# Patient Record
Sex: FEMALE | Race: WHITE | Employment: OTHER | URBAN - METROPOLITAN AREA
[De-identification: names, ages, dates, MRNs, and addresses within clinical notes are randomized per-mention and may not be internally consistent; named-entity substitution may affect disease eponyms.]

---

## 2017-01-24 ENCOUNTER — OFFICE VISIT (OUTPATIENT)
Dept: FAMILY MEDICINE CLINIC | Age: 67
End: 2017-01-24

## 2017-01-24 VITALS
OXYGEN SATURATION: 97 % | RESPIRATION RATE: 18 BRPM | TEMPERATURE: 98.6 F | HEART RATE: 84 BPM | WEIGHT: 197.2 LBS | HEIGHT: 68 IN | SYSTOLIC BLOOD PRESSURE: 140 MMHG | BODY MASS INDEX: 29.89 KG/M2 | DIASTOLIC BLOOD PRESSURE: 88 MMHG

## 2017-01-24 DIAGNOSIS — F41.8 DEPRESSION WITH ANXIETY: ICD-10-CM

## 2017-01-24 DIAGNOSIS — J44.9 CHRONIC OBSTRUCTIVE PULMONARY DISEASE, UNSPECIFIED COPD TYPE (HCC): ICD-10-CM

## 2017-01-24 DIAGNOSIS — I60.8 SUBARACHNOID HEMORRHAGE DUE TO RUPTURED ANEURYSM (HCC): Primary | ICD-10-CM

## 2017-01-24 DIAGNOSIS — I10 BENIGN ESSENTIAL HYPERTENSION: ICD-10-CM

## 2017-01-24 NOTE — PATIENT INSTRUCTIONS
Home Safety Alarms: Care Instructions  Your Care Instructions  Home safety alarms save lives. For example, a smoke alarm can detect small amounts of smoke. This can give you time to escape from a fire. And a carbon monoxide alarm can let you know about this deadly gas before it starts to make you sick. It's important to have both kinds of alarms near all the sleeping areas and on each level of your home. You can buy alarms with different features. For example, if you have a smoke alarm that is set off by steam or cooking smoke, you can buy one with a hush alarm. This lets you push a button that turns off the alarm and makes it less sensitive for a short time. If you put in new alarms, look for long-life alarms with lithium batteries. You may also want to look for ones that can detect both smoke and carbon monoxide. In a newer home, alarms are wired in by an . This type of alarm is electric, with a backup battery. Your local fire department can give you more information on how to prevent fires and carbon monoxide poisoning. They can also help you make a fire escape plan, use fire safety devices, and provide first-aid. Follow-up care is a key part of your treatment and safety. Be sure to make and go to all appointments, and call your doctor if you are having problems. It's also a good idea to know your test results and keep a list of the medicines you take. How can you care for yourself at home? · Install smoke alarms and carbon monoxide alarms in your house. · Put smoke alarms:  ¨ On each level of your home, in the hallway outside sleeping areas, and inside each bedroom. ¨ In the center of a ceiling, or on a wall 6 to 12 inches from the ceiling. This is where smoke goes first. Avoid places near doors, windows, or air ducts. · Put a carbon monoxide alarm in the hallway outside of the bedrooms in each sleeping area of the house. The alarm should be placed high on the wall.  Make sure that the alarm can't be covered up by furniture or drapes. · Make sure your safety alarms are working at all times. You can test them every month by pressing the test button. · If an alarm makes a chirping sound, replace the battery right away. · Replace non-lithium batteries twice a year. Put this on your calendar ahead of time. Some people change alarm batteries when they reset their clocks in the spring and fall. · Replace smoke alarms every 10 years. · Plan and practice fire escape routes. Make sure you have at least two for each area of your home. This includes upper stories and the basement. When should you call for help? Call 911 anytime you think you may need emergency care. For example, call if:  · A smoke or carbon monoxide alarm sounds. Tell everyone to get out of the building. Stand outside until firefighters arrive. Watch closely for changes in your health, and be sure to contact your doctor if you have questions about how to use a home safety alarm. Where can you learn more? Go to http://jama-obinna.info/. Enter S449 in the search box to learn more about \"Home Safety Alarms: Care Instructions. \"  Current as of: July 29, 2016  Content Version: 11.1  © 4296-8379 [employer]. Care instructions adapted under license by [employer] (which disclaims liability or warranty for this information). If you have questions about a medical condition or this instruction, always ask your healthcare professional. [place] [address] any warranty or liability for your use of this information.

## 2017-01-24 NOTE — PROGRESS NOTES
HISTORY OF PRESENT ILLNESS  Jack Philip is a 61 y.o. female. HPI  Patient presents accompanied by her daughter Cary Orellana who is also my patient and this patient's primary caregiver. Cary Orellana is on a new full time job but also cares for her mother which requires her to take time from work to take her to doctor's appointments and provide transportation for other services. Patient had a SAH s/p craniotomy in 2006 and suffered some resultant memory deficit and cognitive impairments along w/ exacerbation of her already chronic depression and anxiety. Since that time patient has required assistance w/ medications, some of her ADL's and all transportation. She also is followed by Pulmonary for COPD and requires f/u appts there about 2-3 x a year. There is nothing acute going on at this time. Patient has been stable in general for quite some time now w/ her daughter's care and support. Presently at her baseline. Her mood is good and stable on current regimen. Appetite, po intake, sleep, bowel/bladder patterns wnl. Self feeds and self grooms. Ambulates w/o asst devices but does have to stop to rest due to general decreased stamina and debility r/t her COPD. Also bc of some of her cognitive issues, she is not safe to commute alone, so one of her children always needs to accompany her. She lost her permanent handicap placard from [facility] and is requesting another form for this as well. BP's have been stable. Review of Systems   Constitutional: Negative. Respiratory: Negative. Cardiovascular: Negative. Gastrointestinal: Negative. Musculoskeletal: Negative for falls.      Problem List  Date Reviewed: 1/24/2017          Codes Class Noted    Benign essential hypertension ICD-10-CM: I10  ICD-9-CM: 401.1  6/24/2016        Impaired fasting glucose/Prediabetes ICD-10-CM: R73.01  ICD-9-CM: 790.21  7/13/2014    Overview Signed 7/13/2014 10:50 PM by Nicholas Webb MD     6/2014             Bilateral hip pain ICD-10-CM: M25.551, M25.552  ICD-9-CM: 719.45  6/3/2014    Overview Signed 6/3/2014  2:46 PM by Justine Lujan MD     xrays 2012, negative             Vitamin D deficiency ICD-10-CM: E55.9  ICD-9-CM: 268.9  6/3/2014    Overview Signed 6/3/2014  2:48 PM by Justine Lujan MD     2012             GERD (gastroesophageal reflux disease) ICD-10-CM: K21.9  ICD-9-CM: 530.81  9/2/2011        H/O Subarachnoid hemorrhage due to ruptured aneurysm ICD-10-CM: I60.8  ICD-9-CM: 430  7/14/2010    Overview Signed 7/14/2010  1:08 PM by Justine Lujan MD     10/10/06 S/P Rt Frontal Craniotomy and clipping, Dr Salud Carter 10/27/06             Essential tremor ICD-10-CM: G25.0  ICD-9-CM: 333.1  7/14/2010        Cough variant asthma ICD-10-CM: J45.991  ICD-9-CM: 493.82  3/29/2010        Tobacco Abuse ICD-10-CM: M44.792  ICD-9-CM: 305.1  3/29/2010    Overview Signed 3/29/2010  6:13 PM by [place] (allergic rhinitis) ICD-10-CM: J30.9  ICD-9-CM: 477.9  3/29/2010        ADD (attention deficit disorder) ICD-10-CM: F98.8  ICD-9-CM: 314.00  3/29/2010        Depression ICD-10-CM: F32.9  ICD-9-CM: 479  3/29/2010        Anxiety ICD-10-CM: F41.9  ICD-9-CM: 300.00  3/29/2010        COPD (chronic obstructive pulmonary disease) ([facility] ICD-10-CM: J44.9  ICD-9-CM: 496  3/29/2010    Overview Signed 11/10/2014  2:05 PM by Justine Lujan MD     Pulm Dr Jory Cheek             Thyroiditis, subacute ICD-10-CM: E06.1  ICD-9-CM: 245.1  3/29/2010        Hyperlipemia ICD-10-CM: P87.1  ICD-9-CM: 272.4  3/29/2010    Overview Signed 7/14/2010  1:05 PM by Justine Lujan MD     2005             DVT of Rt Lower Extremity ICD-10-CM: I82.409  ICD-9-CM: 453.40  3/29/2010    Overview Addendum 7/14/2010  1:04 PM by Justine Lujan MD     11/06             Elevated LFT's, Fatty Liver ICD-10-CM: N71.72  ICD-9-CM: 790.6  3/29/2010    Overview Addendum 7/14/2010  1:04 PM by Justine Lujan MD Abd US 7/09 Hepatic Steatosis                 Past Surgical History   Procedure Laterality Date    Hx hysterectomy  1991     Cervical Dysplasia (Dr Lyndsay Caban)   [facility] colonoscopy w/biopsy single/multiple  1980's     Benign polyp    Echo stress  2005     Negative    Hx craniotomy  10/06     for clipping of ruptured aneurysm; Dr Williams Corona a shunt    Hx colonoscopy  3/2013, Dr Kaylyn Ambriz     benign cecal polyp, f/u 10 yrs    Hx gi  EGD, Dr Kaylyn Ambriz     \"ok\" per pt report       OB History     No data available        Current Outpatient Prescriptions   Medication Sig    tiotropium (SPIRIVA) 18 mcg inhalation capsule Take 1 Cap by inhalation daily. Per Pulm Dr Nael Todd    hydrochlorothiazide (HYDRODIURIL) 25 mg tablet Take 1 Tab by mouth daily. Indications: HYPERTENSION    omeprazole (PRILOSEC) 20 mg capsule TAKE ONE CAPSULE BY MOUTH EVERY DAY    traZODone (DESYREL) 50 mg tablet TAKE 1 TABLET BY MOUTH NIGHTLY    sertraline (ZOLOFT) 100 mg tablet TAKE 1 & 1/2 TABLETS BY MOUTH EVERY DAY    atorvastatin (LIPITOR) 20 mg tablet TAKE 1 TABLET BY MOUTH EVERY DAY    dicyclomine (BENTYL) 10 mg capsule TAKE 1-2 CAPSULES BY MOUTH EVERY 6 HOURS AS NEEDED FOR ABDOMINAL CRAMPS    albuterol (VENTOLIN HFA) 90 mcg/actuation inhaler Take 2 Puffs by inhalation every six (6) hours as needed for Wheezing or Shortness of Breath.  budesonide-formoterol (SYMBICORT) 80-4.5 mcg/actuation HFAA inhaler Take 2 Puffs by inhalation two (2) times a day.  traMADol (ULTRAM) 50 mg tablet Take 1-2 Tabs by mouth daily as needed (for hip pain).  amlodipine (NORVASC) 10 mg tablet TAKE 1 TABLET BY MOUTH EVERY DAY    MULTIVITAMINS (MULTIVITAMIN PO) Take  by mouth daily.  ferrous sulfate (IRON) 325 mg (65 mg Iron) tablet Take  by mouth two (2) times a day. No current facility-administered medications for this visit. Allergies   Allergen Reactions    Ativan [Lorazepam] Hives    Egg Hives     Upset stomach.     Pcn [Penicillins] Other (comments)     Unsure of reaction.     Wellbutrin [Bupropion Hcl] Anxiety    Xanax [Alprazolam] Hives     Social History     Social History    Marital status:      Spouse name: N/A    Number of children: 3    Years of education: N/A     Occupational History    Former  for [employer]; ; involved mostly usage of computers, numbers, financial data, manufacturing data, employee supervision      Social History Main Topics    Smoking status: Current Every Day Smoker     Packs/day: 1.00     Years: 20.00     Types: Cigarettes    Smokeless tobacco: Never Used      Comment: cut back from 1ppd to 1/2-3/4 ppd and uses e cig sometimes    Alcohol use No    Drug use: No    Sexual activity: No     Other Topics Concern     Service No    Blood Transfusions No    Caffeine Concern Yes     1 pot of coffee a day (~ 6 cups)    Occupational Exposure No    Hobby Hazards No    Sleep Concern No    Stress Concern No    Weight Concern No     stable in the upper 190's to 205 range; has gained alot of wt over the years since her disability and not working anymore    Special Diet Yes     cut back on junk foods/sweets    Back Care No    Exercise Yes     walks around [place] 3 x a week x 20 minutes    Bike Helmet No    Seat Belt Yes    Self-Exams Yes     Social History Narrative        2 of her daughters live with her    No asst devices for ambulation; no canes or walkers    No longer drives since the [facility] and stroke    Daughters help w/ finances    Does other ADL's, dressing, bathing, grooming all on her own; some help w/ meal preparation; pt can use microwave and the stove when daughters are home    No toileting problems; uses independently     Immunization History   Administered Date(s) Administered    Pneumococcal Polysaccharide (PPSV-23) 07/10/2014       Family History   Problem Relation Age of Onset    Anxiety Daughter      ADD    Anxiety Daughter    [place] Efrain Thyroid Disease Daughter     Thyroid Disease Daughter      Hypothyroidism    Asthma Daughter     Cancer Father      kidney    Diabetes Father     Arthritis-osteo Father     Diabetes Mother     Hypertension Mother    Boby Lintont Mother    [de-identified] Stroke Mother     Diabetes Sister     Heart Disease Sister     Hypertension Sister    [de-identified] Arthritis-osteo Sister     Thyroid Disease Sister     Diabetes Maternal Grandmother     Diabetes Brother      Visit Vitals    /88 (BP 1 Location: Right arm, BP Patient Position: Sitting)    Pulse 84    Temp 98.6 °F (37 °C) (Oral)    Resp 18    Ht 5' 8\" (1.727 m)    Wt 197 lb 3.2 oz (89.4 kg)    SpO2 97%    BMI 29.98 kg/m2     Physical Exam   Constitutional: She is oriented to person, place, and time. She appears well-developed and well-nourished. No distress. Cardiovascular: Normal rate. Pulmonary/Chest: Effort normal and breath sounds normal.   Neurological: She is alert and oriented to person, place, and time. Coordination normal.   Psychiatric: She has a normal mood and affect. Her behavior is normal. Judgment and thought content normal.   Vitals reviewed. ASSESSMENT and PLAN    ICD-10-CM ICD-9-CM    1. H/O Subarachnoid hemorrhage due to ruptured aneurysm I60.8 430    2. Benign essential hypertension I10 401.1    3. Depression with anxiety F41.8 300.4    4. Chronic obstructive pulmonary disease, unspecified COPD type ([facility] J44.9 496        Primary reason for today's visit is to complete FMLA and DMV paperwork for her daughter Shaji Snyder to care for patient. Shaji Snyder is my patient and is also Ms. Fulton's primary caregiver. See details in HPI above. FMLA forms completed and faxed. Copy given to Shaji Snyder as well.   DMV forms for the permanent handicap placard completed today as well  Reviewed medications, effects and side effects in detail  Continue routine follow up w/ Pulm q3-6 months as directed as well  She is already scheduled for her fasting routine appt w/ me on 2-21-17.  RTC sooner prn

## 2017-01-24 NOTE — PROGRESS NOTES
Chief Complaint   Patient presents with    Medication Evaluation     `Needs FMLA papers filled out for her daughter to be able to get out of work to take her to Dr appointments     1. Have you been to the ER, urgent care clinic since your last visit? Hospitalized since your last visit? No    2. Have you seen or consulted any other health care providers outside of the [address] [place] [address] since your last visit? Include any pap smears or colon screening.  No

## 2017-01-24 NOTE — MR AVS SNAPSHOT
Visit Information Date & Time Provider Department Dept. Phone Encounter #  
 1/24/2017 10:30 AM Justine Lujan, [address] [facility] [phone] [id number] Your Appointments 2/21/2017 10:00 AM  
ROUTINE CARE with Justine Lujan MD  
[facility]) Appt Note: F/u and FMLA papers to be filled out  
 [address] [address]  
[phone]  
  
   
 [address] [address] Upcoming Health Maintenance Date Due  
 BREAST CANCER SCRN MAMMOGRAM 6/9/2016 INFLUENZA AGE 9 TO ADULT 8/1/2016 PAP AKA CERVICAL CYTOLOGY 6/24/2019 COLONOSCOPY 3/27/2023 DTaP/Tdap/Td series (2 - Td) 6/24/2026 Allergies as of 1/24/2017  Review Complete On: 1/24/2017 By: Justine Lujan MD  
  
 Severity Noted Reaction Type Reactions Ativan [Lorazepam]  03/29/2010    Hives Egg  07/14/2010    Hives Upset stomach. Pcn [Penicillins]  03/29/2010    Other (comments) Unsure of reaction. Wellbutrin [Bupropion Hcl]  03/29/2010    Anxiety Xanax [Alprazolam]  03/29/2010    Hives Current Immunizations  Reviewed on 6/24/2016 Name Date Pneumococcal Polysaccharide (PPSV-23) 7/10/2014 Not reviewed this visit You Were Diagnosed With   
  
 Codes Comments Subarachnoid hemorrhage due to ruptured aneurysm ([facility]    -  Primary ICD-10-CM: I60.8 ICD-9-CM: 383 Benign essential hypertension     ICD-10-CM: I10 
ICD-9-CM: 401.1 Depression with anxiety     ICD-10-CM: F41.8 ICD-9-CM: 300.4 Chronic obstructive pulmonary disease, unspecified COPD type ([facility]     ICD-10-CM: J44.9 ICD-9-CM: 796 Vitals BP Pulse Temp Resp Height(growth percentile) Weight(growth percentile) 140/88 (BP 1 Location: Right arm, BP Patient Position: Sitting) 84 98.6 °F (37 °C) (Oral) 18 5' 8\" (1.727 m) 197 lb 3.2 oz (89.4 kg) SpO2 BMI OB Status Smoking Status 97% 29.98 kg/m2 Hysterectomy Current Every Day Smoker BMI and BSA Data Body Mass Index Body Surface Area  
 29.98 kg/m 2 2.07 m 2 Preferred Pharmacy Pharmacy Name Phone [id number] [phone] Your Updated Medication List  
  
   
This list is accurate as of: 1/24/17 12:10 PM.  Always use your most recent med list. amLODIPine 10 mg tablet Commonly known as:  Sparkle Dmitry TAKE 1 TABLET BY MOUTH EVERY DAY  
  
 atorvastatin 20 mg tablet Commonly known as:  LIPITOR  
TAKE 1 TABLET BY MOUTH EVERY DAY  
  
 budesonide-formoterol 80-4.5 mcg/actuation Hfaa inhaler Commonly known as:  SYMBICORT Take 2 Puffs by inhalation two (2) times a day. dicyclomine 10 mg capsule Commonly known as:  BENTYL TAKE 1-2 CAPSULES BY MOUTH EVERY 6 HOURS AS NEEDED FOR ABDOMINAL CRAMPS  
  
 hydroCHLOROthiazide 25 mg tablet Commonly known as:  HYDRODIURIL Take 1 Tab by mouth daily. Indications: HYPERTENSION Iron 325 mg (65 mg iron) tablet Generic drug:  ferrous sulfate Take  by mouth two (2) times a day. MULTIVITAMIN PO Take  by mouth daily. omeprazole 20 mg capsule Commonly known as:  PRILOSEC  
TAKE ONE CAPSULE BY MOUTH EVERY DAY  
  
 sertraline 100 mg tablet Commonly known as:  ZOLOFT  
TAKE 1 & 1/2 TABLETS BY MOUTH EVERY DAY  
  
 tiotropium 18 mcg inhalation capsule Commonly known as:  Darlina Forts Take 1 Cap by inhalation daily. Per Bean Milton  
  
 traMADol 50 mg tablet Commonly known as:  ULTRAM  
Take 1-2 Tabs by mouth daily as needed (for hip pain). traZODone 50 mg tablet Commonly known as:  DESYREL  
TAKE 1 TABLET BY MOUTH NIGHTLY  
  
 VENTOLIN HFA 90 mcg/actuation inhaler Generic drug:  albuterol Take 2 Puffs by inhalation every six (6) hours as needed for Wheezing or Shortness of Breath. Patient Instructions Home Safety Alarms: Care Instructions Your Care Instructions Home safety alarms save lives. For example, a smoke alarm can detect small amounts of smoke. This can give you time to escape from a fire. And a carbon monoxide alarm can let you know about this deadly gas before it starts to make you sick. It's important to have both kinds of alarms near all the sleeping areas and on each level of your home. You can buy alarms with different features. For example, if you have a smoke alarm that is set off by steam or cooking smoke, you can buy one with a hush alarm. This lets you push a button that turns off the alarm and makes it less sensitive for a short time. If you put in new alarms, look for long-life alarms with lithium batteries. You may also want to look for ones that can detect both smoke and carbon monoxide. In a newer home, alarms are wired in by an . This type of alarm is electric, with a backup battery. Your local fire department can give you more information on how to prevent fires and carbon monoxide poisoning. They can also help you make a fire escape plan, use fire safety devices, and provide first-aid. Follow-up care is a key part of your treatment and safety. Be sure to make and go to all appointments, and call your doctor if you are having problems. It's also a good idea to know your test results and keep a list of the medicines you take. How can you care for yourself at home? · Install smoke alarms and carbon monoxide alarms in your house. · Put smoke alarms: 
¨ On each level of your home, in the hallway outside sleeping areas, and inside each bedroom. ¨ In the center of a ceiling, or on a wall 6 to 12 inches from the ceiling. This is where smoke goes first. Avoid places near doors, windows, or air ducts. · Put a carbon monoxide alarm in the hallway outside of the bedrooms in each sleeping area of the house.  The alarm should be placed high on the wall. Make sure that the alarm can't be covered up by furniture or drapes. · Make sure your safety alarms are working at all times. You can test them every month by pressing the test button. · If an alarm makes a chirping sound, replace the battery right away. · Replace non-lithium batteries twice a year. Put this on your calendar ahead of time. Some people change alarm batteries when they reset their clocks in the spring and fall. · Replace smoke alarms every 10 years. · Plan and practice fire escape routes. Make sure you have at least two for each area of your home. This includes upper stories and the basement. When should you call for help? Call 911 anytime you think you may need emergency care. For example, call if: · A smoke or carbon monoxide alarm sounds. Tell everyone to get out of the building. Stand outside until firefighters arrive. Watch closely for changes in your health, and be sure to contact your doctor if you have questions about how to use a home safety alarm. Where can you learn more? Go to http://jama-obinna.info/. Enter C592 in the search box to learn more about \"Home Safety Alarms: Care Instructions. \" Current as of: July 29, 2016 Content Version: 11.1 © 7414-7094 [employer]. Care instructions adapted under license by [employer] (which disclaims liability or warranty for this information). If you have questions about a medical condition or this instruction, always ask your healthcare professional. [place] [address] any warranty or liability for your use of this information. Introducing [facility]! Dear Bailee Muñoz: Thank you for requesting a [employer] account. Our records indicate that you have previously registered for a [employer] account but its currently inactive. Please call our [employer] support line at [phone]. Additional Information If you have questions, please visit the Frequently Asked Questions section of the [employer] website at [email or web address] [email or web address]. [email or web address] Remember, [employer] is NOT to be used for urgent needs. For medical emergencies, dial 911. Now available from your iPhone and Android! Please provide this summary of care documentation to your next provider. Your primary care clinician is listed as ROSANA HOPSON. If you have any questions after today's visit, please call [phone].

## 2017-02-06 RX ORDER — TRAZODONE HYDROCHLORIDE 50 MG/1
TABLET ORAL
Qty: 30 TAB | Refills: 5 | Status: SHIPPED | OUTPATIENT
Start: 2017-02-06 | End: 2018-01-23 | Stop reason: SDUPTHER

## 2017-02-06 RX ORDER — OMEPRAZOLE 20 MG/1
CAPSULE, DELAYED RELEASE ORAL
Qty: 30 CAP | Refills: 5 | Status: SHIPPED | OUTPATIENT
Start: 2017-02-06 | End: 2018-01-23 | Stop reason: SDUPTHER

## 2017-02-21 ENCOUNTER — OFFICE VISIT (OUTPATIENT)
Dept: FAMILY MEDICINE CLINIC | Age: 67
End: 2017-02-21

## 2017-02-21 VITALS
HEART RATE: 88 BPM | RESPIRATION RATE: 18 BRPM | DIASTOLIC BLOOD PRESSURE: 74 MMHG | TEMPERATURE: 99.1 F | BODY MASS INDEX: 29.98 KG/M2 | SYSTOLIC BLOOD PRESSURE: 132 MMHG | WEIGHT: 197.8 LBS | OXYGEN SATURATION: 96 % | HEIGHT: 68 IN

## 2017-02-21 DIAGNOSIS — R73.01 IMPAIRED FASTING GLUCOSE: ICD-10-CM

## 2017-02-21 DIAGNOSIS — K76.0 FATTY LIVER: ICD-10-CM

## 2017-02-21 DIAGNOSIS — F41.8 DEPRESSION WITH ANXIETY: ICD-10-CM

## 2017-02-21 DIAGNOSIS — Z71.89 ACP (ADVANCE CARE PLANNING): ICD-10-CM

## 2017-02-21 DIAGNOSIS — E78.5 HYPERLIPIDEMIA, UNSPECIFIED HYPERLIPIDEMIA TYPE: ICD-10-CM

## 2017-02-21 DIAGNOSIS — I10 BENIGN ESSENTIAL HYPERTENSION: ICD-10-CM

## 2017-02-21 DIAGNOSIS — Z00.00 ROUTINE GENERAL MEDICAL EXAMINATION AT A HEALTH CARE FACILITY: Primary | ICD-10-CM

## 2017-02-21 NOTE — PROGRESS NOTES
HISTORY OF PRESENT ILLNESS  Bisi Hernadez is a 61 y.o. female. HPI  Fasting follow up cholesterol, hypertension, prediabetes, labs and medication check. Accompanied by her daughter Melquiades Robertson again today for routine check. Overall has been getting along well. No complaints or concerns at this time. Doing well on current medication regimen w/o reaction or side effects. Home BP's have been good and stable. Appetite, po intake, wt stable. Sleep patterns good. Mood good and stable on current regimen. Review of Systems   Constitutional: Negative. Respiratory: Negative. Cardiovascular: Negative. Gastrointestinal: Negative. Musculoskeletal: Negative for myalgias. Neurological: Negative for dizziness, tingling, sensory change, speech change and focal weakness. Endo/Heme/Allergies: Negative. Psychiatric/Behavioral: Negative. Problem List  Date Reviewed: 2/21/2017          Codes Class Noted    ACP (advance care planning) ICD-10-CM: Z71.89  ICD-9-CM: V65.49  2/21/2017    Overview Signed 2/21/2017 11:02 AM by Zena Emerson MD     Initial ACP discussion w/ pt and daughter 2-2017. AMD form reviewed and copy provided.  NN/facilitator to discuss with patient               Benign essential hypertension ICD-10-CM: I10  ICD-9-CM: 401.1  6/24/2016        Impaired fasting glucose/Prediabetes ICD-10-CM: R73.01  ICD-9-CM: 790.21  7/13/2014    Overview Signed 7/13/2014 10:50 PM by Zena Emerson MD     6/2014             Bilateral hip pain ICD-10-CM: M25.551, M25.552  ICD-9-CM: 719.45  6/3/2014    Overview Signed 6/3/2014  2:46 PM by Zena Emerson MD     xrays 2012, negative             Vitamin D deficiency ICD-10-CM: E55.9  ICD-9-CM: 268.9  6/3/2014    Overview Signed 6/3/2014  2:48 PM by Zena Emerson MD     2012             GERD (gastroesophageal reflux disease) ICD-10-CM: K21.9  ICD-9-CM: 530.81  9/2/2011        H/O Subarachnoid hemorrhage due to ruptured aneurysm ICD-10-CM: I60.8  ICD-9-CM: 447  7/14/2010    Overview Signed 7/14/2010  1:08 PM by Jennie Jaramillo MD     10/10/06 S/P Rt Frontal Craniotomy and clipping, Dr Farzad Rodríguez 10/27/06             Essential tremor ICD-10-CM: G25.0  ICD-9-CM: 333.1  7/14/2010        Cough variant asthma ICD-10-CM: J45.991  ICD-9-CM: 493.82  3/29/2010        Tobacco Abuse ICD-10-CM: R57.165  ICD-9-CM: 305.1  3/29/2010    Overview Signed 3/29/2010  6:13 PM by Mary Carroll (allergic rhinitis) ICD-10-CM: J30.9  ICD-9-CM: 477.9  3/29/2010        ADD (attention deficit disorder) ICD-10-CM: F98.8  ICD-9-CM: 314.00  3/29/2010        Depression ICD-10-CM: F32.9  ICD-9-CM: 745  3/29/2010        Anxiety ICD-10-CM: F41.9  ICD-9-CM: 300.00  3/29/2010        COPD (chronic obstructive pulmonary disease) ([facility] ICD-10-CM: J44.9  ICD-9-CM: 245  3/29/2010    Overview Signed 11/10/2014  2:05 PM by Jennie Jaramillo MD     Pulm Dr Yen Chamberlain             Thyroiditis, subacute ICD-10-CM: E06.1  ICD-9-CM: 245.1  3/29/2010        Hyperlipemia ICD-10-CM: E78.5  ICD-9-CM: 272.4  3/29/2010    Overview Signed 7/14/2010  1:05 PM by Jennie Jaramillo MD     2005             DVT of Rt Lower Extremity ICD-10-CM: I82.409  ICD-9-CM: 453.40  3/29/2010    Overview Addendum 7/14/2010  1:04 PM by Jennie Jaramillo MD     11/06             Elevated LFT's, Fatty Liver ICD-10-CM: R79.89  ICD-9-CM: 790.6  3/29/2010    Overview Addendum 7/14/2010  1:04 PM by Jennie Jaramillo MD     [facility] 7/09 Hepatic Steatosis                 Past Surgical History   Procedure Laterality Date    Hx hysterectomy  1991     Cervical Dysplasia (Dr Waleska Avina)    Pr colonoscopy w/biopsy single/multiple  1980's     Benign polyp    Echo stress  2005     Negative    Hx craniotomy  10/06     for clipping of ruptured aneurysm; Dr Abdelrahman Briggs a shunt    Hx colonoscopy  3/2013, Dr Tamika Velasquez     benign cecal polyp, f/u 10 yrs    Hx gi  EGD, Dr Tamika Velasquez \"ok\" per pt report     OB History     No data available        Current Outpatient Prescriptions   Medication Sig    traZODone (DESYREL) 50 mg tablet TAKE 1 TABLET BY MOUTH NIGHTLY    omeprazole (PRILOSEC) 20 mg capsule TAKE ONE CAPSULE BY MOUTH EVERY DAY    tiotropium (SPIRIVA) 18 mcg inhalation capsule Take 1 Cap by inhalation daily. Per Pulm Dr Don Mcgovern    hydrochlorothiazide (HYDRODIURIL) 25 mg tablet Take 1 Tab by mouth daily. Indications: HYPERTENSION    sertraline (ZOLOFT) 100 mg tablet TAKE 1 & 1/2 TABLETS BY MOUTH EVERY DAY    atorvastatin (LIPITOR) 20 mg tablet TAKE 1 TABLET BY MOUTH EVERY DAY    dicyclomine (BENTYL) 10 mg capsule TAKE 1-2 CAPSULES BY MOUTH EVERY 6 HOURS AS NEEDED FOR ABDOMINAL CRAMPS    albuterol (VENTOLIN HFA) 90 mcg/actuation inhaler Take 2 Puffs by inhalation every six (6) hours as needed for Wheezing or Shortness of Breath.  budesonide-formoterol (SYMBICORT) 80-4.5 mcg/actuation HFAA inhaler Take 2 Puffs by inhalation two (2) times a day.  traMADol (ULTRAM) 50 mg tablet Take 1-2 Tabs by mouth daily as needed (for hip pain).  amlodipine (NORVASC) 10 mg tablet TAKE 1 TABLET BY MOUTH EVERY DAY    MULTIVITAMINS (MULTIVITAMIN PO) Take  by mouth daily.  ferrous sulfate (IRON) 325 mg (65 mg Iron) tablet Take  by mouth two (2) times a day. No current facility-administered medications for this visit. Allergies   Allergen Reactions    Ativan [Lorazepam] Hives    Egg Hives     Upset stomach.  Pcn [Penicillins] Other (comments)     Unsure of reaction.     Wellbutrin [Bupropion Hcl] Anxiety    Xanax [Alprazolam] Hives     Social History     Social History    Marital status:      Spouse name: N/A    Number of children: 3    Years of education: N/A     Occupational History    Former  for [employer]; ; involved mostly usage of computers, numbers, financial data, manufacturing data, employee supervision      Social History Main Topics    Smoking status: Current Every Day Smoker     Packs/day: 1.00     Years: 20.00     Types: Cigarettes    Smokeless tobacco: Never Used      Comment: cut back from 1ppd to 1/2-3/4 ppd and uses e cig sometimes    Alcohol use No    Drug use: No    Sexual activity: No     Other Topics Concern     Service No    Blood Transfusions No    Caffeine Concern Yes     1 pot of coffee a day (~ 6-8 cups)    Occupational Exposure No    Hobby Hazards No    Sleep Concern No    Stress Concern No    Weight Concern No    Special Diet Yes     cut back on junk foods/sweets    Back Care No    Exercise No     not latelyl, was walking around [facility] 3 x a week x 20 minutes; will start back in spring    Bike Helmet No    Seat Belt Yes    Self-Exams Yes     Social History Narrative        2 of her daughters live with her and one niece    Niece prepares all meals    No asst devices for ambulation; no canes or walkers    No longer drives since the [facility] and stroke    Daughters help w/ finances, cooking, cleaning, transportation and manage her medications (daughter Ford Donnelly who accompanies her to all her appts)    Does other ADL's, dressing, bathing, grooming all on her own; some help w/ meal preparation; pt can use microwave and the stove when daughters are home    No toileting problems; uses independently    Initial ACP discussion w/ pt and daughter 2-2017. AMD form reviewed and copy provided.  NN/facilitator to discuss with patient     Immunization History   Administered Date(s) Administered    Pneumococcal Polysaccharide (PPSV-23) 07/10/2014     Family History   Problem Relation Age of Onset    Anxiety Daughter      ADD    Anxiety Daughter     Thyroid Disease Daughter     Thyroid Disease Daughter      Hypothyroidism    Asthma Daughter     Cancer Father      kidney    Diabetes Father     Arthritis-osteo Father     Diabetes Mother     Hypertension Mother     Arthritis-osteo Mother  Stroke Mother     Diabetes Sister     Heart Disease Sister     Hypertension Sister     Arthritis-osteo Sister     Thyroid Disease Sister     Diabetes Maternal Grandmother     Diabetes Brother      Visit Vitals    /74 (BP 1 Location: Left arm, BP Patient Position: Sitting)    Pulse 88    Temp 99.1 °F (37.3 °C) (Oral)    Resp 18    Ht 5' 8\" (1.727 m)    Wt 197 lb 12.8 oz (89.7 kg)    SpO2 96%    BMI 30.08 kg/m2       Physical Exam   Constitutional: She is oriented to person, place, and time. No distress. Neck: Carotid bruit is not present. Cardiovascular: Normal rate, regular rhythm and normal heart sounds. No murmur heard. Pulmonary/Chest: Effort normal and breath sounds normal. No respiratory distress. She has no wheezes. She has no rales. Abdominal: Soft. There is no tenderness. Musculoskeletal: She exhibits no edema or tenderness. Neurological: She is alert and oriented to person, place, and time. Coordination normal.   Psychiatric: She has a normal mood and affect. Her behavior is normal. Judgment and thought content normal.   Vitals reviewed. ASSESSMENT and PLAN    ICD-10-CM ICD-9-CM    1. Routine general medical examination at a health care facility Z00.00 V70.0 Medicare AWV,    2. ACP (advance care planning) Z71.89 V65.49 Initial ACP discussion w/ pt and daughter 2-2017. AMD form reviewed and copy provided. NN/facilitator to discuss with patient   3. Benign essential hypertension I10 401.1    4. Impaired fasting glucose/Prediabetes H41.35 034.93 METABOLIC PANEL, COMPREHENSIVE      HEMOGLOBIN A1C WITH EAG   5. Hyperlipidemia, unspecified hyperlipidemia type E78.5 272.4 LIPID PANEL   6. Fatty liver L08.0 640.2 METABOLIC PANEL, COMPREHENSIVE   7.  Depression with anxiety, controlled, stable on current regimen F41.8 300.4      Fasting labs today  Reviewed diet, nutrition, exercise, smoking cessation and weight control  Cardiovascular risk and specific lipid/LDL/BP/BS goals reviewed  Reviewed medications and side effects in detail  Doing well on current medication regimen, tolerating well  Further follow up & other recommendations pending review of labs as well  If all remains good and stable, RTC 6month check, sooner prn

## 2017-02-21 NOTE — MR AVS SNAPSHOT
Visit Information Date & Time Provider Department Dept. Phone Encounter #  
 2/21/2017 10:00 AM Alberto Galo, [address] [facility] [phone] [id number] Upcoming Health Maintenance Date Due  
 BREAST CANCER SCRN MAMMOGRAM 2/21/2019 PAP AKA CERVICAL CYTOLOGY 6/24/2019 COLONOSCOPY 3/27/2023 DTaP/Tdap/Td series (2 - Td) 6/24/2026 Allergies as of 2/21/2017  Review Complete On: 2/21/2017 By: [address], MD  
  
 Severity Noted Reaction Type Reactions Ativan [Lorazepam]  03/29/2010    Hives Egg  07/14/2010    Hives Upset stomach. Pcn [Penicillins]  03/29/2010    Other (comments) Unsure of reaction. Wellbutrin [Bupropion Hcl]  03/29/2010    Anxiety Xanax [Alprazolam]  03/29/2010    Hives Current Immunizations  Reviewed on 2/21/2017 Name Date Pneumococcal Polysaccharide (PPSV-23) 7/10/2014 Reviewed by [address], MD on 2/21/2017 at 11:08 AM  
You Were Diagnosed With   
  
 Codes Comments Routine general medical examination at a health care facility    -  Primary ICD-10-CM: Z00.00 ICD-9-CM: V70.0 ACP (advance care planning)     ICD-10-CM: Z71.89 ICD-9-CM: V65.49 Benign essential hypertension     ICD-10-CM: I10 
ICD-9-CM: 401.1 Impaired fasting glucose     ICD-10-CM: R73.01 
ICD-9-CM: 790.21 Hyperlipidemia, unspecified hyperlipidemia type     ICD-10-CM: E78.5 ICD-9-CM: 272.4 Fatty liver     ICD-10-CM: K76.0 ICD-9-CM: 571.8 Depression with anxiety     ICD-10-CM: F41.8 ICD-9-CM: 300.4 Vitals BP Pulse Temp Resp Height(growth percentile) Weight(growth percentile) 132/74 (BP 1 Location: Left arm, BP Patient Position: Sitting) 88 99.1 °F (37.3 °C) (Oral) 18 5' 8\" (1.727 m) 197 lb 12.8 oz (89.7 kg) SpO2 BMI OB Status Smoking Status 96% 30.08 kg/m2 Hysterectomy Current Every Day Smoker BMI and BSA Data Body Mass Index Body Surface Area 30.08 kg/m 2 2.07 m 2 Preferred Pharmacy Pharmacy Name Phone [id number], [address] [place] [address] [phone] Your Updated Medication List  
  
   
This list is accurate as of: 2/21/17 11:11 AM.  Always use your most recent med list. amLODIPine 10 mg tablet Commonly known as:  Absaraka Blade TAKE 1 TABLET BY MOUTH EVERY DAY  
  
 atorvastatin 20 mg tablet Commonly known as:  LIPITOR  
TAKE 1 TABLET BY MOUTH EVERY DAY  
  
 budesonide-formoterol 80-4.5 mcg/actuation Hfaa inhaler Commonly known as:  SYMBICORT Take 2 Puffs by inhalation two (2) times a day. dicyclomine 10 mg capsule Commonly known as:  BENTYL TAKE 1-2 CAPSULES BY MOUTH EVERY 6 HOURS AS NEEDED FOR ABDOMINAL CRAMPS  
  
 hydroCHLOROthiazide 25 mg tablet Commonly known as:  HYDRODIURIL Take 1 Tab by mouth daily. Indications: HYPERTENSION Iron 325 mg (65 mg iron) tablet Generic drug:  ferrous sulfate Take  by mouth two (2) times a day. MULTIVITAMIN PO Take  by mouth daily. omeprazole 20 mg capsule Commonly known as:  PRILOSEC  
TAKE ONE CAPSULE BY MOUTH EVERY DAY  
  
 sertraline 100 mg tablet Commonly known as:  ZOLOFT  
TAKE 1 & 1/2 TABLETS BY MOUTH EVERY DAY  
  
 tiotropium 18 mcg inhalation capsule Commonly known as:  Mayra Aland Take 1 Cap by inhalation daily. Per Bean Huerta  
  
 traMADol 50 mg tablet Commonly known as:  ULTRAM  
Take 1-2 Tabs by mouth daily as needed (for hip pain). traZODone 50 mg tablet Commonly known as:  DESYREL  
TAKE 1 TABLET BY MOUTH NIGHTLY  
  
 VENTOLIN HFA 90 mcg/actuation inhaler Generic drug:  albuterol Take 2 Puffs by inhalation every six (6) hours as needed for Wheezing or Shortness of Breath. We Performed the Following HEMOGLOBIN A1C WITH EAG [90470 CPT(R)] LIPID PANEL [98798 CPT(R)] METABOLIC PANEL, COMPREHENSIVE [91380 CPT(R)] Patient Instructions Advance Directives: Care Instructions Your Care Instructions An advance directive is a legal way to state your wishes at the end of your life. It tells your family and your doctor what to do if you can no longer say what you want. There are two main types of advance directives. You can change them any time that your wishes change. · A living will tells your family and your doctor your wishes about life support and other treatment. · A medical power of  lets you name a person to make treatment decisions for you when you can't speak for yourself. This person is called a health care agent. If you do not have an advance directive, decisions about your medical care may be made by a doctor or a  who doesn't know you. It may help to think of an advance directive as a gift to the people who care for you. If you have one, they won't have to make tough decisions by themselves. Follow-up care is a key part of your treatment and safety. Be sure to make and go to all appointments, and call your doctor if you are having problems. It's also a good idea to know your test results and keep a list of the medicines you take. How can you care for yourself at home? · Discuss your wishes with your loved ones and your doctor. This way, there are no surprises. · Many states have a unique form. Or you might use a universal form that has been approved by many states. This kind of form can sometimes be completed and stored online. Your electronic copy will then be available wherever you have a connection to the Internet. In most cases, doctors will respect your wishes even if you have a form from a different state. · You don't need a  to do an advance directive. But you may want to get legal advice. · Think about these questions when you prepare an advance directive: ¨ Who do you want to make decisions about your medical care if you are not able to? Many people choose a family member, close friend, or doctor. ¨ Do you know enough about life support methods that might be used? If not, talk to your doctor so you understand. ¨ What are you most afraid of that might happen? You might be afraid of having pain, losing your independence, or being kept alive by machines. ¨ Where would you prefer to die? Choices include your home, a hospital, or a nursing home. ¨ Would you like to have information about hospice care to support you and your family? ¨ Do you want to donate organs when you die? ¨ Do you want certain Latter day practices performed before you die? If so, put your wishes in the advance directive. · Read your advance directive every year, and make changes as needed. When should you call for help? Be sure to contact your doctor if you have any questions. Where can you learn more? Go to http://jama-obinna.info/. Enter R264 in the search box to learn more about \"Advance Directives: Care Instructions. \" Current as of: February 24, 2016 Content Version: 11.1 © 2739-7228 i'mma. Care instructions adapted under license by [employer] (which disclaims liability or warranty for this information). If you have questions about a medical condition or this instruction, always ask your healthcare professional. [address] any warranty or liability for your use of this information. Schedule of Personalized Health Plan (Provide Copy to Patient) The best way to stay healthy is to live a healthy lifestyle. A healthy lifestyle includes regular exercise, eating a well-balanced diet, keeping a healthy weight and not smoking. Regular physical exams and screening tests are another important way to take care of yourself.  Preventive exams provided by health care providers can find health problems early when treatment works best and can keep you from getting certain diseases or illnesses. Preventive services include exams, lab tests, screenings, shots, monitoring and information to help you take care of your own health. All people over 65 should have a pneumonia shot. Pneumonia shots are usually only needed once in a lifetime unless your doctor decides differently. All people over 65 should have a yearly flu shot. People over 65 are at medium to high risk for Hepatitis B. Three shots are needed for complete protection. In addition to your physical exam, some screening tests are recommended: 
 
Bone mass measurement (dexa scan) is recommended every two years Diabetes Mellitus screening is recommended every year. Glaucoma is an eye disease caused by high pressure in the eye. An eye exam is recommended every year. Cardiovascular screening tests that check your cholesterol and other blood fat (lipid) levels are recommended every five years. Colorectal Cancer screening tests help to find pre-cancerous polyps (growths in the colon) so they can be removed before they turn into cancer. Tests ordered for screening depend on your personal and family history risk factors. Screening for Breast Cancer is recommended yearly with a mammogram. 
 
Screening for Cervical Cancer is recommended every two years (annually for certain risk factors, such as previous history of STD or abnormal PAP in past 7 years), with a Pelvic Exam with PAP Here is a list of your current Health Maintenance items with a due date: 
Health Maintenance Topic Date Due  
 BREAST CANCER SCRN MAMMOGRAM  02/21/2019  PAP AKA CERVICAL CYTOLOGY  06/24/2019  COLONOSCOPY  03/27/2023  DTaP/Tdap/Td series (2 - Td) 06/24/2026  Hepatitis C Screening  Completed  ZOSTER VACCINE AGE 60>  Addressed  Pneumococcal 19-64 Medium Risk  Completed  INFLUENZA AGE 9 TO ADULT  Addressed Introducing [facility]! Dear Analia Duron: Thank you for requesting a [employer] account. Our records indicate that you have previously registered for a [employer] account but its currently inactive. Please call our [employer] support line at [phone]. Additional Information If you have questions, please visit the Frequently Asked Questions section of the [employer] website at [email or web address] Remember, [employer] is NOT to be used for urgent needs. For medical emergencies, dial 911. Now available from your iPhone and Android! Please provide this summary of care documentation to your next provider. Your primary care clinician is listed as ROSANA HOPSON. If you have any questions after today's visit, please call [phone].

## 2017-02-21 NOTE — LETTER
3/9/2017 4:03 PM 
 
Ms. Marianne Chan [address] [facility] [id number] Dear Marianne Chan: 
 
Please find your most recent results below. Resulted Orders METABOLIC PANEL, COMPREHENSIVE Result Value Ref Range Glucose 101 (H) 65 - 99 mg/dL BUN 9 8 - 27 mg/dL Creatinine 0.81 0.57 - 1.00 mg/dL GFR est non-AA 77 >59 mL/min/1.73 GFR est AA 89 >59 mL/min/1.73  
 BUN/Creatinine ratio 11 11 - 26 Sodium 140 134 - 144 mmol/L Potassium 4.1 3.5 - 5.2 mmol/L Chloride 99 96 - 106 mmol/L  
 CO2 22 18 - 29 mmol/L Calcium 9.6 8.7 - 10.3 mg/dL Protein, total 7.2 6.0 - 8.5 g/dL Albumin 4.4 3.6 - 4.8 g/dL GLOBULIN, TOTAL 2.8 1.5 - 4.5 g/dL A-G Ratio 1.6 1.1 - 2.5 Comment: **Effective March 13, 2017 the reference interval** 
  for A/G Ratio will be changing to: Age                Male          Female 0 -  7 days       1.1 - 2.3       1.1 - 2.3 
          8 - 30 days       1.2 - 2.8       1.2 - 2.8 
          1 -  6 months     1.3 - 3.6       1.3 - 3.6 
   7 months -  5 years      1.5 - 2.6       1.5 - 2.6 
             > 5 years      1.2 - 2.2       1.2 - 2.2 Bilirubin, total 0.3 0.0 - 1.2 mg/dL Alk. phosphatase 69 39 - 117 IU/L  
 AST (SGOT) 13 0 - 40 IU/L  
 ALT (SGPT) 16 0 - 32 IU/L Narrative Performed at:  [address]  [id number] : Didi Henry MD, Phone:  [phone] LIPID PANEL Result Value Ref Range Cholesterol, total 154 100 - 199 mg/dL Triglyceride 120 0 - 149 mg/dL HDL Cholesterol 45 >39 mg/dL VLDL, calculated 24 5 - 40 mg/dL LDL, calculated 85 0 - 99 mg/dL Narrative Performed at:  [facility] [address]  [id number] : Didi Henry MD, Phone:  [phone] HEMOGLOBIN A1C WITH EAG Result Value Ref Range Hemoglobin A1c 6.1 (H) 4.8 - 5.6 % Comment: Pre-diabetes: 5.7 - 6.4 Diabetes: >6.4 Glycemic control for adults with diabetes: <7.0 Estimated average glucose 128 mg/dL Narrative Performed at:  [address]  [id number] : Edward Mon MD, Phone:  [phone] CVD REPORT Result Value Ref Range INTERPRETATION Note Comment:  
   Supplement report is available. Narrative Performed at:  [address] [place] [address]  [id number] : Luann Alcala PhD, Phone:  [phone] Sorry we weren't able to connect. When I called it said that the voice mail was full. Dr David Cai wanted you to know that labs are all overall good and stable. No changes at this time. Next follow up 6months. No need to fast 
 
Please call me if you have any questions: [phone] Sincerely, MJ wesley Falk MD

## 2017-02-21 NOTE — ACP (ADVANCE CARE PLANNING)
Initial ACP discussion w/ pt and daughter 2-2017. AMD form reviewed and copy provided.  NN/facilitator to discuss with patient

## 2017-02-21 NOTE — PROGRESS NOTES
Chief Complaint   Patient presents with    Hypertension     fasting follow up    Blood sugar problem     1. Have you been to the ER, urgent care clinic since your last visit? Hospitalized since your last visit? No    2. Have you seen or consulted any other health care providers outside of the [address] [place] [address] since your last visit? Include any pap smears or colon screening.  No

## 2017-02-21 NOTE — PROGRESS NOTES
Radha Ghotra is a 61 y.o. female and presents for annual Medicare Wellness Visit. Problem List: Reviewed with patient and discussed risk factors.     Patient Active Problem List   Diagnosis Code    Cough variant asthma J45.991    Tobacco Abuse F17.210    AR (allergic rhinitis) J30.9    ADD (attention deficit disorder) F98.8    Depression F32.9    Anxiety F41.9    COPD (chronic obstructive pulmonary disease) ([address] J44.9    Thyroiditis, subacute E06.1    Hyperlipemia E78.5    DVT of Rt Lower Extremity I82.409    Elevated LFT's, Fatty Liver R79.89    H/O Subarachnoid hemorrhage due to ruptured aneurysm I60.8    Essential tremor G25.0    GERD (gastroesophageal reflux disease) K21.9    Bilateral hip pain M25.551, M25.552    Vitamin D deficiency E55.9    Impaired fasting glucose/Prediabetes R73.01    Benign essential hypertension I10       Current medical providers:  Patient Care Team:  Colton Sheets MD as PCP - Lorenzo Ordaz MD (Pulmonary Disease)    PSH: Reviewed with patient  Past Surgical History   Procedure Laterality Date    Hx hysterectomy  1991     Cervical Dysplasia (Dr Lorin Murrieta)    Pr colonoscopy w/biopsy single/multiple  1980's     Benign polyp    Echo stress  2005     Negative    Hx craniotomy  10/06     for clipping of ruptured aneurysm; Dr Rhoda Mcmahon a shunt    Hx colonoscopy  3/2013, Dr Armani Vu     benign cecal polyp, f/u 10 yrs    Hx gi  EGD, Dr Armani Vu     \"ok\" per pt report        SH: Reviewed with patient  Social History   Substance Use Topics    Smoking status: Current Every Day Smoker     Packs/day: 1.00     Years: 20.00     Types: Cigarettes    Smokeless tobacco: Never Used      Comment: cut back from 1ppd to 1/2-3/4 ppd and uses e cig sometimes    Alcohol use No       FH: Reviewed with patient  Family History   Problem Relation Age of Onset    Anxiety Daughter      ADD    Anxiety Daughter     Thyroid Disease Daughter     Thyroid Disease Daughter      Hypothyroidism    Asthma Daughter     Cancer Father      kidney    Diabetes Father     Arthritis-osteo Father     Diabetes Mother     Hypertension Mother    Corena Countess Mother    [employer] Stroke Mother     Diabetes Sister     Heart Disease Sister     Hypertension Sister    Corena Countess Sister     Thyroid Disease Sister     Diabetes Maternal Grandmother     Diabetes Brother        Medications/Allergies: Reviewed with patient  Current Outpatient Prescriptions on File Prior to Visit   Medication Sig Dispense Refill    traZODone (DESYREL) 50 mg tablet TAKE 1 TABLET BY MOUTH NIGHTLY 30 Tab 5    omeprazole (PRILOSEC) 20 mg capsule TAKE ONE CAPSULE BY MOUTH EVERY DAY 30 Cap 5    tiotropium (SPIRIVA) 18 mcg inhalation capsule Take 1 Cap by inhalation daily. Per Pulm Dr Yen Chamberlain      hydrochlorothiazide (HYDRODIURIL) 25 mg tablet Take 1 Tab by mouth daily. Indications: HYPERTENSION 30 Tab 5    sertraline (ZOLOFT) 100 mg tablet TAKE 1 & 1/2 TABLETS BY MOUTH EVERY DAY 45 tablet 11    atorvastatin (LIPITOR) 20 mg tablet TAKE 1 TABLET BY MOUTH EVERY DAY 30 tablet 2    dicyclomine (BENTYL) 10 mg capsule TAKE 1-2 CAPSULES BY MOUTH EVERY 6 HOURS AS NEEDED FOR ABDOMINAL CRAMPS 60 Cap 1    albuterol (VENTOLIN HFA) 90 mcg/actuation inhaler Take 2 Puffs by inhalation every six (6) hours as needed for Wheezing or Shortness of Breath.  budesonide-formoterol (SYMBICORT) 80-4.5 mcg/actuation HFAA inhaler Take 2 Puffs by inhalation two (2) times a day. 1 Inhaler 2    traMADol (ULTRAM) 50 mg tablet Take 1-2 Tabs by mouth daily as needed (for hip pain). 60 Tab 2    amlodipine (NORVASC) 10 mg tablet TAKE 1 TABLET BY MOUTH EVERY DAY 30 Tab 3    MULTIVITAMINS (MULTIVITAMIN PO) Take  by mouth daily.  ferrous sulfate (IRON) 325 mg (65 mg Iron) tablet Take  by mouth two (2) times a day. No current facility-administered medications on file prior to visit.        Allergies   Allergen Reactions    Ativan [Lorazepam] Hives    Egg Hives     Upset stomach.  Pcn [Penicillins] Other (comments)     Unsure of reaction.  Wellbutrin [Bupropion Hcl] Anxiety    Xanax [Alprazolam] Hives       Objective:  Visit Vitals    /74 (BP 1 Location: Left arm, BP Patient Position: Sitting)    Pulse 88    Temp 99.1 °F (37.3 °C) (Oral)    Resp 18    Ht 5' 8\" (1.727 m)    Wt 197 lb 12.8 oz (89.7 kg)    SpO2 96%    BMI 30.08 kg/m2    Body mass index is 30.08 kg/(m^2). Assessment of cognitive impairment: Alert and oriented x 3    Depression Screen: No flowsheet data found. Fall Risk Assessment:  No flowsheet data found. Functional Ability:   Does the patient exhibit a steady gait? yes   How long did it take the patient to get up and walk from a sitting position? 2 sec   Is the patient self reliant?  (ie can do own laundry, meals, household chores)  no     Does the patient handle his/her own medications?  no     Does the patient handle his/her own money? no     Is the patients home safe (ie good lighting, handrails on stairs and bath, etc.)? yes     Did you notice or did patient express any hearing difficulties? no     Did you notice or did patient express any vision difficulties?   no     Were distance and reading eye charts used? no       Advance Care Planning:   Patient was offered the opportunity to discuss advance care planning:  yes     Does patient have an Advance Directive:  no   If no, did you provide information on Caring Connections?   yes       Plan:      Orders Placed This Encounter    METABOLIC PANEL, COMPREHENSIVE    LIPID PANEL    HEMOGLOBIN A1C WITH EAG       Health Maintenance   Topic Date Due    BREAST CANCER SCRN MAMMOGRAM  06/09/2016    INFLUENZA AGE 9 TO ADULT  08/01/2016    PAP AKA CERVICAL CYTOLOGY  06/24/2019    COLONOSCOPY  03/27/2023    DTaP/Tdap/Td series (2 - Td) 06/24/2026    Hepatitis C Screening  Completed    ZOSTER VACCINE AGE 60>  Addressed   Ander Tirado Pneumococcal 19-64 Medium Risk  Completed       *Patient verbalized understanding and agreement with the plan. A copy of the After Visit Summary with personalized health plan was given to the patient today.

## 2017-02-22 LAB
ALBUMIN SERPL-MCNC: 4.4 G/DL (ref 3.6–4.8)
ALBUMIN/GLOB SERPL: 1.6 {RATIO} (ref 1.1–2.5)
ALP SERPL-CCNC: 69 IU/L (ref 39–117)
ALT SERPL-CCNC: 16 IU/L (ref 0–32)
AST SERPL-CCNC: 13 IU/L (ref 0–40)
BILIRUB SERPL-MCNC: 0.3 MG/DL (ref 0–1.2)
BUN SERPL-MCNC: 9 MG/DL (ref 8–27)
BUN/CREAT SERPL: 11 (ref 11–26)
CALCIUM SERPL-MCNC: 9.6 MG/DL (ref 8.7–10.3)
CHLORIDE SERPL-SCNC: 99 MMOL/L (ref 96–106)
CHOLEST SERPL-MCNC: 154 MG/DL (ref 100–199)
CO2 SERPL-SCNC: 22 MMOL/L (ref 18–29)
CREAT SERPL-MCNC: 0.81 MG/DL (ref 0.57–1)
EST. AVERAGE GLUCOSE BLD GHB EST-MCNC: 128 MG/DL
GLOBULIN SER CALC-MCNC: 2.8 G/DL (ref 1.5–4.5)
GLUCOSE SERPL-MCNC: 101 MG/DL (ref 65–99)
HBA1C MFR BLD: 6.1 % (ref 4.8–5.6)
HDLC SERPL-MCNC: 45 MG/DL
INTERPRETATION, 910389: NORMAL
LDLC SERPL CALC-MCNC: 85 MG/DL (ref 0–99)
POTASSIUM SERPL-SCNC: 4.1 MMOL/L (ref 3.5–5.2)
PROT SERPL-MCNC: 7.2 G/DL (ref 6–8.5)
SODIUM SERPL-SCNC: 140 MMOL/L (ref 134–144)
TRIGL SERPL-MCNC: 120 MG/DL (ref 0–149)
VLDLC SERPL CALC-MCNC: 24 MG/DL (ref 5–40)

## 2017-03-08 NOTE — PROGRESS NOTES
Advise pt/daughter Leslee Canas that labs are all overall good and stable. No changes at this time. Offer to mail copy in case daughter wants that. Next follow up 6months.  No need to fast

## 2017-05-10 ENCOUNTER — TELEPHONE (OUTPATIENT)
Dept: FAMILY MEDICINE CLINIC | Age: 67
End: 2017-05-10

## 2017-05-10 NOTE — TELEPHONE ENCOUNTER
Patients daughter Lily Briggs is calling, Lily Briggs is requesting that a new order be put into the system for a bone density scan for the patient, explained to patient as soon as the order is put into the system the coordination of care will be calling to schedule at her nearest imaging facility.     Best call back # for Lily Briggs: [phone]

## 2017-05-11 NOTE — TELEPHONE ENCOUNTER
LM for return call. Need to find out if another MD has ordered her bone density before. Spoke to dtr Tavares Gómez and she states that she doesn't know if pt had bone density from another MD advised that we can discuss at pt's next appt. She will check with pt's gyn.

## 2017-07-19 RX ORDER — HYDROCHLOROTHIAZIDE 25 MG/1
TABLET ORAL
Qty: 30 TAB | Refills: 3 | Status: SHIPPED | OUTPATIENT
Start: 2017-07-19 | End: 2017-12-27 | Stop reason: SDUPTHER

## 2017-08-01 RX ORDER — HYDROCHLOROTHIAZIDE 25 MG/1
TABLET ORAL
Qty: 30 TAB | Refills: 3 | OUTPATIENT
Start: 2017-08-01

## 2017-12-27 RX ORDER — TRAZODONE HYDROCHLORIDE 50 MG/1
TABLET ORAL
Qty: 30 TAB | Refills: 5 | OUTPATIENT
Start: 2017-12-27

## 2017-12-27 RX ORDER — SERTRALINE HYDROCHLORIDE 100 MG/1
TABLET, FILM COATED ORAL
Qty: 45 TAB | Refills: 3 | OUTPATIENT
Start: 2017-12-27

## 2017-12-27 RX ORDER — ATORVASTATIN CALCIUM 20 MG/1
TABLET, FILM COATED ORAL
Qty: 30 TAB | Refills: 3 | OUTPATIENT
Start: 2017-12-27

## 2017-12-27 NOTE — TELEPHONE ENCOUNTER
Pharmacy on file verified  Requested Prescriptions     Pending Prescriptions Disp Refills    hydroCHLOROthiazide (HYDRODIURIL) 25 mg tablet 30 Tab 3     Patient is requesting a 90 day supply

## 2017-12-29 RX ORDER — HYDROCHLOROTHIAZIDE 25 MG/1
TABLET ORAL
Qty: 90 TAB | Refills: 0 | Status: SHIPPED | OUTPATIENT
Start: 2017-12-29 | End: 2018-06-26 | Stop reason: SDUPTHER

## 2017-12-29 RX ORDER — AMLODIPINE BESYLATE 10 MG/1
TABLET ORAL
Qty: 90 TAB | Refills: 0 | Status: SHIPPED | OUTPATIENT
Start: 2017-12-29 | End: 2018-06-26 | Stop reason: SDUPTHER

## 2017-12-29 NOTE — TELEPHONE ENCOUNTER
Patient overdue fasting 6month follow up. She is running close to the time of her AWV due in 2-2018, so just get her scheduled for a fasting OV w/ AWV at the same time in 2-2018.

## 2018-01-23 NOTE — TELEPHONE ENCOUNTER
Pharmacy request #90 day supply for trazodone  Patient has upcoming appt 2/27/18 with pcp  Last appt 2/2017  Last refill 2/6/17      Pharmacy request #90 day supply for for zoloft  Patient has upcoming appt 2/27/18 with pcp  Last appt 2/2017  Last refill 9/30/17    Pharmacy request #90 day supply for Atorvastatin  Patient has upcoming appt 2/27/18 with pcp  Last appt 2/2017  Last refill 9/30/17 for #30 day supply    Pharmacy request #90 day supply for Omeprazole  Patient has upcoming appt 2/27/18 with pcp  Last appt 2/2017  Last refill 2/6/17

## 2018-01-24 RX ORDER — OMEPRAZOLE 20 MG/1
CAPSULE, DELAYED RELEASE ORAL
Qty: 90 CAP | Refills: 0 | Status: SHIPPED | OUTPATIENT
Start: 2018-01-24 | End: 2018-05-10 | Stop reason: SDUPTHER

## 2018-01-24 RX ORDER — SERTRALINE HYDROCHLORIDE 100 MG/1
TABLET, FILM COATED ORAL
Qty: 135 TAB | Refills: 0 | Status: SHIPPED | OUTPATIENT
Start: 2018-01-24 | End: 2018-05-10 | Stop reason: SDUPTHER

## 2018-01-24 RX ORDER — TRAZODONE HYDROCHLORIDE 50 MG/1
TABLET ORAL
Qty: 90 TAB | Refills: 0 | Status: SHIPPED | OUTPATIENT
Start: 2018-01-24 | End: 2018-05-10 | Stop reason: SDUPTHER

## 2018-01-24 RX ORDER — ATORVASTATIN CALCIUM 20 MG/1
TABLET, FILM COATED ORAL
Qty: 90 TAB | Refills: 0 | Status: SHIPPED | OUTPATIENT
Start: 2018-01-24 | End: 2018-05-10 | Stop reason: SDUPTHER

## 2018-05-10 RX ORDER — SERTRALINE HYDROCHLORIDE 100 MG/1
TABLET, FILM COATED ORAL
Qty: 45 TAB | Refills: 0 | Status: SHIPPED | OUTPATIENT
Start: 2018-05-10 | End: 2018-10-04

## 2018-05-10 RX ORDER — TRAZODONE HYDROCHLORIDE 50 MG/1
TABLET ORAL
Qty: 30 TAB | Refills: 0 | Status: SHIPPED | OUTPATIENT
Start: 2018-05-10 | End: 2020-07-30 | Stop reason: SDUPTHER

## 2018-05-10 RX ORDER — ATORVASTATIN CALCIUM 20 MG/1
TABLET, FILM COATED ORAL
Qty: 30 TAB | Refills: 0 | Status: SHIPPED | OUTPATIENT
Start: 2018-05-10 | End: 2018-10-04

## 2018-05-10 RX ORDER — OMEPRAZOLE 20 MG/1
CAPSULE, DELAYED RELEASE ORAL
Qty: 30 CAP | Refills: 0 | Status: SHIPPED | OUTPATIENT
Start: 2018-05-10 | End: 2018-10-04

## 2018-05-11 NOTE — TELEPHONE ENCOUNTER
Patient WAY OVERDUE  Last OV was 2-2017  And not scheduled til 7-2018!   Needs appt w/ fasting labs asap  Ok to put her in a routine slot fasting  For now I am only doing 30 day supply instead of 90

## 2018-05-25 ENCOUNTER — HOSPITAL ENCOUNTER (OUTPATIENT)
Dept: REHABILITATION | Age: 68
End: 2018-05-26
Attending: PHYSICAL MEDICINE & REHABILITATION | Admitting: PHYSICAL MEDICINE & REHABILITATION

## 2018-06-26 RX ORDER — AMLODIPINE BESYLATE 10 MG/1
TABLET ORAL
Qty: 90 TAB | Refills: 0 | Status: SHIPPED | OUTPATIENT
Start: 2018-06-26 | End: 2018-09-29 | Stop reason: SDUPTHER

## 2018-06-26 RX ORDER — HYDROCHLOROTHIAZIDE 25 MG/1
TABLET ORAL
Qty: 90 TAB | Refills: 0 | Status: SHIPPED | OUTPATIENT
Start: 2018-06-26 | End: 2018-09-29 | Stop reason: SDUPTHER

## 2018-08-18 RX ORDER — SERTRALINE HYDROCHLORIDE 100 MG/1
TABLET, FILM COATED ORAL
Qty: 135 TAB | Refills: 0 | Status: SHIPPED | OUTPATIENT
Start: 2018-08-18 | End: 2019-03-12 | Stop reason: SDUPTHER

## 2018-08-18 RX ORDER — OMEPRAZOLE 20 MG/1
CAPSULE, DELAYED RELEASE ORAL
Qty: 90 CAP | Refills: 0 | Status: SHIPPED | OUTPATIENT
Start: 2018-08-18 | End: 2019-03-12 | Stop reason: SDUPTHER

## 2018-08-18 RX ORDER — ATORVASTATIN CALCIUM 20 MG/1
TABLET, FILM COATED ORAL
Qty: 90 TAB | Refills: 0 | Status: SHIPPED | OUTPATIENT
Start: 2018-08-18 | End: 2019-07-11

## 2018-10-04 ENCOUNTER — OFFICE VISIT (OUTPATIENT)
Dept: FAMILY MEDICINE CLINIC | Age: 68
End: 2018-10-04

## 2018-10-04 VITALS
HEIGHT: 68 IN | HEART RATE: 83 BPM | RESPIRATION RATE: 18 BRPM | TEMPERATURE: 98.4 F | BODY MASS INDEX: 30.43 KG/M2 | SYSTOLIC BLOOD PRESSURE: 154 MMHG | WEIGHT: 200.8 LBS | DIASTOLIC BLOOD PRESSURE: 86 MMHG | OXYGEN SATURATION: 96 %

## 2018-10-04 DIAGNOSIS — E78.5 HYPERLIPIDEMIA, UNSPECIFIED HYPERLIPIDEMIA TYPE: ICD-10-CM

## 2018-10-04 DIAGNOSIS — F41.8 DEPRESSION WITH ANXIETY: ICD-10-CM

## 2018-10-04 DIAGNOSIS — Z23 NEED FOR VACCINATION WITH 13-POLYVALENT PNEUMOCOCCAL CONJUGATE VACCINE: ICD-10-CM

## 2018-10-04 DIAGNOSIS — Z00.00 MEDICARE ANNUAL WELLNESS VISIT, SUBSEQUENT: Primary | ICD-10-CM

## 2018-10-04 DIAGNOSIS — R73.01 IMPAIRED FASTING GLUCOSE: ICD-10-CM

## 2018-10-04 DIAGNOSIS — Z71.89 ACP (ADVANCE CARE PLANNING): ICD-10-CM

## 2018-10-04 DIAGNOSIS — I10 BENIGN ESSENTIAL HYPERTENSION: ICD-10-CM

## 2018-10-04 RX ORDER — LOSARTAN POTASSIUM AND HYDROCHLOROTHIAZIDE 12.5; 5 MG/1; MG/1
1 TABLET ORAL DAILY
Qty: 30 TAB | Refills: 2 | Status: SHIPPED | OUTPATIENT
Start: 2018-10-04 | End: 2019-06-13 | Stop reason: SDUPTHER

## 2018-10-04 NOTE — PROGRESS NOTES
HISTORY OF PRESENT ILLNESS  
HPI Follow up hypercholesterolemia, hypertension, prediabetes, depression, anxiety. Due fasting labs but she is not fasting today and will return in the AM to have these done. She is brought in by her daughter Eryn Virgen who lives w/ her and accompanies her to all her appointments. They both report that patient has been getting along well and feeling well in general. 
Doing well on current medication regimen. See diet/exercise under social hx below. Not ready to quit smoking. Still drinks a pot of coffee and refuses to cut back bc it is a great enjoyment for her in life, something she really looks fwd to and keeps her mood and energy up. She has a new Jeremy who now lives w/ them and she is really enjoying being w/ her every day. Mood, energy, appetite, po intake and sleep patterns are good. BP's run on higher end here but they have not been checking it at home lately. No complaints or concerns addressed by either of them today. REVIEW OF SYMPTOMS  
  Review of Systems Constitutional: Negative. Respiratory: Negative. She has not seen her pulmonologist in close to a year but daughter is scheduling that f/u appt. She has not been using Spiriva bc it affected her dentures and she has not been using Symbicort at all either. Even being off these x several months, she has not needed to use Albuterol x several months as well. Cardiovascular: Negative. Gastrointestinal: Negative. Musculoskeletal: Negative for myalgias. Neurological: Negative. Psychiatric/Behavioral: Negative for depression and suicidal ideas. The patient is not nervous/anxious and does not have insomnia. Doing well on Zoloft and Trazodone  
 
 
  
 PROBLEM LIST/MEDICAL HISTORY  
  
Problem List  Date Reviewed: 10/4/2018 Codes Class Noted ACP (advance care planning) ICD-10-CM: Z71.89 ICD-9-CM: V65.49  2/21/2017 Overview Signed 2/21/2017 11:02 AM by Marianne Lomax MD  
  Initial ACP discussion w/ pt and daughter 2-2017. AMD form reviewed and copy provided. NN/facilitator to discuss with patient Benign essential hypertension ICD-10-CM: I10 
ICD-9-CM: 401.1  6/24/2016 Impaired fasting glucose/Prediabetes ICD-10-CM: R73.01 
ICD-9-CM: 790.21  7/13/2014 Overview Signed 7/13/2014 10:50 PM by Marianne Lomax MD  
  6/2014 Bilateral hip pain ICD-10-CM: M25.551, M25.552 ICD-9-CM: 719.45  6/3/2014 Overview Signed 6/3/2014  2:46 PM by Marianne Lomax MD  
  xrays 2012, negative Vitamin D deficiency ICD-10-CM: E55.9 ICD-9-CM: 268.9  6/3/2014 Overview Signed 6/3/2014  2:48 PM by Marianne Lomax MD  
  2012 GERD (gastroesophageal reflux disease) ICD-10-CM: K21.9 ICD-9-CM: 530.81  9/2/2011 H/O Subarachnoid hemorrhage due to ruptured aneurysm ICD-10-CM: I60.8 ICD-9-CM: 400  7/14/2010 Overview Signed 7/14/2010  1:08 PM by Marianne Lomax MD  
  10/10/06 S/P Rt Frontal Craniotomy and clipping, Dr Richa Campbell 10/27/06 Essential tremor ICD-10-CM: G25.0 ICD-9-CM: 333.1  7/14/2010 Cough variant asthma ICD-10-CM: J45.991 ICD-9-CM: 493.82  3/29/2010 Tobacco Abuse ICD-10-CM: C09.045 ICD-9-CM: 305.1  3/29/2010 Overview Signed 3/29/2010  6:13 PM by Rod Homme ABUSE 
 
  
  
   
 AR (allergic rhinitis) ICD-10-CM: J30.9 ICD-9-CM: 477.9  3/29/2010 ADD (attention deficit disorder) ICD-10-CM: F98.8 ICD-9-CM: 314.00  3/29/2010 Depression ICD-10-CM: F32.9 ICD-9-CM: 600  3/29/2010 Anxiety ICD-10-CM: F41.9 ICD-9-CM: 300.00  3/29/2010 COPD (chronic obstructive pulmonary disease) (H[facility]) ICD-10-CM: J44.9 ICD-9-CM: 658  3/29/2010 Overview Signed 11/10/2014  2:05 PM by Marianne Lomax MD  
  Pulm Dr Page Gutierrez Thyroiditis, subacute ICD-10-CM: E06.1 ICD-9-CM: 245.1  3/29/2010 Hyperlipemia ICD-10-CM: E78.5 ICD-9-CM: 272.4  3/29/2010 Overview Signed 7/14/2010  1:05 PM by Brad Tran MD  
  2005 DVT of Rt Lower Extremity ICD-10-CM: I82.409 ICD-9-CM: 453.40  3/29/2010 Overview Addendum 7/14/2010  1:04 PM by Brad Tran MD  
  11/06 Elevated LFT's, Fatty Liver ICD-10-CM: R94.5 ICD-9-CM: 790.6  3/29/2010 Overview Addendum 7/14/2010  1:04 PM by Brad Tran MD  
  [facility] US 7/09 Hepatic Steatosis 
  
  
   
  
 
 
  PAST SURGICAL HISTORY  
   
Past Surgical History:  
Procedure Laterality Date  ECHO STRESS  2005 Negative  HX COLONOSCOPY  3/2013, Dr Debbie Natarajan  
 benign cecal polyp, f/u 10 yrs  HX CRANIOTOMY  10/06  
 for clipping of ruptured aneurysm; Dr Jeronimo Gabriel a shunt  HX ENDOSCOPY  EGD, Dr Debbie Natarajan \"ok\" per pt report [address] [facility] Cervical Dysplasia (Dr Elsie Tobar)  HI COLONOSCOPY W/BIOPSY SINGLE/MULTIPLE  1980's Benign polyp MEDICATIONS  
  
Current Outpatient Prescriptions Medication Sig  pneumococcal 13 kaleigh conj dip (PREVNAR 13, PF,) 0.5 mL syrg injection 0.5 mL by IntraMUSCular route once for 1 dose. UPON ADMINISTRATION PLEASE FAX VERIFICATION [phone], THANK YOU!  
 hydroCHLOROthiazide (HYDRODIURIL) 25 mg tablet TAKE 1 TABLET BY MOUTH EVERY DAY  sertraline (ZOLOFT) 100 mg tablet TAKE 1.5 TABS BY MOUTH DAILY  omeprazole (PRILOSEC) 20 mg capsule TAKE 1 CAPSULE BY MOUTH EVERY DAY  atorvastatin (LIPITOR) 20 mg tablet TAKE 1 TABLET BY MOUTH EVERY DAY  traZODone (DESYREL) 50 mg tablet TAKE 1 TABLET BY MOUTH EVERYDAY AT BEDTIME  dicyclomine (BENTYL) 10 mg capsule TAKE 1-2 CAPSULES BY MOUTH EVERY 6 HOURS AS NEEDED FOR ABDOMINAL CRAMPS  amlodipine (NORVASC) 10 mg tablet TAKE 1 TABLET BY MOUTH EVERY DAY  MULTIVITAMINS (MULTIVITAMIN PO) Take  by mouth daily.  ferrous sulfate (IRON) 325 mg (65 mg Iron) tablet Take  by mouth two (2) times a day.  albuterol (VENTOLIN HFA) 90 mcg/actuation inhaler Take 2 Puffs by inhalation every six (6) hours as needed for Wheezing or Shortness of Breath. No current facility-administered medications for this visit. ALLERGIES Allergies Allergen Reactions  Ativan [Lorazepam] Hives  Egg Hives Upset stomach.  Pcn [Penicillins] Other (comments) Unsure of reaction.  Wellbutrin [Bupropion Hcl] Anxiety  Xanax [Alprazolam] Hives SOCIAL HISTORY  
   
Social History Social History  Marital status:  Spouse name: N/A  
 Number of children: 3  
 Years of education: N/A Occupational History  Former  for [employer]; ; involved mostly usage of computers, AIS, financial data, manufacturing data, employee supervision Social History Main Topics  Smoking status: Current Every Day Smoker Packs/day: 1.00 Years: 20.00 Types: Cigarettes  Smokeless tobacco: Never Used Comment: cut back from 1ppd to 3/4 ppd  Alcohol use No  
 Drug use: No  
 Sexual activity: No  
 
Other Topics Concern   Service No  
 Blood Transfusions No  
 Caffeine Concern Yes 1 pot of coffee a day (~ 6-8 cups)  Occupational Exposure No  
 Hobby Hazards No  
 Sleep Concern No  
 Stress Concern No  
 Weight Concern No  
 Special Diet Yes  
  cut back on junk foods/sweets, not snacking late at night anymore  Back Care No  
 Exercise No  
  walks 2-3 x a week in yard or neighborhood  Bike Helmet No  
 Seat Belt Yes  Self-Exams Yes Social History Narrative  Lives in 2 story home 2 of her daughters (Kiera Sutton) live with her and one grandbaby Daughter prepares most meals but patient still cooks some as well No asst devices for ambulation; no canes or walkers No longer drives since the [facility] and stroke Daughters help w/ finances, cleaning, transportation and manage her medications (daughter Yessenia Malik who accompanies her to all her appts) Does other ADL's, dressing, bathing, grooming all on her own; some help w/ meal preparation; pt can use microwave and the stove when daughters are home. Patient does some cleaning. No toileting problems; uses independently Initial ACP discussion w/ pt and daughter . AMD form reviewed and copy provided. Discussed w/ pt and daughter again , they think patient has AMD and will check.   
 
  
IMMUNIZATIONS Immunization History Administered Date(s) Administered  Pneumococcal Polysaccharide (PPSV-23) 07/10/2014 FAMILY HISTORY Family History Problem Relation Age of Onset  Anxiety Daughter  Attention Deficit Disorder Daughter  Drug Abuse Daughter  Anxiety Daughter  Thyroid Disease Daughter  Thyroid Disease Daughter Hypothyroidism  Asthma Daughter  Cancer Father   
  kidney  Diabetes Father  Arthritis-osteo Father  Diabetes Mother  Hypertension Mother Royetta Perches Arthritis-osteo Mother  Stroke Mother  Hypertension Sister  Depression Sister  Pneumonia Sister   
   age 72  Diabetes Sister  Thyroid Disease Sister  Thyroid Disease Sister  Heart Disease Sister  Diabetes Maternal Grandmother  No Known Problems Brother  Thyroid Disease Sister  Anxiety Sister  Depression Sister  Alcohol abuse Sister  Hypertension Sister  Hypertension Sister  Thyroid Disease Sister  Thyroid Disease Sister Isis Faulkner Visit Vitals  /80 (BP 1 Location: Left arm, BP Patient Position: Sitting), repeated end of exam same arm 154/86  Pulse 83  Temp 98.4 °F (36.9 °C) (Oral)  Resp 18  Ht 5' 8\" (1.727 m)  Wt 200 lb 12.8 oz (91.1 kg)  SpO2 96%  BMI 30.53 kg/m2 PHYSICAL EXAMINATION  
  Physical Exam  
Constitutional: She is oriented to person, place, and time. No distress. Neck: Neck supple. No JVD present. Carotid bruit is not present. No thyromegaly present. Cardiovascular: Normal rate, regular rhythm and normal heart sounds. No murmur heard. Pulmonary/Chest: Effort normal and breath sounds normal.  
Abdominal: Soft. Bowel sounds are normal. She exhibits no distension and no mass. There is no tenderness. Musculoskeletal: She exhibits no edema or tenderness. Neurological: She is alert and oriented to person, place, and time. Gait normal. Coordination normal.  
Psychiatric: Mood, memory, affect and judgment normal.  
Vitals reviewed. 
 
 
  
 
 
 ASSESSMENT & PLAN  
 
  ICD-10-CM ICD-9-CM 1. Medicare annual wellness visit, subsequent Z00.00 V70.0 See separate note, same encounter 2. Need for vaccination with 13-polyvalent pneumococcal conjugate vaccine Z23 V03.82 pneumococcal 13 kaleigh conj dip (PREVNAR 13, PF,) 0.5 mL syrg injection 3. ACP (advance care planning) Z71.89 V65.49   
4. Benign essential hypertension I10 401.1 CBC W/O DIFF  
   LIPID PANEL  
   METABOLIC PANEL, COMPREHENSIVE  
   TSH 3RD GENERATION  
   URINALYSIS W/ RFLX MICROSCOPIC 5. Hyperlipidemia, unspecified hyperlipidemia type E78.5 272.4 LIPID PANEL  
   METABOLIC PANEL, COMPREHENSIVE  
   TSH 3RD GENERATION 6. Impaired fasting glucose/Prediabetes F97.19 160.47 METABOLIC PANEL, COMPREHENSIVE  
   HEMOGLOBIN A1C WITH EAG 7. Depression with anxiety F41.8 300.4 Controlled, stable on Zoloft and Trazodone She will RTC fasting for the above labs in the AM 
Cardiovascular risk and specific lipid/LDL/BS/BP goals reviewed Change plain HCTZ 25 mg to combo Hyzaar 50-12.5 mg daily Monitor interim BP's Keep other meds the same, doing well on current regimen otherwise Reviewed diet, nutrition, exercise, weight management, BMI/goals, smoking cessation, caffeine intake, age/risk based screening recommendations, health maintenance & prevention counseling. Reviewed medications and side effects in detail Further follow up & other recommendations pending review of labs and interim BPs

## 2018-10-04 NOTE — PATIENT INSTRUCTIONS
Body Mass Index: Care Instructions Your Care Instructions Body mass index (BMI) can help you see if your weight is raising your risk for health problems. It uses a formula to compare how much you weigh with how tall you are. · A BMI lower than 18.5 is considered underweight. · A BMI between 18.5 and 24.9 is considered healthy. · A BMI between 25 and 29.9 is considered overweight. A BMI of 30 or higher is considered obese. If your BMI is in the normal range, it means that you have a lower risk for weight-related health problems. If your BMI is in the overweight or obese range, you may be at increased risk for weight-related health problems, such as high blood pressure, heart disease, stroke, arthritis or joint pain, and diabetes. If your BMI is in the underweight range, you may be at increased risk for health problems such as fatigue, lower protection (immunity) against illness, muscle loss, bone loss, hair loss, and hormone problems. BMI is just one measure of your risk for weight-related health problems. You may be at higher risk for health problems if you are not active, you eat an unhealthy diet, or you drink too much alcohol or use tobacco products. Follow-up care is a key part of your treatment and safety. Be sure to make and go to all appointments, and call your doctor if you are having problems. It's also a good idea to know your test results and keep a list of the medicines you take. How can you care for yourself at home? · Practice healthy eating habits. This includes eating plenty of fruits, vegetables, whole grains, lean protein, and low-fat dairy. · If your doctor recommends it, get more exercise. Walking is a good choice. Bit by bit, increase the amount you walk every day. Try for at least 30 minutes on most days of the week. · Do not smoke. Smoking can increase your risk for health problems.  If you need help quitting, talk to your doctor about stop-smoking programs and medicines. These can increase your chances of quitting for good. · Limit alcohol to 2 drinks a day for men and 1 drink a day for women. Too much alcohol can cause health problems. If you have a BMI higher than 25 · Your doctor may do other tests to check your risk for weight-related health problems. This may include measuring the distance around your waist. A waist measurement of more than 40 inches in men or 35 inches in women can increase the risk of weight-related health problems. · Talk with your doctor about steps you can take to stay healthy or improve your health. You may need to make lifestyle changes to lose weight and stay healthy, such as changing your diet and getting regular exercise. If you have a BMI lower than 18.5 · Your doctor may do other tests to check your risk for health problems. · Talk with your doctor about steps you can take to stay healthy or improve your health. You may need to make lifestyle changes to gain or maintain weight and stay healthy, such as getting more healthy foods in your diet and doing exercises to build muscle. Where can you learn more? Go to http://jama-obinna.info/. Enter S176 in the search box to learn more about \"Body Mass Index: Care Instructions. \" Current as of: October 13, 2016 Content Version: 11.4 © 2814-2335 [employer]. Care instructions adapted under license by [employer] (which disclaims liability or warranty for this information). If you have questions about a medical condition or this instruction, always ask your healthcare professional. [place] [address] any warranty or liability for your use of this information. Medicare Wellness Visit, Female The best way to live healthy is to have a lifestyle where you eat a well-balanced diet, exercise regularly, limit alcohol use, and quit all forms of tobacco/nicotine, if applicable. Regular preventive services are another way to keep healthy. Preventive services (vaccines, screening tests, monitoring & exams) can help personalize your care plan, which helps you manage your own care. Screening tests can find health problems at the earliest stages, when they are easiest to treat. [facility] follows the current, evidence-based guidelines published by the [facility] [place] (USPSTF) when recommending preventive services for our patients. Because we follow these guidelines, sometimes recommendations change over time as research supports it. (For example, mammograms used to be recommended annually. Even though Medicare will still pay for an annual mammogram, the newer guidelines recommend a mammogram every two years for women of average risk.) Of course, you and your doctor may decide to screen more often for some diseases, based on your risk and your health status. Preventive services for you include: - Medicare offers their members a free annual wellness visit, which is time for you and your primary care provider to discuss and plan for your preventive service needs. Take advantage of this benefit every year! 
-All adults over the age of 72 should receive the recommended pneumonia vaccines. Current USPSTF guidelines recommend a series of two vaccines for the best pneumonia protection.  
-All adults should have a flu vaccine yearly and a tetanus vaccine every 10 years. All adults age 61 and older should receive a shingles vaccine once in their lifetime.   
-A bone mass density test is recommended when a woman turns 65 to screen for osteoporosis. This test is only recommended one time, as a screening. Some providers will use this same test as a disease monitoring tool if you already have osteoporosis. -All adults age 38-68 who are overweight should have a diabetes screening test once every three years. -Other screening tests and preventive services for persons with diabetes include: an eye exam to screen for diabetic retinopathy, a kidney function test, a foot exam, and stricter control over your cholesterol.  
-Cardiovascular screening for adults with routine risk involves an electrocardiogram (ECG) at intervals determined by your doctor.  
-Colorectal cancer screenings should be done for adults age 54-65 with no increased risk factors for colorectal cancer. There are a number of acceptable methods of screening for this type of cancer. Each test has its own benefits and drawbacks. Discuss with your doctor what is most appropriate for you during your annual wellness visit. The different tests include: colonoscopy (considered the best screening method), a fecal occult blood test, a fecal DNA test, and sigmoidoscopy. -Breast cancer screenings are recommended every other year for women of normal risk, age 54-69. 
-Cervical cancer screenings for women over age 72 are only recommended with certain risk factors.  
-All adults born between [facility] should be screened once for Hepatitis C. Here is a list of your current Health Maintenance items (your personalized list of preventive services) with a due date: 
Health Maintenance Due Topic Date Due  Shingles Vaccine (1 of 2) 10/24/2002  Glaucoma Screening   10/24/2017  Bone Mineral Density   10/24/2017  Pneumococcal Vaccine (1 of 2 - PCV13) 10/24/2017 Vaccine Information Statement Pneumococcal Conjugate Vaccine (PCV13): What You Need to Know Many Vaccine Information Statements are available in Hebrew and other languages. See www.immunize.org/vis. Hojas de información Sobre Vacunas están disponibles en español y en muchos otros idiomas. Visite www.immunize.org/vis. 1. Why get vaccinated? Vaccination can protect both children and adults from pneumococcal disease. Pneumococcal disease is caused by bacteria that can spread from person to person through close contact. It can cause ear infections, and it can also lead to more serious infections of the: 
 Lungs (pneumonia),  Blood (bacteremia), and 
 Covering of the brain and spinal cord (meningitis). Pneumococcal pneumonia is most common among adults. Pneumococcal meningitis can cause deafness and brain damage, and it kills about 1 child in 10 who get it. Anyone can get pneumococcal disease, but children under 3years of age and adults 72 years and older, people with certain medical conditions, and cigarette smokers are at the highest risk. Before there was a vaccine, the [facility] saw: 
 more than 700 cases of meningitis, 
 about 13,000 blood infections, 
 about 5 million ear infections, and 
 about 200 deaths 
in children under 5 each year from pneumococcal disease. Since vaccine became available, severe pneumococcal disease in these children has fallen by 88%. About 18,000 older adults die of pneumococcal disease each year in the [place]. Treatment of pneumococcal infections with penicillin and other drugs is not as effective as it used to be, because some strains of the disease have become resistant to these drugs. This makes prevention of the disease, through vaccination, even more important. 2. PCV13 vaccine Pneumococcal conjugate vaccine (called PCV13) protects against 13 types of pneumococcal bacteria. PCV13 is routinely given to children at 2, 4, 6, and 1515 months of age. It is also recommended for children and adults 3to 59years of age with certain health conditions, and for all adults 72years of age and older. Your doctor can give you details. 3. Some people should not get this vaccine Anyone who has ever had a life-threatening allergic reaction to a dose of this vaccine, to an earlier pneumococcal vaccine called PCV7, or to any vaccine containing diphtheria toxoid (for example, DTaP), should not get PCV13. Anyone with a severe allergy to any component of PCV13 should not get the vaccine. Tell your doctor if the person being vaccinated has any severe allergies. If the person scheduled for vaccination is not feeling well, your healthcare provider might decide to reschedule the shot on another day. 4. Risks of a vaccine reaction With any medicine, including vaccines, there is a chance of reactions. These are usually mild and go away on their own, but serious reactions are also possible. Problems reported following PCV13 varied by age and dose in the series. The most common problems reported among children were:  About half became drowsy after the shot, had a temporary loss of appetite, or had redness or tenderness where the shot was given.  About 1 out of 3 had swelling where the shot was given.  About 1 out of 3 had a mild fever, and about 1 in 20 had a fever over 102.2°F. 
 Up to about 8 out of 10 became fussy or irritable. Adults have reported pain, redness, and swelling where the shot was given; also mild fever, fatigue, headache, chills, or muscle pain. [employer] children who get PCV13 along with inactivated flu vaccine at the same time may be at increased risk for seizures caused by fever. Ask your doctor for more information. Problems that could happen after any vaccine:  People sometimes faint after a medical procedure, including vaccination. Sitting or lying down for about 15 minutes can help prevent fainting, and injuries caused by a fall. Tell your doctor if you feel dizzy, or have vision changes or ringing in the ears.  Some older children and adults get severe pain in the shoulder and have difficulty moving the arm where a shot was given. This happens very rarely.  Any medication can cause a severe allergic reaction.  Such reactions from a vaccine are very rare, estimated at about 1 in a million doses, and would happen within a few minutes to a few hours after the vaccination. As with any medicine, there is a very small chance of a vaccine causing a serious injury or death. The safety of vaccines is always being monitored. For more information, visit: www.cdc.gov/vaccinesafety/  
 
5. What if there is a serious reaction? What should I look for?  Look for anything that concerns you, such as signs of a severe allergic reaction, very high fever, or unusual behavior. Signs of a severe allergic reaction can include hives, swelling of the face and throat, difficulty breathing, a fast heartbeat, dizziness, and weakness  usually within a few minutes to a few hours after the vaccination. What should I do?  If you think it is a severe allergic reaction or other emergency that cant wait, call 9-1-1 or get the person to the nearest hospital. Otherwise, call your doctor. Reactions should be reported to the Vaccine Adverse Event Reporting System (VAERS). Your doctor should file this report, or you can do it yourself through the VAERS web site at www.vaers. [facility].gov, or by calling 3-212.329.1253. VAERS does not give medical advice. 6. The National Vaccine Injury Compensation Program 
 
The [facility] Vaccine Injury Compensation Program (VICP) is a federal program that was created to compensate people who may have been injured by certain vaccines. Persons who believe they may have been injured by a vaccine can learn about the program and about filing a claim by calling 5-632.470.5035 or visiting the [facility] [employer] website at www.[facility].gov/vaccinecompensation. There is a time limit to file a claim for compensation. 7. How can I learn more?  Ask your healthcare provider. He or she can give you the vaccine package insert or suggest other sources of information.  Call your local or state health department.  Contact the Centers for Disease Control and Prevention (CDC): 
- Call 8-218.881.6420 (1-800-CDC-INFO) or 
- Visit CDCs website at www.cdc.gov/vaccines Vaccine Information Statement PCV13 Vaccine 11/5/2015  
[address] CARMEN Roberto [id number] Department of [facility] and [employer] Centers for Disease Control and Prevention Office Use Only High Blood Pressure: Care Instructions Your Care Instructions If your blood pressure is usually above 130/80, you have high blood pressure, or hypertension. That means the top number is 130 or higher or the bottom number is 80 or higher, or both. Despite what a lot of people think, high blood pressure usually doesn't cause headaches or make you feel dizzy or lightheaded. It usually has no symptoms. But it does increase your risk for heart attack, stroke, and kidney or eye damage. The higher your blood pressure, the more your risk increases. Your doctor will give you a goal for your blood pressure. Your goal will be based on your health and your age. Lifestyle changes, such as eating healthy and being active, are always important to help lower blood pressure. You might also take medicine to reach your blood pressure goal. 
Follow-up care is a key part of your treatment and safety. Be sure to make and go to all appointments, and call your doctor if you are having problems. It's also a good idea to know your test results and keep a list of the medicines you take. How can you care for yourself at home? Medical treatment · If you stop taking your medicine, your blood pressure will go back up. You may take one or more types of medicine to lower your blood pressure. Be safe with medicines. Take your medicine exactly as prescribed. Call your doctor if you think you are having a problem with your medicine. · Talk to your doctor before you start taking aspirin every day. Aspirin can help certain people lower their risk of a heart attack or stroke.  But taking aspirin isn't right for everyone, because it can cause serious bleeding. · See your doctor regularly. You may need to see the doctor more often at first or until your blood pressure comes down. · If you are taking blood pressure medicine, talk to your doctor before you take decongestants or anti-inflammatory medicine, such as ibuprofen. Some of these medicines can raise blood pressure. · Learn how to check your blood pressure at home. Lifestyle changes · Stay at a healthy weight. This is especially important if you put on weight around the waist. Losing even 10 pounds can help you lower your blood pressure. · If your doctor recommends it, get more exercise. Walking is a good choice. Bit by bit, increase the amount you walk every day. Try for at least 30 minutes on most days of the week. You also may want to swim, bike, or do other activities. · Avoid or limit alcohol. Talk to your doctor about whether you can drink any alcohol. · Try to limit how much sodium you eat to less than 2,300 milligrams (mg) a day. Your doctor may ask you to try to eat less than 1,500 mg a day. · Eat plenty of fruits (such as bananas and oranges), vegetables, legumes, whole grains, and low-fat dairy products. · Lower the amount of saturated fat in your diet. Saturated fat is found in animal products such as milk, cheese, and meat. Limiting these foods may help you lose weight and also lower your risk for heart disease. · Do not smoke. Smoking increases your risk for heart attack and stroke. If you need help quitting, talk to your doctor about stop-smoking programs and medicines. These can increase your chances of quitting for good. When should you call for help? Call 911 anytime you think you may need emergency care. This may mean having symptoms that suggest that your blood pressure is causing a serious heart or blood vessel problem. Your blood pressure may be over 180/120. 
 For example, call 911 if:   · You have symptoms of a heart attack. These may include: ¨ Chest pain or pressure, or a strange feeling in the chest. 
¨ Sweating. ¨ Shortness of breath. ¨ Nausea or vomiting. ¨ Pain, pressure, or a strange feeling in the back, neck, jaw, or upper belly or in one or both shoulders or arms. ¨ Lightheadedness or sudden weakness. ¨ A fast or irregular heartbeat.  
  · You have symptoms of a stroke. These may include: 
¨ Sudden numbness, tingling, weakness, or loss of movement in your face, arm, or leg, especially on only one side of your body. ¨ Sudden vision changes. ¨ Sudden trouble speaking. ¨ Sudden confusion or trouble understanding simple statements. ¨ Sudden problems with walking or balance. ¨ A sudden, severe headache that is different from past headaches.  
  · You have severe back or belly pain.  
 Do not wait until your blood pressure comes down on its own. Get help right away. 
 Call your doctor now or seek immediate care if: 
  · Your blood pressure is much higher than normal (such as 180/120 or higher), but you don't have symptoms.  
  · You think high blood pressure is causing symptoms, such as: ¨ Severe headache. ¨ Blurry vision.  
 Watch closely for changes in your health, and be sure to contact your doctor if: 
  · Your blood pressure measures higher than your doctor recommends at least 2 times. That means the top number is higher or the bottom number is higher, or both.  
  · You think you may be having side effects from your blood pressure medicine. Where can you learn more? Go to http://jama-obinna.info/. Enter O382 in the search box to learn more about \"High Blood Pressure: Care Instructions. \" Current as of: December 6, 2017 Content Version: 11.8 © 7446-1275 [employer].  Care instructions adapted under license by [employer] (which disclaims liability or warranty for this information). If you have questions about a medical condition or this instruction, always ask your healthcare professional. [place] [address] any warranty or liability for your use of this information.

## 2018-10-04 NOTE — PROGRESS NOTES
This is the Subsequent Medicare Annual Wellness Exam, performed 12 months or more after the Initial AWV or the last Subsequent AWV I have reviewed the patient's medical history in detail and updated the computerized patient record. History Past Medical History:  
Diagnosis Date  ACP (advance care planning) 2/21/2017 Initial ACP discussion w/ pt and daughter 2-2017. AMD form reviewed and copy provided. NN/facilitator to discuss with patient  Asthma   
 hx of  Benign essential hypertension 6/24/2016  Bilateral hip pain 6/3/2014  
 xrays 2012, negative  COPD (chronic obstructive pulmonary disease) ([facility] [address] 3/29/2010  Depression 3/29/2010  Diverticulitis  DVT of Rt Lower Extremity 3/29/2010  Elevated LFT's, Fatty Liver 3/29/2010  Essential tremor 7/14/2010  GERD (gastroesophageal reflux disease) 9/2/2011  H/O Subarachnoid hemorrhage due to ruptured aneurysm 7/14/2010  Hyperlipemia 3/29/2010  Impaired fasting glucose 7/13/2014 6/2014  Thyroiditis, subacute 3/29/2010  Tobacco Abuse 3/29/2010  Vitamin D deficiency 6/3/2014 2012 Past Surgical History:  
Procedure Laterality Date  ECHO STRESS  2005 Negative  HX COLONOSCOPY  3/2013, Dr Willie Wren  
 benign cecal polyp, f/u 10 yrs  HX CRANIOTOMY  10/06  
 for clipping of ruptured aneurysm; Dr Butler Deep a shunt  HX ENDOSCOPY  EGD, Dr Willie Wren \"ok\" per pt report [id number] Cervical Dysplasia (Dr Ana Amaro)  NC COLONOSCOPY W/BIOPSY SINGLE/MULTIPLE  1980's Benign polyp Current Outpatient Prescriptions Medication Sig Dispense Refill  pneumococcal 13 kaleigh conj dip (PREVNAR 13, PF,) 0.5 mL syrg injection 0.5 mL by IntraMUSCular route once for 1 dose.  UPON ADMINISTRATION PLEASE FAX VERIFICATION -[phone], THANK YOU! 0.5 mL 0  
 hydroCHLOROthiazide (HYDRODIURIL) 25 mg tablet TAKE 1 TABLET BY MOUTH EVERY DAY 30 Tab 0  
  sertraline (ZOLOFT) 100 mg tablet TAKE 1.5 TABS BY MOUTH DAILY 135 Tab 0  
 omeprazole (PRILOSEC) 20 mg capsule TAKE 1 CAPSULE BY MOUTH EVERY DAY 90 Cap 0  
 atorvastatin (LIPITOR) 20 mg tablet TAKE 1 TABLET BY MOUTH EVERY DAY 90 Tab 0  
 traZODone (DESYREL) 50 mg tablet TAKE 1 TABLET BY MOUTH EVERYDAY AT BEDTIME 30 Tab 0  
 dicyclomine (BENTYL) 10 mg capsule TAKE 1-2 CAPSULES BY MOUTH EVERY 6 HOURS AS NEEDED FOR ABDOMINAL CRAMPS 60 Cap 1  
 amlodipine (NORVASC) 10 mg tablet TAKE 1 TABLET BY MOUTH EVERY DAY 30 Tab 3  MULTIVITAMINS (MULTIVITAMIN PO) Take  by mouth daily.  ferrous sulfate (IRON) 325 mg (65 mg Iron) tablet Take  by mouth two (2) times a day.  albuterol (VENTOLIN HFA) 90 mcg/actuation inhaler Take 2 Puffs by inhalation every six (6) hours as needed for Wheezing or Shortness of Breath. Allergies Allergen Reactions  Ativan [Lorazepam] Hives  Egg Hives Upset stomach.  Pcn [Penicillins] Other (comments) Unsure of reaction.  Wellbutrin [Bupropion Hcl] Anxiety  Xanax [Alprazolam] Hives Family History Problem Relation Age of Onset  Anxiety Daughter  Attention Deficit Disorder Daughter  Drug Abuse Daughter  Anxiety Daughter  Thyroid Disease Daughter  Thyroid Disease Daughter Hypothyroidism  Asthma Daughter  Cancer Father   
  kidney  Diabetes Father  Arthritis-osteo Father  Diabetes Mother  Hypertension Mother [address] [facility] Arthritis-osteo Mother  Stroke Mother  Hypertension Sister  Depression Sister  Pneumonia Sister   
   age 72  Diabetes Sister  Thyroid Disease Sister  Thyroid Disease Sister  Heart Disease Sister  Diabetes Maternal Grandmother  No Known Problems Brother  Thyroid Disease Sister  Anxiety Sister  Depression Sister  Alcohol abuse Sister  Hypertension Sister  Hypertension Sister  Thyroid Disease Sister  Thyroid Disease Sister Social History Substance Use Topics  Smoking status: Current Every Day Smoker Packs/day: 1.00 Years: 20.00 Types: Cigarettes  Smokeless tobacco: Never Used Comment: cut back from 1ppd to 3/4 ppd  Alcohol use No  
 
Patient Active Problem List  
Diagnosis Code  Cough variant asthma J45.991  
 Tobacco Abuse F17.210  
 AR (allergic rhinitis) J30.9  ADD (attention deficit disorder) F98.8  Depression F32.9  Anxiety F41.9  
 COPD (chronic obstructive pulmonary disease) ([facility] [address] J44.9  Thyroiditis, subacute E06.1  Hyperlipemia E78.5  DVT of Rt Lower Extremity I82.409  Elevated LFT's, Fatty Liver R94.5  
 H/O Subarachnoid hemorrhage due to ruptured aneurysm I60.8  Essential tremor G25.0  GERD (gastroesophageal reflux disease) K21.9  Bilateral hip pain M25.551, M25.552  Vitamin D deficiency E55.9  Impaired fasting glucose/Prediabetes R73.01  
 Benign essential hypertension I10  
 ACP (advance care planning) Z71.89 Depression Risk Factor Screening: PHQ over the last two weeks 10/4/2018 Little interest or pleasure in doing things Not at all Feeling down, depressed, irritable, or hopeless Not at all Total Score PHQ 2 0 Alcohol Risk Factor Screening: You do not drink alcohol or very rarely. Functional Ability and Level of Safety:  
Hearing Loss Hearing is good. Activities of Daily Living The home contains: no safety equipment. Patient needs help with:  transportation, shopping, managing medications and managing money Fall Risk Fall Risk Assessment, last 12 mths 10/4/2018 Able to walk? Yes Fall in past 12 months? No  
 
 
Abuse Screen Patient is not abused Cognitive Screening Evaluation of Cognitive Function: 
Has your family/caregiver stated any concerns about your memory: no 
 
 
Patient Care Team  
Patient Care Team: 
Jo Bauer MD as PCP - General 
 Rosendo Raya MD (Pulmonary Disease) Assessment/Plan Education and counseling provided: 
Are appropriate based on today's review and evaluation Pneumococcal Vaccine Diagnoses and all orders for this visit: 
 
1. Medicare annual wellness visit, subsequent Other orders -     pneumococcal 13 kaleigh conj dip (PREVNAR 13, PF,) 0.5 mL syrg injection; 0.5 mL by IntraMUSCular route once for 1 dose. Health Maintenance Due Topic Date Due  Shingrix Vaccine Age 50> (1 of 2) 10/24/2002  GLAUCOMA SCREENING Q2Y  10/24/2017  Bone Densitometry (Dexa) Screening  10/24/2017  Pneumococcal 65+ Low/Medium Risk (1 of 2 - PCV13) 10/24/2017

## 2018-10-04 NOTE — ACP (ADVANCE CARE PLANNING)
Patient given \"[place] Advanced Directive For Healthcare\" form and/or booklet. Patient advised to follow up with me or contact nurse navigator to review/submit these forms to add to their medical record. I advised patient of the benefits of [address] with regard to end of life care & decision making. The benefits of completing these forms was reviewed w/ patient via 16-30 minute discussion. Questions answered. Concerns addressed.

## 2018-10-04 NOTE — PROGRESS NOTES
Chief Complaint Patient presents with [facility] Annual Wellness Visit  1. Have you been to the ER, urgent care clinic since your last visit? Hospitalized since your last visit? No 
 
2. Have you seen or consulted any other health care providers outside of the [address] [place] [address] since your last visit? Include any pap smears or colon screening.  No

## 2018-10-04 NOTE — MR AVS SNAPSHOT
[address] 
[phone] Patient: Fito Salinas MRN: [id number] :1950 Visit Information Date & Time Provider Department Dept. Phone Encounter #  
 10/4/2018 11:00 AM [facility] [address] [phone] [id number] Upcoming Health Maintenance Date Due Shingrix Vaccine Age 50> (1 of 2) 10/24/2002 GLAUCOMA SCREENING Q2Y 10/24/2017 Bone Densitometry (Dexa) Screening 10/24/2017 Pneumococcal 65+ Low/Medium Risk (1 of 2 - PCV13) 10/24/2017 Influenza Age 5 to Adult 2019* BREAST CANCER SCRN MAMMOGRAM 2019 MEDICARE YEARLY EXAM 10/5/2019 COLONOSCOPY 3/27/2023 DTaP/Tdap/Td series (2 - Td) 2026 *Topic was postponed. The date shown is not the original due date. Allergies as of 10/4/2018  Review Complete On: 10/4/2018 By: [address], [place]  
  
 Severity Noted Reaction Type Reactions Ativan [Lorazepam]  2010    Hives Egg  2010    Hives Upset stomach. Pcn [Penicillins]  2010    Other (comments) Unsure of reaction. Wellbutrin [Bupropion Hcl]  2010    Anxiety Xanax [Alprazolam]  2010    Hives Current Immunizations  Reviewed on 10/4/2018 Name Date Pneumococcal Polysaccharide (PPSV-23) 7/10/2014 Reviewed by [address], [place] on 10/4/2018 at 11:27 AM  
You Were Diagnosed With   
  
 Codes Comments Medicare annual wellness visit, subsequent    -  Primary ICD-10-CM: Z00.00 ICD-9-CM: V70.0 Need for vaccination with 13-polyvalent pneumococcal conjugate vaccine     ICD-10-CM: C07 ICD-9-CM: V03.82   
 ACP (advance care planning)     ICD-10-CM: Z71.89 ICD-9-CM: V65.49 Benign essential hypertension     ICD-10-CM: I10 
ICD-9-CM: 401.1 Hyperlipidemia, unspecified hyperlipidemia type     ICD-10-CM: E78.5 ICD-9-CM: 272.4 Impaired fasting glucose     ICD-10-CM: R73.01 
ICD-9-CM: 790.21 Depression with anxiety     ICD-10-CM: F41.8 ICD-9-CM: 300.4 Vitals BP Pulse Temp Resp Height(growth percentile) Weight(growth percentile) 156/80 (BP 1 Location: Left arm, BP Patient Position: Sitting) 83 98.4 °F (36.9 °C) (Oral) 18 5' 8\" (1.727 m) 200 lb 12.8 oz (91.1 kg) SpO2 BMI OB Status Smoking Status 96% 30.53 kg/m2 Hysterectomy Current Every Day Smoker BMI and BSA Data Body Mass Index Body Surface Area 30.53 kg/m 2 2.09 m 2 Preferred Pharmacy Pharmacy Name Phone [id number]- [facility] Escobar [phone] Your Updated Medication List  
  
   
This list is accurate as of 10/4/18 12:07 PM.  Always use your most recent med list. amLODIPine 10 mg tablet Commonly known as:  Lue Ravinder TAKE 1 TABLET BY MOUTH EVERY DAY  
  
 atorvastatin 20 mg tablet Commonly known as:  LIPITOR  
TAKE 1 TABLET BY MOUTH EVERY DAY  
  
 dicyclomine 10 mg capsule Commonly known as:  BENTYL TAKE 1-2 CAPSULES BY MOUTH EVERY 6 HOURS AS NEEDED FOR ABDOMINAL CRAMPS Iron 325 mg (65 mg iron) tablet Generic drug:  ferrous sulfate Take  by mouth two (2) times a day. losartan-hydroCHLOROthiazide 50-12.5 mg per tablet Commonly known as:  HYZAAR Take 1 Tab by mouth daily. Indications: hypertension MULTIVITAMIN PO Take  by mouth daily. omeprazole 20 mg capsule Commonly known as:  PRILOSEC  
TAKE 1 CAPSULE BY MOUTH EVERY DAY  
  
 pneumococcal 13 kaleigh conj dip 0.5 mL Syrg injection Commonly known as:  PREVNAR 13 (PF)  
0.5 mL by IntraMUSCular route once for 1 dose. UPON ADMINISTRATION PLEASE FAX VERIFICATION -[phone], THANK YOU! sertraline 100 mg tablet Commonly known as:  ZOLOFT  
TAKE 1.5 TABS BY MOUTH DAILY  
  
 traZODone 50 mg tablet Commonly known as:  Nam Giron  
 TAKE 1 TABLET BY MOUTH EVERYDAY AT BEDTIME  
  
 VENTOLIN HFA 90 mcg/actuation inhaler Generic drug:  albuterol Take 2 Puffs by inhalation every six (6) hours as needed for Wheezing or Shortness of Breath. Prescriptions Printed Refills  
 pneumococcal 13 kaleigh conj dip (PREVNAR 13, PF,) 0.5 mL syrg injection 0 Si.5 mL by IntraMUSCular route once for 1 dose. UPON ADMINISTRATION PLEASE FAX VERIFICATION -[phone], THANK YOU! Class: Print Route: IntraMUSCular Prescriptions Sent to Pharmacy Refills  
 losartan-hydroCHLOROthiazide (HYZAAR) 50-12.5 mg per tablet 2 Sig: Take 1 Tab by mouth daily. Indications: hypertension Class: Normal  
 Pharmacy: [facility] #: [phone] Route: Oral  
  
We Performed the Following CBC W/O DIFF [78042 CPT(R)] HEMOGLOBIN A1C WITH EAG [09878 CPT(R)] LIPID PANEL [14296 CPT(R)] METABOLIC PANEL, COMPREHENSIVE [23519 CPT(R)] TSH 3RD GENERATION [68214 CPT(R)] URINALYSIS W/ RFLX MICROSCOPIC [70482 CPT(R)] Patient Instructions Body Mass Index: Care Instructions Your Care Instructions Body mass index (BMI) can help you see if your weight is raising your risk for health problems. It uses a formula to compare how much you weigh with how tall you are. · A BMI lower than 18.5 is considered underweight. · A BMI between 18.5 and 24.9 is considered healthy. · A BMI between 25 and 29.9 is considered overweight. A BMI of 30 or higher is considered obese. If your BMI is in the normal range, it means that you have a lower risk for weight-related health problems. If your BMI is in the overweight or obese range, you may be at increased risk for weight-related health problems, such as high blood pressure, heart disease, stroke, arthritis or joint pain, and diabetes.  If your BMI is in the underweight range, you may be at increased risk for health problems such as fatigue, lower protection (immunity) against illness, muscle loss, bone loss, hair loss, and hormone problems. BMI is just one measure of your risk for weight-related health problems. You may be at higher risk for health problems if you are not active, you eat an unhealthy diet, or you drink too much alcohol or use tobacco products. Follow-up care is a key part of your treatment and safety. Be sure to make and go to all appointments, and call your doctor if you are having problems. It's also a good idea to know your test results and keep a list of the medicines you take. How can you care for yourself at home? · Practice healthy eating habits. This includes eating plenty of fruits, vegetables, whole grains, lean protein, and low-fat dairy. · If your doctor recommends it, get more exercise. Walking is a good choice. Bit by bit, increase the amount you walk every day. Try for at least 30 minutes on most days of the week. · Do not smoke. Smoking can increase your risk for health problems. If you need help quitting, talk to your doctor about stop-smoking programs and medicines. These can increase your chances of quitting for good. · Limit alcohol to 2 drinks a day for men and 1 drink a day for women. Too much alcohol can cause health problems. If you have a BMI higher than 25 · Your doctor may do other tests to check your risk for weight-related health problems. This may include measuring the distance around your waist. A waist measurement of more than 40 inches in men or 35 inches in women can increase the risk of weight-related health problems. · Talk with your doctor about steps you can take to stay healthy or improve your health. You may need to make lifestyle changes to lose weight and stay healthy, such as changing your diet and getting regular exercise. If you have a BMI lower than 18.5 · Your doctor may do other tests to check your risk for health problems. · Talk with your doctor about steps you can take to stay healthy or improve your health. You may need to make lifestyle changes to gain or maintain weight and stay healthy, such as getting more healthy foods in your diet and doing exercises to build muscle. Where can you learn more? Go to http://jama-obinna.info/. Enter S176 in the search box to learn more about \"Body Mass Index: Care Instructions. \" Current as of: October 13, 2016 Content Version: 11.4 © 4754-0147 [employer]. Care instructions adapted under license by [employer] (which disclaims liability or warranty for this information). If you have questions about a medical condition or this instruction, always ask your healthcare professional. [address] any warranty or liability for your use of this information. Medicare Wellness Visit, Female The best way to live healthy is to have a lifestyle where you eat a well-balanced diet, exercise regularly, limit alcohol use, and quit all forms of tobacco/nicotine, if applicable. Regular preventive services are another way to keep healthy. Preventive services (vaccines, screening tests, monitoring & exams) can help personalize your care plan, which helps you manage your own care. Screening tests can find health problems at the earliest stages, when they are easiest to treat. [facility] follows the current, evidence-based guidelines published by the [place] (USPSTF) when recommending preventive services for our patients. Because we follow these guidelines, sometimes recommendations change over time as research supports it. (For example, mammograms used to be recommended annually.  Even though Medicare will still pay for an annual mammogram, the newer guidelines recommend a mammogram every two years for women of average risk.) Of course, you and your doctor may decide to screen more often for some diseases, based on your risk and your health status. Preventive services for you include: - Medicare offers their members a free annual wellness visit, which is time for you and your primary care provider to discuss and plan for your preventive service needs. Take advantage of this benefit every year! 
-All adults over the age of 72 should receive the recommended pneumonia vaccines. Current USPSTF guidelines recommend a series of two vaccines for the best pneumonia protection.  
-All adults should have a flu vaccine yearly and a tetanus vaccine every 10 years. All adults age 61 and older should receive a shingles vaccine once in their lifetime.   
-A bone mass density test is recommended when a woman turns 65 to screen for osteoporosis. This test is only recommended one time, as a screening. Some providers will use this same test as a disease monitoring tool if you already have osteoporosis. -All adults age 38-68 who are overweight should have a diabetes screening test once every three years.  
-Other screening tests and preventive services for persons with diabetes include: an eye exam to screen for diabetic retinopathy, a kidney function test, a foot exam, and stricter control over your cholesterol.  
-Cardiovascular screening for adults with routine risk involves an electrocardiogram (ECG) at intervals determined by your doctor.  
-Colorectal cancer screenings should be done for adults age 54-65 with no increased risk factors for colorectal cancer. There are a number of acceptable methods of screening for this type of cancer. Each test has its own benefits and drawbacks. Discuss with your doctor what is most appropriate for you during your annual wellness visit. The different tests include: colonoscopy (considered the best screening method), a fecal occult blood test, a fecal DNA test, and sigmoidoscopy. -Breast cancer screenings are recommended every other year for women of normal risk, age 54-69. 
-Cervical cancer screenings for women over age 72 are only recommended with certain risk factors.  
-All adults born between [facility] should be screened once for Hepatitis C. Here is a list of your current Health Maintenance items (your personalized list of preventive services) with a due date: 
Health Maintenance Due Topic Date Due  Shingles Vaccine (1 of 2) 10/24/2002  Glaucoma Screening   10/24/2017  Bone Mineral Density   10/24/2017  Pneumococcal Vaccine (1 of 2 - PCV13) 10/24/2017 Vaccine Information Statement Pneumococcal Conjugate Vaccine (PCV13): What You Need to Know Many Vaccine Information Statements are available in [place] and other languages. See www.immunize.org/vis. Hojas de información Sobre Vacunas están disponibles en español y en muchos otros idiomas. Visite www.immunize.org/vis. 1. Why get vaccinated? Vaccination can protect both children and adults from pneumococcal disease. Pneumococcal disease is caused by bacteria that can spread from person to person through close contact. It can cause ear infections, and it can also lead to more serious infections of the: 
 Lungs (pneumonia),  Blood (bacteremia), and 
 Covering of the brain and spinal cord (meningitis). Pneumococcal pneumonia is most common among adults. Pneumococcal meningitis can cause deafness and brain damage, and it kills about 1 child in 10 who get it. Anyone can get pneumococcal disease, but children under 3years of age and adults 72 years and older, people with certain medical conditions, and cigarette smokers are at the highest risk. Before there was a vaccine, the [facility] saw: 
 more than 700 cases of meningitis, 
 about 13,000 blood infections, 
 about 5 million ear infections, and 
 about 200 deaths in children under 5 each year from pneumococcal disease. Since vaccine became available, severe pneumococcal disease in these children has fallen by 88%. About 18,000 older adults die of pneumococcal disease each year in the [place]. Treatment of pneumococcal infections with penicillin and other drugs is not as effective as it used to be, because some strains of the disease have become resistant to these drugs. This makes prevention of the disease, through vaccination, even more important. 2. PCV13 vaccine Pneumococcal conjugate vaccine (called PCV13) protects against 13 types of pneumococcal bacteria. PCV13 is routinely given to children at 2, 4, 6, and 1515 months of age. It is also recommended for children and adults 3to 59years of age with certain health conditions, and for all adults 72years of age and older. Your doctor can give you details. 3. Some people should not get this vaccine Anyone who has ever had a life-threatening allergic reaction to a dose of this vaccine, to an earlier pneumococcal vaccine called PCV7, or to any vaccine containing diphtheria toxoid (for example, DTaP), should not get PCV13. Anyone with a severe allergy to any component of PCV13 should not get the vaccine. Tell your doctor if the person being vaccinated has any severe allergies. If the person scheduled for vaccination is not feeling well, your healthcare provider might decide to reschedule the shot on another day. 4. Risks of a vaccine reaction With any medicine, including vaccines, there is a chance of reactions. These are usually mild and go away on their own, but serious reactions are also possible. Problems reported following PCV13 varied by age and dose in the series. The most common problems reported among children were:  About half became drowsy after the shot, had a temporary loss of appetite, or had redness or tenderness where the shot was given.  About 1 out of 3 had swelling where the shot was given.  About 1 out of 3 had a mild fever, and about 1 in 20 had a fever over 102.2°F. 
 Up to about 8 out of 10 became fussy or irritable. Adults have reported pain, redness, and swelling where the shot was given; also mild fever, fatigue, headache, chills, or muscle pain. Yvonne Bomarilyn children who get PCV13 along with inactivated flu vaccine at the same time may be at increased risk for seizures caused by fever. Ask your doctor for more information. Problems that could happen after any vaccine:  People sometimes faint after a medical procedure, including vaccination. Sitting or lying down for about 15 minutes can help prevent fainting, and injuries caused by a fall. Tell your doctor if you feel dizzy, or have vision changes or ringing in the ears.  Some older children and adults get severe pain in the shoulder and have difficulty moving the arm where a shot was given. This happens very rarely.  Any medication can cause a severe allergic reaction. Such reactions from a vaccine are very rare, estimated at about 1 in a million doses, and would happen within a few minutes to a few hours after the vaccination. As with any medicine, there is a very small chance of a vaccine causing a serious injury or death. The safety of vaccines is always being monitored. For more information, visit: www.cdc.gov/vaccinesafety/  
 
5. What if there is a serious reaction? What should I look for?  Look for anything that concerns you, such as signs of a severe allergic reaction, very high fever, or unusual behavior. Signs of a severe allergic reaction can include hives, swelling of the face and throat, difficulty breathing, a fast heartbeat, dizziness, and weakness  usually within a few minutes to a few hours after the vaccination. What should I do?  
 
 If you think it is a severe allergic reaction or other emergency that cant wait, call 9-1-1 or get the person to the nearest hospital. Otherwise, call your doctor. Reactions should be reported to the Vaccine Adverse Event Reporting System (VAERS). Your doctor should file this report, or you can do it yourself through the VAERS web site at www.vaers. [facility].gov, or by calling 6-615.464.1025. VAERS does not give medical advice. 6. The National Vaccine Injury Compensation Program 
 
The [facility] Vaccine Injury Compensation Program (VICP) is a federal program that was created to compensate people who may have been injured by certain vaccines. Persons who believe they may have been injured by a vaccine can learn about the program and about filing a claim by calling 6-917.459.8021 or visiting the [employer] website at www.[facility].gov/vaccinecompensation. There is a time limit to file a claim for compensation. 7. How can I learn more?  Ask your healthcare provider. He or she can give you the vaccine package insert or suggest other sources of information.  Call your local or state health department.  Contact the Centers for Disease Control and Prevention (CDC): 
- Call 0-516.722.2925 (1-800-CDC-INFO) or 
- Visit CDCs website at www.cdc.gov/vaccines Vaccine Information Statement PCV13 Vaccine 11/5/2015  
[address] PHILIPAdam Mancini Taz [id number] Department of Health and [employer] Centers for Disease Control and Prevention Office Use Only High Blood Pressure: Care Instructions Your Care Instructions If your blood pressure is usually above 130/80, you have high blood pressure, or hypertension. That means the top number is 130 or higher or the bottom number is 80 or higher, or both. Despite what a lot of people think, high blood pressure usually doesn't cause headaches or make you feel dizzy or lightheaded. It usually has no symptoms. But it does increase your risk for heart attack, stroke, and kidney or eye damage.  The higher your blood pressure, the more your risk increases. Your doctor will give you a goal for your blood pressure. Your goal will be based on your health and your age. Lifestyle changes, such as eating healthy and being active, are always important to help lower blood pressure. You might also take medicine to reach your blood pressure goal. 
Follow-up care is a key part of your treatment and safety. Be sure to make and go to all appointments, and call your doctor if you are having problems. It's also a good idea to know your test results and keep a list of the medicines you take. How can you care for yourself at home? Medical treatment · If you stop taking your medicine, your blood pressure will go back up. You may take one or more types of medicine to lower your blood pressure. Be safe with medicines. Take your medicine exactly as prescribed. Call your doctor if you think you are having a problem with your medicine. · Talk to your doctor before you start taking aspirin every day. Aspirin can help certain people lower their risk of a heart attack or stroke. But taking aspirin isn't right for everyone, because it can cause serious bleeding. · See your doctor regularly. You may need to see the doctor more often at first or until your blood pressure comes down. · If you are taking blood pressure medicine, talk to your doctor before you take decongestants or anti-inflammatory medicine, such as ibuprofen. Some of these medicines can raise blood pressure. · Learn how to check your blood pressure at home. Lifestyle changes · Stay at a healthy weight. This is especially important if you put on weight around the waist. Losing even 10 pounds can help you lower your blood pressure. · If your doctor recommends it, get more exercise. Walking is a good choice. Bit by bit, increase the amount you walk every day. Try for at least 30 minutes on most days of the week. You also may want to swim, bike, or do other activities. · Avoid or limit alcohol. Talk to your doctor about whether you can drink any alcohol. · Try to limit how much sodium you eat to less than 2,300 milligrams (mg) a day. Your doctor may ask you to try to eat less than 1,500 mg a day. · Eat plenty of fruits (such as bananas and oranges), vegetables, legumes, whole grains, and low-fat dairy products. · Lower the amount of saturated fat in your diet. Saturated fat is found in animal products such as milk, cheese, and meat. Limiting these foods may help you lose weight and also lower your risk for heart disease. · Do not smoke. Smoking increases your risk for heart attack and stroke. If you need help quitting, talk to your doctor about stop-smoking programs and medicines. These can increase your chances of quitting for good. When should you call for help? Call 911 anytime you think you may need emergency care. This may mean having symptoms that suggest that your blood pressure is causing a serious heart or blood vessel problem. Your blood pressure may be over 180/120. 
 For example, call 911 if: 
  · You have symptoms of a heart attack. These may include: ¨ Chest pain or pressure, or a strange feeling in the chest. 
¨ Sweating. ¨ Shortness of breath. ¨ Nausea or vomiting. ¨ Pain, pressure, or a strange feeling in the back, neck, jaw, or upper belly or in one or both shoulders or arms. ¨ Lightheadedness or sudden weakness. ¨ A fast or irregular heartbeat.  
  · You have symptoms of a stroke. These may include: 
¨ Sudden numbness, tingling, weakness, or loss of movement in your face, arm, or leg, especially on only one side of your body. ¨ Sudden vision changes. ¨ Sudden trouble speaking. ¨ Sudden confusion or trouble understanding simple statements. ¨ Sudden problems with walking or balance. ¨ A sudden, severe headache that is different from past headaches.  
  · You have severe back or belly pain.  Do not wait until your blood pressure comes down on its own. Get help right away. 
 Call your doctor now or seek immediate care if: 
  · Your blood pressure is much higher than normal (such as 180/120 or higher), but you don't have symptoms.  
  · You think high blood pressure is causing symptoms, such as: ¨ Severe headache. ¨ Blurry vision.  
 Watch closely for changes in your health, and be sure to contact your doctor if: 
  · Your blood pressure measures higher than your doctor recommends at least 2 times. That means the top number is higher or the bottom number is higher, or both.  
  · You think you may be having side effects from your blood pressure medicine. Where can you learn more? Go to http://jama-obinna.info/. Enter S671 in the search box to learn more about \"High Blood Pressure: Care Instructions. \" Current as of: December 6, 2017 Content Version: 11.8 © 0446-7639 [employer]. Care instructions adapted under license by [employer] (which disclaims liability or warranty for this information). If you have questions about a medical condition or this instruction, always ask your healthcare professional. [place] [address] any warranty or liability for your use of this information. Introducing [facility]! [facility] introduces [employer] patient portal. Now you can access parts of your medical record, email your doctor's office, and request medication refills online. 1. In your internet browser, go to [email or web address] 2. Click on the First Time User? Click Here link in the Sign In box. You will see the New Member Sign Up page. 3. Enter your [employer] Access Code exactly as it appears below. You will not need to use this code after youve completed the sign-up process. If you do not sign up before the expiration date, you must request a new code.  
 
· [employer] Access Code: [email or web address] 
 Expires: 1/2/2019 11:06 AM 
 
4. Enter the last four digits of your Social Security Number (xxxx) and Date of Birth (mm/dd/yyyy) as indicated and click Submit. You will be taken to the next sign-up page. 5. Create a [employer] ID. This will be your [employer] login ID and cannot be changed, so think of one that is secure and easy to remember. 6. Create a [employer] password. You can change your password at any time. 7. Enter your Password Reset Question and Answer. This can be used at a later time if you forget your password. 8. Enter your e-mail address. You will receive e-mail notification when new information is available in [address] 9. Click Sign Up. You can now view and download portions of your medical record. 10. Click the Download Summary menu link to download a portable copy of your medical information. If you have questions, please visit the Frequently Asked Questions section of the [employer] website. Remember, [employer] is NOT to be used for urgent needs. For medical emergencies, dial 911. Now available from your iPhone and Android! Please provide this summary of care documentation to your next provider. Your primary care clinician is listed as ROSANA HOPSON. If you have any questions after today's visit, please call [phone].

## 2018-10-06 LAB
ALBUMIN SERPL-MCNC: 4.6 G/DL (ref 3.6–4.8)
ALBUMIN/GLOB SERPL: 1.7 {RATIO} (ref 1.2–2.2)
ALP SERPL-CCNC: 63 IU/L (ref 39–117)
ALT SERPL-CCNC: 59 IU/L (ref 0–32)
AST SERPL-CCNC: 39 IU/L (ref 0–40)
BILIRUB SERPL-MCNC: 0.3 MG/DL (ref 0–1.2)
BUN SERPL-MCNC: 8 MG/DL (ref 8–27)
BUN/CREAT SERPL: 12 (ref 12–28)
CALCIUM SERPL-MCNC: 9.4 MG/DL (ref 8.7–10.3)
CHLORIDE SERPL-SCNC: 101 MMOL/L (ref 96–106)
CHOLEST SERPL-MCNC: 157 MG/DL (ref 100–199)
CO2 SERPL-SCNC: 24 MMOL/L (ref 20–29)
CREAT SERPL-MCNC: 0.67 MG/DL (ref 0.57–1)
ERYTHROCYTE [DISTWIDTH] IN BLOOD BY AUTOMATED COUNT: 13.2 % (ref 12.3–15.4)
EST. AVERAGE GLUCOSE BLD GHB EST-MCNC: 123 MG/DL
GLOBULIN SER CALC-MCNC: 2.7 G/DL (ref 1.5–4.5)
GLUCOSE SERPL-MCNC: 119 MG/DL (ref 65–99)
HBA1C MFR BLD: 5.9 % (ref 4.8–5.6)
HCT VFR BLD AUTO: 46.4 % (ref 34–46.6)
HDLC SERPL-MCNC: 40 MG/DL
HGB BLD-MCNC: 15.6 G/DL (ref 11.1–15.9)
INTERPRETATION, 910389: NORMAL
LDLC SERPL CALC-MCNC: 89 MG/DL (ref 0–99)
MCH RBC QN AUTO: 31.4 PG (ref 26.6–33)
MCHC RBC AUTO-ENTMCNC: 33.6 G/DL (ref 31.5–35.7)
MCV RBC AUTO: 93 FL (ref 79–97)
PLATELET # BLD AUTO: 292 X10E3/UL (ref 150–379)
POTASSIUM SERPL-SCNC: 4.2 MMOL/L (ref 3.5–5.2)
PROT SERPL-MCNC: 7.3 G/DL (ref 6–8.5)
RBC # BLD AUTO: 4.97 X10E6/UL (ref 3.77–5.28)
SODIUM SERPL-SCNC: 143 MMOL/L (ref 134–144)
TRIGL SERPL-MCNC: 141 MG/DL (ref 0–149)
TSH SERPL DL<=0.005 MIU/L-ACNC: 1.16 UIU/ML (ref 0.45–4.5)
VLDLC SERPL CALC-MCNC: 28 MG/DL (ref 5–40)
WBC # BLD AUTO: 8 X10E3/UL (ref 3.4–10.8)

## 2018-10-11 ENCOUNTER — TELEPHONE (OUTPATIENT)
Dept: FAMILY MEDICINE CLINIC | Age: 68
End: 2018-10-11

## 2018-10-12 NOTE — TELEPHONE ENCOUNTER
Please call and advise daughter Ronan Beckett who cares for pt, manages her meds and brings her to all her appts (also make sure on HIPAA) of patient lab results and recommendations:    Blood counts, thyroid, electrolytes good and wnl  Fasting  , and HgBA1c has improved nicely from 6.1 to 5.9 which is lowest it has been the last several checks over the past few years, great job  Lipids overall good and at goal except HDL low at 40 and ideal should be >50  I have included some tips below. Liver enzyme mildly elevated, she has a h/o this presumably which has been related to fatty liver on US and can improve w/ diet and wt loss. Adhere to the following Lifestyle Modifications below:  Diet:  ~Consume a dietary pattern that emphasizes intake of vegetables, fruits, whole grains, poultry, fish, low fat dairy, legumes, non tropical vegetable oils and nuts. ~Avoid red meat, saturated fats, fried foods, sweets, and sugars. Limit carbs to no more than one serving per meal and <40 g per serving. Limit snacks to <15 g carbs. Increase lean protein. ~Avoid added salt and if you have a history of high blood pressure also avoid added salt and limit sodium intake to < 2 grams per day. ~Reference the AHA (American Heart Association) Diet or DASH diet for additional guidelines. ~Limit alcohol to no more than 1 standard drink per day for women and 2 for men (ie/ 12 oz beer, 5 oz wine, 1.5 oz liquor). ~Avoid tobacco products. ~Limit caffeine to no more than 1-2 small servings per day. Exercise:  ~Get regular moderate intensity cardio/aerobic exercise at least 150 minutes per week ie/ 30-50 minutes 3-4 x a week.     Follow up 3 months, sooner prn  Set now

## 2018-10-12 NOTE — TELEPHONE ENCOUNTER
Spoke with patient's daughter, Lea Drake( on HIPAA), regarding results and recommendations. Voiced understanding.

## 2019-03-12 RX ORDER — OMEPRAZOLE 20 MG/1
CAPSULE, DELAYED RELEASE ORAL
Qty: 30 CAP | Refills: 0 | Status: SHIPPED | OUTPATIENT
Start: 2019-03-12 | End: 2019-10-08

## 2019-03-12 RX ORDER — SERTRALINE HYDROCHLORIDE 100 MG/1
TABLET, FILM COATED ORAL
Qty: 45 TAB | Refills: 0 | Status: SHIPPED | OUTPATIENT
Start: 2019-03-12 | End: 2020-08-25 | Stop reason: SDUPTHER

## 2019-03-13 NOTE — TELEPHONE ENCOUNTER
Left message on daughter-Prachi-Identified VM to call office a [phone] to schedule appointment for her mom (patient)  Advised a 30 day supply of her requested refills were called in with no refills. Patient needs to be seen before additional refills are called in.

## 2019-03-13 NOTE — TELEPHONE ENCOUNTER
Patient advised after last check to have follow up 3 months, nothing scheduled yet, overdue. Her daughter Joe Odonnell schedules these and brings her to appts. Needs to get set asap.

## 2019-03-16 ENCOUNTER — OFFICE VISIT (OUTPATIENT)
Dept: FAMILY MEDICINE CLINIC | Age: 69
End: 2019-03-16

## 2019-03-16 VITALS
DIASTOLIC BLOOD PRESSURE: 90 MMHG | OXYGEN SATURATION: 95 % | HEIGHT: 68 IN | WEIGHT: 192 LBS | TEMPERATURE: 101 F | RESPIRATION RATE: 24 BRPM | HEART RATE: 92 BPM | SYSTOLIC BLOOD PRESSURE: 180 MMHG | BODY MASS INDEX: 29.1 KG/M2

## 2019-03-16 DIAGNOSIS — R68.89 FLU-LIKE SYMPTOMS: Primary | ICD-10-CM

## 2019-03-16 LAB
QUICKVUE INFLUENZA TEST: NEGATIVE
S PYO AG THROAT QL: NEGATIVE
VALID INTERNAL CONTROL?: YES
VALID INTERNAL CONTROL?: YES

## 2019-03-16 RX ORDER — AZITHROMYCIN 250 MG/1
TABLET, FILM COATED ORAL
Qty: 6 TAB | Refills: 0 | Status: SHIPPED | OUTPATIENT
Start: 2019-03-16 | End: 2019-03-26

## 2019-03-16 RX ORDER — AZITHROMYCIN 250 MG/1
TABLET, FILM COATED ORAL
Qty: 6 TAB | Refills: 0 | Status: CANCELLED | OUTPATIENT
Start: 2019-03-16

## 2019-03-16 RX ORDER — OSELTAMIVIR PHOSPHATE 75 MG/1
75 CAPSULE ORAL 2 TIMES DAILY
Qty: 10 CAP | Refills: 0 | Status: SHIPPED | OUTPATIENT
Start: 2019-03-16 | End: 2019-03-21

## 2019-03-16 NOTE — PATIENT INSTRUCTIONS
Oseltamivir (By mouth)   Oseltamivir ([id number]-i-vir)  Treats and helps prevent influenza. Brand Name(s): Tamiflu   There may be other brand names for this medicine. When This Medicine Should Not Be Used: This medicine is not right for everyone. Do not use it if you had an allergic reaction to oseltamivir. How to Use This Medicine:   Capsule, Liquid  · Your doctor will tell you how much medicine to use. Do not use more than directed. Do not take this medicine for any illness other than the flu. · Start taking this medicine as soon as possible after flu symptoms begin or after you are exposed to the flu (within the first 2 days). · Shake the oral liquid before each use. Measure the liquid medicine with the oral dispenser that came with the medicine. Ask your pharmacist for an oral measuring spoon or syringe if you do not have one. · You may open the capsule and mix the contents with a sweet liquid (such as chocolate syrup, corn syrup, or sugar dissolved in water). Ask your doctor or pharmacist if you have any questions. · Read and follow the patient instructions that come with this medicine. Talk to your doctor or pharmacist if you have any questions. · Missed dose: If you miss a dose and your next dose is due within 2 hours, skip the missed dose and take your medicine at the normal time. Do not use extra medicine to make up for a missed dose. If you miss a dose and your next dose is due in more than 2 hours, take the missed dose as soon as you can. Then take your next dose at the normal time, and go back to your regular schedule. · Capsules: Store at room temperature, away from heat, moisture, and direct light. · Oral liquid: Store in the refrigerator and use within 17 days. Do not freeze. You may also store the medicine at room temperature, but use it within 10 days. Throw away any medicine that has not been used within this time.   Drugs and Foods to Avoid:   Ask your doctor or pharmacist before using any other medicine, including over-the-counter medicines, vitamins, and herbal products. · Do not use this medicine if you have received the live influenza vaccine (nasal mist) in the past 2 weeks or if you will receive the vaccine within 48 hours, unless your doctor says it is okay. Warnings While Using This Medicine:   · Tell your doctor if you are pregnant or breastfeeding, or if you have kidney disease, liver disease, heart disease, lung disease, or a weakened immune system. · This medicine may cause the following problems:   ¨ Serious skin reactions  ¨ Unusual thoughts or behaviors  · Call your doctor if your symptoms do not improve or if they get worse. · The liquid form of this medicine contains sorbitol. Tell your doctor if you have hereditary fructose intolerance. · This medicine is not a substitute for a yearly flu shot. It also will not prevent a bacterial infection. · Keep all medicine out of the reach of children. Never share your medicine with anyone. Possible Side Effects While Using This Medicine:   Call your doctor right away if you notice any of these side effects:  · Allergic reaction: Itching or hives, swelling in your face or hands, swelling or tingling in your mouth or throat, chest tightness, trouble breathing  · Blistering, peeling, red skin rash  · Confusion, agitation, seeing or hearing things that are not there, change in mood or behavior, seizures  · Fever, chills, cough, sore throat, body aches  If you notice these less serious side effects, talk with your doctor:   · Nausea, vomiting, diarrhea, stomach pain, or upset stomach  If you notice other side effects that you think are caused by this medicine, tell your doctor. Call your doctor for medical advice about side effects.  You may report side effects to FDA at 2-840-CTK-7927  © 2017 [facility] Information is for End User's use only and may not be sold, redistributed or otherwise used for commercial purposes. The above information is an  only. It is not intended as medical advice for individual conditions or treatments. Talk to your doctor, nurse or pharmacist before following any medical regimen to see if it is safe and effective for you.

## 2019-03-16 NOTE — PROGRESS NOTES
Chief Complaint   Patient presents with    Cough     started coughing 4 days ago. felt better and then started with chills, fever, and fatigue 2 days ago.  Nausea     pt and her daughter both are having nausea and vomiting. \"REVIEWED RECORD IN PREPARATION FOR VISIT AND HAVE OBTAINED THE NECESSARY DOCUMENTATION\"  1. Have you been to the ER, urgent care clinic since your last visit? Hospitalized since your last visit? No    2. Have you seen or consulted any other health care providers outside of the [address] [place] [address] since your last visit? Include any pap smears or colon screening.  No

## 2019-03-16 NOTE — PROGRESS NOTES
Patient Name: Blake Scott   MRN: [id number]    Bianca York is a 79 y.o. female who presents with the following:     Patient reports sore throat, myalgias, fever, chills, n/v/d, and URI symptoms for 4 days but fevers of 102F started yesterday. Denies a history of rhinorrhea, sinus congestion, chest congestion, wheezing, SOB/CAMARA and chest pain. Evaluation to date: none. Treatment to date: OTC products. Relevant PMH: COPD. Patient reports sick contacts: yes. Review of Systems   Constitutional: Positive for fever and malaise/fatigue. Negative for weight loss. Respiratory: Negative for cough, hemoptysis, shortness of breath and wheezing. Cardiovascular: Negative for chest pain, palpitations, leg swelling and PND. Gastrointestinal: Positive for diarrhea, nausea and vomiting. Negative for abdominal pain and constipation. Musculoskeletal: Positive for myalgias. The patient's medications, allergies, past medical history, surgical history, family history and social history were reviewed and updated where appropriate. Prior to Admission medications    Medication Sig Start Date End Date Taking? Authorizing Provider   sertraline (ZOLOFT) 100 mg tablet TAKE 1.5 TABS BY MOUTH DAILY 3/12/19  Yes Stacy Matias MD   omeprazole (PRILOSEC) 20 mg capsule TAKE 1 CAPSULE BY MOUTH EVERY DAY 3/12/19  Yes Remington Matias MD   traZODone (DESYREL) 50 mg tablet TAKE 1 TABLET BY MOUTH EVERYDAY AT BEDTIME 12/26/18  Yes Stacy Matias MD   amLODIPine (NORVASC) 10 mg tablet TAKE 1 TABLET BY MOUTH EVERY DAY 11/5/18  Yes Remington Matias MD   losartan-hydroCHLOROthiazide (HYZAAR) 50-12.5 mg per tablet Take 1 Tab by mouth daily.  Indications: hypertension 10/4/18  Yes Stacy Matias MD   atorvastatin (LIPITOR) 20 mg tablet TAKE 1 TABLET BY MOUTH EVERY DAY 8/18/18  Yes Stacy Matias MD   traZODone (DESYREL) 50 mg tablet TAKE 1 TABLET BY MOUTH EVERYDAY AT BEDTIME 5/10/18  Yes Fountain-Ellis, Terrel Cabot, MD   dicyclomine (BENTYL) 10 mg capsule TAKE 1-2 CAPSULES BY MOUTH EVERY 6 HOURS AS NEEDED FOR ABDOMINAL CRAMPS 8/6/14  Yes Latonya Giang MD   albuterol (VENTOLIN HFA) 90 mcg/actuation inhaler Take 2 Puffs by inhalation every six (6) hours as needed for Wheezing or Shortness of Breath. Yes Provider, Historical   amlodipine (NORVASC) 10 mg tablet TAKE 1 TABLET BY MOUTH EVERY DAY 10/19/11  Yes Stacy Matias MD   MULTIVITAMINS (MULTIVITAMIN PO) Take  by mouth daily. Yes Provider, Historical   ferrous sulfate (IRON) 325 mg (65 mg Iron) tablet Take  by mouth two (2) times a day. 7/14/10  Yes Provider, Historical       Allergies   Allergen Reactions    Ativan [Lorazepam] Hives    Egg Hives     Upset stomach.  Pcn [Penicillins] Other (comments)     Unsure of reaction.  Wellbutrin [Bupropion Hcl] Anxiety    Xanax [Alprazolam] Hives           OBJECTIVE    Visit Vitals  /90 (BP 1 Location: Left arm, BP Patient Position: Sitting)   Pulse 92   Temp (!) 101 °F (38.3 °C) (Oral)   Resp 24   Ht 5' 8\" (1.727 m)   Wt 192 lb (87.1 kg)   SpO2 95%   BMI 29.19 kg/m²       Physical Exam   Constitutional: She is oriented to person, place, and time and well-developed, well-nourished, and in no distress. No distress. HENT:   Head: Normocephalic and atraumatic. Right Ear: Tympanic membrane is not perforated and not erythematous. No middle ear effusion. No decreased hearing is noted. Left Ear: Tympanic membrane is not perforated and not erythematous. No middle ear effusion. No decreased hearing is noted. Nose: Nose normal. Right sinus exhibits no maxillary sinus tenderness and no frontal sinus tenderness. Left sinus exhibits no maxillary sinus tenderness and no frontal sinus tenderness. Mouth/Throat: Uvula is midline, oropharynx is clear and moist and mucous membranes are normal.   Neck: Normal range of motion. Neck supple.    Cardiovascular: Normal rate, regular rhythm and normal heart sounds. Exam reveals no gallop and no friction rub. No murmur heard. Pulmonary/Chest: Effort normal and breath sounds normal. No respiratory distress. She has no wheezes. Lymphadenopathy:     She has no cervical adenopathy. Neurological: She is alert and oriented to person, place, and time. Skin: She is not diaphoretic. Psychiatric: Mood, memory, affect and judgment normal.   Nursing note and vitals reviewed. ASSESSMENT AND PLAN  Marly Sullivan is a 79 y.o. female who presents today for:    1. Flu-like symptoms  POC flu/strep negative but symptoms appear c/w with flu. Discussed with pt regarding risks/benefits of Tamiflu vs watchful waiting. Side effects of GI upset and transient mood changes reviewed. Patient would like to start Tamiflu. Given fever and COPD hx, will also cover for atypical PNA; Review to take with food and probiotic/yogurt daily while on abx. Reviewed signs and symptoms that would indicate a worsening medical condition which would require immediate evaluation and treatment; patient expressed understanding of plan. - AMB POC RAPID INFLUENZA TEST  - AMB POC RAPID STREP A  - oseltamivir (TAMIFLU) 75 mg capsule; Take 1 Cap by mouth two (2) times a day for 5 days. Dispense: 10 Cap; Refill: 0  - azithromycin (ZITHROMAX) 250 mg tablet; 500 mg (2 tabs) on day 1 followed by 250 mg (1 tab) on days 2 to 5  Dispense: 6 Tab; Refill: 0      There are no discontinued medications. Follow-up Disposition:  Return if symptoms worsen or fail to improve. Medication risks/benefits/costs/interactions/alternatives discussed with patient. Advised patient to call back or return to office if symptoms worsen/change/persist. If patient cannot reach us or should anything more severe/urgent arise he/she should proceed directly to the nearest emergency department. Discussed expected course/resolution/complications of diagnosis in detail with patient.   Patient given a written after visit summary which includes his/her diagnoses, current medications and vitals. Patient expressed understanding with the diagnosis and plan. Elinor Malloy M.D.

## 2019-03-26 ENCOUNTER — HOSPITAL ENCOUNTER (INPATIENT)
Age: 69
LOS: 31 days | Discharge: HOME | DRG: 329 | End: 2019-04-26
Attending: EMERGENCY MEDICINE | Admitting: SURGERY
Payer: MEDICARE

## 2019-03-26 ENCOUNTER — APPOINTMENT (OUTPATIENT)
Dept: GENERAL RADIOLOGY | Age: 69
DRG: 329 | End: 2019-03-26
Attending: EMERGENCY MEDICINE
Payer: MEDICARE

## 2019-03-26 ENCOUNTER — APPOINTMENT (OUTPATIENT)
Dept: CT IMAGING | Age: 69
DRG: 329 | End: 2019-03-26
Attending: EMERGENCY MEDICINE
Payer: MEDICARE

## 2019-03-26 DIAGNOSIS — I50.9 ACUTE CONGESTIVE HEART FAILURE, UNSPECIFIED HEART FAILURE TYPE (HCC): ICD-10-CM

## 2019-03-26 DIAGNOSIS — E87.6 HYPOKALEMIA: ICD-10-CM

## 2019-03-26 DIAGNOSIS — Z98.890 HISTORY OF OPEN SIGMOIDECTOMY: ICD-10-CM

## 2019-03-26 DIAGNOSIS — R77.8 ELEVATED TROPONIN: ICD-10-CM

## 2019-03-26 DIAGNOSIS — K57.80 PERFORATED DIVERTICULUM: Primary | ICD-10-CM

## 2019-03-26 DIAGNOSIS — Z90.49 HISTORY OF OPEN SIGMOIDECTOMY: ICD-10-CM

## 2019-03-26 PROBLEM — K57.20 PERFORATED DIVERTICULUM OF LARGE INTESTINE: Status: ACTIVE | Noted: 2019-03-26

## 2019-03-26 LAB
ALBUMIN SERPL-MCNC: 2.8 G/DL (ref 3.5–5)
ALBUMIN/GLOB SERPL: 0.5 {RATIO} (ref 1.1–2.2)
ALP SERPL-CCNC: 75 U/L (ref 45–117)
ALT SERPL-CCNC: 38 U/L (ref 12–78)
ANION GAP SERPL CALC-SCNC: 9 MMOL/L (ref 5–15)
ARTERIAL PATENCY WRIST A: ABNORMAL
AST SERPL-CCNC: 24 U/L (ref 15–37)
BASE EXCESS BLD CALC-SCNC: 6 MMOL/L
BASOPHILS # BLD: 0 K/UL (ref 0–0.1)
BASOPHILS NFR BLD: 0 % (ref 0–1)
BDY SITE: ABNORMAL
BILIRUB SERPL-MCNC: 0.7 MG/DL (ref 0.2–1)
BNP SERPL-MCNC: 2224 PG/ML (ref 0–125)
BUN SERPL-MCNC: 12 MG/DL (ref 6–20)
BUN/CREAT SERPL: 13 (ref 12–20)
CALCIUM SERPL-MCNC: 9.2 MG/DL (ref 8.5–10.1)
CHLORIDE SERPL-SCNC: 95 MMOL/L (ref 97–108)
CO2 SERPL-SCNC: 31 MMOL/L (ref 21–32)
COMMENT, HOLDF: NORMAL
CREAT SERPL-MCNC: 0.94 MG/DL (ref 0.55–1.02)
D DIMER PPP FEU-MCNC: 3.6 MG/L FEU (ref 0–0.65)
DIFFERENTIAL METHOD BLD: ABNORMAL
EOSINOPHIL # BLD: 0 K/UL (ref 0–0.4)
EOSINOPHIL NFR BLD: 0 % (ref 0–7)
ERYTHROCYTE [DISTWIDTH] IN BLOOD BY AUTOMATED COUNT: 12.5 % (ref 11.5–14.5)
GAS FLOW.O2 O2 DELIVERY SYS: ABNORMAL L/MIN
GLOBULIN SER CALC-MCNC: 5.2 G/DL (ref 2–4)
GLUCOSE SERPL-MCNC: 115 MG/DL (ref 65–100)
HCO3 BLD-SCNC: 28.4 MMOL/L (ref 22–26)
HCT VFR BLD AUTO: 40.6 % (ref 35–47)
HGB BLD-MCNC: 14 G/DL (ref 11.5–16)
IMM GRANULOCYTES # BLD AUTO: 0.1 K/UL (ref 0–0.04)
IMM GRANULOCYTES NFR BLD AUTO: 1 % (ref 0–0.5)
LACTATE SERPL-SCNC: 1.4 MMOL/L (ref 0.4–2)
LIPASE SERPL-CCNC: 343 U/L (ref 73–393)
LYMPHOCYTES # BLD: 2.5 K/UL (ref 0.8–3.5)
LYMPHOCYTES NFR BLD: 20 % (ref 12–49)
MCH RBC QN AUTO: 30.2 PG (ref 26–34)
MCHC RBC AUTO-ENTMCNC: 34.5 G/DL (ref 30–36.5)
MCV RBC AUTO: 87.5 FL (ref 80–99)
MONOCYTES # BLD: 1.1 K/UL (ref 0–1)
MONOCYTES NFR BLD: 9 % (ref 5–13)
NEUTS SEG # BLD: 8.9 K/UL (ref 1.8–8)
NEUTS SEG NFR BLD: 70 % (ref 32–75)
NRBC # BLD: 0 K/UL (ref 0–0.01)
NRBC BLD-RTO: 0 PER 100 WBC
PCO2 BLD: 31.9 MMHG (ref 35–45)
PH BLD: 7.56 [PH] (ref 7.35–7.45)
PLATELET # BLD AUTO: 304 K/UL (ref 150–400)
PMV BLD AUTO: 10.9 FL (ref 8.9–12.9)
PO2 BLD: 63 MMHG (ref 80–100)
POTASSIUM SERPL-SCNC: 2.6 MMOL/L (ref 3.5–5.1)
PROT SERPL-MCNC: 8 G/DL (ref 6.4–8.2)
RBC # BLD AUTO: 4.64 M/UL (ref 3.8–5.2)
SAMPLES BEING HELD,HOLD: NORMAL
SAO2 % BLD: 95 % (ref 92–97)
SODIUM SERPL-SCNC: 135 MMOL/L (ref 136–145)
SPECIMEN TYPE: ABNORMAL
TOTAL RESP. RATE, ITRR: 20
TROPONIN I SERPL-MCNC: 0.08 NG/ML
WBC # BLD AUTO: 12.6 K/UL (ref 3.6–11)

## 2019-03-26 PROCEDURE — 36600 WITHDRAWAL OF ARTERIAL BLOOD: CPT

## 2019-03-26 PROCEDURE — 94640 AIRWAY INHALATION TREATMENT: CPT

## 2019-03-26 PROCEDURE — 71045 X-RAY EXAM CHEST 1 VIEW: CPT

## 2019-03-26 PROCEDURE — 83880 ASSAY OF NATRIURETIC PEPTIDE: CPT

## 2019-03-26 PROCEDURE — 71275 CT ANGIOGRAPHY CHEST: CPT

## 2019-03-26 PROCEDURE — 85379 FIBRIN DEGRADATION QUANT: CPT

## 2019-03-26 PROCEDURE — 96366 THER/PROPH/DIAG IV INF ADDON: CPT

## 2019-03-26 PROCEDURE — 83690 ASSAY OF LIPASE: CPT

## 2019-03-26 PROCEDURE — 77030029684 HC NEB SM VOL KT MONA -A

## 2019-03-26 PROCEDURE — 65660000000 HC RM CCU STEPDOWN

## 2019-03-26 PROCEDURE — 74011250636 HC RX REV CODE- 250/636: Performed by: SURGERY

## 2019-03-26 PROCEDURE — 74011250636 HC RX REV CODE- 250/636: Performed by: EMERGENCY MEDICINE

## 2019-03-26 PROCEDURE — C9113 INJ PANTOPRAZOLE SODIUM, VIA: HCPCS | Performed by: SURGERY

## 2019-03-26 PROCEDURE — 82803 BLOOD GASES ANY COMBINATION: CPT

## 2019-03-26 PROCEDURE — 99285 EMERGENCY DEPT VISIT HI MDM: CPT

## 2019-03-26 PROCEDURE — 74177 CT ABD & PELVIS W/CONTRAST: CPT

## 2019-03-26 PROCEDURE — 74011000250 HC RX REV CODE- 250: Performed by: EMERGENCY MEDICINE

## 2019-03-26 PROCEDURE — 85025 COMPLETE CBC W/AUTO DIFF WBC: CPT

## 2019-03-26 PROCEDURE — 74011636320 HC RX REV CODE- 636/320: Performed by: EMERGENCY MEDICINE

## 2019-03-26 PROCEDURE — 84484 ASSAY OF TROPONIN QUANT: CPT

## 2019-03-26 PROCEDURE — 93005 ELECTROCARDIOGRAM TRACING: CPT

## 2019-03-26 PROCEDURE — 96365 THER/PROPH/DIAG IV INF INIT: CPT

## 2019-03-26 PROCEDURE — 83605 ASSAY OF LACTIC ACID: CPT

## 2019-03-26 PROCEDURE — 36415 COLL VENOUS BLD VENIPUNCTURE: CPT

## 2019-03-26 PROCEDURE — 74011250637 HC RX REV CODE- 250/637: Performed by: EMERGENCY MEDICINE

## 2019-03-26 PROCEDURE — 80053 COMPREHEN METABOLIC PANEL: CPT

## 2019-03-26 PROCEDURE — 96368 THER/DIAG CONCURRENT INF: CPT

## 2019-03-26 PROCEDURE — 74011000250 HC RX REV CODE- 250: Performed by: SURGERY

## 2019-03-26 RX ORDER — ACETAMINOPHEN 325 MG/1
650 TABLET ORAL
Status: DISCONTINUED | OUTPATIENT
Start: 2019-03-26 | End: 2019-04-08

## 2019-03-26 RX ORDER — SERTRALINE HYDROCHLORIDE 50 MG/1
100 TABLET, FILM COATED ORAL DAILY
Status: DISCONTINUED | OUTPATIENT
Start: 2019-03-27 | End: 2019-04-26 | Stop reason: HOSPADM

## 2019-03-26 RX ORDER — METRONIDAZOLE 500 MG/100ML
500 INJECTION, SOLUTION INTRAVENOUS EVERY 8 HOURS
Status: DISCONTINUED | OUTPATIENT
Start: 2019-03-27 | End: 2019-04-02

## 2019-03-26 RX ORDER — ALBUTEROL SULFATE 0.83 MG/ML
2.5 SOLUTION RESPIRATORY (INHALATION)
Status: DISCONTINUED | OUTPATIENT
Start: 2019-03-26 | End: 2019-04-26 | Stop reason: HOSPADM

## 2019-03-26 RX ORDER — CIPROFLOXACIN 2 MG/ML
400 INJECTION, SOLUTION INTRAVENOUS
Status: DISCONTINUED | OUTPATIENT
Start: 2019-03-26 | End: 2019-03-26 | Stop reason: CLARIF

## 2019-03-26 RX ORDER — IPRATROPIUM BROMIDE AND ALBUTEROL SULFATE 2.5; .5 MG/3ML; MG/3ML
3 SOLUTION RESPIRATORY (INHALATION) AS NEEDED
Status: DISCONTINUED | OUTPATIENT
Start: 2019-03-26 | End: 2019-04-12

## 2019-03-26 RX ORDER — SODIUM CHLORIDE 0.9 % (FLUSH) 0.9 %
10 SYRINGE (ML) INJECTION ONCE
Status: COMPLETED | OUTPATIENT
Start: 2019-03-26 | End: 2019-03-26

## 2019-03-26 RX ORDER — DICYCLOMINE HYDROCHLORIDE 10 MG/1
10 CAPSULE ORAL
Status: DISCONTINUED | OUTPATIENT
Start: 2019-03-26 | End: 2019-03-30

## 2019-03-26 RX ORDER — LEVOFLOXACIN 5 MG/ML
750 INJECTION, SOLUTION INTRAVENOUS ONCE
Status: COMPLETED | OUTPATIENT
Start: 2019-03-26 | End: 2019-03-26

## 2019-03-26 RX ORDER — METRONIDAZOLE 500 MG/100ML
500 INJECTION, SOLUTION INTRAVENOUS EVERY 12 HOURS
Status: DISCONTINUED | OUTPATIENT
Start: 2019-03-27 | End: 2019-03-26

## 2019-03-26 RX ORDER — POTASSIUM CHLORIDE 750 MG/1
40 TABLET, FILM COATED, EXTENDED RELEASE ORAL
Status: COMPLETED | OUTPATIENT
Start: 2019-03-26 | End: 2019-03-26

## 2019-03-26 RX ORDER — METRONIDAZOLE 500 MG/100ML
500 INJECTION, SOLUTION INTRAVENOUS
Status: COMPLETED | OUTPATIENT
Start: 2019-03-26 | End: 2019-03-26

## 2019-03-26 RX ORDER — HYDROMORPHONE HYDROCHLORIDE 1 MG/ML
1 INJECTION, SOLUTION INTRAMUSCULAR; INTRAVENOUS; SUBCUTANEOUS
Status: DISCONTINUED | OUTPATIENT
Start: 2019-03-26 | End: 2019-04-02

## 2019-03-26 RX ORDER — SODIUM CHLORIDE, SODIUM LACTATE, POTASSIUM CHLORIDE, CALCIUM CHLORIDE 600; 310; 30; 20 MG/100ML; MG/100ML; MG/100ML; MG/100ML
125 INJECTION, SOLUTION INTRAVENOUS CONTINUOUS
Status: DISCONTINUED | OUTPATIENT
Start: 2019-03-26 | End: 2019-04-03

## 2019-03-26 RX ORDER — TRAZODONE HYDROCHLORIDE 50 MG/1
50 TABLET ORAL
Status: DISCONTINUED | OUTPATIENT
Start: 2019-03-26 | End: 2019-04-26 | Stop reason: HOSPADM

## 2019-03-26 RX ORDER — POTASSIUM CHLORIDE 14.9 MG/ML
10 INJECTION INTRAVENOUS
Status: COMPLETED | OUTPATIENT
Start: 2019-03-26 | End: 2019-03-26

## 2019-03-26 RX ORDER — LEVOFLOXACIN 5 MG/ML
750 INJECTION, SOLUTION INTRAVENOUS EVERY 24 HOURS
Status: DISCONTINUED | OUTPATIENT
Start: 2019-03-27 | End: 2019-03-31 | Stop reason: ALTCHOICE

## 2019-03-26 RX ORDER — ONDANSETRON 2 MG/ML
4 INJECTION INTRAMUSCULAR; INTRAVENOUS
Status: DISCONTINUED | OUTPATIENT
Start: 2019-03-26 | End: 2019-04-26 | Stop reason: HOSPADM

## 2019-03-26 RX ORDER — SODIUM CHLORIDE 9 MG/ML
50 INJECTION, SOLUTION INTRAVENOUS ONCE
Status: COMPLETED | OUTPATIENT
Start: 2019-03-26 | End: 2019-03-26

## 2019-03-26 RX ADMIN — IOPAMIDOL 100 ML: 755 INJECTION, SOLUTION INTRAVENOUS at 15:36

## 2019-03-26 RX ADMIN — IPRATROPIUM BROMIDE AND ALBUTEROL SULFATE 3 ML: .5; 3 SOLUTION RESPIRATORY (INHALATION) at 14:31

## 2019-03-26 RX ADMIN — PANTOPRAZOLE SODIUM 40 MG: 40 INJECTION, POWDER, FOR SOLUTION INTRAVENOUS at 18:35

## 2019-03-26 RX ADMIN — POTASSIUM CHLORIDE 10 MEQ: 200 INJECTION, SOLUTION INTRAVENOUS at 20:42

## 2019-03-26 RX ADMIN — HYDROMORPHONE HYDROCHLORIDE 1 MG: 1 INJECTION, SOLUTION INTRAMUSCULAR; INTRAVENOUS; SUBCUTANEOUS at 18:53

## 2019-03-26 RX ADMIN — SODIUM CHLORIDE 50 ML/HR: 900 INJECTION, SOLUTION INTRAVENOUS at 15:35

## 2019-03-26 RX ADMIN — METRONIDAZOLE 500 MG: 500 INJECTION, SOLUTION INTRAVENOUS at 16:24

## 2019-03-26 RX ADMIN — Medication 10 ML: at 15:35

## 2019-03-26 RX ADMIN — METRONIDAZOLE 500 MG: 500 INJECTION, SOLUTION INTRAVENOUS at 23:14

## 2019-03-26 RX ADMIN — POTASSIUM CHLORIDE 10 MEQ: 200 INJECTION, SOLUTION INTRAVENOUS at 17:23

## 2019-03-26 RX ADMIN — POTASSIUM CHLORIDE 10 MEQ: 200 INJECTION, SOLUTION INTRAVENOUS at 18:37

## 2019-03-26 RX ADMIN — POTASSIUM CHLORIDE 40 MEQ: 750 TABLET, EXTENDED RELEASE ORAL at 15:44

## 2019-03-26 RX ADMIN — SODIUM CHLORIDE, SODIUM LACTATE, POTASSIUM CHLORIDE, AND CALCIUM CHLORIDE 125 ML/HR: 600; 310; 30; 20 INJECTION, SOLUTION INTRAVENOUS at 18:53

## 2019-03-26 RX ADMIN — LEVOFLOXACIN 750 MG: 5 INJECTION, SOLUTION INTRAVENOUS at 18:40

## 2019-03-26 RX ADMIN — POTASSIUM CHLORIDE 10 MEQ: 200 INJECTION, SOLUTION INTRAVENOUS at 15:45

## 2019-03-26 NOTE — ED TRIAGE NOTES
Triage: pt is brought in by her children to be seen. Per family, x2 weeks ago dx with flu and given Tamiflu and antibiotic. Pt continues to feel fatigue and has a productive cough and N/V/D. Denies chest pain, urinary symptoms, sore throat, bilateral ear pain.

## 2019-03-26 NOTE — ED PROVIDER NOTES
The history is provided by the patient and a relative. No  was used. Fatigue This is a new problem. The current episode started more than 1 week ago. The problem has been gradually worsening. There was no focality noted. Pertinent negatives include no focal weakness, no loss of sensation, no loss of balance, no slurred speech, no speech difficulty, no memory loss, no movement disorder, no agitation, no visual change, no auditory change, no mental status change, no unresponsiveness and no disorientation. There has been no fever. Associated symptoms include shortness of breath. Pertinent negatives include no chest pain, no vomiting, no altered mental status, no confusion, no headaches, no choking, no nausea and no bowel incontinence. Associated medical issues include clotting disorder. Associated medical issues do not include trauma, mood changes, bleeding disorder, seizures, dementia or CVA. Past Medical History:  
Diagnosis Date  ACP (advance care planning) 2/21/2017 Initial ACP discussion w/ pt and daughter 2-2017. AMD form reviewed and copy provided. NN/facilitator to discuss with patient  Asthma   
 hx of  Benign essential hypertension 6/24/2016  Bilateral hip pain 6/3/2014  
 xrays 2012, negative  COPD (chronic obstructive pulmonary disease) ([facility] [address] 3/29/2010  Depression 3/29/2010  Diverticulitis  DVT of Rt Lower Extremity 3/29/2010  Elevated LFT's, Fatty Liver 3/29/2010  Essential tremor 7/14/2010  GERD (gastroesophageal reflux disease) 9/2/2011  H/O Subarachnoid hemorrhage due to ruptured aneurysm 7/14/2010  Hyperlipemia 3/29/2010  Impaired fasting glucose 7/13/2014 6/2014  Thyroiditis, subacute 3/29/2010  Tobacco Abuse 3/29/2010  Vitamin D deficiency 6/3/2014 2012 Past Surgical History:  
Procedure Laterality Date  ECHO STRESS  2005 Negative  HX COLONOSCOPY  3/2013, Dr Armani Vu benign cecal polyp, f/u 10 yrs  HX CRANIOTOMY  10/06  
 for clipping of ruptured aneurysm; Dr Markus Cook a shunt  HX ENDOSCOPY  EGD, Dr Danielle Lopes \"ok\" per pt report [address] Cervical Dysplasia (Dr Christiano Arshad)  [place] COLONOSCOPY W/BIOPSY SINGLE/MULTIPLE   Benign polyp Family History:  
Problem Relation Age of Onset  Anxiety Daughter  Attention Deficit Disorder Daughter  Drug Abuse Daughter  Anxiety Daughter  Thyroid Disease Daughter  Thyroid Disease Daughter Hypothyroidism  Asthma Daughter  Cancer Father   
     kidney  Diabetes Father  Arthritis-osteo Father  Diabetes Mother  Hypertension Mother Lashell Fujita Arthritis-osteo Mother  Stroke Mother  Hypertension Sister  Depression Sister  Pneumonia Sister   
      age 72  Diabetes Sister  Thyroid Disease Sister  Thyroid Disease Sister  Heart Disease Sister  Diabetes Maternal Grandmother  No Known Problems Brother  Thyroid Disease Sister  Anxiety Sister  Depression Sister  Alcohol abuse Sister  Hypertension Sister  Hypertension Sister  Thyroid Disease Sister  Thyroid Disease Sister Social History Socioeconomic History  Marital status:  Spouse name: Not on file  Number of children: 3  
 Years of education: Not on file  Highest education level: Not on file Occupational History  Occupation: Former  for [employer]; ; involved mostly usage of computers, numbers, financial data, manufacturing data, employee supervision Social Needs  Financial resource strain: Not on file  Food insecurity:  
  Worry: Not on file Inability: Not on file  Transportation needs:  
  Medical: Not on file Non-medical: Not on file Tobacco Use  Smoking status: Current Every Day Smoker Packs/day: 1.00 Years: 20.00 Pack years: 20.00 Types: Cigarettes  Smokeless tobacco: Never Used  Tobacco comment: cut back from 1ppd to 3/4 ppd Substance and Sexual Activity  Alcohol use: No  
  Alcohol/week: 0.0 oz  Drug use: No  
 Sexual activity: Never Lifestyle  Physical activity:  
  Days per week: Not on file Minutes per session: Not on file  Stress: Not on file Relationships  Social connections:  
  Talks on phone: Not on file Gets together: Not on file Attends Nondenominational service: Not on file Active member of club or organization: Not on file Attends meetings of clubs or organizations: Not on file Relationship status: Not on file  Intimate partner violence:  
  Fear of current or ex partner: Not on file Emotionally abused: Not on file Physically abused: Not on file Forced sexual activity: Not on file Other Topics Concern   Service No  
 Blood Transfusions No  
 Caffeine Concern Yes Comment: 1 pot of coffee a day (~ 6-8 cups)  Occupational Exposure No  
 Hobby Hazards No  
 Sleep Concern No  
 Stress Concern No  
 Weight Concern No  
 Special Diet Yes Comment: cut back on junk foods/sweets, not snacking late at night anymore  Back Care No  
 Exercise No  
  Comment: walks 2-3 x a week in yard or neighborhood  Bike Helmet No  
 Seat Belt Yes  Self-Exams Yes Social History Narrative  Lives in 2 story home 2 of her daughters ([facility] Staff) live with her and one grandbaby Daughter prepares most meals but patient still cooks some as well No asst devices for ambulation; no canes or walkers No longer drives since the [facility] and stroke Daughters help w/ finances, cleaning, transportation and manage her medications (daughter Shaji Snyder who accompanies her to all her appts)  Does other ADL's, dressing, bathing, grooming all on her own; some help w/ meal preparation; pt can use microwave and the stove when daughters are home. Patient does some cleaning. No toileting problems; uses independently Initial ACP discussion w/ pt and daughter 2-2017. AMD form reviewed and copy provided. Discussed w/ pt and daughter again , they think patient has AMD and will check. ALLERGIES: Ativan [lorazepam]; Egg; Pcn [penicillins]; Wellbutrin [bupropion hcl]; and Xanax [alprazolam] Review of Systems Constitutional: Positive for fatigue. Negative for activity change, chills and fever. HENT: Negative for nosebleeds, sore throat, trouble swallowing and voice change. Eyes: Negative for visual disturbance. Respiratory: Positive for shortness of breath. Negative for choking. Cardiovascular: Negative for chest pain and palpitations. Gastrointestinal: Positive for abdominal pain and rectal pain. Negative for bowel incontinence, constipation, diarrhea, nausea and vomiting. Genitourinary: Negative for difficulty urinating, dysuria, hematuria and urgency. Musculoskeletal: Negative for back pain, neck pain and neck stiffness. Skin: Negative for color change. Allergic/Immunologic: Negative for immunocompromised state. Neurological: Negative for dizziness, focal weakness, seizures, syncope, speech difficulty, weakness, light-headedness, numbness, headaches and loss of balance. Psychiatric/Behavioral: Negative for agitation, behavioral problems, confusion, hallucinations, memory loss, self-injury and suicidal ideas. Vitals:  
 03/26/19 1359 BP: 147/86 Pulse: 92 Resp: 22 Temp: 98 °F (36.7 °C) SpO2: 94% Weight: 83.9 kg (184 lb 15.5 oz) Physical Exam  
Constitutional: She is oriented to person, place, and time. She appears well-developed and well-nourished. No distress. HENT:  
Head: Normocephalic and atraumatic. Eyes: Pupils are equal, round, and reactive to light. Neck: Normal range of motion. Neck supple. Cardiovascular: Normal rate, regular rhythm and normal heart sounds. Exam reveals no gallop and no friction rub. No murmur heard. Pulmonary/Chest: Tachypnea noted. She is in respiratory distress. She has decreased breath sounds. She has wheezes. Abdominal: Soft. Bowel sounds are normal. She exhibits no distension. There is generalized tenderness. There is no rebound and no guarding. Musculoskeletal: Normal range of motion. Neurological: She is alert and oriented to person, place, and time. Skin: Skin is warm. No rash noted. She is not diaphoretic. Psychiatric: She has a normal mood and affect. Her behavior is normal. Judgment and thought content normal.  
Nursing note and vitals reviewed. MDM Number of Diagnoses or Management Options Acute congestive heart failure, unspecified heart failure type [facility]):  
Elevated troponin: Hypokalemia:  
Perforated diverticulum:  
Diagnosis management comments: Total critical care time spent exclusive of procedures:  45min ED EKG interpretation: 
Rhythm: normal sinus rhythm; and regular . Rate (approx.): 83; Axis: normal; P wave: normal; QRS interval: normal ; ST/T wave: non-specific changes; in  Lead: Other findings: abnormal ekg. This EKG was interpreted by Regine Ashford MD,ED Physician. This is a 46-year-old female with past medical history, review of systems, physical exam as above, presenting with complaints of weakness, and abdominal pain. Per patient, and family members at bedside, patient became ill approximately 2 weeks ago, with fever, cough, nausea, vomiting, diarrhea, family members were diagnosed with influenza, the patient was treated with Tamiflu, diarrheal symptoms improved, no longer febrile, but continued weakness, and now abdominal pain, without nausea or vomiting.  Physical exam is remarkable for uncomfortable-appearing elderly female, dyspneic, with audible wheezing, diffuse abdominal tenderness, noted to be afebrile, without tachycardia or hypotension. She has decreased breath sounds, scattered diffuse wheezes, mild in nature, in the setting of a history of COPD, patient states noncompliant with nebulizers as she does not like the side effects of albuterol. Differential includes post viral pneumonia, sepsis, diverticulitis, status post antibiotic therapy. Plan to obtain CMP, CBC, lactic acid, chest x-ray, EKG, CT scan of the abdomen and pelvis. We will provide stacked nebulizers, reevaluated, and make a disposition based the patient's diagnostic and response to therapy, however the patient may require admission for further care and evaluation. Procedures 6:20 PM 
Patient discussed with Dr. Baldev Baker (General Surgery), he will admit the patient.

## 2019-03-26 NOTE — PROGRESS NOTES
TRANSFER - IN REPORT: 
 
Verbal report received from Gi Mays RN(name) on Eden Kellogg  being received from [place] [facility](unit) for routine progression of care Report consisted of patients Situation, Background, Assessment and  
Recommendations(SBAR). Information from the following report(s) SBAR was reviewed with the receiving nurse. Opportunity for questions and clarification was provided. Assessment completed upon patients arrival to unit and care assumed.

## 2019-03-26 NOTE — ED NOTES
Verbal report and paperwork given to [facility] providers; time allotted for questions but denied having any at this time. Pt alert and oriented. Pt transported out of ER via stretcher. Daughter going with transport and pt.

## 2019-03-26 NOTE — ROUTINE PROCESS
TRANSFER - OUT REPORT: 
 
Verbal report given to Clarke Jiménez RN (name) on Terrance Bledsoe  being transferred to [address] [place] [address] (unit) for routine progression of care Report consisted of patients Situation, Background, Assessment and Recommendations(SBAR). Information from the following report(s) SBAR, ED Summary, Intake/Output, MAR, Recent Results, Med Rec Status and Cardiac Rhythm NSR was reviewed with the receiving nurse. Lines:  
Peripheral IV 03/26/19 Left Antecubital (Active) Site Assessment Clean, dry, & intact 3/26/2019  2:32 PM  
Phlebitis Assessment 0 3/26/2019  2:32 PM  
Infiltration Assessment 0 3/26/2019  2:32 PM  
Dressing Status Clean, dry, & intact 3/26/2019  2:32 PM  
Dressing Type Tape;Transparent 3/26/2019  2:32 PM  
Hub Color/Line Status Pink;Flushed 3/26/2019  2:32 PM  
Action Taken Blood drawn 3/26/2019  2:32 PM  
  
 
Opportunity for questions and clarification was provided. Patient transported with: 
Monitor

## 2019-03-26 NOTE — PROGRESS NOTES
Clinical Pharmacy Note: Metronidazole Dosing Please note that the metronidazole dose for Jessica Agrawal has been changed to 500 mg IV q12h per [facility]-approved protocol. Please contact the pharmacy with any questions.

## 2019-03-26 NOTE — PROGRESS NOTES
Spiritual Care Assessment/Progress Note [facility] 
 
 
NAME: John Alston      MRN: [id number] AGE: 79 y.o. SEX: female Rastafarian Affiliation: Hoahaoism  
Language: English  
 
3/26/2019     Total Time (in minutes): 10 Spiritual Assessment begun in [facility] through conversation with: 
  
    [x]Patient        [x] Family    [] Friend(s) Reason for Consult: Emergency Department visit Spiritual beliefs: (Please include comment if needed) 
   [] Identifies with a neo tradition:     
   [] Supported by a neo community:        
   [] Claims no spiritual orientation:       
   [] Seeking spiritual identity:            
   [] Adheres to an individual form of spirituality:       
   [x] Not able to assess:                   
 
    
Identified resources for coping:  
   [] Prayer                           
   [] Music                  [] Guided Imagery [x] Family/friends                 [] Pet visits [] Devotional reading                         [] Unknown 
   [] Other:                                          
 
 
Interventions offered during this visit: (See comments for more details) Patient Interventions: Affirmation of emotions/emotional suffering, Normalization of emotional/spiritual concerns Family/Friend(s): Affirmation of emotions/emotional suffering, Normalization of emotional/spiritual concerns Plan of Care: 
 
 [] Support spiritual and/or cultural needs  
 [] Support AMD and/or advance care planning process    
 [] Support grieving process 
 [] Coordinate Rites and/or Rituals  
 [] Coordination with community clergy [] No spiritual needs identified at this time 
 [] Detailed Plan of Care below (See Comments)  [] Make referral to Music Therapy 
[] Make referral to Pet Therapy    
[] Make referral to Addiction services 
[] Make referral to [facility] 
[] Make referral to Spiritual Care Partner 
[] No future visits requested [x] Follow up visits as needed Comments:  visit in ER. Pt was in bed and family at bedside. Pt and family were appreciative of visit. Let them know of  support. Please contact [phone] [facility] for further support.  follow up as needed. Symone Putnam, MACE 
 [phone])

## 2019-03-27 ENCOUNTER — APPOINTMENT (OUTPATIENT)
Dept: GENERAL RADIOLOGY | Age: 69
DRG: 329 | End: 2019-03-27
Attending: SURGERY
Payer: MEDICARE

## 2019-03-27 LAB
ANION GAP SERPL CALC-SCNC: 6 MMOL/L (ref 5–15)
ARTERIAL PATENCY WRIST A: YES
ATRIAL RATE: 83 BPM
BASE EXCESS BLD CALC-SCNC: 4 MMOL/L
BDY SITE: ABNORMAL
BUN SERPL-MCNC: 12 MG/DL (ref 6–20)
BUN/CREAT SERPL: 18 (ref 12–20)
CALCIUM SERPL-MCNC: 8.4 MG/DL (ref 8.5–10.1)
CALCULATED P AXIS, ECG09: 68 DEGREES
CALCULATED R AXIS, ECG10: 76 DEGREES
CALCULATED T AXIS, ECG11: 75 DEGREES
CHLORIDE SERPL-SCNC: 102 MMOL/L (ref 97–108)
CO2 SERPL-SCNC: 28 MMOL/L (ref 21–32)
CREAT SERPL-MCNC: 0.65 MG/DL (ref 0.55–1.02)
DIAGNOSIS, 93000: NORMAL
ERYTHROCYTE [DISTWIDTH] IN BLOOD BY AUTOMATED COUNT: 12.4 % (ref 11.5–14.5)
GAS FLOW.O2 O2 DELIVERY SYS: ABNORMAL L/MIN
GAS FLOW.O2 SETTING OXYMISER: 2 L/M
GLUCOSE SERPL-MCNC: 122 MG/DL (ref 65–100)
HCO3 BLD-SCNC: 28.1 MMOL/L (ref 22–26)
HCT VFR BLD AUTO: 36.9 % (ref 35–47)
HGB BLD-MCNC: 12.2 G/DL (ref 11.5–16)
MCH RBC QN AUTO: 30 PG (ref 26–34)
MCHC RBC AUTO-ENTMCNC: 33.1 G/DL (ref 30–36.5)
MCV RBC AUTO: 90.7 FL (ref 80–99)
NRBC # BLD: 0 K/UL (ref 0–0.01)
NRBC BLD-RTO: 0 PER 100 WBC
P-R INTERVAL, ECG05: 140 MS
PCO2 BLD: 40.3 MMHG (ref 35–45)
PH BLD: 7.45 [PH] (ref 7.35–7.45)
PLATELET # BLD AUTO: 288 K/UL (ref 150–400)
PMV BLD AUTO: 10.4 FL (ref 8.9–12.9)
PO2 BLD: 82 MMHG (ref 80–100)
POTASSIUM SERPL-SCNC: 3.1 MMOL/L (ref 3.5–5.1)
Q-T INTERVAL, ECG07: 432 MS
QRS DURATION, ECG06: 92 MS
QTC CALCULATION (BEZET), ECG08: 507 MS
RBC # BLD AUTO: 4.07 M/UL (ref 3.8–5.2)
SAO2 % BLD: 97 % (ref 92–97)
SODIUM SERPL-SCNC: 136 MMOL/L (ref 136–145)
SPECIMEN TYPE: ABNORMAL
VENTRICULAR RATE, ECG03: 83 BPM
WBC # BLD AUTO: 11.7 K/UL (ref 3.6–11)

## 2019-03-27 PROCEDURE — 80048 BASIC METABOLIC PNL TOTAL CA: CPT

## 2019-03-27 PROCEDURE — 82803 BLOOD GASES ANY COMBINATION: CPT

## 2019-03-27 PROCEDURE — C9113 INJ PANTOPRAZOLE SODIUM, VIA: HCPCS | Performed by: SURGERY

## 2019-03-27 PROCEDURE — 77010033678 HC OXYGEN DAILY

## 2019-03-27 PROCEDURE — 36600 WITHDRAWAL OF ARTERIAL BLOOD: CPT

## 2019-03-27 PROCEDURE — 74011000250 HC RX REV CODE- 250: Performed by: SURGERY

## 2019-03-27 PROCEDURE — 74011250637 HC RX REV CODE- 250/637: Performed by: SURGERY

## 2019-03-27 PROCEDURE — 71045 X-RAY EXAM CHEST 1 VIEW: CPT

## 2019-03-27 PROCEDURE — 74011250636 HC RX REV CODE- 250/636: Performed by: SURGERY

## 2019-03-27 PROCEDURE — 85027 COMPLETE CBC AUTOMATED: CPT

## 2019-03-27 PROCEDURE — 36415 COLL VENOUS BLD VENIPUNCTURE: CPT

## 2019-03-27 PROCEDURE — 51798 US URINE CAPACITY MEASURE: CPT

## 2019-03-27 PROCEDURE — 65660000000 HC RM CCU STEPDOWN

## 2019-03-27 PROCEDURE — 74011250636 HC RX REV CODE- 250/636: Performed by: NURSE PRACTITIONER

## 2019-03-27 RX ORDER — SODIUM CHLORIDE 0.9 % (FLUSH) 0.9 %
SYRINGE (ML) INJECTION
Status: COMPLETED
Start: 2019-03-27 | End: 2019-03-27

## 2019-03-27 RX ORDER — KETOROLAC TROMETHAMINE 30 MG/ML
15 INJECTION, SOLUTION INTRAMUSCULAR; INTRAVENOUS
Status: DISPENSED | OUTPATIENT
Start: 2019-03-27 | End: 2019-03-28

## 2019-03-27 RX ORDER — POTASSIUM CHLORIDE 7.45 MG/ML
10 INJECTION INTRAVENOUS
Status: COMPLETED | OUTPATIENT
Start: 2019-03-27 | End: 2019-03-27

## 2019-03-27 RX ADMIN — KETOROLAC TROMETHAMINE 15 MG: 30 INJECTION, SOLUTION INTRAMUSCULAR; INTRAVENOUS at 18:52

## 2019-03-27 RX ADMIN — HYDROCHLOROTHIAZIDE: 25 TABLET ORAL at 08:41

## 2019-03-27 RX ADMIN — SERTRALINE 100 MG: 50 TABLET, FILM COATED ORAL at 08:41

## 2019-03-27 RX ADMIN — POTASSIUM CHLORIDE 10 MEQ: 10 INJECTION, SOLUTION INTRAVENOUS at 10:48

## 2019-03-27 RX ADMIN — METRONIDAZOLE 500 MG: 500 INJECTION, SOLUTION INTRAVENOUS at 08:41

## 2019-03-27 RX ADMIN — PANTOPRAZOLE SODIUM 40 MG: 40 INJECTION, POWDER, FOR SOLUTION INTRAVENOUS at 17:34

## 2019-03-27 RX ADMIN — SODIUM CHLORIDE, SODIUM LACTATE, POTASSIUM CHLORIDE, AND CALCIUM CHLORIDE 125 ML/HR: 600; 310; 30; 20 INJECTION, SOLUTION INTRAVENOUS at 13:34

## 2019-03-27 RX ADMIN — DICYCLOMINE HYDROCHLORIDE 10 MG: 10 CAPSULE ORAL at 08:21

## 2019-03-27 RX ADMIN — POTASSIUM CHLORIDE 10 MEQ: 10 INJECTION, SOLUTION INTRAVENOUS at 11:56

## 2019-03-27 RX ADMIN — DICYCLOMINE HYDROCHLORIDE 10 MG: 10 CAPSULE ORAL at 22:28

## 2019-03-27 RX ADMIN — POTASSIUM CHLORIDE 10 MEQ: 10 INJECTION, SOLUTION INTRAVENOUS at 12:57

## 2019-03-27 RX ADMIN — Medication 10 ML: at 18:54

## 2019-03-27 RX ADMIN — SODIUM CHLORIDE, SODIUM LACTATE, POTASSIUM CHLORIDE, AND CALCIUM CHLORIDE 125 ML/HR: 600; 310; 30; 20 INJECTION, SOLUTION INTRAVENOUS at 22:31

## 2019-03-27 RX ADMIN — LEVOFLOXACIN 750 MG: 5 INJECTION, SOLUTION INTRAVENOUS at 18:48

## 2019-03-27 RX ADMIN — METRONIDAZOLE 500 MG: 500 INJECTION, SOLUTION INTRAVENOUS at 23:59

## 2019-03-27 RX ADMIN — POTASSIUM CHLORIDE 10 MEQ: 10 INJECTION, SOLUTION INTRAVENOUS at 14:35

## 2019-03-27 RX ADMIN — KETOROLAC TROMETHAMINE 15 MG: 30 INJECTION, SOLUTION INTRAMUSCULAR; INTRAVENOUS at 12:04

## 2019-03-27 RX ADMIN — SODIUM CHLORIDE, SODIUM LACTATE, POTASSIUM CHLORIDE, AND CALCIUM CHLORIDE 125 ML/HR: 600; 310; 30; 20 INJECTION, SOLUTION INTRAVENOUS at 05:22

## 2019-03-27 RX ADMIN — METRONIDAZOLE 500 MG: 500 INJECTION, SOLUTION INTRAVENOUS at 17:38

## 2019-03-27 NOTE — PROGRESS NOTES
General Surgery Daily Progress Note Admit Date: 3/26/2019 Post-Operative Day: * No surgery found * from * No surgery found * Subjective:  
 
Last 24 hrs: NAEO. Afebrile. WBC trending down from 12.6 to 11.7 today. No BM. Pain 7/10 generalized abdomen, worse in LUQ. No nausea, tolerating ice chips. Objective:  
 
Blood pressure (P) 161/66, pulse (P) 78, temperature (P) 98 °F (36.7 °C), resp. rate (P) 20, weight 81.7 kg (180 lb 1.9 oz), SpO2 (P) 98 %. Temp (24hrs), Av.4 °F (36.9 °C), Min:98 °F (36.7 °C), Max:98.8 °F (37.1 °C) 
 
 
_____________________ Physical Exam:    
Alert, repetitive questions,  in no acute distress. Cardiovascular: RRR, no gallops/rubs/murmurs,no peripheral edema Lungs: Tachypneic 20-24, expiratory wheeze, 2L NC, Sat's 94-96%, non-labored Abdomen: Tender to palp x 4 quadrants, greatest in LUQ, hypoactive BS Assessment:  
Active Problems: 
  Perforated diverticulum of large intestine (3/26/2019) Plan:  
 
Neuro: Multimodal pain management. Start IV toradol in addition to PRN dilaudid and PO tylenol. Assess neuro status [id number] with vitals. Continue zoloft. CV: Continue to cardiac monitor, continue hyzaar for HTN management Resp: Continue 2LNC, continuous pulse oximetry, PRN duo nebs, encourage IS 
F/E/N: Repleat K (4 runs 10 mEq IV), Monitor labs, monitor I/O's, Continue LR 125cc/hr GI: Continue NPO with ice chips, except meds, PPI for prophylaxis Heme: Monitor labs I.D.: Continue IV antibiotics (levaquin and flagyl), monitor labs Derm: Turn and position q2 hour MSK: SCD's for DVT prophylaxis, OOB as tolerated Data Review: 
 
Recent Labs  
  19 
0151 19 
1431 WBC 11.7* 12.6* HGB 12.2 14.0  
HCT 36.9 40.6  304 Recent Labs  
  19 
0151 19 
1431  135* K 3.1* 2.6*  
 95* CO2 28 31 * 115* BUN 12 12 CREA 0.65 0.94  
CA 8.4* 9.2 ALB  --  2.8* SGOT  --  24 ALT  --  38  
 
 Recent Labs  
  03/26/19 
1431 LPSE 343  
 
 
 
 
______________________ Medications: 
 
Current Facility-Administered Medications Medication Dose Route Frequency  potassium chloride 10 mEq in 100 ml IVPB  10 mEq IntraVENous Q1H  
 albuterol-ipratropium (DUO-NEB) 2.5 MG-0.5 MG/3 ML  3 mL Nebulization PRN  
 lactated Ringers infusion  125 mL/hr IntraVENous CONTINUOUS  
 ondansetron (ZOFRAN) injection 4 mg  4 mg IntraVENous Q4H PRN  
 acetaminophen (TYLENOL) tablet 650 mg  650 mg Oral Q6H PRN  
 HYDROmorphone (PF) (DILAUDID) injection 1 mg  1 mg IntraVENous Q3H PRN  pantoprazole (PROTONIX) 40 mg in sodium chloride 0.9% 10 mL injection  40 mg IntraVENous Q24H  
 albuterol (PROVENTIL VENTOLIN) nebulizer solution 2.5 mg  2.5 mg Nebulization Q6H PRN  
 dicyclomine (BENTYL) capsule 10 mg  10 mg Oral Q6H PRN  
 sertraline (ZOLOFT) tablet 100 mg  100 mg Oral DAILY  traZODone (DESYREL) tablet 50 mg  50 mg Oral QHS PRN  
 losartan/hydroCHLOROthiazide (HYZAAR) 50/12.5 mg   Oral DAILY  metroNIDAZOLE (FLAGYL) IVPB premix 500 mg  500 mg IntraVENous Q8H  
 levoFLOXacin (LEVAQUIN) 750 mg in D5W IVPB  750 mg IntraVENous Q24H  
 
 
WILLIS Moe Bernard 3/27/2019 I agree w/ the assessment and plan by the student Cont conservative mgmt ADDENDUM:  Delvin Israel MD 
Pt seen and examined. No acute surgical issues. Pt reported pain is overall better. Tolerating sips and WBC trending down. Will start clear liquids. Continue antibiotic therapy. Repeat CT scan tomorrow. Hypokalemia:  Replete potassium.

## 2019-03-27 NOTE — PROGRESS NOTES
Bedside shift change report given to Vimal Hardy RN (oncoming nurse) by Chucky Jordan RN (offgoing nurse). Report included the following information SBAR.

## 2019-03-27 NOTE — CDMP QUERY
#4 
 
Patient admitted with Perforated diverticulitis  noted to have abnormal BNP. If possible, please document in progress notes and d/c summary if you are evaluating and/or treating any of the following: 
 
 
 
Acute on Chronic Systolic CHF Acute on Chronic Diastolic CHF Acute on Chronic Systolic and Diastolic CHF Acute Systolic CHF Acute Diastolic CHF Acute Systolic and Diastolic CHF Chronic Systolic CHF Chronic Diastolic CHF Chronic Systolic and Diastolic CHF Other, please specify Clinically unable to determine The medical record reflects the following: 
  Risk Factors: HTN/COPD Clinical Indicators: NT pro-BNP: 2,224 Treatment: EKG Thank you, 
       Mae Jean [facility], [address] [facility]

## 2019-03-27 NOTE — PROGRESS NOTES
Reason for Admission:   Perforated diverticulum of large intestine. RRAT Score:     14 Do you (patient/family) have any concerns for transition/discharge? No concerns patient has support from family. Plan for utilizing home health:     No plans at this time. Likelihood of readmission?   moderate Transition of Care Plan:       
 CM met with patient to discuss discharge plans. Patient's daughter Dolph Session was at bedside. Patient lives with daughter Leslee Canas who is her caregiver. Patient is independent with ADL's. She uses no assisting devices or medical equipment. Patient is not on oxygen uses room air. Patient does not drive, patient's daughter transports her to and from as needed and will transport at discharge. Patient confirmed PCP is Dr. Zuleima Storm and was last seen earlier this month for the Flu. Patient does not need home health at this time. Patient's has not had home health in the past but was admitted to [facility] for rehab back in 2016. Patient doesn't have a preference for home health if applicable and is in agreement with using [address]. No DNR/ Advance Directive at this time. CM to follow patient for updates. DARELL Bender/CRM Care Management Interventions PCP Verified by CM: Yes Mode of Transport at Discharge: Other (see comment)(Daughter Destinee Ashford will transport at discharge) Current Support Network: Lives with Caregiver(Lives with daughter Leslee Canas) Confirm Follow Up Transport: Family(uSly Canas transports) Plan discussed with Pt/Family/Caregiver: Yes Freedom of Choice Offered: Yes Discharge Location Discharge Placement: Home

## 2019-03-27 NOTE — PROGRESS NOTES
Problem: Pressure Injury - Risk of 
Goal: *Prevention of pressure injury Description Document Moe Scale and appropriate interventions in the flowsheet. Outcome: Progressing Towards Goal 
  
Problem: Patient Education: Go to Patient Education Activity Goal: Patient/Family Education Outcome: Progressing Towards Goal 
  
Problem: Falls - Risk of 
Goal: *Absence of Falls Description Document Kathryn Latin Fall Risk and appropriate interventions in the flowsheet. Outcome: Progressing Towards Goal 
  
Problem: Patient Education: Go to Patient Education Activity Goal: Patient/Family Education Outcome: Progressing Towards Goal

## 2019-03-27 NOTE — CONSULTS
Chief Complaint:  Perforated diverticulitis    HPI:  78 yo woman with multiple medical issues including COPD, HTN, diverticulitis, SAH with short term memory loss presented to [place] [facility] with shortness of breath, abdominal pain, nausea and vomiting. Pt reported having flu about a week ago. Family member was also sick with flu but got better and she was getting worse. Pt reported nausea and vomiting and feeling constipated. Last BM was yesterday. No fever or chills. Pt had CT scan performed at [place] which showed perforated diverticulitis. She has WBC 13K. Past Medical History:   Diagnosis Date    ACP (advance care planning) 2/21/2017    Initial ACP discussion w/ pt and daughter 2-2017. AMD form reviewed and copy provided.  NN/facilitator to discuss with patient     Asthma     hx of    Benign essential hypertension 6/24/2016    Bilateral hip pain 6/3/2014    xrays 2012, negative    COPD (chronic obstructive pulmonary disease) ([facility] [address] 3/29/2010    Depression 3/29/2010    Diverticulitis     DVT of Rt Lower Extremity 3/29/2010    Elevated LFT's, Fatty Liver 3/29/2010    Essential tremor 7/14/2010    GERD (gastroesophageal reflux disease) 9/2/2011    H/O Subarachnoid hemorrhage due to ruptured aneurysm 7/14/2010    Hyperlipemia 3/29/2010    Impaired fasting glucose 7/13/2014 6/2014    Thyroiditis, subacute 3/29/2010    Tobacco Abuse 3/29/2010    Vitamin D deficiency 6/3/2014    2012       Past Surgical History:   Procedure Laterality Date    ECHO STRESS  2005    Negative    HX COLONOSCOPY  3/2013, Dr Sahil Rausch    benign cecal polyp, f/u 10 yrs    HX CRANIOTOMY  10/06    for clipping of ruptured aneurysm; Dr Marlen Mcnulty a shunt    HX ENDOSCOPY  EGD, Dr Sahil Rausch    \"ok\" per pt report    HX HYSTERECTOMY  1991    Cervical Dysplasia (Dr Vickie Limon)    ME COLONOSCOPY W/BIOPSY SINGLE/MULTIPLE  1980's    Benign polyp       No current facility-administered medications on file prior to encounter. Current Outpatient Medications on File Prior to Encounter   Medication Sig Dispense Refill    sertraline (ZOLOFT) 100 mg tablet TAKE 1.5 TABS BY MOUTH DAILY 45 Tab 0    omeprazole (PRILOSEC) 20 mg capsule TAKE 1 CAPSULE BY MOUTH EVERY DAY 30 Cap 0    losartan-hydroCHLOROthiazide (HYZAAR) 50-12.5 mg per tablet Take 1 Tab by mouth daily. Indications: hypertension 30 Tab 2    atorvastatin (LIPITOR) 20 mg tablet TAKE 1 TABLET BY MOUTH EVERY DAY 90 Tab 0    traZODone (DESYREL) 50 mg tablet TAKE 1 TABLET BY MOUTH EVERYDAY AT BEDTIME 30 Tab 0    dicyclomine (BENTYL) 10 mg capsule TAKE 1-2 CAPSULES BY MOUTH EVERY 6 HOURS AS NEEDED FOR ABDOMINAL CRAMPS 60 Cap 1    albuterol (VENTOLIN HFA) 90 mcg/actuation inhaler Take 2 Puffs by inhalation every six (6) hours as needed for Wheezing or Shortness of Breath.  MULTIVITAMINS (MULTIVITAMIN PO) Take  by mouth daily.  ferrous sulfate (IRON) 325 mg (65 mg Iron) tablet Take  by mouth two (2) times a day. Allergies   Allergen Reactions    Ativan [Lorazepam] Hives    Egg Hives     Upset stomach.  Pcn [Penicillins] Other (comments)     Unsure of reaction.  Wellbutrin [Bupropion Hcl] Anxiety    Xanax [Alprazolam] Hives       Review of Systems - General ROS: negative  Psychological ROS: negative  Respiratory ROS: negative  Cardiovascular ROS: negative  Gastrointestinal ROS: positive for - abdominal pain and nausea/vomiting    Visit Vitals  BP (!) 125/91 (BP 1 Location: Right arm, BP Patient Position: At rest)   Pulse 74   Temp 98.8 °F (37.1 °C)   Resp 22   Wt 184 lb 15.5 oz (83.9 kg)   SpO2 96%   BMI 28.12 kg/m²       .   Physical Exam:    Gen:  NAD  Pulm:  Unlabored  Abd:  S/ND/mild TTP in LLQ without guarding or rebound      Recent Results (from the past 24 hour(s))   EKG, 12 LEAD, INITIAL    Collection Time: 03/26/19  2:21 PM   Result Value Ref Range    Ventricular Rate 83 BPM    Atrial Rate 83 BPM    P-R Interval 140 ms    QRS Duration 92 ms    Q-T Interval 432 ms    QTC Calculation (Bezet) 507 ms    Calculated P Axis 68 degrees    Calculated R Axis 76 degrees    Calculated T Axis 75 degrees    Diagnosis       Normal sinus rhythm  ST & T wave abnormality, consider anterior ischemia  Abnormal ECG  When compared with ECG of 21-JUN-2011 21:41,  premature atrial complexes are no longer present  T wave inversion now evident in Anterior leads  QT has lengthened     CBC WITH AUTOMATED DIFF    Collection Time: 03/26/19  2:31 PM   Result Value Ref Range    WBC 12.6 (H) 3.6 - 11.0 K/uL    RBC 4.64 3.80 - 5.20 M/uL    HGB 14.0 11.5 - 16.0 g/dL    HCT 40.6 35.0 - 47.0 %    MCV 87.5 80.0 - 99.0 FL    MCH 30.2 26.0 - 34.0 PG    MCHC 34.5 30.0 - 36.5 g/dL    RDW 12.5 11.5 - 14.5 %    PLATELET 495 466 - 256 K/uL    MPV 10.9 8.9 - 12.9 FL    NRBC 0.0 0  WBC    ABSOLUTE NRBC 0.00 0.00 - 0.01 K/uL    NEUTROPHILS 70 32 - 75 %    LYMPHOCYTES 20 12 - 49 %    MONOCYTES 9 5 - 13 %    EOSINOPHILS 0 0 - 7 %    BASOPHILS 0 0 - 1 %    IMMATURE GRANULOCYTES 1 (H) 0.0 - 0.5 %    ABS. NEUTROPHILS 8.9 (H) 1.8 - 8.0 K/UL    ABS. LYMPHOCYTES 2.5 0.8 - 3.5 K/UL    ABS. MONOCYTES 1.1 (H) 0.0 - 1.0 K/UL    ABS. EOSINOPHILS 0.0 0.0 - 0.4 K/UL    ABS. BASOPHILS 0.0 0.0 - 0.1 K/UL    ABS. IMM. GRANS. 0.1 (H) 0.00 - 0.04 K/UL    DF AUTOMATED     METABOLIC PANEL, COMPREHENSIVE    Collection Time: 03/26/19  2:31 PM   Result Value Ref Range    Sodium 135 (L) 136 - 145 mmol/L    Potassium 2.6 (LL) 3.5 - 5.1 mmol/L    Chloride 95 (L) 97 - 108 mmol/L    CO2 31 21 - 32 mmol/L    Anion gap 9 5 - 15 mmol/L    Glucose 115 (H) 65 - 100 mg/dL    BUN 12 6 - 20 MG/DL    Creatinine 0.94 0.55 - 1.02 MG/DL    BUN/Creatinine ratio 13 12 - 20      GFR est AA >60 >60 ml/min/1.73m2    GFR est non-AA 60 (L) >60 ml/min/1.73m2    Calcium 9.2 8.5 - 10.1 MG/DL    Bilirubin, total 0.7 0.2 - 1.0 MG/DL    ALT (SGPT) 38 12 - 78 U/L    AST (SGOT) 24 15 - 37 U/L    Alk.  phosphatase 75 45 - 117 U/L    Protein, total 8.0 6.4 - 8.2 g/dL    Albumin 2.8 (L) 3.5 - 5.0 g/dL    Globulin 5.2 (H) 2.0 - 4.0 g/dL    A-G Ratio 0.5 (L) 1.1 - 2.2     LACTIC ACID    Collection Time: 03/26/19  2:31 PM   Result Value Ref Range    Lactic acid 1.4 0.4 - 2.0 MMOL/L   TROPONIN I    Collection Time: 03/26/19  2:31 PM   Result Value Ref Range    Troponin-I, Qt. 0.08 (H) <0.05 ng/mL   NT-PRO BNP    Collection Time: 03/26/19  2:31 PM   Result Value Ref Range    NT pro-BNP 2,224 (H) 0 - 125 PG/ML   D DIMER    Collection Time: 03/26/19  2:31 PM   Result Value Ref Range    D-dimer 3.60 (H) 0.00 - 0.65 mg/L FEU   LIPASE    Collection Time: 03/26/19  2:31 PM   Result Value Ref Range    Lipase 343 73 - 393 U/L   SAMPLES BEING HELD    Collection Time: 03/26/19  2:31 PM   Result Value Ref Range    SAMPLES BEING HELD 1RED 1BLUE     COMMENT        Add-on orders for these samples will be processed based on acceptable specimen integrity and analyte stability, which may vary by analyte. POC G3 - PUL    Collection Time: 03/26/19  2:53 PM   Result Value Ref Range    pH (POC) 7.557 (HH) 7.35 - 7.45      pCO2 (POC) 31.9 (L) 35.0 - 45.0 MMHG    pO2 (POC) 63 (L) 80 - 100 MMHG    HCO3 (POC) 28.4 (H) 22 - 26 MMOL/L    sO2 (POC) 95 92 - 97 %    Base excess (POC) 6 mmol/L    Site RIGHT BRACHIAL      Device: ROOM AIR      Allens test (POC) N/A      Specimen type (POC) ARTERIAL      Total resp. rate 20         AP:  80 yo woman with perforated diverticulitis    - Perforated diverticulitis:  No acute indication for operation. Will treat with antibiotic therapy and bowel rest  - NPO with ice chips  - Hypoxemia:  Oxygen therapy.   CXR in am  - Labs in am  - Telemetry monitoring

## 2019-03-27 NOTE — CDMP QUERY
#2 
 
 
Patient admitted with Perforated diverticulitis,  noted to have elevated troponin and abnormal EKG. If possible, please document in progress notes and d/c summary if you are evaluating and/or treating any of the following: 
 
=> Type 2 MI 
=> NSTEMI 
=> Other Explanation of clinical findings 
=> Clinically Undetermined (no explanation for clinical findings) The medical record reflects the following: 
   Risk Factors: Perforated diverticulitis Clinical Indicators: Troponin-I, Qt.: 0.08 
ED EKG interpretation: 
Rhythm: normal sinus rhythm; and regular . Rate (approx.): 83; Axis: normal; P wave: normal; QRS interval: normal ; ST/T wave: non-specific changes; in  Lead: Other findings: abnormal ekg. Treatment: EKG, supplemental oxygen Please clarify and document your clinical opinion in the progress notes and discharge summary including the definitive and/or presumptive diagnosis, (suspected or probable), related to the above clinical findings. Please include clinical findings supporting your diagnosis. Type 2 (MI secondary to an ischemic imbalance): MI consequent to increased oxygen demand or decreased supply (eg, coronary endothelial dysfunction, coronary artery spasm, coronary artery embolus, tachy-/uzma-arrhythmias, anemia, respiratory failure, hypertension, or hypotension). Reference John KRUSE, Hakan Blum MD MPH, Alexis Mayo. (July 5, 2016). Criteria for the Diagnosis of Acute Myocardial Infarction. In UpToDate (Topic  52, Version 36.0). Retrieved from [employer] Thank you, 
       Gardenia García [facility]0 [facility], [address] [facility]

## 2019-03-27 NOTE — CDMP QUERY
#3 
 
 
Pt admitted with Perforated diverticulitis: Pt noted to have hypoxia If possible, please document in the progress notes and d/c summary if you are evaluating and / or treating any of the following: 
 
Acute respiratory failure With hypoxia With hypercapnia Chronic respiratory failure With hypoxia With hypercapnia Acute on chronic respiratory failure With hypoxia With hypercapnia Other, please specify Clinically unable to determine The medical record reflects the following: 
   Risk Factors: SOB Clinical Indicators:resp rate 31-35 
3/26/2019 14:53 
pH (POC): 7.557 (HH) 
pCO2 (POC): 31.9 (L) 
pO2 (POC): 63 (L) HCO3 (POC): 28.4 (H) 
sO2 (POC): 95 Base excess (POC): 6 Specimen type (POC): ARTERIAL Site: RIGHT BRACHIAL Device[de-identified] ROOM AIR Total resp. rate: 20 Allens test (POC): N/A Treatment:ABGs , supplemental oxygen Thank you, 
       [facility], [address] [facility]

## 2019-03-27 NOTE — INTERDISCIPLINARY ROUNDS
..Bedside and Verbal shift change report given to Nadiya Crespo (oncoming nurse) by Mihaela Sprague (offgoing nurse). Report included the following information SBAR, Kardex, Intake/Output and MAR.

## 2019-03-27 NOTE — H&P
Chief Complaint:  Perforated diverticulitis HPI:  78 yo woman with multiple medical issues including COPD, HTN, diverticulitis, SAH with short term memory loss presented to [place] ER with shortness of breath, abdominal pain, nausea and vomiting. Pt reported having flu about a week ago. Family member was also sick with flu but got better and she was getting worse. Pt reported nausea and vomiting and feeling constipated. Last BM was yesterday. No fever or chills. Pt had CT scan performed at [facility] which showed perforated diverticulitis. She has WBC 13K. Past Medical History:  
Diagnosis Date  ACP (advance care planning) 2/21/2017 Initial ACP discussion w/ pt and daughter 2-2017. AMD form reviewed and copy provided. NN/facilitator to discuss with patient  Asthma   
 hx of  Benign essential hypertension 6/24/2016  Bilateral hip pain 6/3/2014  
 xrays 2012, negative  COPD (chronic obstructive pulmonary disease) ([facility] [address] 3/29/2010  Depression 3/29/2010  Diverticulitis  DVT of Rt Lower Extremity 3/29/2010  Elevated LFT's, Fatty Liver 3/29/2010  Essential tremor 7/14/2010  GERD (gastroesophageal reflux disease) 9/2/2011  H/O Subarachnoid hemorrhage due to ruptured aneurysm 7/14/2010  Hyperlipemia 3/29/2010  Impaired fasting glucose 7/13/2014 6/2014  Thyroiditis, subacute 3/29/2010  Tobacco Abuse 3/29/2010  Vitamin D deficiency 6/3/2014 2012 Past Surgical History:  
Procedure Laterality Date  ECHO STRESS  2005 Negative  HX COLONOSCOPY  3/2013, Dr Djea Bird  
 benign cecal polyp, f/u 10 yrs  HX CRANIOTOMY  10/06  
 for clipping of ruptured aneurysm; Dr Sasha Miguel a shunt  HX ENDOSCOPY  EGD, Dr Deja Bird \"ok\" per pt report [id number] Cervical Dysplasia (Dr Kyung Grimaldo)  AL COLONOSCOPY W/BIOPSY SINGLE/MULTIPLE  1980's Benign polyp No current facility-administered medications on file prior to encounter. Current Outpatient Medications on File Prior to Encounter Medication Sig Dispense Refill  sertraline (ZOLOFT) 100 mg tablet TAKE 1.5 TABS BY MOUTH DAILY 45 Tab 0  
 omeprazole (PRILOSEC) 20 mg capsule TAKE 1 CAPSULE BY MOUTH EVERY DAY 30 Cap 0  
 losartan-hydroCHLOROthiazide (HYZAAR) 50-12.5 mg per tablet Take 1 Tab by mouth daily. Indications: hypertension 30 Tab 2  
 atorvastatin (LIPITOR) 20 mg tablet TAKE 1 TABLET BY MOUTH EVERY DAY 90 Tab 0  
 traZODone (DESYREL) 50 mg tablet TAKE 1 TABLET BY MOUTH EVERYDAY AT BEDTIME 30 Tab 0  
 dicyclomine (BENTYL) 10 mg capsule TAKE 1-2 CAPSULES BY MOUTH EVERY 6 HOURS AS NEEDED FOR ABDOMINAL CRAMPS 60 Cap 1  
 albuterol (VENTOLIN HFA) 90 mcg/actuation inhaler Take 2 Puffs by inhalation every six (6) hours as needed for Wheezing or Shortness of Breath.  MULTIVITAMINS (MULTIVITAMIN PO) Take  by mouth daily.  ferrous sulfate (IRON) 325 mg (65 mg Iron) tablet Take  by mouth two (2) times a day. Allergies Allergen Reactions  Ativan [Lorazepam] Hives  Egg Hives Upset stomach.  Pcn [Penicillins] Other (comments) Unsure of reaction.  Wellbutrin [Bupropion Hcl] Anxiety  Xanax [Alprazolam] Hives Review of Systems - General ROS: negative Psychological ROS: negative Respiratory ROS: negative Cardiovascular ROS: negative Gastrointestinal ROS: positive for - abdominal pain and nausea/vomiting Visit Vitals BP (!) 125/91 (BP 1 Location: Right arm, BP Patient Position: At rest) Pulse 74 Temp 98.8 °F (37.1 °C) Resp 22 Wt 184 lb 15.5 oz (83.9 kg) SpO2 96% BMI 28.12 kg/m² Nicholas Plpadilla Physical Exam:   
Gen:  NAD Pulm:  Unlabored Abd:  S/ND/mild TTP in LLQ without guarding or rebound Recent Results (from the past 24 hour(s)) EKG, 12 LEAD, INITIAL Collection Time: 03/26/19  2:21 PM  
Result Value Ref Range Ventricular Rate 83 BPM  
 Atrial Rate 83 BPM  
 P-R Interval 140 ms QRS Duration 92 ms Q-T Interval 432 ms QTC Calculation (Bezet) 507 ms Calculated P Axis 68 degrees Calculated R Axis 76 degrees Calculated T Axis 75 degrees Diagnosis Normal sinus rhythm ST & T wave abnormality, consider anterior ischemia Abnormal ECG When compared with ECG of 21-JUN-2011 21:41, 
premature atrial complexes are no longer present T wave inversion now evident in Anterior leads QT has lengthened CBC WITH AUTOMATED DIFF Collection Time: 03/26/19  2:31 PM  
Result Value Ref Range WBC 12.6 (H) 3.6 - 11.0 K/uL  
 RBC 4.64 3.80 - 5.20 M/uL  
 HGB 14.0 11.5 - 16.0 g/dL HCT 40.6 35.0 - 47.0 % MCV 87.5 80.0 - 99.0 FL  
 MCH 30.2 26.0 - 34.0 PG  
 MCHC 34.5 30.0 - 36.5 g/dL  
 RDW 12.5 11.5 - 14.5 % PLATELET 361 741 - 710 K/uL MPV 10.9 8.9 - 12.9 FL  
 NRBC 0.0 0  WBC ABSOLUTE NRBC 0.00 0.00 - 0.01 K/uL NEUTROPHILS 70 32 - 75 % LYMPHOCYTES 20 12 - 49 % MONOCYTES 9 5 - 13 % EOSINOPHILS 0 0 - 7 % BASOPHILS 0 0 - 1 % IMMATURE GRANULOCYTES 1 (H) 0.0 - 0.5 % ABS. NEUTROPHILS 8.9 (H) 1.8 - 8.0 K/UL  
 ABS. LYMPHOCYTES 2.5 0.8 - 3.5 K/UL  
 ABS. MONOCYTES 1.1 (H) 0.0 - 1.0 K/UL  
 ABS. EOSINOPHILS 0.0 0.0 - 0.4 K/UL  
 ABS. BASOPHILS 0.0 0.0 - 0.1 K/UL  
 ABS. IMM. GRANS. 0.1 (H) 0.00 - 0.04 K/UL  
 DF AUTOMATED METABOLIC PANEL, COMPREHENSIVE Collection Time: 03/26/19  2:31 PM  
Result Value Ref Range Sodium 135 (L) 136 - 145 mmol/L Potassium 2.6 (LL) 3.5 - 5.1 mmol/L Chloride 95 (L) 97 - 108 mmol/L  
 CO2 31 21 - 32 mmol/L Anion gap 9 5 - 15 mmol/L Glucose 115 (H) 65 - 100 mg/dL BUN 12 6 - 20 MG/DL Creatinine 0.94 0.55 - 1.02 MG/DL  
 BUN/Creatinine ratio 13 12 - 20 GFR est AA >60 >60 ml/min/1.73m2 GFR est non-AA 60 (L) >60 ml/min/1.73m2 Calcium 9.2 8.5 - 10.1 MG/DL  Bilirubin, total 0.7 0.2 - 1.0 MG/DL  
 ALT (SGPT) 38 12 - 78 U/L  
 AST (SGOT) 24 15 - 37 U/L Alk. phosphatase 75 45 - 117 U/L Protein, total 8.0 6.4 - 8.2 g/dL Albumin 2.8 (L) 3.5 - 5.0 g/dL Globulin 5.2 (H) 2.0 - 4.0 g/dL A-G Ratio 0.5 (L) 1.1 - 2.2 LACTIC ACID Collection Time: 03/26/19  2:31 PM  
Result Value Ref Range Lactic acid 1.4 0.4 - 2.0 MMOL/L  
TROPONIN I Collection Time: 03/26/19  2:31 PM  
Result Value Ref Range Troponin-I, Qt. 0.08 (H) <0.05 ng/mL NT-PRO BNP Collection Time: 03/26/19  2:31 PM  
Result Value Ref Range NT pro-BNP 2,224 (H) 0 - 125 PG/ML  
D DIMER Collection Time: 03/26/19  2:31 PM  
Result Value Ref Range D-dimer 3.60 (H) 0.00 - 0.65 mg/L FEU  
LIPASE Collection Time: 03/26/19  2:31 PM  
Result Value Ref Range Lipase 343 73 - 393 U/L  
SAMPLES BEING HELD Collection Time: 03/26/19  2:31 PM  
Result Value Ref Range SAMPLES BEING HELD 1RED 1BLUE   
 COMMENT Add-on orders for these samples will be processed based on acceptable specimen integrity and analyte stability, which may vary by analyte. POC G3 - PUL Collection Time: 03/26/19  2:53 PM  
Result Value Ref Range pH (POC) 7.557 (HH) 7.35 - 7.45    
 pCO2 (POC) 31.9 (L) 35.0 - 45.0 MMHG  
 pO2 (POC) 63 (L) 80 - 100 MMHG  
 HCO3 (POC) 28.4 (H) 22 - 26 MMOL/L  
 sO2 (POC) 95 92 - 97 % Base excess (POC) 6 mmol/L Site RIGHT BRACHIAL Device: ROOM AIR Allens test (POC) N/A Specimen type (POC) ARTERIAL Total resp. rate 20 AP:  78 yo woman with perforated diverticulitis - Perforated diverticulitis:  No acute indication for operation. Will treat with antibiotic therapy and bowel rest 
- NPO with ice chips - Hypoxemia:  Oxygen therapy. CXR in am 
- Labs in am 
- Telemetry monitoring

## 2019-03-27 NOTE — PROGRESS NOTES
Bedside and Verbal shift change report given to oncoming nurse Miguel Davila R.N. Opportunity for questions and clarifications provided.

## 2019-03-27 NOTE — CDMP QUERY
#1 
 
Patient admitted with Perforated diverticulitis, noted to have low potassium levels . If possible, please document in progress notes and d/c summary if you are evaluating and/or treating any of the following: 
 
=> Hypokalemia 
=> Other explanation of clinical findings 
=> Clinically Undetermined (no explanation for clinical findings) The medical record reflects the following: 
   Risk Factors: Perforated diverticulitis Clinical Indicators: serum KCl 2.6 Treatment: supplemental potassium Thank you, 
       Heber Patterson [facility]0 [facility], [address] [facility] Spontaneous, unlabored and symmetrical

## 2019-03-27 NOTE — PROGRESS NOTES
Primary Nurse Jay Meredith, RN and Harper Saeed, RN, RN performed a dual skin assessment on this patient No impairment noted Moe score is 23

## 2019-03-28 ENCOUNTER — APPOINTMENT (OUTPATIENT)
Dept: CT IMAGING | Age: 69
DRG: 329 | End: 2019-03-28
Attending: SURGERY
Payer: MEDICARE

## 2019-03-28 LAB
ANION GAP SERPL CALC-SCNC: 9 MMOL/L (ref 5–15)
BUN SERPL-MCNC: 6 MG/DL (ref 6–20)
BUN/CREAT SERPL: 12 (ref 12–20)
CALCIUM SERPL-MCNC: 8.3 MG/DL (ref 8.5–10.1)
CHLORIDE SERPL-SCNC: 101 MMOL/L (ref 97–108)
CO2 SERPL-SCNC: 25 MMOL/L (ref 21–32)
CREAT SERPL-MCNC: 0.5 MG/DL (ref 0.55–1.02)
ERYTHROCYTE [DISTWIDTH] IN BLOOD BY AUTOMATED COUNT: 12.4 % (ref 11.5–14.5)
GLUCOSE BLD STRIP.AUTO-MCNC: 114 MG/DL (ref 65–100)
GLUCOSE SERPL-MCNC: 119 MG/DL (ref 65–100)
HCT VFR BLD AUTO: 35 % (ref 35–47)
HGB BLD-MCNC: 11.5 G/DL (ref 11.5–16)
MAGNESIUM SERPL-MCNC: 1.7 MG/DL (ref 1.6–2.4)
MCH RBC QN AUTO: 29.9 PG (ref 26–34)
MCHC RBC AUTO-ENTMCNC: 32.9 G/DL (ref 30–36.5)
MCV RBC AUTO: 90.9 FL (ref 80–99)
NRBC # BLD: 0 K/UL (ref 0–0.01)
NRBC BLD-RTO: 0 PER 100 WBC
PLATELET # BLD AUTO: 323 K/UL (ref 150–400)
PMV BLD AUTO: 10.6 FL (ref 8.9–12.9)
POTASSIUM SERPL-SCNC: 3.3 MMOL/L (ref 3.5–5.1)
RBC # BLD AUTO: 3.85 M/UL (ref 3.8–5.2)
SERVICE CMNT-IMP: ABNORMAL
SODIUM SERPL-SCNC: 135 MMOL/L (ref 136–145)
WBC # BLD AUTO: 16.4 K/UL (ref 3.6–11)

## 2019-03-28 PROCEDURE — 85027 COMPLETE CBC AUTOMATED: CPT

## 2019-03-28 PROCEDURE — 74011000258 HC RX REV CODE- 258: Performed by: RADIOLOGY

## 2019-03-28 PROCEDURE — 80048 BASIC METABOLIC PNL TOTAL CA: CPT

## 2019-03-28 PROCEDURE — 36415 COLL VENOUS BLD VENIPUNCTURE: CPT

## 2019-03-28 PROCEDURE — 65660000000 HC RM CCU STEPDOWN

## 2019-03-28 PROCEDURE — 74011250637 HC RX REV CODE- 250/637: Performed by: SURGERY

## 2019-03-28 PROCEDURE — C9113 INJ PANTOPRAZOLE SODIUM, VIA: HCPCS | Performed by: SURGERY

## 2019-03-28 PROCEDURE — 74011000250 HC RX REV CODE- 250: Performed by: SURGERY

## 2019-03-28 PROCEDURE — 74011636320 HC RX REV CODE- 636/320: Performed by: RADIOLOGY

## 2019-03-28 PROCEDURE — 82962 GLUCOSE BLOOD TEST: CPT

## 2019-03-28 PROCEDURE — 74177 CT ABD & PELVIS W/CONTRAST: CPT

## 2019-03-28 PROCEDURE — 74011250636 HC RX REV CODE- 250/636: Performed by: SURGERY

## 2019-03-28 PROCEDURE — 83735 ASSAY OF MAGNESIUM: CPT

## 2019-03-28 RX ORDER — SODIUM CHLORIDE 0.9 % (FLUSH) 0.9 %
10 SYRINGE (ML) INJECTION
Status: COMPLETED | OUTPATIENT
Start: 2019-03-28 | End: 2019-03-28

## 2019-03-28 RX ADMIN — SODIUM CHLORIDE, SODIUM LACTATE, POTASSIUM CHLORIDE, AND CALCIUM CHLORIDE 125 ML/HR: 600; 310; 30; 20 INJECTION, SOLUTION INTRAVENOUS at 08:31

## 2019-03-28 RX ADMIN — PANTOPRAZOLE SODIUM 40 MG: 40 INJECTION, POWDER, FOR SOLUTION INTRAVENOUS at 19:09

## 2019-03-28 RX ADMIN — DICYCLOMINE HYDROCHLORIDE 10 MG: 10 CAPSULE ORAL at 13:16

## 2019-03-28 RX ADMIN — HYDROCHLOROTHIAZIDE: 25 TABLET ORAL at 13:00

## 2019-03-28 RX ADMIN — METRONIDAZOLE 500 MG: 500 INJECTION, SOLUTION INTRAVENOUS at 16:32

## 2019-03-28 RX ADMIN — HYDROMORPHONE HYDROCHLORIDE 1 MG: 1 INJECTION, SOLUTION INTRAMUSCULAR; INTRAVENOUS; SUBCUTANEOUS at 06:22

## 2019-03-28 RX ADMIN — DICYCLOMINE HYDROCHLORIDE 10 MG: 10 CAPSULE ORAL at 19:14

## 2019-03-28 RX ADMIN — Medication 10 ML: at 10:47

## 2019-03-28 RX ADMIN — SODIUM CHLORIDE 100 ML: 900 INJECTION, SOLUTION INTRAVENOUS at 10:47

## 2019-03-28 RX ADMIN — METRONIDAZOLE 500 MG: 500 INJECTION, SOLUTION INTRAVENOUS at 23:39

## 2019-03-28 RX ADMIN — METRONIDAZOLE 500 MG: 500 INJECTION, SOLUTION INTRAVENOUS at 08:30

## 2019-03-28 RX ADMIN — IOPAMIDOL 100 ML: 755 INJECTION, SOLUTION INTRAVENOUS at 10:47

## 2019-03-28 RX ADMIN — ONDANSETRON HYDROCHLORIDE 4 MG: 2 SOLUTION INTRAMUSCULAR; INTRAVENOUS at 13:16

## 2019-03-28 RX ADMIN — SODIUM CHLORIDE, SODIUM LACTATE, POTASSIUM CHLORIDE, AND CALCIUM CHLORIDE 125 ML/HR: 600; 310; 30; 20 INJECTION, SOLUTION INTRAVENOUS at 23:39

## 2019-03-28 RX ADMIN — ACETAMINOPHEN 650 MG: 325 TABLET ORAL at 16:39

## 2019-03-28 RX ADMIN — LEVOFLOXACIN 750 MG: 5 INJECTION, SOLUTION INTRAVENOUS at 19:09

## 2019-03-28 NOTE — PROGRESS NOTES
Progress Note Patient: Chucky Wall MRN: [id number]  SSN: [id number] YOB: 1950  Age: 79 y.o. Sex: female Admit Date: 3/26/2019 Perforated diverticulitis Subjective: No acute surgical issues. Pt reported more abdominal pain today. CT scan showed worsening diverticular perforation. Hemodynamically stable. Objective:  
 
Visit Vitals /81 Pulse 88 Temp 100 °F (37.8 °C) Resp 18 Wt 179 lb 7.3 oz (81.4 kg) SpO2 100% BMI 27.29 kg/m² Temp (24hrs), Av.1 °F (37.3 °C), Min:98.2 °F (36.8 °C), Max:100 °F (37.8 °C) Physical Exam:   
Gen:  NAD Pulm:  Unlabored Abd:  S/ND/moderate TTP without guarding or rebound Recent Results (from the past 24 hour(s)) CBC W/O DIFF Collection Time: 19  4:14 AM  
Result Value Ref Range WBC 16.4 (H) 3.6 - 11.0 K/uL  
 RBC 3.85 3.80 - 5.20 M/uL  
 HGB 11.5 11.5 - 16.0 g/dL HCT 35.0 35.0 - 47.0 % MCV 90.9 80.0 - 99.0 FL  
 MCH 29.9 26.0 - 34.0 PG  
 MCHC 32.9 30.0 - 36.5 g/dL  
 RDW 12.4 11.5 - 14.5 % PLATELET 775 355 - 043 K/uL MPV 10.6 8.9 - 12.9 FL  
 NRBC 0.0 0  WBC ABSOLUTE NRBC 0.00 0.00 - 0.01 K/uL METABOLIC PANEL, BASIC Collection Time: 19  4:14 AM  
Result Value Ref Range Sodium 135 (L) 136 - 145 mmol/L Potassium 3.3 (L) 3.5 - 5.1 mmol/L Chloride 101 97 - 108 mmol/L  
 CO2 25 21 - 32 mmol/L Anion gap 9 5 - 15 mmol/L Glucose 119 (H) 65 - 100 mg/dL BUN 6 6 - 20 MG/DL Creatinine 0.50 (L) 0.55 - 1.02 MG/DL  
 BUN/Creatinine ratio 12 12 - 20 GFR est AA >60 >60 ml/min/1.73m2 GFR est non-AA >60 >60 ml/min/1.73m2 Calcium 8.3 (L) 8.5 - 10.1 MG/DL MAGNESIUM Collection Time: 19  4:14 AM  
Result Value Ref Range Magnesium 1.7 1.6 - 2.4 mg/dL GLUCOSE, POC Collection Time: 19 12:50 PM  
Result Value Ref Range Glucose (POC) 114 (H) 65 - 100 mg/dL Performed by Miguel Sawyer April Assessment: Hospital Problems  Date Reviewed: 3/16/2019 Codes Class Noted POA Perforated diverticulum of large intestine ICD-10-CM: K57.20 ICD-9-CM: 562.10  3/26/2019 Unknown Plan/Recommendations/Medical Decision Making:  
 
- Continue clears for now - Perforated diverticulitis:  No acute indication for operation. Continue antibiotic therapy   
- Pain control 
- Consult IR for percutaneous drainage of abscess - Labs in am 
 
Signed By: Ranjit Villarreal MD   
 March 28, 2019

## 2019-03-28 NOTE — PROGRESS NOTES
Pt does not have IV access. Charge nurse attempted to gain access and family requested we let pt sleep. Will try again when pt is awake and alert.

## 2019-03-28 NOTE — PROGRESS NOTES
Bedside shift change report given to April/T, RN (oncoming nurse) by Kaila Silveira RN (offgoing nurse). Report included the following information SBAR, Intake/Output, MAR, Recent Results and Cardiac Rhythm NSR.

## 2019-03-29 ENCOUNTER — ANESTHESIA EVENT (OUTPATIENT)
Dept: SURGERY | Age: 69
DRG: 329 | End: 2019-03-29
Payer: MEDICARE

## 2019-03-29 ENCOUNTER — ANESTHESIA (OUTPATIENT)
Dept: SURGERY | Age: 69
DRG: 329 | End: 2019-03-29
Payer: MEDICARE

## 2019-03-29 ENCOUNTER — APPOINTMENT (OUTPATIENT)
Dept: CT IMAGING | Age: 69
DRG: 329 | End: 2019-03-29
Attending: SURGERY
Payer: MEDICARE

## 2019-03-29 LAB
ANION GAP SERPL CALC-SCNC: 6 MMOL/L (ref 5–15)
APTT PPP: 25.7 SEC (ref 22.1–32)
BASOPHILS # BLD: 0 K/UL (ref 0–0.1)
BASOPHILS NFR BLD: 0 % (ref 0–1)
BUN SERPL-MCNC: 5 MG/DL (ref 6–20)
BUN/CREAT SERPL: 11 (ref 12–20)
CALCIUM SERPL-MCNC: 8.1 MG/DL (ref 8.5–10.1)
CHLORIDE SERPL-SCNC: 98 MMOL/L (ref 97–108)
CO2 SERPL-SCNC: 27 MMOL/L (ref 21–32)
CREAT SERPL-MCNC: 0.45 MG/DL (ref 0.55–1.02)
DIFFERENTIAL METHOD BLD: ABNORMAL
EOSINOPHIL # BLD: 0 K/UL (ref 0–0.4)
EOSINOPHIL NFR BLD: 0 % (ref 0–7)
ERYTHROCYTE [DISTWIDTH] IN BLOOD BY AUTOMATED COUNT: 12.2 % (ref 11.5–14.5)
GLUCOSE SERPL-MCNC: 105 MG/DL (ref 65–100)
HCT VFR BLD AUTO: 33.7 % (ref 35–47)
HGB BLD-MCNC: 11.2 G/DL (ref 11.5–16)
IMM GRANULOCYTES # BLD AUTO: 0.2 K/UL (ref 0–0.04)
IMM GRANULOCYTES NFR BLD AUTO: 1 % (ref 0–0.5)
INR PPP: 1.5 (ref 0.9–1.1)
LYMPHOCYTES # BLD: 1.9 K/UL (ref 0.8–3.5)
LYMPHOCYTES NFR BLD: 9 % (ref 12–49)
MAGNESIUM SERPL-MCNC: 1.5 MG/DL (ref 1.6–2.4)
MCH RBC QN AUTO: 30.6 PG (ref 26–34)
MCHC RBC AUTO-ENTMCNC: 33.2 G/DL (ref 30–36.5)
MCV RBC AUTO: 92.1 FL (ref 80–99)
MONOCYTES # BLD: 1 K/UL (ref 0–1)
MONOCYTES NFR BLD: 5 % (ref 5–13)
NEUTS SEG # BLD: 18.1 K/UL (ref 1.8–8)
NEUTS SEG NFR BLD: 85 % (ref 32–75)
NRBC # BLD: 0 K/UL (ref 0–0.01)
NRBC BLD-RTO: 0 PER 100 WBC
PLATELET # BLD AUTO: 318 K/UL (ref 150–400)
PMV BLD AUTO: 9.9 FL (ref 8.9–12.9)
POTASSIUM SERPL-SCNC: 3 MMOL/L (ref 3.5–5.1)
PROTHROMBIN TIME: 15.1 SEC (ref 9–11.1)
RBC # BLD AUTO: 3.66 M/UL (ref 3.8–5.2)
SODIUM SERPL-SCNC: 131 MMOL/L (ref 136–145)
THERAPEUTIC RANGE,PTTT: NORMAL SECS (ref 58–77)
WBC # BLD AUTO: 21.2 K/UL (ref 3.6–11)

## 2019-03-29 PROCEDURE — 76210000016 HC OR PH I REC 1 TO 1.5 HR: Performed by: SURGERY

## 2019-03-29 PROCEDURE — 0D9V4ZX DRAINAGE OF MESENTERY, PERCUTANEOUS ENDOSCOPIC APPROACH, DIAGNOSTIC: ICD-10-PCS | Performed by: SURGERY

## 2019-03-29 PROCEDURE — 76060000034 HC ANESTHESIA 1.5 TO 2 HR: Performed by: SURGERY

## 2019-03-29 PROCEDURE — 77030012407 HC DRN WND BARD -B: Performed by: SURGERY

## 2019-03-29 PROCEDURE — 77030020263 HC SOL INJ SOD CL0.9% LFCR 1000ML: Performed by: SURGERY

## 2019-03-29 PROCEDURE — 74011250636 HC RX REV CODE- 250/636: Performed by: SURGERY

## 2019-03-29 PROCEDURE — 77030035048 HC TRCR ENDOSC OPTCL COVD -B: Performed by: SURGERY

## 2019-03-29 PROCEDURE — 86900 BLOOD TYPING SEROLOGIC ABO: CPT

## 2019-03-29 PROCEDURE — 87186 SC STD MICRODIL/AGAR DIL: CPT

## 2019-03-29 PROCEDURE — 77030037032 HC INSRT SCIS CLICKLLINE DISP STOR -B: Performed by: SURGERY

## 2019-03-29 PROCEDURE — 74011000250 HC RX REV CODE- 250: Performed by: SURGERY

## 2019-03-29 PROCEDURE — 87205 SMEAR GRAM STAIN: CPT

## 2019-03-29 PROCEDURE — 77030002916 HC SUT ETHLN J&J -A: Performed by: SURGERY

## 2019-03-29 PROCEDURE — 83735 ASSAY OF MAGNESIUM: CPT

## 2019-03-29 PROCEDURE — 77030013567 HC DRN WND RESERV BARD -A: Performed by: SURGERY

## 2019-03-29 PROCEDURE — 86905 BLOOD TYPING RBC ANTIGENS: CPT

## 2019-03-29 PROCEDURE — 74011250637 HC RX REV CODE- 250/637: Performed by: SURGERY

## 2019-03-29 PROCEDURE — 85730 THROMBOPLASTIN TIME PARTIAL: CPT

## 2019-03-29 PROCEDURE — 36415 COLL VENOUS BLD VENIPUNCTURE: CPT

## 2019-03-29 PROCEDURE — 77030035045 HC TRCR ENDOSC VRSPRT BLDLSS COVD -B: Performed by: SURGERY

## 2019-03-29 PROCEDURE — 85610 PROTHROMBIN TIME: CPT

## 2019-03-29 PROCEDURE — 77030035051: Performed by: SURGERY

## 2019-03-29 PROCEDURE — 85025 COMPLETE CBC W/AUTO DIFF WBC: CPT

## 2019-03-29 PROCEDURE — 77030040361 HC SLV COMPR DVT MDII -B: Performed by: SURGERY

## 2019-03-29 PROCEDURE — C9113 INJ PANTOPRAZOLE SODIUM, VIA: HCPCS | Performed by: SURGERY

## 2019-03-29 PROCEDURE — 87077 CULTURE AEROBIC IDENTIFY: CPT

## 2019-03-29 PROCEDURE — 86870 RBC ANTIBODY IDENTIFICATION: CPT

## 2019-03-29 PROCEDURE — 74011000250 HC RX REV CODE- 250: Performed by: ANESTHESIOLOGY

## 2019-03-29 PROCEDURE — 77030039266 HC ADH SKN EXOFIN S2SG -A: Performed by: SURGERY

## 2019-03-29 PROCEDURE — 80048 BASIC METABOLIC PNL TOTAL CA: CPT

## 2019-03-29 PROCEDURE — 74011250636 HC RX REV CODE- 250/636

## 2019-03-29 PROCEDURE — 77030008684 HC TU ET CUF COVD -B: Performed by: ANESTHESIOLOGY

## 2019-03-29 PROCEDURE — 77030013079 HC BLNKT BAIR HGGR 3M -A: Performed by: ANESTHESIOLOGY

## 2019-03-29 PROCEDURE — 65660000000 HC RM CCU STEPDOWN

## 2019-03-29 PROCEDURE — 86922 COMPATIBILITY TEST ANTIGLOB: CPT

## 2019-03-29 PROCEDURE — 77030008756 HC TU IRR SUC STRY -B: Performed by: SURGERY

## 2019-03-29 PROCEDURE — 0DN84ZZ RELEASE SMALL INTESTINE, PERCUTANEOUS ENDOSCOPIC APPROACH: ICD-10-PCS | Performed by: SURGERY

## 2019-03-29 PROCEDURE — 77030026438 HC STYL ET INTUB CARD -A: Performed by: ANESTHESIOLOGY

## 2019-03-29 PROCEDURE — 86921 COMPATIBILITY TEST INCUBATE: CPT

## 2019-03-29 PROCEDURE — 77030002933 HC SUT MCRYL J&J -A: Performed by: SURGERY

## 2019-03-29 PROCEDURE — 77030011640 HC PAD GRND REM COVD -A: Performed by: SURGERY

## 2019-03-29 PROCEDURE — 77010033678 HC OXYGEN DAILY

## 2019-03-29 PROCEDURE — 74011250636 HC RX REV CODE- 250/636: Performed by: ANESTHESIOLOGY

## 2019-03-29 PROCEDURE — 77030020747 HC TU INSUF ENDOSC TELE -A: Performed by: SURGERY

## 2019-03-29 PROCEDURE — 86902 BLOOD TYPE ANTIGEN DONOR EA: CPT

## 2019-03-29 PROCEDURE — 77030034849: Performed by: SURGERY

## 2019-03-29 PROCEDURE — 86920 COMPATIBILITY TEST SPIN: CPT

## 2019-03-29 PROCEDURE — 74011000250 HC RX REV CODE- 250

## 2019-03-29 PROCEDURE — 87185 SC STD ENZYME DETCJ PER NZM: CPT

## 2019-03-29 PROCEDURE — 76010000153 HC OR TIME 1.5 TO 2 HR: Performed by: SURGERY

## 2019-03-29 PROCEDURE — 77030034628 HC LIGASURE LAP SEAL DIV MD COVD -F: Performed by: SURGERY

## 2019-03-29 RX ORDER — FENTANYL CITRATE 50 UG/ML
25 INJECTION, SOLUTION INTRAMUSCULAR; INTRAVENOUS
Status: DISCONTINUED | OUTPATIENT
Start: 2019-03-29 | End: 2019-03-29 | Stop reason: HOSPADM

## 2019-03-29 RX ORDER — SODIUM CHLORIDE 9 MG/ML
25 INJECTION, SOLUTION INTRAVENOUS CONTINUOUS
Status: DISCONTINUED | OUTPATIENT
Start: 2019-03-29 | End: 2019-03-29 | Stop reason: HOSPADM

## 2019-03-29 RX ORDER — SUCCINYLCHOLINE CHLORIDE 20 MG/ML
INJECTION INTRAMUSCULAR; INTRAVENOUS AS NEEDED
Status: DISCONTINUED | OUTPATIENT
Start: 2019-03-29 | End: 2019-03-29 | Stop reason: HOSPADM

## 2019-03-29 RX ORDER — MIDAZOLAM HYDROCHLORIDE 1 MG/ML
INJECTION, SOLUTION INTRAMUSCULAR; INTRAVENOUS AS NEEDED
Status: DISCONTINUED | OUTPATIENT
Start: 2019-03-29 | End: 2019-03-29 | Stop reason: HOSPADM

## 2019-03-29 RX ORDER — MIDAZOLAM HYDROCHLORIDE 1 MG/ML
1 INJECTION, SOLUTION INTRAMUSCULAR; INTRAVENOUS AS NEEDED
Status: DISCONTINUED | OUTPATIENT
Start: 2019-03-29 | End: 2019-03-29 | Stop reason: HOSPADM

## 2019-03-29 RX ORDER — ALBUTEROL SULFATE 0.83 MG/ML
2.5 SOLUTION RESPIRATORY (INHALATION)
Status: COMPLETED | OUTPATIENT
Start: 2019-03-29 | End: 2019-03-29

## 2019-03-29 RX ORDER — SODIUM CHLORIDE 9 MG/ML
INJECTION, SOLUTION INTRAVENOUS
Status: DISCONTINUED | OUTPATIENT
Start: 2019-03-29 | End: 2019-03-29 | Stop reason: HOSPADM

## 2019-03-29 RX ORDER — PROPOFOL 10 MG/ML
INJECTION, EMULSION INTRAVENOUS AS NEEDED
Status: DISCONTINUED | OUTPATIENT
Start: 2019-03-29 | End: 2019-03-29 | Stop reason: HOSPADM

## 2019-03-29 RX ORDER — SODIUM CHLORIDE 0.9 % (FLUSH) 0.9 %
5-40 SYRINGE (ML) INJECTION EVERY 8 HOURS
Status: DISCONTINUED | OUTPATIENT
Start: 2019-03-29 | End: 2019-03-29 | Stop reason: HOSPADM

## 2019-03-29 RX ORDER — DEXAMETHASONE SODIUM PHOSPHATE 4 MG/ML
INJECTION, SOLUTION INTRA-ARTICULAR; INTRALESIONAL; INTRAMUSCULAR; INTRAVENOUS; SOFT TISSUE AS NEEDED
Status: DISCONTINUED | OUTPATIENT
Start: 2019-03-29 | End: 2019-03-29 | Stop reason: HOSPADM

## 2019-03-29 RX ORDER — ROCURONIUM BROMIDE 10 MG/ML
INJECTION, SOLUTION INTRAVENOUS AS NEEDED
Status: DISCONTINUED | OUTPATIENT
Start: 2019-03-29 | End: 2019-03-29 | Stop reason: HOSPADM

## 2019-03-29 RX ORDER — SODIUM CHLORIDE 0.9 % (FLUSH) 0.9 %
5-40 SYRINGE (ML) INJECTION AS NEEDED
Status: DISCONTINUED | OUTPATIENT
Start: 2019-03-29 | End: 2019-03-29 | Stop reason: HOSPADM

## 2019-03-29 RX ORDER — MAGNESIUM SULFATE HEPTAHYDRATE 40 MG/ML
INJECTION, SOLUTION INTRAVENOUS AS NEEDED
Status: DISCONTINUED | OUTPATIENT
Start: 2019-03-29 | End: 2019-03-29 | Stop reason: HOSPADM

## 2019-03-29 RX ORDER — ONDANSETRON 2 MG/ML
INJECTION INTRAMUSCULAR; INTRAVENOUS AS NEEDED
Status: DISCONTINUED | OUTPATIENT
Start: 2019-03-29 | End: 2019-03-29 | Stop reason: HOSPADM

## 2019-03-29 RX ORDER — BUPIVACAINE HYDROCHLORIDE 5 MG/ML
50 INJECTION, SOLUTION EPIDURAL; INTRACAUDAL ONCE
Status: COMPLETED | OUTPATIENT
Start: 2019-03-29 | End: 2019-03-29

## 2019-03-29 RX ORDER — SODIUM CHLORIDE, SODIUM LACTATE, POTASSIUM CHLORIDE, CALCIUM CHLORIDE 600; 310; 30; 20 MG/100ML; MG/100ML; MG/100ML; MG/100ML
75 INJECTION, SOLUTION INTRAVENOUS CONTINUOUS
Status: DISCONTINUED | OUTPATIENT
Start: 2019-03-29 | End: 2019-03-29 | Stop reason: HOSPADM

## 2019-03-29 RX ORDER — SODIUM CHLORIDE, SODIUM LACTATE, POTASSIUM CHLORIDE, CALCIUM CHLORIDE 600; 310; 30; 20 MG/100ML; MG/100ML; MG/100ML; MG/100ML
125 INJECTION, SOLUTION INTRAVENOUS CONTINUOUS
Status: DISCONTINUED | OUTPATIENT
Start: 2019-03-29 | End: 2019-03-29 | Stop reason: HOSPADM

## 2019-03-29 RX ORDER — MIDAZOLAM HYDROCHLORIDE 1 MG/ML
0.5 INJECTION, SOLUTION INTRAMUSCULAR; INTRAVENOUS
Status: DISCONTINUED | OUTPATIENT
Start: 2019-03-29 | End: 2019-03-29 | Stop reason: HOSPADM

## 2019-03-29 RX ORDER — DIPHENHYDRAMINE HYDROCHLORIDE 50 MG/ML
12.5 INJECTION, SOLUTION INTRAMUSCULAR; INTRAVENOUS AS NEEDED
Status: DISCONTINUED | OUTPATIENT
Start: 2019-03-29 | End: 2019-03-29 | Stop reason: HOSPADM

## 2019-03-29 RX ORDER — SODIUM CHLORIDE 9 MG/ML
250 INJECTION, SOLUTION INTRAVENOUS AS NEEDED
Status: DISCONTINUED | OUTPATIENT
Start: 2019-03-29 | End: 2019-04-08

## 2019-03-29 RX ORDER — LIDOCAINE HYDROCHLORIDE 20 MG/ML
INJECTION, SOLUTION EPIDURAL; INFILTRATION; INTRACAUDAL; PERINEURAL AS NEEDED
Status: DISCONTINUED | OUTPATIENT
Start: 2019-03-29 | End: 2019-03-29 | Stop reason: HOSPADM

## 2019-03-29 RX ORDER — ONDANSETRON 2 MG/ML
4 INJECTION INTRAMUSCULAR; INTRAVENOUS AS NEEDED
Status: DISCONTINUED | OUTPATIENT
Start: 2019-03-29 | End: 2019-03-29 | Stop reason: HOSPADM

## 2019-03-29 RX ORDER — FENTANYL CITRATE 50 UG/ML
50 INJECTION, SOLUTION INTRAMUSCULAR; INTRAVENOUS AS NEEDED
Status: DISCONTINUED | OUTPATIENT
Start: 2019-03-29 | End: 2019-03-29 | Stop reason: HOSPADM

## 2019-03-29 RX ORDER — HYDROCODONE BITARTRATE AND ACETAMINOPHEN 5; 325 MG/1; MG/1
1 TABLET ORAL AS NEEDED
Status: DISCONTINUED | OUTPATIENT
Start: 2019-03-29 | End: 2019-03-29 | Stop reason: HOSPADM

## 2019-03-29 RX ORDER — MORPHINE SULFATE 10 MG/ML
2 INJECTION, SOLUTION INTRAMUSCULAR; INTRAVENOUS
Status: DISCONTINUED | OUTPATIENT
Start: 2019-03-29 | End: 2019-03-29 | Stop reason: HOSPADM

## 2019-03-29 RX ORDER — HYDROMORPHONE HYDROCHLORIDE 1 MG/ML
0.2 INJECTION, SOLUTION INTRAMUSCULAR; INTRAVENOUS; SUBCUTANEOUS
Status: DISCONTINUED | OUTPATIENT
Start: 2019-03-29 | End: 2019-03-29 | Stop reason: HOSPADM

## 2019-03-29 RX ORDER — LIDOCAINE HYDROCHLORIDE 10 MG/ML
0.1 INJECTION, SOLUTION EPIDURAL; INFILTRATION; INTRACAUDAL; PERINEURAL AS NEEDED
Status: DISCONTINUED | OUTPATIENT
Start: 2019-03-29 | End: 2019-03-29 | Stop reason: HOSPADM

## 2019-03-29 RX ORDER — SODIUM CHLORIDE 9 MG/ML
50 INJECTION, SOLUTION INTRAVENOUS CONTINUOUS
Status: DISCONTINUED | OUTPATIENT
Start: 2019-03-29 | End: 2019-03-29 | Stop reason: HOSPADM

## 2019-03-29 RX ORDER — KETAMINE HYDROCHLORIDE 10 MG/ML
INJECTION, SOLUTION INTRAMUSCULAR; INTRAVENOUS AS NEEDED
Status: DISCONTINUED | OUTPATIENT
Start: 2019-03-29 | End: 2019-03-29 | Stop reason: HOSPADM

## 2019-03-29 RX ORDER — ESMOLOL HYDROCHLORIDE 10 MG/ML
INJECTION INTRAVENOUS AS NEEDED
Status: DISCONTINUED | OUTPATIENT
Start: 2019-03-29 | End: 2019-03-29 | Stop reason: HOSPADM

## 2019-03-29 RX ADMIN — SODIUM CHLORIDE, SODIUM LACTATE, POTASSIUM CHLORIDE, AND CALCIUM CHLORIDE 125 ML/HR: 600; 310; 30; 20 INJECTION, SOLUTION INTRAVENOUS at 20:22

## 2019-03-29 RX ADMIN — LIDOCAINE HYDROCHLORIDE 100 MG: 20 INJECTION, SOLUTION EPIDURAL; INFILTRATION; INTRACAUDAL; PERINEURAL at 16:16

## 2019-03-29 RX ADMIN — METRONIDAZOLE 500 MG: 500 INJECTION, SOLUTION INTRAVENOUS at 16:33

## 2019-03-29 RX ADMIN — HYDROMORPHONE HYDROCHLORIDE 1 MG: 1 INJECTION, SOLUTION INTRAMUSCULAR; INTRAVENOUS; SUBCUTANEOUS at 20:21

## 2019-03-29 RX ADMIN — ROCURONIUM BROMIDE 45 MG: 10 INJECTION, SOLUTION INTRAVENOUS at 16:26

## 2019-03-29 RX ADMIN — HYDROMORPHONE HYDROCHLORIDE 1 MG: 1 INJECTION, SOLUTION INTRAMUSCULAR; INTRAVENOUS; SUBCUTANEOUS at 10:44

## 2019-03-29 RX ADMIN — KETAMINE HYDROCHLORIDE 50 MG: 10 INJECTION, SOLUTION INTRAMUSCULAR; INTRAVENOUS at 16:17

## 2019-03-29 RX ADMIN — MIDAZOLAM HYDROCHLORIDE 1 MG: 1 INJECTION, SOLUTION INTRAMUSCULAR; INTRAVENOUS at 16:07

## 2019-03-29 RX ADMIN — SODIUM CHLORIDE: 9 INJECTION, SOLUTION INTRAVENOUS at 16:35

## 2019-03-29 RX ADMIN — PANTOPRAZOLE SODIUM 40 MG: 40 INJECTION, POWDER, FOR SOLUTION INTRAVENOUS at 19:44

## 2019-03-29 RX ADMIN — PROPOFOL 100 MG: 10 INJECTION, EMULSION INTRAVENOUS at 16:16

## 2019-03-29 RX ADMIN — ONDANSETRON 4 MG: 2 INJECTION INTRAMUSCULAR; INTRAVENOUS at 16:42

## 2019-03-29 RX ADMIN — LEVOFLOXACIN 750 MG: 5 INJECTION, SOLUTION INTRAVENOUS at 19:44

## 2019-03-29 RX ADMIN — SUCCINYLCHOLINE CHLORIDE 140 MG: 20 INJECTION INTRAMUSCULAR; INTRAVENOUS at 16:17

## 2019-03-29 RX ADMIN — MAGNESIUM SULFATE HEPTAHYDRATE 2 G: 40 INJECTION, SOLUTION INTRAVENOUS at 16:34

## 2019-03-29 RX ADMIN — ESMOLOL HYDROCHLORIDE 10 MG: 10 INJECTION INTRAVENOUS at 17:26

## 2019-03-29 RX ADMIN — ESMOLOL HYDROCHLORIDE 30 MG: 10 INJECTION INTRAVENOUS at 17:30

## 2019-03-29 RX ADMIN — METRONIDAZOLE 500 MG: 500 INJECTION, SOLUTION INTRAVENOUS at 23:28

## 2019-03-29 RX ADMIN — DEXAMETHASONE SODIUM PHOSPHATE 4 MG: 4 INJECTION, SOLUTION INTRA-ARTICULAR; INTRALESIONAL; INTRAMUSCULAR; INTRAVENOUS; SOFT TISSUE at 16:42

## 2019-03-29 RX ADMIN — ESMOLOL HYDROCHLORIDE 10 MG: 10 INJECTION INTRAVENOUS at 17:28

## 2019-03-29 RX ADMIN — ROCURONIUM BROMIDE 5 MG: 10 INJECTION, SOLUTION INTRAVENOUS at 16:16

## 2019-03-29 RX ADMIN — DICYCLOMINE HYDROCHLORIDE 10 MG: 10 CAPSULE ORAL at 00:31

## 2019-03-29 RX ADMIN — MIDAZOLAM HYDROCHLORIDE 1 MG: 1 INJECTION, SOLUTION INTRAMUSCULAR; INTRAVENOUS at 16:11

## 2019-03-29 RX ADMIN — METRONIDAZOLE 500 MG: 500 INJECTION, SOLUTION INTRAVENOUS at 08:13

## 2019-03-29 RX ADMIN — SODIUM CHLORIDE, SODIUM LACTATE, POTASSIUM CHLORIDE, AND CALCIUM CHLORIDE 125 ML/HR: 600; 310; 30; 20 INJECTION, SOLUTION INTRAVENOUS at 10:19

## 2019-03-29 RX ADMIN — ONDANSETRON HYDROCHLORIDE 4 MG: 2 SOLUTION INTRAMUSCULAR; INTRAVENOUS at 05:19

## 2019-03-29 RX ADMIN — ESMOLOL HYDROCHLORIDE 10 MG: 10 INJECTION INTRAVENOUS at 17:25

## 2019-03-29 RX ADMIN — ALBUTEROL SULFATE 2.5 MG: 2.5 SOLUTION RESPIRATORY (INHALATION) at 18:06

## 2019-03-29 RX ADMIN — SODIUM CHLORIDE, SODIUM LACTATE, POTASSIUM CHLORIDE, AND CALCIUM CHLORIDE 125 ML/HR: 600; 310; 30; 20 INJECTION, SOLUTION INTRAVENOUS at 18:23

## 2019-03-29 RX ADMIN — HYDROMORPHONE HYDROCHLORIDE 1 MG: 1 INJECTION, SOLUTION INTRAMUSCULAR; INTRAVENOUS; SUBCUTANEOUS at 07:17

## 2019-03-29 RX ADMIN — SODIUM CHLORIDE, POTASSIUM CHLORIDE, SODIUM LACTATE AND CALCIUM CHLORIDE: 600; 310; 30; 20 INJECTION, SOLUTION INTRAVENOUS at 15:54

## 2019-03-29 NOTE — ROUTINE PROCESS
Patient: Hussain Mayorga MRN: [id number]  SSN: [id number] YOB: 1950  Age: 79 y.o. Sex: female Patient is status post Procedure(s): LAPAROSCOPY GENERAL DIAGNOSTIC/ LAPAROSCOPIC DRAINAGE OF PELVIC ABSCESS. Surgeon(s) and Role: Gaetano Young MD - Primary Local/Dose/Irrigation: see mar Peripheral IV 03/28/19 Left;Mid Forearm (Active) Site Assessment Clean, dry, & intact 3/29/2019 11:30 AM  
Phlebitis Assessment 0 3/29/2019 11:30 AM  
Infiltration Assessment 0 3/29/2019 11:30 AM  
Dressing Status Clean, dry, & intact 3/29/2019 11:30 AM  
Dressing Type Transparent;Tape 3/29/2019 11:30 AM  
Hub Color/Line Status Blue; Infusing 3/29/2019 11:30 AM  
Action Taken Open ports on tubing capped 3/29/2019 11:30 AM  
Alcohol Cap Used Yes 3/29/2019 11:30 AM  
   
Peripheral IV 03/29/19 Right Hand (Active) Lavon Neighbor #1 03/29/19 Right;Upper; Outer Abdomen (Active) Site Assessment Intact 3/29/2019  5:07 PM  
           
 
 
 
Dressing/Packing:    
Splint/Cast:  ] Other: bandaid on drain site

## 2019-03-29 NOTE — WOUND CARE
Stoma Marking Note:  
 
Type of ostomy scheduled: possible colostomy or ileostomy by Dr. Genaro Dodson Introduced self and services to patient. Patient able to verbalize knowledge of procedure consistent with consent. Stoma site marked with surgical marker in LUQ & RUQ Stoma site chosen is in the patients visual field and within the rectus muscle while avoiding the following: umbilicus, wrinkles, dips, skin folds, rib cage, iliac crest and belt/pants/underwear line. Site was assessed in the lying and sitting positions. Abdomen is rotund and BOTH LOWER QUADRANTS ROLL UNDER ENTIRELY when she is sitting. There is a scar in right upper quadrant as well. Her abdomen may appear very different when it is not acute - a difficult marking. Patient understands that the final location will be determined by the surgeon and the site is only a guideline. Plan to follow after surgery for teaching. Will likely need home health care for further teaching after discharge. Christine Johnson, RN, BSN, CWOCN Certified Wound, Ostomy, Continence Nurse 
office: [phone] 
pager [phone] or [phone]

## 2019-03-29 NOTE — PERIOP NOTES
TRANSFER - OUT REPORT: 
 
Verbal report given to KELSEY CANCHOLA(name) [facility]  being transferred to [facility](unit) for routine post - op Report consisted of patients Situation, Background, Assessment and  
Recommendations(SBAR). Time Pre op antibiotic given:N Anesthesia Stop time: N Nails Present on Transfer to floor:Y Order for Nails on Chart:Y Discharge Prescriptions with Chart:N Information from the following report(s) SBAR was reviewed with the receiving nurse. Opportunity for questions and clarification was provided. Is the patient on 02? YES 
     L/Min 2 Other LITERS Is the patient on a monitor? YES Is the nurse transporting with the patient? YES Surgical Waiting Area notified of patient's transfer from PACU? YES The following personal items collected during your admission accompanied patient upon transfer:  
Dental Appliance: Dental Appliances: None Vision: Visual Aid: Glasses Hearing Aid:   
Jewelry: Jewelry: None Clothing: Clothing: (no belongings to preop) Other Valuables: Other Valuables: None Valuables sent to safe:

## 2019-03-29 NOTE — ANESTHESIA PREPROCEDURE EVALUATION
Relevant Problems No relevant active problems Anesthetic History No history of anesthetic complications Review of Systems / Medical History Patient summary reviewed, nursing notes reviewed and pertinent labs reviewed Pulmonary Within defined limits COPD Smoker Neuro/Psych Within defined limits Cardiovascular Within defined limits Hypertension GI/Hepatic/Renal 
Within defined limits GERD Endo/Other Within defined limits Hypothyroidism Other Findings Physical Exam 
 
Airway Mallampati: II 
TM Distance: > 6 cm Neck ROM: normal range of motion Mouth opening: Normal 
 
 Cardiovascular Regular rate and rhythm,  S1 and S2 normal,  no murmur, click, rub, or gallop Dental 
No notable dental hx Pulmonary Breath sounds clear to auscultation Abdominal 
GI exam deferred Other Findings Anesthetic Plan ASA: 4 Anesthesia type: general 
 
 
 
 
Induction: Intravenous Anesthetic plan and risks discussed with: Patient

## 2019-03-29 NOTE — PROGRESS NOTES
Bedside shift change report given to SUSHANT Lopez (oncoming nurse) by SUSHANT Kelley (offgoing nurse). Report included the following information SBAR, Kardex, Intake/Output, MAR, Accordion and Cardiac Rhythm NSR.

## 2019-03-29 NOTE — PROGRESS NOTES
Bedside shift change report given to Karen Conner RN (oncoming nurse) by April/SUSHANT Brown (offgoing nurse). Report included the following information SBAR, Kardex, Intake/Output, MAR, Accordion and Cardiac Rhythm NSR.

## 2019-03-29 NOTE — PROGRESS NOTES
Image-guided drainage not possible given location of extra-luminal collection. Will proceed with diagnostic laparoscopy. Risks reviewed with patient to include bleeding, infection, conversion to open procedure, need for sigmoidectomy with temporary colostomy, need for prolonged mechanical ventilation. She and her family agree and desire to proceed. All questions answered.

## 2019-03-29 NOTE — PROGRESS NOTES
Progress Note Patient: Karlie Dickson MRN: [id number]  SSN: [id number] YOB: 1950  Age: 79 y.o. Sex: female Admit Date: 3/26/2019 Subjective:  
 
Patient complains of worsening abdominal pain; no fever or chills. Objective:  
 
Visit Vitals /78 Pulse 86 Temp 99 °F (37.2 °C) Resp 16 Wt 183 lb 13.8 oz (83.4 kg) SpO2 96% BMI 27.96 kg/m² Temp (24hrs), Av.9 °F (37.2 °C), Min:98.2 °F (36.8 °C), Max:100 °F (37.8 °C) Physical Exam: ABDOMEN: Obese, non-distended, soft. Diffusely tender to palpation (maximal in LLQ) with guarding. Data Review: VS, I/O's, Labs, CT (3/28) Lab Review:  
Recent Results (from the past 12 hour(s)) METABOLIC PANEL, BASIC Collection Time: 19  4:10 AM  
Result Value Ref Range Sodium 131 (L) 136 - 145 mmol/L Potassium 3.0 (L) 3.5 - 5.1 mmol/L Chloride 98 97 - 108 mmol/L  
 CO2 27 21 - 32 mmol/L Anion gap 6 5 - 15 mmol/L Glucose 105 (H) 65 - 100 mg/dL BUN 5 (L) 6 - 20 MG/DL Creatinine 0.45 (L) 0.55 - 1.02 MG/DL  
 BUN/Creatinine ratio 11 (L) 12 - 20 GFR est AA >60 >60 ml/min/1.73m2 GFR est non-AA >60 >60 ml/min/1.73m2 Calcium 8.1 (L) 8.5 - 10.1 MG/DL MAGNESIUM Collection Time: 19  4:10 AM  
Result Value Ref Range Magnesium 1.5 (L) 1.6 - 2.4 mg/dL CBC WITH AUTOMATED DIFF Collection Time: 19  4:10 AM  
Result Value Ref Range WBC 21.2 (H) 3.6 - 11.0 K/uL  
 RBC 3.66 (L) 3.80 - 5.20 M/uL  
 HGB 11.2 (L) 11.5 - 16.0 g/dL HCT 33.7 (L) 35.0 - 47.0 % MCV 92.1 80.0 - 99.0 FL  
 MCH 30.6 26.0 - 34.0 PG  
 MCHC 33.2 30.0 - 36.5 g/dL  
 RDW 12.2 11.5 - 14.5 % PLATELET 668 620 - 755 K/uL MPV 9.9 8.9 - 12.9 FL  
 NRBC 0.0 0  WBC ABSOLUTE NRBC 0.00 0.00 - 0.01 K/uL NEUTROPHILS 85 (H) 32 - 75 % LYMPHOCYTES 9 (L) 12 - 49 % MONOCYTES 5 5 - 13 % EOSINOPHILS 0 0 - 7 % BASOPHILS 0 0 - 1 % IMMATURE GRANULOCYTES 1 (H) 0.0 - 0.5 % ABS. NEUTROPHILS 18.1 (H) 1.8 - 8.0 K/UL  
 ABS. LYMPHOCYTES 1.9 0.8 - 3.5 K/UL  
 ABS. MONOCYTES 1.0 0.0 - 1.0 K/UL  
 ABS. EOSINOPHILS 0.0 0.0 - 0.4 K/UL  
 ABS. BASOPHILS 0.0 0.0 - 0.1 K/UL  
 ABS. IMM. GRANS. 0.2 (H) 0.00 - 0.04 K/UL  
 DF AUTOMATED Assessment:  
 
Hospital Problems  Date Reviewed: 3/16/2019 Codes Class Noted POA Perforated diverticulum of large intestine ICD-10-CM: K57.20 ICD-9-CM: 562.10  3/26/2019 Unknown Worsening clinically and radiographically. Based on location of collection, may not be amenable to image-guided drainage. Plan/Recommendations/Medical Decision Makin. NPO. 
2. Antibiotics. 3. Will discuss feasibility of drainage with IR; discussed possible need for operative drainage, possible sigmoid resection. Patient and daughter in agreement. Signed By: Rainer Perez MD   
 2019

## 2019-03-29 NOTE — PROGRESS NOTES
Problem: Pressure Injury - Risk of 
Goal: *Prevention of pressure injury Description Document Moe Scale and appropriate interventions in the flowsheet. Outcome: Progressing Towards Goal 
  
Problem: Patient Education: Go to Patient Education Activity Goal: Patient/Family Education Outcome: Progressing Towards Goal 
  
Problem: Falls - Risk of 
Goal: *Absence of Falls Description Document Beth Cullen Fall Risk and appropriate interventions in the flowsheet. Outcome: Progressing Towards Goal 
  
Problem: Patient Education: Go to Patient Education Activity Goal: Patient/Family Education Outcome: Progressing Towards Goal 
  
Problem: General Medical Care Plan Goal: *Vital signs within specified parameters Outcome: Progressing Towards Goal 
Goal: *Labs within defined limits Outcome: Progressing Towards Goal 
Goal: *Absence of infection signs and symptoms Outcome: Progressing Towards Goal 
Goal: *Optimal pain control at patient's stated goal 
Outcome: Progressing Towards Goal 
Goal: *Skin integrity maintained Outcome: Progressing Towards Goal 
Goal: *Fluid volume balance Outcome: Progressing Towards Goal 
Goal: *Optimize nutritional status Outcome: Progressing Towards Goal 
Goal: *Anxiety reduced or absent Outcome: Progressing Towards Goal 
Goal: *Progressive mobility and function (eg: ADL's) Outcome: Progressing Towards Goal 
  
Problem: Patient Education: Go to Patient Education Activity Goal: Patient/Family Education Outcome: Progressing Towards Goal

## 2019-03-29 NOTE — ROUTINE PROCESS
Bedside shift change report given to April RN (oncoming nurse) by Maurilio Parish RN (offgoing nurse). Report included the following information SBAR, Procedure Summary, Intake/Output, MAR and Recent Results, NPO since midnight, pain medications, plans for procedures.

## 2019-03-29 NOTE — ANESTHESIA POSTPROCEDURE EVALUATION
Procedure(s): LAPAROSCOPY GENERAL DIAGNOSTIC/ LAPAROSCOPIC DRAINAGE OF PELVIC ABSCESS. general 
 
Anesthesia Post Evaluation Patient location during evaluation: PACU Patient participation: complete - patient participated Level of consciousness: awake and alert Pain management: adequate Airway patency: patent Anesthetic complications: no 
Cardiovascular status: acceptable Respiratory status: acceptable Hydration status: acceptable Comments: I have seen and evaluated the patient and is ready for discharge. Magno Dennis MD 
 
Post anesthesia nausea and vomiting:  none Vitals Value Taken Time /53 3/29/2019  6:30 PM  
Temp 36.4 °C (97.5 °F) 3/29/2019  5:51 PM  
Pulse 81 3/29/2019  6:32 PM  
Resp 24 3/29/2019  6:32 PM  
SpO2 100 % 3/29/2019  6:32 PM  
Vitals shown include unvalidated device data.

## 2019-03-29 NOTE — PROGRESS NOTES
Bedside shift change report given to Philippe Abbott RN (oncoming nurse) by Lary Ramos RN (offgoing nurse). Report included the following information SBAR, Intake/Output, MAR, Recent Results and Cardiac Rhythm NSR.

## 2019-03-29 NOTE — PROGRESS NOTES
Problem: Pressure Injury - Risk of 
Goal: *Prevention of pressure injury Description Document Moe Scale and appropriate interventions in the flowsheet. Outcome: Progressing Towards Goal 
  
Problem: Patient Education: Go to Patient Education Activity Goal: Patient/Family Education Outcome: Progressing Towards Goal 
  
Problem: Falls - Risk of 
Goal: *Absence of Falls Description Document Essie Lawson Fall Risk and appropriate interventions in the flowsheet. Outcome: Progressing Towards Goal 
  
Problem: Patient Education: Go to Patient Education Activity Goal: Patient/Family Education Outcome: Progressing Towards Goal 
  
Problem: General Medical Care Plan Goal: *Vital signs within specified parameters Outcome: Progressing Towards Goal 
Goal: *Labs within defined limits Outcome: Progressing Towards Goal 
Goal: *Absence of infection signs and symptoms Outcome: Progressing Towards Goal 
Goal: *Optimal pain control at patient's stated goal 
Outcome: Progressing Towards Goal 
Goal: *Skin integrity maintained Outcome: Progressing Towards Goal 
Goal: *Fluid volume balance Outcome: Progressing Towards Goal 
Goal: *Optimize nutritional status Outcome: Progressing Towards Goal 
Goal: *Anxiety reduced or absent Outcome: Progressing Towards Goal 
Goal: *Progressive mobility and function (eg: ADL's) Outcome: Progressing Towards Goal 
  
Problem: Patient Education: Go to Patient Education Activity Goal: Patient/Family Education Outcome: Progressing Towards Goal

## 2019-03-29 NOTE — BRIEF OP NOTE
BRIEF OPERATIVE NOTE Date of Procedure: 3/29/2019 Preoperative Diagnosis: Perforated Diverticulitis 
/ Pelvic Abscess Postoperative Diagnosis: Perforated Diverticulitis 
/ Pelvic Abscess Procedure(s): LAPAROSCOPY GENERAL DIAGNOSTIC/ LAPAROSCOPIC DRAINAGE OF PELVIC ABSCESS Surgeon(s) and Role: Evelio Santos MD - Primary Surgical Assistant: Angeline Dutton Surgical Staff: 
Circ-1: Misty Reynolds RN Scrub RN-1: Vesta Scheuermann, RN Surg Asst-1: Robyn Ron Event Time In Time Out Incision Start 03/29/2019 1635 Incision Close 03/29/2019 1724 Anesthesia: General  
Estimated Blood Loss: 50 cc Specimens:  
ID Type Source Tests Collected by Time Destination 1 : Pelvic abscess Body Fluid Pelvis ANAEROBIC/AEROBIC/GRAM Jey Marino MD 3/29/2019 1652 Microbiology Findings:  shunt in LLQ; pelvic abscess with mesosigmoid drained; no feculent material  
Complications: none Implants: * No implants in log *

## 2019-03-29 NOTE — ROUTINE PROCESS
TRANSFER - IN REPORT: 
 
Verbal report received from April RN(name) on Jack Cedrick  being received from [facility](unit) for ordered procedure Report consisted of patients Situation, Background, Assessment and  
Recommendations(SBAR). Information from the following report(s) SBAR, Kardex, ED Summary, Procedure Summary, Intake/Output, MAR, Recent Results and Cardiac Rhythm SR was reviewed with the receiving nurse. Opportunity for questions and clarification was provided. Assessment completed upon patients arrival to unit and care assumed.

## 2019-03-30 ENCOUNTER — APPOINTMENT (OUTPATIENT)
Dept: CT IMAGING | Age: 69
DRG: 329 | End: 2019-03-30
Attending: SPECIALIST
Payer: MEDICARE

## 2019-03-30 LAB
ANION GAP SERPL CALC-SCNC: 7 MMOL/L (ref 5–15)
BASOPHILS # BLD: 0 K/UL (ref 0–0.1)
BASOPHILS NFR BLD: 0 % (ref 0–1)
BUN SERPL-MCNC: 8 MG/DL (ref 6–20)
BUN/CREAT SERPL: 19 (ref 12–20)
CALCIUM SERPL-MCNC: 8.1 MG/DL (ref 8.5–10.1)
CHLORIDE SERPL-SCNC: 102 MMOL/L (ref 97–108)
CO2 SERPL-SCNC: 28 MMOL/L (ref 21–32)
CREAT SERPL-MCNC: 0.43 MG/DL (ref 0.55–1.02)
DIFFERENTIAL METHOD BLD: ABNORMAL
EOSINOPHIL # BLD: 0 K/UL (ref 0–0.4)
EOSINOPHIL NFR BLD: 0 % (ref 0–7)
ERYTHROCYTE [DISTWIDTH] IN BLOOD BY AUTOMATED COUNT: 12.3 % (ref 11.5–14.5)
GLUCOSE SERPL-MCNC: 99 MG/DL (ref 65–100)
HCT VFR BLD AUTO: 33 % (ref 35–47)
HGB BLD-MCNC: 10.8 G/DL (ref 11.5–16)
IMM GRANULOCYTES # BLD AUTO: 0.2 K/UL (ref 0–0.04)
IMM GRANULOCYTES NFR BLD AUTO: 1 % (ref 0–0.5)
LYMPHOCYTES # BLD: 1.9 K/UL (ref 0.8–3.5)
LYMPHOCYTES NFR BLD: 9 % (ref 12–49)
MCH RBC QN AUTO: 30.4 PG (ref 26–34)
MCHC RBC AUTO-ENTMCNC: 32.7 G/DL (ref 30–36.5)
MCV RBC AUTO: 93 FL (ref 80–99)
MONOCYTES # BLD: 0.9 K/UL (ref 0–1)
MONOCYTES NFR BLD: 5 % (ref 5–13)
NEUTS SEG # BLD: 17.5 K/UL (ref 1.8–8)
NEUTS SEG NFR BLD: 85 % (ref 32–75)
NRBC # BLD: 0 K/UL (ref 0–0.01)
NRBC BLD-RTO: 0 PER 100 WBC
PLATELET # BLD AUTO: 354 K/UL (ref 150–400)
PMV BLD AUTO: 10.1 FL (ref 8.9–12.9)
POTASSIUM SERPL-SCNC: 3.4 MMOL/L (ref 3.5–5.1)
RBC # BLD AUTO: 3.55 M/UL (ref 3.8–5.2)
SODIUM SERPL-SCNC: 137 MMOL/L (ref 136–145)
WBC # BLD AUTO: 20.5 K/UL (ref 3.6–11)

## 2019-03-30 PROCEDURE — 94760 N-INVAS EAR/PLS OXIMETRY 1: CPT

## 2019-03-30 PROCEDURE — 74011250637 HC RX REV CODE- 250/637: Performed by: SURGERY

## 2019-03-30 PROCEDURE — 85025 COMPLETE CBC W/AUTO DIFF WBC: CPT

## 2019-03-30 PROCEDURE — 74011000250 HC RX REV CODE- 250: Performed by: SURGERY

## 2019-03-30 PROCEDURE — C9113 INJ PANTOPRAZOLE SODIUM, VIA: HCPCS | Performed by: SURGERY

## 2019-03-30 PROCEDURE — 65660000000 HC RM CCU STEPDOWN

## 2019-03-30 PROCEDURE — 80048 BASIC METABOLIC PNL TOTAL CA: CPT

## 2019-03-30 PROCEDURE — 36415 COLL VENOUS BLD VENIPUNCTURE: CPT

## 2019-03-30 PROCEDURE — 77030027138 HC INCENT SPIROMETER -A

## 2019-03-30 PROCEDURE — 70450 CT HEAD/BRAIN W/O DYE: CPT

## 2019-03-30 PROCEDURE — 77010033678 HC OXYGEN DAILY

## 2019-03-30 PROCEDURE — 74011250636 HC RX REV CODE- 250/636: Performed by: SURGERY

## 2019-03-30 RX ORDER — SODIUM CHLORIDE 0.9 % (FLUSH) 0.9 %
5-40 SYRINGE (ML) INJECTION EVERY 8 HOURS
Status: DISCONTINUED | OUTPATIENT
Start: 2019-03-30 | End: 2019-04-26 | Stop reason: HOSPADM

## 2019-03-30 RX ORDER — SODIUM CHLORIDE 0.9 % (FLUSH) 0.9 %
5-40 SYRINGE (ML) INJECTION AS NEEDED
Status: DISCONTINUED | OUTPATIENT
Start: 2019-03-30 | End: 2019-04-26 | Stop reason: HOSPADM

## 2019-03-30 RX ORDER — HYDROCODONE BITARTRATE AND ACETAMINOPHEN 5; 325 MG/1; MG/1
1 TABLET ORAL
Status: DISCONTINUED | OUTPATIENT
Start: 2019-03-30 | End: 2019-04-08

## 2019-03-30 RX ADMIN — SERTRALINE 100 MG: 50 TABLET, FILM COATED ORAL at 09:31

## 2019-03-30 RX ADMIN — METRONIDAZOLE 500 MG: 500 INJECTION, SOLUTION INTRAVENOUS at 16:20

## 2019-03-30 RX ADMIN — Medication 10 ML: at 20:15

## 2019-03-30 RX ADMIN — LEVOFLOXACIN 750 MG: 5 INJECTION, SOLUTION INTRAVENOUS at 19:55

## 2019-03-30 RX ADMIN — HYDROMORPHONE HYDROCHLORIDE 1 MG: 1 INJECTION, SOLUTION INTRAMUSCULAR; INTRAVENOUS; SUBCUTANEOUS at 15:00

## 2019-03-30 RX ADMIN — ACETAMINOPHEN 650 MG: 325 TABLET ORAL at 03:11

## 2019-03-30 RX ADMIN — Medication 10 ML: at 03:10

## 2019-03-30 RX ADMIN — Medication 10 ML: at 13:47

## 2019-03-30 RX ADMIN — METRONIDAZOLE 500 MG: 500 INJECTION, SOLUTION INTRAVENOUS at 09:38

## 2019-03-30 RX ADMIN — Medication 10 ML: at 04:19

## 2019-03-30 RX ADMIN — SODIUM CHLORIDE, SODIUM LACTATE, POTASSIUM CHLORIDE, AND CALCIUM CHLORIDE 125 ML/HR: 600; 310; 30; 20 INJECTION, SOLUTION INTRAVENOUS at 13:21

## 2019-03-30 RX ADMIN — SODIUM CHLORIDE, SODIUM LACTATE, POTASSIUM CHLORIDE, AND CALCIUM CHLORIDE 125 ML/HR: 600; 310; 30; 20 INJECTION, SOLUTION INTRAVENOUS at 04:18

## 2019-03-30 RX ADMIN — HYDROMORPHONE HYDROCHLORIDE 1 MG: 1 INJECTION, SOLUTION INTRAMUSCULAR; INTRAVENOUS; SUBCUTANEOUS at 04:17

## 2019-03-30 RX ADMIN — HYDROMORPHONE HYDROCHLORIDE 1 MG: 1 INJECTION, SOLUTION INTRAMUSCULAR; INTRAVENOUS; SUBCUTANEOUS at 20:15

## 2019-03-30 RX ADMIN — METRONIDAZOLE 500 MG: 500 INJECTION, SOLUTION INTRAVENOUS at 23:59

## 2019-03-30 RX ADMIN — PANTOPRAZOLE SODIUM 40 MG: 40 INJECTION, POWDER, FOR SOLUTION INTRAVENOUS at 18:05

## 2019-03-30 RX ADMIN — HYDROCHLOROTHIAZIDE: 25 TABLET ORAL at 09:38

## 2019-03-30 NOTE — CONSULTS
[address]    Name:  Anh Pan  MR#:  [id number]  :  1950  ACCOUNT #:  [de-identified]  DATE OF SERVICE:  2019      REASON FOR CONSULTATION:   shunt. HISTORY OF PRESENT ILLNESS:  The patient is a 27-year-old female. She had an aneurysm rupture in  which was clipped by Dr. Sherri Valera. She ended up with a shunt after this. She never had a problem with the shunt since then. She had one CAT scan from  for dizziness that showed small ventricles. She came in for abdominal pain, was found to have a pelvic abscess and Dr. Emma Prado did a laparoscopic drainage of the abscess. I talked to him directly. She did not have a peritonitis and this was fairly well contained without a big irrigation system when there is a drain in now. The shunt is present in her abdominal cavity. She is not having other neurological symptoms or signs. Her abdomen feels much better now. She has not had any fevers in the last 24 hours. Her white count today is 20 down from 21, was called for evaluation of the shunt. PAST MEDICAL HISTORY:  Significant for,  1. Asthma. 2.  Hypertension. 3.  COPD. 4.  Depression. 5.  Diverticulitis. 6.  DVT. 7.  Tremor. 8.  GERD. 9.  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 10.  Hyperlipidemia. 11.  Thyroiditis. OUTPATIENT MEDICATIONS:  1.  Zoloft. 2.  Prilosec. 3.  Hyzaar. 4.  Lipitor. 5.  Desyrel. 6.  Bentyl. 7.  Ventolin. 8.  Iron. SOCIAL HISTORY:  Lives with her daughter. Daughter takes care of anything outside the house. She does her own ADLS. She is a current smoker. Does not drink. ALLERGIES:  ATIVAN, EGG, PENICILLIN, Glory Rasher. FAMILY HISTORY:  Noncontributory. REVIEW OF SYSTEMS:  No chest pain, no shortness of breath, no headache, no other neurologic signs or symptoms except as explained above.     PHYSICAL EXAMINATION:  GENERAL:  She is a well-developed, well-nourished female sitting in no acute distress. HEENT:  Her head is normocephalic, atraumatic. Her pupils are equal and reactive to light. Extraocular movements are intact. Face is symmetric. Tongue and uvula are in midline. EXTREMITIES:  Shoulder shrug is normal.  Her strength is 5/5 in upper and lower extremities. Sensation is grossly intact to light touch. Deep tendon reflexes are intact. NEUROLOGIC:  She is awake, alert, oriented x3. Her speech is fluent. Recent and remote memory appears impacted. Fund of knowledge appears impacted. I can feel the shunt along the right side of her neck over the clavicle and probably up into her upper chest.    IMPRESSION:  Ventriculoperitoneal shunt after aneurysmal rupture, subarachnoid hemorrhage, now with localized pelvic abscess. PLAN:  I discussed with Dr. Gayle Melgoza. Certainly, she did not have a widespread peritonitis. There are still chance of seeding her  shunt getting into her abdomen. I discussed the options with her family of externalized the  shunt, letting the abdomen calm down and replace it. The other option is to watch this and if she continues to show signs of infection, then externalize it. We discussed the risk of failure, continued infection even months down the road. We will have to watch this closely. If she needs any repeat surgery on her abdomen, I would certainly externalize the  shunt at that time. Otherwise, we will keep a close eye on this and make sure she is heading in the right direction. We will get a head CT now to see what sorts out with her brain as well.       Balwinder Ang MD      [id number]  D:  03/30/2019 10:15  T:  03/30/2019 11:43  JOB #:  [id number]

## 2019-03-30 NOTE — ROUTINE PROCESS
Bedside and Verbal shift change report given to Alex (oncoming nurse) by Julio Trevino (offgoing nurse). Report included the following information SBAR, Kardex, ED Summary, Intake/Output, MAR, Recent Results and Cardiac Rhythm NSR.

## 2019-03-30 NOTE — ROUTINE PROCESS
Bedside and Verbal shift change report given to Patricia Zhao (oncoming nurse) by Chacorta Mccabe (offgoing nurse). Report included the following information SBAR, Kardex, Procedure Summary, Intake/Output, Recent Results and Cardiac Rhythm NSR.

## 2019-03-30 NOTE — PROGRESS NOTES
Progress Note Patient: Lizzie Farah MRN: [id number]  SSN: [id number] YOB: 1950  Age: 79 y.o. Sex: female Admit Date: 3/26/2019 
 
1 Day Post-Op Procedure:  Procedure(s): LAPAROSCOPY GENERAL DIAGNOSTIC/ LAPAROSCOPIC DRAINAGE OF PELVIC ABSCESS Subjective:  
 
Patient complains of some pain. No nausea. Hungry. Passing some gas. Objective:  
 
Visit Vitals /69 (BP 1 Location: Right arm, BP Patient Position: At rest) Pulse 83 Temp 98.6 °F (37 °C) Resp 20 Wt 189 lb 2.5 oz (85.8 kg) SpO2 99% BMI 28.76 kg/m² Temp (24hrs), Av.1 °F (36.7 °C), Min:97.4 °F (36.3 °C), Max:98.6 °F (37 °C) Physical Exam:   
gen- Alert in NAd 
Lungs- CTA- 
H-RRR Abd-S/distended. Tender in the bilateral lower quadrants. Drain serous Data Review: images and reports reviewed Lab Review: All lab results for the last 24 hours reviewed. Recent Results (from the past 24 hour(s)) TYPE & SCREEN Collection Time: 19  4:00 PM  
Result Value Ref Range Crossmatch Expiration 2019 ABO/Rh(D) A POSITIVE Antibody screen POS Antibody ID ANTI-STEPHANIE   
 ANTIGEN TYPING STEPHANIE NEGATIVE, Unit number [id number] Blood component type  LR Unit division 00 Status of unit ALLOCATED ANTIGEN/ANTIBODY INFO STEPHANIE NEGATIVE, Crossmatch result Compatible CULTURE, WOUND W GRAM STAIN Collection Time: 19  4:52 PM  
Result Value Ref Range Special Requests: LOOKING FOR ANAEROBES   
 GRAM STAIN 1+ WBCS SEEN    
 GRAM STAIN 2+ GRAM POSITIVE COCCI IN CLUSTERS    
 GRAM STAIN MODERATE GRAM POSITIVE COCCI IN CHAINS    
 GRAM STAIN MODERATE GRAM NEGATIVE RODS Culture result: HEAVY GRAM NEGATIVE RODS (A) Culture result: (A) HEAVY PROBABLE STAPHYLOCOCCUS SPECIES, COAGULASE NEGATIVE  Culture result:     
  Specimen not collected anaerobically, recovery of anaerobes may be compromised. Anaerobic screening performed based on source. METABOLIC PANEL, BASIC Collection Time: 03/30/19  3:12 AM  
Result Value Ref Range Sodium 137 136 - 145 mmol/L Potassium 3.4 (L) 3.5 - 5.1 mmol/L Chloride 102 97 - 108 mmol/L  
 CO2 28 21 - 32 mmol/L Anion gap 7 5 - 15 mmol/L Glucose 99 65 - 100 mg/dL BUN 8 6 - 20 MG/DL Creatinine 0.43 (L) 0.55 - 1.02 MG/DL  
 BUN/Creatinine ratio 19 12 - 20 GFR est AA >60 >60 ml/min/1.73m2 GFR est non-AA >60 >60 ml/min/1.73m2 Calcium 8.1 (L) 8.5 - 10.1 MG/DL  
CBC WITH AUTOMATED DIFF Collection Time: 03/30/19  3:12 AM  
Result Value Ref Range WBC 20.5 (H) 3.6 - 11.0 K/uL  
 RBC 3.55 (L) 3.80 - 5.20 M/uL  
 HGB 10.8 (L) 11.5 - 16.0 g/dL HCT 33.0 (L) 35.0 - 47.0 % MCV 93.0 80.0 - 99.0 FL  
 MCH 30.4 26.0 - 34.0 PG  
 MCHC 32.7 30.0 - 36.5 g/dL  
 RDW 12.3 11.5 - 14.5 % PLATELET 018 839 - 117 K/uL MPV 10.1 8.9 - 12.9 FL  
 NRBC 0.0 0  WBC ABSOLUTE NRBC 0.00 0.00 - 0.01 K/uL NEUTROPHILS 85 (H) 32 - 75 % LYMPHOCYTES 9 (L) 12 - 49 % MONOCYTES 5 5 - 13 % EOSINOPHILS 0 0 - 7 % BASOPHILS 0 0 - 1 % IMMATURE GRANULOCYTES 1 (H) 0.0 - 0.5 % ABS. NEUTROPHILS 17.5 (H) 1.8 - 8.0 K/UL  
 ABS. LYMPHOCYTES 1.9 0.8 - 3.5 K/UL  
 ABS. MONOCYTES 0.9 0.0 - 1.0 K/UL  
 ABS. EOSINOPHILS 0.0 0.0 - 0.4 K/UL  
 ABS. BASOPHILS 0.0 0.0 - 0.1 K/UL  
 ABS. IMM. GRANS. 0.2 (H) 0.00 - 0.04 K/UL  
 DF AUTOMATED Assessment:  
 
Hospital Problems  Date Reviewed: 3/16/2019 Codes Class Noted POA Perforated diverticulum of large intestine ICD-10-CM: K57.20 ICD-9-CM: 562.10  3/26/2019 Unknown Plan/Recommendations/Medical Decision Making: Ok for sips of clears Appreciate NS input. Awaiting CT of the head. Conitnue IV abx 
D/c Jesu. Signed By: Aden Cardoso MD   
 March 30, 2019

## 2019-03-30 NOTE — PROGRESS NOTES
Neurosurgery Pt seen and examined, full consult to follow. Difficult case.  shunt in abdomen, with localized pelvic abscess, no peritonitis. Discussed options of externalizing the  shunt, letting the abdomen calm down, then replace. Option to watch and if continues to show signs of infection then externalize. Discussed that there is a risk of failure and continued infection,  Will have to watch closely. If she needs repeat surgery would certainly externalize the shunt at that time. Will follow

## 2019-03-30 NOTE — ROUTINE PROCESS
TRANSFER - IN REPORT: 
 
Verbal report received from Shyla Louis (name) on Ismael Freeman  being received from PACU (unit) for routine post - op Report consisted of patients Situation, Background, Assessment and  
Recommendations(SBAR). Information from the following report(s) SBAR, OR Summary, Intake/Output, MAR, Recent Results and Cardiac Rhythm NSR was reviewed with the receiving nurse. Opportunity for questions and clarification was provided. Assessment completed upon patients arrival to unit and care assumed.

## 2019-03-30 NOTE — ROUTINE PROCESS
Bedside shift change report given to Sherice Edward and John RN (oncoming nurse) by Wendy Hernandez (offgoing nurse). Report included the following information SBAR, OR Summary, Procedure Summary, Intake/Output, MAR and Recent Results.

## 2019-03-31 LAB
ANION GAP SERPL CALC-SCNC: 6 MMOL/L (ref 5–15)
BASOPHILS # BLD: 0 K/UL (ref 0–0.1)
BASOPHILS NFR BLD: 0 % (ref 0–1)
BUN SERPL-MCNC: 7 MG/DL (ref 6–20)
BUN/CREAT SERPL: 18 (ref 12–20)
CALCIUM SERPL-MCNC: 8 MG/DL (ref 8.5–10.1)
CHLORIDE SERPL-SCNC: 100 MMOL/L (ref 97–108)
CO2 SERPL-SCNC: 29 MMOL/L (ref 21–32)
CREAT SERPL-MCNC: 0.39 MG/DL (ref 0.55–1.02)
DIFFERENTIAL METHOD BLD: ABNORMAL
EOSINOPHIL # BLD: 0 K/UL (ref 0–0.4)
EOSINOPHIL NFR BLD: 0 % (ref 0–7)
ERYTHROCYTE [DISTWIDTH] IN BLOOD BY AUTOMATED COUNT: 12.3 % (ref 11.5–14.5)
GLUCOSE SERPL-MCNC: 95 MG/DL (ref 65–100)
HCT VFR BLD AUTO: 34.1 % (ref 35–47)
HGB BLD-MCNC: 11.1 G/DL (ref 11.5–16)
IMM GRANULOCYTES # BLD AUTO: 0.1 K/UL (ref 0–0.04)
IMM GRANULOCYTES NFR BLD AUTO: 1 % (ref 0–0.5)
LYMPHOCYTES # BLD: 2 K/UL (ref 0.8–3.5)
LYMPHOCYTES NFR BLD: 15 % (ref 12–49)
MAGNESIUM SERPL-MCNC: 1.9 MG/DL (ref 1.6–2.4)
MCH RBC QN AUTO: 30.7 PG (ref 26–34)
MCHC RBC AUTO-ENTMCNC: 32.6 G/DL (ref 30–36.5)
MCV RBC AUTO: 94.2 FL (ref 80–99)
MONOCYTES # BLD: 0.9 K/UL (ref 0–1)
MONOCYTES NFR BLD: 7 % (ref 5–13)
NEUTS SEG # BLD: 10.1 K/UL (ref 1.8–8)
NEUTS SEG NFR BLD: 77 % (ref 32–75)
NRBC # BLD: 0 K/UL (ref 0–0.01)
NRBC BLD-RTO: 0 PER 100 WBC
PHOSPHATE SERPL-MCNC: 1.7 MG/DL (ref 2.6–4.7)
PLATELET # BLD AUTO: 382 K/UL (ref 150–400)
PMV BLD AUTO: 9.7 FL (ref 8.9–12.9)
POTASSIUM SERPL-SCNC: 2.9 MMOL/L (ref 3.5–5.1)
RBC # BLD AUTO: 3.62 M/UL (ref 3.8–5.2)
SODIUM SERPL-SCNC: 135 MMOL/L (ref 136–145)
WBC # BLD AUTO: 13 K/UL (ref 3.6–11)

## 2019-03-31 PROCEDURE — 65660000000 HC RM CCU STEPDOWN

## 2019-03-31 PROCEDURE — 74011250637 HC RX REV CODE- 250/637: Performed by: SURGERY

## 2019-03-31 PROCEDURE — 74011000250 HC RX REV CODE- 250: Performed by: SURGERY

## 2019-03-31 PROCEDURE — 94760 N-INVAS EAR/PLS OXIMETRY 1: CPT

## 2019-03-31 PROCEDURE — 84100 ASSAY OF PHOSPHORUS: CPT

## 2019-03-31 PROCEDURE — 85025 COMPLETE CBC W/AUTO DIFF WBC: CPT

## 2019-03-31 PROCEDURE — 83735 ASSAY OF MAGNESIUM: CPT

## 2019-03-31 PROCEDURE — 77030038269 HC DRN EXT URIN PURWCK BARD -A

## 2019-03-31 PROCEDURE — 74011250636 HC RX REV CODE- 250/636: Performed by: SURGERY

## 2019-03-31 PROCEDURE — 74011000258 HC RX REV CODE- 258: Performed by: SURGERY

## 2019-03-31 PROCEDURE — 80048 BASIC METABOLIC PNL TOTAL CA: CPT

## 2019-03-31 PROCEDURE — 77010033678 HC OXYGEN DAILY

## 2019-03-31 PROCEDURE — 36415 COLL VENOUS BLD VENIPUNCTURE: CPT

## 2019-03-31 PROCEDURE — C9113 INJ PANTOPRAZOLE SODIUM, VIA: HCPCS | Performed by: SURGERY

## 2019-03-31 RX ORDER — VANCOMYCIN 1.75 GRAM/500 ML IN 0.9 % SODIUM CHLORIDE INTRAVENOUS
1750 ONCE
Status: COMPLETED | OUTPATIENT
Start: 2019-03-31 | End: 2019-03-31

## 2019-03-31 RX ORDER — POTASSIUM CHLORIDE 14.9 MG/ML
10 INJECTION INTRAVENOUS
Status: COMPLETED | OUTPATIENT
Start: 2019-03-31 | End: 2019-03-31

## 2019-03-31 RX ADMIN — SODIUM CHLORIDE, SODIUM LACTATE, POTASSIUM CHLORIDE, AND CALCIUM CHLORIDE 125 ML/HR: 600; 310; 30; 20 INJECTION, SOLUTION INTRAVENOUS at 08:51

## 2019-03-31 RX ADMIN — POTASSIUM CHLORIDE 10 MEQ: 200 INJECTION, SOLUTION INTRAVENOUS at 11:42

## 2019-03-31 RX ADMIN — HYDROMORPHONE HYDROCHLORIDE 1 MG: 1 INJECTION, SOLUTION INTRAMUSCULAR; INTRAVENOUS; SUBCUTANEOUS at 08:49

## 2019-03-31 RX ADMIN — CEFTRIAXONE SODIUM 2 G: 2 INJECTION, POWDER, FOR SOLUTION INTRAMUSCULAR; INTRAVENOUS at 15:34

## 2019-03-31 RX ADMIN — PANTOPRAZOLE SODIUM 40 MG: 40 INJECTION, POWDER, FOR SOLUTION INTRAVENOUS at 17:19

## 2019-03-31 RX ADMIN — HYDROCHLOROTHIAZIDE: 25 TABLET ORAL at 08:45

## 2019-03-31 RX ADMIN — VANCOMYCIN HYDROCHLORIDE 1750 MG: 10 INJECTION, POWDER, LYOPHILIZED, FOR SOLUTION INTRAVENOUS at 15:37

## 2019-03-31 RX ADMIN — POTASSIUM CHLORIDE 10 MEQ: 200 INJECTION, SOLUTION INTRAVENOUS at 07:03

## 2019-03-31 RX ADMIN — POTASSIUM CHLORIDE 10 MEQ: 200 INJECTION, SOLUTION INTRAVENOUS at 10:22

## 2019-03-31 RX ADMIN — HYDROMORPHONE HYDROCHLORIDE 1 MG: 1 INJECTION, SOLUTION INTRAMUSCULAR; INTRAVENOUS; SUBCUTANEOUS at 13:44

## 2019-03-31 RX ADMIN — HYDROMORPHONE HYDROCHLORIDE 1 MG: 1 INJECTION, SOLUTION INTRAMUSCULAR; INTRAVENOUS; SUBCUTANEOUS at 04:36

## 2019-03-31 RX ADMIN — Medication 10 ML: at 22:00

## 2019-03-31 RX ADMIN — POTASSIUM CHLORIDE 10 MEQ: 200 INJECTION, SOLUTION INTRAVENOUS at 08:56

## 2019-03-31 RX ADMIN — Medication 10 ML: at 15:34

## 2019-03-31 RX ADMIN — SERTRALINE 100 MG: 50 TABLET, FILM COATED ORAL at 08:45

## 2019-03-31 RX ADMIN — METRONIDAZOLE 500 MG: 500 INJECTION, SOLUTION INTRAVENOUS at 08:45

## 2019-03-31 RX ADMIN — SODIUM CHLORIDE: 900 INJECTION, SOLUTION INTRAVENOUS at 07:26

## 2019-03-31 RX ADMIN — Medication 10 ML: at 13:44

## 2019-03-31 RX ADMIN — Medication 10 ML: at 04:36

## 2019-03-31 RX ADMIN — HYDROCODONE BITARTRATE AND ACETAMINOPHEN 1 TABLET: 5; 325 TABLET ORAL at 19:11

## 2019-03-31 RX ADMIN — SODIUM CHLORIDE, SODIUM LACTATE, POTASSIUM CHLORIDE, AND CALCIUM CHLORIDE 125 ML/HR: 600; 310; 30; 20 INJECTION, SOLUTION INTRAVENOUS at 01:36

## 2019-03-31 RX ADMIN — METRONIDAZOLE 500 MG: 500 INJECTION, SOLUTION INTRAVENOUS at 17:19

## 2019-03-31 NOTE — ROUTINE PROCESS
1353:  All day tried to motivate pt out of bed to sit in chair or walk but pt reported bloating. Encourage to walk to help with bloating but she still does not want to get out of bed. Pt able to self turn in bed. Push deep breathing and incentive use. Sofia educated pt on IS usage.

## 2019-03-31 NOTE — PROGRESS NOTES
Bedside shift change report given to [address] (oncoming nurse) by Maurilio Parish RN (offgoing nurse). Report included the following information SBAR, OR Summary, Procedure Summary, Intake/Output, MAR and Recent Results, Lab results, potassium and and magnesium, CT results posted

## 2019-03-31 NOTE — PROGRESS NOTES
Pharmacist Note - Vancomycin Dosing Consult provided for this 79 y.o. female for indication of s/p lap I&D of pelvic abscess - Perforated diverticulum of large intestine Antibiotic regimen(s): Vancomycin, Flagyl, and ceftriaxone (replaces Levaquin) Recent Labs  
  19 
0424 19 
0056 19 
0410 WBC 13.0* 20.5* 21.2*  
CREA 0.39* 0.43* 0.45* BUN 7 8 5* Frequency of BMP: Daily Height: 172.7 cm Weight: 87 kg Est CrCl: >100 ml/min; UO: 0.6 ml/kg/hr Temp (24hrs), Av.3 °F (36.8 °C), Min:98.1 °F (36.7 °C), Max:98.5 °F (36.9 °C) Cultures: 
3/29 Wound, Pelvic abscess - Heavy E.coli (S to ceftriaxone, R to Levaquin), Heavy CoNS - pending 3/29 Fluid, Pelvic abscess - NGTD - pending Goal trough = 10 - 15 mcg/mL Therapy will be initiated with a loading dose of 1750 mg IV x 1 to be followed by a maintenance dose of 1000 mg IV every 12 hours. Pharmacy to follow patient daily and order levels / make dose adjustments as appropriate.

## 2019-03-31 NOTE — PROGRESS NOTES
Progress Note Patient: Claude Bowl MRN: [id number]  SSN: [id number] YOB: 1950  Age: 79 y.o. Sex: female Admit Date: 3/26/2019 2 Days Post-Op Procedure:  Procedure(s): LAPAROSCOPY GENERAL DIAGNOSTIC/ LAPAROSCOPIC DRAINAGE OF PELVIC ABSCESS Subjective:  
 
Patient denies nausea. She has been passing gas. Complains of some pain. Objective:  
 
Visit Vitals /70 (BP 1 Location: Left arm, BP Patient Position: At rest;Lying right side) Pulse 82 Temp 98.2 °F (36.8 °C) Resp 25 Wt 191 lb 12.8 oz (87 kg) SpO2 98% BMI 29.16 kg/m² Temp (24hrs), Av.3 °F (36.8 °C), Min:98.1 °F (36.7 °C), Max:98.5 °F (36.9 °C) Physical Exam:   
Gen- Alert in NAD Lungs- CTA H-RRR Abd-soft but distended. Much less tender in the LLQ than yesterday. TAMIKO serous no enteric content. Data Review: images and reports reviewed Lab Review: All lab results for the last 24 hours reviewed. Recent Results (from the past 24 hour(s)) CBC WITH AUTOMATED DIFF Collection Time: 19  4:24 AM  
Result Value Ref Range WBC 13.0 (H) 3.6 - 11.0 K/uL  
 RBC 3.62 (L) 3.80 - 5.20 M/uL  
 HGB 11.1 (L) 11.5 - 16.0 g/dL HCT 34.1 (L) 35.0 - 47.0 % MCV 94.2 80.0 - 99.0 FL  
 MCH 30.7 26.0 - 34.0 PG  
 MCHC 32.6 30.0 - 36.5 g/dL  
 RDW 12.3 11.5 - 14.5 % PLATELET 288 818 - 357 K/uL MPV 9.7 8.9 - 12.9 FL  
 NRBC 0.0 0  WBC ABSOLUTE NRBC 0.00 0.00 - 0.01 K/uL NEUTROPHILS 77 (H) 32 - 75 % LYMPHOCYTES 15 12 - 49 % MONOCYTES 7 5 - 13 % EOSINOPHILS 0 0 - 7 % BASOPHILS 0 0 - 1 % IMMATURE GRANULOCYTES 1 (H) 0.0 - 0.5 % ABS. NEUTROPHILS 10.1 (H) 1.8 - 8.0 K/UL  
 ABS. LYMPHOCYTES 2.0 0.8 - 3.5 K/UL  
 ABS. MONOCYTES 0.9 0.0 - 1.0 K/UL  
 ABS. EOSINOPHILS 0.0 0.0 - 0.4 K/UL  
 ABS. BASOPHILS 0.0 0.0 - 0.1 K/UL  
 ABS. IMM. GRANS. 0.1 (H) 0.00 - 0.04 K/UL  
 DF AUTOMATED METABOLIC PANEL, BASIC  Collection Time: 19  4:24 AM  
 Result Value Ref Range Sodium 135 (L) 136 - 145 mmol/L Potassium 2.9 (L) 3.5 - 5.1 mmol/L Chloride 100 97 - 108 mmol/L  
 CO2 29 21 - 32 mmol/L Anion gap 6 5 - 15 mmol/L Glucose 95 65 - 100 mg/dL BUN 7 6 - 20 MG/DL Creatinine 0.39 (L) 0.55 - 1.02 MG/DL  
 BUN/Creatinine ratio 18 12 - 20 GFR est AA >60 >60 ml/min/1.73m2 GFR est non-AA >60 >60 ml/min/1.73m2 Calcium 8.0 (L) 8.5 - 10.1 MG/DL MAGNESIUM Collection Time: 03/31/19  4:24 AM  
Result Value Ref Range Magnesium 1.9 1.6 - 2.4 mg/dL PHOSPHORUS Collection Time: 03/31/19  4:24 AM  
Result Value Ref Range Phosphorus 1.7 (L) 2.6 - 4.7 MG/DL Assessment:  
 
Hospital Problems  Date Reviewed: 3/16/2019 Codes Class Noted POA Perforated diverticulum of large intestine ICD-10-CM: K57.20 ICD-9-CM: 562.10  3/26/2019 Unknown Plan/Recommendations/Medical Decision Making: S/p Drainage of diverticulitis Doing well 
 advance diet to clears OOB and ambulate. Continue abx Signed By: Kasey Frazier MD   
 March 31, 2019

## 2019-03-31 NOTE — PROGRESS NOTES
03/31/19 1528 Vitals Temp 99.1 °F (37.3 °C) Temp Source Axillary Pulse (Heart Rate) 81 Resp Rate 30  
O2 Sat (%) 100 % Level of Consciousness Alert /90 MAP (Monitor) 119 MAP (Calculated) 123 BP 1 Location Left arm BP 1 Method Automatic MEWS Score 3 Post activity BP. PT tachypnic noted all day. Also pt c/o of bloating. Upper quandrants harder than yesterday. Remind pt to sip clears slowly but pt reports hungry. Dr. Lyle Morning aware of bloating.

## 2019-03-31 NOTE — PROGRESS NOTES
Neurosurgery Progress Note Date: 3/31/2019 Admit Date: 3/26/2019 LOS: 5 days Chief Complaint:  abdominal pain Interval History: Still with abdominal pain, is passing gas Subjective:  
 
Pain is  not changed, still generalized abdominal pain Objective:  
 
Patient Vitals for the past 8 hrs: 
 BP Temp Pulse Resp SpO2  
19 0944 156/81      
19 0820 193/75 98.1 °F (36.7 °C) 82 24 94 % 19 0728 (!) 174/92 98.2 °F (36.8 °C) 88 18 93 % 19 0404 174/68 98.2 °F (36.8 °C) 92 20 95 % Temp (24hrs), Av.3 °F (36.8 °C), Min:98.1 °F (36.7 °C), Max:98.6 °F (37 °C) 
 
 
 0701 -  1900 In: -  
Out: 210 [Urine:200; Drains:10] Physical Exam:   
General : NAD Follows commands readily. Awake, Alert, oriented x3   Speech fluent. Recent and remote memory intact. PERRL EOMI, face symmetric, tongue and uvula midline, shoulder shrug nl. No pronator drift. Motor 5/5, Sensation intact. Reflexes intact. Labs:   
Recent Results (from the past 24 hour(s)) CBC WITH AUTOMATED DIFF Collection Time: 19  4:24 AM  
Result Value Ref Range WBC 13.0 (H) 3.6 - 11.0 K/uL  
 RBC 3.62 (L) 3.80 - 5.20 M/uL  
 HGB 11.1 (L) 11.5 - 16.0 g/dL HCT 34.1 (L) 35.0 - 47.0 % MCV 94.2 80.0 - 99.0 FL  
 MCH 30.7 26.0 - 34.0 PG  
 MCHC 32.6 30.0 - 36.5 g/dL  
 RDW 12.3 11.5 - 14.5 % PLATELET 194 062 - 878 K/uL MPV 9.7 8.9 - 12.9 FL  
 NRBC 0.0 0  WBC ABSOLUTE NRBC 0.00 0.00 - 0.01 K/uL NEUTROPHILS 77 (H) 32 - 75 % LYMPHOCYTES 15 12 - 49 % MONOCYTES 7 5 - 13 % EOSINOPHILS 0 0 - 7 % BASOPHILS 0 0 - 1 % IMMATURE GRANULOCYTES 1 (H) 0.0 - 0.5 % ABS. NEUTROPHILS 10.1 (H) 1.8 - 8.0 K/UL  
 ABS. LYMPHOCYTES 2.0 0.8 - 3.5 K/UL  
 ABS. MONOCYTES 0.9 0.0 - 1.0 K/UL  
 ABS. EOSINOPHILS 0.0 0.0 - 0.4 K/UL  
 ABS. BASOPHILS 0.0 0.0 - 0.1 K/UL  
 ABS. IMM. GRANS. 0.1 (H) 0.00 - 0.04 K/UL  
 DF AUTOMATED METABOLIC PANEL, BASIC  
 Collection Time: 03/31/19  4:24 AM  
Result Value Ref Range Sodium 135 (L) 136 - 145 mmol/L Potassium 2.9 (L) 3.5 - 5.1 mmol/L Chloride 100 97 - 108 mmol/L  
 CO2 29 21 - 32 mmol/L Anion gap 6 5 - 15 mmol/L Glucose 95 65 - 100 mg/dL BUN 7 6 - 20 MG/DL Creatinine 0.39 (L) 0.55 - 1.02 MG/DL  
 BUN/Creatinine ratio 18 12 - 20 GFR est AA >60 >60 ml/min/1.73m2 GFR est non-AA >60 >60 ml/min/1.73m2 Calcium 8.0 (L) 8.5 - 10.1 MG/DL MAGNESIUM Collection Time: 03/31/19  4:24 AM  
Result Value Ref Range Magnesium 1.9 1.6 - 2.4 mg/dL PHOSPHORUS Collection Time: 03/31/19  4:24 AM  
Result Value Ref Range Phosphorus 1.7 (L) 2.6 - 4.7 MG/DL  
 
 
CT:  Unchanged head CT from 2011 Assessment:  
 
Active Problems: 
  Perforated diverticulum of large intestine (3/26/2019) Plan:  
 
Continue to watch abdomen If general surgery needs to go back in, will externalize shunt. Stephani Blanco MD 
35 minutes were spent with the patient, over half of which was face to face  counseling and coordination of care, discussing with nursing, obtaining history, examining patient and discussing treatment plans.

## 2019-03-31 NOTE — OP NOTES
[address]   OPERATIVE REPORT    Name:  Nadiya Herrera  MR#:  [id number]  :  1950  ACCOUNT #:  [de-identified]  DATE OF SERVICE:  2019      PREOPERATIVE DIAGNOSES:  Perforated diverticulitis with abscess. POSTOPERATIVE DIAGNOSES:  Perforated diverticulitis with abscess. PROCEDURE PERFORMED:  Diagnostic laparoscopy with laparoscopic drainage of pelvic abscess. ATTENDING SURGEON:  Demetrice Aguilar MD    ASSISTANT:  Roxi Canela SA    ANESTHESIA:  General endotracheal.    DRAIN:  19-mm Sam drain. COUNTS:  Sponge count correct. Needle count correct. SPECIMENS REMOVED:  Fluid from pelvic abscess for culture and sensitivities. ESTIMATED BLOOD LOSS:   50 mL. IMPLANTS:  None. COMPLICATIONS:  None. INDICATIONS:  The patient is a 53-year-old white female with history of multiple medical problems to include COPD, prior cerebral aneurysm managed through craniotomy with interval ventriculoperitoneal shunt, recently admitted with perforated diverticulitis. Bowel rest was initiated, along with intravenous antibiotics. Her symptoms of pain had worsened, with increasing white blood cell count to 21,000 this morning. CT scan performed yesterday revealed interval worsening of a pelvic collection, extending into the mesosigmoid. The collection is not amenable to image-guided drainage. She is taken to the operating room today for diagnostic laparoscopy. FINDINGS:  1. Ventriculoperitoneal shunt in the left lower quadrant. 2.  Pelvic abscess within the mesosigmoid drain. No feculent material identified. PROCEDURE:  The patient was identified as the correct patient in the preoperative holding area and informed consent was confirmed. After answering the patient's remaining questions, she was taken to the operating room and placed on the operating room table in the supine position. Sequential compression devices were placed on both lower extremities.   Following the uneventful initiation of general anesthesia, she was carefully secured to the operating room table with footboard and safety strap in place. All potential pressure points were padded with egg crate. Her arms were tucked to her side. Her abdomen was prepped and draped in the usual sterile fashion. Final time-out was performed, and it was confirmed she had received intravenous antibiotics. A 5-mm trocar was inserted through a small right upper quadrant skin incision using an OptiView technique. After confirming intraperitoneal location of the trocar tip, insufflation with carbon dioxide gas was initiated. Once adequate working sites have been developed, the 5-mm, 30-degree laparoscope was inserted. No signs of trocar injury were present. The ventriculoperitoneal shunt was identified, with an entry site into the abdominal space in the right abdomen, with the shunt coursing towards the left lower quadrant. Omental adhesions were identified in the pelvis. Three additional 5-mm trocars were inserted through small incisions using visual guidance with the laparoscope. The patient was placed in the steep Trendelenburg position. Omental adhesions were taken down from the intraabdominal wall in the pelvis along the left sidewall, freeing the omentum and allowing exposure of the underlying small bowel. Several loops of small bowel were mobilized into the upper abdomen, exposing the sigmoid colon, which was densely adherent to the sidewall of the pelvis and several loops of small bowel, which were densely adherent to the medial aspect of the sigmoid colon and the mesosigmoid. Careful blunt dissection was then used to mobilize loops of small bowel from the pelvis. This allowed entry into a large abscess within the mesosigmoid, and a sample of fluid was sent for culture and sensitivities. Approximately 80 mL of purulent material were evacuated.   The colon was then manually manipulated, and despite this maneuver, no feculent material was identified within the abscess cavity. The abscess cavity area was then irrigated with sterile saline. After evacuating this fluid, a 19-mm Sam drain was inserted into the abdominal space. This allowed to lie within the abscess cavity and pelvis and brought out through the right upper quadrant trocar site. It was secured to the skin with 2-0 nylon suture. After confirming adequate hemostasis, pneumoperitoneum was released and all trocars were removed. All wounds were infiltrated with 0.5% Marcaine without epinephrine. All skin edges were reapproximated with a combination of subcuticular 4-0 Monocryl suture. The patient tolerated the procedure well. She was extubated in the operating room and transported to the recovery area in stable condition. The attending surgeon, Dr. Cecilia Jc, was scrubbed and present for the entire procedure. The operative findings were discussed in detail with the patient's family. Given the ventriculoperitoneal shunt with an area that is potentially contaminated, Dr. Debby Albright from Neurosurgery was consulted for recommendations.         Mary Phillips MD      [id number]  D:  03/29/2019 19:59  T:  03/29/2019 20:02  JOB #:  [id number]

## 2019-04-01 ENCOUNTER — APPOINTMENT (OUTPATIENT)
Dept: CT IMAGING | Age: 69
DRG: 329 | End: 2019-04-01
Attending: NURSE PRACTITIONER
Payer: MEDICARE

## 2019-04-01 LAB
ABO + RH BLD: NORMAL
ANION GAP SERPL CALC-SCNC: 7 MMOL/L (ref 5–15)
ANTIGENS PRESENT BLD: NORMAL
ANTIGENS PRESENT RBC DONR: NORMAL
BACTERIA SPEC CULT: ABNORMAL
BASOPHILS # BLD: 0 K/UL (ref 0–0.1)
BASOPHILS NFR BLD: 0 % (ref 0–1)
BLD PROD TYP BPU: NORMAL
BLOOD GROUP ANTIBODIES SERPL: NORMAL
BLOOD GROUP ANTIBODIES SERPL: NORMAL
BPU ID: NORMAL
BUN SERPL-MCNC: 3 MG/DL (ref 6–20)
BUN/CREAT SERPL: 8 (ref 12–20)
CALCIUM SERPL-MCNC: 8.5 MG/DL (ref 8.5–10.1)
CHLORIDE SERPL-SCNC: 99 MMOL/L (ref 97–108)
CO2 SERPL-SCNC: 29 MMOL/L (ref 21–32)
CREAT SERPL-MCNC: 0.38 MG/DL (ref 0.55–1.02)
CROSSMATCH RESULT,%XM: NORMAL
DIFFERENTIAL METHOD BLD: ABNORMAL
EOSINOPHIL # BLD: 0.1 K/UL (ref 0–0.4)
EOSINOPHIL NFR BLD: 1 % (ref 0–7)
ERYTHROCYTE [DISTWIDTH] IN BLOOD BY AUTOMATED COUNT: 12.3 % (ref 11.5–14.5)
GLUCOSE SERPL-MCNC: 107 MG/DL (ref 65–100)
GRAM STN SPEC: ABNORMAL
HCT VFR BLD AUTO: 35.4 % (ref 35–47)
HGB BLD-MCNC: 11.9 G/DL (ref 11.5–16)
IMM GRANULOCYTES # BLD AUTO: 0.1 K/UL (ref 0–0.04)
IMM GRANULOCYTES NFR BLD AUTO: 1 % (ref 0–0.5)
LYMPHOCYTES # BLD: 1.8 K/UL (ref 0.8–3.5)
LYMPHOCYTES NFR BLD: 19 % (ref 12–49)
MAGNESIUM SERPL-MCNC: 1.8 MG/DL (ref 1.6–2.4)
MCH RBC QN AUTO: 30.7 PG (ref 26–34)
MCHC RBC AUTO-ENTMCNC: 33.6 G/DL (ref 30–36.5)
MCV RBC AUTO: 91.2 FL (ref 80–99)
MONOCYTES # BLD: 0.9 K/UL (ref 0–1)
MONOCYTES NFR BLD: 10 % (ref 5–13)
NEUTS SEG # BLD: 6.5 K/UL (ref 1.8–8)
NEUTS SEG NFR BLD: 69 % (ref 32–75)
NRBC # BLD: 0 K/UL (ref 0–0.01)
NRBC BLD-RTO: 0 PER 100 WBC
PHOSPHATE SERPL-MCNC: 2.2 MG/DL (ref 2.6–4.7)
PLATELET # BLD AUTO: 385 K/UL (ref 150–400)
PMV BLD AUTO: 9.6 FL (ref 8.9–12.9)
POTASSIUM SERPL-SCNC: 3 MMOL/L (ref 3.5–5.1)
RBC # BLD AUTO: 3.88 M/UL (ref 3.8–5.2)
SERVICE CMNT-IMP: ABNORMAL
SODIUM SERPL-SCNC: 135 MMOL/L (ref 136–145)
SPECIMEN EXP DATE BLD: NORMAL
STATUS OF UNIT,%ST: NORMAL
UNIT DIVISION, %UDIV: 0
WBC # BLD AUTO: 9.3 K/UL (ref 3.6–11)

## 2019-04-01 PROCEDURE — 74011250636 HC RX REV CODE- 250/636: Performed by: INTERNAL MEDICINE

## 2019-04-01 PROCEDURE — 80048 BASIC METABOLIC PNL TOTAL CA: CPT

## 2019-04-01 PROCEDURE — 83735 ASSAY OF MAGNESIUM: CPT

## 2019-04-01 PROCEDURE — 74011250637 HC RX REV CODE- 250/637: Performed by: SURGERY

## 2019-04-01 PROCEDURE — 65660000000 HC RM CCU STEPDOWN

## 2019-04-01 PROCEDURE — 74011000250 HC RX REV CODE- 250: Performed by: SURGERY

## 2019-04-01 PROCEDURE — 84100 ASSAY OF PHOSPHORUS: CPT

## 2019-04-01 PROCEDURE — 74011250636 HC RX REV CODE- 250/636: Performed by: NEUROLOGICAL SURGERY

## 2019-04-01 PROCEDURE — 70450 CT HEAD/BRAIN W/O DYE: CPT

## 2019-04-01 PROCEDURE — C9113 INJ PANTOPRAZOLE SODIUM, VIA: HCPCS | Performed by: SURGERY

## 2019-04-01 PROCEDURE — 74011000258 HC RX REV CODE- 258: Performed by: SURGERY

## 2019-04-01 PROCEDURE — 36415 COLL VENOUS BLD VENIPUNCTURE: CPT

## 2019-04-01 PROCEDURE — 74011250636 HC RX REV CODE- 250/636: Performed by: SURGERY

## 2019-04-01 PROCEDURE — 85025 COMPLETE CBC W/AUTO DIFF WBC: CPT

## 2019-04-01 PROCEDURE — 74011250637 HC RX REV CODE- 250/637: Performed by: NURSE PRACTITIONER

## 2019-04-01 RX ORDER — HYDRALAZINE HYDROCHLORIDE 20 MG/ML
10 INJECTION INTRAMUSCULAR; INTRAVENOUS
Status: DISCONTINUED | OUTPATIENT
Start: 2019-04-01 | End: 2019-04-16

## 2019-04-01 RX ORDER — HYDRALAZINE HYDROCHLORIDE 20 MG/ML
10 INJECTION INTRAMUSCULAR; INTRAVENOUS ONCE
Status: COMPLETED | OUTPATIENT
Start: 2019-04-01 | End: 2019-04-01

## 2019-04-01 RX ORDER — HYDRALAZINE HYDROCHLORIDE 20 MG/ML
20 INJECTION INTRAMUSCULAR; INTRAVENOUS ONCE
Status: DISCONTINUED | OUTPATIENT
Start: 2019-04-01 | End: 2019-04-01

## 2019-04-01 RX ORDER — LABETALOL HYDROCHLORIDE 5 MG/ML
10 INJECTION, SOLUTION INTRAVENOUS
Status: DISCONTINUED | OUTPATIENT
Start: 2019-04-01 | End: 2019-04-02

## 2019-04-01 RX ORDER — POTASSIUM CHLORIDE 750 MG/1
40 TABLET, FILM COATED, EXTENDED RELEASE ORAL
Status: COMPLETED | OUTPATIENT
Start: 2019-04-01 | End: 2019-04-01

## 2019-04-01 RX ADMIN — HYDROCHLOROTHIAZIDE: 25 TABLET ORAL at 08:28

## 2019-04-01 RX ADMIN — VANCOMYCIN HYDROCHLORIDE 1000 MG: 1 INJECTION, POWDER, LYOPHILIZED, FOR SOLUTION INTRAVENOUS at 15:48

## 2019-04-01 RX ADMIN — VANCOMYCIN HYDROCHLORIDE 1000 MG: 1 INJECTION, POWDER, LYOPHILIZED, FOR SOLUTION INTRAVENOUS at 03:30

## 2019-04-01 RX ADMIN — CEFTRIAXONE SODIUM 2 G: 2 INJECTION, POWDER, FOR SOLUTION INTRAMUSCULAR; INTRAVENOUS at 15:23

## 2019-04-01 RX ADMIN — Medication 10 ML: at 06:00

## 2019-04-01 RX ADMIN — METRONIDAZOLE 500 MG: 500 INJECTION, SOLUTION INTRAVENOUS at 15:25

## 2019-04-01 RX ADMIN — HYDROMORPHONE HYDROCHLORIDE 1 MG: 1 INJECTION, SOLUTION INTRAMUSCULAR; INTRAVENOUS; SUBCUTANEOUS at 11:57

## 2019-04-01 RX ADMIN — HYDROCODONE BITARTRATE AND ACETAMINOPHEN 1 TABLET: 5; 325 TABLET ORAL at 04:19

## 2019-04-01 RX ADMIN — SODIUM CHLORIDE, SODIUM LACTATE, POTASSIUM CHLORIDE, AND CALCIUM CHLORIDE 125 ML/HR: 600; 310; 30; 20 INJECTION, SOLUTION INTRAVENOUS at 08:24

## 2019-04-01 RX ADMIN — HYDROCODONE BITARTRATE AND ACETAMINOPHEN 1 TABLET: 5; 325 TABLET ORAL at 15:25

## 2019-04-01 RX ADMIN — POTASSIUM CHLORIDE 40 MEQ: 750 TABLET, EXTENDED RELEASE ORAL at 11:57

## 2019-04-01 RX ADMIN — PANTOPRAZOLE SODIUM 40 MG: 40 INJECTION, POWDER, FOR SOLUTION INTRAVENOUS at 18:38

## 2019-04-01 RX ADMIN — HYDROCODONE BITARTRATE AND ACETAMINOPHEN 1 TABLET: 5; 325 TABLET ORAL at 21:27

## 2019-04-01 RX ADMIN — SERTRALINE 100 MG: 50 TABLET, FILM COATED ORAL at 08:28

## 2019-04-01 RX ADMIN — METRONIDAZOLE 500 MG: 500 INJECTION, SOLUTION INTRAVENOUS at 08:26

## 2019-04-01 RX ADMIN — HYDROMORPHONE HYDROCHLORIDE 1 MG: 1 INJECTION, SOLUTION INTRAMUSCULAR; INTRAVENOUS; SUBCUTANEOUS at 15:25

## 2019-04-01 RX ADMIN — METRONIDAZOLE 500 MG: 500 INJECTION, SOLUTION INTRAVENOUS at 00:00

## 2019-04-01 RX ADMIN — HYDROMORPHONE HYDROCHLORIDE 1 MG: 1 INJECTION, SOLUTION INTRAMUSCULAR; INTRAVENOUS; SUBCUTANEOUS at 06:24

## 2019-04-01 RX ADMIN — HYDRALAZINE HYDROCHLORIDE 10 MG: 20 INJECTION INTRAMUSCULAR; INTRAVENOUS at 05:07

## 2019-04-01 RX ADMIN — Medication 10 ML: at 22:00

## 2019-04-01 RX ADMIN — SODIUM CHLORIDE, SODIUM LACTATE, POTASSIUM CHLORIDE, AND CALCIUM CHLORIDE 125 ML/HR: 600; 310; 30; 20 INJECTION, SOLUTION INTRAVENOUS at 18:34

## 2019-04-01 RX ADMIN — HYDRALAZINE HYDROCHLORIDE 10 MG: 20 INJECTION INTRAMUSCULAR; INTRAVENOUS at 15:47

## 2019-04-01 NOTE — PROGRESS NOTES
Call placed spoke with Dr. Smith Castillo about pt's BP, orders received. Will give IV Hydralazine 10mg x1 and monitor. No further orders as pt has 0900 routine BP meds.

## 2019-04-01 NOTE — ROUTINE PROCESS
Bedside and Verbal shift change report given to [employer] (oncoming nurse) by Ingris Burris (offgoing nurse). Report included the following information SBAR, ED Summary, Intake/Output, MAR, Recent Results and Cardiac Rhythm NSR.

## 2019-04-01 NOTE — PROGRESS NOTES
Mild h/a today but has had marked hypertension. CT  Head is stable. I added prn hydralazine and labetalol. Awake, alert. Nonfocal. 
Will follow

## 2019-04-01 NOTE — ROUTINE PROCESS
Bedside and Verbal shift change report given to ariela Hernandez (oncoming nurse) by Otto Lilly (offgoing nurse). Report included the following information SBAR, Kardex, OR Summary, Procedure Summary, Intake/Output, MAR, Recent Results and Cardiac Rhythm SA/NSR.

## 2019-04-01 NOTE — PROGRESS NOTES
Daily Progress Note [id number] [facility] at [address] [place] [address] Admit Date: 3/26/2019 Post-Operative Day: 3 from Procedure(s): LAPAROSCOPY GENERAL DIAGNOSTIC/ LAPAROSCOPIC DRAINAGE OF PELVIC ABSCESS Subjective:  
 
Last 24 hrs: Just returned from head CT. Has persistent pain in RLQ. WBC 9.3, improved, Tmax 99.1, on Rocephine, Flagyl, and Vancomycin. + flatus and loose stool. Doing OK with clears. Objective:  
 
Blood pressure (!) 170/93, pulse 82, temperature 98.3 °F (36.8 °C), resp. rate 20, height 5' 8\" (1.727 m), weight 87 kg (191 lb 12.8 oz), SpO2 95 %. Temp (24hrs), Av.6 °F (37 °C), Min:98.2 °F (36.8 °C), Max:99.1 °F (37.3 °C) 
 
 
_____________________ Physical Exam:    
Alert and Oriented, sitting up in bed, no acute distress. Cardiovascular: RRR, no peripheral edema Lungs:CTAB Abdomen: + BS, soft, appropriately tender. Laparoscopy sites healing well. Assessment:  
Active Problems: 
  Perforated diverticulum of large intestine (3/26/2019) 
 
s/p drainage of pelvic abscess Plan:  
 
Doing well from abdominal surgical perspective Can advance to full liquids if OK with all involved services Continue abx Labs in am 
[facility] [address] [facility] at [address] [place] [address], [address] [place], [place] 
[phone] Data Review: 
 
Recent Labs 19 
1438 19 
0424 19 
9036 WBC 9.3 13.0* 20.5* HGB 11.9 11.1* 10.8* HCT 35.4 34.1* 33.0*  
 382 354 Recent Labs 19 
7990 19 
0424 19 
2960 * 135* 137  
K 3.0* 2.9* 3.4*  
CL 99 100 102 CO2 29 29 28 * 95 99 BUN 3* 7 8 CREA 0.38* 0.39* 0.43* CA 8.5 8.0* 8.1*  
MG 1.8 1.9  --   
PHOS 2.2* 1.7*  -- No results for input(s): AML, LPSE in the last 72 hours. ______________________ Medications: 
 
Current Facility-Administered Medications Medication Dose Route Frequency  Vancomycin - Pharmacy to Dose   Other Rx Dosing/Monitoring  vancomycin (VANCOCIN) 1,000 mg in 0.9% sodium chloride (MBP/ADV) 250 mL  1,000 mg IntraVENous Q12H  cefTRIAXone (ROCEPHIN) 2 g in 0.9% sodium chloride (MBP/ADV) 50 mL  2 g IntraVENous Q24H  
 sodium chloride (NS) flush 5-40 mL  5-40 mL IntraVENous Q8H  
 sodium chloride (NS) flush 5-40 mL  5-40 mL IntraVENous PRN  
 HYDROcodone-acetaminophen (NORCO) 5-325 mg per tablet 1 Tab  1 Tab Oral Q4H PRN  
 0.9% sodium chloride infusion 250 mL  250 mL IntraVENous PRN  
 albuterol-ipratropium (DUO-NEB) 2.5 MG-0.5 MG/3 ML  3 mL Nebulization PRN  
 lactated Ringers infusion  125 mL/hr IntraVENous CONTINUOUS  
 ondansetron (ZOFRAN) injection 4 mg  4 mg IntraVENous Q4H PRN  
 acetaminophen (TYLENOL) tablet 650 mg  650 mg Oral Q6H PRN  
 HYDROmorphone (PF) (DILAUDID) injection 1 mg  1 mg IntraVENous Q3H PRN  pantoprazole (PROTONIX) 40 mg in sodium chloride 0.9% 10 mL injection  40 mg IntraVENous Q24H  
 albuterol (PROVENTIL VENTOLIN) nebulizer solution 2.5 mg  2.5 mg Nebulization Q6H PRN  
 sertraline (ZOLOFT) tablet 100 mg  100 mg Oral DAILY  traZODone (DESYREL) tablet 50 mg  50 mg Oral QHS PRN  
 losartan/hydroCHLOROthiazide (HYZAAR) 50/12.5 mg   Oral DAILY  metroNIDAZOLE (FLAGYL) IVPB premix 500 mg  500 mg IntraVENous Q8H I have independently examined the patient and have reviewed the chart. I agree with the above plan. Doing well, tolerating fulls well. OK for regular diet per primary team.  I have ordered a CT abd for tomorrow to see if the collection is fully drained and then may remove the TAMIKO drain. Improving well.  
 
Rashad Honeycutt MD

## 2019-04-02 ENCOUNTER — APPOINTMENT (OUTPATIENT)
Dept: NON INVASIVE DIAGNOSTICS | Age: 69
DRG: 329 | End: 2019-04-02
Attending: NEUROMUSCULOSKELETAL MEDICINE & OMM
Payer: MEDICARE

## 2019-04-02 ENCOUNTER — APPOINTMENT (OUTPATIENT)
Dept: CT IMAGING | Age: 69
DRG: 329 | End: 2019-04-02
Attending: SURGERY
Payer: MEDICARE

## 2019-04-02 LAB
ANION GAP SERPL CALC-SCNC: 8 MMOL/L (ref 5–15)
BASOPHILS # BLD: 0 K/UL (ref 0–0.1)
BASOPHILS NFR BLD: 0 % (ref 0–1)
BUN SERPL-MCNC: 3 MG/DL (ref 6–20)
BUN/CREAT SERPL: 7 (ref 12–20)
CALCIUM SERPL-MCNC: 8.5 MG/DL (ref 8.5–10.1)
CHLORIDE SERPL-SCNC: 100 MMOL/L (ref 97–108)
CO2 SERPL-SCNC: 28 MMOL/L (ref 21–32)
CREAT SERPL-MCNC: 0.46 MG/DL (ref 0.55–1.02)
DATE LAST DOSE: NORMAL
DIFFERENTIAL METHOD BLD: ABNORMAL
ECHO AO ROOT DIAM: 2.71 CM
ECHO LA AREA 4C: 16.3 CM2
ECHO LA MAJOR AXIS: 4.3 CM
ECHO LA TO AORTIC ROOT RATIO: 1.58
ECHO LA VOL 2C: 55.3 ML (ref 22–52)
ECHO LA VOL 4C: 47.37 ML (ref 22–52)
ECHO LA VOL BP: 60.65 ML (ref 22–52)
ECHO LA VOL/BSA BIPLANE: 30.67 ML/M2 (ref 16–28)
ECHO LA VOLUME INDEX A2C: 27.97 ML/M2 (ref 16–28)
ECHO LA VOLUME INDEX A4C: 23.96 ML/M2 (ref 16–28)
ECHO LV E' LATERAL VELOCITY: 5.19 CM/S
ECHO LV E' SEPTAL VELOCITY: 4.1 CM/S
ECHO LV INTERNAL DIMENSION DIASTOLIC: 4.46 CM (ref 3.9–5.3)
ECHO LV INTERNAL DIMENSION SYSTOLIC: 3 CM
ECHO LV IVSD: 1.1 CM (ref 0.6–0.9)
ECHO LV MASS 2D: 197 G (ref 67–162)
ECHO LV MASS INDEX 2D: 99.6 G/M2 (ref 43–95)
ECHO LV POSTERIOR WALL DIASTOLIC: 1.07 CM (ref 0.6–0.9)
ECHO MV A VELOCITY: 113.82 CM/S
ECHO MV AREA PHT: 3.8 CM2
ECHO MV AREA PISA: 0.1 CM2
ECHO MV E DECELERATION TIME (DT): 200.1 MS
ECHO MV E VELOCITY: 65.92 CM/S
ECHO MV E/A RATIO: 0.58
ECHO MV E/E' LATERAL: 12.7
ECHO MV E/E' RATIO (AVERAGED): 14.39
ECHO MV E/E' SEPTAL: 16.08
ECHO MV EROA PISA: 0.1 CM2
ECHO MV PRESSURE HALF TIME (PHT): 58 MS
ECHO MV REGURGITANT PEAK GRADIENT: 129.6 MMHG
ECHO MV REGURGITANT PEAK VELOCITY: 569.31 CM/S
ECHO MV REGURGITANT RADIUS PISA: 0.43 CM
ECHO MV REGURGITANT VOLUME: 15.18 CC
ECHO MV REGURGITANT VTIA: 197.2 CM
ECHO PULMONARY ARTERY SYSTOLIC PRESSURE (PASP): 35 MMHG
ECHO TV REGURGITANT MAX VELOCITY: 285.07 CM/S
ECHO TV REGURGITANT PEAK GRADIENT: 32.5 MMHG
EOSINOPHIL # BLD: 0.1 K/UL (ref 0–0.4)
EOSINOPHIL NFR BLD: 1 % (ref 0–7)
ERYTHROCYTE [DISTWIDTH] IN BLOOD BY AUTOMATED COUNT: 12.3 % (ref 11.5–14.5)
GLUCOSE SERPL-MCNC: 111 MG/DL (ref 65–100)
HCT VFR BLD AUTO: 36.1 % (ref 35–47)
HGB BLD-MCNC: 11.7 G/DL (ref 11.5–16)
IMM GRANULOCYTES # BLD AUTO: 0.1 K/UL (ref 0–0.04)
IMM GRANULOCYTES NFR BLD AUTO: 1 % (ref 0–0.5)
LYMPHOCYTES # BLD: 1.8 K/UL (ref 0.8–3.5)
LYMPHOCYTES NFR BLD: 17 % (ref 12–49)
MAGNESIUM SERPL-MCNC: 1.7 MG/DL (ref 1.6–2.4)
MCH RBC QN AUTO: 29.5 PG (ref 26–34)
MCHC RBC AUTO-ENTMCNC: 32.4 G/DL (ref 30–36.5)
MCV RBC AUTO: 91.2 FL (ref 80–99)
MONOCYTES # BLD: 1.1 K/UL (ref 0–1)
MONOCYTES NFR BLD: 10 % (ref 5–13)
NEUTS SEG # BLD: 7.3 K/UL (ref 1.8–8)
NEUTS SEG NFR BLD: 71 % (ref 32–75)
NRBC # BLD: 0 K/UL (ref 0–0.01)
NRBC BLD-RTO: 0 PER 100 WBC
PHOSPHATE SERPL-MCNC: 2.9 MG/DL (ref 2.6–4.7)
PLATELET # BLD AUTO: 393 K/UL (ref 150–400)
PMV BLD AUTO: 9.8 FL (ref 8.9–12.9)
POTASSIUM SERPL-SCNC: 2.9 MMOL/L (ref 3.5–5.1)
RBC # BLD AUTO: 3.96 M/UL (ref 3.8–5.2)
REPORTED DOSE,DOSE: NORMAL UNITS
REPORTED DOSE/TIME,TMG: NORMAL
SODIUM SERPL-SCNC: 136 MMOL/L (ref 136–145)
TROPONIN I SERPL-MCNC: <0.05 NG/ML
VANCOMYCIN TROUGH SERPL-MCNC: 6.8 UG/ML (ref 5–10)
WBC # BLD AUTO: 10.3 K/UL (ref 3.6–11)

## 2019-04-02 PROCEDURE — 84484 ASSAY OF TROPONIN QUANT: CPT

## 2019-04-02 PROCEDURE — 74011250637 HC RX REV CODE- 250/637: Performed by: SURGERY

## 2019-04-02 PROCEDURE — 74011250636 HC RX REV CODE- 250/636: Performed by: SURGERY

## 2019-04-02 PROCEDURE — 74177 CT ABD & PELVIS W/CONTRAST: CPT

## 2019-04-02 PROCEDURE — 85025 COMPLETE CBC W/AUTO DIFF WBC: CPT

## 2019-04-02 PROCEDURE — 84100 ASSAY OF PHOSPHORUS: CPT

## 2019-04-02 PROCEDURE — 80202 ASSAY OF VANCOMYCIN: CPT

## 2019-04-02 PROCEDURE — 74011000258 HC RX REV CODE- 258: Performed by: SURGERY

## 2019-04-02 PROCEDURE — 74011636320 HC RX REV CODE- 636/320: Performed by: RADIOLOGY

## 2019-04-02 PROCEDURE — 74011250636 HC RX REV CODE- 250/636: Performed by: NEUROLOGICAL SURGERY

## 2019-04-02 PROCEDURE — 74011000250 HC RX REV CODE- 250: Performed by: SURGERY

## 2019-04-02 PROCEDURE — 80048 BASIC METABOLIC PNL TOTAL CA: CPT

## 2019-04-02 PROCEDURE — 65660000000 HC RM CCU STEPDOWN

## 2019-04-02 PROCEDURE — C9113 INJ PANTOPRAZOLE SODIUM, VIA: HCPCS | Performed by: SURGERY

## 2019-04-02 PROCEDURE — 77030038269 HC DRN EXT URIN PURWCK BARD -A

## 2019-04-02 PROCEDURE — 93306 TTE W/DOPPLER COMPLETE: CPT

## 2019-04-02 PROCEDURE — 74011250636 HC RX REV CODE- 250/636: Performed by: NEUROMUSCULOSKELETAL MEDICINE & OMM

## 2019-04-02 PROCEDURE — 74011250637 HC RX REV CODE- 250/637: Performed by: NEUROMUSCULOSKELETAL MEDICINE & OMM

## 2019-04-02 PROCEDURE — 36415 COLL VENOUS BLD VENIPUNCTURE: CPT

## 2019-04-02 PROCEDURE — 83735 ASSAY OF MAGNESIUM: CPT

## 2019-04-02 PROCEDURE — 74011000258 HC RX REV CODE- 258: Performed by: RADIOLOGY

## 2019-04-02 RX ORDER — POTASSIUM CHLORIDE 7.45 MG/ML
10 INJECTION INTRAVENOUS
Status: COMPLETED | OUTPATIENT
Start: 2019-04-02 | End: 2019-04-02

## 2019-04-02 RX ORDER — SODIUM CHLORIDE 0.9 % (FLUSH) 0.9 %
10 SYRINGE (ML) INJECTION
Status: COMPLETED | OUTPATIENT
Start: 2019-04-02 | End: 2019-04-02

## 2019-04-02 RX ORDER — LABETALOL HCL 20 MG/4 ML
20 SYRINGE (ML) INTRAVENOUS
Status: DISCONTINUED | OUTPATIENT
Start: 2019-04-02 | End: 2019-04-16

## 2019-04-02 RX ORDER — METRONIDAZOLE 250 MG/1
500 TABLET ORAL EVERY 8 HOURS
Status: DISCONTINUED | OUTPATIENT
Start: 2019-04-02 | End: 2019-04-08

## 2019-04-02 RX ORDER — CLONIDINE 0.2 MG/24H
1 PATCH, EXTENDED RELEASE TRANSDERMAL
Status: DISCONTINUED | OUTPATIENT
Start: 2019-04-02 | End: 2019-04-26 | Stop reason: HOSPADM

## 2019-04-02 RX ORDER — HYDROMORPHONE HYDROCHLORIDE 1 MG/ML
2 INJECTION, SOLUTION INTRAMUSCULAR; INTRAVENOUS; SUBCUTANEOUS
Status: DISCONTINUED | OUTPATIENT
Start: 2019-04-02 | End: 2019-04-08

## 2019-04-02 RX ADMIN — SODIUM CHLORIDE 100 ML: 900 INJECTION, SOLUTION INTRAVENOUS at 12:28

## 2019-04-02 RX ADMIN — VANCOMYCIN HYDROCHLORIDE 1000 MG: 1 INJECTION, POWDER, LYOPHILIZED, FOR SOLUTION INTRAVENOUS at 05:00

## 2019-04-02 RX ADMIN — Medication 10 ML: at 20:27

## 2019-04-02 RX ADMIN — Medication 10 ML: at 12:28

## 2019-04-02 RX ADMIN — HYDROCODONE BITARTRATE AND ACETAMINOPHEN 1 TABLET: 5; 325 TABLET ORAL at 06:03

## 2019-04-02 RX ADMIN — SODIUM CHLORIDE, SODIUM LACTATE, POTASSIUM CHLORIDE, AND CALCIUM CHLORIDE 125 ML/HR: 600; 310; 30; 20 INJECTION, SOLUTION INTRAVENOUS at 09:16

## 2019-04-02 RX ADMIN — HYDROMORPHONE HYDROCHLORIDE 2 MG: 1 INJECTION, SOLUTION INTRAMUSCULAR; INTRAVENOUS; SUBCUTANEOUS at 11:35

## 2019-04-02 RX ADMIN — Medication 10 ML: at 06:00

## 2019-04-02 RX ADMIN — POTASSIUM CHLORIDE 10 MEQ: 10 INJECTION, SOLUTION INTRAVENOUS at 10:51

## 2019-04-02 RX ADMIN — HYDROCHLOROTHIAZIDE: 25 TABLET ORAL at 09:16

## 2019-04-02 RX ADMIN — TRAZODONE HYDROCHLORIDE 50 MG: 50 TABLET ORAL at 20:38

## 2019-04-02 RX ADMIN — METRONIDAZOLE 500 MG: 250 TABLET ORAL at 09:16

## 2019-04-02 RX ADMIN — HYDROCODONE BITARTRATE AND ACETAMINOPHEN 1 TABLET: 5; 325 TABLET ORAL at 09:16

## 2019-04-02 RX ADMIN — POTASSIUM CHLORIDE 10 MEQ: 10 INJECTION, SOLUTION INTRAVENOUS at 13:02

## 2019-04-02 RX ADMIN — SERTRALINE 100 MG: 50 TABLET, FILM COATED ORAL at 09:16

## 2019-04-02 RX ADMIN — LABETALOL 20 MG/4 ML (5 MG/ML) INTRAVENOUS SYRINGE 20 MG: at 20:47

## 2019-04-02 RX ADMIN — POTASSIUM CHLORIDE 10 MEQ: 10 INJECTION, SOLUTION INTRAVENOUS at 15:29

## 2019-04-02 RX ADMIN — HYDRALAZINE HYDROCHLORIDE 10 MG: 20 INJECTION INTRAMUSCULAR; INTRAVENOUS at 20:29

## 2019-04-02 RX ADMIN — POTASSIUM CHLORIDE 10 MEQ: 10 INJECTION, SOLUTION INTRAVENOUS at 14:18

## 2019-04-02 RX ADMIN — HYDRALAZINE HYDROCHLORIDE 10 MG: 20 INJECTION INTRAMUSCULAR; INTRAVENOUS at 09:16

## 2019-04-02 RX ADMIN — METRONIDAZOLE 500 MG: 250 TABLET ORAL at 17:22

## 2019-04-02 RX ADMIN — HYDROCODONE BITARTRATE AND ACETAMINOPHEN 1 TABLET: 5; 325 TABLET ORAL at 20:04

## 2019-04-02 RX ADMIN — PANTOPRAZOLE SODIUM 40 MG: 40 INJECTION, POWDER, FOR SOLUTION INTRAVENOUS at 17:22

## 2019-04-02 RX ADMIN — IOPAMIDOL 100 ML: 755 INJECTION, SOLUTION INTRAVENOUS at 12:28

## 2019-04-02 RX ADMIN — IOHEXOL 50 ML: 240 INJECTION, SOLUTION INTRATHECAL; INTRAVASCULAR; INTRAVENOUS; ORAL at 12:29

## 2019-04-02 RX ADMIN — METRONIDAZOLE 500 MG: 500 INJECTION, SOLUTION INTRAVENOUS at 00:00

## 2019-04-02 RX ADMIN — CEFTRIAXONE SODIUM 2 G: 2 INJECTION, POWDER, FOR SOLUTION INTRAMUSCULAR; INTRAVENOUS at 17:22

## 2019-04-02 NOTE — PROGRESS NOTES
Neurosurgery Checked in on patient with Dr. Alessandro Kuhn. Multiple family members at bedside. Consulted hospitalist for BP management as headache likely due to HTN. Pain also contributing to HTN. If patient ends up returning to OR for any sort of abdominal washout, please notify us so the neurosurgeon can externalize her shunt at the shoulder.  
 
Samuel Ziegler, NP

## 2019-04-02 NOTE — H&P
Hospitalist Consultation NoteNAME:  Ismael Freeman :   1950 MRN:   [id number] ATTENDING: Yoshi Byers MD 
PCP:  Marissa Pantoja MD 
 
Date/Time:  2019 9:58 AM 
 
 
Recommendations/Plan: Active Problems: 
  Perforated diverticulum of large intestine (3/26/2019) 1. htn urgency with hx of brain aneurysm(SAH) in the past - labetalol 20mg iv q4 hrs prn. Cont hyzaar. Add clonidine patch until on reliable po. 
 
2. Intractable pain - increase dilaudid to 2 q3hrs prn 
 
3. diverticlitis sp drain - clear fluid currently, cx with ecoli on rocephin and flagyl 4. COPD - duonebs prn. 
 
5. NSTEMI - repeat troponin 6.hypokalemia - give 20 meq of iv k 
 
7. Acute on chronic respiratory failure 
    With hypoxia - supplemental o2 
 
8. Chronic Systolic CHF - check echo Code Status: full DVT Prophylaxis: scd Subjective:  
REQUESTING PHYSICIAN: 
REASON FOR CONSULT:     
Darrian Fitzpatrick is a 79 y.o.  female who I was asked to see for htn and hypokalemia. The pt is sp laparoscopy and drain placement for perforated diverticluitis with abscess and is sp drain placmeent. The pt has a hx of htn and is on hyzaar. She currently reports severe 10/10 pain which may be contributing to her htn. She deneis any nausea or vomiting. She is have borwn smears for stool Past Medical History:  
Diagnosis Date  ACP (advance care planning) 2017 Initial ACP discussion w/ pt and daughter . AMD form reviewed and copy provided. NN/facilitator to discuss with patient  Asthma   
 hx of  Benign essential hypertension 2016  Bilateral hip pain 6/3/2014  
 xrays , negative  COPD (chronic obstructive pulmonary disease) ([facility] [address] 3/29/2010  Depression 3/29/2010  Diverticulitis  DVT of Rt Lower Extremity 3/29/2010  Elevated LFT's, Fatty Liver 3/29/2010  Essential tremor 2010  GERD (gastroesophageal reflux disease) 2011  H/O Subarachnoid hemorrhage due to ruptured aneurysm 2010  Hyperlipemia 3/29/2010  Impaired fasting glucose 2014  Thyroiditis, subacute 3/29/2010  Tobacco Abuse 3/29/2010  Vitamin D deficiency 6/3/2014 2012 Past Surgical History:  
Procedure Laterality Date  ECHO STRESS  2005 Negative  HX COLONOSCOPY  3/2013, Dr Pavan Weiss  
 benign cecal polyp, f/u 10 yrs  HX CRANIOTOMY  10/06  
 for clipping of ruptured aneurysm; Dr Ene Collins a shunt  HX ENDOSCOPY  EGD, Dr Pavan Weiss \"ok\" per pt report [address] Cervical Dysplasia (Dr Lili Do)  HI COLONOSCOPY W/BIOPSY SINGLE/MULTIPLE   Benign polyp Social History Tobacco Use  Smoking status: Current Every Day Smoker Packs/day: 1.00 Years: 20.00 Pack years: 20.00 Types: Cigarettes  Smokeless tobacco: Never Used  Tobacco comment: cut back from 1ppd to 3/4 ppd Substance Use Topics  Alcohol use: No  
  Alcohol/week: 0.0 oz Family History Problem Relation Age of Onset  Anxiety Daughter  Attention Deficit Disorder Daughter  Drug Abuse Daughter  Anxiety Daughter  Thyroid Disease Daughter  Thyroid Disease Daughter Hypothyroidism  Asthma Daughter  Cancer Father   
     kidney  Diabetes Father  Arthritis-osteo Father  Diabetes Mother  Hypertension Mother Winsome Belcher Arthritis-osteo Mother  Stroke Mother  Hypertension Sister  Depression Sister  Pneumonia Sister   
      age 72  Diabetes Sister  Thyroid Disease Sister  Thyroid Disease Sister  Heart Disease Sister  Diabetes Maternal Grandmother  No Known Problems Brother  Thyroid Disease Sister  Anxiety Sister  Depression Sister  Alcohol abuse Sister  Hypertension Sister  Hypertension Sister  Thyroid Disease Sister  Thyroid Disease Sister Allergies Allergen Reactions  Ativan [Lorazepam] Hives  Egg Hives Upset stomach.  Pcn [Penicillins] Other (comments) Unsure of reaction; pt reports unsure if have taken cephalosporin before.  Wellbutrin [Bupropion Hcl] Anxiety  Xanax [Alprazolam] Hives Prior to Admission medications Medication Sig Start Date End Date Taking? Authorizing Provider  
sertraline (ZOLOFT) 100 mg tablet TAKE 1.5 TABS BY MOUTH DAILY 3/12/19  Yes Stacy Matias MD  
omeprazole (PRILOSEC) 20 mg capsule TAKE 1 CAPSULE BY MOUTH EVERY DAY 3/12/19  Yes Nael Matias MD  
losartan-hydroCHLOROthiazide (HYZAAR) 50-12.5 mg per tablet Take 1 Tab by mouth daily. Indications: hypertension 10/4/18  Yes Stacy Matias MD  
atorvastatin (LIPITOR) 20 mg tablet TAKE 1 TABLET BY MOUTH EVERY DAY 8/18/18  Yes Nael Matias MD  
traZODone (DESYREL) 50 mg tablet TAKE 1 TABLET BY MOUTH EVERYDAY AT BEDTIME 5/10/18  Yes Stacy Matias MD  
dicyclomine (BENTYL) 10 mg capsule TAKE 1-2 CAPSULES BY MOUTH EVERY 6 HOURS AS NEEDED FOR ABDOMINAL CRAMPS 8/6/14  Yes Zhao Carlos MD  
albuterol (VENTOLIN HFA) 90 mcg/actuation inhaler Take 2 Puffs by inhalation every six (6) hours as needed for Wheezing or Shortness of Breath. Yes Provider, Historical  
MULTIVITAMINS (MULTIVITAMIN PO) Take  by mouth daily. Yes Provider, Historical  
ferrous sulfate (IRON) 325 mg (65 mg Iron) tablet Take  by mouth two (2) times a day. 7/14/10  Yes Provider, Historical  
 
 
REVIEW OF SYSTEMS:    
Total of 12 systems reviewed as follows:  
I am not able to complete the review of systems because: The patient is intubated and sedated The patient has altered mental status due to his acute medical problems The patient has baseline aphasia from prior stroke(s) The patient has baseline dementia and is not reliable historian POSITIVE= underlined text  Negative = text not underlined General:  fever, chills, sweats, generalized weakness, weight loss/gain,  
   loss of appetite Eyes:    blurred vision, eye pain, loss of vision, double vision ENT:    rhinorrhea, pharyngitis Respiratory:   cough, sputum production, SOB, wheezing, CAMARA, pleuritic pain  
Cardiology:   chest pain, palpitations, orthopnea, PND, edema, syncope Gastrointestinal:  abdominal pain , N/V, dysphagia, diarrhea, constipation, bleeding Genitourinary:  frequency, urgency, dysuria, hematuria, incontinence Muskuloskeletal :  arthralgia, myalgia Hematology:  easy bruising, nose or gum bleeding, lymphadenopathy Dermatological: rash, ulceration, pruritis Endocrine:   hot flashes or polydipsia Neurological:  headache, dizziness, confusion, focal weakness, paresthesia, Speech difficulties, memory loss, gait disturbance Psychological: Feelings of anxiety, depression, agitation Objective: VITALS:   
Visit Vitals /86 Pulse 76 Temp 98.5 °F (36.9 °C) Resp 16 Ht 5' 8\" (1.727 m) Wt 84.3 kg (185 lb 13.6 oz) SpO2 96% BMI 28.26 kg/m² Temp (24hrs), Av.4 °F (36.9 °C), Min:98 °F (36.7 °C), Max:99.6 °F (37.6 °C) PHYSICAL EXAM:  
General:    Alert, cooperative, no distress, appears stated age. HEENT: Atraumatic, anicteric sclerae, pink conjunctivae No oral ulcers, mucosa moist, throat clear Neck:  Supple, symmetrical,  thyroid: non tender Lungs:   Clear to auscultation bilaterally. No Wheezing or Rhonchi. No rales. Chest wall:  No tenderness  No Accessory muscle use. Heart:   Regular  rhythm,  No  murmur   No edema Abdomen:   Drain in place with clear fluid output 
 
_______________________________________________________________________ Care Plan discussed with: 
  Comments Patient Family RN Care Manager Consultant:     
____________________________________________________________________ TOTAL TIME:     39 mins Comments Reviewed previous records  
>50% of visit spent in counseling and coordination of care  Discussion with patient and/or family and questions answered Critical Care Provided     Minutes non procedure based 
________________________________________________________________________ Signed: Hermes Huertas DO 
 
 
Procedures: see electronic medical records for all procedures/Xrays and details which were not copied into this note but were reviewed prior to creation of Plan. LAB DATA REVIEWED:   
Recent Results (from the past 24 hour(s)) CBC WITH AUTOMATED DIFF Collection Time: 04/02/19  4:15 AM  
Result Value Ref Range WBC 10.3 3.6 - 11.0 K/uL  
 RBC 3.96 3.80 - 5.20 M/uL  
 HGB 11.7 11.5 - 16.0 g/dL HCT 36.1 35.0 - 47.0 % MCV 91.2 80.0 - 99.0 FL  
 MCH 29.5 26.0 - 34.0 PG  
 MCHC 32.4 30.0 - 36.5 g/dL  
 RDW 12.3 11.5 - 14.5 % PLATELET 133 710 - 698 K/uL MPV 9.8 8.9 - 12.9 FL  
 NRBC 0.0 0  WBC ABSOLUTE NRBC 0.00 0.00 - 0.01 K/uL NEUTROPHILS 71 32 - 75 % LYMPHOCYTES 17 12 - 49 % MONOCYTES 10 5 - 13 % EOSINOPHILS 1 0 - 7 % BASOPHILS 0 0 - 1 % IMMATURE GRANULOCYTES 1 (H) 0.0 - 0.5 % ABS. NEUTROPHILS 7.3 1.8 - 8.0 K/UL  
 ABS. LYMPHOCYTES 1.8 0.8 - 3.5 K/UL  
 ABS. MONOCYTES 1.1 (H) 0.0 - 1.0 K/UL  
 ABS. EOSINOPHILS 0.1 0.0 - 0.4 K/UL  
 ABS. BASOPHILS 0.0 0.0 - 0.1 K/UL  
 ABS. IMM. GRANS. 0.1 (H) 0.00 - 0.04 K/UL  
 DF AUTOMATED METABOLIC PANEL, BASIC Collection Time: 04/02/19  4:15 AM  
Result Value Ref Range Sodium 136 136 - 145 mmol/L Potassium 2.9 (L) 3.5 - 5.1 mmol/L Chloride 100 97 - 108 mmol/L  
 CO2 28 21 - 32 mmol/L Anion gap 8 5 - 15 mmol/L Glucose 111 (H) 65 - 100 mg/dL BUN 3 (L) 6 - 20 MG/DL Creatinine 0.46 (L) 0.55 - 1.02 MG/DL  
 BUN/Creatinine ratio 7 (L) 12 - 20 GFR est AA >60 >60 ml/min/1.73m2 GFR est non-AA >60 >60 ml/min/1.73m2 Calcium 8.5 8.5 - 10.1 MG/DL MAGNESIUM  
 Collection Time: 04/02/19  4:15 AM  
Result Value Ref Range Magnesium 1.7 1.6 - 2.4 mg/dL PHOSPHORUS Collection Time: 04/02/19  4:15 AM  
Result Value Ref Range Phosphorus 2.9 2.6 - 4.7 MG/DL  
 
 
_____________________________ Hospitalist: Billy Hill DO

## 2019-04-02 NOTE — PROGRESS NOTES
Progress Note Patient: Michael De Jesus MRN: [id number]  SSN: [id number] YOB: 1950  Age: 79 y.o. Sex: female Admit Date: 3/26/2019 
 
4 Days Post-Op Procedure:  Procedure(s): LAPAROSCOPY GENERAL DIAGNOSTIC/ LAPAROSCOPIC DRAINAGE OF PELVIC ABSCESS Subjective:  
 
Patient continues to feel better. Wants to eat regular food. Objective:  
 
Visit Vitals /86 Pulse 76 Temp 98.5 °F (36.9 °C) Resp 16 Ht 5' 8\" (1.727 m) Wt 185 lb 13.6 oz (84.3 kg) SpO2 96% BMI 28.26 kg/m² Temp (24hrs), Av.4 °F (36.9 °C), Min:98 °F (36.7 °C), Max:99.6 °F (37.6 °C) Physical Exam:   
Gen- Alert in NAD Lungs-CTA H-RRR Abds/nt/nd TAMIKO serous Data Review: images and reports reviewed Lab Review: All lab results for the last 24 hours reviewed. Recent Results (from the past 24 hour(s)) CBC WITH AUTOMATED DIFF Collection Time: 19  4:15 AM  
Result Value Ref Range WBC 10.3 3.6 - 11.0 K/uL  
 RBC 3.96 3.80 - 5.20 M/uL  
 HGB 11.7 11.5 - 16.0 g/dL HCT 36.1 35.0 - 47.0 % MCV 91.2 80.0 - 99.0 FL  
 MCH 29.5 26.0 - 34.0 PG  
 MCHC 32.4 30.0 - 36.5 g/dL  
 RDW 12.3 11.5 - 14.5 % PLATELET 703 689 - 558 K/uL MPV 9.8 8.9 - 12.9 FL  
 NRBC 0.0 0  WBC ABSOLUTE NRBC 0.00 0.00 - 0.01 K/uL NEUTROPHILS 71 32 - 75 % LYMPHOCYTES 17 12 - 49 % MONOCYTES 10 5 - 13 % EOSINOPHILS 1 0 - 7 % BASOPHILS 0 0 - 1 % IMMATURE GRANULOCYTES 1 (H) 0.0 - 0.5 % ABS. NEUTROPHILS 7.3 1.8 - 8.0 K/UL  
 ABS. LYMPHOCYTES 1.8 0.8 - 3.5 K/UL  
 ABS. MONOCYTES 1.1 (H) 0.0 - 1.0 K/UL  
 ABS. EOSINOPHILS 0.1 0.0 - 0.4 K/UL  
 ABS. BASOPHILS 0.0 0.0 - 0.1 K/UL  
 ABS. IMM. GRANS. 0.1 (H) 0.00 - 0.04 K/UL  
 DF AUTOMATED METABOLIC PANEL, BASIC Collection Time: 19  4:15 AM  
Result Value Ref Range Sodium 136 136 - 145 mmol/L Potassium 2.9 (L) 3.5 - 5.1 mmol/L  Chloride 100 97 - 108 mmol/L  
 CO2 28 21 - 32 mmol/L  
 Anion gap 8 5 - 15 mmol/L Glucose 111 (H) 65 - 100 mg/dL BUN 3 (L) 6 - 20 MG/DL Creatinine 0.46 (L) 0.55 - 1.02 MG/DL  
 BUN/Creatinine ratio 7 (L) 12 - 20 GFR est AA >60 >60 ml/min/1.73m2 GFR est non-AA >60 >60 ml/min/1.73m2 Calcium 8.5 8.5 - 10.1 MG/DL MAGNESIUM Collection Time: 04/02/19  4:15 AM  
Result Value Ref Range Magnesium 1.7 1.6 - 2.4 mg/dL PHOSPHORUS Collection Time: 04/02/19  4:15 AM  
Result Value Ref Range Phosphorus 2.9 2.6 - 4.7 MG/DL Assessment:  
 
Hospital Problems  Date Reviewed: 3/16/2019 Codes Class Noted POA Perforated diverticulum of large intestine ICD-10-CM: K57.20 ICD-9-CM: 562.10  3/26/2019 Unknown Plan/Recommendations/Medical Decision Making:  
Seems to be improving Appreciate Medical and NS input Getting repeat CT today. Will advance diet once back from CT Continue IV ABX. Signed By: Yaritza Boone MD   
 April 2, 2019

## 2019-04-02 NOTE — PROGRESS NOTES
Assumed care of pt this am. Pt is alert and oriented resting in bed at this time. Pt has started drinking her contrast for CT this morning. Pt BP is elevated, pt given morning medications as well as her PRN.

## 2019-04-02 NOTE — ROUTINE PROCESS
Bedside and Verbal shift change report given to Jimenez Richey rn (oncoming nurse) by Lisa Ambrocio (offgoing nurse). Report included the following information SBAR, Kardex, OR Summary, Procedure Summary, Intake/Output, MAR and Recent Results.

## 2019-04-02 NOTE — PROGRESS NOTES
NUTRITION COMPLETE ASSESSMENT 
 
RECOMMENDATIONS:  
1. Diet advancement per surgery (recommend Regular, Low Fiber rather than GI Lite) 2. Consider probiotic secondary to antibiotic use 3. Continue daily weights via standing scale Interventions/Plan:  
Food/Nutrient Delivery: Ensure Enlive q day to optimize intake Assessment:  
Reason for Assessment:  
[x]LOS/NPO or Liquid diet x 7 days Diet: Full liquids Nutritionally Significant Medications: [x] Reviewed & Includes: rocephin daily; LR @ 125 mL/hr; flagyl q8 hours; zofran q 4 hours prn; protonix daily (IV); potassium chloride runs Meal Intake:  
Patient Vitals for the past 100 hrs: 
 % Diet Eaten 03/31/19 1722 50 % 03/31/19 0845 0 %  
03/29/19 1130 0 % Pre-Hospitalization: 
Usual Appetite: Good Diet at Home: Regular Vitamins/Supplements: Yes(has tried Ensure in the past) Current Hospitalization:  
Fluid Restriction:  NA Appetite: Good PO Ability: Independent Subjective: 
Pt in good spirits. Multiple family members at the bedside. Pt is hungry and hopeful she can eat soon. Pt and family admit her usual appetite is great and she was eating well without issues prior to admission. She has had Ensure in the past and is agreeable to trial while here in the hospital (Ensure Enlive chocolate). Pt with egg allergy listed. She does eat foods that contain eggs, but dislikes whole eggs (fried, scrambled, hard boiled, etc). Spoke with Pharmacy to remove from EHR allergy list. 
 
Objective: 
Chart reviewed, discussed with RN. Pt admitted with perforated diverticulum of the large intestine. She is POD4 from laparascopy with laparoscopic drainage of pelvic abscess. Diet advancement is pending CT results. Pt has been NPO or on liquid diet x 7 days. Discussed increased protein needs and pt willing to drink ONS. This will provide 350 kcal, 20 g protein.   If unable to advance diet within the next 24 hours, may need to consider short term nutrition support (PPN) to optimize post-op healing and prevent nutrition decline. Decreased potassium-- ordered for repletion today Decreased phosphorus-- WNL today Estimated Nutrition Needs:  
Kcals/day: 5690 Kcals/day(7437-3915 kcal/day (MSJ x 1.2-1.3)) Protein: 84 g(84 g/day (1 g/kg)) Fluid: 1800 ml(1 mL/kcal) Based On: [place] [address] Weight Used: Actual wt(83.9 kg) Pt expected to meet estimated nutrient needs:  [x]   Yes (once diet is advanced; otherwise, she is not meeting estimated needs) Nutrition Diagnosis:  
1. Inadequate protein-energy intake related to slow diet progression/advancement as evidenced by NPO/Liquid diet order 7 days Goals:   
 Pt to tolerate diet advancement within the next 24 hours; consume at least 50% of all meals and snacks over the next 5-7 days Monitoring & Evaluation: - Total energy intake, Liquid meal replacement - Weight/weight change Previous Nutrition Goals Met:   N/A Previous Recommendations:    N/A Education & Discharge Needs: 
 [] None Identified 
 [x] Identified and addressed [x] Participated in care plan, discharge planning, and/or interdisciplinary rounds Cultural, Roman Catholic and ethnic food preferences identified: None Skin Integrity: []Intact  [x]Other: surgical incision Edema: [x]None []Other Last BM: 3/31/19 Food Allergies: []None [x]Other:egg (clarified-- she does not like whole eggs, but will eat foods containing eggs) Diet Restrictions: Cultural/Oriental orthodox Preference(s): None Anthropometrics:   
Weight Loss Metrics 4/2/2019 3/16/2019 10/4/2018 2/21/2017 1/24/2017 6/24/2016 6/2/2015 Today's Wt 185 lb 192 lb 200 lb 12.8 oz 197 lb 12.8 oz 197 lb 3.2 oz 201 lb 6.4 oz 202 lb 12.8 oz  
BMI 28.13 kg/m2 29.19 kg/m2 30.53 kg/m2 30.08 kg/m2 29.98 kg/m2 30.63 kg/m2 30.84 kg/m2 Weight Source: Standing scale (comment) Height: 5' 8\" (172.7 cm), Body mass index is 28.13 kg/m². IBW : 69.9 kg (154 lb), Usual Body Weight: 88.5 kg (195 lb),   
 
Labs:   
Lab Results Component Value Date/Time Sodium 136 04/02/2019 04:15 AM  
 Potassium 2.9 (L) 04/02/2019 04:15 AM  
 Chloride 100 04/02/2019 04:15 AM  
 CO2 28 04/02/2019 04:15 AM  
 Glucose 111 (H) 04/02/2019 04:15 AM  
 BUN 3 (L) 04/02/2019 04:15 AM  
 Creatinine 0.46 (L) 04/02/2019 04:15 AM  
 Calcium 8.5 04/02/2019 04:15 AM  
 Magnesium 1.7 04/02/2019 04:15 AM  
 Phosphorus 2.9 04/02/2019 04:15 AM  
 Albumin 2.8 (L) 03/26/2019 02:31 PM  
 
Lab Results Component Value Date/Time Hemoglobin A1c 5.9 (H) 10/05/2018 08:25 AM  
 
[place], [address]

## 2019-04-03 LAB
ANION GAP SERPL CALC-SCNC: 8 MMOL/L (ref 5–15)
BASOPHILS # BLD: 0.1 K/UL (ref 0–0.1)
BASOPHILS NFR BLD: 0 % (ref 0–1)
BUN SERPL-MCNC: 4 MG/DL (ref 6–20)
BUN/CREAT SERPL: 8 (ref 12–20)
CALCIUM SERPL-MCNC: 8.6 MG/DL (ref 8.5–10.1)
CHLORIDE SERPL-SCNC: 99 MMOL/L (ref 97–108)
CO2 SERPL-SCNC: 27 MMOL/L (ref 21–32)
CREAT SERPL-MCNC: 0.53 MG/DL (ref 0.55–1.02)
DIFFERENTIAL METHOD BLD: ABNORMAL
EOSINOPHIL # BLD: 0.1 K/UL (ref 0–0.4)
EOSINOPHIL NFR BLD: 0 % (ref 0–7)
ERYTHROCYTE [DISTWIDTH] IN BLOOD BY AUTOMATED COUNT: 12.5 % (ref 11.5–14.5)
GLUCOSE SERPL-MCNC: 98 MG/DL (ref 65–100)
HCT VFR BLD AUTO: 36.2 % (ref 35–47)
HGB BLD-MCNC: 11.8 G/DL (ref 11.5–16)
IMM GRANULOCYTES # BLD AUTO: 0.1 K/UL (ref 0–0.04)
IMM GRANULOCYTES NFR BLD AUTO: 1 % (ref 0–0.5)
LYMPHOCYTES # BLD: 2.4 K/UL (ref 0.8–3.5)
LYMPHOCYTES NFR BLD: 20 % (ref 12–49)
MAGNESIUM SERPL-MCNC: 1.9 MG/DL (ref 1.6–2.4)
MCH RBC QN AUTO: 29.5 PG (ref 26–34)
MCHC RBC AUTO-ENTMCNC: 32.6 G/DL (ref 30–36.5)
MCV RBC AUTO: 90.5 FL (ref 80–99)
MONOCYTES # BLD: 1.3 K/UL (ref 0–1)
MONOCYTES NFR BLD: 11 % (ref 5–13)
NEUTS SEG # BLD: 8 K/UL (ref 1.8–8)
NEUTS SEG NFR BLD: 68 % (ref 32–75)
NRBC # BLD: 0 K/UL (ref 0–0.01)
NRBC BLD-RTO: 0 PER 100 WBC
PLATELET # BLD AUTO: 393 K/UL (ref 150–400)
PMV BLD AUTO: 9.7 FL (ref 8.9–12.9)
POTASSIUM SERPL-SCNC: 2.9 MMOL/L (ref 3.5–5.1)
RBC # BLD AUTO: 4 M/UL (ref 3.8–5.2)
SODIUM SERPL-SCNC: 134 MMOL/L (ref 136–145)
WBC # BLD AUTO: 11.9 K/UL (ref 3.6–11)

## 2019-04-03 PROCEDURE — 74011250636 HC RX REV CODE- 250/636: Performed by: NEUROLOGICAL SURGERY

## 2019-04-03 PROCEDURE — 74011000250 HC RX REV CODE- 250: Performed by: SURGERY

## 2019-04-03 PROCEDURE — 36415 COLL VENOUS BLD VENIPUNCTURE: CPT

## 2019-04-03 PROCEDURE — 80048 BASIC METABOLIC PNL TOTAL CA: CPT

## 2019-04-03 PROCEDURE — 65270000032 HC RM SEMIPRIVATE

## 2019-04-03 PROCEDURE — 83735 ASSAY OF MAGNESIUM: CPT

## 2019-04-03 PROCEDURE — 74011250636 HC RX REV CODE- 250/636: Performed by: SURGERY

## 2019-04-03 PROCEDURE — 74011250636 HC RX REV CODE- 250/636: Performed by: NEUROMUSCULOSKELETAL MEDICINE & OMM

## 2019-04-03 PROCEDURE — C9113 INJ PANTOPRAZOLE SODIUM, VIA: HCPCS | Performed by: SURGERY

## 2019-04-03 PROCEDURE — 74011250637 HC RX REV CODE- 250/637: Performed by: SURGERY

## 2019-04-03 PROCEDURE — 74011000258 HC RX REV CODE- 258: Performed by: SURGERY

## 2019-04-03 PROCEDURE — 85025 COMPLETE CBC W/AUTO DIFF WBC: CPT

## 2019-04-03 RX ADMIN — HYDROCHLOROTHIAZIDE: 25 TABLET ORAL at 08:34

## 2019-04-03 RX ADMIN — HYDROMORPHONE HYDROCHLORIDE 2 MG: 1 INJECTION, SOLUTION INTRAMUSCULAR; INTRAVENOUS; SUBCUTANEOUS at 18:21

## 2019-04-03 RX ADMIN — HYDRALAZINE HYDROCHLORIDE 10 MG: 20 INJECTION INTRAMUSCULAR; INTRAVENOUS at 15:57

## 2019-04-03 RX ADMIN — METRONIDAZOLE 500 MG: 250 TABLET ORAL at 16:50

## 2019-04-03 RX ADMIN — HYDRALAZINE HYDROCHLORIDE 10 MG: 20 INJECTION INTRAMUSCULAR; INTRAVENOUS at 08:36

## 2019-04-03 RX ADMIN — CEFTRIAXONE SODIUM 2 G: 2 INJECTION, POWDER, FOR SOLUTION INTRAMUSCULAR; INTRAVENOUS at 15:56

## 2019-04-03 RX ADMIN — HYDROCODONE BITARTRATE AND ACETAMINOPHEN 1 TABLET: 5; 325 TABLET ORAL at 16:57

## 2019-04-03 RX ADMIN — HYDROCODONE BITARTRATE AND ACETAMINOPHEN 1 TABLET: 5; 325 TABLET ORAL at 02:15

## 2019-04-03 RX ADMIN — METRONIDAZOLE 500 MG: 250 TABLET ORAL at 08:36

## 2019-04-03 RX ADMIN — SODIUM CHLORIDE, SODIUM LACTATE, POTASSIUM CHLORIDE, AND CALCIUM CHLORIDE 125 ML/HR: 600; 310; 30; 20 INJECTION, SOLUTION INTRAVENOUS at 13:46

## 2019-04-03 RX ADMIN — SODIUM CHLORIDE, SODIUM LACTATE, POTASSIUM CHLORIDE, AND CALCIUM CHLORIDE 125 ML/HR: 600; 310; 30; 20 INJECTION, SOLUTION INTRAVENOUS at 02:15

## 2019-04-03 RX ADMIN — SERTRALINE 100 MG: 50 TABLET, FILM COATED ORAL at 08:36

## 2019-04-03 RX ADMIN — LABETALOL 20 MG/4 ML (5 MG/ML) INTRAVENOUS SYRINGE 20 MG: at 04:44

## 2019-04-03 RX ADMIN — METRONIDAZOLE 500 MG: 250 TABLET ORAL at 02:05

## 2019-04-03 RX ADMIN — PANTOPRAZOLE SODIUM 40 MG: 40 INJECTION, POWDER, FOR SOLUTION INTRAVENOUS at 17:40

## 2019-04-03 RX ADMIN — Medication 10 ML: at 13:08

## 2019-04-03 RX ADMIN — Medication 10 ML: at 08:43

## 2019-04-03 RX ADMIN — LABETALOL 20 MG/4 ML (5 MG/ML) INTRAVENOUS SYRINGE 20 MG: at 13:08

## 2019-04-03 NOTE — PROGRESS NOTES
Hospitalist Progress Note Bernice Farah MD 
Answering service: [phone] OR [phone] from in house phone Date of Service:  4/3/2019 NAME:  Katie Bone :  1950 MRN:  [id number] Admission Summary:  
REASON FOR CONSULT:     
[facility] is a 79 y.o.  female who I was asked to see for htn and hypokalemia. The pt is sp laparoscopy and drain placement for perforated diverticluitis with abscess and is sp drain placmeent. The pt has a hx of htn and is on hyzaar. She currently reports severe 10/10 pain which may be contributing to her htn. She deneis any nausea or vomiting. She is have borwn smears for stool Interval history / Subjective:  
  BP still elevated Assessment & Plan: 1. HTN urgency with hx of brain aneurysm(SAH) in the past  
- labetalol 20mg iv q4 hrs prn. Cont hyzaar. Add clonidine patch until on reliable po. 
- d/c fluids if possible  
  
2. Intractable pain - increase dilaudid to 2 q3hrs prn 
- pain causing elevated BP partly  
  
3. Diverticlitis sp drain - clear fluid currently, cx with ecoli on rocephin and flagyl 
  
4. COPD - duonebs prn. 
  
5. Hypokalemia - replete IV 
  
7. Acute on chronic respiratory failure 
    With hypoxia - supplemental o2 
  
8. Chronic diastolic CHF- 4/2 ECHO - Estimated left ventricular ejection fraction is 61 - 65% Need diuresis. Code status:Full DVT prophylaxis: SCD Care Plan discussed with: Patient/Family and Nurse Disposition: TBD Hospital Problems  Date Reviewed: 3/16/2019 Codes Class Noted POA Perforated diverticulum of large intestine ICD-10-CM: K57.20 ICD-9-CM: 562.10  3/26/2019 Unknown Review of Systems: A comprehensive review of systems was negative. Vital Signs:  
 Last 24hrs VS reviewed since prior progress note. Most recent are: 
Visit Vitals /79 (BP 1 Location: Right arm, BP Patient Position: Sitting) Pulse 82 Temp 98.5 °F (36.9 °C) Resp 17 Ht 5' 8\" (1.727 m) Wt 83.9 kg (185 lb) SpO2 92% BMI 28.13 kg/m² Intake/Output Summary (Last 24 hours) at 4/3/2019 1259 Last data filed at 4/3/2019 5347 Gross per 24 hour Intake 600 ml Output 575 ml Net 25 ml Physical Examination:  
 
 
     
Constitutional:  No acute distress, cooperative, ENT:  Oral mucous moist, Resp:  CTA bilaterally. No wheezing/rhonchi/rales. CV:  Regular rhythm, normal rate, GI:  Soft, non distended, non tender. bs+ Musculoskeletal:  No edema, warm, 2+ pulses throughout Neurologic:  Moves all extremities. AAOx3, CN II-XII reviewed Psych:  Good insight, Not anxious nor agitated. Data Review:  
 I personally reviewed  Image and labs Labs:  
 
Recent Labs 04/03/19 
5232 04/02/19 
4010 WBC 11.9* 10.3 HGB 11.8 11.7 HCT 36.2 36.1  393 Recent Labs 04/03/19 
1469 04/02/19 
2647 04/01/19 
7695 * 136 135*  
K 2.9* 2.9* 3.0*  
CL 99 100 99 CO2 27 28 29 BUN 4* 3* 3*  
CREA 0.53* 0.46* 0.38* GLU 98 111* 107* CA 8.6 8.5 8.5 MG 1.9 1.7 1.8 PHOS  --  2.9 2.2* No results for input(s): SGOT, GPT, ALT, AP, TBIL, TBILI, TP, ALB, GLOB, GGT, AML, LPSE in the last 72 hours. No lab exists for component: AMYP, HLPSE No results for input(s): INR, PTP, APTT in the last 72 hours. No lab exists for component: INREXT No results for input(s): FE, TIBC, PSAT, FERR in the last 72 hours. No results found for: FOL, RBCF No results for input(s): PH, PCO2, PO2 in the last 72 hours. Recent Labs 04/02/19 
1451 TROIQ <0.05 Lab Results Component Value Date/Time  Cholesterol, total 157 10/05/2018 08:25 AM  
 HDL Cholesterol 40 10/05/2018 08:25 AM  
 LDL, calculated 89 10/05/2018 08:25 AM  
 Triglyceride 141 10/05/2018 08:25 AM  
 CHOL/HDL Ratio 2.7 10/05/2009 09:40 AM  
 
 Lab Results Component Value Date/Time Glucose (POC) 114 (H) 03/28/2019 12:50 PM  
 
Lab Results Component Value Date/Time Color Yellow 06/15/2016 10:03 AM  
 Appearance Clear 06/15/2016 10:03 AM  
 pH (UA) 6.5 06/15/2016 10:03 AM  
 Ketone Trace (A) 06/15/2016 10:03 AM  
 Bilirubin Negative 06/15/2016 10:03 AM  
 Nitrites Negative 06/15/2016 10:03 AM  
 Leukocyte Esterase Trace (A) 06/15/2016 10:03 AM  
 Bacteria Few 06/15/2016 10:03 AM  
 WBC 0-5 06/15/2016 10:03 AM  
 RBC 0-2 06/15/2016 10:03 AM  
 
 
 
Medications Reviewed:  
 
Current Facility-Administered Medications Medication Dose Route Frequency  metroNIDAZOLE (FLAGYL) tablet 500 mg  500 mg Oral Q8H  
 HYDROmorphone (PF) (DILAUDID) injection 2 mg  2 mg IntraVENous Q3H PRN  
 labetalol (NORMODYNE;TRANDATE) 20 mg/4 mL (5 mg/mL) injection 20 mg  20 mg IntraVENous Q4H PRN  
 cloNIDine (CATAPRES) 0.2 mg/24 hr patch 1 Patch  1 Patch TransDERmal Q7D  
 hydrALAZINE (APRESOLINE) 20 mg/mL injection 10 mg  10 mg IntraVENous Q6H PRN  
 cefTRIAXone (ROCEPHIN) 2 g in 0.9% sodium chloride (MBP/ADV) 50 mL  2 g IntraVENous Q24H  
 sodium chloride (NS) flush 5-40 mL  5-40 mL IntraVENous Q8H  
 sodium chloride (NS) flush 5-40 mL  5-40 mL IntraVENous PRN  
 HYDROcodone-acetaminophen (NORCO) 5-325 mg per tablet 1 Tab  1 Tab Oral Q4H PRN  
 0.9% sodium chloride infusion 250 mL  250 mL IntraVENous PRN  
 albuterol-ipratropium (DUO-NEB) 2.5 MG-0.5 MG/3 ML  3 mL Nebulization PRN  
 lactated Ringers infusion  125 mL/hr IntraVENous CONTINUOUS  
 ondansetron (ZOFRAN) injection 4 mg  4 mg IntraVENous Q4H PRN  
 acetaminophen (TYLENOL) tablet 650 mg  650 mg Oral Q6H PRN  pantoprazole (PROTONIX) 40 mg in sodium chloride 0.9% 10 mL injection  40 mg IntraVENous Q24H  
 albuterol (PROVENTIL VENTOLIN) nebulizer solution 2.5 mg  2.5 mg Nebulization Q6H PRN  
 sertraline (ZOLOFT) tablet 100 mg  100 mg Oral DAILY  traZODone (DESYREL) tablet 50 mg  50 mg Oral QHS PRN  
 losartan/hydroCHLOROthiazide (HYZAAR) 50/12.5 mg   Oral DAILY  
 
______________________________________________________________________ EXPECTED LENGTH OF STAY: 2d 14h ACTUAL LENGTH OF STAY:          [address] [place] MD

## 2019-04-03 NOTE — PROGRESS NOTES
She is feeling better today, ate, some diarrhea. Discussed with Dr. Maureen Díaz as well Following, hopeful to clear infection with antibiotics

## 2019-04-03 NOTE — PROGRESS NOTES
[facility] POD #5 Subjective Doing well, tolerating fulls well, no N/V 
 
Objective Patient Vitals for the past 24 hrs: 
 Temp Pulse Resp BP SpO2  
04/03/19 1507 98 °F (36.7 °C) 85 18 199/88 98 % 04/03/19 1217 98.5 °F (36.9 °C) 82 17 172/79 92 % 04/03/19 0743 98.8 °F (37.1 °C) 83 17 188/86 91 % 04/03/19 0318 98.9 °F (37.2 °C) 86 16 164/72 94 % 04/03/19 0020 99 °F (37.2 °C) 83 16 154/82 94 % 04/02/19 2112  86  149/62   
04/02/19 2046  91  189/65   
04/02/19 2033  86  192/77   
04/02/19 1944 98.6 °F (37 °C) 84 17 179/57 92 % Date 04/02/19 1900 - 04/03/19 2251 04/03/19 0700 - 04/04/19 2338 Shift 1462-7444 24 Hour Total 2825-5911 0707-0379 24 Hour Total  
INTAKE  
P.O. 200 200     
  P. O. 200 200     
I. V.(mL/kg/hr)  400 Volume (potassium chloride 10 mEq in 100 ml IVPB)  400 Shift Total(mL/kg) 200(2.4) 600(7.2) OUTPUT Urine(mL/kg/hr) 550 550 Urine Voided 550 550 Drains 5 25 0  0 Output (ml) (Curtistine Breslow #1 03/29/19 Right;Upper; Outer Abdomen) 5 25 0  0 Shift Total(mL/kg) 555(6.6) 575(6.9) 0(0)  0(0) NET -355 25 0  0 Weight (kg) 83.9 83.9 83.9 83.9 83.9 PE 
Pulm - CTAB 
CV - RRR Abd - soft, ND, BS present, TAMIKO serous Labs Lab Results Component Value Date/Time WBC 11.9 (H) 04/03/2019 04:34 AM  
 HGB 11.8 04/03/2019 04:34 AM  
 HCT 36.2 04/03/2019 04:34 AM  
 PLATELET 886 45/77/8638 04:34 AM  
 MCV 90.5 04/03/2019 04:34 AM  
 
Lab Results Component Value Date/Time  Sodium 134 (L) 04/03/2019 04:34 AM  
 Potassium 2.9 (L) 04/03/2019 04:34 AM  
 Chloride 99 04/03/2019 04:34 AM  
 CO2 27 04/03/2019 04:34 AM  
 Anion gap 8 04/03/2019 04:34 AM  
 Glucose 98 04/03/2019 04:34 AM  
 BUN 4 (L) 04/03/2019 04:34 AM  
 Creatinine 0.53 (L) 04/03/2019 04:34 AM  
 BUN/Creatinine ratio 8 (L) 04/03/2019 04:34 AM  
 GFR est AA >60 04/03/2019 04:34 AM  
 GFR est non-AA >60 04/03/2019 04:34 AM  
 Calcium 8.6 04/03/2019 04:34 AM  
 Bilirubin, total 0.7 03/26/2019 02:31 PM  
 AST (SGOT) 24 03/26/2019 02:31 PM  
 Alk. phosphatase 75 03/26/2019 02:31 PM  
 Protein, total 8.0 03/26/2019 02:31 PM  
 Albumin 2.8 (L) 03/26/2019 02:31 PM  
 Globulin 5.2 (H) 03/26/2019 02:31 PM  
 A-G Ratio 0.5 (L) 03/26/2019 02:31 PM  
 ALT (SGPT) 38 03/26/2019 02:31 PM  
 
 
 
Assessment Claude Bowl is a 79 y. o.yr old female s/p laparoscopic drainage of diverticular abscess Plan Increase diet to GI lite diet OOB more Tx to floor today DC IVF I talked with Dr Lili Brooks about the  shunt and it appears to be far away from the infection The CT shows a little contrast in the area of the drain but the drain is serous and no signs of any enteric contents Continue IV abx Jeronimo Pfeiffer MD

## 2019-04-03 NOTE — PROGRESS NOTES
Bedside shift change report given to Mahendra Nair (oncoming nurse) by Robby He (offgoing nurse).  Report included the following information SBAR, MAR, Recent Results and Cardiac Rhythm SR.

## 2019-04-03 NOTE — PROGRESS NOTES
Patient resting in bed, family at bedside. C/o abd pain, Norco prn given. Patient a&ox4, although she can be forgetful at times. SR on tele. Elevated bp highest was 192/77, have prn Hydralazine at 2029, pressure down to 189/65, gave Labetalol at 2047, pressure down to 149/62, afebrile. Jearldine Leivasy in place and draining well, emptied 550cc, patient oob with 2 assist, advanced to full liquid diet, tolerating well. TAMIKO in place, very little output. Call light within reach, will ctm 0030: Bedside and Verbal shift change report given to Omero Gastelum (oncoming nurse) by Eve Zuñiga (offgoing nurse).  Report included the following information SBAR, Kardex, MAR, Med Rec Status and Cardiac Rhythm SR.

## 2019-04-03 NOTE — PROGRESS NOTES
Spiritual Care Partner Volunteer visited patient in Rm 439 on 4/3/19. Documented by: Chaplain Singh MDiv, MACE 
[phone] [phone])

## 2019-04-04 LAB
ANION GAP SERPL CALC-SCNC: 7 MMOL/L (ref 5–15)
BASOPHILS # BLD: 0 K/UL (ref 0–0.1)
BASOPHILS NFR BLD: 0 % (ref 0–1)
BUN SERPL-MCNC: 6 MG/DL (ref 6–20)
BUN/CREAT SERPL: 12 (ref 12–20)
CALCIUM SERPL-MCNC: 8.5 MG/DL (ref 8.5–10.1)
CHLORIDE SERPL-SCNC: 97 MMOL/L (ref 97–108)
CO2 SERPL-SCNC: 26 MMOL/L (ref 21–32)
CREAT SERPL-MCNC: 0.49 MG/DL (ref 0.55–1.02)
DIFFERENTIAL METHOD BLD: ABNORMAL
EOSINOPHIL # BLD: 0.1 K/UL (ref 0–0.4)
EOSINOPHIL NFR BLD: 1 % (ref 0–7)
ERYTHROCYTE [DISTWIDTH] IN BLOOD BY AUTOMATED COUNT: 12.9 % (ref 11.5–14.5)
GLUCOSE SERPL-MCNC: 110 MG/DL (ref 65–100)
HCT VFR BLD AUTO: 34.7 % (ref 35–47)
HGB BLD-MCNC: 11.3 G/DL (ref 11.5–16)
IMM GRANULOCYTES # BLD AUTO: 0.1 K/UL (ref 0–0.04)
IMM GRANULOCYTES NFR BLD AUTO: 1 % (ref 0–0.5)
LYMPHOCYTES # BLD: 2.4 K/UL (ref 0.8–3.5)
LYMPHOCYTES NFR BLD: 19 % (ref 12–49)
MAGNESIUM SERPL-MCNC: 1.9 MG/DL (ref 1.6–2.4)
MAGNESIUM SERPL-MCNC: 2 MG/DL (ref 1.6–2.4)
MCH RBC QN AUTO: 29.6 PG (ref 26–34)
MCHC RBC AUTO-ENTMCNC: 32.6 G/DL (ref 30–36.5)
MCV RBC AUTO: 90.8 FL (ref 80–99)
MONOCYTES # BLD: 1.3 K/UL (ref 0–1)
MONOCYTES NFR BLD: 11 % (ref 5–13)
NEUTS SEG # BLD: 8.5 K/UL (ref 1.8–8)
NEUTS SEG NFR BLD: 68 % (ref 32–75)
NRBC # BLD: 0 K/UL (ref 0–0.01)
NRBC BLD-RTO: 0 PER 100 WBC
PHOSPHATE SERPL-MCNC: 3.7 MG/DL (ref 2.6–4.7)
PLATELET # BLD AUTO: 366 K/UL (ref 150–400)
PMV BLD AUTO: 9.5 FL (ref 8.9–12.9)
POTASSIUM SERPL-SCNC: 3.1 MMOL/L (ref 3.5–5.1)
RBC # BLD AUTO: 3.82 M/UL (ref 3.8–5.2)
SODIUM SERPL-SCNC: 130 MMOL/L (ref 136–145)
WBC # BLD AUTO: 12.5 K/UL (ref 3.6–11)

## 2019-04-04 PROCEDURE — 74011250636 HC RX REV CODE- 250/636: Performed by: SURGERY

## 2019-04-04 PROCEDURE — 83735 ASSAY OF MAGNESIUM: CPT

## 2019-04-04 PROCEDURE — 74011250636 HC RX REV CODE- 250/636: Performed by: NEUROMUSCULOSKELETAL MEDICINE & OMM

## 2019-04-04 PROCEDURE — 74011250637 HC RX REV CODE- 250/637: Performed by: SURGERY

## 2019-04-04 PROCEDURE — 74011000258 HC RX REV CODE- 258: Performed by: SURGERY

## 2019-04-04 PROCEDURE — 85025 COMPLETE CBC W/AUTO DIFF WBC: CPT

## 2019-04-04 PROCEDURE — 36415 COLL VENOUS BLD VENIPUNCTURE: CPT

## 2019-04-04 PROCEDURE — 84100 ASSAY OF PHOSPHORUS: CPT

## 2019-04-04 PROCEDURE — C9113 INJ PANTOPRAZOLE SODIUM, VIA: HCPCS | Performed by: SURGERY

## 2019-04-04 PROCEDURE — 74011250636 HC RX REV CODE- 250/636: Performed by: HOSPITALIST

## 2019-04-04 PROCEDURE — 65270000032 HC RM SEMIPRIVATE

## 2019-04-04 PROCEDURE — 74011000250 HC RX REV CODE- 250: Performed by: SURGERY

## 2019-04-04 PROCEDURE — 74011250637 HC RX REV CODE- 250/637: Performed by: HOSPITALIST

## 2019-04-04 PROCEDURE — 80048 BASIC METABOLIC PNL TOTAL CA: CPT

## 2019-04-04 RX ORDER — FUROSEMIDE 10 MG/ML
40 INJECTION INTRAMUSCULAR; INTRAVENOUS ONCE
Status: COMPLETED | OUTPATIENT
Start: 2019-04-04 | End: 2019-04-04

## 2019-04-04 RX ORDER — POTASSIUM CHLORIDE 14.9 MG/ML
10 INJECTION INTRAVENOUS
Status: COMPLETED | OUTPATIENT
Start: 2019-04-04 | End: 2019-04-04

## 2019-04-04 RX ORDER — HYDRALAZINE HYDROCHLORIDE 25 MG/1
25 TABLET, FILM COATED ORAL 3 TIMES DAILY
Status: DISCONTINUED | OUTPATIENT
Start: 2019-04-04 | End: 2019-04-05

## 2019-04-04 RX ORDER — FLUCONAZOLE 2 MG/ML
400 INJECTION, SOLUTION INTRAVENOUS DAILY
Status: DISCONTINUED | OUTPATIENT
Start: 2019-04-04 | End: 2019-04-26

## 2019-04-04 RX ORDER — POTASSIUM CHLORIDE 750 MG/1
40 TABLET, FILM COATED, EXTENDED RELEASE ORAL 2 TIMES DAILY
Status: DISPENSED | OUTPATIENT
Start: 2019-04-04 | End: 2019-04-06

## 2019-04-04 RX ORDER — POTASSIUM CHLORIDE 750 MG/1
10 TABLET, FILM COATED, EXTENDED RELEASE ORAL 3 TIMES DAILY
Status: DISCONTINUED | OUTPATIENT
Start: 2019-04-04 | End: 2019-04-04

## 2019-04-04 RX ADMIN — POTASSIUM CHLORIDE 10 MEQ: 200 INJECTION, SOLUTION INTRAVENOUS at 15:39

## 2019-04-04 RX ADMIN — POTASSIUM CHLORIDE 10 MEQ: 200 INJECTION, SOLUTION INTRAVENOUS at 18:20

## 2019-04-04 RX ADMIN — POTASSIUM CHLORIDE 40 MEQ: 750 TABLET, EXTENDED RELEASE ORAL at 18:30

## 2019-04-04 RX ADMIN — HYDRALAZINE HYDROCHLORIDE 25 MG: 25 TABLET, FILM COATED ORAL at 15:20

## 2019-04-04 RX ADMIN — POTASSIUM CHLORIDE 10 MEQ: 750 TABLET, EXTENDED RELEASE ORAL at 10:09

## 2019-04-04 RX ADMIN — SERTRALINE 100 MG: 50 TABLET, FILM COATED ORAL at 10:09

## 2019-04-04 RX ADMIN — CEFTRIAXONE SODIUM 2 G: 2 INJECTION, POWDER, FOR SOLUTION INTRAMUSCULAR; INTRAVENOUS at 15:19

## 2019-04-04 RX ADMIN — FLUCONAZOLE 400 MG: 400 INJECTION, SOLUTION INTRAVENOUS at 11:58

## 2019-04-04 RX ADMIN — HYDROCODONE BITARTRATE AND ACETAMINOPHEN 1 TABLET: 5; 325 TABLET ORAL at 13:33

## 2019-04-04 RX ADMIN — HYDRALAZINE HYDROCHLORIDE 25 MG: 25 TABLET, FILM COATED ORAL at 21:10

## 2019-04-04 RX ADMIN — Medication 10 ML: at 13:33

## 2019-04-04 RX ADMIN — METRONIDAZOLE 500 MG: 250 TABLET ORAL at 18:30

## 2019-04-04 RX ADMIN — HYDROMORPHONE HYDROCHLORIDE 2 MG: 1 INJECTION, SOLUTION INTRAMUSCULAR; INTRAVENOUS; SUBCUTANEOUS at 21:10

## 2019-04-04 RX ADMIN — FUROSEMIDE 40 MG: 10 INJECTION, SOLUTION INTRAMUSCULAR; INTRAVENOUS at 15:20

## 2019-04-04 RX ADMIN — METRONIDAZOLE 500 MG: 250 TABLET ORAL at 00:47

## 2019-04-04 RX ADMIN — METRONIDAZOLE 500 MG: 250 TABLET ORAL at 09:55

## 2019-04-04 RX ADMIN — HYDROCODONE BITARTRATE AND ACETAMINOPHEN 1 TABLET: 5; 325 TABLET ORAL at 18:38

## 2019-04-04 RX ADMIN — Medication 10 ML: at 21:11

## 2019-04-04 RX ADMIN — HYDROCHLOROTHIAZIDE: 25 TABLET ORAL at 10:09

## 2019-04-04 RX ADMIN — PANTOPRAZOLE SODIUM 40 MG: 40 INJECTION, POWDER, FOR SOLUTION INTRAVENOUS at 18:30

## 2019-04-04 NOTE — PROGRESS NOTES
Bedside shift change report given to Miguel A RN (oncoming nurse) by Blaze RN (offgoing nurse). Report included the following information SBAR, Kardex, Intake/Output and MAR.

## 2019-04-04 NOTE — PROGRESS NOTES
4/4/19; 10:30 -  
CM participated in IDRs on patient. Patient's WBC is up today, patient continues on IV ABX, and patient is afebrile. CRM: Edin Cuevas MPH, [facility]; Z: [phone] 
 
16:00 -  
CM noted patient's CM consult for SNF vs HH. Patient has not been seen by PT and OT to assess needs. For possible SNF placement, patient will need to be evaluated by therapy teams. CM discussed with bedside nursing. CRM: Edin Cuevas, MPH, [address] [place] [address]; Z: [phone]

## 2019-04-04 NOTE — PROGRESS NOTES
Neurosurgery Pt states abdominal pain a little worse today though she has been tolerating a diet and having BMs. No fevers, but WBC count trending up. Surgery plans to repeat WBC count tomorrow and possibly repeat abdominal CT if trend continues to rise. We are continuing to follow along in the case the patient is taken back to surgery, so we would be able to externalize shunt. Will continue to follow along.  
 
Milton Cm, NP

## 2019-04-04 NOTE — PROGRESS NOTES
[facility] Surgery Subjective Doing well, tolerating diet well, no N/V, had a BM, pain minimal 
 
Objective Patient Vitals for the past 24 hrs: 
 Temp Pulse Resp BP SpO2  
04/04/19 0915 98.1 °F (36.7 °C) 79 16 152/82 96 % 04/04/19 0440 98.2 °F (36.8 °C) 80 16 156/85 99 % 04/04/19 0046 98.4 °F (36.9 °C) 77 16 155/65 97 % 04/03/19 2156 98.1 °F (36.7 °C) 78 18 125/77 95 % 04/03/19 1942 98.1 °F (36.7 °C) 81 18 137/53 99 % 04/03/19 1730    142/55   
04/03/19 1507 98 °F (36.7 °C) 85 18 199/88 98 % 04/03/19 1217 98.5 °F (36.9 °C) 82 17 172/79 92 % Date 04/03/19 0700 - 04/04/19 4027 04/04/19 0700 - 04/05/19 5839 Shift 7198-5292 6548-3553 24 Hour Total 4505-3395 8317-3796 24 Hour Total  
INTAKE Shift Total(mL/kg) OUTPUT Urine(mL/kg/hr) 500(0.5)  500(0.2) Urine Voided 500  500 Drains 0  0 Output (ml) (Ami Soja #1 03/29/19 Right;Upper; Outer Abdomen) 0  0 Shift Total(mL/kg) 500(6)  500(6) NET -500  -500 Weight (kg) 83.9 83.9 83.9 83 83 83 PE 
Pulm - CTAB 
CV - RRR Abd - soft, ND, BS present, minimal TTP, TAMIKO serous to almost a greenish tint, not succuss or stool though Labs Recent Results (from the past 12 hour(s)) CBC WITH AUTOMATED DIFF Collection Time: 04/04/19  4:48 AM  
Result Value Ref Range WBC 12.5 (H) 3.6 - 11.0 K/uL  
 RBC 3.82 3.80 - 5.20 M/uL  
 HGB 11.3 (L) 11.5 - 16.0 g/dL HCT 34.7 (L) 35.0 - 47.0 % MCV 90.8 80.0 - 99.0 FL  
 MCH 29.6 26.0 - 34.0 PG  
 MCHC 32.6 30.0 - 36.5 g/dL  
 RDW 12.9 11.5 - 14.5 % PLATELET 322 258 - 508 K/uL MPV 9.5 8.9 - 12.9 FL  
 NRBC 0.0 0  WBC ABSOLUTE NRBC 0.00 0.00 - 0.01 K/uL NEUTROPHILS 68 32 - 75 % LYMPHOCYTES 19 12 - 49 % MONOCYTES 11 5 - 13 % EOSINOPHILS 1 0 - 7 % BASOPHILS 0 0 - 1 % IMMATURE GRANULOCYTES 1 (H) 0.0 - 0.5 % ABS. NEUTROPHILS 8.5 (H) 1.8 - 8.0 K/UL ABS. LYMPHOCYTES 2.4 0.8 - 3.5 K/UL  
 ABS. MONOCYTES 1.3 (H) 0.0 - 1.0 K/UL  
 ABS. EOSINOPHILS 0.1 0.0 - 0.4 K/UL  
 ABS. BASOPHILS 0.0 0.0 - 0.1 K/UL  
 ABS. IMM. GRANS. 0.1 (H) 0.00 - 0.04 K/UL  
 DF AUTOMATED METABOLIC PANEL, BASIC Collection Time: 04/04/19  4:48 AM  
Result Value Ref Range Sodium 130 (L) 136 - 145 mmol/L Potassium 3.1 (L) 3.5 - 5.1 mmol/L Chloride 97 97 - 108 mmol/L  
 CO2 26 21 - 32 mmol/L Anion gap 7 5 - 15 mmol/L Glucose 110 (H) 65 - 100 mg/dL BUN 6 6 - 20 MG/DL Creatinine 0.49 (L) 0.55 - 1.02 MG/DL  
 BUN/Creatinine ratio 12 12 - 20 GFR est AA >60 >60 ml/min/1.73m2 GFR est non-AA >60 >60 ml/min/1.73m2 Calcium 8.5 8.5 - 10.1 MG/DL MAGNESIUM Collection Time: 04/04/19  4:48 AM  
Result Value Ref Range Magnesium 1.9 1.6 - 2.4 mg/dL PHOSPHORUS Collection Time: 04/04/19  4:48 AM  
Result Value Ref Range Phosphorus 3.7 2.6 - 4.7 MG/DL Assessment Vanessa Arzate is a 79 y. o.yr old female s/p laparoscopic drainage of diverticular abscess Plan Doing well Tolerating diet and having bowel function Continue IV abx, she will be eventually DC home on PO abx if possible WBC up a touch from yesterday but no fever, tachycardia and if the WBC goes up I may repeat a CT tomorrow Gaston Lewis MD

## 2019-04-04 NOTE — ROUTINE PROCESS
TRANSFER - OUT REPORT: 
 
Verbal report given to ariela Thakur(name) on Theresa Mayes  being transferred to [place], rn(unit) for routine progression of care Report consisted of patients Situation, Background, Assessment and  
Recommendations(SBAR). Information from the following report(s) SBAR, Kardex, OR Summary, Intake/Output, MAR and Cardiac Rhythm NSR. was reviewed with the receiving nurse. Lines:  
Peripheral IV 04/03/19 Left Wrist (Active) Site Assessment Clean, dry, & intact 4/3/2019  3:56 PM  
Phlebitis Assessment 0 4/3/2019  3:56 PM  
Infiltration Assessment 0 4/3/2019  3:56 PM  
Dressing Status Clean, dry, & intact 4/3/2019  3:56 PM  
Dressing Type Transparent;Tape 4/3/2019  3:56 PM  
Hub Color/Line Status Blue 4/3/2019  3:56 PM  
Alcohol Cap Used Yes 4/3/2019  3:56 PM  
  
 
Opportunity for questions and clarification was provided. Patient transported with: 
 O2 @ 1 liters

## 2019-04-04 NOTE — ROUTINE PROCESS
Bedside and Verbal shift change report given to [address] [place] [address] (oncoming nurse) by Saba Barron (offgoing nurse). Report included the following information SBAR, Intake/Output, MAR and Cardiac Rhythm NSR.

## 2019-04-04 NOTE — PROGRESS NOTES
Hospitalist Progress Note Stacey Almendarez MD 
Answering service: [phone] OR [phone] from in house phone Date of Service:  2019 NAME:  Malena Hernandez :  1950 MRN:  [id number] Admission Summary:  
REASON FOR CONSULT:     
Jason Euceda is a 79 y.o.  female who I was asked to see for htn and hypokalemia. The pt is sp laparoscopy and drain placement for perforated diverticluitis with abscess and is sp drain placmeent. The pt has a hx of htn and is on hyzaar. She currently reports severe 10/10 pain which may be contributing to her htn. She deneis any nausea or vomiting. She is have borwn smears for stool Interval history / Subjective:  
  BP still elevated will make some changes Assessment & Plan: 1. HTN urgency with hx of brain aneurysm(SAH) in the past  
-Cont hyzaar. Add clonidine patch  
- add hydralazine 25 TID and lasix - d/c fluids 
  
2. Intractable pain - increase dilaudid to 2 q3hrs prn 
- pain causing elevated BP partly  
  
3. Diverticlitis sp drain - clear fluid currently, cx with ecoli on rocephin and flagyl - MGMT per primary team 
  
4. COPD - duonebs prn. 
  
5. Hypokalemia - replete IV and Oral  
  
7. Acute on chronic respiratory failure 
    With hypoxia - supplemental o2 
  
8. Chronic diastolic CHF- 4/2 ECHO - Estimated left ventricular ejection fraction is 61 - 65% Need diuresis. On lasix Code status:Full DVT prophylaxis: SCD Care Plan discussed with: Patient/Family and Nurse Disposition: TBD Hospital Problems  Date Reviewed: 3/16/2019 Codes Class Noted POA Perforated diverticulum of large intestine ICD-10-CM: K57.20 ICD-9-CM: 562.10  3/26/2019 Unknown Review of Systems: A comprehensive review of systems was negative. Vital Signs:  
 Last 24hrs VS reviewed since prior progress note. Most recent are: 
Visit Vitals /79 (BP 1 Location: Left arm, BP Patient Position: At rest) Pulse 82 Temp 97.3 °F (36.3 °C) Resp 16 Ht 5' 8\" (1.727 m) Wt 83 kg (182 lb 14.4 oz) SpO2 98% BMI 27.81 kg/m² Intake/Output Summary (Last 24 hours) at 4/4/2019 1340 Last data filed at 4/3/2019 1556 Gross per 24 hour Intake  Output 500 ml Net -500 ml Physical Examination:  
 
 
     
Constitutional:  No acute distress, cooperative, ENT:  Oral mucous moist, Resp:  CTA bilaterally. No wheezing/rhonchi/rales. CV:  Regular rhythm, normal rate, GI:  Soft, non distended, non tender. bs+ Musculoskeletal:  No edema, warm, 2+ pulses throughout Neurologic:  Moves all extremities. AAOx3, CN II-XII reviewed Psych:  Good insight, Not anxious nor agitated. Data Review:  
 I personally reviewed  Image and labs Labs:  
 
Recent Labs 04/04/19 0448 04/03/19 
1004 WBC 12.5* 11.9* HGB 11.3* 11.8 HCT 34.7* 36.2  393 Recent Labs 04/04/19 
0448 04/03/19 
1817 04/02/19 
5072 * 134* 136  
K 3.1* 2.9* 2.9*  
CL 97 99 100 CO2 26 27 28 BUN 6 4* 3*  
CREA 0.49* 0.53* 0.46* * 98 111* CA 8.5 8.6 8.5 MG 1.9 1.9 1.7 PHOS 3.7  --  2.9 No results for input(s): SGOT, GPT, ALT, AP, TBIL, TBILI, TP, ALB, GLOB, GGT, AML, LPSE in the last 72 hours. No lab exists for component: AMYP, HLPSE No results for input(s): INR, PTP, APTT in the last 72 hours. No lab exists for component: INREXT, INREXT No results for input(s): FE, TIBC, PSAT, FERR in the last 72 hours. No results found for: FOL, RBCF No results for input(s): PH, PCO2, PO2 in the last 72 hours. Recent Labs 04/02/19 
1451 TROIQ <0.05 Lab Results Component Value Date/Time  Cholesterol, total 157 10/05/2018 08:25 AM  
 HDL Cholesterol 40 10/05/2018 08:25 AM  
 LDL, calculated 89 10/05/2018 08:25 AM  
 Triglyceride 141 10/05/2018 08:25 AM  
 CHOL/HDL Ratio 2.7 10/05/2009 09:40 AM  
 
 Lab Results Component Value Date/Time Glucose (POC) 114 (H) 03/28/2019 12:50 PM  
 
Lab Results Component Value Date/Time Color Yellow 06/15/2016 10:03 AM  
 Appearance Clear 06/15/2016 10:03 AM  
 pH (UA) 6.5 06/15/2016 10:03 AM  
 Ketone Trace (A) 06/15/2016 10:03 AM  
 Bilirubin Negative 06/15/2016 10:03 AM  
 Nitrites Negative 06/15/2016 10:03 AM  
 Leukocyte Esterase Trace (A) 06/15/2016 10:03 AM  
 Bacteria Few 06/15/2016 10:03 AM  
 WBC 0-5 06/15/2016 10:03 AM  
 RBC 0-2 06/15/2016 10:03 AM  
 
 
 
Medications Reviewed:  
 
Current Facility-Administered Medications Medication Dose Route Frequency  fluconazole (DIFLUCAN) 400mg/200 mL IVPB (premix)  400 mg IntraVENous DAILY  hydrALAZINE (APRESOLINE) tablet 25 mg  25 mg Oral TID  furosemide (LASIX) injection 40 mg  40 mg IntraVENous ONCE  potassium chloride SR (KLOR-CON 10) tablet 40 mEq  40 mEq Oral BID  metroNIDAZOLE (FLAGYL) tablet 500 mg  500 mg Oral Q8H  
 HYDROmorphone (PF) (DILAUDID) injection 2 mg  2 mg IntraVENous Q3H PRN  
 labetalol (NORMODYNE;TRANDATE) 20 mg/4 mL (5 mg/mL) injection 20 mg  20 mg IntraVENous Q4H PRN  
 cloNIDine (CATAPRES) 0.2 mg/24 hr patch 1 Patch  1 Patch TransDERmal Q7D  
 hydrALAZINE (APRESOLINE) 20 mg/mL injection 10 mg  10 mg IntraVENous Q6H PRN  
 cefTRIAXone (ROCEPHIN) 2 g in 0.9% sodium chloride (MBP/ADV) 50 mL  2 g IntraVENous Q24H  
 sodium chloride (NS) flush 5-40 mL  5-40 mL IntraVENous Q8H  
 sodium chloride (NS) flush 5-40 mL  5-40 mL IntraVENous PRN  
 HYDROcodone-acetaminophen (NORCO) 5-325 mg per tablet 1 Tab  1 Tab Oral Q4H PRN  
 0.9% sodium chloride infusion 250 mL  250 mL IntraVENous PRN  
 albuterol-ipratropium (DUO-NEB) 2.5 MG-0.5 MG/3 ML  3 mL Nebulization PRN  
 ondansetron (ZOFRAN) injection 4 mg  4 mg IntraVENous Q4H PRN  
 acetaminophen (TYLENOL) tablet 650 mg  650 mg Oral Q6H PRN  pantoprazole (PROTONIX) 40 mg in sodium chloride 0.9% 10 mL injection 40 mg IntraVENous Q24H  
 albuterol (PROVENTIL VENTOLIN) nebulizer solution 2.5 mg  2.5 mg Nebulization Q6H PRN  
 sertraline (ZOLOFT) tablet 100 mg  100 mg Oral DAILY  traZODone (DESYREL) tablet 50 mg  50 mg Oral QHS PRN  
 losartan/hydroCHLOROthiazide (HYZAAR) 50/12.5 mg   Oral DAILY  
 
______________________________________________________________________ EXPECTED LENGTH OF STAY: 2d 14h ACTUAL LENGTH OF STAY:          9 Humza Garcia MD

## 2019-04-05 ENCOUNTER — APPOINTMENT (OUTPATIENT)
Dept: CT IMAGING | Age: 69
DRG: 329 | End: 2019-04-05
Attending: SURGERY
Payer: MEDICARE

## 2019-04-05 LAB
ANION GAP SERPL CALC-SCNC: 7 MMOL/L (ref 5–15)
BASOPHILS # BLD: 0 K/UL (ref 0–0.1)
BASOPHILS NFR BLD: 0 % (ref 0–1)
BUN SERPL-MCNC: 8 MG/DL (ref 6–20)
BUN/CREAT SERPL: 13 (ref 12–20)
CALCIUM SERPL-MCNC: 8.1 MG/DL (ref 8.5–10.1)
CHLORIDE SERPL-SCNC: 98 MMOL/L (ref 97–108)
CO2 SERPL-SCNC: 28 MMOL/L (ref 21–32)
CREAT SERPL-MCNC: 0.6 MG/DL (ref 0.55–1.02)
DIFFERENTIAL METHOD BLD: ABNORMAL
EOSINOPHIL # BLD: 0.5 K/UL (ref 0–0.4)
EOSINOPHIL NFR BLD: 4 % (ref 0–7)
ERYTHROCYTE [DISTWIDTH] IN BLOOD BY AUTOMATED COUNT: 12.9 % (ref 11.5–14.5)
GLUCOSE SERPL-MCNC: 115 MG/DL (ref 65–100)
HCT VFR BLD AUTO: 33.5 % (ref 35–47)
HGB BLD-MCNC: 11.1 G/DL (ref 11.5–16)
IMM GRANULOCYTES # BLD AUTO: 0.1 K/UL (ref 0–0.04)
IMM GRANULOCYTES NFR BLD AUTO: 1 % (ref 0–0.5)
LYMPHOCYTES # BLD: 2.7 K/UL (ref 0.8–3.5)
LYMPHOCYTES NFR BLD: 18 % (ref 12–49)
MAGNESIUM SERPL-MCNC: 1.8 MG/DL (ref 1.6–2.4)
MCH RBC QN AUTO: 30.6 PG (ref 26–34)
MCHC RBC AUTO-ENTMCNC: 33.1 G/DL (ref 30–36.5)
MCV RBC AUTO: 92.3 FL (ref 80–99)
MONOCYTES # BLD: 1.4 K/UL (ref 0–1)
MONOCYTES NFR BLD: 9 % (ref 5–13)
NEUTS SEG # BLD: 10.2 K/UL (ref 1.8–8)
NEUTS SEG NFR BLD: 68 % (ref 32–75)
NRBC # BLD: 0 K/UL (ref 0–0.01)
NRBC BLD-RTO: 0 PER 100 WBC
PHOSPHATE SERPL-MCNC: 2.3 MG/DL (ref 2.6–4.7)
PLATELET # BLD AUTO: 380 K/UL (ref 150–400)
PMV BLD AUTO: 9.6 FL (ref 8.9–12.9)
POTASSIUM SERPL-SCNC: 3.8 MMOL/L (ref 3.5–5.1)
RBC # BLD AUTO: 3.63 M/UL (ref 3.8–5.2)
SODIUM SERPL-SCNC: 133 MMOL/L (ref 136–145)
WBC # BLD AUTO: 15 K/UL (ref 3.6–11)

## 2019-04-05 PROCEDURE — 74011250636 HC RX REV CODE- 250/636: Performed by: SURGERY

## 2019-04-05 PROCEDURE — 83735 ASSAY OF MAGNESIUM: CPT

## 2019-04-05 PROCEDURE — 74011250637 HC RX REV CODE- 250/637: Performed by: SURGERY

## 2019-04-05 PROCEDURE — 74177 CT ABD & PELVIS W/CONTRAST: CPT

## 2019-04-05 PROCEDURE — 36415 COLL VENOUS BLD VENIPUNCTURE: CPT

## 2019-04-05 PROCEDURE — 74011000258 HC RX REV CODE- 258: Performed by: SURGERY

## 2019-04-05 PROCEDURE — 74011250637 HC RX REV CODE- 250/637: Performed by: HOSPITALIST

## 2019-04-05 PROCEDURE — 85025 COMPLETE CBC W/AUTO DIFF WBC: CPT

## 2019-04-05 PROCEDURE — 74011250636 HC RX REV CODE- 250/636: Performed by: NEUROLOGICAL SURGERY

## 2019-04-05 PROCEDURE — 74011000250 HC RX REV CODE- 250: Performed by: SURGERY

## 2019-04-05 PROCEDURE — 80048 BASIC METABOLIC PNL TOTAL CA: CPT

## 2019-04-05 PROCEDURE — 74011000258 HC RX REV CODE- 258: Performed by: RADIOLOGY

## 2019-04-05 PROCEDURE — 65270000032 HC RM SEMIPRIVATE

## 2019-04-05 PROCEDURE — 74011250636 HC RX REV CODE- 250/636: Performed by: NEUROMUSCULOSKELETAL MEDICINE & OMM

## 2019-04-05 PROCEDURE — 74011636320 HC RX REV CODE- 636/320: Performed by: RADIOLOGY

## 2019-04-05 PROCEDURE — 84100 ASSAY OF PHOSPHORUS: CPT

## 2019-04-05 PROCEDURE — C9113 INJ PANTOPRAZOLE SODIUM, VIA: HCPCS | Performed by: SURGERY

## 2019-04-05 RX ORDER — LOSARTAN POTASSIUM 50 MG/1
100 TABLET ORAL DAILY
Status: DISCONTINUED | OUTPATIENT
Start: 2019-04-05 | End: 2019-04-17

## 2019-04-05 RX ORDER — HYDRALAZINE HYDROCHLORIDE 25 MG/1
50 TABLET, FILM COATED ORAL 3 TIMES DAILY
Status: DISCONTINUED | OUTPATIENT
Start: 2019-04-05 | End: 2019-04-07

## 2019-04-05 RX ORDER — SODIUM CHLORIDE 0.9 % (FLUSH) 0.9 %
10 SYRINGE (ML) INJECTION
Status: COMPLETED | OUTPATIENT
Start: 2019-04-05 | End: 2019-04-05

## 2019-04-05 RX ORDER — FUROSEMIDE 40 MG/1
40 TABLET ORAL DAILY
Status: DISCONTINUED | OUTPATIENT
Start: 2019-04-05 | End: 2019-04-08

## 2019-04-05 RX ADMIN — HYDRALAZINE HYDROCHLORIDE 10 MG: 20 INJECTION INTRAMUSCULAR; INTRAVENOUS at 05:15

## 2019-04-05 RX ADMIN — Medication 10 ML: at 05:01

## 2019-04-05 RX ADMIN — POTASSIUM CHLORIDE 40 MEQ: 750 TABLET, EXTENDED RELEASE ORAL at 17:19

## 2019-04-05 RX ADMIN — HYDROMORPHONE HYDROCHLORIDE 2 MG: 1 INJECTION, SOLUTION INTRAMUSCULAR; INTRAVENOUS; SUBCUTANEOUS at 08:40

## 2019-04-05 RX ADMIN — TRAZODONE HYDROCHLORIDE 50 MG: 50 TABLET ORAL at 20:59

## 2019-04-05 RX ADMIN — HYDROCODONE BITARTRATE AND ACETAMINOPHEN 1 TABLET: 5; 325 TABLET ORAL at 20:43

## 2019-04-05 RX ADMIN — IOPAMIDOL 100 ML: 755 INJECTION, SOLUTION INTRAVENOUS at 11:18

## 2019-04-05 RX ADMIN — HYDROCODONE BITARTRATE AND ACETAMINOPHEN 1 TABLET: 5; 325 TABLET ORAL at 12:55

## 2019-04-05 RX ADMIN — HYDRALAZINE HYDROCHLORIDE 50 MG: 25 TABLET, FILM COATED ORAL at 21:01

## 2019-04-05 RX ADMIN — HYDRALAZINE HYDROCHLORIDE 50 MG: 25 TABLET, FILM COATED ORAL at 12:54

## 2019-04-05 RX ADMIN — FLUCONAZOLE 400 MG: 400 INJECTION, SOLUTION INTRAVENOUS at 14:14

## 2019-04-05 RX ADMIN — SODIUM CHLORIDE 100 ML: 900 INJECTION, SOLUTION INTRAVENOUS at 11:19

## 2019-04-05 RX ADMIN — Medication 10 ML: at 21:01

## 2019-04-05 RX ADMIN — CEFTRIAXONE SODIUM 2 G: 2 INJECTION, POWDER, FOR SOLUTION INTRAMUSCULAR; INTRAVENOUS at 18:05

## 2019-04-05 RX ADMIN — METRONIDAZOLE 500 MG: 250 TABLET ORAL at 17:19

## 2019-04-05 RX ADMIN — HYDROCODONE BITARTRATE AND ACETAMINOPHEN 1 TABLET: 5; 325 TABLET ORAL at 05:18

## 2019-04-05 RX ADMIN — LOSARTAN POTASSIUM 100 MG: 50 TABLET ORAL at 12:54

## 2019-04-05 RX ADMIN — METRONIDAZOLE 500 MG: 250 TABLET ORAL at 12:54

## 2019-04-05 RX ADMIN — FUROSEMIDE 40 MG: 40 TABLET ORAL at 12:54

## 2019-04-05 RX ADMIN — METRONIDAZOLE 500 MG: 250 TABLET ORAL at 00:30

## 2019-04-05 RX ADMIN — Medication 10 ML: at 20:58

## 2019-04-05 RX ADMIN — SERTRALINE 100 MG: 50 TABLET, FILM COATED ORAL at 12:54

## 2019-04-05 RX ADMIN — PANTOPRAZOLE SODIUM 40 MG: 40 INJECTION, POWDER, FOR SOLUTION INTRAVENOUS at 17:21

## 2019-04-05 RX ADMIN — Medication 10 ML: at 11:19

## 2019-04-05 RX ADMIN — HYDRALAZINE HYDROCHLORIDE 50 MG: 25 TABLET, FILM COATED ORAL at 17:19

## 2019-04-05 NOTE — PROGRESS NOTES
[address] [facility] Surgery POD #7 Subjective Having more pain today, no fever Objective Patient Vitals for the past 24 hrs: 
 Temp Pulse Resp BP SpO2  
04/05/19 0843 97.9 °F (36.6 °C) 84 18 169/81 94 % 04/05/19 0502 98.9 °F (37.2 °C) 80 16 (!) 179/103 94 % 04/05/19 0103 97.8 °F (36.6 °C) 77 16 152/80 95 % 04/04/19 2110  88  149/81   
04/04/19 2013 97.6 °F (36.4 °C) 82 16 155/83 93 % 04/04/19 1542 97.3 °F (36.3 °C) 81 16 154/82 96 % 04/04/19 1157 97.3 °F (36.3 °C) 82 16 160/79 98 % Date 04/04/19 0700 - 04/05/19 9765 04/05/19 0700 - 04/06/19 8388 Shift 1995-0263 2608-1045 24 Hour Total 1246-3437 0142-0098 24 Hour Total  
INTAKE  
P.O. 240 240 480     
  P. O. 240 240 480 Shift Total(mL/kg) 240(2.9) 240(2.9) 480(5.8) OUTPUT Urine(mL/kg/hr) 450(0.5) 250(0.3) 700(0.4) Urine Voided 450 250 700 Urine Occurrence(s) 2 x 1 x 3 x Drains 15  15 Output (ml) (Tani Chin #1 03/29/19 Right;Upper; Outer Abdomen) 15  15 Stool Stool Occurrence(s) 2 x  2 x Shift Total(mL/kg) 465(5.6) 250(3) 715(8.6) NET -225 -10 -235 Weight (kg) 83 83 83 83 83 83 PE 
Pulm - CTAB 
CV - RRR Abd - soft, ND, BS Present, mild TTP, no peritonitis, TAMIKO with enteric contents Labs Recent Results (from the past 12 hour(s)) CBC WITH AUTOMATED DIFF Collection Time: 04/05/19 12:40 AM  
Result Value Ref Range WBC 15.0 (H) 3.6 - 11.0 K/uL  
 RBC 3.63 (L) 3.80 - 5.20 M/uL  
 HGB 11.1 (L) 11.5 - 16.0 g/dL HCT 33.5 (L) 35.0 - 47.0 % MCV 92.3 80.0 - 99.0 FL  
 MCH 30.6 26.0 - 34.0 PG  
 MCHC 33.1 30.0 - 36.5 g/dL  
 RDW 12.9 11.5 - 14.5 % PLATELET 897 177 - 447 K/uL MPV 9.6 8.9 - 12.9 FL  
 NRBC 0.0 0  WBC ABSOLUTE NRBC 0.00 0.00 - 0.01 K/uL NEUTROPHILS 68 32 - 75 % LYMPHOCYTES 18 12 - 49 % MONOCYTES 9 5 - 13 % EOSINOPHILS 4 0 - 7 % BASOPHILS 0 0 - 1 % IMMATURE GRANULOCYTES 1 (H) 0.0 - 0.5 % ABS. NEUTROPHILS 10.2 (H) 1.8 - 8.0 K/UL  
 ABS. LYMPHOCYTES 2.7 0.8 - 3.5 K/UL  
 ABS. MONOCYTES 1.4 (H) 0.0 - 1.0 K/UL  
 ABS. EOSINOPHILS 0.5 (H) 0.0 - 0.4 K/UL  
 ABS. BASOPHILS 0.0 0.0 - 0.1 K/UL  
 ABS. IMM. GRANS. 0.1 (H) 0.00 - 0.04 K/UL  
 DF AUTOMATED METABOLIC PANEL, BASIC Collection Time: 04/05/19 12:40 AM  
Result Value Ref Range Sodium 133 (L) 136 - 145 mmol/L Potassium 3.8 3.5 - 5.1 mmol/L Chloride 98 97 - 108 mmol/L  
 CO2 28 21 - 32 mmol/L Anion gap 7 5 - 15 mmol/L Glucose 115 (H) 65 - 100 mg/dL BUN 8 6 - 20 MG/DL Creatinine 0.60 0.55 - 1.02 MG/DL  
 BUN/Creatinine ratio 13 12 - 20 GFR est AA >60 >60 ml/min/1.73m2 GFR est non-AA >60 >60 ml/min/1.73m2 Calcium 8.1 (L) 8.5 - 10.1 MG/DL MAGNESIUM Collection Time: 04/05/19 12:40 AM  
Result Value Ref Range Magnesium 1.8 1.6 - 2.4 mg/dL PHOSPHORUS Collection Time: 04/05/19 12:40 AM  
Result Value Ref Range Phosphorus 2.3 (L) 2.6 - 4.7 MG/DL Assessment Theresa Mayes is a 79 y. o.yr old female s/p laparoscopic drainage of diverticular abscess Plan She now has enteric contents in the TAMIKO drain, she has not sealed up this diverticular perforation The TAMIKO seems to be draining the fluid well and it is likely contained in that area. She does not have any peritonitis or any signs of sepsis She is going now for CT to see if the collection is still contained in that area It is looking like she may need sigmoid colectomy with colostomy and then would likely need to get the  shunt exteriorized Her WBC is up some but not too high Continue IV abx NPO for now We will make a decision about surgery after the CT Ebonie Suggs MD

## 2019-04-05 NOTE — PROGRESS NOTES
I reviewed the CT, the abscess is maybe a little bigger but is very well contained and well away from the  shunt. For now no operation but may need to consider this if she has any signs of sepsis or peritonitis which she does not have. Maybe with NPO and some time the output will decrease. I will discuss her with Dr Sherri Merino for the weekend. Janette Beckham

## 2019-04-05 NOTE — PROGRESS NOTES
Hospitalist Progress Note Donte Bolton MD 
Answering service: [phone] OR [phone] from in house phone Date of Service:  2019 NAME:  Vanessa Arzate :  1950 MRN:  [id number] Admission Summary:  
REASON FOR CONSULT:     
Mayra Matute is a 79 y.o.  female who I was asked to see for htn and hypokalemia. The pt is sp laparoscopy and drain placement for perforated diverticluitis with abscess and is sp drain placmeent. The pt has a hx of htn and is on hyzaar. She currently reports severe 10/10 pain which may be contributing to her htn. She deneis any nausea or vomiting. She is have borwn smears for stool Interval history / Subjective:  
  BP still elevated reviewed chart Assessment & Plan: 1. HTN urgency with hx of brain aneurysm(SAH) in the past  
-Cont ACE will increase dose add diuretics. Add clonidine patch  
- add hydralazine 50  TID and lasix - d/c fluids 
  
2. Intractable pain - increase dilaudid to 2 q3hrs prn 
- pain causing elevated BP partly  
  
3. Diverticlitis sp drain - clear fluid currently, cx with ecoli on rocephin and flagyl - MGMT per primary team 
  
4. COPD - duonebs prn. 
  
5. Hypokalemia - resolved 
  
7. Acute on chronic respiratory failure 
    With hypoxia - supplemental o2 
  
8. Chronic diastolic CHF-unknown NYHA  
-  4/2 ECHO - Estimated left ventricular ejection fraction is 61 - 65% - Need diuresis. On lasix Code status:Full DVT prophylaxis: SCD Care Plan discussed with: Patient/Family and Nurse Disposition: TBD Hospital Problems  Date Reviewed: 3/16/2019 Codes Class Noted POA Perforated diverticulum of large intestine ICD-10-CM: K57.20 ICD-9-CM: 562.10  3/26/2019 Unknown Review of Systems: A comprehensive review of systems was negative. Vital Signs:  
 Last 24hrs VS reviewed since prior progress note. Most recent are: Visit Vitals /81 (BP 1 Location: Left arm, BP Patient Position: At rest) Pulse 84 Temp 97.9 °F (36.6 °C) Resp 18 Ht 5' 8\" (1.727 m) Wt 83 kg (182 lb 14.4 oz) SpO2 94% BMI 27.81 kg/m² Intake/Output Summary (Last 24 hours) at 4/5/2019 9591 Last data filed at 4/4/2019 1945 Gross per 24 hour Intake 240 ml Output 715 ml Net -475 ml Physical Examination:  
 
 
     
Constitutional:  No acute distress, cooperative, ENT:  Oral mucous moist, Resp:  CTA bilaterally. No wheezing/rhonchi/rales. CV:  Regular rhythm, normal rate, GI:  Soft, non distended, non tender. bs+ Musculoskeletal:  No edema, warm, 2+ pulses throughout Neurologic:  Moves all extremities. AAOx3, CN II-XII reviewed Psych:  Good insight, Not anxious nor agitated. Data Review:  
 I personally reviewed  Image and labs Labs:  
 
Recent Labs 04/05/19 0040 04/04/19 0448 WBC 15.0* 12.5* HGB 11.1* 11.3* HCT 33.5* 34.7*  
 366 Recent Labs 04/05/19 
0040 04/04/19 
1526 04/04/19 0448 04/03/19 
5988 *  --  130* 134* K 3.8  --  3.1* 2.9*  
CL 98  --  97 99 CO2 28  --  26 27 BUN 8  --  6 4* CREA 0.60  --  0.49* 0.53* *  --  110* 98  
CA 8.1*  --  8.5 8.6 MG 1.8 2.0 1.9 1.9 PHOS 2.3*  --  3.7  -- No results for input(s): SGOT, GPT, ALT, AP, TBIL, TBILI, TP, ALB, GLOB, GGT, AML, LPSE in the last 72 hours. No lab exists for component: AMYP, HLPSE No results for input(s): INR, PTP, APTT in the last 72 hours. No lab exists for component: INREXT, INREXT No results for input(s): FE, TIBC, PSAT, FERR in the last 72 hours. No results found for: FOL, RBCF No results for input(s): PH, PCO2, PO2 in the last 72 hours. Recent Labs 04/02/19 
1451 TROIQ <0.05 Lab Results Component Value Date/Time  Cholesterol, total 157 10/05/2018 08:25 AM  
 HDL Cholesterol 40 10/05/2018 08:25 AM  
 LDL, calculated 89 10/05/2018 08:25 AM  
 Triglyceride 141 10/05/2018 08:25 AM  
 CHOL/HDL Ratio 2.7 10/05/2009 09:40 AM  
 
Lab Results Component Value Date/Time Glucose (POC) 114 (H) 03/28/2019 12:50 PM  
 
Lab Results Component Value Date/Time Color Yellow 06/15/2016 10:03 AM  
 Appearance Clear 06/15/2016 10:03 AM  
 pH (UA) 6.5 06/15/2016 10:03 AM  
 Ketone Trace (A) 06/15/2016 10:03 AM  
 Bilirubin Negative 06/15/2016 10:03 AM  
 Nitrites Negative 06/15/2016 10:03 AM  
 Leukocyte Esterase Trace (A) 06/15/2016 10:03 AM  
 Bacteria Few 06/15/2016 10:03 AM  
 WBC 0-5 06/15/2016 10:03 AM  
 RBC 0-2 06/15/2016 10:03 AM  
 
 
 
Medications Reviewed:  
 
Current Facility-Administered Medications Medication Dose Route Frequency  sodium chloride 0.9 % bolus infusion 100 mL  100 mL IntraVENous RAD ONCE  
 iopamidol (ISOVUE-370) 76 % injection 100 mL  100 mL IntraVENous RAD ONCE  
 sodium chloride (NS) flush 10 mL  10 mL IntraVENous RAD ONCE  hydrALAZINE (APRESOLINE) tablet 50 mg  50 mg Oral TID  losartan (COZAAR) tablet 100 mg  100 mg Oral DAILY  furosemide (LASIX) tablet 40 mg  40 mg Oral DAILY  fluconazole (DIFLUCAN) 400mg/200 mL IVPB (premix)  400 mg IntraVENous DAILY  potassium chloride SR (KLOR-CON 10) tablet 40 mEq  40 mEq Oral BID  metroNIDAZOLE (FLAGYL) tablet 500 mg  500 mg Oral Q8H  
 HYDROmorphone (PF) (DILAUDID) injection 2 mg  2 mg IntraVENous Q3H PRN  
 labetalol (NORMODYNE;TRANDATE) 20 mg/4 mL (5 mg/mL) injection 20 mg  20 mg IntraVENous Q4H PRN  
 cloNIDine (CATAPRES) 0.2 mg/24 hr patch 1 Patch  1 Patch TransDERmal Q7D  
 hydrALAZINE (APRESOLINE) 20 mg/mL injection 10 mg  10 mg IntraVENous Q6H PRN  
 cefTRIAXone (ROCEPHIN) 2 g in 0.9% sodium chloride (MBP/ADV) 50 mL  2 g IntraVENous Q24H  
 sodium chloride (NS) flush 5-40 mL  5-40 mL IntraVENous Q8H  
 sodium chloride (NS) flush 5-40 mL  5-40 mL IntraVENous PRN  
 HYDROcodone-acetaminophen (NORCO) 5-325 mg per tablet 1 Tab  1 Tab Oral Q4H PRN  
  0.9% sodium chloride infusion 250 mL  250 mL IntraVENous PRN  
 albuterol-ipratropium (DUO-NEB) 2.5 MG-0.5 MG/3 ML  3 mL Nebulization PRN  
 ondansetron (ZOFRAN) injection 4 mg  4 mg IntraVENous Q4H PRN  
 acetaminophen (TYLENOL) tablet 650 mg  650 mg Oral Q6H PRN  pantoprazole (PROTONIX) 40 mg in sodium chloride 0.9% 10 mL injection  40 mg IntraVENous Q24H  
 albuterol (PROVENTIL VENTOLIN) nebulizer solution 2.5 mg  2.5 mg Nebulization Q6H PRN  
 sertraline (ZOLOFT) tablet 100 mg  100 mg Oral DAILY  traZODone (DESYREL) tablet 50 mg  50 mg Oral QHS PRN  
 
______________________________________________________________________ EXPECTED LENGTH OF STAY: 2d 14h ACTUAL LENGTH OF STAY:          Abi Cruz MD

## 2019-04-05 NOTE — PROGRESS NOTES
Bedside shift change report given to Julita Zuñiga 71 (oncoming nurse) by Bianca Sandhu (offgoing nurse). Report included the following information SBAR, Kardex and MAR.

## 2019-04-05 NOTE — PROGRESS NOTES
4/5/19; 10:10 -  
CM did independent chart review of patient. Patient is to have CT today. Patient is NPO status and will likely have a sigmoid colectomy with ostomy placement. Patient's TAMIKO drain now has enteric material in it. CM again noted consult for SNF vs , but patient has not been seen by therapy teams for recommendations. Therapies will be likely after surgical plan is developed. CM to continue following. CRM: [facility], [facility]; Z: [phone] 
 
16:25 -  
CM notes that patient's surgical plan is being held for now. Patient needs to be evaluated by PT and OT for placement recommendations. CRM: Buddy Hannah, MPH, [address] [place] [address]; Z: [phone]

## 2019-04-05 NOTE — ROUTINE PROCESS
Bedside and Verbal shift change report given to [address] [place] [address] (oncoming nurse) by Roya Frias (offgoing nurse). Report included the following information SBAR, Kardex, Procedure Summary, Intake/Output, MAR, Accordion and Recent Results.

## 2019-04-06 LAB
ANION GAP SERPL CALC-SCNC: 6 MMOL/L (ref 5–15)
BASOPHILS # BLD: 0 K/UL (ref 0–0.1)
BASOPHILS NFR BLD: 0 % (ref 0–1)
BUN SERPL-MCNC: 5 MG/DL (ref 6–20)
BUN/CREAT SERPL: 9 (ref 12–20)
CALCIUM SERPL-MCNC: 8.5 MG/DL (ref 8.5–10.1)
CHLORIDE SERPL-SCNC: 102 MMOL/L (ref 97–108)
CO2 SERPL-SCNC: 27 MMOL/L (ref 21–32)
CREAT SERPL-MCNC: 0.58 MG/DL (ref 0.55–1.02)
DIFFERENTIAL METHOD BLD: ABNORMAL
EOSINOPHIL # BLD: 0.3 K/UL (ref 0–0.4)
EOSINOPHIL NFR BLD: 3 % (ref 0–7)
ERYTHROCYTE [DISTWIDTH] IN BLOOD BY AUTOMATED COUNT: 13.2 % (ref 11.5–14.5)
GLUCOSE SERPL-MCNC: 88 MG/DL (ref 65–100)
HCT VFR BLD AUTO: 36 % (ref 35–47)
HGB BLD-MCNC: 11.4 G/DL (ref 11.5–16)
IMM GRANULOCYTES # BLD AUTO: 0.1 K/UL (ref 0–0.04)
IMM GRANULOCYTES NFR BLD AUTO: 1 % (ref 0–0.5)
LYMPHOCYTES # BLD: 2 K/UL (ref 0.8–3.5)
LYMPHOCYTES NFR BLD: 18 % (ref 12–49)
MAGNESIUM SERPL-MCNC: 2.2 MG/DL (ref 1.6–2.4)
MCH RBC QN AUTO: 29.5 PG (ref 26–34)
MCHC RBC AUTO-ENTMCNC: 31.7 G/DL (ref 30–36.5)
MCV RBC AUTO: 93.3 FL (ref 80–99)
MONOCYTES # BLD: 1 K/UL (ref 0–1)
MONOCYTES NFR BLD: 9 % (ref 5–13)
NEUTS SEG # BLD: 7.5 K/UL (ref 1.8–8)
NEUTS SEG NFR BLD: 69 % (ref 32–75)
NRBC # BLD: 0 K/UL (ref 0–0.01)
NRBC BLD-RTO: 0 PER 100 WBC
PHOSPHATE SERPL-MCNC: 2.7 MG/DL (ref 2.6–4.7)
PLATELET # BLD AUTO: 379 K/UL (ref 150–400)
PMV BLD AUTO: 9.5 FL (ref 8.9–12.9)
POTASSIUM SERPL-SCNC: 3.8 MMOL/L (ref 3.5–5.1)
RBC # BLD AUTO: 3.86 M/UL (ref 3.8–5.2)
SODIUM SERPL-SCNC: 135 MMOL/L (ref 136–145)
WBC # BLD AUTO: 10.9 K/UL (ref 3.6–11)

## 2019-04-06 PROCEDURE — 85025 COMPLETE CBC W/AUTO DIFF WBC: CPT

## 2019-04-06 PROCEDURE — 74011250636 HC RX REV CODE- 250/636: Performed by: SURGERY

## 2019-04-06 PROCEDURE — 74011250637 HC RX REV CODE- 250/637: Performed by: SURGERY

## 2019-04-06 PROCEDURE — 83735 ASSAY OF MAGNESIUM: CPT

## 2019-04-06 PROCEDURE — 74011250636 HC RX REV CODE- 250/636: Performed by: NEUROMUSCULOSKELETAL MEDICINE & OMM

## 2019-04-06 PROCEDURE — C9113 INJ PANTOPRAZOLE SODIUM, VIA: HCPCS | Performed by: SURGERY

## 2019-04-06 PROCEDURE — 74011250637 HC RX REV CODE- 250/637: Performed by: HOSPITALIST

## 2019-04-06 PROCEDURE — 36415 COLL VENOUS BLD VENIPUNCTURE: CPT

## 2019-04-06 PROCEDURE — 65270000032 HC RM SEMIPRIVATE

## 2019-04-06 PROCEDURE — 80048 BASIC METABOLIC PNL TOTAL CA: CPT

## 2019-04-06 PROCEDURE — 84100 ASSAY OF PHOSPHORUS: CPT

## 2019-04-06 PROCEDURE — 74011000250 HC RX REV CODE- 250: Performed by: SURGERY

## 2019-04-06 PROCEDURE — 74011000258 HC RX REV CODE- 258: Performed by: SURGERY

## 2019-04-06 RX ORDER — SODIUM CHLORIDE 9 MG/ML
100 INJECTION, SOLUTION INTRAVENOUS CONTINUOUS
Status: DISCONTINUED | OUTPATIENT
Start: 2019-04-06 | End: 2019-04-08

## 2019-04-06 RX ADMIN — HYDROCODONE BITARTRATE AND ACETAMINOPHEN 1 TABLET: 5; 325 TABLET ORAL at 11:02

## 2019-04-06 RX ADMIN — HYDROCODONE BITARTRATE AND ACETAMINOPHEN 1 TABLET: 5; 325 TABLET ORAL at 20:29

## 2019-04-06 RX ADMIN — FLUCONAZOLE 400 MG: 400 INJECTION, SOLUTION INTRAVENOUS at 11:00

## 2019-04-06 RX ADMIN — SODIUM CHLORIDE 100 ML/HR: 900 INJECTION, SOLUTION INTRAVENOUS at 16:08

## 2019-04-06 RX ADMIN — PANTOPRAZOLE SODIUM 40 MG: 40 INJECTION, POWDER, FOR SOLUTION INTRAVENOUS at 17:48

## 2019-04-06 RX ADMIN — HYDRALAZINE HYDROCHLORIDE 50 MG: 25 TABLET, FILM COATED ORAL at 22:38

## 2019-04-06 RX ADMIN — Medication 10 ML: at 07:25

## 2019-04-06 RX ADMIN — HYDROMORPHONE HYDROCHLORIDE 2 MG: 1 INJECTION, SOLUTION INTRAMUSCULAR; INTRAVENOUS; SUBCUTANEOUS at 22:38

## 2019-04-06 RX ADMIN — METRONIDAZOLE 500 MG: 250 TABLET ORAL at 16:01

## 2019-04-06 RX ADMIN — Medication 10 ML: at 14:00

## 2019-04-06 RX ADMIN — SERTRALINE 100 MG: 50 TABLET, FILM COATED ORAL at 16:05

## 2019-04-06 RX ADMIN — LOSARTAN POTASSIUM 50 MG: 50 TABLET ORAL at 16:05

## 2019-04-06 RX ADMIN — METRONIDAZOLE 500 MG: 250 TABLET ORAL at 00:36

## 2019-04-06 RX ADMIN — HYDRALAZINE HYDROCHLORIDE 50 MG: 25 TABLET, FILM COATED ORAL at 16:00

## 2019-04-06 RX ADMIN — HYDROCODONE BITARTRATE AND ACETAMINOPHEN 1 TABLET: 5; 325 TABLET ORAL at 00:35

## 2019-04-06 RX ADMIN — HYDROCODONE BITARTRATE AND ACETAMINOPHEN 1 TABLET: 5; 325 TABLET ORAL at 16:00

## 2019-04-06 RX ADMIN — Medication 10 ML: at 23:10

## 2019-04-06 RX ADMIN — CEFTRIAXONE SODIUM 2 G: 2 INJECTION, POWDER, FOR SOLUTION INTRAMUSCULAR; INTRAVENOUS at 15:03

## 2019-04-06 NOTE — PROGRESS NOTES
Progress Note Patient: Theresa Mayes MRN: [id number]  SSN: [id number] YOB: 1950  Age: 79 y.o. Sex: female Admit Date: 3/26/2019 
 
8 Days Post-Op Procedure:  Procedure(s): LAPAROSCOPY GENERAL DIAGNOSTIC/ LAPAROSCOPIC DRAINAGE OF PELVIC ABSCESS Subjective: No acute surgical issues. Pt reported pain is well controlled. No nausea or vomiting and WBC normalizing. No fever or chills. TAMIKO drain does look feculent but there is minimal output. Objective:  
 
Visit Vitals /78 (BP 1 Location: Left arm, BP Patient Position: At rest) Pulse 77 Temp 98.4 °F (36.9 °C) Resp 16 Ht 5' 8\" (1.727 m) Wt 180 lb 11.2 oz (82 kg) SpO2 97% BMI 27.48 kg/m² Temp (24hrs), Av.7 °F (36.5 °C), Min:97.1 °F (36.2 °C), Max:98.4 °F (36.9 °C) Physical Exam:   
Gen:  NAD Pulm:  Unlabored Abd:  S/ND/appropriate TTP Wound:  C/D/I 
TAMIKO:  Light brown feculent material 
 
Recent Results (from the past 24 hour(s)) CBC WITH AUTOMATED DIFF Collection Time: 19 12:55 AM  
Result Value Ref Range WBC 10.9 3.6 - 11.0 K/uL  
 RBC 3.86 3.80 - 5.20 M/uL  
 HGB 11.4 (L) 11.5 - 16.0 g/dL HCT 36.0 35.0 - 47.0 % MCV 93.3 80.0 - 99.0 FL  
 MCH 29.5 26.0 - 34.0 PG  
 MCHC 31.7 30.0 - 36.5 g/dL  
 RDW 13.2 11.5 - 14.5 % PLATELET 529 898 - 353 K/uL MPV 9.5 8.9 - 12.9 FL  
 NRBC 0.0 0  WBC ABSOLUTE NRBC 0.00 0.00 - 0.01 K/uL NEUTROPHILS 69 32 - 75 % LYMPHOCYTES 18 12 - 49 % MONOCYTES 9 5 - 13 % EOSINOPHILS 3 0 - 7 % BASOPHILS 0 0 - 1 % IMMATURE GRANULOCYTES 1 (H) 0.0 - 0.5 % ABS. NEUTROPHILS 7.5 1.8 - 8.0 K/UL  
 ABS. LYMPHOCYTES 2.0 0.8 - 3.5 K/UL  
 ABS. MONOCYTES 1.0 0.0 - 1.0 K/UL  
 ABS. EOSINOPHILS 0.3 0.0 - 0.4 K/UL  
 ABS. BASOPHILS 0.0 0.0 - 0.1 K/UL  
 ABS. IMM. GRANS. 0.1 (H) 0.00 - 0.04 K/UL  
 DF AUTOMATED METABOLIC PANEL, BASIC Collection Time: 19 12:55 AM  
Result Value Ref Range Sodium 135 (L) 136 - 145 mmol/L Potassium 3.8 3.5 - 5.1 mmol/L Chloride 102 97 - 108 mmol/L  
 CO2 27 21 - 32 mmol/L Anion gap 6 5 - 15 mmol/L Glucose 88 65 - 100 mg/dL BUN 5 (L) 6 - 20 MG/DL Creatinine 0.58 0.55 - 1.02 MG/DL  
 BUN/Creatinine ratio 9 (L) 12 - 20 GFR est AA >60 >60 ml/min/1.73m2 GFR est non-AA >60 >60 ml/min/1.73m2 Calcium 8.5 8.5 - 10.1 MG/DL MAGNESIUM Collection Time: 04/06/19 12:55 AM  
Result Value Ref Range Magnesium 2.2 1.6 - 2.4 mg/dL PHOSPHORUS Collection Time: 04/06/19 12:55 AM  
Result Value Ref Range Phosphorus 2.7 2.6 - 4.7 MG/DL Assessment:  
 
Hospital Problems  Date Reviewed: 3/16/2019 Codes Class Noted POA Perforated diverticulum of large intestine ICD-10-CM: K57.20 ICD-9-CM: 562.10  3/26/2019 Unknown Plan/Recommendations/Medical Decision Making: - Perforated diverticulitis:  Continue antibiotic therapy and monitor TAMIKO drain output. Overall pt is clinically improving. No acute indication for colectomy 
- NPO with ice chips/meds/popsicle - Continue antibiotic therapy - Repeat CT scan Monday - If not improving by early this week then she will need PICC and TPN 
- Out of bed as tolerated - Labs in am 
 
Signed By: Carmel Espinosa MD   
 April 6, 2019

## 2019-04-06 NOTE — PROGRESS NOTES
Bedside shift change report given to [address] [place] [address] (oncoming nurse) by Dov Hopkins (offgoing nurse). Report included the following information SBAR, Kardex and MAR.

## 2019-04-06 NOTE — PROGRESS NOTES
Bedside shift change report given to Donell Varela (oncoming nurse) by Airam Kaufman (offgoing nurse). Report included the following information SBAR.

## 2019-04-07 LAB
ANION GAP SERPL CALC-SCNC: 5 MMOL/L (ref 5–15)
BASOPHILS # BLD: 0 K/UL (ref 0–0.1)
BASOPHILS NFR BLD: 0 % (ref 0–1)
BUN SERPL-MCNC: 4 MG/DL (ref 6–20)
BUN/CREAT SERPL: 8 (ref 12–20)
CALCIUM SERPL-MCNC: 8.2 MG/DL (ref 8.5–10.1)
CHLORIDE SERPL-SCNC: 104 MMOL/L (ref 97–108)
CO2 SERPL-SCNC: 26 MMOL/L (ref 21–32)
CREAT SERPL-MCNC: 0.48 MG/DL (ref 0.55–1.02)
DIFFERENTIAL METHOD BLD: ABNORMAL
EOSINOPHIL # BLD: 0.2 K/UL (ref 0–0.4)
EOSINOPHIL NFR BLD: 2 % (ref 0–7)
ERYTHROCYTE [DISTWIDTH] IN BLOOD BY AUTOMATED COUNT: 13.1 % (ref 11.5–14.5)
GLUCOSE SERPL-MCNC: 88 MG/DL (ref 65–100)
HCT VFR BLD AUTO: 34.6 % (ref 35–47)
HGB BLD-MCNC: 10.9 G/DL (ref 11.5–16)
IMM GRANULOCYTES # BLD AUTO: 0.1 K/UL (ref 0–0.04)
IMM GRANULOCYTES NFR BLD AUTO: 1 % (ref 0–0.5)
LYMPHOCYTES # BLD: 1.5 K/UL (ref 0.8–3.5)
LYMPHOCYTES NFR BLD: 13 % (ref 12–49)
MCH RBC QN AUTO: 29.6 PG (ref 26–34)
MCHC RBC AUTO-ENTMCNC: 31.5 G/DL (ref 30–36.5)
MCV RBC AUTO: 94 FL (ref 80–99)
MONOCYTES # BLD: 0.8 K/UL (ref 0–1)
MONOCYTES NFR BLD: 7 % (ref 5–13)
NEUTS SEG # BLD: 9 K/UL (ref 1.8–8)
NEUTS SEG NFR BLD: 77 % (ref 32–75)
NRBC # BLD: 0 K/UL (ref 0–0.01)
NRBC BLD-RTO: 0 PER 100 WBC
PLATELET # BLD AUTO: 383 K/UL (ref 150–400)
POTASSIUM SERPL-SCNC: 3.6 MMOL/L (ref 3.5–5.1)
RBC # BLD AUTO: 3.68 M/UL (ref 3.8–5.2)
RBC MORPH BLD: ABNORMAL
SODIUM SERPL-SCNC: 135 MMOL/L (ref 136–145)
WBC # BLD AUTO: 11.6 K/UL (ref 3.6–11)

## 2019-04-07 PROCEDURE — 74011000250 HC RX REV CODE- 250: Performed by: SURGERY

## 2019-04-07 PROCEDURE — 85025 COMPLETE CBC W/AUTO DIFF WBC: CPT

## 2019-04-07 PROCEDURE — 74011000258 HC RX REV CODE- 258: Performed by: SURGERY

## 2019-04-07 PROCEDURE — 74011250637 HC RX REV CODE- 250/637: Performed by: SURGERY

## 2019-04-07 PROCEDURE — 74011250637 HC RX REV CODE- 250/637: Performed by: HOSPITALIST

## 2019-04-07 PROCEDURE — 94760 N-INVAS EAR/PLS OXIMETRY 1: CPT

## 2019-04-07 PROCEDURE — 36415 COLL VENOUS BLD VENIPUNCTURE: CPT

## 2019-04-07 PROCEDURE — 74011250636 HC RX REV CODE- 250/636: Performed by: SURGERY

## 2019-04-07 PROCEDURE — C9113 INJ PANTOPRAZOLE SODIUM, VIA: HCPCS | Performed by: SURGERY

## 2019-04-07 PROCEDURE — 65270000032 HC RM SEMIPRIVATE

## 2019-04-07 PROCEDURE — 80048 BASIC METABOLIC PNL TOTAL CA: CPT

## 2019-04-07 RX ORDER — HYDRALAZINE HYDROCHLORIDE 50 MG/1
100 TABLET, FILM COATED ORAL 3 TIMES DAILY
Status: DISCONTINUED | OUTPATIENT
Start: 2019-04-07 | End: 2019-04-17

## 2019-04-07 RX ADMIN — TRAZODONE HYDROCHLORIDE 50 MG: 50 TABLET ORAL at 01:13

## 2019-04-07 RX ADMIN — LOSARTAN POTASSIUM 100 MG: 50 TABLET ORAL at 11:03

## 2019-04-07 RX ADMIN — FLUCONAZOLE 400 MG: 400 INJECTION, SOLUTION INTRAVENOUS at 11:13

## 2019-04-07 RX ADMIN — HYDRALAZINE HYDROCHLORIDE 100 MG: 25 TABLET ORAL at 11:03

## 2019-04-07 RX ADMIN — Medication 10 ML: at 05:03

## 2019-04-07 RX ADMIN — Medication 10 ML: at 15:26

## 2019-04-07 RX ADMIN — HYDRALAZINE HYDROCHLORIDE 100 MG: 25 TABLET ORAL at 22:26

## 2019-04-07 RX ADMIN — METRONIDAZOLE 500 MG: 250 TABLET ORAL at 19:59

## 2019-04-07 RX ADMIN — HYDROCODONE BITARTRATE AND ACETAMINOPHEN 1 TABLET: 5; 325 TABLET ORAL at 07:12

## 2019-04-07 RX ADMIN — FUROSEMIDE 40 MG: 40 TABLET ORAL at 11:03

## 2019-04-07 RX ADMIN — CEFTRIAXONE SODIUM 2 G: 2 INJECTION, POWDER, FOR SOLUTION INTRAMUSCULAR; INTRAVENOUS at 15:23

## 2019-04-07 RX ADMIN — HYDROCODONE BITARTRATE AND ACETAMINOPHEN 1 TABLET: 5; 325 TABLET ORAL at 11:03

## 2019-04-07 RX ADMIN — Medication 10 ML: at 22:28

## 2019-04-07 RX ADMIN — HYDROCODONE BITARTRATE AND ACETAMINOPHEN 1 TABLET: 5; 325 TABLET ORAL at 15:26

## 2019-04-07 RX ADMIN — METRONIDAZOLE 500 MG: 250 TABLET ORAL at 11:04

## 2019-04-07 RX ADMIN — HYDROCODONE BITARTRATE AND ACETAMINOPHEN 1 TABLET: 5; 325 TABLET ORAL at 19:59

## 2019-04-07 RX ADMIN — SERTRALINE 100 MG: 50 TABLET, FILM COATED ORAL at 11:04

## 2019-04-07 RX ADMIN — HYDRALAZINE HYDROCHLORIDE 100 MG: 25 TABLET ORAL at 15:26

## 2019-04-07 RX ADMIN — METRONIDAZOLE 500 MG: 250 TABLET ORAL at 01:09

## 2019-04-07 RX ADMIN — Medication 10 ML: at 14:00

## 2019-04-07 RX ADMIN — PANTOPRAZOLE SODIUM 40 MG: 40 INJECTION, POWDER, FOR SOLUTION INTRAVENOUS at 19:59

## 2019-04-07 NOTE — PROGRESS NOTES
Hospitalist Progress Note Faina Fontanez MD 
Answering service: [phone] OR [phone] from in house phone Date of Service:  2019 NAME:  Chai Oconnor :  1950 MRN:  [id number] Admission Summary:  
REASON FOR CONSULT:     
Tavo Fernández is a 79 y.o.  female who I was asked to see for htn and hypokalemia. The pt is sp laparoscopy and drain placement for perforated diverticluitis with abscess and is sp drain placmeent. The pt has a hx of htn and is on hyzaar. She currently reports severe 10/10 pain which may be contributing to her htn. She deneis any nausea or vomiting. She is have borwn smears for stool Interval history / Subjective:  
  BP still elevated reviewed chart cont same MGMT today Assessment & Plan: 1. HTN urgency with hx of brain aneurysm(SAH) in the past  
-Cont ACE will increase dose add diuretics. Add clonidine patch  
- add hydralazine 50  TID and lasix - d/c fluids 
  
2. Intractable pain - increase dilaudid to 2 q3hrs prn 
- pain causing elevated BP partly  
  
3. Diverticlitis sp drain - clear fluid currently, cx with ecoli on rocephin and flagyl - MGMT per primary team 
  
4. COPD - duonebs prn. 
  
5. Hypokalemia - resolved 
  
7. Acute on chronic respiratory failure 
    With hypoxia - supplemental o2 
  
8. Chronic diastolic CHF-unknown NYHA  
-  4/2 ECHO - Estimated left ventricular ejection fraction is 61 - 65% - Need diuresis. On lasix Code status:Full DVT prophylaxis: SCD Care Plan discussed with: Patient/Family and Nurse Disposition: TBD Hospital Problems  Date Reviewed: 3/16/2019 Codes Class Noted POA Perforated diverticulum of large intestine ICD-10-CM: K57.20 ICD-9-CM: 562.10  3/26/2019 Unknown Review of Systems: A comprehensive review of systems was negative. Vital Signs: Last 24hrs VS reviewed since prior progress note. Most recent are: 
Visit Vitals /72 (BP 1 Location: Left arm, BP Patient Position: At rest) Pulse 82 Temp 98.4 °F (36.9 °C) Resp 16 Ht 5' 8\" (1.727 m) Wt 81.3 kg (179 lb 3.2 oz) SpO2 97% BMI 27.25 kg/m² Intake/Output Summary (Last 24 hours) at 4/7/2019 0413 Last data filed at 4/6/2019 2034 Gross per 24 hour Intake 291.67 ml Output 13 ml Net 278.67 ml Physical Examination:  
 
 
     
Constitutional:  No acute distress, cooperative, ENT:  Oral mucous moist, Resp:  CTA bilaterally. No wheezing/rhonchi/rales. CV:  Regular rhythm, normal rate, GI:  Soft, non distended, non tender. bs+ Musculoskeletal:  No edema, warm, 2+ pulses throughout Neurologic:  Moves all extremities. AAOx3, CN II-XII reviewed Psych:  Good insight, Not anxious nor agitated. Data Review:  
 I personally reviewed  Image and labs Labs:  
 
Recent Labs 04/07/19 
0125 04/06/19 
6621 WBC 11.6* 10.9 HGB 10.9* 11.4* HCT 34.6* 36.0  379 Recent Labs 04/07/19 
0125 04/06/19 
0055 04/05/19 
0040 04/04/19 
1526 * 135* 133*  --   
K 3.6 3.8 3.8  --   
 102 98  --   
CO2 26 27 28  --   
BUN 4* 5* 8  --   
CREA 0.48* 0.58 0.60  --   
GLU 88 88 115*  --   
CA 8.2* 8.5 8.1*  --   
MG  --  2.2 1.8 2.0 PHOS  --  2.7 2.3*  -- No results for input(s): SGOT, GPT, ALT, AP, TBIL, TBILI, TP, ALB, GLOB, GGT, AML, LPSE in the last 72 hours. No lab exists for component: AMYP, HLPSE No results for input(s): INR, PTP, APTT in the last 72 hours. No lab exists for component: INREXT, INREXT No results for input(s): FE, TIBC, PSAT, FERR in the last 72 hours. No results found for: FOL, RBCF No results for input(s): PH, PCO2, PO2 in the last 72 hours. No results for input(s): CPK, CKNDX, TROIQ in the last 72 hours. No lab exists for component: CPKMB Lab Results Component Value Date/Time Cholesterol, total 157 10/05/2018 08:25 AM  
 HDL Cholesterol 40 10/05/2018 08:25 AM  
 LDL, calculated 89 10/05/2018 08:25 AM  
 Triglyceride 141 10/05/2018 08:25 AM  
 CHOL/HDL Ratio 2.7 10/05/2009 09:40 AM  
 
Lab Results Component Value Date/Time Glucose (POC) 114 (H) 03/28/2019 12:50 PM  
 
Lab Results Component Value Date/Time Color Yellow 06/15/2016 10:03 AM  
 Appearance Clear 06/15/2016 10:03 AM  
 pH (UA) 6.5 06/15/2016 10:03 AM  
 Ketone Trace (A) 06/15/2016 10:03 AM  
 Bilirubin Negative 06/15/2016 10:03 AM  
 Nitrites Negative 06/15/2016 10:03 AM  
 Leukocyte Esterase Trace (A) 06/15/2016 10:03 AM  
 Bacteria Few 06/15/2016 10:03 AM  
 WBC 0-5 06/15/2016 10:03 AM  
 RBC 0-2 06/15/2016 10:03 AM  
 
 
 
Medications Reviewed:  
 
Current Facility-Administered Medications Medication Dose Route Frequency  0.9% sodium chloride infusion  100 mL/hr IntraVENous CONTINUOUS  
 hydrALAZINE (APRESOLINE) tablet 50 mg  50 mg Oral TID  losartan (COZAAR) tablet 100 mg  100 mg Oral DAILY  furosemide (LASIX) tablet 40 mg  40 mg Oral DAILY  fluconazole (DIFLUCAN) 400mg/200 mL IVPB (premix)  400 mg IntraVENous DAILY  metroNIDAZOLE (FLAGYL) tablet 500 mg  500 mg Oral Q8H  
 HYDROmorphone (PF) (DILAUDID) injection 2 mg  2 mg IntraVENous Q3H PRN  
 labetalol (NORMODYNE;TRANDATE) 20 mg/4 mL (5 mg/mL) injection 20 mg  20 mg IntraVENous Q4H PRN  
 cloNIDine (CATAPRES) 0.2 mg/24 hr patch 1 Patch  1 Patch TransDERmal Q7D  
 hydrALAZINE (APRESOLINE) 20 mg/mL injection 10 mg  10 mg IntraVENous Q6H PRN  
 cefTRIAXone (ROCEPHIN) 2 g in 0.9% sodium chloride (MBP/ADV) 50 mL  2 g IntraVENous Q24H  
 sodium chloride (NS) flush 5-40 mL  5-40 mL IntraVENous Q8H  
 sodium chloride (NS) flush 5-40 mL  5-40 mL IntraVENous PRN  
 HYDROcodone-acetaminophen (NORCO) 5-325 mg per tablet 1 Tab  1 Tab Oral Q4H PRN  
 0.9% sodium chloride infusion 250 mL  250 mL IntraVENous PRN  
  albuterol-ipratropium (DUO-NEB) 2.5 MG-0.5 MG/3 ML  3 mL Nebulization PRN  
 ondansetron (ZOFRAN) injection 4 mg  4 mg IntraVENous Q4H PRN  
 acetaminophen (TYLENOL) tablet 650 mg  650 mg Oral Q6H PRN  pantoprazole (PROTONIX) 40 mg in sodium chloride 0.9% 10 mL injection  40 mg IntraVENous Q24H  
 albuterol (PROVENTIL VENTOLIN) nebulizer solution 2.5 mg  2.5 mg Nebulization Q6H PRN  
 sertraline (ZOLOFT) tablet 100 mg  100 mg Oral DAILY  traZODone (DESYREL) tablet 50 mg  50 mg Oral QHS PRN  
 
______________________________________________________________________ EXPECTED LENGTH OF STAY: 2d 14h ACTUAL LENGTH OF STAY:          12 Kathy Matthew MD

## 2019-04-07 NOTE — PROGRESS NOTES
Following from neurosurgical standpoint in case shunt needs to be externalized  Pt is awake alert comfortable in NAD Spoke with Dr Ana Gutierrez who is also on call this weekend and plan is to repeat CT abd tomorrow  No surgery at this time, CT from Friday was stable with regard to confinement of abscess according to Dr Ana Martínez to return tomorrow

## 2019-04-07 NOTE — PROGRESS NOTES
Bedside shift change report given to Julianne CANCHOLA (oncoming nurse) by Dale Melara (offgoing nurse). Report included the following information SBAR, Kardex and MAR.

## 2019-04-07 NOTE — PROGRESS NOTES
Progress Note Patient: Jack Philip MRN: [id number]  SSN: [id number] YOB: 1950  Age: 79 y.o. Sex: female Admit Date: 3/26/2019 
 
9 Days Post-Op Procedure:  Procedure(s): LAPAROSCOPY GENERAL DIAGNOSTIC/ LAPAROSCOPIC DRAINAGE OF PELVIC ABSCESS Subjective: No acute surgical issues. Pt reported pain is well controlled. No nausea or vomiting and WBC normalizing. No fever or chills. TAMIKO drain still with feculent output but is minimal.   
 
Objective:  
 
Visit Vitals /73 (BP 1 Location: Left arm, BP Patient Position: At rest) Pulse 82 Temp 99 °F (37.2 °C) Resp 16 Ht 5' 8\" (1.727 m) Wt 179 lb 3.2 oz (81.3 kg) SpO2 97% BMI 27.25 kg/m² Temp (24hrs), Av.7 °F (37.1 °C), Min:98.4 °F (36.9 °C), Max:99 °F (37.2 °C) Physical Exam:   
Gen:  NAD Pulm:  Unlabored Abd:  S/ND/appropriate TTP Wound:  C/D/I 
TAMIKO:  Light brown feculent material 
 
Recent Results (from the past 24 hour(s)) CBC WITH AUTOMATED DIFF Collection Time: 19  1:25 AM  
Result Value Ref Range WBC 11.6 (H) 3.6 - 11.0 K/uL  
 RBC 3.68 (L) 3.80 - 5.20 M/uL  
 HGB 10.9 (L) 11.5 - 16.0 g/dL HCT 34.6 (L) 35.0 - 47.0 % MCV 94.0 80.0 - 99.0 FL  
 MCH 29.6 26.0 - 34.0 PG  
 MCHC 31.5 30.0 - 36.5 g/dL  
 RDW 13.1 11.5 - 14.5 % PLATELET 276 990 - 925 K/uL NRBC 0.0 0  WBC ABSOLUTE NRBC 0.00 0.00 - 0.01 K/uL NEUTROPHILS 77 (H) 32 - 75 % LYMPHOCYTES 13 12 - 49 % MONOCYTES 7 5 - 13 % EOSINOPHILS 2 0 - 7 % BASOPHILS 0 0 - 1 % IMMATURE GRANULOCYTES 1 (H) 0.0 - 0.5 % ABS. NEUTROPHILS 9.0 (H) 1.8 - 8.0 K/UL  
 ABS. LYMPHOCYTES 1.5 0.8 - 3.5 K/UL  
 ABS. MONOCYTES 0.8 0.0 - 1.0 K/UL  
 ABS. EOSINOPHILS 0.2 0.0 - 0.4 K/UL  
 ABS. BASOPHILS 0.0 0.0 - 0.1 K/UL  
 ABS. IMM. GRANS. 0.1 (H) 0.00 - 0.04 K/UL  
 DF SMEAR SCANNED    
 RBC COMMENTS NORMOCYTIC, NORMOCHROMIC METABOLIC PANEL, BASIC  Collection Time: 19  1:25 AM  
 Result Value Ref Range Sodium 135 (L) 136 - 145 mmol/L Potassium 3.6 3.5 - 5.1 mmol/L Chloride 104 97 - 108 mmol/L  
 CO2 26 21 - 32 mmol/L Anion gap 5 5 - 15 mmol/L Glucose 88 65 - 100 mg/dL BUN 4 (L) 6 - 20 MG/DL Creatinine 0.48 (L) 0.55 - 1.02 MG/DL  
 BUN/Creatinine ratio 8 (L) 12 - 20 GFR est AA >60 >60 ml/min/1.73m2 GFR est non-AA >60 >60 ml/min/1.73m2 Calcium 8.2 (L) 8.5 - 10.1 MG/DL Assessment:  
 
Hospital Problems  Date Reviewed: 3/16/2019 Codes Class Noted POA Perforated diverticulum of large intestine ICD-10-CM: K57.20 ICD-9-CM: 562.10  3/26/2019 Unknown Plan/Recommendations/Medical Decision Making: - Perforated diverticulitis:  Continue antibiotic therapy and monitor TAMIKO drain output. Overall pt is clinically improving. No acute indication for colectomy - Clear liquid diet 
- Continue antibiotic therapy - Repeat CT scan Monday - If not improving by early this week then she will need PICC and TPN 
- Out of bed as tolerated - Labs in am 
 
Signed By: Mela Lopez MD   
 April 7, 2019

## 2019-04-08 ENCOUNTER — ANESTHESIA EVENT (OUTPATIENT)
Dept: SURGERY | Age: 69
DRG: 329 | End: 2019-04-08
Payer: MEDICARE

## 2019-04-08 ENCOUNTER — APPOINTMENT (OUTPATIENT)
Dept: CT IMAGING | Age: 69
DRG: 329 | End: 2019-04-08
Attending: SURGERY
Payer: MEDICARE

## 2019-04-08 ENCOUNTER — ANESTHESIA (OUTPATIENT)
Dept: SURGERY | Age: 69
DRG: 329 | End: 2019-04-08
Payer: MEDICARE

## 2019-04-08 LAB
ANION GAP SERPL CALC-SCNC: 6 MMOL/L (ref 5–15)
BASOPHILS # BLD: 0 K/UL (ref 0–0.1)
BASOPHILS NFR BLD: 0 % (ref 0–1)
BUN SERPL-MCNC: 4 MG/DL (ref 6–20)
BUN/CREAT SERPL: 8 (ref 12–20)
CALCIUM SERPL-MCNC: 8.4 MG/DL (ref 8.5–10.1)
CHLORIDE SERPL-SCNC: 102 MMOL/L (ref 97–108)
CO2 SERPL-SCNC: 27 MMOL/L (ref 21–32)
CREAT SERPL-MCNC: 0.51 MG/DL (ref 0.55–1.02)
DIFFERENTIAL METHOD BLD: ABNORMAL
EOSINOPHIL # BLD: 0.4 K/UL (ref 0–0.4)
EOSINOPHIL NFR BLD: 5 % (ref 0–7)
ERYTHROCYTE [DISTWIDTH] IN BLOOD BY AUTOMATED COUNT: 13.2 % (ref 11.5–14.5)
GLUCOSE BLD STRIP.AUTO-MCNC: 171 MG/DL (ref 65–100)
GLUCOSE BLD STRIP.AUTO-MCNC: 176 MG/DL (ref 65–100)
GLUCOSE SERPL-MCNC: 113 MG/DL (ref 65–100)
HCT VFR BLD AUTO: 33.8 % (ref 35–47)
HGB BLD-MCNC: 10.9 G/DL (ref 11.5–16)
IMM GRANULOCYTES # BLD AUTO: 0.1 K/UL (ref 0–0.04)
IMM GRANULOCYTES NFR BLD AUTO: 1 % (ref 0–0.5)
INR PPP: 1.2 (ref 0.9–1.1)
LYMPHOCYTES # BLD: 1.5 K/UL (ref 0.8–3.5)
LYMPHOCYTES NFR BLD: 17 % (ref 12–49)
MCH RBC QN AUTO: 30 PG (ref 26–34)
MCHC RBC AUTO-ENTMCNC: 32.2 G/DL (ref 30–36.5)
MCV RBC AUTO: 93.1 FL (ref 80–99)
MONOCYTES # BLD: 0.6 K/UL (ref 0–1)
MONOCYTES NFR BLD: 7 % (ref 5–13)
NEUTS SEG # BLD: 6.1 K/UL (ref 1.8–8)
NEUTS SEG NFR BLD: 70 % (ref 32–75)
NRBC # BLD: 0 K/UL (ref 0–0.01)
NRBC BLD-RTO: 0 PER 100 WBC
PLATELET # BLD AUTO: 339 K/UL (ref 150–400)
PMV BLD AUTO: 9.4 FL (ref 8.9–12.9)
POTASSIUM SERPL-SCNC: 3.3 MMOL/L (ref 3.5–5.1)
PROTHROMBIN TIME: 12.5 SEC (ref 9–11.1)
RBC # BLD AUTO: 3.63 M/UL (ref 3.8–5.2)
SERVICE CMNT-IMP: ABNORMAL
SERVICE CMNT-IMP: ABNORMAL
SODIUM SERPL-SCNC: 135 MMOL/L (ref 136–145)
WBC # BLD AUTO: 8.6 K/UL (ref 3.6–11)

## 2019-04-08 PROCEDURE — 85610 PROTHROMBIN TIME: CPT

## 2019-04-08 PROCEDURE — 77030019702 HC WRP THER MENM -C: Performed by: SPECIALIST

## 2019-04-08 PROCEDURE — 76210000000 HC OR PH I REC 2 TO 2.5 HR: Performed by: SPECIALIST

## 2019-04-08 PROCEDURE — 86900 BLOOD TYPING SEROLOGIC ABO: CPT

## 2019-04-08 PROCEDURE — 74011250637 HC RX REV CODE- 250/637: Performed by: SURGERY

## 2019-04-08 PROCEDURE — 77030026438 HC STYL ET INTUB CARD -A: Performed by: NURSE ANESTHETIST, CERTIFIED REGISTERED

## 2019-04-08 PROCEDURE — 82962 GLUCOSE BLOOD TEST: CPT

## 2019-04-08 PROCEDURE — 74011636320 HC RX REV CODE- 636/320: Performed by: SURGERY

## 2019-04-08 PROCEDURE — 74177 CT ABD & PELVIS W/CONTRAST: CPT

## 2019-04-08 PROCEDURE — 65660000000 HC RM CCU STEPDOWN

## 2019-04-08 PROCEDURE — 00W60JZ REVISION OF SYNTHETIC SUBSTITUTE IN CEREBRAL VENTRICLE, OPEN APPROACH: ICD-10-PCS | Performed by: SPECIALIST

## 2019-04-08 PROCEDURE — 77030011264 HC ELECTRD BLD EXT COVD -A: Performed by: SPECIALIST

## 2019-04-08 PROCEDURE — 93005 ELECTROCARDIOGRAM TRACING: CPT

## 2019-04-08 PROCEDURE — 3E0336Z INTRODUCTION OF NUTRITIONAL SUBSTANCE INTO PERIPHERAL VEIN, PERCUTANEOUS APPROACH: ICD-10-PCS | Performed by: SURGERY

## 2019-04-08 PROCEDURE — 0D1H0Z4 BYPASS CECUM TO CUTANEOUS, OPEN APPROACH: ICD-10-PCS | Performed by: SPECIALIST

## 2019-04-08 PROCEDURE — 77030034818 HC STPLR INT GIA COVD -C: Performed by: SPECIALIST

## 2019-04-08 PROCEDURE — 77030018719 HC DRSG PTCH ANTIMIC J&J -A

## 2019-04-08 PROCEDURE — 76937 US GUIDE VASCULAR ACCESS: CPT

## 2019-04-08 PROCEDURE — 77030010541: Performed by: SPECIALIST

## 2019-04-08 PROCEDURE — 74011250636 HC RX REV CODE- 250/636: Performed by: SURGERY

## 2019-04-08 PROCEDURE — 02HV33Z INSERTION OF INFUSION DEVICE INTO SUPERIOR VENA CAVA, PERCUTANEOUS APPROACH: ICD-10-PCS | Performed by: SURGERY

## 2019-04-08 PROCEDURE — 86870 RBC ANTIBODY IDENTIFICATION: CPT

## 2019-04-08 PROCEDURE — 77030011640 HC PAD GRND REM COVD -A: Performed by: SPECIALIST

## 2019-04-08 PROCEDURE — 77030031139 HC SUT VCRL2 J&J -A: Performed by: SPECIALIST

## 2019-04-08 PROCEDURE — 77030032060 HC PWDR HEMSTAT ARISTA ASRB 3GM BARD -C: Performed by: SPECIALIST

## 2019-04-08 PROCEDURE — 77030040361 HC SLV COMPR DVT MDII -B: Performed by: SPECIALIST

## 2019-04-08 PROCEDURE — 74011000250 HC RX REV CODE- 250: Performed by: SURGERY

## 2019-04-08 PROCEDURE — 77030035058: Performed by: SPECIALIST

## 2019-04-08 PROCEDURE — 36569 INSJ PICC 5 YR+ W/O IMAGING: CPT | Performed by: SURGERY

## 2019-04-08 PROCEDURE — 77030020417 HC STPLR ENDO GIA COVD -C: Performed by: SPECIALIST

## 2019-04-08 PROCEDURE — 74011250636 HC RX REV CODE- 250/636: Performed by: NEUROMUSCULOSKELETAL MEDICINE & OMM

## 2019-04-08 PROCEDURE — 77030008684 HC TU ET CUF COVD -B: Performed by: NURSE ANESTHETIST, CERTIFIED REGISTERED

## 2019-04-08 PROCEDURE — 77030020782 HC GWN BAIR PAWS FLX 3M -B

## 2019-04-08 PROCEDURE — 88307 TISSUE EXAM BY PATHOLOGIST: CPT

## 2019-04-08 PROCEDURE — 36415 COLL VENOUS BLD VENIPUNCTURE: CPT

## 2019-04-08 PROCEDURE — 77030002966 HC SUT PDS J&J -A: Performed by: SPECIALIST

## 2019-04-08 PROCEDURE — 77030020847 HC STATLOK BARD -A

## 2019-04-08 PROCEDURE — 77030025242: Performed by: SPECIALIST

## 2019-04-08 PROCEDURE — 77030012935 HC DRSG AQUACEL BMS -B: Performed by: SPECIALIST

## 2019-04-08 PROCEDURE — 77030018846 HC SOL IRR STRL H20 ICUM -A: Performed by: SPECIALIST

## 2019-04-08 PROCEDURE — 86921 COMPATIBILITY TEST INCUBATE: CPT

## 2019-04-08 PROCEDURE — C1751 CATH, INF, PER/CENT/MIDLINE: HCPCS

## 2019-04-08 PROCEDURE — 77030002996 HC SUT SLK J&J -A: Performed by: SPECIALIST

## 2019-04-08 PROCEDURE — 80048 BASIC METABOLIC PNL TOTAL CA: CPT

## 2019-04-08 PROCEDURE — 74011250636 HC RX REV CODE- 250/636

## 2019-04-08 PROCEDURE — 77030031753 HC SHR ENDO COAG HARM J&J -E: Performed by: SPECIALIST

## 2019-04-08 PROCEDURE — 74011250636 HC RX REV CODE- 250/636: Performed by: ANESTHESIOLOGY

## 2019-04-08 PROCEDURE — 77030008467 HC STPLR SKN COVD -B: Performed by: SPECIALIST

## 2019-04-08 PROCEDURE — 85018 HEMOGLOBIN: CPT

## 2019-04-08 PROCEDURE — 76010000135 HC OR TIME 4 TO 4.5 HR: Performed by: SPECIALIST

## 2019-04-08 PROCEDURE — 77030018836 HC SOL IRR NACL ICUM -A: Performed by: SPECIALIST

## 2019-04-08 PROCEDURE — 86920 COMPATIBILITY TEST SPIN: CPT

## 2019-04-08 PROCEDURE — 74011250637 HC RX REV CODE- 250/637: Performed by: HOSPITALIST

## 2019-04-08 PROCEDURE — 76060000040 HC ANESTHESIA 4.5 TO 5 HR: Performed by: SPECIALIST

## 2019-04-08 PROCEDURE — 77030020365 HC SOL INJ SOD CL 0.9% 50ML

## 2019-04-08 PROCEDURE — 86922 COMPATIBILITY TEST ANTIGLOB: CPT

## 2019-04-08 PROCEDURE — 86902 BLOOD TYPE ANTIGEN DONOR EA: CPT

## 2019-04-08 PROCEDURE — 85025 COMPLETE CBC W/AUTO DIFF WBC: CPT

## 2019-04-08 PROCEDURE — 77030002986 HC SUT PROL J&J -A: Performed by: SPECIALIST

## 2019-04-08 PROCEDURE — 77030026102 HC DEV TISS ENSEAL G2 J&J -F: Performed by: SPECIALIST

## 2019-04-08 PROCEDURE — 74011000258 HC RX REV CODE- 258: Performed by: SURGERY

## 2019-04-08 PROCEDURE — 0DTN0ZZ RESECTION OF SIGMOID COLON, OPEN APPROACH: ICD-10-PCS | Performed by: SPECIALIST

## 2019-04-08 RX ORDER — METRONIDAZOLE 500 MG/100ML
INJECTION, SOLUTION INTRAVENOUS AS NEEDED
Status: DISCONTINUED | OUTPATIENT
Start: 2019-04-08 | End: 2019-04-08 | Stop reason: HOSPADM

## 2019-04-08 RX ORDER — SODIUM CHLORIDE 9 MG/ML
INJECTION, SOLUTION INTRAVENOUS
Status: DISCONTINUED | OUTPATIENT
Start: 2019-04-08 | End: 2019-04-08 | Stop reason: HOSPADM

## 2019-04-08 RX ORDER — SODIUM CHLORIDE 9 MG/ML
250 INJECTION, SOLUTION INTRAVENOUS AS NEEDED
Status: DISCONTINUED | OUTPATIENT
Start: 2019-04-08 | End: 2019-04-08

## 2019-04-08 RX ORDER — MIDAZOLAM HYDROCHLORIDE 1 MG/ML
1 INJECTION, SOLUTION INTRAMUSCULAR; INTRAVENOUS AS NEEDED
Status: DISCONTINUED | OUTPATIENT
Start: 2019-04-08 | End: 2019-04-08 | Stop reason: HOSPADM

## 2019-04-08 RX ORDER — ACETAMINOPHEN 10 MG/ML
1000 INJECTION, SOLUTION INTRAVENOUS EVERY 8 HOURS
Status: COMPLETED | OUTPATIENT
Start: 2019-04-08 | End: 2019-04-09

## 2019-04-08 RX ORDER — METRONIDAZOLE 500 MG/100ML
500 INJECTION, SOLUTION INTRAVENOUS EVERY 8 HOURS
Status: DISCONTINUED | OUTPATIENT
Start: 2019-04-08 | End: 2019-04-20

## 2019-04-08 RX ORDER — PHENYLEPHRINE HCL IN 0.9% NACL 0.4MG/10ML
SYRINGE (ML) INTRAVENOUS AS NEEDED
Status: DISCONTINUED | OUTPATIENT
Start: 2019-04-08 | End: 2019-04-08 | Stop reason: HOSPADM

## 2019-04-08 RX ORDER — HYDROMORPHONE HYDROCHLORIDE 1 MG/ML
1 INJECTION, SOLUTION INTRAMUSCULAR; INTRAVENOUS; SUBCUTANEOUS ONCE
Status: COMPLETED | OUTPATIENT
Start: 2019-04-08 | End: 2019-04-08

## 2019-04-08 RX ORDER — GLYCOPYRROLATE 0.2 MG/ML
INJECTION INTRAMUSCULAR; INTRAVENOUS AS NEEDED
Status: DISCONTINUED | OUTPATIENT
Start: 2019-04-08 | End: 2019-04-08 | Stop reason: HOSPADM

## 2019-04-08 RX ORDER — ONDANSETRON 2 MG/ML
INJECTION INTRAMUSCULAR; INTRAVENOUS AS NEEDED
Status: DISCONTINUED | OUTPATIENT
Start: 2019-04-08 | End: 2019-04-08 | Stop reason: HOSPADM

## 2019-04-08 RX ORDER — PROPOFOL 10 MG/ML
INJECTION, EMULSION INTRAVENOUS AS NEEDED
Status: DISCONTINUED | OUTPATIENT
Start: 2019-04-08 | End: 2019-04-08 | Stop reason: HOSPADM

## 2019-04-08 RX ORDER — SODIUM CHLORIDE, SODIUM LACTATE, POTASSIUM CHLORIDE, CALCIUM CHLORIDE 600; 310; 30; 20 MG/100ML; MG/100ML; MG/100ML; MG/100ML
100 INJECTION, SOLUTION INTRAVENOUS CONTINUOUS
Status: DISCONTINUED | OUTPATIENT
Start: 2019-04-08 | End: 2019-04-08 | Stop reason: HOSPADM

## 2019-04-08 RX ORDER — FENTANYL CITRATE 50 UG/ML
INJECTION, SOLUTION INTRAMUSCULAR; INTRAVENOUS AS NEEDED
Status: DISCONTINUED | OUTPATIENT
Start: 2019-04-08 | End: 2019-04-08 | Stop reason: HOSPADM

## 2019-04-08 RX ORDER — DIPHENHYDRAMINE HYDROCHLORIDE 50 MG/ML
12.5 INJECTION, SOLUTION INTRAMUSCULAR; INTRAVENOUS AS NEEDED
Status: DISCONTINUED | OUTPATIENT
Start: 2019-04-08 | End: 2019-04-08 | Stop reason: HOSPADM

## 2019-04-08 RX ORDER — SODIUM CHLORIDE 0.9 % (FLUSH) 0.9 %
5-40 SYRINGE (ML) INJECTION EVERY 8 HOURS
Status: DISCONTINUED | OUTPATIENT
Start: 2019-04-08 | End: 2019-04-26 | Stop reason: HOSPADM

## 2019-04-08 RX ORDER — ALBUMIN HUMAN 50 G/1000ML
SOLUTION INTRAVENOUS AS NEEDED
Status: DISCONTINUED | OUTPATIENT
Start: 2019-04-08 | End: 2019-04-08 | Stop reason: HOSPADM

## 2019-04-08 RX ORDER — MIDAZOLAM HYDROCHLORIDE 1 MG/ML
INJECTION, SOLUTION INTRAMUSCULAR; INTRAVENOUS AS NEEDED
Status: DISCONTINUED | OUTPATIENT
Start: 2019-04-08 | End: 2019-04-08 | Stop reason: HOSPADM

## 2019-04-08 RX ORDER — SODIUM CHLORIDE 0.9 % (FLUSH) 0.9 %
10 SYRINGE (ML) INJECTION
Status: COMPLETED | OUTPATIENT
Start: 2019-04-08 | End: 2019-04-08

## 2019-04-08 RX ORDER — MIDAZOLAM HYDROCHLORIDE 1 MG/ML
0.5 INJECTION, SOLUTION INTRAMUSCULAR; INTRAVENOUS
Status: DISCONTINUED | OUTPATIENT
Start: 2019-04-08 | End: 2019-04-08 | Stop reason: HOSPADM

## 2019-04-08 RX ORDER — SODIUM CHLORIDE 9 MG/ML
125 INJECTION, SOLUTION INTRAVENOUS CONTINUOUS
Status: DISCONTINUED | OUTPATIENT
Start: 2019-04-08 | End: 2019-04-12

## 2019-04-08 RX ORDER — MORPHINE SULFATE 10 MG/ML
2 INJECTION, SOLUTION INTRAMUSCULAR; INTRAVENOUS
Status: DISCONTINUED | OUTPATIENT
Start: 2019-04-08 | End: 2019-04-08 | Stop reason: HOSPADM

## 2019-04-08 RX ORDER — ACETAMINOPHEN 325 MG/1
650 TABLET ORAL ONCE
Status: DISCONTINUED | OUTPATIENT
Start: 2019-04-08 | End: 2019-04-08 | Stop reason: HOSPADM

## 2019-04-08 RX ORDER — SODIUM CHLORIDE 0.9 % (FLUSH) 0.9 %
5-40 SYRINGE (ML) INJECTION AS NEEDED
Status: DISCONTINUED | OUTPATIENT
Start: 2019-04-08 | End: 2019-04-08 | Stop reason: HOSPADM

## 2019-04-08 RX ORDER — FENTANYL CITRATE 50 UG/ML
50 INJECTION, SOLUTION INTRAMUSCULAR; INTRAVENOUS AS NEEDED
Status: DISCONTINUED | OUTPATIENT
Start: 2019-04-08 | End: 2019-04-08 | Stop reason: HOSPADM

## 2019-04-08 RX ORDER — HYDROMORPHONE HYDROCHLORIDE 2 MG/ML
INJECTION, SOLUTION INTRAMUSCULAR; INTRAVENOUS; SUBCUTANEOUS AS NEEDED
Status: DISCONTINUED | OUTPATIENT
Start: 2019-04-08 | End: 2019-04-08 | Stop reason: HOSPADM

## 2019-04-08 RX ORDER — FENTANYL CITRATE 50 UG/ML
50 INJECTION, SOLUTION INTRAMUSCULAR; INTRAVENOUS
Status: COMPLETED | OUTPATIENT
Start: 2019-04-08 | End: 2019-04-08

## 2019-04-08 RX ORDER — FENTANYL CITRATE 50 UG/ML
25 INJECTION, SOLUTION INTRAMUSCULAR; INTRAVENOUS
Status: DISCONTINUED | OUTPATIENT
Start: 2019-04-08 | End: 2019-04-08 | Stop reason: HOSPADM

## 2019-04-08 RX ORDER — SODIUM CHLORIDE 0.9 % (FLUSH) 0.9 %
5-40 SYRINGE (ML) INJECTION EVERY 8 HOURS
Status: DISCONTINUED | OUTPATIENT
Start: 2019-04-08 | End: 2019-04-08 | Stop reason: HOSPADM

## 2019-04-08 RX ORDER — SODIUM CHLORIDE 9 MG/ML
25 INJECTION, SOLUTION INTRAVENOUS CONTINUOUS
Status: DISCONTINUED | OUTPATIENT
Start: 2019-04-08 | End: 2019-04-08 | Stop reason: HOSPADM

## 2019-04-08 RX ORDER — SODIUM CHLORIDE, SODIUM LACTATE, POTASSIUM CHLORIDE, CALCIUM CHLORIDE 600; 310; 30; 20 MG/100ML; MG/100ML; MG/100ML; MG/100ML
INJECTION, SOLUTION INTRAVENOUS
Status: DISCONTINUED | OUTPATIENT
Start: 2019-04-08 | End: 2019-04-08 | Stop reason: HOSPADM

## 2019-04-08 RX ORDER — ACETAMINOPHEN 10 MG/ML
INJECTION, SOLUTION INTRAVENOUS AS NEEDED
Status: DISCONTINUED | OUTPATIENT
Start: 2019-04-08 | End: 2019-04-08 | Stop reason: HOSPADM

## 2019-04-08 RX ORDER — HYDROMORPHONE HYDROCHLORIDE 2 MG/ML
2 INJECTION, SOLUTION INTRAMUSCULAR; INTRAVENOUS; SUBCUTANEOUS
Status: DISCONTINUED | OUTPATIENT
Start: 2019-04-08 | End: 2019-04-09

## 2019-04-08 RX ORDER — NEOSTIGMINE METHYLSULFATE 1 MG/ML
INJECTION INTRAVENOUS AS NEEDED
Status: DISCONTINUED | OUTPATIENT
Start: 2019-04-08 | End: 2019-04-08 | Stop reason: HOSPADM

## 2019-04-08 RX ORDER — LIDOCAINE HYDROCHLORIDE 10 MG/ML
0.1 INJECTION, SOLUTION EPIDURAL; INFILTRATION; INTRACAUDAL; PERINEURAL AS NEEDED
Status: DISCONTINUED | OUTPATIENT
Start: 2019-04-08 | End: 2019-04-08 | Stop reason: HOSPADM

## 2019-04-08 RX ORDER — BACITRACIN 500 UNIT/G
1 PACKET (EA) TOPICAL AS NEEDED
Status: DISCONTINUED | OUTPATIENT
Start: 2019-04-08 | End: 2019-04-26 | Stop reason: HOSPADM

## 2019-04-08 RX ORDER — ONDANSETRON 2 MG/ML
4 INJECTION INTRAMUSCULAR; INTRAVENOUS AS NEEDED
Status: DISCONTINUED | OUTPATIENT
Start: 2019-04-08 | End: 2019-04-08 | Stop reason: HOSPADM

## 2019-04-08 RX ORDER — ROPIVACAINE HYDROCHLORIDE 5 MG/ML
30 INJECTION, SOLUTION EPIDURAL; INFILTRATION; PERINEURAL AS NEEDED
Status: DISCONTINUED | OUTPATIENT
Start: 2019-04-08 | End: 2019-04-08 | Stop reason: HOSPADM

## 2019-04-08 RX ORDER — HYDROMORPHONE HYDROCHLORIDE 1 MG/ML
0.5 INJECTION, SOLUTION INTRAMUSCULAR; INTRAVENOUS; SUBCUTANEOUS
Status: DISCONTINUED | OUTPATIENT
Start: 2019-04-08 | End: 2019-04-08 | Stop reason: HOSPADM

## 2019-04-08 RX ORDER — MAGNESIUM SULFATE 100 %
4 CRYSTALS MISCELLANEOUS AS NEEDED
Status: DISCONTINUED | OUTPATIENT
Start: 2019-04-08 | End: 2019-04-26 | Stop reason: HOSPADM

## 2019-04-08 RX ORDER — ROCURONIUM BROMIDE 10 MG/ML
INJECTION, SOLUTION INTRAVENOUS AS NEEDED
Status: DISCONTINUED | OUTPATIENT
Start: 2019-04-08 | End: 2019-04-08 | Stop reason: HOSPADM

## 2019-04-08 RX ORDER — DEXTROSE 50 % IN WATER (D50W) INTRAVENOUS SYRINGE
12.5-25 AS NEEDED
Status: DISCONTINUED | OUTPATIENT
Start: 2019-04-08 | End: 2019-04-26 | Stop reason: HOSPADM

## 2019-04-08 RX ORDER — SODIUM CHLORIDE 0.9 % (FLUSH) 0.9 %
5-40 SYRINGE (ML) INJECTION AS NEEDED
Status: DISCONTINUED | OUTPATIENT
Start: 2019-04-08 | End: 2019-04-26 | Stop reason: HOSPADM

## 2019-04-08 RX ORDER — FENTANYL CITRATE 50 UG/ML
INJECTION, SOLUTION INTRAMUSCULAR; INTRAVENOUS
Status: COMPLETED
Start: 2019-04-08 | End: 2019-04-08

## 2019-04-08 RX ADMIN — ROCURONIUM BROMIDE 40 MG: 10 INJECTION, SOLUTION INTRAVENOUS at 14:05

## 2019-04-08 RX ADMIN — FENTANYL CITRATE 50 MCG: 50 INJECTION, SOLUTION INTRAMUSCULAR; INTRAVENOUS at 19:40

## 2019-04-08 RX ADMIN — HYDROMORPHONE HYDROCHLORIDE 0.5 MG: 2 INJECTION, SOLUTION INTRAMUSCULAR; INTRAVENOUS; SUBCUTANEOUS at 17:15

## 2019-04-08 RX ADMIN — SODIUM CHLORIDE 100 ML: 900 INJECTION, SOLUTION INTRAVENOUS at 07:58

## 2019-04-08 RX ADMIN — ACETAMINOPHEN 1000 MG: 10 INJECTION, SOLUTION INTRAVENOUS at 14:30

## 2019-04-08 RX ADMIN — SODIUM CHLORIDE 125 ML/HR: 900 INJECTION, SOLUTION INTRAVENOUS at 18:41

## 2019-04-08 RX ADMIN — FENTANYL CITRATE 25 MCG: 50 INJECTION INTRAMUSCULAR; INTRAVENOUS at 18:55

## 2019-04-08 RX ADMIN — FUROSEMIDE 40 MG: 40 TABLET ORAL at 12:02

## 2019-04-08 RX ADMIN — SODIUM CHLORIDE 100 ML/HR: 900 INJECTION, SOLUTION INTRAVENOUS at 03:15

## 2019-04-08 RX ADMIN — FLUCONAZOLE 400 MG: 400 INJECTION, SOLUTION INTRAVENOUS at 11:57

## 2019-04-08 RX ADMIN — Medication 80 MCG: at 14:30

## 2019-04-08 RX ADMIN — ACETAMINOPHEN 1000 MG: 10 INJECTION, SOLUTION INTRAVENOUS at 21:37

## 2019-04-08 RX ADMIN — HYDROMORPHONE HYDROCHLORIDE 0.5 MG: 2 INJECTION, SOLUTION INTRAMUSCULAR; INTRAVENOUS; SUBCUTANEOUS at 13:54

## 2019-04-08 RX ADMIN — SODIUM CHLORIDE, SODIUM LACTATE, POTASSIUM CHLORIDE, CALCIUM CHLORIDE: 600; 310; 30; 20 INJECTION, SOLUTION INTRAVENOUS at 16:28

## 2019-04-08 RX ADMIN — ROCURONIUM BROMIDE 10 MG: 10 INJECTION, SOLUTION INTRAVENOUS at 15:19

## 2019-04-08 RX ADMIN — HYDROMORPHONE HYDROCHLORIDE 2 MG: 1 INJECTION, SOLUTION INTRAMUSCULAR; INTRAVENOUS; SUBCUTANEOUS at 07:24

## 2019-04-08 RX ADMIN — LOSARTAN POTASSIUM 100 MG: 50 TABLET ORAL at 12:02

## 2019-04-08 RX ADMIN — Medication 10 ML: at 07:58

## 2019-04-08 RX ADMIN — METRONIDAZOLE 500 MG: 500 INJECTION, SOLUTION INTRAVENOUS at 14:15

## 2019-04-08 RX ADMIN — CEFTRIAXONE SODIUM 2 G: 2 INJECTION, POWDER, FOR SOLUTION INTRAMUSCULAR; INTRAVENOUS at 15:14

## 2019-04-08 RX ADMIN — HYDROMORPHONE HYDROCHLORIDE 1 MG: 1 INJECTION, SOLUTION INTRAMUSCULAR; INTRAVENOUS; SUBCUTANEOUS at 19:47

## 2019-04-08 RX ADMIN — MIDAZOLAM HYDROCHLORIDE 2 MG: 1 INJECTION, SOLUTION INTRAMUSCULAR; INTRAVENOUS at 13:54

## 2019-04-08 RX ADMIN — ROCURONIUM BROMIDE 10 MG: 10 INJECTION, SOLUTION INTRAVENOUS at 17:13

## 2019-04-08 RX ADMIN — SODIUM CHLORIDE, SODIUM LACTATE, POTASSIUM CHLORIDE, CALCIUM CHLORIDE: 600; 310; 30; 20 INJECTION, SOLUTION INTRAVENOUS at 13:54

## 2019-04-08 RX ADMIN — Medication 80 MCG: at 14:35

## 2019-04-08 RX ADMIN — ALBUMIN HUMAN 250 ML: 50 SOLUTION INTRAVENOUS at 16:05

## 2019-04-08 RX ADMIN — FENTANYL CITRATE 50 MCG: 50 INJECTION, SOLUTION INTRAMUSCULAR; INTRAVENOUS at 17:12

## 2019-04-08 RX ADMIN — Medication 80 MCG: at 17:29

## 2019-04-08 RX ADMIN — THIAMINE HYDROCHLORIDE: 100 INJECTION, SOLUTION INTRAMUSCULAR; INTRAVENOUS at 22:28

## 2019-04-08 RX ADMIN — METRONIDAZOLE 500 MG: 250 TABLET ORAL at 00:24

## 2019-04-08 RX ADMIN — HYDRALAZINE HYDROCHLORIDE 100 MG: 25 TABLET ORAL at 12:02

## 2019-04-08 RX ADMIN — GLYCOPYRROLATE 0.4 MG: 0.2 INJECTION INTRAMUSCULAR; INTRAVENOUS at 17:48

## 2019-04-08 RX ADMIN — ROCURONIUM BROMIDE 10 MG: 10 INJECTION, SOLUTION INTRAVENOUS at 15:00

## 2019-04-08 RX ADMIN — ALBUMIN HUMAN 250 ML: 50 SOLUTION INTRAVENOUS at 15:24

## 2019-04-08 RX ADMIN — Medication 10 ML: at 21:40

## 2019-04-08 RX ADMIN — FENTANYL CITRATE 50 MCG: 50 INJECTION, SOLUTION INTRAMUSCULAR; INTRAVENOUS at 19:29

## 2019-04-08 RX ADMIN — HYDROMORPHONE HYDROCHLORIDE 0.5 MG: 2 INJECTION, SOLUTION INTRAMUSCULAR; INTRAVENOUS; SUBCUTANEOUS at 18:13

## 2019-04-08 RX ADMIN — IOPAMIDOL 100 ML: 755 INJECTION, SOLUTION INTRAVENOUS at 07:58

## 2019-04-08 RX ADMIN — HYDROMORPHONE HYDROCHLORIDE 2 MG: 2 INJECTION, SOLUTION INTRAMUSCULAR; INTRAVENOUS; SUBCUTANEOUS at 23:28

## 2019-04-08 RX ADMIN — HYDRALAZINE HYDROCHLORIDE 100 MG: 25 TABLET ORAL at 21:37

## 2019-04-08 RX ADMIN — FENTANYL CITRATE 25 MCG: 50 INJECTION, SOLUTION INTRAMUSCULAR; INTRAVENOUS at 19:00

## 2019-04-08 RX ADMIN — Medication 10 MCG: at 18:03

## 2019-04-08 RX ADMIN — ONDANSETRON 4 MG: 2 INJECTION INTRAMUSCULAR; INTRAVENOUS at 17:15

## 2019-04-08 RX ADMIN — SODIUM CHLORIDE: 9 INJECTION, SOLUTION INTRAVENOUS at 13:54

## 2019-04-08 RX ADMIN — FENTANYL CITRATE 25 MCG: 50 INJECTION, SOLUTION INTRAMUSCULAR; INTRAVENOUS at 19:05

## 2019-04-08 RX ADMIN — HYDROMORPHONE HYDROCHLORIDE 0.5 MG: 2 INJECTION, SOLUTION INTRAMUSCULAR; INTRAVENOUS; SUBCUTANEOUS at 15:00

## 2019-04-08 RX ADMIN — PROPOFOL 150 MG: 10 INJECTION, EMULSION INTRAVENOUS at 14:05

## 2019-04-08 RX ADMIN — Medication 80 MCG: at 17:26

## 2019-04-08 RX ADMIN — FENTANYL CITRATE 100 MCG: 50 INJECTION, SOLUTION INTRAMUSCULAR; INTRAVENOUS at 14:05

## 2019-04-08 RX ADMIN — Medication 10 ML: at 07:24

## 2019-04-08 RX ADMIN — HYDROCODONE BITARTRATE AND ACETAMINOPHEN 1 TABLET: 5; 325 TABLET ORAL at 00:24

## 2019-04-08 RX ADMIN — METRONIDAZOLE 500 MG: 500 INJECTION, SOLUTION INTRAVENOUS at 21:41

## 2019-04-08 RX ADMIN — FENTANYL CITRATE 25 MCG: 50 INJECTION, SOLUTION INTRAMUSCULAR; INTRAVENOUS at 18:55

## 2019-04-08 RX ADMIN — NEOSTIGMINE METHYLSULFATE 3 MG: 1 INJECTION INTRAVENOUS at 17:48

## 2019-04-08 RX ADMIN — ROCURONIUM BROMIDE 20 MG: 10 INJECTION, SOLUTION INTRAVENOUS at 16:08

## 2019-04-08 NOTE — ANESTHESIA PREPROCEDURE EVALUATION
Relevant Problems No relevant active problems Anesthetic History No history of anesthetic complications Review of Systems / Medical History Patient summary reviewed, nursing notes reviewed and pertinent labs reviewed Pulmonary Within defined limits COPD Smoker Neuro/Psych Comments: Hx SAH  Cardiovascular Within defined limits Hypertension GI/Hepatic/Renal 
Within defined limits GERD Endo/Other Within defined limits Hypothyroidism Other Findings Physical Exam 
 
Airway Mallampati: II 
TM Distance: > 6 cm Neck ROM: normal range of motion Mouth opening: Normal 
 
 Cardiovascular Regular rate and rhythm,  S1 and S2 normal,  no murmur, click, rub, or gallop Dental 
No notable dental hx Pulmonary Breath sounds clear to auscultation Abdominal 
GI exam deferred Other Findings Anesthetic Plan ASA: 4 Anesthesia type: general 
 
 
 
 
Induction: Intravenous Anesthetic plan and risks discussed with: Patient

## 2019-04-08 NOTE — PROCEDURES
PICC Placement Note    PRE-PROCEDURE VERIFICATION  Correct Procedure: yes  Correct Site:  yes  Temperature: Temp: 98.7 °F (37.1 °C), Temperature Source: Temp Source: Oral  Recent Labs     04/08/19  0320   BUN 4*   CREA 0.51*      WBC 8.6     Allergies: Ativan [lorazepam]; Pcn [penicillins]; Wellbutrin [bupropion hcl]; and Xanax [alprazolam]  Education materials, including PICC Booklet, for PICC Care given to patient: yes. See Patient Education activity for further details. PROCEDURE DETAIL  A double lumen PICC line was started for TPN. The following documentation is in addition to the PICC properties in the lines/airways flowsheet :  Lot #: [id number]  Was xylocaine 1% used intradermally:  yes  Catheter Length: 40 (cm)  Vein Selection for PICC:right basilic  Central Line Bundle followed yes  Complication Related to Insertion: none    The placement was verified by ECG/Sapiens technology: The  tip location is on the right side and the tip is in the  superior vena cava. See ECG results for PICC tip placement. Report given to nurse Whitaker. Line is okay to use.     Danny Montoya, RN

## 2019-04-08 NOTE — BRIEF OP NOTE
BRIEF OPERATIVE NOTE Date of Procedure: 4/8/2019 Preoperative Diagnosis: PERFORATED DIVERTICULITIS Postoperative Diagnosis: PERFORATED DIVERTICULUS Procedure(s): LAPAROTOMY EXPLORATORY/ SIGMOIDECTOMY/ COLOSTOMY 
externalize Shunt Ventricular-Peritoneal 
Surgeon(s) and Role: Baudilio Chew MD - Primary Wyatt Reid MD - Co-Surgeon Surgical Assistant: Dori Cassidy MD 
 
Surgical Staff: 
Circ-1: Kj Sitter Circ-Relief: Jhonny Meredith RN Scrub Tech-1: Whit  Scrub Tech-Relief: Amrit Moses Scrub RN-2: Sriram Brown RN Scrub RN-Relief: Ny García RN Surg Asst-1: Iman Corporal Surg Asst-Relief: Tati Alvarez Event Time In Time Out Incision Start 04/08/2019 1436 Incision Close 04/08/2019 1752 Anesthesia: General  
Estimated Blood Loss: 200 cc Specimens:  
ID Type Source Tests Collected by Time Destination 1 : sigmoind colon Fresh Colon  Eloisa Stephenson MD 4/8/2019 1705 Pathology Findings: Perforated diverticulitis with fecal contamination; sigmoidectomy with end colostomy;  shunt externalized at beginning of case Complications: bladder tear, repaired primarily Implants: * No implants in log *

## 2019-04-08 NOTE — PROGRESS NOTES
Bedside shift change report given to Rosalva Herrera RN (oncoming nurse) by Chaya Aguilar RN (offgoing nurse). Report included the following information SBAR, Kardex and MAR.

## 2019-04-08 NOTE — BRIEF OP NOTE
BRIEF OPERATIVE NOTE Date of Procedure: 4/8/2019 Preoperative Diagnosis: PERFORATED DIVERTICULITIS, hydrocephalus Postoperative Diagnosis: same Procedure(s): 
 
externalize Ventricular-Peritoneal Shunt Surgeon(s) and Role: Eamon Alba MD - Primary Surgical Assistant: or staff Anesthesia: General  
Estimated Blood Loss: minimal 
Specimens: * No specimens in log * Findings: good flow CSF Complications: none Implants: * No implants in log *

## 2019-04-08 NOTE — PROGRESS NOTES
She complains increased diffuse abdominal pain. AF, FSS; drain fluid is feculent. Lower abdominal pain with palpation. CT abdomen/pelvis with persistent abscess adjacent to drain. Recommended laparotomy with sigmoid resection/end colostomy with externalization of  shunt by NS. Technical aspects of procedure along with risks to include bleeding, infection, visceral injury, ureteral injury, CNS infection/ shunt infection. She understands and desires to proceed. All questions answered. NPO, continue IV antibiotics.

## 2019-04-09 LAB
ANION GAP SERPL CALC-SCNC: 6 MMOL/L (ref 5–15)
BASOPHILS # BLD: 0 K/UL (ref 0–0.1)
BASOPHILS NFR BLD: 0 % (ref 0–1)
BUN SERPL-MCNC: 6 MG/DL (ref 6–20)
BUN/CREAT SERPL: 9 (ref 12–20)
CALCIUM SERPL-MCNC: 7.7 MG/DL (ref 8.5–10.1)
CHLORIDE SERPL-SCNC: 103 MMOL/L (ref 97–108)
CO2 SERPL-SCNC: 26 MMOL/L (ref 21–32)
CREAT SERPL-MCNC: 0.67 MG/DL (ref 0.55–1.02)
DIFFERENTIAL METHOD BLD: ABNORMAL
EOSINOPHIL # BLD: 0 K/UL (ref 0–0.4)
EOSINOPHIL NFR BLD: 0 % (ref 0–7)
ERYTHROCYTE [DISTWIDTH] IN BLOOD BY AUTOMATED COUNT: 13.5 % (ref 11.5–14.5)
GLUCOSE BLD STRIP.AUTO-MCNC: 145 MG/DL (ref 65–100)
GLUCOSE BLD STRIP.AUTO-MCNC: 154 MG/DL (ref 65–100)
GLUCOSE BLD STRIP.AUTO-MCNC: 163 MG/DL (ref 65–100)
GLUCOSE BLD STRIP.AUTO-MCNC: 186 MG/DL (ref 65–100)
GLUCOSE SERPL-MCNC: 152 MG/DL (ref 65–100)
HCT VFR BLD AUTO: 32.7 % (ref 35–47)
HGB BLD-MCNC: 10.2 G/DL (ref 11.5–16)
HGB BLD-MCNC: 10.6 G/DL (ref 11.5–16)
HGB BLD-MCNC: 11.1 G/DL (ref 11.5–16)
HGB BLD-MCNC: 9.5 G/DL (ref 11.5–16)
IMM GRANULOCYTES # BLD AUTO: 0.1 K/UL (ref 0–0.04)
IMM GRANULOCYTES NFR BLD AUTO: 0 % (ref 0–0.5)
LYMPHOCYTES # BLD: 1.7 K/UL (ref 0.8–3.5)
LYMPHOCYTES NFR BLD: 11 % (ref 12–49)
MCH RBC QN AUTO: 30.3 PG (ref 26–34)
MCHC RBC AUTO-ENTMCNC: 31.2 G/DL (ref 30–36.5)
MCV RBC AUTO: 97 FL (ref 80–99)
MONOCYTES # BLD: 0.8 K/UL (ref 0–1)
MONOCYTES NFR BLD: 5 % (ref 5–13)
NEUTS SEG # BLD: 11.9 K/UL (ref 1.8–8)
NEUTS SEG NFR BLD: 84 % (ref 32–75)
NRBC # BLD: 0 K/UL (ref 0–0.01)
NRBC BLD-RTO: 0 PER 100 WBC
PLATELET # BLD AUTO: 337 K/UL (ref 150–400)
PMV BLD AUTO: 9.8 FL (ref 8.9–12.9)
POTASSIUM SERPL-SCNC: 3.4 MMOL/L (ref 3.5–5.1)
RBC # BLD AUTO: 3.37 M/UL (ref 3.8–5.2)
SERVICE CMNT-IMP: ABNORMAL
SODIUM SERPL-SCNC: 135 MMOL/L (ref 136–145)
WBC # BLD AUTO: 14.4 K/UL (ref 3.6–11)

## 2019-04-09 PROCEDURE — 74011250636 HC RX REV CODE- 250/636: Performed by: SURGERY

## 2019-04-09 PROCEDURE — 74011636637 HC RX REV CODE- 636/637: Performed by: SURGERY

## 2019-04-09 PROCEDURE — 80048 BASIC METABOLIC PNL TOTAL CA: CPT

## 2019-04-09 PROCEDURE — 77010033678 HC OXYGEN DAILY

## 2019-04-09 PROCEDURE — 82962 GLUCOSE BLOOD TEST: CPT

## 2019-04-09 PROCEDURE — 74011000250 HC RX REV CODE- 250: Performed by: SURGERY

## 2019-04-09 PROCEDURE — 74011250637 HC RX REV CODE- 250/637: Performed by: SURGERY

## 2019-04-09 PROCEDURE — 36415 COLL VENOUS BLD VENIPUNCTURE: CPT

## 2019-04-09 PROCEDURE — 65660000000 HC RM CCU STEPDOWN

## 2019-04-09 PROCEDURE — 74011000258 HC RX REV CODE- 258: Performed by: SURGERY

## 2019-04-09 PROCEDURE — C9113 INJ PANTOPRAZOLE SODIUM, VIA: HCPCS | Performed by: SURGERY

## 2019-04-09 PROCEDURE — 85025 COMPLETE CBC W/AUTO DIFF WBC: CPT

## 2019-04-09 RX ORDER — HYDROMORPHONE HCL/0.9% NACL/PF 0.5 MG/ML
PLASTIC BAG, INJECTION (ML) INTRAVENOUS CONTINUOUS
Status: DISCONTINUED | OUTPATIENT
Start: 2019-04-09 | End: 2019-04-16

## 2019-04-09 RX ADMIN — CEFTRIAXONE SODIUM 2 G: 2 INJECTION, POWDER, FOR SOLUTION INTRAMUSCULAR; INTRAVENOUS at 16:12

## 2019-04-09 RX ADMIN — FLUCONAZOLE 400 MG: 400 INJECTION, SOLUTION INTRAVENOUS at 10:28

## 2019-04-09 RX ADMIN — METRONIDAZOLE 500 MG: 500 INJECTION, SOLUTION INTRAVENOUS at 05:57

## 2019-04-09 RX ADMIN — Medication: at 09:08

## 2019-04-09 RX ADMIN — Medication 10 ML: at 13:42

## 2019-04-09 RX ADMIN — HYDROMORPHONE HYDROCHLORIDE 2 MG: 2 INJECTION, SOLUTION INTRAMUSCULAR; INTRAVENOUS; SUBCUTANEOUS at 02:27

## 2019-04-09 RX ADMIN — HUMAN INSULIN 2 UNITS: 100 INJECTION, SOLUTION SUBCUTANEOUS at 17:46

## 2019-04-09 RX ADMIN — TRAZODONE HYDROCHLORIDE 50 MG: 50 TABLET ORAL at 23:15

## 2019-04-09 RX ADMIN — HYDRALAZINE HYDROCHLORIDE 100 MG: 25 TABLET ORAL at 17:48

## 2019-04-09 RX ADMIN — METRONIDAZOLE 500 MG: 500 INJECTION, SOLUTION INTRAVENOUS at 21:49

## 2019-04-09 RX ADMIN — THIAMINE HYDROCHLORIDE: 100 INJECTION, SOLUTION INTRAMUSCULAR; INTRAVENOUS at 20:09

## 2019-04-09 RX ADMIN — HYDRALAZINE HYDROCHLORIDE 100 MG: 25 TABLET ORAL at 21:47

## 2019-04-09 RX ADMIN — ACETAMINOPHEN 1000 MG: 10 INJECTION, SOLUTION INTRAVENOUS at 13:39

## 2019-04-09 RX ADMIN — ACETAMINOPHEN 1000 MG: 10 INJECTION, SOLUTION INTRAVENOUS at 04:54

## 2019-04-09 RX ADMIN — METRONIDAZOLE 500 MG: 500 INJECTION, SOLUTION INTRAVENOUS at 13:41

## 2019-04-09 RX ADMIN — HUMAN INSULIN 2 UNITS: 100 INJECTION, SOLUTION SUBCUTANEOUS at 23:40

## 2019-04-09 RX ADMIN — LABETALOL 20 MG/4 ML (5 MG/ML) INTRAVENOUS SYRINGE 20 MG: at 23:06

## 2019-04-09 RX ADMIN — Medication 20 ML: at 21:49

## 2019-04-09 RX ADMIN — PANTOPRAZOLE SODIUM 40 MG: 40 INJECTION, POWDER, FOR SOLUTION INTRAVENOUS at 17:45

## 2019-04-09 RX ADMIN — HYDRALAZINE HYDROCHLORIDE 100 MG: 25 TABLET ORAL at 10:04

## 2019-04-09 RX ADMIN — LOSARTAN POTASSIUM 100 MG: 50 TABLET ORAL at 10:04

## 2019-04-09 RX ADMIN — Medication 10 ML: at 21:50

## 2019-04-09 RX ADMIN — HUMAN INSULIN 2 UNITS: 100 INJECTION, SOLUTION SUBCUTANEOUS at 13:39

## 2019-04-09 RX ADMIN — SODIUM CHLORIDE 125 ML/HR: 900 INJECTION, SOLUTION INTRAVENOUS at 13:39

## 2019-04-09 RX ADMIN — SERTRALINE 100 MG: 50 TABLET, FILM COATED ORAL at 10:04

## 2019-04-09 RX ADMIN — Medication 10 ML: at 05:57

## 2019-04-09 NOTE — PERIOP NOTES
TRANSFER - OUT REPORT: 
 
Verbal report given to Lashell (name) on Theo Powell  being transferred to [facility] rom 667 (unit) for routine post - op Report consisted of patients Situation, Background, Assessment and  
Recommendations(SBAR). Time Pre op antibiotic given:1515 Anesthesia Stop time: 6824 Nails Present on Transfer to floor:YES Order for Nails on Chart:no Discharge Prescriptions with Chart:no Information from the following report(s) SBAR, Kardex, OR Summary, Intake/Output, MAR, Med Rec Status and Cardiac Rhythm SR was reviewed with the receiving nurse. Opportunity for questions and clarification was provided. Is the patient on 02? YES 
     L/Min 3L Is the patient on a monitor? YES Is the nurse transporting with the patient? YES Surgical Waiting Area notified of patient's transfer from PACU? YES The following personal items collected during your admission accompanied patient upon transfer:  
Dental Appliance: Dental Appliances: None Vision: Visual Aid: Glasses Hearing Aid:   
Jewelry: Jewelry: None Clothing: Clothing: (no belongings to preop) Other Valuables: Other Valuables: None Valuables sent to safe:

## 2019-04-09 NOTE — PROGRESS NOTES
This writer called neuro dept to give report on pt as pt is being transferred from PACU, rather than returning to [address] [place] [address], post-op. Spoke w/Eloina who stated nurse in neuro receiving report via telephone from PACU at the time of my call. Had no further questions or concerns.

## 2019-04-09 NOTE — PROGRESS NOTES
Physical Therapy: Defer 1359 - Chart reviewed, Rn approved, patient received lying in bed. Patient reports she just got positioned after moving with nursing, requesting to rest and hold PT eval at this time. Will follow up as able and appropriate. [facility] second attempt to see patient. Patient currently sleeping with family requesting to hold PT to allow patient to rest.  Will follow up tomorrow for PT evaluation.    
 
Placido Spicer, PT, DPT

## 2019-04-09 NOTE — PROGRESS NOTES
Problem: Pressure Injury - Risk of 
Goal: *Prevention of pressure injury Description Document Moe Scale and appropriate interventions in the flowsheet. Outcome: Progressing Towards Goal 
  
Problem: Patient Education: Go to Patient Education Activity Goal: Patient/Family Education Outcome: Progressing Towards Goal 
  
Problem: Falls - Risk of 
Goal: *Absence of Falls Description Document Ron Arenas Fall Risk and appropriate interventions in the flowsheet. Outcome: Progressing Towards Goal 
  
Problem: Patient Education: Go to Patient Education Activity Goal: Patient/Family Education Outcome: Progressing Towards Goal 
  
Problem: General Medical Care Plan Goal: *Vital signs within specified parameters Outcome: Progressing Towards Goal 
Goal: *Labs within defined limits Outcome: Progressing Towards Goal 
Goal: *Absence of infection signs and symptoms Outcome: Progressing Towards Goal 
Goal: *Optimal pain control at patient's stated goal 
Outcome: Progressing Towards Goal 
Goal: *Skin integrity maintained Outcome: Progressing Towards Goal 
Goal: *Fluid volume balance Outcome: Progressing Towards Goal 
Goal: *Optimize nutritional status Outcome: Progressing Towards Goal 
Goal: *Anxiety reduced or absent Outcome: Progressing Towards Goal 
Goal: *Progressive mobility and function (eg: ADL's) Outcome: Progressing Towards Goal 
  
Problem: Surgical Pathway Post-Op Day 1 Goal: Activity/Safety Outcome: Progressing Towards Goal 
Goal: Diagnostic Test/Procedures Outcome: Progressing Towards Goal 
Goal: Nutrition/Diet Outcome: Progressing Towards Goal 
Goal: Discharge Planning Outcome: Progressing Towards Goal 
Goal: Medications Outcome: Progressing Towards Goal 
Goal: Respiratory Outcome: Progressing Towards Goal 
Goal: Treatments/Interventions/Procedures Outcome: Progressing Towards Goal 
Goal: Psychosocial 
Outcome: Progressing Towards Goal 
 Goal: *No signs and symptoms of infection or wound complications Outcome: Progressing Towards Goal 
Goal: *Optimal pain control at patient's stated goal 
Outcome: Progressing Towards Goal 
Goal: *Adequate urinary output (equal to or greater than 30 milliliters/hour) Outcome: Progressing Towards Goal 
Goal: *Hemodynamically stable Outcome: Progressing Towards Goal 
Goal: *Tolerating diet Outcome: Progressing Towards Goal 
Goal: *Demonstrates progressive activity Outcome: Progressing Towards Goal 
Goal: *Lungs clear or at baseline Outcome: Progressing Towards Goal 
  
Problem: Nutrition Deficit Goal: *Optimize nutritional status Outcome: Progressing Towards Goal 
  
Problem: Nutrition Deficit Goal: *Optimize nutritional status Outcome: Progressing Towards Goal

## 2019-04-09 NOTE — PROGRESS NOTES
NUTRITION COMPLETE ASSESSMENT 
 
RECOMMENDATIONS:  
1. At risk for refeeding syndrome: 
 - add Mg, Phos in addition to CBC tomorrow 
 - continue thiamine 2. If electrolytes WNL tomorrow increase to goal: - 5%AA, D15 @ 83ml/hr + 500ml, 20% lipids 3x/week 3. Diet advancement per surgery to:  Regular, Low Fiber - not GI lite - Ensure Enlive (chocolate) TID 4. Daily weights via standing scale while on TPN Interventions/Plan:  
Food/Nutrient Delivery: TPN Assessment:  
Reason for Assessment: [x]Reassessment Diet: NPO  
TPN: 5%AA, D15 @ 42ml/hr -> 5%AA, D15 @ 75ml/hr + 2 units insulin/liter + 110mg thiamine Nutritionally Significant Medications: [x] Reviewed & Includes: ceftriaxone, diflucan, regular insulin, flagyl, protonix, zoloft, NS @ 125ml/hr; dilaudid PCA Subjective: \"When will I be able to eat. \" Discussed usual diet progression and TPN for now. Objective: 
Pt admitted with perforated diverticulum of the large intestine. PMHx: COPD, depression, HLD, shunt s/p aneurysm, GERD. S/p laparoscopic drainage of pelvic abscess on 3/29, returned to OR on 4/8 for sigmoidectomy and end colostomy with externalization of ventriculoperitoneal shunt. Abx continue. TPN started last night after surgery. Happy to see start of TPN since pt has been with minimal PO intake since admit with just several days of full liquids throughout. TPN increasing tonight to provide: 1278kcal, 90g protein, 1800ml fluid. At high risk for refeeding syndrome. Low K+ noted today. Recheck K+ along with Mg and Phos tomorrow morning with repletion PRN. If electrolytes WNL tomorrow increased to goal of: 5%AA, D15 @ 83ml/hr + 500ml, 20% lipids 3x/week. Provides: 1848kcal, 100g protein, 300g CHO (GIR = 2.48mg/kg/min), 2000ml fluid. Meets 100% energy and protein needs. She has had Ensure in the past and is agreeable to trial while here in the hospital (Ensure Enlive chocolate).  Once diet advanced add Ensure TID (1050kcal, 60g protein). Will continue to follow for TPN, electrolytes, wt trends, and diet advancement. Estimated Nutrition Needs:  
Kcals/day: 2663 Kcals/day(2813-1737 kcal/day (MSJ x 1.2-1.3)) Protein: 100 g(100-118g (1.2-1.4 g/kg)) Fluid: 1800 ml(1 mL/kcal) Based On: [place] [address] Weight Used: Actual wt(83.9 kg) Pt expected to meet estimated nutrient needs:  [x]   Yes - with TPN Nutrition Diagnosis:  
1. Inadequate protein-energy intake related to altered GI fx as evidenced by minimal PO intake this admit; TPN started; s/p colectomy and colostomy placment Goals:   
 TPN meeting at least 90% needs until oral intake meeting 60% needs consistently Monitoring & Evaluation: - Total energy intake - Weight/weight change Previous Nutrition Goals Met:   No 
Previous Recommendations:    No 
 
Education & Discharge Needs: 
 [] None Identified 
 [x] Identified and addressed  
 [] Participated in care plan, discharge planning, and/or interdisciplinary rounds Cultural, Sikh and ethnic food preferences identified: None Skin Integrity: []Intact  [x]Other: surgical incision Edema: [x]None []Other Last BM: 4/8 - colostomy Food Allergies: []None [x]Other:egg (clarified-- she does not like whole eggs, but will eat foods containing eggs) Diet Restrictions: Cultural/Restorationist Preference(s): None Anthropometrics:   
Weight Loss Metrics 4/9/2019 3/16/2019 10/4/2018 2/21/2017 1/24/2017 6/24/2016 6/2/2015 Today's Wt 189 lb 9.5 oz 192 lb 200 lb 12.8 oz 197 lb 12.8 oz 197 lb 3.2 oz 201 lb 6.4 oz 202 lb 12.8 oz  
BMI 28.83 kg/m2 29.19 kg/m2 30.53 kg/m2 30.08 kg/m2 29.98 kg/m2 30.63 kg/m2 30.84 kg/m2 Weight Source: Bed Height: 5' 8\" (172.7 cm), Body mass index is 28.83 kg/m². IBW : 69.9 kg (154 lb), Usual Body Weight: 88.5 kg (195 lb),   
 
Labs:   
Lab Results Component Value Date/Time  Sodium 135 (L) 04/09/2019 02:37 AM  
 Potassium 3.4 (L) 04/09/2019 02:37 AM  
 Chloride 103 04/09/2019 02:37 AM  
 CO2 26 04/09/2019 02:37 AM  
 Glucose 152 (H) 04/09/2019 02:37 AM  
 BUN 6 04/09/2019 02:37 AM  
 Creatinine 0.67 04/09/2019 02:37 AM  
 Calcium 7.7 (L) 04/09/2019 02:37 AM  
 Magnesium 2.2 04/06/2019 12:55 AM  
 Phosphorus 2.7 04/06/2019 12:55 AM  
 Albumin 2.8 (L) 03/26/2019 02:31 PM  
 
Lab Results Component Value Date/Time Hemoglobin A1c 5.9 (H) 10/05/2018 08:25 AM  
 
Linda Jain [address]

## 2019-04-09 NOTE — OP NOTES
[address]   OPERATIVE REPORT    Name:  Heike Velez  MR#:  [id number]  :  1950  ACCOUNT #:  [de-identified]  DATE OF SERVICE:  2019    PREOPERATIVE DIAGNOSIS:  Hydrocephalus. POSTOPERATIVE DIAGNOSIS:  Hydrocephalus. PROCEDURE PERFORMED:  Externalization of ventriculoperitoneal shunt. SURGEON:  Candyce Snellen, MD    FIRST ASSISTANT:  OR staff. ANESTHESIA:  General endotracheal.    INDICATIONS:  The patient is a 72-year-old female with a longstanding ventriculoperitoneal shunt, put in by Dr. Brendon Patiño in , I believe. She has not had any problems with it. She has developed an abscess in her pelvis and is going for a sigmoid colectomy, so we decided to externalize the ventriculoperitoneal shunt. There has been no sign of  shunt infection; however, to be on the safe side, we are going to externalize it with a plan of putting it probably back in her chest after everything has calmed down. COMPLICATIONS:  None. SPECIMENS REMOVED:  n.    IMPLANTS:  n.    ESTIMATED BLOOD LOSS:  Minimal.    FINDINGS:  Good drainage of CSF. She was already on antibiotics, which were planned postoperatively given her pelvic infection. SCDs were used for the entire case. PROCEDURE:  The patient was brought to the operating room. After appropriate anesthesia was administered, she was placed supine, then her chest and neck were prepped and draped in the usual sterile fashion. I could feel the shunt just to the right side of her xyphoid process. This was incised using a 15-blade, and Bovie electrocautery was used to cut through the subcutaneous tissue. I was then able to use digital palpation to find the shunt slightly lateral to where it was and then pulled this out using a Mosquito. Once I was able to clear off all the soft tissue from this, it easily slid from the belly without any hesitancy or stretching and I could see the tip of the shunt was intact.   I then cut back several centimeters back to get all the side ports out. This was then connected to a bag and left to drain at 10 cm above her ear. At this point, the site was closed using interrupted sutures followed by Steri-Strips. The sponge and needle counts were correct x2. Rosa Chopra MD was present for the entire case from start to finish.       John Smith MD      [id number]  D:  04/08/2019 15:12  T:  04/08/2019 19:01  JOB #:  [id number]

## 2019-04-09 NOTE — PROGRESS NOTES
Progress Note Patient: Chai Oconnor MRN: [id number]  SSN: [id number] YOB: 1950  Age: 79 y.o. Sex: female Admit Date: 3/26/2019 
 
1 Day Post-Op Procedure:  Procedure(s): LAPAROTOMY EXPLORATORY/ SIGMOIDECTOMY/ COLOSTOMY 
externalize Shunt Ventricular-Peritoneal 
 
Subjective:  
 
Patient had marginal pain control overnight (better now); no nausea/vomiting; not out of bed yet. Objective:  
 
Visit Vitals /65 (BP 1 Location: Left arm, BP Patient Position: At rest) Pulse 87 Temp 98.6 °F (37 °C) Resp 15 Ht 5' 8\" (1.727 m) Wt 189 lb 9.5 oz (86 kg) SpO2 99% BMI 28.83 kg/m² Temp (24hrs), Av.9 °F (36.6 °C), Min:96.3 °F (35.7 °C), Max:98.7 °F (37.1 °C) Physical Exam: ABDOMEN: Hypoactive bowel sounds, slightly distended, soft. Silver dressing intact. Sero-sanguinous drain fluid. Viable LUQ colostomy; appropriate incisional pain with palpation. Data Review: VS, I/O's, labs Lab Review:  
Recent Results (from the past 12 hour(s)) GLUCOSE, POC Collection Time: 19 10:01 PM  
Result Value Ref Range Glucose (POC) 171 (H) 65 - 100 mg/dL Performed by Lalo Dixon   
CBC WITH AUTOMATED DIFF Collection Time: 19  2:37 AM  
Result Value Ref Range WBC 14.4 (H) 3.6 - 11.0 K/uL  
 RBC 3.37 (L) 3.80 - 5.20 M/uL  
 HGB 10.2 (L) 11.5 - 16.0 g/dL HCT 32.7 (L) 35.0 - 47.0 % MCV 97.0 80.0 - 99.0 FL  
 MCH 30.3 26.0 - 34.0 PG  
 MCHC 31.2 30.0 - 36.5 g/dL  
 RDW 13.5 11.5 - 14.5 % PLATELET 233 777 - 582 K/uL MPV 9.8 8.9 - 12.9 FL  
 NRBC 0.0 0  WBC ABSOLUTE NRBC 0.00 0.00 - 0.01 K/uL NEUTROPHILS 84 (H) 32 - 75 % LYMPHOCYTES 11 (L) 12 - 49 % MONOCYTES 5 5 - 13 % EOSINOPHILS 0 0 - 7 % BASOPHILS 0 0 - 1 % IMMATURE GRANULOCYTES 0 0.0 - 0.5 % ABS. NEUTROPHILS 11.9 (H) 1.8 - 8.0 K/UL  
 ABS. LYMPHOCYTES 1.7 0.8 - 3.5 K/UL  
 ABS. MONOCYTES 0.8 0.0 - 1.0 K/UL ABS. EOSINOPHILS 0.0 0.0 - 0.4 K/UL  
 ABS. BASOPHILS 0.0 0.0 - 0.1 K/UL  
 ABS. IMM. GRANS. 0.1 (H) 0.00 - 0.04 K/UL  
 DF AUTOMATED METABOLIC PANEL, BASIC Collection Time: 19  2:37 AM  
Result Value Ref Range Sodium 135 (L) 136 - 145 mmol/L Potassium 3.4 (L) 3.5 - 5.1 mmol/L Chloride 103 97 - 108 mmol/L  
 CO2 26 21 - 32 mmol/L Anion gap 6 5 - 15 mmol/L Glucose 152 (H) 65 - 100 mg/dL BUN 6 6 - 20 MG/DL Creatinine 0.67 0.55 - 1.02 MG/DL  
 BUN/Creatinine ratio 9 (L) 12 - 20 GFR est AA >60 >60 ml/min/1.73m2 GFR est non-AA >60 >60 ml/min/1.73m2 Calcium 7.7 (L) 8.5 - 10.1 MG/DL  
GLUCOSE, POC Collection Time: 19  7:12 AM  
Result Value Ref Range Glucose (POC) 186 (H) 65 - 100 mg/dL Performed by [employer] Assessment:  
 
Hospital Problems  Date Reviewed: 2019 Codes Class Noted POA Perforated diverticulum of large intestine ICD-10-CM: K57.20 ICD-9-CM: 562.10  3/26/2019 Unknown Plan/Recommendations/Medical Decision Makin. Add PCA. 2. Sips of clear liquids. 3. Out of bed, PT, spirometry. 4. DVT prophylaxis. 5. Maintain Nails. Signed By: Frank Post MD   
 2019

## 2019-04-09 NOTE — WOUND CARE
WOCN Ostomy Progress Note:  
 
First postoperative visit. Surgery: Laparotomy with sigmoidectomy and end colostomy Date of Surgery: 4.8.19      Surgeon: Dr. Elise Salinas Type of Ostomy: End Colostomy Stoma Location: JONA Ostomy Assessment: 
Stoma type: End Colostomy Stoma appearance: pink, moist and flushed Abdomen: midline incision with dressing and drain Output: minimal serosanguinous Current appliance: 2 piece no filtered pouch Teaching/Subjects covered today: 
Daughter at the bedside. Left educational material at the bedside for daughters to review. Patient is sleepy and requested that ostomy RN return tomorrow for teaching. Encouraged daughter and patient to review handout given to patient/family and ask questions regarding material on next ostomy nurse visit. Recommendations: 1. Empty appliance when 1/3 full and PRN. Encourage patient/family to notify nurse and 
assist w/ pouch emptying to promote self-care. Change appliance twice weekly and PRN for leaking ASAP. DO NOT REINFORCE LEAKS to avoid skin breakdown. Transition of Care: 
Ostomy RN to return and meet with patient and daughter for ostomy education Wednesday. Judith LOZOYAN RN [facility] Wound Care Office [phone] Pager [phone]

## 2019-04-09 NOTE — PROGRESS NOTES
2045 - Pt arrived from PACU. Telemetry monitor placed on. Vitals taken. Family at bedside. 2055 - Nursing supervisor called. Transducer needed?for shunt. 2105 - Dr. Kyara Angelo paged. 2115 - Pt does not need to be connected to transducer. Abdominal pressure would make it inaccurate readings. 0 - Dr. Adam Mays paged. Pain meds needed. 2315 - Orders given.

## 2019-04-09 NOTE — OP NOTES
[facility]  OPERATIVE REPORT    Name:  Yasmani Strong  MR#:  [id number]  :  1950  ACCOUNT #:  [de-identified]  DATE OF SERVICE:  2019    PREOPERATIVE DIAGNOSIS:  Perforated sigmoid diverticulitis. POSTOPERATIVE DIAGNOSIS:  Perforated sigmoid diverticulitis with fecal contamination. PROCEDURE PERFORMED:  Laparotomy with sigmoidectomy and end colostomy (CPT 28640). ATTENDING SURGEON:  John Olivas MD    ASSISTANT:  Renny Red. MD Dick    ANESTHESIA:  General endotracheal.    DRAIN:  19-mm Sam drain. SPONGE COUNT:  Correct. NEEDLE COUNT:  Correct. COMPLICATIONS:  Bladder tear, repaired primarily. SPECIMENS REMOVED:  Sigmoid colon. IMPLANTS:  None. ESTIMATED BLOOD LOSS:  200 mL. INDICATIONS:  The patient is a 73-year-old obese white female with multiple medical problems to include COPD, who was admitted to [facility] 13 days ago with perforated sigmoid diverticulitis with abscess. She was treated conservatively, however, worsening of inflammatory pelvic changes were noted on interval imaging. She was taken to the operating room on 2019 for laparoscopic drainage of this abscess. A drain was left in place. Additionally, she has a ventriculoperitoneal shunt and Neurosurgery has been following. There have been no signs of shunt infection, and initially the patient did well, with improvement in pain, white blood cell count, and vital signs. On 2019, drain output became feculent. Interval imaging revealed a collection adjacent to the sigmoid colon with gas. Over the weekend, with bowel rest, feculent drainage has continued, and interval imaging performed earlier on 2019 revealed a persistent pericolic collection. She has failed nonoperative management of the perforation. She was taken to the operating today for sigmoidectomy with end colostomy.   Dr. Dona Graf from Neurosurgery will externalize her drain prior to laparotomy and sigmoidectomy. During the procedure, I asked Dr. Kolby Arriaga to assist with the procedure, given her multiple prior abdominal surgeries, severe pelvic inflammatory changes, and chronic scarring from prior hysterectomy. He assisted in mobilization of the sigmoid colon and repair of the bladder tear. FINDINGS:  1. Externalization of  shunt at beginning of procedure. 2.  Perforated sigmoid diverticulitis with contained feculent contamination. 3.  Sigmoidectomy with end colostomy. PROCEDURE:  The patient was identified as the correct patient in the preoperative holding area and informed consent was confirmed. After answering the patient's remaining questions, she was taken to the operating room and placed on the operating room table in a supine position. Sequential compression devices were placed on both lower extremities. Following the uneventful initiation of general anesthesia, a Nails catheter was placed within her bladder, and she was carefully secured to the operating room table with safety strap in place. All potential pressure points were padded with egg crate. Dr. Gabby Gar from Neurosurgery then performed externalization of her ventriculoperitoneal shunt prior to laparotomy. This portion of the procedure is dictated as a separate operative note. Following Dr. Evelyn Melara portion of the procedure, her abdomen was reprepped and redraped in the usual standard fashion using sterile technique. Final time-out was performed, and it was confirmed that the patient had received intravenous antibiotics. A generous midline incision was made from just above the umbilicus. The dissection was carried through the subcutaneous fatty tissue down to the fascia, which was incised in the midline, allowing atraumatic entry into the abdominal space. This opening in the fascia was extended throughout the length of the skin incision. Serous ascites was identified.   Dense inflammatory changes were identified in the pelvis, and the patient was placed in a steep Trendelenburg position. Loops of small bowel were freed from the margins of the pelvis, mainly in the right lower quadrant, using careful blunt dissection. As these loops of small bowel were mobilized, the sigmoid colon was noted to be densely adherent to the area of the bladder and vaginal cuff from prior hysterectomy. Lateral attachments were incised using electrocautery, followed by medial mobilization of the sigmoid colon using careful blunt dissection. The adnexa were freed from the sigmoid colon using blunt dissection. The left ureter was identified and mobilized away from the sigmoid colon. As the dissection was carried distally to the area of perforation, a contained collection of stool, extraluminal, was entered and drained. Significant inflammatory changes were identified between the antimesenteric border of the sigmoid colon and the area of the bladder. Chronic scarring from prior hysterectomy was also encountered. Blunt dissection was used to free this segment of the sigmoid colon from the area of the bladder, with identification of a 1-cm tear in the bladder. With continued sigmoid mobilization, the sigmoid colon was completely freed from the area of the bladder and vaginal cuff. The bladder was then additionally mobilized for planned primary repair. Continued blunt dissection was used to completely free the sigmoid colon from the pelvis, allowing identification of a relatively soft segment of proximal rectum. The bladder tear was repaired with a running 3-0 Vicryl suture. This repair was imbricated with seromuscular 3-0 silk sutures. The colon was divided proximally at the junction of the descending colon and sigmoid colon. The mesosigmoid was serially ligated and divided using a combination of the bipolar device and transfixing 2-0 silk sutures. This brought the dissection to the rectosigmoid junction.   As the blood supply was controlled using the bipolar device, a soft segment of upper rectum was reached. This segment of the rectum was controlled with a black load stapler firing, and the specimen was sent to Pathology for review. The pelvis was irrigated with sterile saline. The area of controlled blood supply was inspected and no signs of ongoing bleeding were noted. Two small bleeding points in the left gonadal vessel area were controlled with transfixing 3-0 silk sutures. The staple line of the rectum was imbricated with a running 2-0 Prolene suture. The descending colon was then mobilized using combination of  electrocautery and blunt dissection. A site previously marked by the stoma team was chosen, and a disc of skin and subcutaneous fatty tissue were excised using electrocautery. Once dissection reached the fascia, the fascia was incised using cruciate technique, followed by blunt dissection of the rectus musculature and incision of the posterior fascia using electrocautery. This allowed entry into the abdominal space. This opening was manually dilated to allow passage of 2 fingerbreadths. The descending colon was passed through this opening with care to avoid twisting of the bowel or its blood supply. No significant tension was encountered during this maneuver. The abdominal space was again irrigated with warm sterile saline. Small bleeding points along rough surfaces were controlled using Surgicel and Reshma hemostatic agent. The drain from her prior surgery was replaced in the pelvis, adjacent to the bladder and left lower quadrant. After confirming adequate hemostasis and correct sponge count and needle count, all personnel changed gowns and gloves, and new instruments were opened. The midline fascia was reapproximated with a running loop #1 PDS suture. The subcutaneous tissues were irrigated with sterile saline. Skin edges were reapproximated with skin staples.   The end colostomy was then matured in standard fashion with interrupted 4-0 Vicryl sutures. A colostomy appliance was applied, followed by application of an Aquacel dressing to the midline wound. The patient tolerated the procedure well. She was extubated in the operating room and transported to the recovery area in stable condition. The attending surgeon, Dr. Day Gutiérrez, was scrubbed and present for the entire procedure.       Jocy Hernandez MD      [id number]  D:  04/08/2019 20:33  T:  04/08/2019 20:39  JOB #:  [id number]

## 2019-04-10 LAB
ANION GAP SERPL CALC-SCNC: ABNORMAL MMOL/L (ref 5–15)
ATRIAL RATE: 74 BPM
BASOPHILS # BLD: 0 K/UL (ref 0–0.1)
BASOPHILS NFR BLD: 0 % (ref 0–1)
BUN SERPL-MCNC: 7 MG/DL (ref 6–20)
BUN/CREAT SERPL: 15 (ref 12–20)
CALCIUM SERPL-MCNC: 7.6 MG/DL (ref 8.5–10.1)
CALCULATED P AXIS, ECG09: 67 DEGREES
CALCULATED R AXIS, ECG10: 71 DEGREES
CALCULATED T AXIS, ECG11: 96 DEGREES
CHLORIDE SERPL-SCNC: 106 MMOL/L (ref 97–108)
CO2 SERPL-SCNC: 30 MMOL/L (ref 21–32)
CREAT SERPL-MCNC: 0.46 MG/DL (ref 0.55–1.02)
DIAGNOSIS, 93000: NORMAL
DIFFERENTIAL METHOD BLD: ABNORMAL
EOSINOPHIL # BLD: 0.1 K/UL (ref 0–0.4)
EOSINOPHIL NFR BLD: 1 % (ref 0–7)
ERYTHROCYTE [DISTWIDTH] IN BLOOD BY AUTOMATED COUNT: 13.5 % (ref 11.5–14.5)
GLUCOSE BLD STRIP.AUTO-MCNC: 164 MG/DL (ref 65–100)
GLUCOSE BLD STRIP.AUTO-MCNC: 170 MG/DL (ref 65–100)
GLUCOSE BLD STRIP.AUTO-MCNC: 170 MG/DL (ref 65–100)
GLUCOSE SERPL-MCNC: 133 MG/DL (ref 65–100)
HCT VFR BLD AUTO: 28.1 % (ref 35–47)
HGB BLD-MCNC: 8.6 G/DL (ref 11.5–16)
IMM GRANULOCYTES # BLD AUTO: 0.1 K/UL (ref 0–0.04)
IMM GRANULOCYTES NFR BLD AUTO: 1 % (ref 0–0.5)
LYMPHOCYTES # BLD: 1.8 K/UL (ref 0.8–3.5)
LYMPHOCYTES NFR BLD: 13 % (ref 12–49)
MAGNESIUM SERPL-MCNC: 2.1 MG/DL (ref 1.6–2.4)
MCH RBC QN AUTO: 29.9 PG (ref 26–34)
MCHC RBC AUTO-ENTMCNC: 30.6 G/DL (ref 30–36.5)
MCV RBC AUTO: 97.6 FL (ref 80–99)
MONOCYTES # BLD: 1.1 K/UL (ref 0–1)
MONOCYTES NFR BLD: 8 % (ref 5–13)
NEUTS SEG # BLD: 11.3 K/UL (ref 1.8–8)
NEUTS SEG NFR BLD: 77 % (ref 32–75)
NRBC # BLD: 0 K/UL (ref 0–0.01)
NRBC BLD-RTO: 0 PER 100 WBC
P-R INTERVAL, ECG05: 126 MS
PHOSPHATE SERPL-MCNC: 1.5 MG/DL (ref 2.6–4.7)
PLATELET # BLD AUTO: 309 K/UL (ref 150–400)
PMV BLD AUTO: 9.7 FL (ref 8.9–12.9)
POTASSIUM SERPL-SCNC: 2.9 MMOL/L (ref 3.5–5.1)
Q-T INTERVAL, ECG07: 506 MS
QRS DURATION, ECG06: 94 MS
QTC CALCULATION (BEZET), ECG08: 572 MS
RBC # BLD AUTO: 2.88 M/UL (ref 3.8–5.2)
SERVICE CMNT-IMP: ABNORMAL
SODIUM SERPL-SCNC: 134 MMOL/L (ref 136–145)
VENTRICULAR RATE, ECG03: 77 BPM
WBC # BLD AUTO: 14.5 K/UL (ref 3.6–11)

## 2019-04-10 PROCEDURE — 97530 THERAPEUTIC ACTIVITIES: CPT

## 2019-04-10 PROCEDURE — 85025 COMPLETE CBC W/AUTO DIFF WBC: CPT

## 2019-04-10 PROCEDURE — 74011250636 HC RX REV CODE- 250/636: Performed by: SURGERY

## 2019-04-10 PROCEDURE — 84100 ASSAY OF PHOSPHORUS: CPT

## 2019-04-10 PROCEDURE — 74011250637 HC RX REV CODE- 250/637: Performed by: SURGERY

## 2019-04-10 PROCEDURE — 74011000250 HC RX REV CODE- 250: Performed by: SURGERY

## 2019-04-10 PROCEDURE — 77030011641 HC PASTE OST ADH BMS -A

## 2019-04-10 PROCEDURE — 74011636637 HC RX REV CODE- 636/637: Performed by: SURGERY

## 2019-04-10 PROCEDURE — 77030010540 HC BG OST DRN BMS -A

## 2019-04-10 PROCEDURE — C9113 INJ PANTOPRAZOLE SODIUM, VIA: HCPCS | Performed by: SURGERY

## 2019-04-10 PROCEDURE — 74011000258 HC RX REV CODE- 258: Performed by: SURGERY

## 2019-04-10 PROCEDURE — 80048 BASIC METABOLIC PNL TOTAL CA: CPT

## 2019-04-10 PROCEDURE — 77030010520

## 2019-04-10 PROCEDURE — 82962 GLUCOSE BLOOD TEST: CPT

## 2019-04-10 PROCEDURE — 36415 COLL VENOUS BLD VENIPUNCTURE: CPT

## 2019-04-10 PROCEDURE — 83735 ASSAY OF MAGNESIUM: CPT

## 2019-04-10 PROCEDURE — 97162 PT EVAL MOD COMPLEX 30 MIN: CPT

## 2019-04-10 PROCEDURE — 97166 OT EVAL MOD COMPLEX 45 MIN: CPT

## 2019-04-10 PROCEDURE — 65660000000 HC RM CCU STEPDOWN

## 2019-04-10 RX ORDER — POTASSIUM CHLORIDE 29.8 MG/ML
20 INJECTION INTRAVENOUS
Status: COMPLETED | OUTPATIENT
Start: 2019-04-10 | End: 2019-04-12

## 2019-04-10 RX ORDER — SODIUM,POTASSIUM PHOSPHATES 280-250MG
1 POWDER IN PACKET (EA) ORAL 4 TIMES DAILY
Status: DISCONTINUED | OUTPATIENT
Start: 2019-04-10 | End: 2019-04-26 | Stop reason: HOSPADM

## 2019-04-10 RX ORDER — DIPHENHYDRAMINE HYDROCHLORIDE 50 MG/ML
25 INJECTION, SOLUTION INTRAMUSCULAR; INTRAVENOUS
Status: DISCONTINUED | OUTPATIENT
Start: 2019-04-10 | End: 2019-04-26 | Stop reason: HOSPADM

## 2019-04-10 RX ADMIN — LOSARTAN POTASSIUM 100 MG: 50 TABLET ORAL at 08:48

## 2019-04-10 RX ADMIN — LABETALOL 20 MG/4 ML (5 MG/ML) INTRAVENOUS SYRINGE 20 MG: at 07:33

## 2019-04-10 RX ADMIN — PANTOPRAZOLE SODIUM 40 MG: 40 INJECTION, POWDER, FOR SOLUTION INTRAVENOUS at 17:02

## 2019-04-10 RX ADMIN — METRONIDAZOLE 500 MG: 500 INJECTION, SOLUTION INTRAVENOUS at 05:53

## 2019-04-10 RX ADMIN — Medication 10 ML: at 13:30

## 2019-04-10 RX ADMIN — HYDRALAZINE HYDROCHLORIDE 10 MG: 20 INJECTION INTRAMUSCULAR; INTRAVENOUS at 13:59

## 2019-04-10 RX ADMIN — POTASSIUM & SODIUM PHOSPHATES POWDER PACK 280-160-250 MG 1 PACKET: 280-160-250 PACK at 17:03

## 2019-04-10 RX ADMIN — Medication 10 ML: at 05:53

## 2019-04-10 RX ADMIN — LABETALOL 20 MG/4 ML (5 MG/ML) INTRAVENOUS SYRINGE 20 MG: at 22:30

## 2019-04-10 RX ADMIN — HYDRALAZINE HYDROCHLORIDE 10 MG: 20 INJECTION INTRAMUSCULAR; INTRAVENOUS at 06:21

## 2019-04-10 RX ADMIN — HYDRALAZINE HYDROCHLORIDE 100 MG: 25 TABLET ORAL at 21:36

## 2019-04-10 RX ADMIN — HYDRALAZINE HYDROCHLORIDE 100 MG: 25 TABLET ORAL at 09:39

## 2019-04-10 RX ADMIN — POTASSIUM & SODIUM PHOSPHATES POWDER PACK 280-160-250 MG 1 PACKET: 280-160-250 PACK at 21:36

## 2019-04-10 RX ADMIN — CEFTRIAXONE SODIUM 2 G: 2 INJECTION, POWDER, FOR SOLUTION INTRAMUSCULAR; INTRAVENOUS at 14:37

## 2019-04-10 RX ADMIN — Medication 10 ML: at 21:36

## 2019-04-10 RX ADMIN — TRAZODONE HYDROCHLORIDE 50 MG: 50 TABLET ORAL at 21:37

## 2019-04-10 RX ADMIN — POTASSIUM CHLORIDE 20 MEQ: 400 INJECTION, SOLUTION INTRAVENOUS at 11:02

## 2019-04-10 RX ADMIN — METRONIDAZOLE 500 MG: 500 INJECTION, SOLUTION INTRAVENOUS at 21:37

## 2019-04-10 RX ADMIN — HYDRALAZINE HYDROCHLORIDE 10 MG: 20 INJECTION INTRAMUSCULAR; INTRAVENOUS at 02:19

## 2019-04-10 RX ADMIN — Medication 10 ML: at 13:28

## 2019-04-10 RX ADMIN — LABETALOL 20 MG/4 ML (5 MG/ML) INTRAVENOUS SYRINGE 20 MG: at 03:18

## 2019-04-10 RX ADMIN — HYDRALAZINE HYDROCHLORIDE 100 MG: 25 TABLET ORAL at 17:03

## 2019-04-10 RX ADMIN — POTASSIUM & SODIUM PHOSPHATES POWDER PACK 280-160-250 MG 1 PACKET: 280-160-250 PACK at 13:36

## 2019-04-10 RX ADMIN — HUMAN INSULIN 2 UNITS: 100 INJECTION, SOLUTION SUBCUTANEOUS at 12:03

## 2019-04-10 RX ADMIN — POTASSIUM CHLORIDE 20 MEQ: 400 INJECTION, SOLUTION INTRAVENOUS at 15:42

## 2019-04-10 RX ADMIN — POTASSIUM CHLORIDE 20 MEQ: 400 INJECTION, SOLUTION INTRAVENOUS at 13:27

## 2019-04-10 RX ADMIN — POTASSIUM CHLORIDE 20 MEQ: 400 INJECTION, SOLUTION INTRAVENOUS at 17:06

## 2019-04-10 RX ADMIN — HUMAN INSULIN 2 UNITS: 100 INJECTION, SOLUTION SUBCUTANEOUS at 06:20

## 2019-04-10 RX ADMIN — HUMAN INSULIN 2 UNITS: 100 INJECTION, SOLUTION SUBCUTANEOUS at 19:17

## 2019-04-10 RX ADMIN — FLUCONAZOLE 400 MG: 400 INJECTION, SOLUTION INTRAVENOUS at 11:09

## 2019-04-10 RX ADMIN — THIAMINE HYDROCHLORIDE: 100 INJECTION, SOLUTION INTRAMUSCULAR; INTRAVENOUS at 18:58

## 2019-04-10 RX ADMIN — DIPHENHYDRAMINE HYDROCHLORIDE 25 MG: 50 INJECTION, SOLUTION INTRAMUSCULAR; INTRAVENOUS at 21:37

## 2019-04-10 RX ADMIN — LABETALOL 20 MG/4 ML (5 MG/ML) INTRAVENOUS SYRINGE 20 MG: at 19:20

## 2019-04-10 RX ADMIN — SODIUM CHLORIDE 125 ML/HR: 900 INJECTION, SOLUTION INTRAVENOUS at 15:42

## 2019-04-10 RX ADMIN — SERTRALINE 100 MG: 50 TABLET, FILM COATED ORAL at 08:48

## 2019-04-10 RX ADMIN — HYDRALAZINE HYDROCHLORIDE 10 MG: 20 INJECTION INTRAMUSCULAR; INTRAVENOUS at 23:33

## 2019-04-10 RX ADMIN — METRONIDAZOLE 500 MG: 500 INJECTION, SOLUTION INTRAVENOUS at 13:36

## 2019-04-10 NOTE — PROGRESS NOTES
Problem: Mobility Impaired (Adult and Pediatric) Goal: *Acute Goals and Plan of Care (Insert Text) Description Physical Therapy Goals Initiated 4/10/2019 1. Patient will move from supine to sit and sit to supine , scoot up and down and roll side to side in bed with modified independence within 7 day(s). 2.  Patient will transfer from bed to chair and chair to bed with minimal assistance/contact guard assist using the least restrictive device within 7 day(s). 3.  Patient will perform sit to stand with minimal assistance/contact guard assist within 7 day(s). 4.  Patient will ambulate with minimal assistance/contact guard assist for 25 feet with the least restrictive device within 7 day(s). Outcome: Progressing Towards Goal 
PHYSICAL THERAPY EVALUATION Patient: Jessica Agrawal (62 y.o. female) Date: 4/10/2019 Primary Diagnosis: Perforated diverticulum of large intestine [K57.20] Procedure(s) (LRB): 
LAPAROTOMY EXPLORATORY/ SIGMOIDECTOMY/ COLSTOMY (N/A) externalize Shunt Ventricular-Peritoneal (Right) 2 Days Post-Op Precautions:  shunt drain must have 0 in line with tragus of ear ASSESSMENT :  
Based on the objective data described below, patient presents with impaired mobility s/p above procedure POD 2. At baseline, patient lives with daughter, is independent with ADLs, and ambulates without an AD. This date patient received lying in bed agreeable to work with therapy. Patient transferred supine<>sit with Renard. Sit<>stand with Renard x2 and HHA. Patient maintained standing for ~1 minute before reporting abdominal pain. Able to take sidesteps along bed with Renard x2 and returned to supine. Rn present throughout session to adjust  shunt drain to align with tragus throughout session. The following are barriers to independence while in acute care:  
-Cognitive and/or behavioral: Patient has short term memory loss at baseline per daughter report -Medical condition: strength, functional endurance, standing balance and pain tolerance   
-Other:    
 
The patient will benefit from skilled acute intervention to address the above impairments and their rehabilitation potential is considered to be Good Discharge recommendations: Home health (to increase independence and safety) 24 supervision If above is not an option then recommend: Rehab: patient is working towards tolerating 3 hours of therapy 
 (to regain functional baseline patient requires rehab) Patient's barriers to discharging home, in addition to above impairments: family availability to assist 
total assist driving to follow up medical appointment(s)/groceries/obtain medication 
level of physical assist required to maintain patient safety. Equipment recommendations for successful discharge (if) home: TBD PLAN : 
Recommendations and Planned Interventions: bed mobility training, transfer training, gait training, therapeutic exercises, neuromuscular re-education, patient and family training/education and therapeutic activities Frequency/Duration: Patient will be followed by physical therapy  5 times a week to address goals. SUBJECTIVE:  
Patient stated ? It feels good to stand. ? OBJECTIVE DATA SUMMARY:  
HISTORY:   
Past Medical History:  
Diagnosis Date  
 ACP (advance care planning) 2/21/2017 Initial ACP discussion w/ pt and daughter 2-2017. AMD form reviewed and copy provided. NN/facilitator to discuss with patient Asthma   
 hx of Benign essential hypertension 6/24/2016 Bilateral hip pain 6/3/2014  
 xrays 2012, negative COPD (chronic obstructive pulmonary disease) ([facility] [address] 3/29/2010 Depression 3/29/2010 Diverticulitis DVT of Rt Lower Extremity 3/29/2010 Elevated LFT's, Fatty Liver 3/29/2010 Essential tremor 7/14/2010 GERD (gastroesophageal reflux disease) 9/2/2011 H/O Subarachnoid hemorrhage due to ruptured aneurysm 7/14/2010 Hyperlipemia 3/29/2010 Impaired fasting glucose 7/13/2014 6/2014 Thyroiditis, subacute 3/29/2010 Tobacco Abuse 3/29/2010 Vitamin D deficiency 6/3/2014 2012 Past Surgical History:  
Procedure Laterality Date ECHO STRESS  2005 Negative HX COLONOSCOPY  3/2013, Dr Marco Woo  
 benign cecal polyp, f/u 10 yrs HX CRANIOTOMY  10/06  
 for clipping of ruptured aneurysm; Dr Sam Reyes a shunt HX ENDOSCOPY  EGD, Dr Marco Woo \"ok\" per pt report [address] [place] [address] Cervical Dysplasia (Dr Eladio Napoles) AR COLONOSCOPY W/BIOPSY SINGLE/MULTIPLE  1980's Benign polyp Prior Level of Function/Home Situation: At baseline, patient lives with daughter, is independent with ADLs, and ambulates without an AD. Patient has history of short term memory loss and does not drive Personal factors and/or comorbidities impacting plan of care: [facility] Home Situation Home Environment: Private residence One/Two Story Residence: Two story Living Alone: No 
Support Systems: Child(messi), Family member(s) Patient Expects to be Discharged to[de-identified] Private residence Current DME Used/Available at Home: None EXAMINATION/PRESENTATION/DECISION MAKING:  
Critical Behavior: 
Neurologic State: Alert Orientation Level: Oriented X4 Cognition: Memory loss Hearing: Auditory Auditory Impairment: None Skin:   
Edema:  
Range Of Motion: 
AROM: Generally decreased, functional 
  
  
  
PROM: Within functional limits Strength:   
Strength: Generally decreased, functional 
  
  
  
  
  
  
Tone & Sensation:  
Tone: Normal 
  
  
  
  
Sensation: Intact Coordination: 
Coordination: Within functional limits Vision:  
  
Functional Mobility: 
Bed Mobility: 
  
Supine to Sit: Minimum assistance; Additional time Sit to Supine: Minimum assistance; Additional time Scooting: Moderate assistance;Assist x2 Transfers: 
Sit to Stand: Minimum assistance;Assist x2 
 Stand to Sit: Minimum assistance;Assist x2 Balance:  
Sitting: Intact; Without support Standing: Impaired; With support Standing - Static: Fair;Constant support Standing - Dynamic : Fair;Constant support Ambulation/Gait Training: 
Distance (ft): 2 Feet (ft) Assistive Device: Gait belt(HHA) Ambulation - Level of Assistance: Minimal assistance;Assist x2 Gait Abnormalities: Decreased step clearance;Shuffling gait;Trunk sway increased Base of Support: Widened Speed/Tracy: Shuffled; Slow Step Length: Left shortened;Right shortened Stairs: Therapeutic Exercises:  
 
 
Functional Measure: 
Barthel Index: 
Bathin Bladder: 0 Bowels: 5 Groomin Dressin Feedin Mobility: 0 Stairs: 0 Toilet Use: 0 Transfer (Bed to Chair and Back): 0 Total: 15/100 Percentage of impairment  
0% 1-19% 20-39% 40-59% 60-79% 80-99% 100% Barthel Score 0-100 100 99-80 79-60 59-40 20-39 1-19 
 0 The Barthel ADL Index: Guidelines 1. The index should be used as a record of what a patient does, not as a record of what a patient could do. 2. The main aim is to establish degree of independence from any help, physical or verbal, however minor and for whatever reason. 3. The need for supervision renders the patient not independent. 4. A patient's performance should be established using the best available evidence. Asking the patient, friends/relatives and nurses are the usual sources, but direct observation and common sense are also important. However direct testing is not needed. 5. Usually the patient's performance over the preceding 24-48 hours is important, but occasionally longer periods will be relevant. 6. Middle categories imply that the patient supplies over 50 per cent of the effort. 7. Use of aids to be independent is allowed. Melisa Swenson., Barthel, D.W. (1753).  Functional evaluation: the Barthel Index. [address] [facility] (14)2. ASHUTOSH Morrison, Jessica Bhupinder., Shelly Ham., [place], [address] (1999). Measuring the change indisability after inpatient rehabilitation; comparison of the responsiveness of the Barthel Index and Functional [place] Measure. Journal of Neurology, Neurosurgery, and Psychiatry, 66(4), 596-673. PRATIK Colorado, PARISA Wakefield, & Gerson Appiah M.A. (2004.) Assessment of post-stroke quality of life in cost-effectiveness studies: The usefulness of the Barthel Index and the EuroQoL-5D. [facility], 13, 128-35 Physical Therapy Evaluation Charge Determination History Examination Presentation Decision-Making HIGH Complexity :3+ comorbidities / personal factors will impact the outcome/ POC  HIGH Complexity : 4+ Standardized tests and measures addressing body structure, function, activity limitation and / or participation in recreation  MEDIUM Complexity : Evolving with changing characteristics  MEDIUM Complexity : FOTO score of 26-74 Based on the above components, the patient evaluation is determined to be of the following complexity level: MEDIUM Activity Tolerance:  
Fair secondary to pain Please refer to the flowsheet for vital signs taken during this treatment. After treatment patient left:  
Supine in bed Call light within reach RN notified Family at bedside COMMUNICATION/EDUCATION:  
The patient?s plan of care was discussed with: Registered Nurse. Fall prevention education was provided and the patient/caregiver indicated understanding., Patient/family have participated as able in goal setting and plan of care. and Patient/family agree to work toward stated goals and plan of care. Thank you for this referral. 
Ellie Soto, PT, DPT Time Calculation: 22 mins

## 2019-04-10 NOTE — PROGRESS NOTES
Bedside and Verbal shift change report given to Mone Paiz RN (oncoming nurse) by Nayeli Barry RN (offgoing nurse). Report included the following information SBAR, Kardex, Procedure Summary, Intake/Output, MAR, Recent Results and Cardiac Rhythm SA/NSR.

## 2019-04-10 NOTE — PROGRESS NOTES
Progress Note Patient: Katie Bone MRN: [id number]  SSN: [id number] YOB: 1950  Age: 79 y.o. Sex: female Admit Date: 3/26/2019 2 Days Post-Op Procedure:  Procedure(s): LAPAROTOMY EXPLORATORY/ SIGMOIDECTOMY/ COLOSTOMY 
externalize Shunt Ventricular-Peritoneal 
 
Subjective:  
 
Patient has improved pain control now; no nausea/vomiting; not out of bed yet. She complains of distention and insomnia. Objective:  
 
Visit Vitals /67 (BP 1 Location: Right arm, BP Patient Position: At rest) Pulse 99 Temp 98.8 °F (37.1 °C) Resp 25 Ht 5' 8\" (1.727 m) Wt 189 lb 2.5 oz (85.8 kg) SpO2 98% BMI 28.76 kg/m² Temp (24hrs), Av °F (37.2 °C), Min:98.5 °F (36.9 °C), Max:99.4 °F (37.4 °C) Physical Exam: ABDOMEN: Hypoactive bowel sounds, distended, soft. Silver dressing intact. Sero-sanguinous drain fluid. Viable LUQ colostomy ( no output); appropriate incisional pain with palpation. Data Review: VS, I/O's, labs Lab Review:  
Recent Results (from the past 12 hour(s)) CBC WITH AUTOMATED DIFF Collection Time: 04/10/19  2:29 AM  
Result Value Ref Range WBC 14.5 (H) 3.6 - 11.0 K/uL  
 RBC 2.88 (L) 3.80 - 5.20 M/uL HGB 8.6 (L) 11.5 - 16.0 g/dL HCT 28.1 (L) 35.0 - 47.0 % MCV 97.6 80.0 - 99.0 FL  
 MCH 29.9 26.0 - 34.0 PG  
 MCHC 30.6 30.0 - 36.5 g/dL  
 RDW 13.5 11.5 - 14.5 % PLATELET 911 790 - 791 K/uL MPV 9.7 8.9 - 12.9 FL  
 NRBC 0.0 0  WBC ABSOLUTE NRBC 0.00 0.00 - 0.01 K/uL NEUTROPHILS 77 (H) 32 - 75 % LYMPHOCYTES 13 12 - 49 % MONOCYTES 8 5 - 13 % EOSINOPHILS 1 0 - 7 % BASOPHILS 0 0 - 1 % IMMATURE GRANULOCYTES 1 (H) 0.0 - 0.5 % ABS. NEUTROPHILS 11.3 (H) 1.8 - 8.0 K/UL  
 ABS. LYMPHOCYTES 1.8 0.8 - 3.5 K/UL  
 ABS. MONOCYTES 1.1 (H) 0.0 - 1.0 K/UL  
 ABS. EOSINOPHILS 0.1 0.0 - 0.4 K/UL  
 ABS. BASOPHILS 0.0 0.0 - 0.1 K/UL  
 ABS. IMM. GRANS. 0.1 (H) 0.00 - 0.04 K/UL  
 DF AUTOMATED METABOLIC PANEL, BASIC Collection Time: 04/10/19  2:29 AM  
Result Value Ref Range Sodium 134 (L) 136 - 145 mmol/L Potassium 2.9 (L) 3.5 - 5.1 mmol/L Chloride 106 97 - 108 mmol/L  
 CO2 30 21 - 32 mmol/L Anion gap NEG 2 5 - 15 mmol/L Glucose 133 (H) 65 - 100 mg/dL BUN 7 6 - 20 MG/DL Creatinine 0.46 (L) 0.55 - 1.02 MG/DL  
 BUN/Creatinine ratio 15 12 - 20 GFR est AA >60 >60 ml/min/1.73m2 GFR est non-AA >60 >60 ml/min/1.73m2 Calcium 7.6 (L) 8.5 - 10.1 MG/DL  
PHOSPHORUS Collection Time: 04/10/19  2:29 AM  
Result Value Ref Range Phosphorus 1.5 (L) 2.6 - 4.7 MG/DL MAGNESIUM Collection Time: 04/10/19  2:29 AM  
Result Value Ref Range Magnesium 2.1 1.6 - 2.4 mg/dL GLUCOSE, POC Collection Time: 04/10/19  5:59 AM  
Result Value Ref Range Glucose (POC) 164 (H) 65 - 100 mg/dL Performed by Corrine Kendall, POC Collection Time: 04/10/19 11:47 AM  
Result Value Ref Range Glucose (POC) 170 (H) 65 - 100 mg/dL Performed by Maryjo Collins Assessment:  
 
Hospital Problems  Date Reviewed: 2019 Codes Class Noted POA Perforated diverticulum of large intestine ICD-10-CM: K57.20 ICD-9-CM: 562.10  3/26/2019 Unknown Plan/Recommendations/Medical Decision Makin. Continue PCA. 2. Sips of clear liquids until distention improves. 3. Out of bed, PT, spirometry. 4. DVT prophylaxis. 5. Maintain Nails. 6. Replete K+, PO4; continue TPN. Signed By: Carolin Bro MD   
 April 10, 2019

## 2019-04-10 NOTE — PROGRESS NOTES
Pt discussed in [address] rounds. Pt to see therapy today. CM will meet with pt after therapy recommendations are made. BRIAN Mcconnell,ACM-SW

## 2019-04-10 NOTE — PROGRESS NOTES
Neurosurgery Progress Note Date: 4/10/2019 Admit Date: 3/26/2019 LOS: 15 days Chief Complaint:  Pelvic abscess Interval History: looks better today Subjective: No acute complaints Objective:  
 
Patient Vitals for the past 8 hrs: 
 BP Temp Pulse Resp SpO2  
04/10/19 1331 186/70 98.8 °F (37.1 °C) (!) 102 21 97 % 04/10/19 1000 155/67 98.8 °F (37.1 °C) 99 25 98 % 04/10/19 0848 138/87  (!) 101    
04/10/19 0733 176/85  (!) 104    
04/10/19 0730 176/85      
04/10/19 0621 173/70  99    
04/10/19 0600 153/54 99.2 °F (37.3 °C) 98 19 94 % Temp (24hrs), Av °F (37.2 °C), Min:98.5 °F (36.9 °C), Max:99.4 °F (37.4 °C) 
 
 
04/10 07 - 04/10 1900 In: -  
Out: 105 [Drains:105] Physical Exam:   
General : NAD Follows commands readily. Awake, Alert, oriented x3   Speech fluent. Recent and remote memory intact. PERRL EOMI, face symmetric, tongue and uvula midline, shoulder shrug nl. No pronator drift. Motor 5/5, Sensation intact. Reflexes intact. Labs:   
Recent Results (from the past 24 hour(s)) GLUCOSE, POC Collection Time: 19  4:40 PM  
Result Value Ref Range Glucose (POC) 154 (H) 65 - 100 mg/dL Performed by Michael Beltran GLUCOSE, POC Collection Time: 19 11:19 PM  
Result Value Ref Range Glucose (POC) 163 (H) 65 - 100 mg/dL Performed by [facility]   
CBC WITH AUTOMATED DIFF Collection Time: 04/10/19  2:29 AM  
Result Value Ref Range WBC 14.5 (H) 3.6 - 11.0 K/uL  
 RBC 2.88 (L) 3.80 - 5.20 M/uL HGB 8.6 (L) 11.5 - 16.0 g/dL HCT 28.1 (L) 35.0 - 47.0 % MCV 97.6 80.0 - 99.0 FL  
 MCH 29.9 26.0 - 34.0 PG  
 MCHC 30.6 30.0 - 36.5 g/dL  
 RDW 13.5 11.5 - 14.5 % PLATELET 097 822 - 137 K/uL MPV 9.7 8.9 - 12.9 FL  
 NRBC 0.0 0  WBC ABSOLUTE NRBC 0.00 0.00 - 0.01 K/uL NEUTROPHILS 77 (H) 32 - 75 % LYMPHOCYTES 13 12 - 49 % MONOCYTES 8 5 - 13 % EOSINOPHILS 1 0 - 7 % BASOPHILS 0 0 - 1 % IMMATURE GRANULOCYTES 1 (H) 0.0 - 0.5 % ABS. NEUTROPHILS 11.3 (H) 1.8 - 8.0 K/UL  
 ABS. LYMPHOCYTES 1.8 0.8 - 3.5 K/UL  
 ABS. MONOCYTES 1.1 (H) 0.0 - 1.0 K/UL  
 ABS. EOSINOPHILS 0.1 0.0 - 0.4 K/UL  
 ABS. BASOPHILS 0.0 0.0 - 0.1 K/UL  
 ABS. IMM. GRANS. 0.1 (H) 0.00 - 0.04 K/UL  
 DF AUTOMATED METABOLIC PANEL, BASIC Collection Time: 04/10/19  2:29 AM  
Result Value Ref Range Sodium 134 (L) 136 - 145 mmol/L Potassium 2.9 (L) 3.5 - 5.1 mmol/L Chloride 106 97 - 108 mmol/L  
 CO2 30 21 - 32 mmol/L Anion gap NEG 2 5 - 15 mmol/L Glucose 133 (H) 65 - 100 mg/dL BUN 7 6 - 20 MG/DL Creatinine 0.46 (L) 0.55 - 1.02 MG/DL  
 BUN/Creatinine ratio 15 12 - 20 GFR est AA >60 >60 ml/min/1.73m2 GFR est non-AA >60 >60 ml/min/1.73m2 Calcium 7.6 (L) 8.5 - 10.1 MG/DL  
PHOSPHORUS Collection Time: 04/10/19  2:29 AM  
Result Value Ref Range Phosphorus 1.5 (L) 2.6 - 4.7 MG/DL MAGNESIUM Collection Time: 04/10/19  2:29 AM  
Result Value Ref Range Magnesium 2.1 1.6 - 2.4 mg/dL GLUCOSE, POC Collection Time: 04/10/19  5:59 AM  
Result Value Ref Range Glucose (POC) 164 (H) 65 - 100 mg/dL Performed by Galindo Ladd, POC Collection Time: 04/10/19 11:47 AM  
Result Value Ref Range Glucose (POC) 170 (H) 65 - 100 mg/dL Performed by Jeff Azul Assessment:  
 
Active Problems: 
  Perforated diverticulum of large intestine (3/26/2019) Plan:  
 
Tentative plan would be drain this week - put in pleural space next week, barring no infection or fevers. I discussed with patient and family that there is a risk of infection with the drain externalized, but to early to put it back in. Discussed reasons to put in pleural space given infection in abdomen.  
 
I discussed with nursing that the drain does not need to strictly be at ten - but if too low for prolonged period of time, it could overdrain, and if too high it could underdrain. It does not have to be exact. Continue to watch closely Rosa Chopra MD 
35 minutes were spent with the patient, over half of which was face to face  counseling and coordination of care, discussing with nursing, obtaining history, examining patient and discussing treatment plans.

## 2019-04-10 NOTE — PROGRESS NOTES
Problem: Pressure Injury - Risk of 
Goal: *Prevention of pressure injury Description Document Moe Scale and appropriate interventions in the flowsheet. Outcome: Progressing Towards Goal 
  
Problem: Patient Education: Go to Patient Education Activity Goal: Patient/Family Education Outcome: Progressing Towards Goal 
  
Problem: Falls - Risk of 
Goal: *Absence of Falls Description Document Marco Gomez Fall Risk and appropriate interventions in the flowsheet. Outcome: Progressing Towards Goal 
Note:  
Fall Risk Interventions: 
Mobility Interventions: Communicate number of staff needed for ambulation/transfer, PT Consult for mobility concerns, PT Consult for assist device competence Mentation Interventions: Door open when patient unattended, Adequate sleep, hydration, pain control, Increase mobility Medication Interventions: Assess postural VS orthostatic hypotension, Evaluate medications/consider consulting pharmacy Elimination Interventions: Call light in reach History of Falls Interventions: Consult care management for discharge planning, Door open when patient unattended Problem: Patient Education: Go to Patient Education Activity Goal: Patient/Family Education Outcome: Progressing Towards Goal 
  
Problem: General Medical Care Plan Goal: *Vital signs within specified parameters Outcome: Progressing Towards Goal 
Goal: *Labs within defined limits Outcome: Progressing Towards Goal 
Goal: *Absence of infection signs and symptoms Outcome: Progressing Towards Goal 
Goal: *Optimal pain control at patient's stated goal 
Outcome: Progressing Towards Goal 
Goal: *Skin integrity maintained Outcome: Progressing Towards Goal 
Goal: *Fluid volume balance Outcome: Progressing Towards Goal 
Goal: *Optimize nutritional status Outcome: Progressing Towards Goal 
Goal: *Anxiety reduced or absent Outcome: Progressing Towards Goal 
Goal: *Progressive mobility and function (eg: ADL's) Outcome: Progressing Towards Goal 
  
Problem: Patient Education: Go to Patient Education Activity Goal: Patient/Family Education Outcome: Progressing Towards Goal 
  
Problem: Surgical Pathway Day of Surgery Goal: Activity/Safety Outcome: Progressing Towards Goal 
Goal: Consults, if ordered Outcome: Progressing Towards Goal 
Goal: Nutrition/Diet Outcome: Progressing Towards Goal 
Goal: Medications Outcome: Progressing Towards Goal 
Goal: Respiratory Outcome: Progressing Towards Goal 
Goal: Treatments/Interventions/Procedures Outcome: Progressing Towards Goal 
Goal: Psychosocial 
Outcome: Progressing Towards Goal 
Goal: *No signs and symptoms of infection or wound complications Outcome: Progressing Towards Goal 
Goal: *Optimal pain control at patient's stated goal 
Outcome: Progressing Towards Goal 
Goal: *Adequate urinary output (equal to or greater than 30 milliliters/hour) Description Ambulatory Surgery patients voiding without difficulty. Outcome: Progressing Towards Goal 
Goal: *Hemodynamically stable Outcome: Progressing Towards Goal

## 2019-04-10 NOTE — PROGRESS NOTES
Bedside and Verbal shift change report given to [facility] (oncoming nurse) by Kimani Sandoval (offgoing nurse). Report included the following information SBAR, Kardex, Intake/Output, MAR and Cardiac Rhythm NSR.

## 2019-04-10 NOTE — PROGRESS NOTES
Problem: Pressure Injury - Risk of 
Goal: *Prevention of pressure injury Description Document Moe Scale and appropriate interventions in the flowsheet. Outcome: Progressing Towards Goal 
  
Problem: Patient Education: Go to Patient Education Activity Goal: Patient/Family Education Outcome: Progressing Towards Goal 
  
Problem: Falls - Risk of 
Goal: *Absence of Falls Description Document Edgar Brunner Fall Risk and appropriate interventions in the flowsheet. Outcome: Progressing Towards Goal 
  
Problem: Patient Education: Go to Patient Education Activity Goal: Patient/Family Education Outcome: Progressing Towards Goal 
  
Problem: General Medical Care Plan Goal: *Vital signs within specified parameters Outcome: Progressing Towards Goal 
Goal: *Labs within defined limits Outcome: Progressing Towards Goal 
Goal: *Absence of infection signs and symptoms Outcome: Progressing Towards Goal 
Goal: *Optimal pain control at patient's stated goal 
Outcome: Progressing Towards Goal 
Goal: *Skin integrity maintained Outcome: Progressing Towards Goal 
Goal: *Fluid volume balance Outcome: Progressing Towards Goal 
Goal: *Optimize nutritional status Outcome: Progressing Towards Goal 
Goal: *Anxiety reduced or absent Outcome: Progressing Towards Goal 
Goal: *Progressive mobility and function (eg: ADL's) Outcome: Progressing Towards Goal 
  
Problem: Patient Education: Go to Patient Education Activity Goal: Patient/Family Education Outcome: Progressing Towards Goal 
  
Problem: Patient Education: Go to Patient Education Activity Goal: Patient/Family Education Outcome: Progressing Towards Goal 
  
Problem: Surgical Pathway Day of Surgery Goal: Off Pathway (Use only if patient is Off Pathway) Outcome: Progressing Towards Goal 
Goal: Activity/Safety Outcome: Progressing Towards Goal 
Goal: Consults, if ordered Outcome: Progressing Towards Goal 
Goal: Nutrition/Diet Outcome: Progressing Towards Goal 
 Goal: Medications Outcome: Progressing Towards Goal 
Goal: Respiratory Outcome: Progressing Towards Goal 
Goal: Treatments/Interventions/Procedures Outcome: Progressing Towards Goal 
Goal: Psychosocial 
Outcome: Progressing Towards Goal 
Goal: *No signs and symptoms of infection or wound complications Outcome: Progressing Towards Goal 
Goal: *Optimal pain control at patient's stated goal 
Outcome: Progressing Towards Goal 
Goal: *Adequate urinary output (equal to or greater than 30 milliliters/hour) Description Ambulatory Surgery patients voiding without difficulty. Outcome: Progressing Towards Goal 
Goal: *Hemodynamically stable Outcome: Progressing Towards Goal 
  
Problem: Surgical Pathway Post-Op Day 2 through Discharge Goal: Off Pathway (Use only if patient is Off Pathway) Outcome: Progressing Towards Goal 
Goal: Activity/Safety Outcome: Progressing Towards Goal 
Goal: Nutrition/Diet Outcome: Progressing Towards Goal 
Goal: Discharge Planning Outcome: Progressing Towards Goal 
Goal: Medications Outcome: Progressing Towards Goal 
Goal: Respiratory Outcome: Progressing Towards Goal 
Goal: Treatments/Interventions/Procedures Outcome: Progressing Towards Goal 
Goal: Psychosocial 
Outcome: Progressing Towards Goal 
Goal: *No signs and symptoms of infection or wound complications Outcome: Progressing Towards Goal 
Goal: *Optimal pain control at patient's stated goal 
Outcome: Progressing Towards Goal 
Goal: *Adequate urinary output (equal to or greater than 30 milliliters/hour) Outcome: Progressing Towards Goal 
Goal: *Hemodynamically stable Outcome: Progressing Towards Goal 
Goal: *Tolerating diet Outcome: Progressing Towards Goal 
Goal: *Demonstrates progressive activity Outcome: Progressing Towards Goal 
Goal: *Lungs clear or at baseline Outcome: Progressing Towards Goal 
  
Problem: Surgical Pathway: Discharge Outcomes Goal: *Lungs clear or at baseline Outcome: Progressing Towards Goal 
 Goal: *Demonstrates independent activity or return to baseline Outcome: Progressing Towards Goal 
Goal: *Optimal pain control at patient's stated goal 
Outcome: Progressing Towards Goal 
Goal: *Verbalizes understanding and describes prescribed diet Outcome: Progressing Towards Goal 
Goal: *Tolerating diet Outcome: Progressing Towards Goal 
Goal: *Verbalizes name, dosage, time, side effects, and number of days to continue medications Outcome: Progressing Towards Goal 
Goal: *No signs and symptoms of infection or wound complications Outcome: Progressing Towards Goal 
Goal: *Anxiety reduced or absent Outcome: Progressing Towards Goal 
Goal: *Understands and describes signs and symptoms to report to providers(Stroke Metric) Outcome: Progressing Towards Goal 
Goal: *Describes follow-up/return visits to physicians Outcome: Progressing Towards Goal 
Goal: *Describes available resources and support systems Outcome: Progressing Towards Goal

## 2019-04-10 NOTE — PROGRESS NOTES
Problem: Self Care Deficits Care Plan (Adult) Goal: *Acute Goals and Plan of Care (Insert Text) Description Occupational Therapy Goals Initiated 4/10/2019 1. Patient will perform standing ADLs at sink with least restrictive DME with supervision/set-up within 7 day(s). 2.  Patient will perform lower body dressing with minimal assistance/contact guard assist using AE PRN within 7 day(s). 3.  Patient will perform bathing with minimal assistance/contact guard assist within 7 day(s). 4.  Patient will perform toilet transfers with supervision/set-up within 7 day(s). 5.  Patient will perform all aspects of toileting with minimal assistance/contact guard assist within 7 day(s). 6.  Patient will participate in upper extremity therapeutic exercise/activities with supervision/set-up for 10 minutes within 7 day(s). Outcome: Progressing Towards Goal 
  
OCCUPATIONAL THERAPY EVALUATION Patient: Gael Bojorquez (76 y.o. female) Date: 4/10/2019 Primary Diagnosis: Perforated diverticulum of large intestine [K57.20] Procedure(s) (LRB): 
LAPAROTOMY EXPLORATORY/ SIGMOIDECTOMY/ COLSTOMY (N/A) externalize Shunt Ventricular-Peritoneal (Right) 2 Days Post-Op Precautions:   Fall ASSESSMENT : 
Based on the objective data described below, patient presents with moderate assistance upper body ADLs, Maximum assistance lower body ADLs, and Minimum assistance and Assist x1 assist functional mobility to take several side steps up to [facility] with RW. Pt is POD 2 laparotomy exploratory POD 2. RN present to assist with external ventricular shunt management. She was mod A x 1 for bed mobility where she completed UE exercises as well as bathing with mod A seated EOB 2* decreased functional reach to feet due to abdominal pain. Completed sit <> stand with min A x 1 with RW. She is progressing well but functioning below her ADL baseline and may benefit from IP rehab The following are barriers to ADL independence while in acute care:  
- Cognitive and/or behavioral: none - Medical condition: strength, standing balance and pain tolerance   
- Other:    
 
Patient will benefit from skilled acute intervention to address the above impairments. Patient?s rehabilitation potential is considered to be Good Discharge recommendations: Rehab: patient is working towards tolerating 3 hours of therapy (to regain functional baseline patient requires rehab) If above is not an option then recommend: Rehab at inpatient facility: patient can tolerate 3 hours of therapy (to regain functional baseline patient requires rehab) Barriers to discharging home, in addition to above listed impairments: level of physical assist required to maintain patient safety. Equipment recommendations for successful discharge (if) home: TBD PLAN : 
Recommendations and Planned Interventions: self care training, functional mobility training, therapeutic exercise, therapeutic activities and endurance activities Frequency/Duration: Patient will be followed by occupational therapy 5 times a week to address goals. SUBJECTIVE:  
Patient stated ?do I have to do any more movement today?? OBJECTIVE DATA SUMMARY:  
HISTORY:  
Past Medical History:  
Diagnosis Date  
 ACP (advance care planning) 2/21/2017 Initial ACP discussion w/ pt and daughter 2-2017. AMD form reviewed and copy provided. NN/facilitator to discuss with patient Asthma   
 hx of Benign essential hypertension 6/24/2016 Bilateral hip pain 6/3/2014  
 xrays 2012, negative COPD (chronic obstructive pulmonary disease) ([facility] [address] 3/29/2010 Depression 3/29/2010 Diverticulitis DVT of Rt Lower Extremity 3/29/2010 Elevated LFT's, Fatty Liver 3/29/2010 Essential tremor 7/14/2010 GERD (gastroesophageal reflux disease) 9/2/2011 H/O Subarachnoid hemorrhage due to ruptured aneurysm 7/14/2010 Hyperlipemia 3/29/2010 Impaired fasting glucose 7/13/2014 6/2014 Thyroiditis, subacute 3/29/2010 Tobacco Abuse 3/29/2010 Vitamin D deficiency 6/3/2014 2012 Past Surgical History:  
Procedure Laterality Date ECHO STRESS  2005 Negative HX COLONOSCOPY  3/2013, Dr Keily Johnson  
 benign cecal polyp, f/u 10 yrs HX CRANIOTOMY  10/06  
 for clipping of ruptured aneurysm; Dr Chantell Burciaga a shunt HX ENDOSCOPY  EGD, Dr Keily Johnson \"ok\" per pt report [facility] Cervical Dysplasia (Dr Anahi Pichardo) [place] COLONOSCOPY W/BIOPSY SINGLE/MULTIPLE  1980's Benign polyp Prior Level of Function/Environment/Context: independent in ADLs Occupations in which the patient is/was successful, what are the barriers preventing that success:  
Performance Patterns (routines, roles, habits, and rituals):  
Personal Interests and/or values:  
Expanded or extensive additional review of patient history:  
 
Home Situation Home Environment: Private residence One/Two Story Residence: Two story Living Alone: No 
Support Systems: Child(messi), Family member(s) Patient Expects to be Discharged to[de-identified] Private residence Current DME Used/Available at Home: None Hand dominance: Right EXAMINATION OF PERFORMANCE DEFICITS: 
Cognitive/Behavioral Status: 
Neurologic State: Alert Orientation Level: Oriented X4 Cognition: Memory loss Perception: Appears intact Perseveration: No perseveration noted Safety/Judgement: Awareness of environment Skin: all lines/leads intact Edema: none noted Hearing: Auditory Auditory Impairment: None Vision/Perceptual:   
Tracking: Able to track stimulus in all quadrants w/o difficulty Acuity: Within Defined Limits Corrective Lenses: Reading glasses Range of Motion: 
AROM: Generally decreased, functional 
PROM: Within functional limits Strength: 
Strength: Generally decreased, functional 
  
  
  
  
Coordination: Coordination: Within functional limits Fine Motor Skills-Upper: Left Intact; Right Intact Gross Motor Skills-Upper: Left Intact; Right Intact Tone & Sensation: 
Tone: Normal 
Sensation: Intact Balance: 
Sitting: Intact; Without support Standing: Impaired; With support Standing - Static: Fair;Constant support Standing - Dynamic : Fair;Constant support Functional Mobility and Transfers for ADLs: 
Bed Mobility: 
Supine to Sit: Moderate assistance x 1;Additional time Sit to Supine: Minimum assistance; Additional time Scooting: Moderate assistance;Assist x2 Transfers: 
Sit to Stand: Minimum assistance;Assist x1, cues for hand placement with RW 
Stand to Sit: Minimum assistance;Assist x1, cues to reach back ADL Assessment: 
  
 
Oral Facial Hygiene/Grooming: Minimum assistance Bathing: Moderate assistance(decreased functional reach) Upper Body Dressing: Moderate assistance Lower Body Dressing: Maximum assistance Toileting: Total assistance ADL Intervention and task modifications: 
  
 
  
 
Upper Body Bathing Bathing Assistance: Moderate assistance Position Performed: Seated edge of bed Lower Body Bathing Lower Body : Maximum assistance Position Performed: Seated edge of bed Upper Body Dressing Assistance Hospital Gown: Moderate assistance Cognitive Retraining Safety/Judgement: Awareness of environment Therapeutic Exercise: 
Pt completed 10 reps of unilateral shoulder flexion, abduction and functional reach to mid shins in prep for LB ADLs. Functional Measure: 
Barthel Index: 
Bathin Bladder: 0 Bowels: 5 Groomin Dressin Feedin Mobility: 0 Stairs: 0 Toilet Use: 0 Transfer (Bed to Chair and Back): 0 Total: 15/100 Percentage of impairment  
0% 1-19% 20-39% 40-59% 60-79% 80-99% 100% Barthel Score 0-100 100 99-80 79-60 59-40 20-39 1-19 
 0 The Barthel ADL Index: Guidelines 1. The index should be used as a record of what a patient does, not as a record of what a patient could do. 2. The main aim is to establish degree of independence from any help, physical or verbal, however minor and for whatever reason. 3. The need for supervision renders the patient not independent. 4. A patient's performance should be established using the best available evidence. Asking the patient, friends/relatives and nurses are the usual sources, but direct observation and common sense are also important. However direct testing is not needed. 5. Usually the patient's performance over the preceding 24-48 hours is important, but occasionally longer periods will be relevant. 6. Middle categories imply that the patient supplies over 50 per cent of the effort. 7. Use of aids to be independent is allowed. Marcela Egan, Barthel, MINI. (5244). Functional evaluation: the Barthel Index. [address] [facility] (14)2. Lauren Myrick cheryl ASHUTOSH Benton, Awilda Avalos., Count includes the [facility], [address] [place] [address] (1999). Measuring the change indisability after inpatient rehabilitation; comparison of the responsiveness of the Barthel Index and Functional Costilla Measure. Journal of Neurology, Neurosurgery, and Psychiatry, 66(4), 196-288. Rocco Borja, N.J.A, PARISA [place], & Aide Hernandez MTRISTIN. (2004.) Assessment of post-stroke quality of life in cost-effectiveness studies: The usefulness of the Barthel Index and the EuroQoL-5D. [facility], 13, [phone] Occupational Therapy Evaluation Charge Determination History Examination Decision-Making MEDIUM Complexity : Expanded review of history including physical, cognitive and psychosocial  history  MEDIUM Complexity : 3-5 performance deficits relating to physical, cognitive , or psychosocial skils that result in activity limitations and / or participation restrictions MEDIUM Complexity : Patient may present with comorbidities that affect occupational performnce. Miniml to moderate modification of tasks or assistance (eg, physical or verbal ) with assesment(s) is necessary to enable patient to complete evaluation Based on the above components, the patient evaluation is determined to be of the following complexity level: MEDIUM Activity Tolerance:  
Good Please refer to the flowsheet for vital signs taken during this treatment. After treatment patient left:  
Supine in bed Call light within reach RN notified Family at bedside COMMUNICATION/EDUCATION:  
The patient?s plan of care was discussed with: Registered Nurse and Certified Nursing Assistant/Patient Care Technician. Patient/family have participated as able in goal setting and plan of care. This patient?s plan of care is appropriate for delegation to [facility]. Thank you for this referral. 
Tim Nunez OT Time Calculation: 32 mins

## 2019-04-11 LAB
ANION GAP SERPL CALC-SCNC: 1 MMOL/L (ref 5–15)
BASOPHILS # BLD: 0 K/UL (ref 0–0.1)
BASOPHILS NFR BLD: 0 % (ref 0–1)
BUN SERPL-MCNC: 7 MG/DL (ref 6–20)
BUN/CREAT SERPL: 16 (ref 12–20)
CALCIUM SERPL-MCNC: 7.8 MG/DL (ref 8.5–10.1)
CHLORIDE SERPL-SCNC: 106 MMOL/L (ref 97–108)
CO2 SERPL-SCNC: 26 MMOL/L (ref 21–32)
CREAT SERPL-MCNC: 0.44 MG/DL (ref 0.55–1.02)
DIFFERENTIAL METHOD BLD: ABNORMAL
EOSINOPHIL # BLD: 0.2 K/UL (ref 0–0.4)
EOSINOPHIL NFR BLD: 1 % (ref 0–7)
ERYTHROCYTE [DISTWIDTH] IN BLOOD BY AUTOMATED COUNT: 13.5 % (ref 11.5–14.5)
GLUCOSE BLD STRIP.AUTO-MCNC: 140 MG/DL (ref 65–100)
GLUCOSE BLD STRIP.AUTO-MCNC: 142 MG/DL (ref 65–100)
GLUCOSE BLD STRIP.AUTO-MCNC: 146 MG/DL (ref 65–100)
GLUCOSE BLD STRIP.AUTO-MCNC: 151 MG/DL (ref 65–100)
GLUCOSE BLD STRIP.AUTO-MCNC: 160 MG/DL (ref 65–100)
GLUCOSE SERPL-MCNC: 140 MG/DL (ref 65–100)
HCT VFR BLD AUTO: 26 % (ref 35–47)
HGB BLD-MCNC: 8.2 G/DL (ref 11.5–16)
IMM GRANULOCYTES # BLD AUTO: 0.2 K/UL (ref 0–0.04)
IMM GRANULOCYTES NFR BLD AUTO: 1 % (ref 0–0.5)
LYMPHOCYTES # BLD: 2.8 K/UL (ref 0.8–3.5)
LYMPHOCYTES NFR BLD: 18 % (ref 12–49)
MCH RBC QN AUTO: 30.3 PG (ref 26–34)
MCHC RBC AUTO-ENTMCNC: 31.5 G/DL (ref 30–36.5)
MCV RBC AUTO: 95.9 FL (ref 80–99)
MONOCYTES # BLD: 1.2 K/UL (ref 0–1)
MONOCYTES NFR BLD: 8 % (ref 5–13)
NEUTS SEG # BLD: 11.2 K/UL (ref 1.8–8)
NEUTS SEG NFR BLD: 72 % (ref 32–75)
NRBC # BLD: 0 K/UL (ref 0–0.01)
NRBC BLD-RTO: 0 PER 100 WBC
PLATELET # BLD AUTO: 303 K/UL (ref 150–400)
PMV BLD AUTO: 9.3 FL (ref 8.9–12.9)
POTASSIUM SERPL-SCNC: 3.7 MMOL/L (ref 3.5–5.1)
RBC # BLD AUTO: 2.71 M/UL (ref 3.8–5.2)
SERVICE CMNT-IMP: ABNORMAL
SODIUM SERPL-SCNC: 133 MMOL/L (ref 136–145)
WBC # BLD AUTO: 15.5 K/UL (ref 3.6–11)

## 2019-04-11 PROCEDURE — 80048 BASIC METABOLIC PNL TOTAL CA: CPT

## 2019-04-11 PROCEDURE — 74011250636 HC RX REV CODE- 250/636: Performed by: SURGERY

## 2019-04-11 PROCEDURE — C9113 INJ PANTOPRAZOLE SODIUM, VIA: HCPCS | Performed by: SURGERY

## 2019-04-11 PROCEDURE — 74011000258 HC RX REV CODE- 258: Performed by: SURGERY

## 2019-04-11 PROCEDURE — 97530 THERAPEUTIC ACTIVITIES: CPT

## 2019-04-11 PROCEDURE — 36415 COLL VENOUS BLD VENIPUNCTURE: CPT

## 2019-04-11 PROCEDURE — 74011636637 HC RX REV CODE- 636/637: Performed by: SURGERY

## 2019-04-11 PROCEDURE — 85025 COMPLETE CBC W/AUTO DIFF WBC: CPT

## 2019-04-11 PROCEDURE — 74011000250 HC RX REV CODE- 250: Performed by: SURGERY

## 2019-04-11 PROCEDURE — 74011250637 HC RX REV CODE- 250/637: Performed by: SURGERY

## 2019-04-11 PROCEDURE — 65660000000 HC RM CCU STEPDOWN

## 2019-04-11 PROCEDURE — 82962 GLUCOSE BLOOD TEST: CPT

## 2019-04-11 PROCEDURE — 97535 SELF CARE MNGMENT TRAINING: CPT

## 2019-04-11 RX ADMIN — CEFTRIAXONE SODIUM 2 G: 2 INJECTION, POWDER, FOR SOLUTION INTRAMUSCULAR; INTRAVENOUS at 15:53

## 2019-04-11 RX ADMIN — HYDRALAZINE HYDROCHLORIDE 10 MG: 20 INJECTION INTRAMUSCULAR; INTRAVENOUS at 11:59

## 2019-04-11 RX ADMIN — ONDANSETRON HYDROCHLORIDE 4 MG: 2 SOLUTION INTRAMUSCULAR; INTRAVENOUS at 02:12

## 2019-04-11 RX ADMIN — PANTOPRAZOLE SODIUM 40 MG: 40 INJECTION, POWDER, FOR SOLUTION INTRAVENOUS at 18:06

## 2019-04-11 RX ADMIN — HYDRALAZINE HYDROCHLORIDE 10 MG: 20 INJECTION INTRAMUSCULAR; INTRAVENOUS at 05:13

## 2019-04-11 RX ADMIN — Medication 10 ML: at 05:14

## 2019-04-11 RX ADMIN — HYDRALAZINE HYDROCHLORIDE 100 MG: 25 TABLET ORAL at 09:23

## 2019-04-11 RX ADMIN — LABETALOL 20 MG/4 ML (5 MG/ML) INTRAVENOUS SYRINGE 20 MG: at 06:01

## 2019-04-11 RX ADMIN — ALBUTEROL SULFATE 2.5 MG: 2.5 SOLUTION RESPIRATORY (INHALATION) at 11:59

## 2019-04-11 RX ADMIN — METRONIDAZOLE 500 MG: 500 INJECTION, SOLUTION INTRAVENOUS at 21:54

## 2019-04-11 RX ADMIN — LABETALOL 20 MG/4 ML (5 MG/ML) INTRAVENOUS SYRINGE 20 MG: at 14:26

## 2019-04-11 RX ADMIN — METRONIDAZOLE 500 MG: 500 INJECTION, SOLUTION INTRAVENOUS at 14:09

## 2019-04-11 RX ADMIN — THIAMINE HYDROCHLORIDE: 100 INJECTION, SOLUTION INTRAMUSCULAR; INTRAVENOUS at 19:23

## 2019-04-11 RX ADMIN — LOSARTAN POTASSIUM 100 MG: 50 TABLET ORAL at 09:22

## 2019-04-11 RX ADMIN — HYDRALAZINE HYDROCHLORIDE 100 MG: 25 TABLET ORAL at 21:54

## 2019-04-11 RX ADMIN — SODIUM CHLORIDE 125 ML/HR: 900 INJECTION, SOLUTION INTRAVENOUS at 14:59

## 2019-04-11 RX ADMIN — HUMAN INSULIN 2 UNITS: 100 INJECTION, SOLUTION SUBCUTANEOUS at 18:06

## 2019-04-11 RX ADMIN — METRONIDAZOLE 500 MG: 500 INJECTION, SOLUTION INTRAVENOUS at 05:13

## 2019-04-11 RX ADMIN — Medication: at 14:19

## 2019-04-11 RX ADMIN — HUMAN INSULIN 2 UNITS: 100 INJECTION, SOLUTION SUBCUTANEOUS at 06:18

## 2019-04-11 RX ADMIN — HUMAN INSULIN 2 UNITS: 100 INJECTION, SOLUTION SUBCUTANEOUS at 01:19

## 2019-04-11 RX ADMIN — Medication 10 ML: at 14:00

## 2019-04-11 RX ADMIN — FLUCONAZOLE 400 MG: 400 INJECTION, SOLUTION INTRAVENOUS at 10:56

## 2019-04-11 RX ADMIN — POTASSIUM & SODIUM PHOSPHATES POWDER PACK 280-160-250 MG 1 PACKET: 280-160-250 PACK at 18:06

## 2019-04-11 RX ADMIN — POTASSIUM & SODIUM PHOSPHATES POWDER PACK 280-160-250 MG 1 PACKET: 280-160-250 PACK at 09:28

## 2019-04-11 RX ADMIN — LABETALOL 20 MG/4 ML (5 MG/ML) INTRAVENOUS SYRINGE 20 MG: at 02:12

## 2019-04-11 RX ADMIN — Medication 10 ML: at 21:57

## 2019-04-11 RX ADMIN — POTASSIUM & SODIUM PHOSPHATES POWDER PACK 280-160-250 MG 1 PACKET: 280-160-250 PACK at 21:54

## 2019-04-11 RX ADMIN — SERTRALINE 100 MG: 50 TABLET, FILM COATED ORAL at 09:22

## 2019-04-11 RX ADMIN — POTASSIUM & SODIUM PHOSPHATES POWDER PACK 280-160-250 MG 1 PACKET: 280-160-250 PACK at 14:09

## 2019-04-11 RX ADMIN — HYDRALAZINE HYDROCHLORIDE 100 MG: 25 TABLET ORAL at 15:53

## 2019-04-11 NOTE — PROGRESS NOTES
Problem: Pressure Injury - Risk of 
Goal: *Prevention of pressure injury Description Document Moe Scale and appropriate interventions in the flowsheet. Outcome: Progressing Towards Goal 
  
Problem: Patient Education: Go to Patient Education Activity Goal: Patient/Family Education Outcome: Progressing Towards Goal 
  
Problem: Falls - Risk of 
Goal: *Absence of Falls Description Document Tia Manus Fall Risk and appropriate interventions in the flowsheet. Outcome: Progressing Towards Goal 
  
Problem: Patient Education: Go to Patient Education Activity Goal: Patient/Family Education Outcome: Progressing Towards Goal 
  
Problem: General Medical Care Plan Goal: *Vital signs within specified parameters Outcome: Progressing Towards Goal 
Goal: *Labs within defined limits Outcome: Progressing Towards Goal 
Goal: *Absence of infection signs and symptoms Outcome: Progressing Towards Goal 
Goal: *Optimal pain control at patient's stated goal 
Outcome: Progressing Towards Goal 
Goal: *Skin integrity maintained Outcome: Progressing Towards Goal 
Goal: *Fluid volume balance Outcome: Progressing Towards Goal 
Goal: *Optimize nutritional status Outcome: Progressing Towards Goal 
Goal: *Anxiety reduced or absent Outcome: Progressing Towards Goal 
Goal: *Progressive mobility and function (eg: ADL's) Outcome: Progressing Towards Goal 
  
Problem: Patient Education: Go to Patient Education Activity Goal: Patient/Family Education Outcome: Progressing Towards Goal 
  
Problem: Patient Education: Go to Patient Education Activity Goal: Patient/Family Education Outcome: Progressing Towards Goal 
  
Problem: Surgical Pathway Post-Op Day 2 through Discharge Goal: Off Pathway (Use only if patient is Off Pathway) Outcome: Progressing Towards Goal 
Goal: Activity/Safety Outcome: Progressing Towards Goal 
Goal: Nutrition/Diet Outcome: Progressing Towards Goal 
Goal: Discharge Planning Outcome: Progressing Towards Goal 
Goal: Medications Outcome: Progressing Towards Goal 
Goal: Respiratory Outcome: Progressing Towards Goal 
Goal: Treatments/Interventions/Procedures Outcome: Progressing Towards Goal 
Goal: Psychosocial 
Outcome: Progressing Towards Goal 
Goal: *No signs and symptoms of infection or wound complications Outcome: Progressing Towards Goal 
Goal: *Optimal pain control at patient's stated goal 
Outcome: Progressing Towards Goal 
Goal: *Adequate urinary output (equal to or greater than 30 milliliters/hour) Outcome: Progressing Towards Goal 
Goal: *Hemodynamically stable Outcome: Progressing Towards Goal 
Goal: *Tolerating diet Outcome: Progressing Towards Goal 
Goal: *Demonstrates progressive activity Outcome: Progressing Towards Goal 
Goal: *Lungs clear or at baseline Outcome: Progressing Towards Goal 
  
Problem: Surgical Pathway: Discharge Outcomes Goal: *Hemodynamically stable Outcome: Progressing Towards Goal 
Goal: *Lungs clear or at baseline Outcome: Progressing Towards Goal 
Goal: *Demonstrates independent activity or return to baseline Outcome: Progressing Towards Goal 
Goal: *Optimal pain control at patient's stated goal 
Outcome: Progressing Towards Goal 
Goal: *Verbalizes understanding and describes prescribed diet Outcome: Progressing Towards Goal 
Goal: *Tolerating diet Outcome: Progressing Towards Goal 
Goal: *Verbalizes name, dosage, time, side effects, and number of days to continue medications Outcome: Progressing Towards Goal 
Goal: *No signs and symptoms of infection or wound complications Outcome: Progressing Towards Goal 
Goal: *Anxiety reduced or absent Outcome: Progressing Towards Goal 
Goal: *Understands and describes signs and symptoms to report to providers(Stroke Metric) Outcome: Progressing Towards Goal 
Goal: *Describes follow-up/return visits to physicians Outcome: Progressing Towards Goal 
 Goal: *Describes available resources and support systems Outcome: Progressing Towards Goal

## 2019-04-11 NOTE — PROGRESS NOTES
Problem: Self Care Deficits Care Plan (Adult) Goal: *Acute Goals and Plan of Care (Insert Text) Description Occupational Therapy Goals Initiated 4/10/2019 1. Patient will perform standing ADLs at sink with least restrictive DME with supervision/set-up within 7 day(s). 2.  Patient will perform lower body dressing with minimal assistance/contact guard assist using AE PRN within 7 day(s). 3.  Patient will perform bathing with minimal assistance/contact guard assist within 7 day(s). 4.  Patient will perform toilet transfers with supervision/set-up within 7 day(s). 5.  Patient will perform all aspects of toileting with minimal assistance/contact guard assist within 7 day(s). 6.  Patient will participate in upper extremity therapeutic exercise/activities with supervision/set-up for 10 minutes within 7 day(s). Outcome: Progressing Towards Goal 
  
OCCUPATIONAL THERAPY TREATMENT Patient: Jolie Hill (85 y.o. female) Date: 4/11/2019 Diagnosis: Perforated diverticulum of large intestine [K57.20] <principal problem not specified> Procedure(s) (LRB): 
LAPAROTOMY EXPLORATORY/ SIGMOIDECTOMY/ COLSTOMY (N/A) externalize Shunt Ventricular-Peritoneal (Right) 3 Days Post-Op Precautions: Fall Chart, occupational therapy assessment, plan of care, and goals were reviewed. ASSESSMENT: 
Based on the objective data described below, patient presents with set-up to moderate assistance upper body ADLs, Maximum assistance lower body ADLs, moderate assistance supine-sit, and minimum assistance x2 for sit-stand and pivot bed-chair. RN present and assisted with line/  management. Patient demonstrates progress in self-care (washed face and blew nose/ managed kleenex with set-up) and transferred OOB to chair for first time since admission.   Co-treated with physical therapist due to medical complexity/ need for collaboration for 2 disciplines/ to optimize patient safety and functional outcome. Patient's BP in chair elevated to 267/106 (HR 98) with patient demonstrating CAMARA and diaphoretic. BP after a few minutes siting in the chair 191/88 (). Patient reporting 7/10 pain with mobility. RN present and aware and notified MD immediately. Prior level of function: At baseline, patient lives with daughter, is independent with ADLs, and ambulates without an AD. Patient has history of short term memory loss and does not drive Progression toward goals: 
?       Improving appropriately and progressing toward goals ? Improving slowly and progressing toward goals ? Not making progress toward goals and plan of care will be adjusted PLAN: 
Patient continues to benefit from skilled intervention to address the above impairments. Continue treatment per established plan of care. Discharge Recommendations: To be determined pending progress. Patient is currently limited by multiple lines/  shunt, elevated BP, and pain. Further Equipment Recommendations for Discharge: TBD SUBJECTIVE:  
Patient stated ? That wore me out.? OBJECTIVE DATA SUMMARY:  
Cognitive/Behavioral Status: 
Neurologic State: Alert Orientation Level: Oriented to place;Oriented to person;Disoriented to situation;Disoriented to time Cognition: Memory loss Functional Mobility and Transfers for ADLs: 
Bed Mobility: 
Supine to Sit: Moderate assistance; Additional time Sit to Supine: (session ended in chair) Transfers: 
Sit to Stand: Minimum assistance;Assist x2 Balance: 
Sitting: Intact; Without support Standing: Impaired; With support Standing - Static: Fair;Constant support Standing - Dynamic : Fair;Constant support ADL Intervention: 
  
 
Grooming Washing Hands: Supervision/set-up(washed face seated in chair) Pain: 
Pain Scale 1: Numeric (0 - 10) Pain Intensity 1: 5 Pain Location 1: Abdomen Pain Intervention(s) 1: Encouraged PCA Activity Tolerance:  
 Patient's BP in chair elevated to 267/106 (HR 98) with patient demonstrating CAMARA and diaphoretic. BP after a few minutes siting in the chair 191/88 (). Please refer to the flowsheet for vital signs taken during this treatment. After treatment:  
? Patient left in no apparent distress sitting up in chair ? Patient left in no apparent distress in bed 
? Call bell left within reach ? Nursing notified ? Family present ? Bed alarm activated COMMUNICATION/COLLABORATION:  
The patient?s plan of care was discussed with: Physical Therapist and Registered Nurse Teresa Chavez OT Time Calculation: 30 mins

## 2019-04-11 NOTE — PROGRESS NOTES
Problem: Mobility Impaired (Adult and Pediatric) Goal: *Acute Goals and Plan of Care (Insert Text) Description Physical Therapy Goals Initiated 4/10/2019 1. Patient will move from supine to sit and sit to supine , scoot up and down and roll side to side in bed with modified independence within 7 day(s). 2.  Patient will transfer from bed to chair and chair to bed with minimal assistance/contact guard assist using the least restrictive device within 7 day(s). 3.  Patient will perform sit to stand with minimal assistance/contact guard assist within 7 day(s). 4.  Patient will ambulate with minimal assistance/contact guard assist for 25 feet with the least restrictive device within 7 day(s). Outcome: Progressing Towards Goal 
PHYSICAL THERAPY TREATMENT Patient: Otoniel Hanson (37 y.o. female) Date: 4/11/2019 Diagnosis: Perforated diverticulum of large intestine [K57.20] <principal problem not specified> Procedure(s) (LRB): 
LAPAROTOMY EXPLORATORY/ SIGMOIDECTOMY/ COLSTOMY (N/A) externalize Shunt Ventricular-Peritoneal (Right) 3 Days Post-Op Precautions: Fall,  shunt drain in line with tragus of ear Chart, physical therapy assessment, plan of care and goals were reviewed. ASSESSMENT: 
Patient transferred supine to sit with modA x1. Transferred sit<>stand and stand pivot transfer bed to chair with Renard x2 (third assist for line management). Patient's BP in chair elevated to 267/106, HR 98 with patient demonstrating CAMARA and diaphoretic. BP after a few minutes siting in the chair 191/88, . Patient reporting 7/10 pain with mobility. RN present to assist with line management and aware of vitals. Prior level of function: At baseline, patient lives with daughter, is independent with ADLs, and ambulates without an AD. Patient has history of short term memory loss and does not drive PLAN: 
Patient continues to benefit from skilled intervention to address the above impairments. Continue treatment per established plan of care. Discharge recommendations: TBD Either inpatient rehab or home health with 24/7 assistance pending progress with acute care Patient's barriers to discharging home, in addition to above impairments: family availability to assist 
total assist driving to follow up medical appointment(s)/groceries/obtain medication 
entry and exit into the home 
level of physical assist required to maintain patient safety. Equipment recommendations for successful discharge (if) home: TBD SUBJECTIVE:  
Patient stated ? I want a tray of food. ? OBJECTIVE DATA SUMMARY:  
Critical Behavior: 
Neurologic State: Alert Orientation Level: Oriented to place, Oriented to person, Disoriented to situation, Disoriented to time Cognition: Memory loss Safety/Judgement: Awareness of environment Functional Mobility Training: 
Bed Mobility: 
  
Supine to Sit: Moderate assistance; Additional time Sit to Supine: (session ended in chair) Transfers: 
Sit to Stand: Minimum assistance;Assist x2 Stand to Sit: Minimum assistance;Assist x2 Balance: 
Sitting: Intact; Without support Standing: Impaired; With support Standing - Static: Fair;Constant support Standing - Dynamic : Fair;Constant support Ambulation/Gait Training: 
  
  
Ambulation - Level of Assistance: Minimal assistance;Assist x2 Gait Abnormalities: Decreased step clearance;Shuffling gait;Trunk sway increased Base of Support: Widened Speed/Tracy: Shuffled; Slow Step Length: Left shortened;Right shortened Activity Tolerance:  
Poor, see vitals above Please refer to the flowsheet for vital signs taken during this treatment. After treatment patient left:  
Up in chair Call light within reach RN notified Family at bedside COMMUNICATION/COLLABORATION:  
The patient?s plan of care was discussed with: Occupational Therapist and Registered Nurse Mk Brennan, PT, DPT Time Calculation: 34 mins

## 2019-04-11 NOTE — WOUND CARE
WO Ostomy Progress Note:  
  
Third postoperative visit. Surgery: Laparotomy with sigmoidectomy and end colostomy  
Date of Surgery: 4.8.19      Surgeon: Dr. Kimberly Licea Type of Ostomy: End Colostomy Stoma Location: JEREMY Ruffin at the bedside for education; Daughter Leti Dove was at the bedside yesterday. 
  
Ostomy Assessment: 
Stoma appearance: pink, moist, edematous and flushed. Abdomen: midline incision with dressing with marked drainage; TAMIKO drain to right upper quadrant Output: scant serosanguinous; no flatus noted Current appliance: 1 piece advanced life pouch without filter. Firmly attached without leakage. 
   
Teaching/Subjects covered today: 
Encouraged pt to review handout given to patient/family and ask questions regarding material on next ostomy nurse visit. 
  
- General anatomy and expectations of output - Normal & abnormal stoma characteristics - Normal & abnormal peristomal characteristics & changes over time - When/how to clean stoma & peristomal skin - When/how to empty pouch 
- Crusting technique; shaving around stoma;  
- When/how to change appliance - When to notify nurse/physician - Dietary guidelines - Flatus management - Where/how to obtain supplies - Reviewed ADL's 
  
Recommendations: 1. Empty appliance when 1/3 full and PRN. Encourage patient/family to notify nurse and 
assist w/ pouch emptying to promote self-care. Change appliance twice weekly and PRN for leaking ASAP. DO NOT REINFORCE LEAKS to avoid skin breakdown. 
  
Transition of Care: 
Ostomy nurse to return and reinforce teaching Friday. Home Health consult in place. 
  
Yesi LOZOYAN RN [facility] Wound Care Office [phone] Pager [phone]

## 2019-04-11 NOTE — PROGRESS NOTES
NUTRITION Recommendations: 1. Recheck phos & mag tomorrow 2. Advance TPN as planned 3. Advance PO per GI to Regular, Low Fiber with Ensure Enlive (vahe) with meals TPN check. Pt is beginning sips of clear liquids this morning. She is hungry and eager to resume PO. TPN continues. Pt will remain on unit for healing for the next week at least. Phos low yesterday - working towards replacing & GI adjusted TPN. Expect MD to recheck lytes tomorrow. TPN: AA5% D15 @75ml/hr (provides: 1.8L, 1263 kcal, 89g protein) TPN for 4/12: AA5% D15 @ 83 mL/hr (provides: 2L, ~1400 kcal, 99g Protein) Lipids for 4/12: 500mL 20% (M,W,F) provides on average additional 428 kcal daily 5%AA, D15 @ 83ml/hr + 500ml, 20% lipids 3x/week. Provides: 1848kcal, 100g protein, 300g CHO (GIR = 2.48mg/kg/min), 2000ml fluid. Meets 100% energy and protein needs.  
 
 
Eunice Foley, RD, MS, CDE

## 2019-04-11 NOTE — PROGRESS NOTES
Problem: Pressure Injury - Risk of 
Goal: *Prevention of pressure injury Description Document Moe Scale and appropriate interventions in the flowsheet. Outcome: Progressing Towards Goal 
  
Problem: Patient Education: Go to Patient Education Activity Goal: Patient/Family Education Outcome: Progressing Towards Goal 
  
Problem: Falls - Risk of 
Goal: *Absence of Falls Description Document Rafael Earl Fall Risk and appropriate interventions in the flowsheet. Outcome: Progressing Towards Goal 
Note:  
Fall Risk Interventions: 
Mobility Interventions: Patient to call before getting OOB, PT Consult for mobility concerns Mentation Interventions: Door open when patient unattended, Familiar objects from home, Family/sitter at bedside Medication Interventions: Teach patient to arise slowly, Patient to call before getting OOB, Bed/chair exit alarm Elimination Interventions: Toilet paper/wipes in reach, Toileting schedule/hourly rounds, Patient to call for help with toileting needs, Call light in reach History of Falls Interventions: Room close to nurse's station Problem: Patient Education: Go to Patient Education Activity Goal: Patient/Family Education Outcome: Progressing Towards Goal 
  
Problem: General Medical Care Plan Goal: *Labs within defined limits Outcome: Progressing Towards Goal 
Goal: *Absence of infection signs and symptoms Outcome: Progressing Towards Goal 
Goal: *Optimal pain control at patient's stated goal 
Outcome: Progressing Towards Goal 
Goal: *Skin integrity maintained Outcome: Progressing Towards Goal 
Goal: *Fluid volume balance Outcome: Progressing Towards Goal 
Goal: *Optimize nutritional status Outcome: Progressing Towards Goal 
  
Problem: Surgical Pathway Post-Op Day 2 through Discharge Goal: Activity/Safety Outcome: Progressing Towards Goal 
Goal: Nutrition/Diet Outcome: Progressing Towards Goal 
Goal: Discharge Planning Outcome: Progressing Towards Goal 
 Goal: Medications Outcome: Progressing Towards Goal 
Goal: Respiratory Outcome: Progressing Towards Goal 
Goal: Treatments/Interventions/Procedures Outcome: Progressing Towards Goal 
Goal: *No signs and symptoms of infection or wound complications Outcome: Progressing Towards Goal

## 2019-04-11 NOTE — PROGRESS NOTES
Neurosurgery Shunt externalization site clean and dry. CSF is clear. Will have better idea of shunt internalization date tomorrow. Looking at sometime next week. Will have to coordinate with thoracic surgery as well. Discussed with daughter.  
 
Katelynn Cruz, NP

## 2019-04-12 ENCOUNTER — APPOINTMENT (OUTPATIENT)
Dept: GENERAL RADIOLOGY | Age: 69
DRG: 329 | End: 2019-04-12
Attending: NEUROMUSCULOSKELETAL MEDICINE & OMM
Payer: MEDICARE

## 2019-04-12 ENCOUNTER — DOCUMENTATION ONLY (OUTPATIENT)
Dept: SURGERY | Age: 69
End: 2019-04-12

## 2019-04-12 PROBLEM — J18.9 PNEUMONIA DUE TO INFECTIOUS ORGANISM: Status: ACTIVE | Noted: 2019-04-12

## 2019-04-12 PROBLEM — J96.01 ACUTE HYPOXEMIC RESPIRATORY FAILURE (HCC): Status: ACTIVE | Noted: 2019-04-12

## 2019-04-12 LAB
ABO + RH BLD: NORMAL
ALBUMIN SERPL-MCNC: 2 G/DL (ref 3.5–5)
ALBUMIN/GLOB SERPL: 0.4 {RATIO} (ref 1.1–2.2)
ALP SERPL-CCNC: 72 U/L (ref 45–117)
ALT SERPL-CCNC: 18 U/L (ref 12–78)
ANION GAP SERPL CALC-SCNC: 7 MMOL/L (ref 5–15)
ANTIGENS PRESENT RBC DONR: NORMAL
APTT PPP: 26.5 SEC (ref 22.1–32)
ARTERIAL PATENCY WRIST A: YES
AST SERPL-CCNC: 28 U/L (ref 15–37)
BASE DEFICIT BLD-SCNC: 2 MMOL/L
BASOPHILS # BLD: 0 K/UL (ref 0–0.1)
BASOPHILS NFR BLD: 0 % (ref 0–1)
BDY SITE: ABNORMAL
BILIRUB SERPL-MCNC: 0.5 MG/DL (ref 0.2–1)
BLD PROD TYP BPU: NORMAL
BLOOD BANK CMNT PATIENT-IMP: NORMAL
BLOOD GROUP ANTIBODIES SERPL: NORMAL
BLOOD GROUP ANTIBODIES SERPL: NORMAL
BPU ID: NORMAL
BUN SERPL-MCNC: 8 MG/DL (ref 6–20)
BUN/CREAT SERPL: 20 (ref 12–20)
CALCIUM SERPL-MCNC: 7.9 MG/DL (ref 8.5–10.1)
CHLORIDE SERPL-SCNC: 103 MMOL/L (ref 97–108)
CK MB CFR SERPL CALC: 0.6 % (ref 0–2.5)
CK MB SERPL-MCNC: 1.7 NG/ML (ref 5–25)
CK SERPL-CCNC: 284 U/L (ref 26–192)
CO2 SERPL-SCNC: 25 MMOL/L (ref 21–32)
CREAT SERPL-MCNC: 0.4 MG/DL (ref 0.55–1.02)
CROSSMATCH RESULT,%XM: NORMAL
DIFFERENTIAL METHOD BLD: ABNORMAL
EOSINOPHIL # BLD: 0 K/UL (ref 0–0.4)
EOSINOPHIL NFR BLD: 0 % (ref 0–7)
ERYTHROCYTE [DISTWIDTH] IN BLOOD BY AUTOMATED COUNT: 14.1 % (ref 11.5–14.5)
GAS FLOW.O2 O2 DELIVERY SYS: ABNORMAL L/MIN
GAS FLOW.O2 SETTING OXYMISER: 4 L/M
GLOBULIN SER CALC-MCNC: 4.8 G/DL (ref 2–4)
GLUCOSE BLD STRIP.AUTO-MCNC: 119 MG/DL (ref 65–100)
GLUCOSE BLD STRIP.AUTO-MCNC: 144 MG/DL (ref 65–100)
GLUCOSE BLD STRIP.AUTO-MCNC: 172 MG/DL (ref 65–100)
GLUCOSE BLD STRIP.AUTO-MCNC: 180 MG/DL (ref 65–100)
GLUCOSE BLD STRIP.AUTO-MCNC: 184 MG/DL (ref 65–100)
GLUCOSE BLD STRIP.AUTO-MCNC: 189 MG/DL (ref 65–100)
GLUCOSE SERPL-MCNC: 166 MG/DL (ref 65–100)
HCO3 BLD-SCNC: 23 MMOL/L (ref 22–26)
HCT VFR BLD AUTO: 24.1 % (ref 35–47)
HGB BLD-MCNC: 7.4 G/DL (ref 11.5–16)
IMM GRANULOCYTES # BLD AUTO: 0.1 K/UL (ref 0–0.04)
IMM GRANULOCYTES NFR BLD AUTO: 1 % (ref 0–0.5)
INR PPP: 1.2 (ref 0.9–1.1)
LACTATE SERPL-SCNC: 1.1 MMOL/L (ref 0.4–2)
LYMPHOCYTES # BLD: 1.5 K/UL (ref 0.8–3.5)
LYMPHOCYTES NFR BLD: 12 % (ref 12–49)
MCH RBC QN AUTO: 30.3 PG (ref 26–34)
MCHC RBC AUTO-ENTMCNC: 30.7 G/DL (ref 30–36.5)
MCV RBC AUTO: 98.8 FL (ref 80–99)
MONOCYTES # BLD: 0.9 K/UL (ref 0–1)
MONOCYTES NFR BLD: 7 % (ref 5–13)
NEUTS SEG # BLD: 10.1 K/UL (ref 1.8–8)
NEUTS SEG NFR BLD: 80 % (ref 32–75)
NRBC # BLD: 0.03 K/UL (ref 0–0.01)
NRBC BLD-RTO: 0.2 PER 100 WBC
PCO2 BLD: 39.5 MMHG (ref 35–45)
PH BLD: 7.37 [PH] (ref 7.35–7.45)
PLATELET # BLD AUTO: 301 K/UL (ref 150–400)
PMV BLD AUTO: 9.3 FL (ref 8.9–12.9)
PO2 BLD: 45 MMHG (ref 80–100)
POTASSIUM SERPL-SCNC: 3.9 MMOL/L (ref 3.5–5.1)
PROT SERPL-MCNC: 6.8 G/DL (ref 6.4–8.2)
PROTHROMBIN TIME: 11.6 SEC (ref 9–11.1)
RBC # BLD AUTO: 2.44 M/UL (ref 3.8–5.2)
SAO2 % BLD: 79 % (ref 92–97)
SERVICE CMNT-IMP: ABNORMAL
SODIUM SERPL-SCNC: 135 MMOL/L (ref 136–145)
SPECIMEN EXP DATE BLD: NORMAL
SPECIMEN TYPE: ABNORMAL
STATUS OF UNIT,%ST: NORMAL
THERAPEUTIC RANGE,PTTT: NORMAL SECS (ref 58–77)
TOTAL RESP. RATE, ITRR: 31
TROPONIN I SERPL-MCNC: <0.05 NG/ML
UNIT DIVISION, %UDIV: 0
WBC # BLD AUTO: 12.7 K/UL (ref 3.6–11)

## 2019-04-12 PROCEDURE — 36415 COLL VENOUS BLD VENIPUNCTURE: CPT

## 2019-04-12 PROCEDURE — 74011250636 HC RX REV CODE- 250/636: Performed by: SURGERY

## 2019-04-12 PROCEDURE — 97168 OT RE-EVAL EST PLAN CARE: CPT

## 2019-04-12 PROCEDURE — 82962 GLUCOSE BLOOD TEST: CPT

## 2019-04-12 PROCEDURE — 74011000258 HC RX REV CODE- 258: Performed by: SURGERY

## 2019-04-12 PROCEDURE — 83735 ASSAY OF MAGNESIUM: CPT

## 2019-04-12 PROCEDURE — 85610 PROTHROMBIN TIME: CPT

## 2019-04-12 PROCEDURE — 74011636637 HC RX REV CODE- 636/637: Performed by: SURGERY

## 2019-04-12 PROCEDURE — 87040 BLOOD CULTURE FOR BACTERIA: CPT

## 2019-04-12 PROCEDURE — 77030010520

## 2019-04-12 PROCEDURE — 77030010522

## 2019-04-12 PROCEDURE — 97530 THERAPEUTIC ACTIVITIES: CPT

## 2019-04-12 PROCEDURE — 77030020186 HC BOOT HL PROTCT SAGE -B

## 2019-04-12 PROCEDURE — 74011000250 HC RX REV CODE- 250: Performed by: SURGERY

## 2019-04-12 PROCEDURE — 77030010541

## 2019-04-12 PROCEDURE — 94640 AIRWAY INHALATION TREATMENT: CPT

## 2019-04-12 PROCEDURE — 84100 ASSAY OF PHOSPHORUS: CPT

## 2019-04-12 PROCEDURE — 36600 WITHDRAWAL OF ARTERIAL BLOOD: CPT

## 2019-04-12 PROCEDURE — 74011250637 HC RX REV CODE- 250/637: Performed by: SURGERY

## 2019-04-12 PROCEDURE — 80053 COMPREHEN METABOLIC PANEL: CPT

## 2019-04-12 PROCEDURE — 82803 BLOOD GASES ANY COMBINATION: CPT

## 2019-04-12 PROCEDURE — 71045 X-RAY EXAM CHEST 1 VIEW: CPT

## 2019-04-12 PROCEDURE — 85730 THROMBOPLASTIN TIME PARTIAL: CPT

## 2019-04-12 PROCEDURE — 74011250637 HC RX REV CODE- 250/637: Performed by: FAMILY MEDICINE

## 2019-04-12 PROCEDURE — 84484 ASSAY OF TROPONIN QUANT: CPT

## 2019-04-12 PROCEDURE — 74011000250 HC RX REV CODE- 250: Performed by: INTERNAL MEDICINE

## 2019-04-12 PROCEDURE — 77030011641 HC PASTE OST ADH BMS -A

## 2019-04-12 PROCEDURE — C9113 INJ PANTOPRAZOLE SODIUM, VIA: HCPCS | Performed by: SURGERY

## 2019-04-12 PROCEDURE — 97164 PT RE-EVAL EST PLAN CARE: CPT

## 2019-04-12 PROCEDURE — 77030011256 HC DRSG MEPILEX <16IN NO BORD MOLN -A

## 2019-04-12 PROCEDURE — 82550 ASSAY OF CK (CPK): CPT

## 2019-04-12 PROCEDURE — 83605 ASSAY OF LACTIC ACID: CPT

## 2019-04-12 PROCEDURE — 65620000000 HC RM CCU GENERAL

## 2019-04-12 PROCEDURE — 74011250636 HC RX REV CODE- 250/636: Performed by: INTERNAL MEDICINE

## 2019-04-12 PROCEDURE — 85025 COMPLETE CBC W/AUTO DIFF WBC: CPT

## 2019-04-12 PROCEDURE — 93005 ELECTROCARDIOGRAM TRACING: CPT

## 2019-04-12 RX ORDER — IPRATROPIUM BROMIDE AND ALBUTEROL SULFATE 2.5; .5 MG/3ML; MG/3ML
3 SOLUTION RESPIRATORY (INHALATION)
Status: DISCONTINUED | OUTPATIENT
Start: 2019-04-12 | End: 2019-04-20

## 2019-04-12 RX ORDER — FUROSEMIDE 10 MG/ML
40 INJECTION INTRAMUSCULAR; INTRAVENOUS DAILY
Status: DISCONTINUED | OUTPATIENT
Start: 2019-04-12 | End: 2019-04-15

## 2019-04-12 RX ADMIN — POTASSIUM & SODIUM PHOSPHATES POWDER PACK 280-160-250 MG 1 PACKET: 280-160-250 PACK at 18:05

## 2019-04-12 RX ADMIN — Medication 10 ML: at 13:39

## 2019-04-12 RX ADMIN — Medication 10 ML: at 05:10

## 2019-04-12 RX ADMIN — FLUCONAZOLE 400 MG: 400 INJECTION, SOLUTION INTRAVENOUS at 13:59

## 2019-04-12 RX ADMIN — HUMAN INSULIN 2 UNITS: 100 INJECTION, SOLUTION SUBCUTANEOUS at 13:39

## 2019-04-12 RX ADMIN — LOSARTAN POTASSIUM 100 MG: 50 TABLET ORAL at 08:32

## 2019-04-12 RX ADMIN — Medication 10 ML: at 21:47

## 2019-04-12 RX ADMIN — HYDRALAZINE HYDROCHLORIDE 10 MG: 20 INJECTION INTRAMUSCULAR; INTRAVENOUS at 09:00

## 2019-04-12 RX ADMIN — FUROSEMIDE 40 MG: 10 INJECTION, SOLUTION INTRAMUSCULAR; INTRAVENOUS at 16:42

## 2019-04-12 RX ADMIN — METRONIDAZOLE 500 MG: 500 INJECTION, SOLUTION INTRAVENOUS at 05:09

## 2019-04-12 RX ADMIN — HYDRALAZINE HYDROCHLORIDE 100 MG: 25 TABLET ORAL at 15:52

## 2019-04-12 RX ADMIN — PANTOPRAZOLE SODIUM 40 MG: 40 INJECTION, POWDER, FOR SOLUTION INTRAVENOUS at 18:05

## 2019-04-12 RX ADMIN — Medication 10 ML: at 13:40

## 2019-04-12 RX ADMIN — HUMAN INSULIN 2 UNITS: 100 INJECTION, SOLUTION SUBCUTANEOUS at 23:22

## 2019-04-12 RX ADMIN — HUMAN INSULIN 2 UNITS: 100 INJECTION, SOLUTION SUBCUTANEOUS at 06:14

## 2019-04-12 RX ADMIN — POTASSIUM & SODIUM PHOSPHATES POWDER PACK 280-160-250 MG 1 PACKET: 280-160-250 PACK at 08:32

## 2019-04-12 RX ADMIN — SERTRALINE 100 MG: 50 TABLET, FILM COATED ORAL at 08:31

## 2019-04-12 RX ADMIN — METRONIDAZOLE 500 MG: 500 INJECTION, SOLUTION INTRAVENOUS at 21:47

## 2019-04-12 RX ADMIN — HYDRALAZINE HYDROCHLORIDE 100 MG: 25 TABLET ORAL at 21:46

## 2019-04-12 RX ADMIN — IPRATROPIUM BROMIDE AND ALBUTEROL SULFATE 3 ML: .5; 3 SOLUTION RESPIRATORY (INHALATION) at 04:02

## 2019-04-12 RX ADMIN — SODIUM CHLORIDE 125 ML/HR: 900 INJECTION, SOLUTION INTRAVENOUS at 09:00

## 2019-04-12 RX ADMIN — POTASSIUM & SODIUM PHOSPHATES POWDER PACK 280-160-250 MG 1 PACKET: 280-160-250 PACK at 13:39

## 2019-04-12 RX ADMIN — SODIUM CHLORIDE 125 ML/HR: 900 INJECTION, SOLUTION INTRAVENOUS at 00:00

## 2019-04-12 RX ADMIN — Medication 10 ML: at 05:09

## 2019-04-12 RX ADMIN — NITROGLYCERIN 1 INCH: 20 OINTMENT TOPICAL at 05:09

## 2019-04-12 RX ADMIN — METRONIDAZOLE 500 MG: 500 INJECTION, SOLUTION INTRAVENOUS at 13:39

## 2019-04-12 RX ADMIN — IPRATROPIUM BROMIDE AND ALBUTEROL SULFATE 3 ML: .5; 3 SOLUTION RESPIRATORY (INHALATION) at 20:13

## 2019-04-12 RX ADMIN — HYDRALAZINE HYDROCHLORIDE 100 MG: 25 TABLET ORAL at 08:32

## 2019-04-12 RX ADMIN — HUMAN INSULIN 2 UNITS: 100 INJECTION, SOLUTION SUBCUTANEOUS at 00:35

## 2019-04-12 RX ADMIN — CEFTRIAXONE SODIUM 2 G: 2 INJECTION, POWDER, FOR SOLUTION INTRAMUSCULAR; INTRAVENOUS at 15:51

## 2019-04-12 RX ADMIN — LABETALOL 20 MG/4 ML (5 MG/ML) INTRAVENOUS SYRINGE 20 MG: at 09:49

## 2019-04-12 RX ADMIN — IPRATROPIUM BROMIDE AND ALBUTEROL SULFATE 3 ML: .5; 3 SOLUTION RESPIRATORY (INHALATION) at 00:37

## 2019-04-12 RX ADMIN — THIAMINE HYDROCHLORIDE: 100 INJECTION, SOLUTION INTRAMUSCULAR; INTRAVENOUS at 19:03

## 2019-04-12 RX ADMIN — I.V. FAT EMULSION 500 ML: 20 EMULSION INTRAVENOUS at 19:08

## 2019-04-12 RX ADMIN — HYDRALAZINE HYDROCHLORIDE 10 MG: 20 INJECTION INTRAMUSCULAR; INTRAVENOUS at 16:42

## 2019-04-12 RX ADMIN — POTASSIUM & SODIUM PHOSPHATES POWDER PACK 280-160-250 MG 1 PACKET: 280-160-250 PACK at 21:22

## 2019-04-12 NOTE — PROGRESS NOTES
0730 Bedside shift change report given to Jil Holstein (oncoming nurse) by Minnie Mendoza (offgoing nurse). Report included the following information SBAR, Kardex, Procedure Summary, Intake/Output, MAR, Recent Results and Cardiac Rhythm NSR . 0830 Pt tolerated mask off long enough for mouth care and med admin, but became tachypneic after 10 min, working to breathe, tachycardic 110s.

## 2019-04-12 NOTE — PROGRESS NOTES
0500 Pt arrived in bed, transferred to CCU 23, oriented to room and call system. Placed on bipap, will continue to monitor. New PIV started. 1468 Dr. Grace Garvey notified and updated. 0169 Dr. Lulu Gandhi at bedside, update provided, no new orders. 0600 Family at bedside, pt resting on bipap, easily awakened, no complaints at this time. 0730 Bedside and Verbal shift change report given to Ric Hodge (oncoming nurse) by Grant Schulz (offgoing nurse). Report included the following information SBAR, Kardex, Intake/Output, MAR, Recent Results and Alarm Parameters .

## 2019-04-12 NOTE — PROGRESS NOTES
Spiritual Care Assessment/Progress Note [address] 
 
 
NAME: Ismael Freeman      MRN: [id number] AGE: 79 y.o. SEX: female Yarsani Affiliation: [employer]  
Language: [place] 4/12/2019     Total Time (in minutes): 15 Spiritual Assessment begun in [facility] 4 CORONARY CARE through conversation with: 
  
    []Patient        [x] Family    [] Friend(s) Reason for Consult: Rapid response team  
 
Spiritual beliefs: (Please include comment if needed) [x] Identifies with a neo tradition:     
   [] Supported by a neo community:        
   [] Claims no spiritual orientation:       
   [] Seeking spiritual identity:            
   [] Adheres to an individual form of spirituality:       
   [] Not able to assess:                   
 
    
Identified resources for coping:  
   [x] Prayer                           
   [] Music                  [] Guided Imagery [x] Family/friends                 [] Pet visits [] Devotional reading                         [] Unknown 
   [x] Other:                                       
 
 
Interventions offered during this visit: (See comments for more details) Patient Interventions: Prayer (assurance of) Family/Friend(s): Affirmation of emotions/emotional suffering, Affirmation of neo, Iconic (affirming the presence of God/Higher Power), Normalization of emotional/spiritual concerns, Prayer (actual), Prayer (assurance of), Yarsani beliefs/image of God discussed Plan of Care: 
 
 [x] Support spiritual and/or cultural needs  
 [] Support AMD and/or advance care planning process    
 [] Support grieving process 
 [] Coordinate Rites and/or Rituals  
 [] Coordination with community clergy [] No spiritual needs identified at this time 
 [] Detailed Plan of Care below (See Comments)  [] Make referral to Music Therapy 
[] Make referral to Pet Therapy    
[] Make referral to Addiction services 
[] Make referral to [facility] [] Make referral to Spiritual Care Partner 
[] No future visits requested       
[x] Follow up visits as needed Comments:Responded to Code RRT in [phone]. Consulted with Nurse. Provided listening ear as family shared about pt being the \"Queen Bee\" of the family  Family relies of neo and prayer to cope with difficult situations.  prayed and offered continual prayer and spiritual support. Robert Stephenson of  Availability. Chaplains will follow as able and/or needed.

## 2019-04-12 NOTE — PROGRESS NOTES
Bedside and Verbal shift change report given to Enoch Hayes (oncoming nurse) by Scott Carl (offgoing nurse). Report included the following information SBAR, Kardex, Intake/Output, MAR and Cardiac Rhythm NSR.

## 2019-04-12 NOTE — INTERDISCIPLINARY ROUNDS
IDR/SLIDR Summary Patient: Bisi Hernadez MRN: [id number]    Age: 79 y.o. YOB: 1950 Room/Bed: [address] [place] [address] Admit Diagnosis: Perforated diverticulum of large intestine [K57.20]  Principal Diagnosis: <principal problem not specified>  
Goals: Breathing easier Readmission: NO  Quality Measure: SCIP 
VTE Prophylaxis: Mechanical 
Influenza Vaccine screening completed? YES Pneumococcal Vaccine screening completed? YES Mobility needs: Yes   Nutrition plan:Yes 
Consults:Respiratory and Case Management Financial concerns:Yes  Escalated to ? YES 
RRAT Score: 20   Interventions:H2H Testing due for pt today? YES 
LOS: 17 days Expected length of stay ? days Discharge plan: home   PCP: Sohan Weiss MD 
Transportation needs: Yes Days before discharge:two or more days before discharge  and longer than expected LOS Discharge disposition: Rehab Signed:  
 
Colletta Slim, RN 
4/12/2019 
6:32 AM

## 2019-04-12 NOTE — PROGRESS NOTES
Progress Note Patient: Theresa Mayes MRN: [id number]  SSN: [id number] YOB: 1950  Age: 79 y.o. Sex: female Admit Date: 3/26/2019 
 
4 Days Post-Op Procedure:  Procedure(s): LAPAROTOMY EXPLORATORY/ SIGMOIDECTOMY/ COLOSTOMY 
externalize Shunt Ventricular-Peritoneal 
 
Subjective:  
 
Patient transferred to CCU for acute hypoxic respiratory failure; now on BIPAP; small BM via colostomy this AM. Objective:  
 
Visit Vitals /64 Pulse 92 Temp 99 °F (37.2 °C) Resp 18 Ht 5' 8\" (1.727 m) Wt 187 lb 6.3 oz (85 kg) SpO2 97% BMI 28.49 kg/m² Temp (24hrs), Av °F (37.2 °C), Min:98.9 °F (37.2 °C), Max:99 °F (37.2 °C) Physical Exam: ABDOMEN: Distended, soft. Midline wound intact without signs of infection. Healthy colostomy; serous drain fluid. Data Review: VS, I/O's, Labs, CXR Lab Review:  
Recent Results (from the past 12 hour(s)) GLUCOSE, POC Collection Time: 19 12:29 AM  
Result Value Ref Range Glucose (POC) 144 (H) 65 - 100 mg/dL Performed by Shefali John GLUCOSE, POC Collection Time: 19  4:12 AM  
Result Value Ref Range Glucose (POC) 172 (H) 65 - 100 mg/dL Performed by Juli Schaferp POC G3 - PUL Collection Time: 19  4:24 AM  
Result Value Ref Range pH (POC) 7.373 7.35 - 7.45    
 pCO2 (POC) 39.5 35.0 - 45.0 MMHG  
 pO2 (POC) 45 (LL) 80 - 100 MMHG  
 HCO3 (POC) 23.0 22 - 26 MMOL/L  
 sO2 (POC) 79 (L) 92 - 97 % Base deficit (POC) 2 mmol/L Site RIGHT RADIAL Device: NASAL CANNULA Flow rate (POC) 4.0 L/M Allens test (POC) YES Specimen type (POC) ARTERIAL Total resp. rate 31 TROPONIN I Collection Time: 19  4:30 AM  
Result Value Ref Range Troponin-I, Qt. <0.05 <0.05 ng/mL CK W/ CKMB & INDEX Collection Time: 19  4:30 AM  
Result Value Ref Range  (H) 26 - 192 U/L  
 CK - MB 1.7 <3.6 NG/ML  
 CK-MB Index 0.6 0.0 - 2.5 METABOLIC PANEL, COMPREHENSIVE Collection Time: 04/12/19  4:30 AM  
Result Value Ref Range Sodium 135 (L) 136 - 145 mmol/L Potassium 3.9 3.5 - 5.1 mmol/L Chloride 103 97 - 108 mmol/L  
 CO2 25 21 - 32 mmol/L Anion gap 7 5 - 15 mmol/L Glucose 166 (H) 65 - 100 mg/dL BUN 8 6 - 20 MG/DL Creatinine 0.40 (L) 0.55 - 1.02 MG/DL  
 BUN/Creatinine ratio 20 12 - 20 GFR est AA >60 >60 ml/min/1.73m2 GFR est non-AA >60 >60 ml/min/1.73m2 Calcium 7.9 (L) 8.5 - 10.1 MG/DL Bilirubin, total 0.5 0.2 - 1.0 MG/DL  
 ALT (SGPT) 18 12 - 78 U/L  
 AST (SGOT) 28 15 - 37 U/L Alk. phosphatase 72 45 - 117 U/L Protein, total 6.8 6.4 - 8.2 g/dL Albumin 2.0 (L) 3.5 - 5.0 g/dL Globulin 4.8 (H) 2.0 - 4.0 g/dL A-G Ratio 0.4 (L) 1.1 - 2.2 LACTIC ACID Collection Time: 04/12/19  4:30 AM  
Result Value Ref Range Lactic acid 1.1 0.4 - 2.0 MMOL/L  
PTT Collection Time: 04/12/19  4:30 AM  
Result Value Ref Range aPTT 26.5 22.1 - 32.0 sec  
 aPTT, therapeutic range     58.0 - 77.0 SECS  
PROTHROMBIN TIME + INR Collection Time: 04/12/19  4:30 AM  
Result Value Ref Range INR 1.2 (H) 0.9 - 1.1 Prothrombin time 11.6 (H) 9.0 - 11.1 sec GLUCOSE, POC Collection Time: 04/12/19  6:07 AM  
Result Value Ref Range Glucose (POC) 189 (H) 65 - 100 mg/dL Performed by Quynh Noble CBC WITH AUTOMATED DIFF Collection Time: 04/12/19 10:48 AM  
Result Value Ref Range WBC 12.7 (H) 3.6 - 11.0 K/uL  
 RBC 2.44 (L) 3.80 - 5.20 M/uL HGB 7.4 (L) 11.5 - 16.0 g/dL HCT 24.1 (L) 35.0 - 47.0 % MCV 98.8 80.0 - 99.0 FL  
 MCH 30.3 26.0 - 34.0 PG  
 MCHC 30.7 30.0 - 36.5 g/dL  
 RDW 14.1 11.5 - 14.5 % PLATELET 285 994 - 393 K/uL MPV 9.3 8.9 - 12.9 FL  
 NRBC 0.2 (H) 0  WBC ABSOLUTE NRBC 0.03 (H) 0.00 - 0.01 K/uL NEUTROPHILS 80 (H) 32 - 75 % LYMPHOCYTES 12 12 - 49 % MONOCYTES 7 5 - 13 % EOSINOPHILS 0 0 - 7 % BASOPHILS 0 0 - 1 % IMMATURE GRANULOCYTES 1 (H) 0.0 - 0.5 % ABS. NEUTROPHILS 10.1 (H) 1.8 - 8.0 K/UL  
 ABS. LYMPHOCYTES 1.5 0.8 - 3.5 K/UL  
 ABS. MONOCYTES 0.9 0.0 - 1.0 K/UL  
 ABS. EOSINOPHILS 0.0 0.0 - 0.4 K/UL  
 ABS. BASOPHILS 0.0 0.0 - 0.1 K/UL  
 ABS. IMM. GRANS. 0.1 (H) 0.00 - 0.04 K/UL  
 DF AUTOMATED    
GLUCOSE, POC Collection Time: 19 10:55 AM  
Result Value Ref Range Glucose (POC) 184 (H) 65 - 100 mg/dL Performed by Yayo Perez Assessment:  
 
Hospital Problems  Date Reviewed: 2019 Codes Class Noted POA Acute hypoxemic respiratory failure ([facility] [address] ICD-10-CM: J96.01 
ICD-9-CM: 518.81  2019 No  
   
 Pneumonia due to infectious organism ICD-10-CM: J18.9 ICD-9-CM: 136.9, 484.8  2019 No  
   
 Perforated diverticulum of large intestine ICD-10-CM: K57.20 ICD-9-CM: 562.10  3/26/2019 Yes CXR consistent with pneumonia. Plan/Recommendations/Medical Decision Makin. BIPAP as needed. 2. Antibiotics, TPN. 3. Maintain Nails, drain.

## 2019-04-12 NOTE — PROGRESS NOTES
Bedside and Verbal shift change report given to Aleja (oncoming nurse) by Curtis Holman (offgoing nurse). Report included the following information SBAR, Kardex, Intake/Output, MAR, Accordion, Recent Results, Cardiac Rhythm -NSR/ST and Alarm Parameters . 2000 - Assumed care. Family at bedside, plan of care reviewed. 2100 - Blood cultures drawn per orders. Pt able to tolerate Bipap mask off for 30min-1hr periods before getting tachypneic & tachycardic. 
2300 - CHG bath given. 0200 - AM labs drawn. SBP > 160, PRN labetalol given. Bedside and Verbal shift change report given to Curtis Holman (oncoming nurse) by Abelardo Erwin (offgoing nurse). Report included the following information SBAR, Kardex, Intake/Output, MAR, Accordion, Recent Results and Cardiac Rhythm -NSR/ST.

## 2019-04-12 NOTE — PROGRESS NOTES
I responded to rapid response code [facility]) as requested by patient's nurse. Nurse reported that patient was in SOB and hypoxic with O2sats as low as 85% on 2 liters O2 NC. Nurse noted that patient received a breathing treatment but due had not improved much with O2sat= 91% on 4 liters O2 NC. Nurse also noted that patient was tachycardic with HR in 120s On arrival at the bedside, patient was dyspneic, noting that she could not catch her breath. Pt seen and evaluated. VS:    BP=  225/110  HR= 125  RR= 34   O2sat= 93% on 4 liters O2 NC and nebulizer treatment PE: 
GEN- patient in acute respiratory distress PSYCH-A&Ox1 to person but too dyspneic to answer many questions. NEURO- GCS 15. Moves all extremities x 4. No slurred speech. No facial droop. Sensation grossly intact. HEENT-NCAT/pupils 2 mm reactive bilateral. Oropharynx clear. NECK-supple, trachea midline LUNGS- B rhonchi HEART- tachycardic/ regular rhythm. No M/R/G 
ABD-soft, NT,ND, + BS. No R/G/R 
VASCULAR-2+ radial/1+DP pulses bilateral. Non-pitting edema BLE SKIN-warm/dry MS-no calf tenderness A/P: 
1)  Acute hypoxic respiratory failure. Ordered stat ABG, chest xray. Plan to transfer to ICU for higher level of care and place on BIPAP. If patient's respiratory status worsens, then ETT intubate and place on mechanical ventilator support. Continuous pulse oximetry. Recommend pulmonologist/ intensivist consult this a.m. As our hospitalist service had signed off some days ago from this patient's case, I have asked nursing staff to notify patient's attending MD regarding patient's condition. I will continue to with patient's care in the interim to due the severity of patient's current condition requiring emergent treatment. 2)  Hypertensive urgency. Ordered stat dose of NTG 2% ointment, 1 inch transdermal x 1 dose. Provide additional anti-hypertensive therapy as needed. Monitor BP q 1 hour. 3)  Tachycardia. Continue telemetry monitoring.

## 2019-04-12 NOTE — PROGRESS NOTES
Neurosurgery Events overnight noted. She remains awake and oriented, still with trouble breathing on BIPAP Tentative plan was for replacement of shunt in pleura on Monday, however given the current breathing problems, we should probably wait to make sure she is stable before proceeding. Will see how she does over weekend.

## 2019-04-12 NOTE — WOUND CARE
WOCN Ostomy Progress Note:  
  
Fourth postoperative visit in CCU. Surgery: Laparotomy with sigmoidectomy and end colostomy  
Date of Surgery: 4.8. 19      Surgeon: Dr. Sabi Kidd Type of Ostomy: End Colostomy  
Stoma Location: LUQ Daughter Amy Pavon was at the bedside. Patient now on BIPAP. provided Albertina Bobo RN with mepilex transfer to place under mask to protect bridge of nose. Bed:  Sport. If patient is transferred out of CCU then order an Envision on Versacare bed. Offloaded heels with Prevalon boots. Sacrum and buttocks intact with blanching pink. Protect skin with Aloe Vesta. Turn team to assist with q2 turns. Ostomy Assessment: 
Stoma appearance: pink, moist, edematous and flushed. Peristomal skin: intact without irritation Abdomen: midline incision with staples and a scant about of serosanguinous drainage near umbilicus; TAMIKO drain to right upper quadrant with serosanguinous drainage. Output: liquid brown stool with flatus. Current appliance: 1 piece advanced life pouch without filter removed. Pouch changed with Daughter Amy Pavon. Applied 2 piece 2 1/4 pouch flat pouch with eakins ring cut just a little larger than 1 1/4 brittany. Supplies left at the bedside. 
  
Treatments: 
Pouch removed, stoma and peristomal skin cleaned and assessed, new pouch prepared and applied with eakins ring. Teaching/Subjects covered today: 
Reviewed with daughter. 
  
-How to clean stoma & peristomal skin 
-How to empty pouch 
-How to change appliance 
  
Recommendations: 1. Mepilex transfer under bipap mask. 2.  Bed:  Sport. If patient is transferred out of CCU then order an Envision on Versacare bed. 3.  Prevalon boots. 4.  Sacrum and buttocks:  Twice daily and as needed apply Aloe Sylva. 5.  Turn team 
 
1. Empty appliance when 1/3 full and PRN. Encourage patient/family to notify nurse and 
assist w/ pouch emptying to promote self-care.  Change appliance twice weekly and PRN for leaking ASAP. DO NOT REINFORCE LEAKS to avoid skin breakdown. 
  
Transition of Care: 
Ostomy nurse to return and reinforce teaching Monday. 
  
Angus DIAZ RN [facility] Wound Care Office [phone] Pager [phone]

## 2019-04-12 NOTE — PROGRESS NOTES
0500 TRANSFER - IN REPORT: 
 
Verbal report received from NSTU(name) on Terrance Bledsoe  being received from [facility](unit) for urgent transfer Report consisted of patients Situation, Background, Assessment and  
Recommendations(SBAR). Information from the following report(s) SBAR, Kardex, Intake/Output, MAR, Recent Results and Alarm Parameters  was reviewed with the receiving nurse. Opportunity for questions and clarification was provided. Assessment completed upon patients arrival to unit and care assumed. Primary Nurse Jason Winters RN and SUSHANT Gramajo performed a dual skin assessment on this patient No impairment noted- Blanchable but red sacrum Current Bed:  
Total Care SPORT (air with burgundy cover) Moe score is 15

## 2019-04-12 NOTE — PROGRESS NOTES
Problem: Mobility Impaired (Adult and Pediatric) Goal: *Acute Goals and Plan of Care (Insert Text) Description Physical Therapy Goals Goals re-evaluated 4/12/2019 1. Patient will move from supine to sit and sit to supine , scoot up and down and roll side to side in bed with moderate assistance within 7 day(s). 2.  Patient will transfer from bed to chair and chair to bed with moderate assistance using the least restrictive device within 7 day(s). 3.  Patient will perform sit to stand with moderate assist within 7 day(s). 4.  Patient will ambulate with moderate assist for 25 feet with the least restrictive device within 7 day(s). 5.  Patient will tolerate unsupported sitting for 10 minutes without assist within 7 days. Initiated 4/10/2019 1. Patient will move from supine to sit and sit to supine , scoot up and down and roll side to side in bed with modified independence within 7 day(s). 2.  Patient will transfer from bed to chair and chair to bed with minimal assistance/contact guard assist using the least restrictive device within 7 day(s). 3.  Patient will perform sit to stand with minimal assistance/contact guard assist within 7 day(s). 4.  Patient will ambulate with minimal assistance/contact guard assist for 25 feet with the least restrictive device within 7 day(s). Outcome: Progressing Towards Goal 
PHYSICAL THERAPY REEVALUATION Patient: Joseph Angeles (75 y.o. female) Date: 4/12/2019 Primary Diagnosis: Perforated diverticulum of large intestine [K57.20] Procedure(s) (LRB): 
LAPAROTOMY EXPLORATORY/ SIGMOIDECTOMY/ COLSTOMY (N/A) externalize Shunt Ventricular-Peritoneal (Right) 4 Days Post-Op Precautions:   Fall(external  shunt) Chart, physical therapy assessment, plan of care and goals were reviewed. ASSESSMENT : 
Based on the objective data described below, the patient presents with impaired mobility s/p above procedure POD 4.   Patient had rapid called on her yesterday due to shortness of breath, was transferred to higher level of care and is now on BIPAP. Initial Bp 147/63. Patient required max to total assist for supine<>sit transfer. Unable to tolerate sitting edge of bed due to 6/10 abdominal pain. Patient returned to bed and left sitting in modified chair position. Final sitting /66. HR elevated to 110 bpm and respiratory rate increased to 38 with mobility. PLOF: At baseline, patient lives with daughter, is independent with ADLs, and ambulates without an AD. Patient has history of short term memory loss and does not drive Patient will benefit from skilled intervention to address the above impairments. Patient?s rehabilitation potential is considered to be Fair Factors which may influence rehabilitation potential include:  
? None noted ? Mental ability/status ? Medical condition ? Home/family situation and support systems ? Safety awareness 
? Pain tolerance/management 
? Other: PLAN : 
Recommendations and Planned Interventions: ?             Bed Mobility Training             ? Neuromuscular Re-Education ? Transfer Training                   ? Orthotic/Prosthetic Training ? Gait Training                         ? Modalities ? Therapeutic Exercises           ? Edema Management/Control ? Therapeutic Activities            ? Patient and Family Training/Education ? Other (comment): Frequency/Duration: Patient will be followed by physical therapy 5 times a week to address goals. Discharge Recommendations: Inpatient Rehab. If IP rehab is not an option, recommending  PT with 24/7 supervision and physical assistance with all mobility Further Equipment Recommendations for Discharge: TBD SUBJECTIVE:  
Patient stated ? My mouth is so dry. ? OBJECTIVE DATA SUMMARY:  
 
 Past Medical History:  
Diagnosis Date  
 ACP (advance care planning) 2/21/2017 Initial ACP discussion w/ pt and daughter 2-2017. AMD form reviewed and copy provided. NN/facilitator to discuss with patient Asthma   
 hx of Benign essential hypertension 6/24/2016 Bilateral hip pain 6/3/2014  
 xrays 2012, negative COPD (chronic obstructive pulmonary disease) ([facility] [address] 3/29/2010 Depression 3/29/2010 Diverticulitis DVT of Rt Lower Extremity 3/29/2010 Elevated LFT's, Fatty Liver 3/29/2010 Essential tremor 7/14/2010 GERD (gastroesophageal reflux disease) 9/2/2011 H/O Subarachnoid hemorrhage due to ruptured aneurysm 7/14/2010 Hyperlipemia 3/29/2010 Impaired fasting glucose 7/13/2014 6/2014 Thyroiditis, subacute 3/29/2010 Tobacco Abuse 3/29/2010 Vitamin D deficiency 6/3/2014 2012 Past Surgical History:  
Procedure Laterality Date ECHO STRESS  2005 Negative HX COLONOSCOPY  3/2013, Dr Barrow Falling  
 benign cecal polyp, f/u 10 yrs HX CRANIOTOMY  10/06  
 for clipping of ruptured aneurysm; Dr Venegas Gut a shunt HX ENDOSCOPY  EGD, Dr Barrow Falling \"ok\" per pt report [address] [place] [address] Cervical Dysplasia (Dr Mandy Ortiz) [place] COLONOSCOPY W/BIOPSY SINGLE/MULTIPLE  1980's Benign polyp Hospital course since last seen and reason for reevaluation: Patient had rapid called on her yesterday due to shortness of breath, was transferred to higher level of care and is now on BIPAP. Critical Behavior: 
Neurologic State: Drowsy Orientation Level: Oriented to person, Oriented to place, Oriented to situation, Disoriented to time Cognition: Follows commands, Decreased attention/concentration Safety/Judgement: Awareness of environment, Fall prevention Skin:   
Strength:   
Strength: Grossly decreased, non-functional 
  
  
  
  
  
 
  
Tone & Sensation:  
Tone: Normal 
  
  
  
  
Sensation: Intact Range Of Motion: AROM: Generally decreased, functional 
  
  
  
  
  
  
  
Coordination: 
Coordination: Generally decreased, functional 
 
Functional Mobility: 
Bed Mobility: 
  
Supine to Sit: Maximum assistance; Additional time;Assist x2 Sit to Supine: Total assistance; Additional time;Assist x2 Scooting: Maximum assistance; Additional time;Assist x2 Transfers: 
Sit to Stand: (NT 2* poor sititng balance) Balance:  
Sitting: Impaired; With support Sitting - Static: Poor (constant support) Sitting - Dynamic: Poor (constant support) Standing: (NT 2* poor sitting balance) Ambulation/Gait Training: 
  
  
  
  
  
  
  
  
  
  
  
  
  
  
  
  
  
 
Functional Measure: 
Barthel Index: 
Bathin Bladder: 0 Bowels: 5 Groomin Dressin Feedin Mobility: 0 Stairs: 0 Toilet Use: 0 Transfer (Bed to Chair and Back): 0 Total: 5/100 Percentage of impairment  
0% 1-19% 20-39% 40-59% 60-79% 80-99% 100% Barthel Score 0-100 100 99-80 79-60 59-40 20-39 1-19 
 0 The Barthel ADL Index: Guidelines 1. The index should be used as a record of what a patient does, not as a record of what a patient could do. 2. The main aim is to establish degree of independence from any help, physical or verbal, however minor and for whatever reason. 3. The need for supervision renders the patient not independent. 4. A patient's performance should be established using the best available evidence. Asking the patient, friends/relatives and nurses are the usual sources, but direct observation and common sense are also important. However direct testing is not needed. 5. Usually the patient's performance over the preceding 24-48 hours is important, but occasionally longer periods will be relevant. 6. Middle categories imply that the patient supplies over 50 per cent of the effort. 7. Use of aids to be independent is allowed. Delon Epstein., Barthel, D.W. (6052).  Functional evaluation: the Barthel Index. [address] [facility] (14)2. ASHUTOSH Mcneil, Ivonne Wilson., [facility] [place], [address] (1999). Measuring the change indisability after inpatient rehabilitation; comparison of the responsiveness of the Barthel Index and Functional Reynolds Measure. Journal of Neurology, Neurosurgery, and Psychiatry, 66(4), 980-470. PRATIK Morales, PARISA Wakefield, & Mikal Brandon M.A. (2004.) Assessment of post-stroke quality of life in cost-effectiveness studies: The usefulness of the Barthel Index and the EuroQoL-5D. [facility], 13, 770-77 Pain: 
Pain Scale 1: Numeric (0 - 10) Pain Intensity 1: 0 Activity Tolerance:  
Poor, see vitals above Please refer to the flowsheet for vital signs taken during this treatment. After treatment:  
?  Patient left in no apparent distress sitting up in chair ? Patient left in no apparent distress in bed 
? Call bell left within reach ? Nursing notified ? Family present ? Bed alarm activated COMMUNICATION/EDUCATION:  
The patient?s plan of care was discussed with: Occupational Therapist and Registered Nurse. ?  Fall prevention education was provided and the patient/caregiver indicated understanding. ? Patient/family have participated as able in goal setting and plan of care. ?  Patient/family agree to work toward stated goals and plan of care. ?  Patient understands intent and goals of therapy, but is neutral about his/her participation. ? Patient is unable to participate in goal setting and plan of care. Thank you for this referral. 
Wilton Goetz, PT, DPT Time Calculation: 24 mins

## 2019-04-12 NOTE — PROGRESS NOTES
Problem: Self Care Deficits Care Plan (Adult) Goal: *Acute Goals and Plan of Care (Insert Text) Description Occupational Therapy Goals Initiated 4/10/2019, re-evaluation s/p rapid response 4/12/2019 - all goals adjusted to reflect decline 1. Patient will perform grooming sitting unsupported EOB with minimal assistance within 7 day(s). 2.  Patient will perform upper body dressing/bathing sitting unsupported EOB with minimal assistance within 7 day(s). 3.  Patient will perform toilet transfers to/from [facility] with moderate assistance within 7 day(s). 4.  Patient will perform all aspects of toileting with moderate assistance within 7 day(s). 5.  Patient will participate in upper extremity therapeutic exercise/activities with minimal assistance for 10 minutes within 7 day(s). Initiated 4/10/2019 1. Patient will perform standing ADLs at sink with least restrictive DME with supervision/set-up within 7 day(s). 2.  Patient will perform lower body dressing with minimal assistance/contact guard assist using AE PRN within 7 day(s). 3.  Patient will perform bathing with minimal assistance/contact guard assist within 7 day(s). 4.  Patient will perform toilet transfers with supervision/set-up within 7 day(s). 5.  Patient will perform all aspects of toileting with minimal assistance/contact guard assist within 7 day(s). 6.  Patient will participate in upper extremity therapeutic exercise/activities with supervision/set-up for 10 minutes within 7 day(s). Outcome: Progressing Towards Goal 
 
OCCUPATIONAL THERAPY REEVALUATION Patient: Theo Powell (73 y.o. female) Date: 4/12/2019 Diagnosis: Perforated diverticulum of large intestine [K57.20] <principal problem not specified> Procedure(s) (LRB): 
LAPAROTOMY EXPLORATORY/ SIGMOIDECTOMY/ COLSTOMY (N/A) externalize Shunt Ventricular-Peritoneal (Right) 4 Days Post-Op Precautions: Fall(external  shunt) ASSESSMENT : 
 Based on the objective data described below, the patient presents with overall max Ax2 for bed mobility, max-total A for all ADLs s/p admission for ex lap d/t perforated diverticulum and externalized  shunt, re-evaluation s/p RR for WOB and transfer to CCU (higher level of care). Patient functioning well below baseline, D/C recommendations TBD pending progress and medical stability/management. Will continue to follow. Recommend with nursing patient to complete as able in order to maintain strength, endurance and independence: ADLs with supervision/setup, bed to chair position 3x/day for meals. Thank you for your assistance. Patient will benefit from skilled intervention to address the above impairments. Patient?s rehabilitation potential is considered to be Fair Factors which may influence rehabilitation potential include:  
? None noted ? Mental ability/status ? Medical condition ? Home/family situation and support systems ? Safety awareness ? Pain tolerance/management ? Other: PLAN : 
Recommendations and Planned Interventions: 
?                  Self Care Training                  ? Therapeutic Activities ? Functional Mobility Training    ? Cognitive Retraining 
? Therapeutic Exercises           ? Endurance Activities ? Balance Training                   ? Neuromuscular Re-Education ? Visual/Perceptual Training     ? Home Safety Training 
? Patient Education                 ? Family Training/Education ? Other (comment): Frequency/Duration: Patient will be followed by occupational therapy 5 times a week to address goals. Discharge Recommendations: To Be Determined Further Equipment Recommendations for Discharge: TBD   
 
 SUBJECTIVE:  
Patient stated ? My mouth is so dry. ? OBJECTIVE DATA SUMMARY:  
Hospital course since last seen and reason for reevaluation: RR 4/10 and transition to higher level of care, patient now on BIPAP Cognitive/Behavioral Status: 
Neurologic State: Drowsy Orientation Level: Oriented to person;Oriented to place;Oriented to situation;Disoriented to time Cognition: Follows commands;Decreased attention/concentration Perception: Cues to maintain midline in sitting; Tactile;Verbal 
Perseveration: No perseveration noted Safety/Judgement: Awareness of environment; Fall prevention Skin: appears grossly intact Edema: none noted in BUEs Vision/Perceptual:   
Tracking: Able to track stimulus in all quadrants w/o difficulty Diplopia: No   
Acuity: Within Defined Limits Corrective Lenses: Reading glasses Range of Motion: In [facility] [address] AROM: Generally decreased, functional 
 
Strength: In [address] [place], [place] [address] Strength: Grossly decreased, non-functional 
 
Coordination: 
Coordination: Generally decreased, functional 
Fine Motor Skills-Upper: Left Impaired;Right Impaired Gross Motor Skills-Upper: Left Intact; Right Intact Tone & Sensation: In [facility] [address] Tone: Normal 
Sensation: Intact Balance: 
Sitting: Impaired; With support Sitting - Static: Poor (constant support) Sitting - Dynamic: Poor (constant support) Standing: (NT 2* poor sitting balance) Functional Mobility and Transfers for ADLs: 
Bed Mobility: 
Supine to Sit: Maximum assistance; Additional time;Assist x2 Sit to Supine: Total assistance; Additional time;Assist x2 Scooting: Maximum assistance; Additional time;Assist x2 Transfers: 
Sit to Stand: (NT 2* poor sitting balance) ADL Assessment: 
Feeding: Maximum assistance(Infer per obs of func mob, balance, func reach, BUE ROM) Oral Facial Hygiene/Grooming: Maximum assistance(Infer per obs of func mob, balance, func reach, BUE ROM) Bathing: Maximum assistance(Infer per obs of func mob, balance, func reach, BUE ROM) Upper Body Dressing: Maximum assistance(Infer per obs of func mob, balance, func reach, BUE ROM) Lower Body Dressing: Total assistance(Infer per obs of func mob, balance, func reach, BUE ROM) Toileting: Total assistance(Infer per obs of func mob, balance, func reach, BUE ROM) ADL Intervention: 
Patient cleared for EOB activity only, educated on importance of moving and attempting to sit up. Patient left sitting in modified chair position and educated on importance of this position to encourage pulmonary toileting and endurance. Cognitive Retraining Safety/Judgement: Awareness of environment; Fall prevention Functional Measure: 
Barthel Index: 
Bathin Bladder: 0 Bowels: 5 Groomin Dressin Feedin Mobility: 0 Stairs: 0 Toilet Use: 0 Transfer (Bed to Chair and Back): 0 Total: 5/100 Percentage of impairment  
0% 1-19% 20-39% 40-59% 60-79% 80-99% 100% Barthel Score 0-100 100 99-80 79-60 59-40 20-39 1-19 
 0 The Barthel ADL Index: Guidelines 1. The index should be used as a record of what a patient does, not as a record of what a patient could do. 2. The main aim is to establish degree of independence from any help, physical or verbal, however minor and for whatever reason. 3. The need for supervision renders the patient not independent. 4. A patient's performance should be established using the best available evidence. Asking the patient, friends/relatives and nurses are the usual sources, but direct observation and common sense are also important. However direct testing is not needed. 5. Usually the patient's performance over the preceding 24-48 hours is important, but occasionally longer periods will be relevant. 6. Middle categories imply that the patient supplies over 50 per cent of the effort. 7. Use of aids to be independent is allowed. Joel Obregon, Barthel, D.W. (3502).  Functional evaluation: the Barthel Index. [address] [facility] (14)2. SHERINE PadillaF, Diamante Huynh., Rodrick Stone., [place], [address] (1999). Measuring the change indisability after inpatient rehabilitation; comparison of the responsiveness of the Barthel Index and Functional Cannon Measure. Journal of Neurology, Neurosurgery, and Psychiatry, 66(4), 836-166. PRATIK Gale, PARISA Wakefield, & Rainer Cobos M.A. (2004.) Assessment of post-stroke quality of life in cost-effectiveness studies: The usefulness of the Barthel Index and the EuroQoL-5D. [facility], 13, 799-35 Occupational Therapy Evaluation Charge Determination History Examination Decision-Making LOW Complexity : Brief history review  MEDIUM Complexity : 3-5 performance deficits relating to physical, cognitive , or psychosocial skils that result in activity limitations and / or participation restrictions HIGH Complexity : Patient presents with comorbidities that affect occupational performance. Signifigant modification of tasks or assistance (eg, physical or verbal) with assessment (s) is necessary to enable patient to complete evaluation Based on the above components, the patient evaluation is determined to be of the following complexity level: MEDIUM Pain: 
Pain Scale 1: Numeric (0 - 10) Pain Intensity 1: 0 Activity Tolerance:  
Poor, (BP pre activity 147/63, post activity 154/66, RR up to 38, HR up to 110 on BIPAP) Please refer to the flowsheet for vital signs taken during this treatment. After treatment:  
? Patient left in no apparent distress sitting up in chair ? Patient left in no apparent distress in bed 
? Call bell left within reach ? Nursing notified ? Daughter present ? Bed alarm activated COMMUNICATION/EDUCATION:  
The patient?s plan of care was discussed with: Physical Therapist and Registered Nurse. 
? Home safety education was provided and the patient/caregiver indicated understanding. ?    Patient/family have participated as able in goal setting and plan of care. ?    Patient/family agree to work toward stated goals and plan of care. ?    Patient understands intent and goals of therapy, but is neutral about his/her participation. ? Patient is unable to participate in goal setting and plan of care. This patient?s plan of care is appropriate for delegation to [facility]. Thank you for this referral. 
Tracie Goodpasture, OT Time Calculation: 24 mins

## 2019-04-12 NOTE — CONSULTS
Asked to see Mrs Diana Giang re: converting Ventriculo-Peritoneal shunt    Referred by Dr. Dionisio Salinas    Mrs Diana Giang is a pleasant 78 y/o lady with a past medical history significant for HTN, COPD and SAH who was admitted with a pelvic abscess. That necessitated the externalization of her Ventriculo-peritoneal shunt. We are asked by Neurosurgery to assist in converting it to a ventriculo-pleural shunt. Last night she became dyspneic and hypoxic and was transfer to the CCU for BiPap support. CXR suggestive of a possible pneumonia. Allergies   Allergen Reactions    Ativan [Lorazepam] Hives    Pcn [Penicillins] Other (comments)     Unsure of reaction; pt reports unsure if have taken cephalosporin before.      Wellbutrin [Bupropion Hcl] Anxiety    Xanax [Alprazolam] Hives       PMHx: HTN, COPD, SAH, depression    PSHx:craniotomy    SocHx: active smoker    Family History   Problem Relation Age of Onset    Anxiety Daughter     Attention Deficit Disorder Daughter     Drug Abuse Daughter     Anxiety Daughter     Thyroid Disease Daughter     Thyroid Disease Daughter         Hypothyroidism    Asthma Daughter     Cancer Father         kidney    Diabetes Father     Arthritis-osteo Father     Diabetes Mother     Hypertension Mother    Lerry Mallet Mother    Joao Cords Stroke Mother     Hypertension Sister     Depression Sister     Pneumonia Sister          age 72    Diabetes Sister     Thyroid Disease Sister     Thyroid Disease Sister     Heart Disease Sister     Diabetes Maternal Grandmother     No Known Problems Brother     Thyroid Disease Sister     Anxiety Sister     Depression Sister     Alcohol abuse Sister     Hypertension Sister     Hypertension Sister     Thyroid Disease Sister     Thyroid Disease Sister        Outpatient Medications Marked as Taking for the 3/26/19 encounter [facility] Encounter)   Medication Sig Dispense Refill    sertraline (ZOLOFT) 100 mg tablet TAKE 1.5 TABS BY MOUTH DAILY 45 Tab 0    omeprazole (PRILOSEC) 20 mg capsule TAKE 1 CAPSULE BY MOUTH EVERY DAY 30 Cap 0    losartan-hydroCHLOROthiazide (HYZAAR) 50-12.5 mg per tablet Take 1 Tab by mouth daily. Indications: hypertension 30 Tab 2    atorvastatin (LIPITOR) 20 mg tablet TAKE 1 TABLET BY MOUTH EVERY DAY 90 Tab 0    traZODone (DESYREL) 50 mg tablet TAKE 1 TABLET BY MOUTH EVERYDAY AT BEDTIME 30 Tab 0    dicyclomine (BENTYL) 10 mg capsule TAKE 1-2 CAPSULES BY MOUTH EVERY 6 HOURS AS NEEDED FOR ABDOMINAL CRAMPS 60 Cap 1    albuterol (VENTOLIN HFA) 90 mcg/actuation inhaler Take 2 Puffs by inhalation every six (6) hours as needed for Wheezing or Shortness of Breath.  MULTIVITAMINS (MULTIVITAMIN PO) Take  by mouth daily.  ferrous sulfate (IRON) 325 mg (65 mg Iron) tablet Take  by mouth two (2) times a day. ROS:    Unable to obtain a full ROS as patient was resting on BiPAP and didn'tw ant to remove mask. Blood pressure 141/63, pulse 89, temperature 99 °F (37.2 °C), resp. rate 20, height 5' 8\" (1.727 m), weight 187 lb 6.3 oz (85 kg), SpO2 100 %.     On exam she is laying in bed  Alert  Well developed  On BiPap mask  Breathing does appear somewhat labored  Lungs coarse breath sounds b/l  Rrr, no murmurs  Radial pulses palp b/l  abd exam deferred for patient comfort    ==============================    Recent Results (from the past 12 hour(s))   GLUCOSE, POC    Collection Time: 04/11/19 11:11 PM   Result Value Ref Range    Glucose (POC) 140 (H) 65 - 100 mg/dL    Performed by Yg Wellington, POC    Collection Time: 04/12/19 12:29 AM   Result Value Ref Range    Glucose (POC) 144 (H) 65 - 100 mg/dL    Performed by Tiffany Cordova    GLUCOSE, POC    Collection Time: 04/12/19  4:12 AM   Result Value Ref Range    Glucose (POC) 172 (H) 65 - 100 mg/dL    Performed by Gertrudis Fernando    POC G3 - PUL    Collection Time: 04/12/19  4:24 AM   Result Value Ref Range    pH (POC) 7.373 7.35 - 7.45      pCO2 (POC) 39.5 35.0 - 45.0 MMHG    pO2 (POC) 45 (LL) 80 - 100 MMHG    HCO3 (POC) 23.0 22 - 26 MMOL/L    sO2 (POC) 79 (L) 92 - 97 %    Base deficit (POC) 2 mmol/L    Site RIGHT RADIAL      Device: NASAL CANNULA      Flow rate (POC) 4.0 L/M    Allens test (POC) YES      Specimen type (POC) ARTERIAL      Total resp. rate 31     TROPONIN I    Collection Time: 04/12/19  4:30 AM   Result Value Ref Range    Troponin-I, Qt. <0.05 <0.05 ng/mL   CK W/ CKMB & INDEX    Collection Time: 04/12/19  4:30 AM   Result Value Ref Range     (H) 26 - 192 U/L    CK - MB 1.7 <3.6 NG/ML    CK-MB Index 0.6 0.0 - 2.5     METABOLIC PANEL, COMPREHENSIVE    Collection Time: 04/12/19  4:30 AM   Result Value Ref Range    Sodium 135 (L) 136 - 145 mmol/L    Potassium 3.9 3.5 - 5.1 mmol/L    Chloride 103 97 - 108 mmol/L    CO2 25 21 - 32 mmol/L    Anion gap 7 5 - 15 mmol/L    Glucose 166 (H) 65 - 100 mg/dL    BUN 8 6 - 20 MG/DL    Creatinine 0.40 (L) 0.55 - 1.02 MG/DL    BUN/Creatinine ratio 20 12 - 20      GFR est AA >60 >60 ml/min/1.73m2    GFR est non-AA >60 >60 ml/min/1.73m2    Calcium 7.9 (L) 8.5 - 10.1 MG/DL    Bilirubin, total 0.5 0.2 - 1.0 MG/DL    ALT (SGPT) 18 12 - 78 U/L    AST (SGOT) 28 15 - 37 U/L    Alk.  phosphatase 72 45 - 117 U/L    Protein, total 6.8 6.4 - 8.2 g/dL    Albumin 2.0 (L) 3.5 - 5.0 g/dL    Globulin 4.8 (H) 2.0 - 4.0 g/dL    A-G Ratio 0.4 (L) 1.1 - 2.2     LACTIC ACID    Collection Time: 04/12/19  4:30 AM   Result Value Ref Range    Lactic acid 1.1 0.4 - 2.0 MMOL/L   PTT    Collection Time: 04/12/19  4:30 AM   Result Value Ref Range    aPTT 26.5 22.1 - 32.0 sec    aPTT, therapeutic range     58.0 - 77.0 SECS   PROTHROMBIN TIME + INR    Collection Time: 04/12/19  4:30 AM   Result Value Ref Range    INR 1.2 (H) 0.9 - 1.1      Prothrombin time 11.6 (H) 9.0 - 11.1 sec   GLUCOSE, POC    Collection Time: 04/12/19  6:07 AM   Result Value Ref Range    Glucose (POC) 189 (H) 65 - 100 mg/dL    Performed by Lisa Hill Minnie Mendoza        ==============================    I have personally reviewed her most recent CXR- there is a small right effusion and some right sided infiltrates    ==============================    PFTs- none    ==============================    Diagnoses  1: externalized Ventriculo-peritoneal shunt  2: Acute hypoxic respiratory insufficiency  3: Possible nosoccomial pneumonia    =============================    Mrs. Otis Borja has an externalized Ventricular shunt that needs to be re-located to the pleural cavity. We will assist neurosurgery with this. Initially posted for Monday. If she truly has a right sided pneumonia and effusion we will want to ensure it is fully treated before placing the shunt in that area.     Thank you for allowing us to care for Mrs Hiren Rubi

## 2019-04-12 NOTE — ROUTINE PROCESS
Patient was discussed during IDR this am, CM also noted patient was transferred to CCU following a RRT due to acute hypoxic resp failure, now on BIPAP. Per Dr. MOORE [facility], Mrs. Gerald Romberg has an externalized Ventricular shunt that needs to be re-located to the pleural cavity. We will assist neurosurgery with this. Initially posted for Monday. If she truly has a right sided pneumonia and effusion we will want to ensure it is fully treated before placing the shunt in that area. Disposition plan will be determined when patient is medically stable. Albert Perry MSA, RN, CRM.

## 2019-04-12 NOTE — PROGRESS NOTES
Patient: Lizzie Farah MRN: [id number]    Age: 79 y.o. YOB: 1950 Room/Bed: [address] [place] [address] Consent for video monitoring obtained after the below has been explained to the patient/family on 4/12/2019: 
1. They are being monitored continuously in an effort to promote their safety. 2. That there may be times when the camera will be discontinued to provide care to me to ensure my dignity, such as during bathing or any activity that risks me being exposed. 3. They have the right to opt out of having this surveillance monitoring at any time. 4. It has been explained to the patient/family and they understand that the hospital does not maintain any recording of this surveillance monitoring.    
José Christensen RN

## 2019-04-12 NOTE — PROGRESS NOTES
Bedside and Verbal shift change report given to Peter Schroeder (oncoming nurse) by Barrera Strnage (offgoing nurse). Report included the following information SBAR, Kardex, Intake/Output, MAR, Accordion, Recent Results, Med Rec Status, Cardiac Rhythm nsr and Alarm Parameters . 0000: Care assumed, pt with expiratory wheezing noted, PRN duo neb given to effect, pt resting quietly 3983: Pt experiencing SOB, RT paged by charge, RT states will be on floor soon, charge RN alerted RT that pt needs assistance stat and is complaining of SOB to no effect, RT then paged by this RN, not coming to unit stat as asked, rapid response called at bedside, CCU RN at bedside, ABG, CXR ordered, pt with expiratorty wheezing and HR 120s, Dr Cynthia Natarajan paged, orders for troponin, CMP, CKMB, Mg, lactic, PT/INR, PTT received 
0426: [place] repaged for ABG results, pH 7.373, CO2 39.5, p02 45, HCO3 23, s02 79%, pt continuing to complain of \"not getting enough air\" - giving pt additional neb treatment 
0428: [place] at bedside assessing patient, XRAY at bedside, orders for intensive care transfer received 0435: Pt with /110, order for nitro received by Dr Cynthia Natarajan 
0162: Report given to CCU, will give nitro paste and call attending upon arrival 
 
TRANSFER - OUT REPORT: 
Verbal report given to Ana Laura(name) janice Villalta  being transferred to CCU(unit) for change in patient condition(SOB) Report consisted of patients Situation, Background, Assessment and  
Recommendations(SBAR). Information from the following report(s) SBAR, Kardex, Intake/Output, MAR, Accordion, Recent Results, Med Rec Status, Cardiac Rhythm sinus tach and Alarm Parameters  was reviewed with the receiving nurse. Lines: PICC Double Lumen 01/84/67 Right;Basilic (Active) Central Line Being Utilized Yes 4/12/2019 12:00 AM  
Criteria for Appropriate Use Limited/no vessel suitable for conventional peripheral access 4/12/2019 12:00 AM  
 Site Assessment Clean, dry, & intact 4/12/2019 12:00 AM  
Phlebitis Assessment 0 4/12/2019 12:00 AM  
Infiltration Assessment 0 4/12/2019 12:00 AM  
Arm Circumference (cm) 32 cm 4/8/2019 10:26 AM  
Date of Last Dressing Change 04/08/19 4/12/2019 12:00 AM  
Dressing Status Clean, dry, & intact 4/12/2019 12:00 AM  
Action Taken Open ports on tubing capped 4/12/2019 12:00 AM  
External Catheter Length (cm) 0 centimeters 4/8/2019 10:26 AM  
Dressing Type Transparent;Tape 4/12/2019 12:00 AM  
Hub Color/Line Status Blue; Infusing;Flushed 4/12/2019 12:00 AM  
Positive Blood Return (Site #1) Yes 4/12/2019 12:00 AM  
Hub Color/Line Status Red; Infusing;Flushed 4/12/2019 12:00 AM  
Positive Blood Return (Site #2) Yes 4/12/2019 12:00 AM  
Alcohol Cap Used Yes 4/12/2019 12:00 AM  
   
Peripheral IV 04/10/19 Left;Mid Forearm (Active) Site Assessment Clean, dry, & intact 4/12/2019 12:00 AM  
Phlebitis Assessment 0 4/12/2019 12:00 AM  
Infiltration Assessment 0 4/12/2019 12:00 AM  
Dressing Status Clean, dry, & intact 4/12/2019 12:00 AM  
Dressing Type Transparent;Tape 4/12/2019 12:00 AM  
Hub Color/Line Status Blue;Capped;Flushed 4/12/2019 12:00 AM  
Action Taken Open ports on tubing capped 4/12/2019 12:00 AM  
Alcohol Cap Used Yes 4/12/2019 12:00 AM  
 Opportunity for questions and clarification was provided.    
Patient transported with: 
Monitor, tech, charge RN, RT, O2 at 4L

## 2019-04-12 NOTE — CONSULTS
PULMONARY/CRITICAL CARE/SLEEP MEDICINE    Initial Physician Consultation Note    Name: Enoch Giron   : 1950   MRN: [id number]   Date: 2019      Subjective:   Consult Note: 2019   Requesting Physician: Dr. Airam Shoemaker  Reason for consult: Resp failure    Medical records and data reviewed. Patient is a 79 y.o. female who is in the hospital since 3/26/2019 admitted for perforated diverticulitis for which she underwent surgery on 3/29 and later required externalization of  shunt which she underwent on . She was noted to have hypertensive urgency with associated resp distress and transferred to ICU for bipap last night. We were asked to see her this evening for resp failure. Patient appears volume overloaded with increasing weight and if I/Os are correct, nearly 20 liters positive, on TPN and IVF. CXR shows central vascular congestion, fluid in right major fissure and asymmetric infiltrate in the right lung. Echo showed preserved EF and mitral valve disease. She reports cough with scant sputum production. She is on rocephin and flagyl. No ABG is available to review    Review of Systems:     A comprehensive 12 system review of systems was limited from the patient    Assessment:   Acute hypoxic resp failure  Volume overload  Pulmonary infiltrates- asymmetric edema versus hospital acquired pneumonia?   H/O COPD- not bronchospastic  S/P abd surgery  For relocation of  shunt into pleural space next week  Uncontrolled systemic HTN  OHD with valvular heart disease  Anemia  H/O tobacco use  Other medical problems per chart    Recommendations:   NIPPV/O2- wean as tolerated  On antibiotics  Reculture  Diuretics  Nebs  Watch need for steroids- not bronchospastic at present  DC IV fluids  On TPN  BP control  On protonix  On s  Repeat CXR on -- marked improvement in CXR and negative cultures would favor edema  D/W RN and RT  D/W patient and family       Active Problem List:     Problem List  Date Reviewed: 4/8/2019          Codes Class    Acute hypoxemic respiratory failure (HCC) ICD-10-CM: J96.01  ICD-9-CM: 518.81         Pneumonia due to infectious organism ICD-10-CM: J18.9  ICD-9-CM: 136.9, 484.8         Perforated diverticulum of large intestine ICD-10-CM: K57.20  ICD-9-CM: 562.10         ACP (advance care planning) ICD-10-CM: Z71.89  ICD-9-CM: V65.49     Overview Signed 2/21/2017 11:02 AM by Prince Michelle MD     Initial ACP discussion w/ pt and daughter 2-2017. AMD form reviewed and copy provided.  NN/facilitator to discuss with patient               Benign essential hypertension ICD-10-CM: I10  ICD-9-CM: 401.1         Impaired fasting glucose/Prediabetes ICD-10-CM: R73.01  ICD-9-CM: 790.21     Overview Signed 7/13/2014 10:50 PM by Prince Michelle MD     6/2014             Bilateral hip pain ICD-10-CM: M25.551, M25.552  ICD-9-CM: 719.45     Overview Signed 6/3/2014  2:46 PM by Prince Michelle MD     xrays 2012, negative             Vitamin D deficiency ICD-10-CM: E55.9  ICD-9-CM: 268.9     Overview Signed 6/3/2014  2:48 PM by Prince Michelle MD     2012             GERD (gastroesophageal reflux disease) ICD-10-CM: K21.9  ICD-9-CM: 530.81         H/O Subarachnoid hemorrhage due to ruptured aneurysm ICD-10-CM: I60.8  ICD-9-CM: 430     Overview Signed 7/14/2010  1:08 PM by Prince Michelle MD     10/10/06 S/P Rt Frontal Craniotomy and clipping, Dr Ana Shaffer 10/27/06             Essential tremor ICD-10-CM: G25.0  ICD-9-CM: 333.1         Cough variant asthma ICD-10-CM: J45.991  ICD-9-CM: 493.82         Tobacco Abuse ICD-10-CM: F17.210  ICD-9-CM: 305.1     Overview Signed 3/29/2010  6:13 PM by Amanda Mott (allergic rhinitis) ICD-10-CM: J30.9  ICD-9-CM: 477.9         ADD (attention deficit disorder) ICD-10-CM: F98.8  ICD-9-CM: 314.00         Depression ICD-10-CM: F32.9  ICD-9-CM: 311         Anxiety ICD-10-CM: F41.9  ICD-9-CM: 300.00 COPD (chronic obstructive pulmonary disease) ([facility] [address] ICD-10-CM: J44.9  ICD-9-CM: 496     Overview Signed 11/10/2014  2:05 PM by Nova White MD     [address]             Thyroiditis, subacute ICD-10-CM: E06.1  ICD-9-CM: 245.1         Hyperlipemia ICD-10-CM: E78.5  ICD-9-CM: 272.4     Overview Signed 7/14/2010  1:05 PM by Nova White MD     2005             DVT of Rt Lower Extremity ICD-10-CM: I82.409  ICD-9-CM: 453.40     Overview Addendum 7/14/2010  1:04 PM by Nova White MD     11/06             Elevated LFT's, Fatty Liver ICD-10-CM: R94.5  ICD-9-CM: 790.6     Overview Addendum 7/14/2010  1:04 PM by Nova White MD     [facility] 7/09 Hepatic Steatosis                   Past Medical History:      has a past medical history of ACP (advance care planning) (2/21/2017), Asthma, Benign essential hypertension (6/24/2016), Bilateral hip pain (6/3/2014), COPD (chronic obstructive pulmonary disease) ([facility] [address] (3/29/2010), Depression (3/29/2010), Diverticulitis, DVT of Rt Lower Extremity (3/29/2010), Elevated LFT's, Fatty Liver (3/29/2010), Essential tremor (7/14/2010), GERD (gastroesophageal reflux disease) (9/2/2011), H/O Subarachnoid hemorrhage due to ruptured aneurysm (7/14/2010), Hyperlipemia (3/29/2010), Impaired fasting glucose (7/13/2014), Thyroiditis, subacute (3/29/2010), Tobacco Abuse (3/29/2010), and Vitamin D deficiency (6/3/2014). She also has no past medical history of Abuse, Anemia NEC, Arrhythmia, Arthritis, Autoimmune disease ([address] CAD (coronary artery disease), Calculus of kidney, Cancer ([facility] [address] Chronic kidney disease, Chronic pain, Congestive heart failure, unspecified, Contact dermatitis and other eczema, due to unspecified cause, Diabetes ([facility] [address] Headache(784.0), Liver disease, Psychotic disorder ([address] PUD (peptic ulcer disease), Seizures ([facility] [address] Stroke ([facility] [address] Trauma, or Unspecified sleep apnea.     Past Surgical History:      has a past surgical history that includes hx hysterectomy (1991); pr colonoscopy w/biopsy single/multiple (1980's); echo stress (2005); hx craniotomy (10/06); hx colonoscopy (3/2013, Dr Red Ballard); and hx endoscopy (EGD, Dr Red Ballard). Home Medications:     Prior to Admission medications    Medication Sig Start Date End Date Taking? Authorizing Provider   sertraline (ZOLOFT) 100 mg tablet TAKE 1.5 TABS BY MOUTH DAILY 3/12/19  Yes Stacy Matias MD   omeprazole (PRILOSEC) 20 mg capsule TAKE 1 CAPSULE BY MOUTH EVERY DAY 3/12/19  Yes Fountain-Ellis, Palmira Mcburney, MD   losartan-hydroCHLOROthiazide (HYZAAR) 50-12.5 mg per tablet Take 1 Tab by mouth daily. Indications: hypertension 10/4/18  Yes Stacy Matias MD   atorvastatin (LIPITOR) 20 mg tablet TAKE 1 TABLET BY MOUTH EVERY DAY 8/18/18  Yes Fountain-Ellis, Palmira Mcburney, MD   traZODone (DESYREL) 50 mg tablet TAKE 1 TABLET BY MOUTH EVERYDAY AT BEDTIME 5/10/18  Yes Stacy Matias MD   dicyclomine (BENTYL) 10 mg capsule TAKE 1-2 CAPSULES BY MOUTH EVERY 6 HOURS AS NEEDED FOR ABDOMINAL CRAMPS 8/6/14  Yes Teresa Lemus MD   albuterol (VENTOLIN HFA) 90 mcg/actuation inhaler Take 2 Puffs by inhalation every six (6) hours as needed for Wheezing or Shortness of Breath. Yes Provider, Historical   MULTIVITAMINS (MULTIVITAMIN PO) Take  by mouth daily. Yes Provider, Historical   ferrous sulfate (IRON) 325 mg (65 mg Iron) tablet Take  by mouth two (2) times a day. 7/14/10  Yes Provider, Historical       Allergies/Social/Family History: Allergies   Allergen Reactions    Ativan [Lorazepam] Hives    Pcn [Penicillins] Other (comments)     Unsure of reaction; pt reports unsure if have taken cephalosporin before.      Wellbutrin [Bupropion Hcl] Anxiety    Xanax [Alprazolam] Hives      Social History     Tobacco Use    Smoking status: Current Every Day Smoker     Packs/day: 1.00     Years: 20.00     Pack years: 20.00     Types: Cigarettes    Smokeless tobacco: Never Used    Tobacco comment: cut back from 1ppd to 3/4 ppd    Substance Use Topics    Alcohol use: No     Alcohol/week: 0.0 oz      Family History   Problem Relation Age of Onset    Anxiety Daughter     Attention Deficit Disorder Daughter     Drug Abuse Daughter     Anxiety Daughter     Thyroid Disease Daughter     Thyroid Disease Daughter         Hypothyroidism    Asthma Daughter     Cancer Father         kidney    Diabetes Father    [place] Gin Father     Diabetes Mother     Hypertension Mother    Delta Gin Mother     Stroke Mother     Hypertension Sister     Depression Sister     Pneumonia Sister          age 72    Diabetes Sister     Thyroid Disease Sister     Thyroid Disease Sister     Heart Disease Sister     Diabetes Maternal Grandmother     No Known Problems Brother     Thyroid Disease Sister     Anxiety Sister     Depression Sister     Alcohol abuse Sister     Hypertension Sister     Hypertension Sister     Thyroid Disease Sister     Thyroid Disease Sister             Objective:   Vital Signs:  Visit Vitals  /74   Pulse 95   Temp 98.4 °F (36.9 °C)   Resp 21   Ht 5' 8\" (1.727 m)   Wt 85 kg (187 lb 6.3 oz)   SpO2 98%   BMI 28.49 kg/m²    O2 Flow Rate (L/min): 2 l/min O2 Device: BIPAP Temp (24hrs), Av °F (37.2 °C), Min:98.4 °F (36.9 °C), Max:99.9 °F (37.7 °C)           Intake/Output:     Intake/Output Summary (Last 24 hours) at 2019 1604  Last data filed at 2019 1600  Gross per 24 hour   Intake 3412.18 ml   Output 2410.5 ml   Net 1001.68 ml       Physical Exam:   General:  Alert, cooperative, no distress, appears stated age. Head:  Normocephalic, without obvious abnormality, atraumatic. Eyes:  Conjunctivae/corneas clear. PERRL   Neck: Supple, symmetrical, no adenopathy, no carotid bruit and no JVD. Lungs:   Diminished BS, no wheeze   Chest wall:  No tenderness or deformity. Heart:  Regular rate and rhythm, S1-S2 normal, no murmur, no click, rub or gallop. Abdomen:   Soft, non-tender. Bowel sounds present. No masses,  No organomegaly. Extremities: Atraumatic, no cyanosis, + edema. Pulses: Palpable   Skin: No rashes or lesions   Neurologic: Grossly nonfocal         LABS AND  DATA: Personally reviewed  Recent Labs     04/12/19  1048 04/11/19  0128   WBC 12.7* 15.5*   HGB 7.4* 8.2*   HCT 24.1* 26.0*    303     Recent Labs     04/12/19  0430 04/11/19  0128 04/10/19  0229   * 133* 134*   K 3.9 3.7 2.9*    106 106   CO2 25 26 30   BUN 8 7 7   CREA 0.40* 0.44* 0.46*   * 140* 133*   CA 7.9* 7.8* 7.6*   MG  --   --  2.1   PHOS  --   --  1.5*     Recent Labs     04/12/19  0430   SGOT 28   AP 72   TP 6.8   ALB 2.0*   GLOB 4.8*     Recent Labs     04/12/19  0430   INR 1.2*   PTP 11.6*   APTT 26.5      Recent Labs     04/12/19  0424   PHI 7.373   PCO2I 39.5   PO2I 45*     Recent Labs     04/12/19  0430   *   CKMB 1.7   TROIQ <0.05       MEDS: Reviewed    Chest Imaging: personally reviewed and report checked    Tele- reviewed    Medical decision making:   I have reviewed the flowsheet and previous day's notes  Patient has acute or chronic illness that poses a threat to life or bodily function  Review and order of Clinical lab tests  Review and Order of Radiology tests  Independent visualization of Image  Reviewed Ventilator / NiPPV        Thank you for allowing me to participate in this patient's care.     Cornell Champion MD      [facility]

## 2019-04-13 ENCOUNTER — APPOINTMENT (OUTPATIENT)
Dept: GENERAL RADIOLOGY | Age: 69
DRG: 329 | End: 2019-04-13
Attending: INTERNAL MEDICINE
Payer: MEDICARE

## 2019-04-13 LAB
ANION GAP SERPL CALC-SCNC: 4 MMOL/L (ref 5–15)
ANION GAP SERPL CALC-SCNC: 5 MMOL/L (ref 5–15)
BASOPHILS # BLD: 0 K/UL (ref 0–0.1)
BASOPHILS NFR BLD: 0 % (ref 0–1)
BNP SERPL-MCNC: 1063 PG/ML
BUN SERPL-MCNC: 9 MG/DL (ref 6–20)
BUN SERPL-MCNC: 9 MG/DL (ref 6–20)
BUN/CREAT SERPL: 23 (ref 12–20)
BUN/CREAT SERPL: 26 (ref 12–20)
CALCIUM SERPL-MCNC: 7.8 MG/DL (ref 8.5–10.1)
CALCIUM SERPL-MCNC: 8.2 MG/DL (ref 8.5–10.1)
CHLORIDE SERPL-SCNC: 101 MMOL/L (ref 97–108)
CHLORIDE SERPL-SCNC: 103 MMOL/L (ref 97–108)
CO2 SERPL-SCNC: 30 MMOL/L (ref 21–32)
CO2 SERPL-SCNC: 30 MMOL/L (ref 21–32)
CREAT SERPL-MCNC: 0.34 MG/DL (ref 0.55–1.02)
CREAT SERPL-MCNC: 0.4 MG/DL (ref 0.55–1.02)
DIFFERENTIAL METHOD BLD: ABNORMAL
EOSINOPHIL # BLD: 0.4 K/UL (ref 0–0.4)
EOSINOPHIL NFR BLD: 4 % (ref 0–7)
ERYTHROCYTE [DISTWIDTH] IN BLOOD BY AUTOMATED COUNT: 14.3 % (ref 11.5–14.5)
GLUCOSE BLD STRIP.AUTO-MCNC: 136 MG/DL (ref 65–100)
GLUCOSE BLD STRIP.AUTO-MCNC: 168 MG/DL (ref 65–100)
GLUCOSE BLD STRIP.AUTO-MCNC: 195 MG/DL (ref 65–100)
GLUCOSE SERPL-MCNC: 160 MG/DL (ref 65–100)
GLUCOSE SERPL-MCNC: 180 MG/DL (ref 65–100)
HCT VFR BLD AUTO: 24.9 % (ref 35–47)
HGB BLD-MCNC: 7.5 G/DL (ref 11.5–16)
IMM GRANULOCYTES # BLD AUTO: 0.1 K/UL (ref 0–0.04)
IMM GRANULOCYTES NFR BLD AUTO: 1 % (ref 0–0.5)
LYMPHOCYTES # BLD: 1.8 K/UL (ref 0.8–3.5)
LYMPHOCYTES NFR BLD: 15 % (ref 12–49)
MAGNESIUM SERPL-MCNC: 2 MG/DL (ref 1.6–2.4)
MAGNESIUM SERPL-MCNC: 2.1 MG/DL (ref 1.6–2.4)
MCH RBC QN AUTO: 29.4 PG (ref 26–34)
MCHC RBC AUTO-ENTMCNC: 30.1 G/DL (ref 30–36.5)
MCV RBC AUTO: 97.6 FL (ref 80–99)
MONOCYTES # BLD: 1 K/UL (ref 0–1)
MONOCYTES NFR BLD: 9 % (ref 5–13)
NEUTS SEG # BLD: 8.5 K/UL (ref 1.8–8)
NEUTS SEG NFR BLD: 71 % (ref 32–75)
NRBC # BLD: 0.02 K/UL (ref 0–0.01)
NRBC BLD-RTO: 0.2 PER 100 WBC
PHOSPHATE SERPL-MCNC: 2.9 MG/DL (ref 2.6–4.7)
PLATELET # BLD AUTO: 309 K/UL (ref 150–400)
PMV BLD AUTO: 9.5 FL (ref 8.9–12.9)
POTASSIUM SERPL-SCNC: 3.4 MMOL/L (ref 3.5–5.1)
POTASSIUM SERPL-SCNC: 3.8 MMOL/L (ref 3.5–5.1)
RBC # BLD AUTO: 2.55 M/UL (ref 3.8–5.2)
SERVICE CMNT-IMP: ABNORMAL
SODIUM SERPL-SCNC: 136 MMOL/L (ref 136–145)
SODIUM SERPL-SCNC: 137 MMOL/L (ref 136–145)
TROPONIN I SERPL-MCNC: <0.05 NG/ML
WBC # BLD AUTO: 11.9 K/UL (ref 3.6–11)

## 2019-04-13 PROCEDURE — 94640 AIRWAY INHALATION TREATMENT: CPT

## 2019-04-13 PROCEDURE — 84484 ASSAY OF TROPONIN QUANT: CPT

## 2019-04-13 PROCEDURE — 74011250636 HC RX REV CODE- 250/636: Performed by: SURGERY

## 2019-04-13 PROCEDURE — 94660 CPAP INITIATION&MGMT: CPT

## 2019-04-13 PROCEDURE — 65620000000 HC RM CCU GENERAL

## 2019-04-13 PROCEDURE — 74011000250 HC RX REV CODE- 250: Performed by: INTERNAL MEDICINE

## 2019-04-13 PROCEDURE — 83880 ASSAY OF NATRIURETIC PEPTIDE: CPT

## 2019-04-13 PROCEDURE — 93005 ELECTROCARDIOGRAM TRACING: CPT

## 2019-04-13 PROCEDURE — 74011000250 HC RX REV CODE- 250: Performed by: SURGERY

## 2019-04-13 PROCEDURE — 85025 COMPLETE CBC W/AUTO DIFF WBC: CPT

## 2019-04-13 PROCEDURE — 82962 GLUCOSE BLOOD TEST: CPT

## 2019-04-13 PROCEDURE — 74011250637 HC RX REV CODE- 250/637: Performed by: SURGERY

## 2019-04-13 PROCEDURE — 80048 BASIC METABOLIC PNL TOTAL CA: CPT

## 2019-04-13 PROCEDURE — 83735 ASSAY OF MAGNESIUM: CPT

## 2019-04-13 PROCEDURE — 87070 CULTURE OTHR SPECIMN AEROBIC: CPT

## 2019-04-13 PROCEDURE — 74011636637 HC RX REV CODE- 636/637: Performed by: SURGERY

## 2019-04-13 PROCEDURE — 71045 X-RAY EXAM CHEST 1 VIEW: CPT

## 2019-04-13 PROCEDURE — 74011250636 HC RX REV CODE- 250/636: Performed by: INTERNAL MEDICINE

## 2019-04-13 PROCEDURE — 74011000258 HC RX REV CODE- 258: Performed by: SURGERY

## 2019-04-13 PROCEDURE — C9113 INJ PANTOPRAZOLE SODIUM, VIA: HCPCS | Performed by: SURGERY

## 2019-04-13 RX ORDER — CALCIUM CARBONATE 200(500)MG
400 TABLET,CHEWABLE ORAL
Status: DISCONTINUED | OUTPATIENT
Start: 2019-04-13 | End: 2019-04-26 | Stop reason: HOSPADM

## 2019-04-13 RX ORDER — POTASSIUM CHLORIDE 7.45 MG/ML
10 INJECTION INTRAVENOUS
Status: COMPLETED | OUTPATIENT
Start: 2019-04-13 | End: 2019-04-13

## 2019-04-13 RX ADMIN — Medication 10 ML: at 13:34

## 2019-04-13 RX ADMIN — POTASSIUM & SODIUM PHOSPHATES POWDER PACK 280-160-250 MG 1 PACKET: 280-160-250 PACK at 21:28

## 2019-04-13 RX ADMIN — HYDRALAZINE HYDROCHLORIDE 100 MG: 25 TABLET ORAL at 21:28

## 2019-04-13 RX ADMIN — POTASSIUM CHLORIDE 10 MEQ: 10 INJECTION, SOLUTION INTRAVENOUS at 11:25

## 2019-04-13 RX ADMIN — Medication 10 ML: at 21:28

## 2019-04-13 RX ADMIN — LABETALOL 20 MG/4 ML (5 MG/ML) INTRAVENOUS SYRINGE 20 MG: at 19:01

## 2019-04-13 RX ADMIN — Medication 10 ML: at 06:32

## 2019-04-13 RX ADMIN — IPRATROPIUM BROMIDE AND ALBUTEROL SULFATE 3 ML: .5; 3 SOLUTION RESPIRATORY (INHALATION) at 16:43

## 2019-04-13 RX ADMIN — LABETALOL 20 MG/4 ML (5 MG/ML) INTRAVENOUS SYRINGE 20 MG: at 13:34

## 2019-04-13 RX ADMIN — PANTOPRAZOLE SODIUM 40 MG: 40 INJECTION, POWDER, FOR SOLUTION INTRAVENOUS at 17:00

## 2019-04-13 RX ADMIN — HUMAN INSULIN 2 UNITS: 100 INJECTION, SOLUTION SUBCUTANEOUS at 18:22

## 2019-04-13 RX ADMIN — FLUCONAZOLE 400 MG: 400 INJECTION, SOLUTION INTRAVENOUS at 10:15

## 2019-04-13 RX ADMIN — POTASSIUM & SODIUM PHOSPHATES POWDER PACK 280-160-250 MG 1 PACKET: 280-160-250 PACK at 08:14

## 2019-04-13 RX ADMIN — METRONIDAZOLE 500 MG: 500 INJECTION, SOLUTION INTRAVENOUS at 13:33

## 2019-04-13 RX ADMIN — HUMAN INSULIN 2 UNITS: 100 INJECTION, SOLUTION SUBCUTANEOUS at 06:37

## 2019-04-13 RX ADMIN — FUROSEMIDE 40 MG: 10 INJECTION, SOLUTION INTRAMUSCULAR; INTRAVENOUS at 08:14

## 2019-04-13 RX ADMIN — SERTRALINE 100 MG: 50 TABLET, FILM COATED ORAL at 08:14

## 2019-04-13 RX ADMIN — POTASSIUM & SODIUM PHOSPHATES POWDER PACK 280-160-250 MG 1 PACKET: 280-160-250 PACK at 12:04

## 2019-04-13 RX ADMIN — METRONIDAZOLE 500 MG: 500 INJECTION, SOLUTION INTRAVENOUS at 21:29

## 2019-04-13 RX ADMIN — HYDRALAZINE HYDROCHLORIDE 10 MG: 20 INJECTION INTRAMUSCULAR; INTRAVENOUS at 12:13

## 2019-04-13 RX ADMIN — Medication: at 22:34

## 2019-04-13 RX ADMIN — LABETALOL 20 MG/4 ML (5 MG/ML) INTRAVENOUS SYRINGE 20 MG: at 06:32

## 2019-04-13 RX ADMIN — HYDRALAZINE HYDROCHLORIDE 100 MG: 25 TABLET ORAL at 08:14

## 2019-04-13 RX ADMIN — IPRATROPIUM BROMIDE AND ALBUTEROL SULFATE 3 ML: .5; 3 SOLUTION RESPIRATORY (INHALATION) at 11:58

## 2019-04-13 RX ADMIN — CALCIUM CARBONATE (ANTACID) CHEW TAB 500 MG 400 MG: 500 CHEW TAB at 18:57

## 2019-04-13 RX ADMIN — IPRATROPIUM BROMIDE AND ALBUTEROL SULFATE 3 ML: .5; 3 SOLUTION RESPIRATORY (INHALATION) at 07:41

## 2019-04-13 RX ADMIN — POTASSIUM & SODIUM PHOSPHATES POWDER PACK 280-160-250 MG 1 PACKET: 280-160-250 PACK at 17:00

## 2019-04-13 RX ADMIN — TRAZODONE HYDROCHLORIDE 50 MG: 50 TABLET ORAL at 21:28

## 2019-04-13 RX ADMIN — CEFTRIAXONE SODIUM 2 G: 2 INJECTION, POWDER, FOR SOLUTION INTRAMUSCULAR; INTRAVENOUS at 16:57

## 2019-04-13 RX ADMIN — LABETALOL 20 MG/4 ML (5 MG/ML) INTRAVENOUS SYRINGE 20 MG: at 02:16

## 2019-04-13 RX ADMIN — THIAMINE HYDROCHLORIDE: 100 INJECTION, SOLUTION INTRAMUSCULAR; INTRAVENOUS at 18:55

## 2019-04-13 RX ADMIN — METRONIDAZOLE 500 MG: 500 INJECTION, SOLUTION INTRAVENOUS at 06:32

## 2019-04-13 RX ADMIN — LOSARTAN POTASSIUM 100 MG: 50 TABLET ORAL at 08:14

## 2019-04-13 RX ADMIN — IPRATROPIUM BROMIDE AND ALBUTEROL SULFATE 3 ML: .5; 3 SOLUTION RESPIRATORY (INHALATION) at 21:04

## 2019-04-13 RX ADMIN — HYDRALAZINE HYDROCHLORIDE 100 MG: 25 TABLET ORAL at 16:57

## 2019-04-13 RX ADMIN — POTASSIUM CHLORIDE 10 MEQ: 10 INJECTION, SOLUTION INTRAVENOUS at 10:15

## 2019-04-13 NOTE — PROGRESS NOTES
Progress Note Patient: Gael Bojorquez MRN: [id number]  SSN: [id number] YOB: 1950  Age: 79 y.o. Sex: female Admit Date: 3/26/2019 
 
5 Days Post-Op Procedure:  Procedure(s): LAPAROTOMY EXPLORATORY/ SIGMOIDECTOMY/ COLSTOMY 
externalize Shunt Ventricular-Peritoneal 
 
Subjective:  
 
Patient feeling better today. She is hungry. No nausea or vomiting. Currently off of BIPAP Objective:  
 
Visit Vitals /64 Pulse 89 Temp 99.5 °F (37.5 °C) Resp 20 Ht 5' 8\" (1.727 m) Wt 184 lb 15.5 oz (83.9 kg) SpO2 96% BMI 28.12 kg/m² Temp (24hrs), Av.5 °F (37.5 °C), Min:98.4 °F (36.9 °C), Max:99.9 °F (37.7 °C) Physical Exam:   
Gen- Alert in NAD Lungs- CTA 
h-RRr Abd- S/NT/ND 
+ BS Ostomy pink and viable. Data Review: images and reports reviewed CXR-1. Right upper lobe pneumonia.    
  
Lab Review: All lab results for the last 24 hours reviewed. Recent Results (from the past 24 hour(s)) CBC WITH AUTOMATED DIFF Collection Time: 19 10:48 AM  
Result Value Ref Range WBC 12.7 (H) 3.6 - 11.0 K/uL  
 RBC 2.44 (L) 3.80 - 5.20 M/uL HGB 7.4 (L) 11.5 - 16.0 g/dL HCT 24.1 (L) 35.0 - 47.0 % MCV 98.8 80.0 - 99.0 FL  
 MCH 30.3 26.0 - 34.0 PG  
 MCHC 30.7 30.0 - 36.5 g/dL  
 RDW 14.1 11.5 - 14.5 % PLATELET 341 503 - 324 K/uL MPV 9.3 8.9 - 12.9 FL  
 NRBC 0.2 (H) 0  WBC ABSOLUTE NRBC 0.03 (H) 0.00 - 0.01 K/uL NEUTROPHILS 80 (H) 32 - 75 % LYMPHOCYTES 12 12 - 49 % MONOCYTES 7 5 - 13 % EOSINOPHILS 0 0 - 7 % BASOPHILS 0 0 - 1 % IMMATURE GRANULOCYTES 1 (H) 0.0 - 0.5 % ABS. NEUTROPHILS 10.1 (H) 1.8 - 8.0 K/UL  
 ABS. LYMPHOCYTES 1.5 0.8 - 3.5 K/UL  
 ABS. MONOCYTES 0.9 0.0 - 1.0 K/UL  
 ABS. EOSINOPHILS 0.0 0.0 - 0.4 K/UL  
 ABS. BASOPHILS 0.0 0.0 - 0.1 K/UL  
 ABS. IMM. GRANS. 0.1 (H) 0.00 - 0.04 K/UL  
 DF AUTOMATED    
GLUCOSE, POC Collection Time: 19 10:55 AM  
Result Value Ref Range Glucose (POC) 184 (H) 65 - 100 mg/dL Performed by LDR [employer] [facility] PHOSPHORUS Collection Time: 04/12/19  3:00 PM  
Result Value Ref Range Phosphorus 2.9 2.6 - 4.7 MG/DL MAGNESIUM Collection Time: 04/12/19  3:00 PM  
Result Value Ref Range Magnesium 2.0 1.6 - 2.4 mg/dL GLUCOSE, POC Collection Time: 04/12/19  6:09 PM  
Result Value Ref Range Glucose (POC) 119 (H) 65 - 100 mg/dL Performed by LDR [employer] [facility] CULTURE, BLOOD Collection Time: 04/12/19  8:50 PM  
Result Value Ref Range Special Requests: NO SPECIAL REQUESTS Culture result: NO GROWTH AFTER 8 HOURS    
GLUCOSE, POC Collection Time: 04/12/19 11:19 PM  
Result Value Ref Range Glucose (POC) 180 (H) 65 - 100 mg/dL Performed by Carmel Sims CBC WITH AUTOMATED DIFF Collection Time: 04/13/19  2:24 AM  
Result Value Ref Range WBC 11.9 (H) 3.6 - 11.0 K/uL  
 RBC 2.55 (L) 3.80 - 5.20 M/uL HGB 7.5 (L) 11.5 - 16.0 g/dL HCT 24.9 (L) 35.0 - 47.0 % MCV 97.6 80.0 - 99.0 FL  
 MCH 29.4 26.0 - 34.0 PG  
 MCHC 30.1 30.0 - 36.5 g/dL  
 RDW 14.3 11.5 - 14.5 % PLATELET 437 602 - 413 K/uL MPV 9.5 8.9 - 12.9 FL  
 NRBC 0.2 (H) 0  WBC ABSOLUTE NRBC 0.02 (H) 0.00 - 0.01 K/uL NEUTROPHILS 71 32 - 75 % LYMPHOCYTES 15 12 - 49 % MONOCYTES 9 5 - 13 % EOSINOPHILS 4 0 - 7 % BASOPHILS 0 0 - 1 % IMMATURE GRANULOCYTES 1 (H) 0.0 - 0.5 % ABS. NEUTROPHILS 8.5 (H) 1.8 - 8.0 K/UL  
 ABS. LYMPHOCYTES 1.8 0.8 - 3.5 K/UL  
 ABS. MONOCYTES 1.0 0.0 - 1.0 K/UL  
 ABS. EOSINOPHILS 0.4 0.0 - 0.4 K/UL  
 ABS. BASOPHILS 0.0 0.0 - 0.1 K/UL  
 ABS. IMM. GRANS. 0.1 (H) 0.00 - 0.04 K/UL  
 DF AUTOMATED METABOLIC PANEL, BASIC Collection Time: 04/13/19  2:24 AM  
Result Value Ref Range Sodium 137 136 - 145 mmol/L Potassium 3.4 (L) 3.5 - 5.1 mmol/L Chloride 103 97 - 108 mmol/L  
 CO2 30 21 - 32 mmol/L Anion gap 4 (L) 5 - 15 mmol/L Glucose 180 (H) 65 - 100 mg/dL  BUN 9 6 - 20 MG/DL  
 Creatinine 0.40 (L) 0.55 - 1.02 MG/DL  
 BUN/Creatinine ratio 23 (H) 12 - 20 GFR est AA >60 >60 ml/min/1.73m2 GFR est non-AA >60 >60 ml/min/1.73m2 Calcium 7.8 (L) 8.5 - 10.1 MG/DL  
NT-PRO BNP Collection Time: 04/13/19  2:24 AM  
Result Value Ref Range NT pro-BNP 1,063 (H) <125 PG/ML  
GLUCOSE, POC Collection Time: 04/13/19  6:36 AM  
Result Value Ref Range Glucose (POC) 195 (H) 65 - 100 mg/dL Performed by Lizzeth Hale Assessment:  
 
Hospital Problems  Date Reviewed: 4/8/2019 Codes Class Noted POA Acute hypoxemic respiratory failure ([facility] [address] ICD-10-CM: J96.01 
ICD-9-CM: 518.81  4/12/2019 No  
   
 Pneumonia due to infectious organism ICD-10-CM: J18.9 ICD-9-CM: 136.9, 484.8  4/12/2019 No  
   
 Perforated diverticulum of large intestine ICD-10-CM: K57.20 ICD-9-CM: 562.10  3/26/2019 Yes Plan/Recommendations/Medical Decision Making: Pneumonia treatment per [facility] Continue to wean BIPAP 
continue TPN Continue JEROME Start clears.

## 2019-04-13 NOTE — PROGRESS NOTES
Problem: Pressure Injury - Risk of 
Goal: *Prevention of pressure injury Description Document Moe Scale and appropriate interventions in the flowsheet. Outcome: Progressing Towards Goal 
  
Problem: Patient Education: Go to Patient Education Activity Goal: Patient/Family Education Outcome: Progressing Towards Goal 
  
Problem: Falls - Risk of 
Goal: *Absence of Falls Description Document Rochelle Damon Fall Risk and appropriate interventions in the flowsheet. Outcome: Progressing Towards Goal 
  
Problem: Patient Education: Go to Patient Education Activity Goal: Patient/Family Education Outcome: Progressing Towards Goal 
  
Problem: General Medical Care Plan Goal: *Vital signs within specified parameters Outcome: Progressing Towards Goal 
Goal: *Labs within defined limits Outcome: Progressing Towards Goal 
Goal: *Absence of infection signs and symptoms Outcome: Progressing Towards Goal 
Goal: *Optimal pain control at patient's stated goal 
Outcome: Progressing Towards Goal 
Goal: *Skin integrity maintained Outcome: Progressing Towards Goal 
Goal: *Fluid volume balance Outcome: Progressing Towards Goal 
Goal: *Optimize nutritional status Outcome: Progressing Towards Goal 
Goal: *Anxiety reduced or absent Outcome: Progressing Towards Goal 
Goal: *Progressive mobility and function (eg: ADL's) Outcome: Progressing Towards Goal 
  
Problem: Patient Education: Go to Patient Education Activity Goal: Patient/Family Education Outcome: Progressing Towards Goal 
  
Problem: Patient Education: Go to Patient Education Activity Goal: Patient/Family Education Outcome: Progressing Towards Goal 
  
Problem: Surgical Pathway: Discharge Outcomes Goal: *Hemodynamically stable Outcome: Progressing Towards Goal 
Goal: *Lungs clear or at baseline Outcome: Progressing Towards Goal 
Goal: *Demonstrates independent activity or return to baseline Outcome: Progressing Towards Goal 
 Goal: *Optimal pain control at patient's stated goal 
Outcome: Progressing Towards Goal 
Goal: *Verbalizes understanding and describes prescribed diet Outcome: Progressing Towards Goal 
Goal: *Tolerating diet Outcome: Progressing Towards Goal 
Goal: *Verbalizes name, dosage, time, side effects, and number of days to continue medications Outcome: Progressing Towards Goal 
Goal: *No signs and symptoms of infection or wound complications Outcome: Progressing Towards Goal 
Goal: *Anxiety reduced or absent Outcome: Progressing Towards Goal 
Goal: *Understands and describes signs and symptoms to report to providers(Stroke Metric) Outcome: Progressing Towards Goal 
Goal: *Describes follow-up/return visits to physicians Outcome: Progressing Towards Goal 
Goal: *Describes available resources and support systems Outcome: Progressing Towards Goal 
  
Problem: Nutrition Deficit Goal: *Optimize nutritional status Outcome: Progressing Towards Goal 
  
Problem: Nutrition Deficit Goal: *Optimize nutritional status Outcome: Progressing Towards Goal 
  
Problem: Patient Education: Go to Patient Education Activity Goal: Patient/Family Education Outcome: Progressing Towards Goal 
  
Problem: Patient Education: Go to Patient Education Activity Goal: Patient/Family Education Outcome: Progressing Towards Goal 
  
Problem: Infection - Risk of, Urinary Catheter-Associated Urinary Tract Infection Goal: *Absence of infection signs and symptoms Outcome: Progressing Towards Goal 
  
Problem: Patient Education: Go to Patient Education Activity Goal: Patient/Family Education Outcome: Progressing Towards Goal 
  
Problem: Infection - Risk of, Central Venous Catheter-Associated Bloodstream Infection Goal: *Absence of infection signs and symptoms Outcome: Progressing Towards Goal 
  
Problem: Patient Education: Go to Patient Education Activity Goal: Patient/Family Education Outcome: Progressing Towards Goal

## 2019-04-13 NOTE — PROGRESS NOTES
0730 Bedside shift change report given to Nhi (oncoming nurse) by Konrad Rivera (offgoing nurse). Report included the following information SBAR, Kardex, Procedure Summary, Intake/Output, MAR, Recent Results and Cardiac Rhythm NSR . 0900 Pt tolerating being off bipap. Asking to eat. 1200 Pt has been off bipap since 0900. Tolerating 3LNC fine. 1800 After dinner pt complaining of chest heaviness and SOB. Repositioning helped ease discomfort, pt instructed to stop eating. EKG, BMP, Mag and Trop completed. Updated surgery on call, orders for tums. 1930 Bedside shift change report given to Aleja (oncoming nurse) by Nhi (offgoing nurse). Report included the following information SBAR, Kardex, Intake/Output, MAR, Recent Results and Cardiac Rhythm NSR.

## 2019-04-13 NOTE — INTERDISCIPLINARY ROUNDS
IDR/SLIDR Summary Patient: Diana Giang MRN: [id number]    Age: 79 y.o. YOB: 1950 Room/Bed: [address] [place] [address] Admit Diagnosis: Perforated diverticulum of large intestine [K57.20]  Principal Diagnosis: <principal problem not specified>  
Goals: wean Bipap, ABX Readmission: NO  Quality Measure: SCIP 
VTE Prophylaxis: Mechanical 
Influenza Vaccine screening completed? YES Pneumococcal Vaccine screening completed? NO Mobility needs: Yes   Nutrition plan:Yes 
Consults:P.T, O.T., Speech, Respiratory and Case Management Financial concerns:Yes  Escalated to CM? YES 
RRAT Score: 20   Interventions:H2H Testing due for pt today? YES 
LOS: 18 days Expected length of stay ? days Discharge plan: tbd   PCP: Osvaldo Salinas MD 
Transportation needs: Yes Days before discharge:two or more days before discharge Discharge disposition: Rehab Signed:  
 
Maria Victoria Silveira RN 
4/13/2019 
3:26 AM

## 2019-04-13 NOTE — PROGRESS NOTES
Bedside and Verbal shift change report given to Aleja (oncoming nurse) by Phillips Nissen (offgoing nurse). Report included the following information SBAR, Kardex, Intake/Output, MAR, Accordion, Recent Results, Cardiac Rhythm -NSR and Alarm Parameters . 2000 - Assumed care. Family at bedside. Plan of care reviewed. 2200 - CHG bath given. Bipap placed for HS. 
0200 - AM labs drawn. 0600 - Dr. Michaela Bernard from [facility] in to see pt, update given. Pt back to [facility], tolerating well. Colostomy emptied of 250ml brown, soft stool. Bedside and Verbal shift change report given to Phillips Nissen (oncoming nurse) by Blake Duque (offgoing nurse). Report included the following information SBAR, Kardex, Intake/Output, MAR, Accordion, Recent Results and Cardiac Rhythm -NSR/ST.

## 2019-04-13 NOTE — PROGRESS NOTES
[facility] Name: Theresa Mayes MRN: [id number] : 1950 Hospital: [address]  Date: 2019 Initial evaluation per Dr Janay Swenson: 
Patient is a 79 y.o. female who is in the hospital since 3/26/2019 admitted for perforated diverticulitis for which she underwent surgery on 3/29 and later required externalization of  shunt which she underwent on . She was noted to have hypertensive urgency with associated resp distress and transferred to ICU for bipap last night. We were asked to see her this evening for resp failure. Patient appears volume overloaded with increasing weight and if I/Os are correct, nearly 20 liters positive, on TPN and IVF. CXR shows central vascular congestion, fluid in right major fissure and asymmetric infiltrate in the right lung. Echo showed preserved EF and mitral valve disease. She reports cough with scant sputum production. She is on rocephin and flagyl. No ABG is available to review. IMPRESSION:  
-Acute hypoxic resp failure 
-Volume overload 
-Pulmonary infiltrates- asymmetric edema versus hospital acquired pneumonia? 
-H/O COPD- not bronchospastic 
-S/P abd surgery 
-For relocation of  shunt into pleural space next week 
-Uncontrolled systemic HTN 
-OHD with valvular heart disease 
-Anemia 
-H/O tobacco use RECOMMENDATIONS:please see orders for details. Case d/w nursing and on Multi D rounds. NIPPV/O2- wean as tolerated On antibiotics - rocephin/ flagyl. Does not have HAP, gram neg extended coverage. May consider changing to cefepime if cxr shows no improvement. Will need to see if patient has got cefepime before and tolerated well. Has tolerated rocephin well. Allergic to PCN noted. Reculture Diuretics Nebs Watch need for steroids- not bronchospastic at present No IV fluids On TPN 
BP control CXR today. D/W RN 
D/W patient and family Routine management Stress ulcer prophylaxis DVT prophylaxis Discussed with nursing Ambulate per ICU protocol. Daily delirium assessment with CAM - ICU I have personally reviewed the radiology films and reports. Right side GGO Subjective/Interval History:  
I have reviewed the flowsheet and previous days notes. Cough with brown sputum. No fever, chills. Shortness of breath the same. Got diuretics yesterday and diuresed well. Objective: 
Vital Signs:   
Visit Vitals /76 Pulse 90 Temp 99.5 °F (37.5 °C) Resp 23 Ht 5' 8\" (1.727 m) Wt 83.9 kg (184 lb 15.5 oz) SpO2 97% BMI 28.12 kg/m² TMAX(24) Intake/Output:  
Last shift:      Ventilator Volumes Vt Spont (ml): 881 ml Ve Observed (l/min): 16 l/min Last 3 shifts: No intake/output data recorded. Rosita Lyman Intake/Output Summary (Last 24 hours) at 4/13/2019 5631 Last data filed at 4/13/2019 0700 Gross per 24 hour Intake 2901.33 ml Output 3529.5 ml Net -628.17 ml EXAM  
Physical Exam:  
General:  Alert, cooperative, no distress, appears stated age. Head:  Normocephalic, without obvious abnormality, atraumatic. Eyes:  Conjunctivae/corneas clear. PERRL Neck: Supple, symmetrical, no adenopathy, no carotid bruit and no JVD. Lungs:   Diminished BS b/l, no wheeze Chest wall:  No tenderness or deformity. Heart:  Regular rate and rhythm, S1-S2 normal, no murmur, no click, rub or gallop. Abdomen:   Soft, non-tender. Bowel sounds present. No masses,  No organomegaly. Extremities: Atraumatic, no cyanosis, + edema. Pulses: Palpable Skin: No rashes or lesions Neurologic: Grossly nonfocal  
 
 
Data I have personally reviewed data, flowsheets for the last 24 hours. Labs: 
Recent Labs 04/13/19 
0224 04/12/19 
1048 04/11/19 
0128 WBC 11.9* 12.7* 15.5* HGB 7.5* 7.4* 8.2* HCT 24.9* 24.1* 26.0*  
 301 303 Recent Labs 04/13/19 
0224 04/12/19 
1500 04/12/19 
0430 04/11/19 
0128   --  135* 133* K 3.4*  --  3.9 3.7   --  103 106 CO2 30  --  25 26 *  --  166* 140* BUN 9  --  8 7 CREA 0.40*  --  0.40* 0.44* CA 7.8*  --  7.9* 7.8*  
MG  --  2.0  --   --   
PHOS  --  2.9  --   --   
ALB  --   --  2.0*  --   
SGOT  --   --  28  --   
ALT  --   --  18  --   
INR  --   --  1.2*  --   
 
 
ABG Recent Labs 04/12/19 
0424 PHI 7.373 PO2I 45* PCO2I 39.5 Ryan York MD 
[facility]

## 2019-04-14 ENCOUNTER — APPOINTMENT (OUTPATIENT)
Dept: GENERAL RADIOLOGY | Age: 69
DRG: 329 | End: 2019-04-14
Attending: INTERNAL MEDICINE
Payer: MEDICARE

## 2019-04-14 LAB
ANION GAP SERPL CALC-SCNC: 5 MMOL/L (ref 5–15)
BASOPHILS # BLD: 0 K/UL (ref 0–0.1)
BASOPHILS # BLD: 0 K/UL (ref 0–0.1)
BASOPHILS NFR BLD: 0 % (ref 0–1)
BASOPHILS NFR BLD: 0 % (ref 0–1)
BUN SERPL-MCNC: 10 MG/DL (ref 6–20)
BUN/CREAT SERPL: 29 (ref 12–20)
CALCIUM SERPL-MCNC: 8 MG/DL (ref 8.5–10.1)
CHLORIDE SERPL-SCNC: 100 MMOL/L (ref 97–108)
CO2 SERPL-SCNC: 30 MMOL/L (ref 21–32)
CREAT SERPL-MCNC: 0.34 MG/DL (ref 0.55–1.02)
DIFFERENTIAL METHOD BLD: ABNORMAL
DIFFERENTIAL METHOD BLD: ABNORMAL
EOSINOPHIL # BLD: 0.2 K/UL (ref 0–0.4)
EOSINOPHIL # BLD: 0.3 K/UL (ref 0–0.4)
EOSINOPHIL NFR BLD: 2 % (ref 0–7)
EOSINOPHIL NFR BLD: 2 % (ref 0–7)
ERYTHROCYTE [DISTWIDTH] IN BLOOD BY AUTOMATED COUNT: 14.2 % (ref 11.5–14.5)
ERYTHROCYTE [DISTWIDTH] IN BLOOD BY AUTOMATED COUNT: 14.5 % (ref 11.5–14.5)
GLUCOSE BLD STRIP.AUTO-MCNC: 172 MG/DL (ref 65–100)
GLUCOSE BLD STRIP.AUTO-MCNC: 192 MG/DL (ref 65–100)
GLUCOSE BLD STRIP.AUTO-MCNC: 196 MG/DL (ref 65–100)
GLUCOSE BLD STRIP.AUTO-MCNC: 240 MG/DL (ref 65–100)
GLUCOSE SERPL-MCNC: 166 MG/DL (ref 65–100)
HCT VFR BLD AUTO: 22.4 % (ref 35–47)
HCT VFR BLD AUTO: 23 % (ref 35–47)
HGB BLD-MCNC: 6.8 G/DL (ref 11.5–16)
HGB BLD-MCNC: 7.3 G/DL (ref 11.5–16)
IMM GRANULOCYTES # BLD AUTO: 0.1 K/UL (ref 0–0.04)
IMM GRANULOCYTES # BLD AUTO: 0.1 K/UL (ref 0–0.04)
IMM GRANULOCYTES NFR BLD AUTO: 1 % (ref 0–0.5)
IMM GRANULOCYTES NFR BLD AUTO: 1 % (ref 0–0.5)
LYMPHOCYTES # BLD: 1.6 K/UL (ref 0.8–3.5)
LYMPHOCYTES # BLD: 1.9 K/UL (ref 0.8–3.5)
LYMPHOCYTES NFR BLD: 15 % (ref 12–49)
LYMPHOCYTES NFR BLD: 17 % (ref 12–49)
MCH RBC QN AUTO: 29.2 PG (ref 26–34)
MCH RBC QN AUTO: 30.4 PG (ref 26–34)
MCHC RBC AUTO-ENTMCNC: 30.4 G/DL (ref 30–36.5)
MCHC RBC AUTO-ENTMCNC: 31.7 G/DL (ref 30–36.5)
MCV RBC AUTO: 95.8 FL (ref 80–99)
MCV RBC AUTO: 96.1 FL (ref 80–99)
MONOCYTES # BLD: 1 K/UL (ref 0–1)
MONOCYTES # BLD: 1.1 K/UL (ref 0–1)
MONOCYTES NFR BLD: 10 % (ref 5–13)
MONOCYTES NFR BLD: 9 % (ref 5–13)
NEUTS SEG # BLD: 7.8 K/UL (ref 1.8–8)
NEUTS SEG # BLD: 7.9 K/UL (ref 1.8–8)
NEUTS SEG NFR BLD: 71 % (ref 32–75)
NEUTS SEG NFR BLD: 72 % (ref 32–75)
NRBC # BLD: 0 K/UL (ref 0–0.01)
NRBC # BLD: 0.02 K/UL (ref 0–0.01)
NRBC BLD-RTO: 0 PER 100 WBC
NRBC BLD-RTO: 0.2 PER 100 WBC
PLATELET # BLD AUTO: 300 K/UL (ref 150–400)
PLATELET # BLD AUTO: 311 K/UL (ref 150–400)
PMV BLD AUTO: 9.6 FL (ref 8.9–12.9)
PMV BLD AUTO: 9.7 FL (ref 8.9–12.9)
POTASSIUM SERPL-SCNC: 3.6 MMOL/L (ref 3.5–5.1)
RBC # BLD AUTO: 2.33 M/UL (ref 3.8–5.2)
RBC # BLD AUTO: 2.4 M/UL (ref 3.8–5.2)
RBC MORPH BLD: ABNORMAL
SERVICE CMNT-IMP: ABNORMAL
SODIUM SERPL-SCNC: 135 MMOL/L (ref 136–145)
WBC # BLD AUTO: 10.8 K/UL (ref 3.6–11)
WBC # BLD AUTO: 11.2 K/UL (ref 3.6–11)

## 2019-04-14 PROCEDURE — 74011000258 HC RX REV CODE- 258: Performed by: INTERNAL MEDICINE

## 2019-04-14 PROCEDURE — 36415 COLL VENOUS BLD VENIPUNCTURE: CPT

## 2019-04-14 PROCEDURE — 82962 GLUCOSE BLOOD TEST: CPT

## 2019-04-14 PROCEDURE — 74011250637 HC RX REV CODE- 250/637: Performed by: SURGERY

## 2019-04-14 PROCEDURE — 74011250636 HC RX REV CODE- 250/636: Performed by: SURGERY

## 2019-04-14 PROCEDURE — 74011636637 HC RX REV CODE- 636/637: Performed by: SURGERY

## 2019-04-14 PROCEDURE — 65620000000 HC RM CCU GENERAL

## 2019-04-14 PROCEDURE — 74011250636 HC RX REV CODE- 250/636: Performed by: INTERNAL MEDICINE

## 2019-04-14 PROCEDURE — 85025 COMPLETE CBC W/AUTO DIFF WBC: CPT

## 2019-04-14 PROCEDURE — 74011000250 HC RX REV CODE- 250: Performed by: SURGERY

## 2019-04-14 PROCEDURE — 74011000250 HC RX REV CODE- 250: Performed by: INTERNAL MEDICINE

## 2019-04-14 PROCEDURE — C9113 INJ PANTOPRAZOLE SODIUM, VIA: HCPCS | Performed by: SURGERY

## 2019-04-14 PROCEDURE — 74011000258 HC RX REV CODE- 258: Performed by: SURGERY

## 2019-04-14 PROCEDURE — 80048 BASIC METABOLIC PNL TOTAL CA: CPT

## 2019-04-14 PROCEDURE — 94640 AIRWAY INHALATION TREATMENT: CPT

## 2019-04-14 PROCEDURE — 71045 X-RAY EXAM CHEST 1 VIEW: CPT

## 2019-04-14 RX ORDER — DIAZEPAM 10 MG/2ML
INJECTION INTRAMUSCULAR
Status: DISPENSED
Start: 2019-04-14 | End: 2019-04-14

## 2019-04-14 RX ORDER — DIAZEPAM 10 MG/2ML
5 INJECTION INTRAMUSCULAR
Status: DISCONTINUED | OUTPATIENT
Start: 2019-04-14 | End: 2019-04-24

## 2019-04-14 RX ADMIN — HYDRALAZINE HYDROCHLORIDE 10 MG: 20 INJECTION INTRAMUSCULAR; INTRAVENOUS at 07:04

## 2019-04-14 RX ADMIN — Medication 10 ML: at 06:27

## 2019-04-14 RX ADMIN — CALCIUM CARBONATE (ANTACID) CHEW TAB 500 MG 400 MG: 500 CHEW TAB at 16:20

## 2019-04-14 RX ADMIN — CALCIUM CARBONATE (ANTACID) CHEW TAB 500 MG 400 MG: 500 CHEW TAB at 11:36

## 2019-04-14 RX ADMIN — HYDRALAZINE HYDROCHLORIDE 100 MG: 25 TABLET ORAL at 15:52

## 2019-04-14 RX ADMIN — METRONIDAZOLE 500 MG: 500 INJECTION, SOLUTION INTRAVENOUS at 21:21

## 2019-04-14 RX ADMIN — Medication 10 ML: at 15:50

## 2019-04-14 RX ADMIN — CALCIUM CARBONATE (ANTACID) CHEW TAB 500 MG 400 MG: 500 CHEW TAB at 08:00

## 2019-04-14 RX ADMIN — THIAMINE HYDROCHLORIDE: 100 INJECTION, SOLUTION INTRAMUSCULAR; INTRAVENOUS at 19:00

## 2019-04-14 RX ADMIN — IPRATROPIUM BROMIDE AND ALBUTEROL SULFATE 3 ML: .5; 3 SOLUTION RESPIRATORY (INHALATION) at 15:41

## 2019-04-14 RX ADMIN — HUMAN INSULIN 2 UNITS: 100 INJECTION, SOLUTION SUBCUTANEOUS at 17:47

## 2019-04-14 RX ADMIN — FLUCONAZOLE 400 MG: 400 INJECTION, SOLUTION INTRAVENOUS at 11:26

## 2019-04-14 RX ADMIN — Medication 5 MG: at 08:07

## 2019-04-14 RX ADMIN — Medication 10 ML: at 15:51

## 2019-04-14 RX ADMIN — HYDRALAZINE HYDROCHLORIDE 100 MG: 25 TABLET ORAL at 21:20

## 2019-04-14 RX ADMIN — IPRATROPIUM BROMIDE AND ALBUTEROL SULFATE 3 ML: .5; 3 SOLUTION RESPIRATORY (INHALATION) at 11:41

## 2019-04-14 RX ADMIN — FUROSEMIDE 40 MG: 10 INJECTION, SOLUTION INTRAMUSCULAR; INTRAVENOUS at 08:00

## 2019-04-14 RX ADMIN — LOSARTAN POTASSIUM 100 MG: 50 TABLET ORAL at 08:00

## 2019-04-14 RX ADMIN — POTASSIUM & SODIUM PHOSPHATES POWDER PACK 280-160-250 MG 1 PACKET: 280-160-250 PACK at 08:00

## 2019-04-14 RX ADMIN — TRAZODONE HYDROCHLORIDE 50 MG: 50 TABLET ORAL at 21:20

## 2019-04-14 RX ADMIN — HUMAN INSULIN 2 UNITS: 100 INJECTION, SOLUTION SUBCUTANEOUS at 00:15

## 2019-04-14 RX ADMIN — IPRATROPIUM BROMIDE AND ALBUTEROL SULFATE 3 ML: .5; 3 SOLUTION RESPIRATORY (INHALATION) at 08:01

## 2019-04-14 RX ADMIN — HUMAN INSULIN 2 UNITS: 100 INJECTION, SOLUTION SUBCUTANEOUS at 06:30

## 2019-04-14 RX ADMIN — POTASSIUM & SODIUM PHOSPHATES POWDER PACK 280-160-250 MG 1 PACKET: 280-160-250 PACK at 17:42

## 2019-04-14 RX ADMIN — HUMAN INSULIN 3 UNITS: 100 INJECTION, SOLUTION SUBCUTANEOUS at 11:36

## 2019-04-14 RX ADMIN — CEFEPIME HYDROCHLORIDE 2 G: 2 INJECTION, POWDER, FOR SOLUTION INTRAVENOUS at 16:18

## 2019-04-14 RX ADMIN — POTASSIUM & SODIUM PHOSPHATES POWDER PACK 280-160-250 MG 1 PACKET: 280-160-250 PACK at 21:20

## 2019-04-14 RX ADMIN — HYDRALAZINE HYDROCHLORIDE 100 MG: 25 TABLET ORAL at 08:00

## 2019-04-14 RX ADMIN — CEFEPIME HYDROCHLORIDE 2 G: 2 INJECTION, POWDER, FOR SOLUTION INTRAVENOUS at 11:26

## 2019-04-14 RX ADMIN — IPRATROPIUM BROMIDE AND ALBUTEROL SULFATE 3 ML: .5; 3 SOLUTION RESPIRATORY (INHALATION) at 20:59

## 2019-04-14 RX ADMIN — Medication 10 ML: at 21:21

## 2019-04-14 RX ADMIN — PANTOPRAZOLE SODIUM 40 MG: 40 INJECTION, POWDER, FOR SOLUTION INTRAVENOUS at 17:42

## 2019-04-14 RX ADMIN — POTASSIUM & SODIUM PHOSPHATES POWDER PACK 280-160-250 MG 1 PACKET: 280-160-250 PACK at 13:00

## 2019-04-14 RX ADMIN — SERTRALINE 100 MG: 50 TABLET, FILM COATED ORAL at 08:00

## 2019-04-14 RX ADMIN — LABETALOL 20 MG/4 ML (5 MG/ML) INTRAVENOUS SYRINGE 20 MG: at 19:13

## 2019-04-14 RX ADMIN — METRONIDAZOLE 500 MG: 500 INJECTION, SOLUTION INTRAVENOUS at 06:24

## 2019-04-14 RX ADMIN — METRONIDAZOLE 500 MG: 500 INJECTION, SOLUTION INTRAVENOUS at 15:51

## 2019-04-14 NOTE — PROGRESS NOTES
0730 Bedside shift change report given to Nhi (oncoming nurse) by Subha Matute (offgoing nurse). Report included the following information SBAR, Kardex, Procedure Summary, Intake/Output, MAR, Recent Results and Cardiac Rhythm ST . 0745 Pt awake complaining she cannot breathe, asking to use bipap 
0753 Pt has bloody nose, taking off bipap, 3LNC, satting 90% 0800 Pt diaphoretic, tachycardic, tachypneic into the 40s, sbp >200, states she \"feels the same as the other day\" in reference to her RR. BIPAP replaced, orders for valium from Dr. Carlos Manuel Newell. CXR underway. 6566 Pt's sbp down to 170s, pt sleepy. 0024  shunt put out 50 cc. Will continue to monitor. 0830 Pt recovered, HR 80s, RR 19, sbp 150s. Still drowsy, but answers to voice. No more complaints of SOB.  shunt has no additional output. 1200 Able to get patient to chair 2 person assist. 
1400 Back to bed 
1930 PRN labetalol given 1930 Bedside shift change report given to Aleja (oncoming nurse) by Nhi (offgoing nurse). Report included the following information SBAR, Kardex, Procedure Summary, Intake/Output, MAR, Recent Results and Cardiac Rhythm NSR.

## 2019-04-14 NOTE — PROGRESS NOTES
Problem: Pressure Injury - Risk of 
Goal: *Prevention of pressure injury Description Document Moe Scale and appropriate interventions in the flowsheet. Outcome: Progressing Towards Goal 
  
Problem: Patient Education: Go to Patient Education Activity Goal: Patient/Family Education Outcome: Progressing Towards Goal 
  
Problem: Falls - Risk of 
Goal: *Absence of Falls Description Document Dejuan Wisdom Fall Risk and appropriate interventions in the flowsheet. Outcome: Progressing Towards Goal 
  
Problem: Patient Education: Go to Patient Education Activity Goal: Patient/Family Education Outcome: Progressing Towards Goal 
  
Problem: General Medical Care Plan Goal: *Vital signs within specified parameters Outcome: Progressing Towards Goal 
Goal: *Labs within defined limits Outcome: Progressing Towards Goal 
Goal: *Absence of infection signs and symptoms Outcome: Progressing Towards Goal 
Goal: *Optimal pain control at patient's stated goal 
Outcome: Progressing Towards Goal 
Goal: *Skin integrity maintained Outcome: Progressing Towards Goal 
Goal: *Fluid volume balance Outcome: Progressing Towards Goal 
Goal: *Optimize nutritional status Outcome: Progressing Towards Goal 
Goal: *Anxiety reduced or absent Outcome: Progressing Towards Goal 
Goal: *Progressive mobility and function (eg: ADL's) Outcome: Progressing Towards Goal 
  
Problem: Patient Education: Go to Patient Education Activity Goal: Patient/Family Education Outcome: Progressing Towards Goal 
  
Problem: Patient Education: Go to Patient Education Activity Goal: Patient/Family Education Outcome: Progressing Towards Goal 
  
Problem: Surgical Pathway: Discharge Outcomes Goal: *Hemodynamically stable Outcome: Progressing Towards Goal 
Goal: *Lungs clear or at baseline Outcome: Progressing Towards Goal 
Goal: *Demonstrates independent activity or return to baseline Outcome: Progressing Towards Goal 
 Goal: *Optimal pain control at patient's stated goal 
Outcome: Progressing Towards Goal 
Goal: *Verbalizes understanding and describes prescribed diet Outcome: Progressing Towards Goal 
Goal: *Tolerating diet Outcome: Progressing Towards Goal 
Goal: *Verbalizes name, dosage, time, side effects, and number of days to continue medications Outcome: Progressing Towards Goal 
Goal: *No signs and symptoms of infection or wound complications Outcome: Progressing Towards Goal 
Goal: *Anxiety reduced or absent Outcome: Progressing Towards Goal 
Goal: *Understands and describes signs and symptoms to report to providers(Stroke Metric) Outcome: Progressing Towards Goal 
Goal: *Describes follow-up/return visits to physicians Outcome: Progressing Towards Goal 
Goal: *Describes available resources and support systems Outcome: Progressing Towards Goal 
  
Problem: Nutrition Deficit Goal: *Optimize nutritional status Outcome: Progressing Towards Goal 
  
Problem: Nutrition Deficit Goal: *Optimize nutritional status Outcome: Progressing Towards Goal 
  
Problem: Patient Education: Go to Patient Education Activity Goal: Patient/Family Education Outcome: Progressing Towards Goal 
  
Problem: Patient Education: Go to Patient Education Activity Goal: Patient/Family Education Outcome: Progressing Towards Goal 
  
Problem: Infection - Risk of, Urinary Catheter-Associated Urinary Tract Infection Goal: *Absence of infection signs and symptoms Outcome: Progressing Towards Goal 
  
Problem: Patient Education: Go to Patient Education Activity Goal: Patient/Family Education Outcome: Progressing Towards Goal 
  
Problem: Infection - Risk of, Central Venous Catheter-Associated Bloodstream Infection Goal: *Absence of infection signs and symptoms Outcome: Progressing Towards Goal 
  
Problem: Patient Education: Go to Patient Education Activity Goal: Patient/Family Education Outcome: Progressing Towards Goal

## 2019-04-14 NOTE — PROGRESS NOTES
Progress Note Patient: Derrick Miller MRN: [id number]  SSN: [id number] YOB: 1950  Age: 79 y.o. Sex: female Admit Date: 3/26/2019 
 
6 Days Post-Op Procedure:  Procedure(s): LAPAROTOMY EXPLORATORY/ SIGMOIDECTOMY/ COLSTOMY 
externalize Shunt Ventricular-Peritoneal 
 
Subjective:  
 
Patient had panic attack earlier today. She is feeling better after some valium. She is tolerating clears and is hungry- Feeling better after getting tums. Objective:  
 
Visit Vitals /67 Pulse 92 Temp 98.2 °F (36.8 °C) Resp 21 Ht 5' 8\" (1.727 m) Wt 186 lb 4.6 oz (84.5 kg) SpO2 93% BMI 28.33 kg/m² Temp (24hrs), Av.9 °F (37.2 °C), Min:98.2 °F (36.8 °C), Max:99.3 °F (37.4 °C) Physical Exam:   
Gen- Alert in NAD Lungs-CTA H-RRR Abd- s/ Nontender -Incision intact Ostomy- functional  
Clint is serosanginous Data Review: images and reports reviewed CXR- Worsening focal infiltrates in the right upper and right lower lobe, 
superimposed on diffuse otherwise stable interstitial prominence. Stable right PICC line. Lab Review: All lab results for the last 24 hours reviewed. Assessment:  
 
Hospital Problems  Date Reviewed: 2019 Codes Class Noted POA [facility] ICD-10-CM: J96.01 
ICD-9-CM: 518.81  2019 No  
   
 Pneumonia due to infectious organism ICD-10-CM: J18.9 ICD-9-CM: 136.9, 484.8  2019 No  
   
 Perforated diverticulum of large intestine ICD-10-CM: K57.20 ICD-9-CM: 562.10  3/26/2019 Yes Plan/Recommendations/Medical Decision Making:  
Continue TPN Tums May  Advance to fulls  shunt revision per NS and thoracic NPO after midnight.

## 2019-04-14 NOTE — PROGRESS NOTES
Problem: Pressure Injury - Risk of 
Goal: *Prevention of pressure injury Description Document Moe Scale and appropriate interventions in the flowsheet. Outcome: Progressing Towards Goal 
  
Problem: Patient Education: Go to Patient Education Activity Goal: Patient/Family Education Outcome: Progressing Towards Goal 
  
Problem: Falls - Risk of 
Goal: *Absence of Falls Description Document Clide Failing Fall Risk and appropriate interventions in the flowsheet. Outcome: Progressing Towards Goal 
  
Problem: Patient Education: Go to Patient Education Activity Goal: Patient/Family Education Outcome: Progressing Towards Goal 
  
Problem: General Medical Care Plan Goal: *Vital signs within specified parameters Outcome: Progressing Towards Goal 
Goal: *Labs within defined limits Outcome: Progressing Towards Goal 
Goal: *Absence of infection signs and symptoms Outcome: Progressing Towards Goal 
Goal: *Optimal pain control at patient's stated goal 
Outcome: Progressing Towards Goal 
Goal: *Skin integrity maintained Outcome: Progressing Towards Goal 
Goal: *Fluid volume balance Outcome: Progressing Towards Goal 
Goal: *Optimize nutritional status Outcome: Progressing Towards Goal 
Goal: *Anxiety reduced or absent Outcome: Progressing Towards Goal 
Goal: *Progressive mobility and function (eg: ADL's) Outcome: Progressing Towards Goal 
  
Problem: Patient Education: Go to Patient Education Activity Goal: Patient/Family Education Outcome: Progressing Towards Goal 
  
Problem: Patient Education: Go to Patient Education Activity Goal: Patient/Family Education Outcome: Progressing Towards Goal 
  
Problem: Surgical Pathway: Discharge Outcomes Goal: *Hemodynamically stable Outcome: Progressing Towards Goal 
Goal: *Lungs clear or at baseline Outcome: Progressing Towards Goal 
Goal: *Demonstrates independent activity or return to baseline Outcome: Progressing Towards Goal 
 Goal: *Optimal pain control at patient's stated goal 
Outcome: Progressing Towards Goal 
Goal: *Verbalizes understanding and describes prescribed diet Outcome: Progressing Towards Goal 
Goal: *Tolerating diet Outcome: Progressing Towards Goal 
Goal: *Verbalizes name, dosage, time, side effects, and number of days to continue medications Outcome: Progressing Towards Goal 
Goal: *No signs and symptoms of infection or wound complications Outcome: Progressing Towards Goal 
Goal: *Anxiety reduced or absent Outcome: Progressing Towards Goal 
Goal: *Understands and describes signs and symptoms to report to providers(Stroke Metric) Outcome: Progressing Towards Goal 
Goal: *Describes follow-up/return visits to physicians Outcome: Progressing Towards Goal 
Goal: *Describes available resources and support systems Outcome: Progressing Towards Goal 
  
Problem: Nutrition Deficit Goal: *Optimize nutritional status Outcome: Progressing Towards Goal 
  
Problem: Nutrition Deficit Goal: *Optimize nutritional status Outcome: Progressing Towards Goal 
  
Problem: Patient Education: Go to Patient Education Activity Goal: Patient/Family Education Outcome: Progressing Towards Goal 
  
Problem: Patient Education: Go to Patient Education Activity Goal: Patient/Family Education Outcome: Progressing Towards Goal 
  
Problem: Infection - Risk of, Urinary Catheter-Associated Urinary Tract Infection Goal: *Absence of infection signs and symptoms Outcome: Progressing Towards Goal 
  
Problem: Patient Education: Go to Patient Education Activity Goal: Patient/Family Education Outcome: Progressing Towards Goal 
  
Problem: Infection - Risk of, Central Venous Catheter-Associated Bloodstream Infection Goal: *Absence of infection signs and symptoms Outcome: Progressing Towards Goal 
  
Problem: Patient Education: Go to Patient Education Activity Goal: Patient/Family Education Outcome: Progressing Towards Goal

## 2019-04-14 NOTE — PROGRESS NOTES
Bedside and Verbal shift change report given to Aleja (oncoming nurse) by Tom Mckenna (offgoing nurse). Report included the following information SBAR, Kardex, Intake/Output, MAR, Accordion, Recent Results, Cardiac Rhythm -NSR and Alarm Parameters . 2000 - Assumed care. Gtts verified. 3LNC, tolerating well. Family at bedside, plan of care reviewed. 2100 - CHG bath given. 2200 - Bipap placed HS. 
0300 - AM labs drawn. 0400 - AM CXR obtained. 0500 - Hgb 6.8, jet Russ, no orders for transfusion per Dr. Prem Manzo at this time. 0600 - Pt back on 3LNC, tolerating well. , PRN Labetalol given. Bedside and Verbal shift change report given to [facility] (oncoming nurse) by Willa Villanueva (offgoing nurse). Report included the following information SBAR, Kardex, Intake/Output, MAR, Accordion, Recent Results, Cardiac Rhythm -NSR/ST and Alarm Parameters .

## 2019-04-14 NOTE — PROGRESS NOTES
Pt resting comfortably on BiPap Uneventful night CXR RUL consolidation, overall unchanged Discussed with Gen Surgery team 
Thoracic service available for  shunt surgery in co-ordination with other services

## 2019-04-14 NOTE — PROGRESS NOTES
Day #1 of Cefepime Indication:  HAP Current regimen:  2gm IV Q12H Abx regimen: cefepime + fluconazole + metronidazole Recent Labs 19 
3671 19 
0215 19 
1818 19 
4638 WBC 11.2* 10.8  --  11.9*  
CREA  --  0.34* 0.34* 0.40* BUN  --  10 9 9 Est CrCl: >100 ml/min; UO: 1.3 ml/kg/hr Temp (24hrs), Av.1 °F (37.3 °C), Min:98.9 °F (37.2 °C), Max:99.3 °F (37.4 °C) Cultures:  
19: Blood = NGTD, prelim 19: Resp = no orgs, prelim Plan: Change to 2 gm IV Q8H per renal dose adjustment protocol based on indication Thank you, Joe Lynne, PHARMD, BCPS **close i-vent after initial review. Remove this text prior to posting to note.

## 2019-04-14 NOTE — PROGRESS NOTES
[facility] Name: Bisi Hernadez MRN: [id number] : 1950 Hospital: [address] Date: 2019 Initial evaluation per Dr Elvira Ramey: 
Patient is a 79 y.o. female who is in the hospital since 3/26/2019 admitted for perforated diverticulitis for which she underwent surgery on 3/29 and later required externalization of  shunt which she underwent on . She was noted to have hypertensive urgency with associated resp distress and transferred to ICU for bipap last night. We were asked to see her this evening for resp failure. Patient appears volume overloaded with increasing weight and if I/Os are correct, nearly 20 liters positive, on TPN and IVF. CXR shows central vascular congestion, fluid in right major fissure and asymmetric infiltrate in the right lung. Echo showed preserved EF and mitral valve disease. She reports cough with scant sputum production. She is on rocephin and flagyl. No ABG is available to review. IMPRESSION:  
-Acute hypoxic resp failure. -Volume overload. -right upper lobe pneumonia. -H/O COPD- not bronchospastic. 
-S/P abd surgery. 
-For relocation of  shunt into pleural space next week. -Uncontrolled systemic HTN. -OHD with valvular heart disease. -Anemia. -H/O tobacco use. RECOMMENDATIONS:please see orders for details. Case d/w nursing and on Multi D rounds. NIPPV/O2- wean as tolerated On antibiotics - rocephin/ flagyl. Change rocephin to cefepime. Resp culture. Diuretics. No iv fluids. Nebs Watch need for steroids- not bronchospastic at present On TPN 
BP control CXR today. D/W RN 
D/W patient and family Routine management Stress ulcer prophylaxis. DVT prophylaxis. Discussed with nursing. Ambulate per ICU protocol. Daily delirium assessment with CAM - ICU. I have personally reviewed the radiology films and reports. cxr pending. Subjective/Interval History:  
I have reviewed the flowsheet and previous days notes. 4/14 Rapid response this morning. Short of breath. Lot of anxiety. Placed on bipap. Cough with brown sputum. No fever, chills. Due for lasix now. Objective: 
Vital Signs:   
Visit Vitals /80 Pulse 94 Temp 99.2 °F (37.3 °C) Resp 23 Ht 5' 8\" (1.727 m) Wt 84.5 kg (186 lb 4.6 oz) SpO2 98% BMI 28.33 kg/m² TMAX(24) Intake/Output:  
Last shift:      Ventilator Volumes Vt Spont (ml): 881 ml Ve Observed (l/min): 16 l/min Last 3 shifts: No intake/output data recorded. Irene Breen Intake/Output Summary (Last 24 hours) at 4/14/2019 4374 Last data filed at 4/14/2019 0700 Gross per 24 hour Intake 2765.92 ml Output 3273 ml Net -507.08 ml EXAM 
Physical Exam:  
General:  Alert, cooperative, no distress, appears stated age. Head:  Normocephalic, without obvious abnormality, atraumatic. Eyes:  Conjunctivae/corneas clear. PERRL Neck: Supple, symmetrical, no adenopathy, no carotid bruit and no JVD. Lungs:   Diminished BS b/l, no wheeze Chest wall:  No tenderness or deformity. Heart:  Regular rate and rhythm, S1-S2 normal, no murmur, no click, rub or gallop. Abdomen:   Soft, non-tender. Bowel sounds present. No masses,  No organomegaly. Extremities: Atraumatic, no cyanosis, + edema. Pulses: Palpable Skin: No rashes or lesions Neurologic: Grossly nonfocal  
 
 
Data I have personally reviewed data, flowsheets for the last 24 hours. Labs: 
Recent Labs 04/14/19 
2830 04/14/19 
0215 04/13/19 
7259 WBC 11.2* 10.8 11.9* HGB 7.3* 6.8* 7.5* HCT 23.0* 22.4* 24.9*  
 300 309 Recent Labs 04/14/19 
0215 04/13/19 
1818 04/13/19 
0224 04/12/19 
1500 04/12/19 
0430 * 136 137  --  135* K 3.6 3.8 3.4*  --  3.9  101 103  --  103 CO2 30 30 30  --  25 * 160* 180*  --  166* BUN 10 9 9  --  8  
 CREA 0.34* 0.34* 0.40*  --  0.40* CA 8.0* 8.2* 7.8*  --  7.9*  
MG  --  2.1  --  2.0  --   
PHOS  --   --   --  2.9  --   
ALB  --   --   --   --  2.0*  
SGOT  --   --   --   --  28 ALT  --   --   --   --  18 INR  --   --   --   --  1.2* ABG Recent Labs 04/12/19 
0424 PHI 7.373 PO2I 45* PCO2I 39.5 Safia Prabhakar MD 
[facility]

## 2019-04-14 NOTE — INTERDISCIPLINARY ROUNDS
IDR/SLIDR Summary Patient: John Alston MRN: [id number]    Age: 79 y.o. YOB: 1950 Room/Bed: [address] [place] [address] Admit Diagnosis: Perforated diverticulum of large intestine [K57.20]  Principal Diagnosis: <principal problem not specified>  
Goals: wean Bipap, advance diet, ABX Readmission: NO  Quality Measure: SCIP 
VTE Prophylaxis: Mechanical 
Influenza Vaccine screening completed? YES Pneumococcal Vaccine screening completed? NO Mobility needs: Yes   Nutrition plan:Yes 
Consults:P.T, O.T., Respiratory and Case Management Financial concerns:Yes  Escalated to CM? YES 
RRAT Score: 20   Interventions:H2H Testing due for pt today? YES 
LOS: 18 days Expected length of stay ? days Discharge plan: tbd   PCP: Christianne Santana MD 
Transportation needs: Yes Days before discharge:two or more days before discharge Discharge disposition: Rehab Signed:  
 
Samantha Emerson RN 
4/13/2019 
11:22 PM

## 2019-04-15 ENCOUNTER — APPOINTMENT (OUTPATIENT)
Dept: GENERAL RADIOLOGY | Age: 69
DRG: 329 | End: 2019-04-15
Attending: INTERNAL MEDICINE
Payer: MEDICARE

## 2019-04-15 LAB
ANION GAP SERPL CALC-SCNC: 4 MMOL/L (ref 5–15)
ATRIAL RATE: 115 BPM
ATRIAL RATE: 96 BPM
BACTERIA SPEC CULT: NORMAL
BASOPHILS # BLD: 0 K/UL (ref 0–0.1)
BASOPHILS # BLD: 0 K/UL (ref 0–0.1)
BASOPHILS NFR BLD: 0 % (ref 0–1)
BASOPHILS NFR BLD: 0 % (ref 0–1)
BUN SERPL-MCNC: 9 MG/DL (ref 6–20)
BUN/CREAT SERPL: 27 (ref 12–20)
CALCIUM SERPL-MCNC: 8.5 MG/DL (ref 8.5–10.1)
CALCULATED P AXIS, ECG09: -29 DEGREES
CALCULATED P AXIS, ECG09: 63 DEGREES
CALCULATED R AXIS, ECG10: 78 DEGREES
CALCULATED R AXIS, ECG10: 79 DEGREES
CALCULATED T AXIS, ECG11: 75 DEGREES
CALCULATED T AXIS, ECG11: 86 DEGREES
CHLORIDE SERPL-SCNC: 99 MMOL/L (ref 97–108)
CO2 SERPL-SCNC: 30 MMOL/L (ref 21–32)
CREAT SERPL-MCNC: 0.33 MG/DL (ref 0.55–1.02)
DIAGNOSIS, 93000: NORMAL
DIAGNOSIS, 93000: NORMAL
DIFFERENTIAL METHOD BLD: ABNORMAL
DIFFERENTIAL METHOD BLD: ABNORMAL
EOSINOPHIL # BLD: 0.4 K/UL (ref 0–0.4)
EOSINOPHIL # BLD: 0.5 K/UL (ref 0–0.4)
EOSINOPHIL NFR BLD: 4 % (ref 0–7)
EOSINOPHIL NFR BLD: 4 % (ref 0–7)
ERYTHROCYTE [DISTWIDTH] IN BLOOD BY AUTOMATED COUNT: 14.5 % (ref 11.5–14.5)
ERYTHROCYTE [DISTWIDTH] IN BLOOD BY AUTOMATED COUNT: 14.6 % (ref 11.5–14.5)
GLUCOSE BLD STRIP.AUTO-MCNC: 143 MG/DL (ref 65–100)
GLUCOSE BLD STRIP.AUTO-MCNC: 154 MG/DL (ref 65–100)
GLUCOSE BLD STRIP.AUTO-MCNC: 189 MG/DL (ref 65–100)
GLUCOSE BLD STRIP.AUTO-MCNC: 208 MG/DL (ref 65–100)
GLUCOSE BLD STRIP.AUTO-MCNC: 209 MG/DL (ref 65–100)
GLUCOSE SERPL-MCNC: 149 MG/DL (ref 65–100)
GRAM STN SPEC: NORMAL
HCT VFR BLD AUTO: 22.1 % (ref 35–47)
HCT VFR BLD AUTO: 22.2 % (ref 35–47)
HGB BLD-MCNC: 6.8 G/DL (ref 11.5–16)
HGB BLD-MCNC: 6.8 G/DL (ref 11.5–16)
IMM GRANULOCYTES # BLD AUTO: 0.1 K/UL (ref 0–0.04)
IMM GRANULOCYTES # BLD AUTO: 0.1 K/UL (ref 0–0.04)
IMM GRANULOCYTES NFR BLD AUTO: 1 % (ref 0–0.5)
IMM GRANULOCYTES NFR BLD AUTO: 1 % (ref 0–0.5)
LYMPHOCYTES # BLD: 1.9 K/UL (ref 0.8–3.5)
LYMPHOCYTES # BLD: 1.9 K/UL (ref 0.8–3.5)
LYMPHOCYTES NFR BLD: 16 % (ref 12–49)
LYMPHOCYTES NFR BLD: 17 % (ref 12–49)
MAGNESIUM SERPL-MCNC: 2.2 MG/DL (ref 1.6–2.4)
MCH RBC QN AUTO: 29.4 PG (ref 26–34)
MCH RBC QN AUTO: 29.6 PG (ref 26–34)
MCHC RBC AUTO-ENTMCNC: 30.6 G/DL (ref 30–36.5)
MCHC RBC AUTO-ENTMCNC: 30.8 G/DL (ref 30–36.5)
MCV RBC AUTO: 96.1 FL (ref 80–99)
MCV RBC AUTO: 96.1 FL (ref 80–99)
MONOCYTES # BLD: 1.1 K/UL (ref 0–1)
MONOCYTES # BLD: 1.2 K/UL (ref 0–1)
MONOCYTES NFR BLD: 10 % (ref 5–13)
MONOCYTES NFR BLD: 10 % (ref 5–13)
NEUTS SEG # BLD: 7.5 K/UL (ref 1.8–8)
NEUTS SEG # BLD: 8.1 K/UL (ref 1.8–8)
NEUTS SEG NFR BLD: 68 % (ref 32–75)
NEUTS SEG NFR BLD: 69 % (ref 32–75)
NRBC # BLD: 0.02 K/UL (ref 0–0.01)
NRBC # BLD: 0.03 K/UL (ref 0–0.01)
NRBC BLD-RTO: 0.2 PER 100 WBC
NRBC BLD-RTO: 0.3 PER 100 WBC
P-R INTERVAL, ECG05: 114 MS
P-R INTERVAL, ECG05: 142 MS
PLATELET # BLD AUTO: 318 K/UL (ref 150–400)
PLATELET # BLD AUTO: 322 K/UL (ref 150–400)
PLATELET COMMENTS,PCOM: ABNORMAL
PLATELET COMMENTS,PCOM: ABNORMAL
PMV BLD AUTO: 9.5 FL (ref 8.9–12.9)
PMV BLD AUTO: 9.6 FL (ref 8.9–12.9)
POTASSIUM SERPL-SCNC: 3.7 MMOL/L (ref 3.5–5.1)
Q-T INTERVAL, ECG07: 334 MS
Q-T INTERVAL, ECG07: 380 MS
QRS DURATION, ECG06: 88 MS
QRS DURATION, ECG06: 92 MS
QTC CALCULATION (BEZET), ECG08: 462 MS
QTC CALCULATION (BEZET), ECG08: 480 MS
RBC # BLD AUTO: 2.3 M/UL (ref 3.8–5.2)
RBC # BLD AUTO: 2.31 M/UL (ref 3.8–5.2)
RBC MORPH BLD: ABNORMAL
SERVICE CMNT-IMP: ABNORMAL
SERVICE CMNT-IMP: NORMAL
SODIUM SERPL-SCNC: 133 MMOL/L (ref 136–145)
VENTRICULAR RATE, ECG03: 115 BPM
VENTRICULAR RATE, ECG03: 96 BPM
WBC # BLD AUTO: 11 K/UL (ref 3.6–11)
WBC # BLD AUTO: 11.8 K/UL (ref 3.6–11)

## 2019-04-15 PROCEDURE — 74011000250 HC RX REV CODE- 250: Performed by: SURGERY

## 2019-04-15 PROCEDURE — 71045 X-RAY EXAM CHEST 1 VIEW: CPT

## 2019-04-15 PROCEDURE — 74011250636 HC RX REV CODE- 250/636: Performed by: INTERNAL MEDICINE

## 2019-04-15 PROCEDURE — 74011250637 HC RX REV CODE- 250/637: Performed by: SURGERY

## 2019-04-15 PROCEDURE — 74011250636 HC RX REV CODE- 250/636: Performed by: NURSE PRACTITIONER

## 2019-04-15 PROCEDURE — 74011000258 HC RX REV CODE- 258: Performed by: INTERNAL MEDICINE

## 2019-04-15 PROCEDURE — 82962 GLUCOSE BLOOD TEST: CPT

## 2019-04-15 PROCEDURE — 36415 COLL VENOUS BLD VENIPUNCTURE: CPT

## 2019-04-15 PROCEDURE — 74011000250 HC RX REV CODE- 250: Performed by: INTERNAL MEDICINE

## 2019-04-15 PROCEDURE — 83735 ASSAY OF MAGNESIUM: CPT

## 2019-04-15 PROCEDURE — 85025 COMPLETE CBC W/AUTO DIFF WBC: CPT

## 2019-04-15 PROCEDURE — 65610000006 HC RM INTENSIVE CARE

## 2019-04-15 PROCEDURE — 74011250636 HC RX REV CODE- 250/636: Performed by: SURGERY

## 2019-04-15 PROCEDURE — 74011636637 HC RX REV CODE- 636/637: Performed by: SURGERY

## 2019-04-15 PROCEDURE — 74011000250 HC RX REV CODE- 250: Performed by: NURSE PRACTITIONER

## 2019-04-15 PROCEDURE — 80048 BASIC METABOLIC PNL TOTAL CA: CPT

## 2019-04-15 PROCEDURE — C9113 INJ PANTOPRAZOLE SODIUM, VIA: HCPCS | Performed by: SURGERY

## 2019-04-15 PROCEDURE — 94640 AIRWAY INHALATION TREATMENT: CPT

## 2019-04-15 PROCEDURE — 74011636637 HC RX REV CODE- 636/637: Performed by: NURSE PRACTITIONER

## 2019-04-15 PROCEDURE — 97530 THERAPEUTIC ACTIVITIES: CPT

## 2019-04-15 PROCEDURE — 74011000258 HC RX REV CODE- 258: Performed by: NURSE PRACTITIONER

## 2019-04-15 RX ORDER — SODIUM CHLORIDE 0.9 % (FLUSH) 0.9 %
SYRINGE (ML) INJECTION
Status: COMPLETED
Start: 2019-04-15 | End: 2019-04-15

## 2019-04-15 RX ORDER — FUROSEMIDE 10 MG/ML
40 INJECTION INTRAMUSCULAR; INTRAVENOUS 2 TIMES DAILY
Status: DISCONTINUED | OUTPATIENT
Start: 2019-04-15 | End: 2019-04-26 | Stop reason: HOSPADM

## 2019-04-15 RX ORDER — POTASSIUM CHLORIDE 14.9 MG/ML
10 INJECTION INTRAVENOUS
Status: COMPLETED | OUTPATIENT
Start: 2019-04-15 | End: 2019-04-15

## 2019-04-15 RX ADMIN — Medication 10 ML: at 11:39

## 2019-04-15 RX ADMIN — POTASSIUM & SODIUM PHOSPHATES POWDER PACK 280-160-250 MG 1 PACKET: 280-160-250 PACK at 21:06

## 2019-04-15 RX ADMIN — HUMAN INSULIN 2 UNITS: 100 INJECTION, SOLUTION SUBCUTANEOUS at 06:21

## 2019-04-15 RX ADMIN — Medication 10 ML: at 06:19

## 2019-04-15 RX ADMIN — POTASSIUM & SODIUM PHOSPHATES POWDER PACK 280-160-250 MG 1 PACKET: 280-160-250 PACK at 14:13

## 2019-04-15 RX ADMIN — HYDRALAZINE HYDROCHLORIDE 100 MG: 25 TABLET ORAL at 21:07

## 2019-04-15 RX ADMIN — LOSARTAN POTASSIUM 100 MG: 50 TABLET ORAL at 08:47

## 2019-04-15 RX ADMIN — THIAMINE HYDROCHLORIDE: 100 INJECTION, SOLUTION INTRAMUSCULAR; INTRAVENOUS at 19:53

## 2019-04-15 RX ADMIN — I.V. FAT EMULSION 500 ML: 20 EMULSION INTRAVENOUS at 19:53

## 2019-04-15 RX ADMIN — FUROSEMIDE 40 MG: 10 INJECTION, SOLUTION INTRAMUSCULAR; INTRAVENOUS at 08:47

## 2019-04-15 RX ADMIN — POTASSIUM CHLORIDE 10 MEQ: 200 INJECTION, SOLUTION INTRAVENOUS at 14:12

## 2019-04-15 RX ADMIN — POTASSIUM CHLORIDE 10 MEQ: 200 INJECTION, SOLUTION INTRAVENOUS at 12:46

## 2019-04-15 RX ADMIN — CALCIUM CARBONATE (ANTACID) CHEW TAB 500 MG 400 MG: 500 CHEW TAB at 17:37

## 2019-04-15 RX ADMIN — IPRATROPIUM BROMIDE AND ALBUTEROL SULFATE 3 ML: .5; 3 SOLUTION RESPIRATORY (INHALATION) at 08:10

## 2019-04-15 RX ADMIN — SERTRALINE 100 MG: 50 TABLET, FILM COATED ORAL at 08:47

## 2019-04-15 RX ADMIN — METRONIDAZOLE 500 MG: 500 INJECTION, SOLUTION INTRAVENOUS at 14:12

## 2019-04-15 RX ADMIN — METRONIDAZOLE 500 MG: 500 INJECTION, SOLUTION INTRAVENOUS at 06:20

## 2019-04-15 RX ADMIN — PANTOPRAZOLE SODIUM 40 MG: 40 INJECTION, POWDER, FOR SOLUTION INTRAVENOUS at 17:38

## 2019-04-15 RX ADMIN — POTASSIUM CHLORIDE 10 MEQ: 200 INJECTION, SOLUTION INTRAVENOUS at 11:46

## 2019-04-15 RX ADMIN — Medication 10 ML: at 15:18

## 2019-04-15 RX ADMIN — HUMAN INSULIN 3 UNITS: 100 INJECTION, SOLUTION SUBCUTANEOUS at 00:34

## 2019-04-15 RX ADMIN — HYDRALAZINE HYDROCHLORIDE 100 MG: 25 TABLET ORAL at 08:47

## 2019-04-15 RX ADMIN — CEFEPIME HYDROCHLORIDE 2 G: 2 INJECTION, POWDER, FOR SOLUTION INTRAVENOUS at 00:29

## 2019-04-15 RX ADMIN — CALCIUM CARBONATE (ANTACID) CHEW TAB 500 MG 400 MG: 500 CHEW TAB at 08:46

## 2019-04-15 RX ADMIN — POTASSIUM & SODIUM PHOSPHATES POWDER PACK 280-160-250 MG 1 PACKET: 280-160-250 PACK at 17:38

## 2019-04-15 RX ADMIN — CEFEPIME HYDROCHLORIDE 2 G: 2 INJECTION, POWDER, FOR SOLUTION INTRAVENOUS at 17:38

## 2019-04-15 RX ADMIN — METRONIDAZOLE 500 MG: 500 INJECTION, SOLUTION INTRAVENOUS at 21:07

## 2019-04-15 RX ADMIN — IPRATROPIUM BROMIDE AND ALBUTEROL SULFATE 3 ML: .5; 3 SOLUTION RESPIRATORY (INHALATION) at 11:39

## 2019-04-15 RX ADMIN — Medication 10 ML: at 21:20

## 2019-04-15 RX ADMIN — HUMAN INSULIN 2 UNITS: 100 INJECTION, SOLUTION SUBCUTANEOUS at 11:51

## 2019-04-15 RX ADMIN — CEFEPIME HYDROCHLORIDE 2 G: 2 INJECTION, POWDER, FOR SOLUTION INTRAVENOUS at 08:48

## 2019-04-15 RX ADMIN — IPRATROPIUM BROMIDE AND ALBUTEROL SULFATE 3 ML: .5; 3 SOLUTION RESPIRATORY (INHALATION) at 15:54

## 2019-04-15 RX ADMIN — CALCIUM CARBONATE (ANTACID) CHEW TAB 500 MG 400 MG: 500 CHEW TAB at 11:43

## 2019-04-15 RX ADMIN — FUROSEMIDE 40 MG: 10 INJECTION, SOLUTION INTRAMUSCULAR; INTRAVENOUS at 17:38

## 2019-04-15 RX ADMIN — LABETALOL 20 MG/4 ML (5 MG/ML) INTRAVENOUS SYRINGE 20 MG: at 22:50

## 2019-04-15 RX ADMIN — HUMAN INSULIN 2 UNITS: 100 INJECTION, SOLUTION SUBCUTANEOUS at 17:36

## 2019-04-15 RX ADMIN — Medication 5 MG: at 11:38

## 2019-04-15 RX ADMIN — FLUCONAZOLE 400 MG: 400 INJECTION, SOLUTION INTRAVENOUS at 10:48

## 2019-04-15 RX ADMIN — HUMAN INSULIN 3 UNITS: 100 INJECTION, SOLUTION SUBCUTANEOUS at 23:14

## 2019-04-15 RX ADMIN — POTASSIUM & SODIUM PHOSPHATES POWDER PACK 280-160-250 MG 1 PACKET: 280-160-250 PACK at 08:47

## 2019-04-15 RX ADMIN — LABETALOL 20 MG/4 ML (5 MG/ML) INTRAVENOUS SYRINGE 20 MG: at 06:29

## 2019-04-15 RX ADMIN — POTASSIUM CHLORIDE 10 MEQ: 200 INJECTION, SOLUTION INTRAVENOUS at 10:41

## 2019-04-15 RX ADMIN — HYDRALAZINE HYDROCHLORIDE 100 MG: 25 TABLET ORAL at 15:18

## 2019-04-15 NOTE — INTERDISCIPLINARY ROUNDS
IDR/SLIDR Summary Patient: Theresa Mayes MRN: [id number]    Age: 79 y.o. YOB: 1950 Room/Bed: [address] [place] [address] Admit Diagnosis: Perforated diverticulum of large intestine [K57.20]  Principal Diagnosis: <principal problem not specified>  
Goals: wean Bipap, advance diet, ABX, sx Readmission: NO  Quality Measure: SCIP 
VTE Prophylaxis: Mechanical 
Influenza Vaccine screening completed? YES Pneumococcal Vaccine screening completed? NO Mobility needs: Yes   Nutrition plan:Yes 
Consults:P.T, Respiratory and Case Management Financial concerns:Yes  Escalated to CM? YES 
RRAT Score: 20   Interventions:H2H Testing due for pt today? YES 
LOS: 20 days Expected length of stay ? days Discharge plan: tbd   PCP: Carol Barth MD 
Transportation needs: Yes Days before discharge:two or more days before discharge Discharge disposition: Home and Rehab Signed:  
 
Ricky Lee RN 
4/15/2019 
12:46 AM

## 2019-04-15 NOTE — PROGRESS NOTES
Nurse requesting hold OT treatment this morning. Patient had to roll around multiple times with wound care and became anxious and SOB. She is now on bipap. Will follow up later for treatment.

## 2019-04-15 NOTE — PROGRESS NOTES
General Surgery Daily Progress Note Admit Date: 3/26/2019 Post-Operative Day: 7 Days Post-Op from Procedure(s): LAPAROTOMY EXPLORATORY/ SIGMOIDECTOMY/ COLSTOMY 
externalize Shunt Ventricular-Peritoneal  
 
Subjective:  
 
Last 24 hrs: Pt w/o complaints. Using PCA; Family getting ostomy teaching. Objective:  
 
Blood pressure 148/62, pulse 90, temperature 99.5 °F (37.5 °C), resp. rate 24, height 5' 8\" (1.727 m), weight 181 lb 7 oz (82.3 kg), SpO2 96 %. Temp (24hrs), Av.1 °F (37.3 °C), Min:98.4 °F (36.9 °C), Max:99.5 °F (37.5 °C) 
 
 
_____________________ Physical Exam:    
Alert and Oriented, x3, in no acute distress. Cardiovascular: RRR, no peripheral edema Lungs:CTAB anterioally Abdomen: soft, nl BS, colostomy empty now; just changed; stoma pink TAMIKO ss drainage Assessment:  
Active Problems: 
  Perforated diverticulum of large intestine (3/26/2019) Acute hypoxemic respiratory failure ([address] (2019) Pneumonia due to infectious organism (2019) Plan:  
 
Full liq diet per Dr Arreola Spare Cont abx ? Transfer out of CCU to neurosurgery unit 
tpn Cont PCA 
 shunt replacement this week per neurosurgery Data Review: 
 
Recent Labs 04/15/19 
6949 04/15/19 
0345 19 
3857 WBC 11.0 11.8* 11.2* HGB 6.8* 6.8* 7.3* HCT 22.2* 22.1* 23.0*  
 322 311 Recent Labs 04/15/19 
0345 19 
0215 19 
1818  19 
1500 * 135* 136   < >  --   
K 3.7 3.6 3.8   < >  --   
CL 99 100 101   < >  --   
CO2 30 30 30   < >  --   
* 166* 160*   < >  --   
BUN 9 10 9   < >  --   
CREA 0.33* 0.34* 0.34*   < >  --   
CA 8.5 8.0* 8.2*   < >  --   
MG  --   --  2.1  --  2.0 PHOS  --   --   --   --  2.9  
 < > = values in this interval not displayed. No results for input(s): AML, LPSE in the last 72 hours. ______________________ Medications: 
 
Current Facility-Administered Medications Medication Dose Route Frequency  sodium chloride (NS) flush  furosemide (LASIX) injection 40 mg  40 mg IntraVENous BID  potassium chloride 10 mEq in 50 ml IVPB  10 mEq IntraVENous Q1H  
 diazePAM (VALIUM) injection 5 mg  5 mg IntraVENous Q4H PRN  
 cefepime (MAXIPIME) 2 g in 0.9% sodium chloride (MBP/ADV) 100 mL  2 g IntraVENous Q8H  
 TPN ADULT - CENTRAL AA 5% D15% W/ ELECTROLYTES AND CA   IntraVENous CONTINUOUS  
 calcium carbonate (TUMS) chewable tablet 400 mg [elemental]  400 mg Oral TID WITH MEALS  
 albuterol-ipratropium (DUO-NEB) 2.5 MG-0.5 MG/3 ML  3 mL Nebulization QID RT  
 fat emulsion 20% (LIPOSYN, INTRAlipid) infusion 500 mL  500 mL IntraVENous Q MON, WED & FRI  potassium, sodium phosphates (NEUTRA-PHOS) packet 1 Packet  1 Packet Oral QID  diphenhydrAMINE (BENADRYL) injection 25 mg  25 mg IntraVENous QHS PRN  
 HYDROmorphone (PF) 25 mg/50 mL (DILAUDID) PCA   IntraVENous CONTINUOUS  
 bacitracin 500 unit/gram packet 1 Packet  1 Packet Topical PRN  
 metroNIDAZOLE (FLAGYL) IVPB premix 500 mg  500 mg IntraVENous Q8H  
 sodium chloride (NS) flush 5-40 mL  5-40 mL IntraVENous Q8H  
 sodium chloride (NS) flush 5-40 mL  5-40 mL IntraVENous PRN  
 glucose chewable tablet 16 g  4 Tab Oral PRN  
 dextrose (D50W) injection syrg 12.5-25 g  12.5-25 g IntraVENous PRN  
 glucagon (GLUCAGEN) injection 1 mg  1 mg IntraMUSCular PRN  
 insulin regular (NOVOLIN R, HUMULIN R) injection   SubCUTAneous Q6H  
 hydrALAZINE (APRESOLINE) tablet 100 mg  100 mg Oral TID  losartan (COZAAR) tablet 100 mg  100 mg Oral DAILY  fluconazole (DIFLUCAN) 400mg/200 mL IVPB (premix)  400 mg IntraVENous DAILY  labetalol (NORMODYNE;TRANDATE) 20 mg/4 mL (5 mg/mL) injection 20 mg  20 mg IntraVENous Q4H PRN  
 cloNIDine (CATAPRES) 0.2 mg/24 hr patch 1 Patch  1 Patch TransDERmal Q7D  
 hydrALAZINE (APRESOLINE) 20 mg/mL injection 10 mg  10 mg IntraVENous Q6H PRN  
 sodium chloride (NS) flush 5-40 mL  5-40 mL IntraVENous Q8H  
  sodium chloride (NS) flush 5-40 mL  5-40 mL IntraVENous PRN  
 ondansetron (ZOFRAN) injection 4 mg  4 mg IntraVENous Q4H PRN  pantoprazole (PROTONIX) 40 mg in sodium chloride 0.9% 10 mL injection  40 mg IntraVENous Q24H  
 albuterol (PROVENTIL VENTOLIN) nebulizer solution 2.5 mg  2.5 mg Nebulization Q6H PRN  
 sertraline (ZOLOFT) tablet 100 mg  100 mg Oral DAILY  traZODone (DESYREL) tablet 50 mg  50 mg Oral QHS PRN Fadi Kline NP 
4/15/2019 Patient independently interviewed and examined; agree with NP assessment and plan. Breathing better, stoma functioning; tolerated liquids without difficulty over weekend. Will remove drain and advance to GI Lite diet. Shunt repositioning tentatively scheduled for Wednesday. She will likely need inpatient rehab following discharge.

## 2019-04-15 NOTE — PROGRESS NOTES
Physical Therapy Note Nurse requesting hold PT treatment this morning. Patient had to roll around multiple times with wound care and became anxious and SOB. She is now on bipap. Will follow up as able. Thank you, 
Anastacio DALYT

## 2019-04-15 NOTE — PROGRESS NOTES
Neurosurgery Progress Note Date: 4/15/2019 Admit Date: 3/26/2019 LOS: 20 days Chief Complaint:  abscess Interval History: breathing better Subjective: No acute complaints Objective:  
 
Patient Vitals for the past 8 hrs: 
 BP Temp Pulse Resp SpO2  
04/15/19 1200 151/70 99.1 °F (37.3 °C) 94 18 93 % 04/15/19 1100 147/69  89 28 97 % 04/15/19 1000 148/62  90 24 96 % 04/15/19 0900 159/63  92 30 96 % 04/15/19 0800 163/77 99.5 °F (37.5 °C) 91 25 98 % 04/15/19 0700 (!) 157/93  89 (!) 31 97 % 04/15/19 0600 168/79  94 21 96 % Temp (24hrs), Av.2 °F (37.3 °C), Min:99 °F (37.2 °C), Max:99.5 °F (37.5 °C) 
 
 
04/15 07 - 04/15 1900 In: 36 [P.O.:120; I.V.:483] Out: 1958 [[id number]] Physical Exam:   
General : NAD Follows commands readily. Awake, Alert, oriented x3   Speech fluent. Recent and remote memory intact. PERRL EOMI, face symmetric, tongue and uvula midline, shoulder shrug nl. No pronator drift. Motor 5/5, Sensation intact. Reflexes intact. Labs:   
Recent Results (from the past 24 hour(s)) GLUCOSE, POC Collection Time: 19  5:41 PM  
Result Value Ref Range Glucose (POC) 196 (H) 65 - 100 mg/dL Performed by Jose Hobbs GLUCOSE, POC Collection Time: 04/15/19 12:33 AM  
Result Value Ref Range Glucose (POC) 209 (H) 65 - 100 mg/dL Performed by [facility] CBC WITH AUTOMATED DIFF Collection Time: 04/15/19  3:45 AM  
Result Value Ref Range WBC 11.8 (H) 3.6 - 11.0 K/uL  
 RBC 2.30 (L) 3.80 - 5.20 M/uL HGB 6.8 (L) 11.5 - 16.0 g/dL HCT 22.1 (L) 35.0 - 47.0 % MCV 96.1 80.0 - 99.0 FL  
 MCH 29.6 26.0 - 34.0 PG  
 MCHC 30.8 30.0 - 36.5 g/dL  
 RDW 14.6 (H) 11.5 - 14.5 % PLATELET 785 481 - 682 K/uL MPV 9.6 8.9 - 12.9 FL  
 NRBC 0.2 (H) 0  WBC ABSOLUTE NRBC 0.02 (H) 0.00 - 0.01 K/uL NEUTROPHILS 69 32 - 75 % LYMPHOCYTES 16 12 - 49 % MONOCYTES 10 5 - 13 % EOSINOPHILS 4 0 - 7 % BASOPHILS 0 0 - 1 % IMMATURE GRANULOCYTES 1 (H) 0.0 - 0.5 % ABS. NEUTROPHILS 8.1 (H) 1.8 - 8.0 K/UL  
 ABS. LYMPHOCYTES 1.9 0.8 - 3.5 K/UL  
 ABS. MONOCYTES 1.2 (H) 0.0 - 1.0 K/UL  
 ABS. EOSINOPHILS 0.5 (H) 0.0 - 0.4 K/UL  
 ABS. BASOPHILS 0.0 0.0 - 0.1 K/UL  
 ABS. IMM. GRANS. 0.1 (H) 0.00 - 0.04 K/UL  
 DF SMEAR SCANNED    
 PLATELET COMMENTS Large Platelets RBC COMMENTS MACROCYTOSIS 1+ 
    
 RBC COMMENTS ANISOCYTOSIS 1+ 
    
 RBC COMMENTS POLYCHROMASIA 1+ METABOLIC PANEL, BASIC Collection Time: 04/15/19  3:45 AM  
Result Value Ref Range Sodium 133 (L) 136 - 145 mmol/L Potassium 3.7 3.5 - 5.1 mmol/L Chloride 99 97 - 108 mmol/L  
 CO2 30 21 - 32 mmol/L Anion gap 4 (L) 5 - 15 mmol/L Glucose 149 (H) 65 - 100 mg/dL BUN 9 6 - 20 MG/DL Creatinine 0.33 (L) 0.55 - 1.02 MG/DL  
 BUN/Creatinine ratio 27 (H) 12 - 20 GFR est AA >60 >60 ml/min/1.73m2 GFR est non-AA >60 >60 ml/min/1.73m2 Calcium 8.5 8.5 - 10.1 MG/DL MAGNESIUM Collection Time: 04/15/19  3:45 AM  
Result Value Ref Range Magnesium 2.2 1.6 - 2.4 mg/dL CBC WITH AUTOMATED DIFF Collection Time: 04/15/19  5:13 AM  
Result Value Ref Range WBC 11.0 3.6 - 11.0 K/uL  
 RBC 2.31 (L) 3.80 - 5.20 M/uL HGB 6.8 (L) 11.5 - 16.0 g/dL HCT 22.2 (L) 35.0 - 47.0 % MCV 96.1 80.0 - 99.0 FL  
 MCH 29.4 26.0 - 34.0 PG  
 MCHC 30.6 30.0 - 36.5 g/dL  
 RDW 14.5 11.5 - 14.5 % PLATELET 680 757 - 879 K/uL MPV 9.5 8.9 - 12.9 FL  
 NRBC 0.3 (H) 0  WBC ABSOLUTE NRBC 0.03 (H) 0.00 - 0.01 K/uL NEUTROPHILS 68 32 - 75 % LYMPHOCYTES 17 12 - 49 % MONOCYTES 10 5 - 13 % EOSINOPHILS 4 0 - 7 % BASOPHILS 0 0 - 1 % IMMATURE GRANULOCYTES 1 (H) 0.0 - 0.5 % ABS. NEUTROPHILS 7.5 1.8 - 8.0 K/UL  
 ABS. LYMPHOCYTES 1.9 0.8 - 3.5 K/UL  
 ABS. MONOCYTES 1.1 (H) 0.0 - 1.0 K/UL  
 ABS. EOSINOPHILS 0.4 0.0 - 0.4 K/UL  
 ABS. BASOPHILS 0.0 0.0 - 0.1 K/UL  
 ABS. IMM. GRANS. 0.1 (H) 0.00 - 0.04 K/UL DF SMEAR SCANNED    
 PLATELET COMMENTS Large Platelets RBC COMMENTS MACROCYTOSIS 1+ 
    
 RBC COMMENTS ANISOCYTOSIS 1+ 
    
 RBC COMMENTS POLYCHROMASIA 1+ GLUCOSE, POC Collection Time: 04/15/19  6:21 AM  
Result Value Ref Range Glucose (POC) 143 (H) 65 - 100 mg/dL Performed by Kady Trevizo GLUCOSE, POC Collection Time: 04/15/19 11:47 AM  
Result Value Ref Range Glucose (POC) 154 (H) 65 - 100 mg/dL Performed by Paul Marking Assessment:  
 
Active Problems: 
  Perforated diverticulum of large intestine (3/26/2019) Acute hypoxemic respiratory failure ([address] (4/12/2019) Pneumonia due to infectious organism (4/12/2019) Plan: Will plan tentatively for surgery Wednesday if breathing improved, no fevers, etc. 
Will coordinate with Dr. Lombardi Cos Continue to watch closely Malaika Cool MD 
25 minutes were spent with the patient, over half of which was face to face  counseling and coordination of care, discussing with nursing, obtaining history, examining patient and discussing treatment plans.

## 2019-04-15 NOTE — PROGRESS NOTES
Bedside and Verbal shift change report given to Willie Edmond 88 by Mary Carmen Schultz RN (offgoing nurse). Report included the following information SBAR, Kardex, MAR, Recent Results and Cardiac Rhythm SR/ST. 0900: Family at bedside 
 
1100: Pt with wound care RN 
 
1202: Pt c/o of shortness of breath after turning Called resp tx for treatment. Placed back on bipap and gave valuim as requested by patient. 1230: Pt requesting to remove bipap. Wanted to eat 
 
1300: Tolerating diet 1800: TRANSFER - OUT REPORT: 
 
Verbal report given to Julita Zuñiga 71 on Lizzie Farah  being transferred to ICU 1 for routine progression of care Report consisted of patients Situation, Background, Assessment and  
Recommendations(SBAR). Information from the following report(s) SBAR, Kardex, OR Summary, Procedure Summary, MAR, Recent Results and Cardiac Rhythm SR was reviewed with the receiving nurse. Lines: PICC Double Lumen 11/50/45 Right;Basilic (Active) Central Line Being Utilized Yes 4/15/2019  4:00 PM  
Criteria for Appropriate Use Hemodynamically unstable, requiring monitoring lines, vasopressors, or volume resuscitation 4/15/2019  4:00 PM  
Site Assessment Clean, dry, & intact 4/15/2019  4:00 PM  
Phlebitis Assessment 0 4/15/2019  4:00 PM  
Infiltration Assessment 0 4/15/2019  4:00 PM  
Arm Circumference (cm) 32 cm 4/8/2019 10:26 AM  
Date of Last Dressing Change 04/15/19 4/15/2019  4:00 PM  
Dressing Status Clean, dry, & intact 4/15/2019  4:00 PM  
Action Taken Open ports on tubing capped 4/15/2019  4:00 PM  
External Catheter Length (cm) 0 centimeters 4/12/2019  8:00 PM  
Dressing Type Disk with Chlorhexadine gluconate (CHG); Transparent 4/15/2019  4:00 PM  
Hub Color/Line Status Purple 4/15/2019  4:00 PM  
Positive Blood Return (Site #1) Yes 4/15/2019  4:00 PM  
Hub Color/Line Status Red 4/15/2019  4:00 PM  
Positive Blood Return (Site #2) Yes 4/15/2019  8:00 AM  
Alcohol Cap Used Yes 4/15/2019  4:00 AM  
   
 Peripheral IV 04/12/19 Anterior;Right Hand (Active) Site Assessment Clean, dry, & intact 4/15/2019  4:00 PM  
Phlebitis Assessment 0 4/15/2019  4:00 PM  
Infiltration Assessment 0 4/15/2019  4:00 PM  
Dressing Status Clean, dry, & intact 4/15/2019  4:00 PM  
Dressing Type Disk with Chlorhexadine gluconate (CHG); Transparent 4/15/2019  4:00 PM  
Hub Color/Line Status Pink 4/15/2019  4:00 PM  
Action Taken Open ports on tubing capped 4/15/2019  4:00 AM  
Alcohol Cap Used Yes 4/15/2019  4:00 PM  
  
 
Opportunity for questions and clarification was provided. Patient transported with: 
 Monitor O2 @ 3 liters Registered Nurse Tech

## 2019-04-15 NOTE — WOUND CARE
WOCN Ostomy Progress Note:  
  
Postoperative visit in CCU. Surgery: Laparotomy with sigmoidectomy and end colostomy  
Date of Surgery: 4.8. 19      Surgeon: Dr. Sabi Kidd Type of Ostomy: End Colostomy  
Stoma Location: [place] Daughters Amy Pavon and Rosey was at the bedside. Bed:  Sport. If patient is transferred out of CCU then order an Envision on Versacare bed. Offloaded heels with pillows. Sacrum and buttocks intact with blanching pink. Protect skin with Aloe Vesta. Turn team to assist with q2 turns. 
  
Ostomy Assessment: 
Stoma appearance: pink, moist, edematous and flushed. Peristomal skin: intact without irritation Abdomen: midline incision with staples and a scant about of serosanguinous drainage near umbilicus; TAMIKO drain to right upper quadrant with serosanguinous drainage. Output: liquid brown stool with flatus. Treatments: 
Pouch removed, stoma and peristomal skin cleaned and assessed, new pouch prepared and applied with eakins ring. Pouch changed by January Vu. Applied 2 piece 2 1/4 pouch flat pouch with eakins ring cut just a little larger than 1 1/4 brittany. Supplies left at the bedside. 
 
  
Teaching/Subjects covered today: 
Reviewed with daughter. 
  
-How to clean stoma & peristomal skin 
-How to empty pouch 
-How to change appliance 
  
Recommendations: 1. Mepilex transfer under bipap mask when in use. 2.  Bed:  Sport. If patient is transferred out of CCU then order an Envision on Versacare bed. 3.  Prevalon boots or offload with pillows. 4.  Sacrum and buttocks:  Twice daily and as needed apply Aloe Linwood. 5.  Turn team 
  
1. Empty appliance when 1/3 full and PRN. Encourage patient/family to notify nurse and 
assist w/ pouch emptying to promote self-care. Change appliance twice weekly and PRN for leaking ASAP. DO NOT REINFORCE LEAKS to avoid skin breakdown. 
  
Transition of Care: 
Ostomy nurse to return and reinforce teaching Tuesday. 
  
Chidi Grissom BSN RN [facility] Wound Care Office [phone] Pager [phone]

## 2019-04-15 NOTE — PROGRESS NOTES
CM discussed transition of care with Dr. Adam Mays, he said patient needed to return to OR for a shunt placement. However, he plans to continue TPN. CM also met with patient and her family today, patient alert and oriented x 4. CM discussed Inpatient Rehab recommended by PT/OT, patient had been to [facility] in the past, she preferred to go back to [facility]. CM awaiting updated therapy notes to send referral. Of note, patient has [employer], CM will fax updated therapy notes and other required paperwork to [facility] when available. Edwin Blank RN,CRM.

## 2019-04-15 NOTE — PROGRESS NOTES
1800: TRANSFER - IN REPORT: 
 
Verbal report received from Sherren Pickles, RN(name) on Diana Padmaja  being received from CCU(unit) for routine progression of care Report consisted of patients Situation, Background, Assessment and  
Recommendations(SBAR). Information from the following report(s) SBAR, Kardex, Intake/Output, MAR, Recent Results, Cardiac Rhythm SR/ST and Alarm Parameters  was reviewed with the receiving nurse. Opportunity for questions and clarification was provided. Assessment completed upon patients arrival to unit and care assumed. 1930: Bedside and Verbal shift change report given to [place]., RN (oncoming nurse) by [facility], RN (offgoing nurse). Report included the following information SBAR, Kardex, Intake/Output, MAR, Recent Results, Cardiac Rhythm SR/ST and Alarm Parameters .

## 2019-04-15 NOTE — PROGRESS NOTES
[facility] Pulmonary, Critical Care, and Sleep Medicine Name: Gael Bojorquez MRN: [id number] : 1950 Hospital: [place] [address] Date: 4/15/2019 Subjective: 
 
Alert denies CP. C/o stable abd pain. Former smoker and kknown COPD on spiriva. Pertinent items are noted in HPI. Objective: 
 
 
Exam 
Vital Signs: 
Visit Vitals /63 Pulse 92 Temp 99.5 °F (37.5 °C) Resp 30 Ht 5' 8\" (1.727 m) Wt 82.3 kg (181 lb 7 oz) SpO2 96% BMI 27.59 kg/m² Temp (24hrs), Av.1 °F (37.3 °C), Min:98.4 °F (36.9 °C), Max:99.5 °F (37.5 °C) Intake/Output Summary (Last 24 hours) at 4/15/2019 1028 Last data filed at 4/15/2019 3345 Gross per 24 hour Intake 2497 ml Output 2713 ml Net -216 ml GENERAL: well developed and in no distress, HEENT:  PERRL, EOMI, no alar flaring or epistaxis, oral mucosa moist without cyanosis, NECK:  no jugular vein distention, no retractions, no thyromegaly or masses, LUNGS: decreased breath sounds billaterally and with rhonchi , HEART:  Regular rate and rhythm with no MGR; no edema is present, EXTREMITIES:  warm with no cyanosis and SKIN:  no jaundice or ecchymosis  
abd- drain and mild tenderness LQ no r/g/r Labs: 
Recent Labs 04/15/19 
1233 04/15/19 
0345 19 
8138 WBC 11.0 11.8* 11.2* HGB 6.8* 6.8* 7.3* HCT 22.2* 22.1* 23.0*  
 322 311 Recent Labs 04/15/19 
0345 19 
0215 19 
1818  19 
1500 * 135* 136   < >  --   
K 3.7 3.6 3.8   < >  --   
CL 99 100 101   < >  --   
CO2 30 30 30   < >  --   
* 166* 160*   < >  --   
BUN 9 10 9   < >  --   
CREA 0.33* 0.34* 0.34*   < >  --   
CA 8.5 8.0* 8.2*   < >  --   
MG  --   --  2.1  --  2.0 PHOS  --   --   --   --  2.9  
 < > = values in this interval not displayed. No results for input(s): PHI, PO2I, PCO2I in the last 72 hours. Impression     Plan 1. 4/8/19 S/p xlap with sigmoid colectomy and end colostomy for perforated diverticulitis and fecal contamination. Pelvic abscess with drain in place. 2. 4/8/19 S/p externalization  shunt due to above- relocated to pleural cavity 3. Abdominal sepsis 4. Acute resp failure- RUL PNA and asymmetric pulm edema. Baseline COPD on spiriva 5. ECHO 4/19 EF 65% RV nml 6. HTN · Bedside todd today · Keep off bipap · Nebs sched · Continue current abx · Would increase diurese ( + 9 liters) · Pain control · IS/OOB Pt is acutely ill and at risk for decline due to sepsis/resp failure · Dottie Shane MD 
4/15/2019

## 2019-04-15 NOTE — PROGRESS NOTES
Neurosurgery Pt transferred to CCU on Friday for BIPAP due to hypoxia and pneumonia. She has improved. Tmax 99.5. WBC 11 (trending down from 11.8) CSF remains clear.  shunt is draining about 10 cc/hr of CSF. Original plan was to replace her shunt in the pleural lining of the lungs today. Due to the events on Friday, we are holding off until later this week, potentially Wednesday if proper arrangements can be made with all teams. Pt can be transferred back to [facility]U from our standpoint. Amarilys Hoover for pt to eat today from NSGY standpoint. Plan discussed with Dr. Dona Graf, daughter, CCU nurse.  
 
Sulma Bush NP

## 2019-04-15 NOTE — PROGRESS NOTES
Problem: Self Care Deficits Care Plan (Adult) Goal: *Acute Goals and Plan of Care (Insert Text) Description Occupational Therapy Goals Initiated 4/10/2019, re-evaluation s/p rapid response 4/12/2019 - all goals adjusted to reflect decline 1. Patient will perform grooming sitting unsupported EOB with minimal assistance within 7 day(s). 2.  Patient will perform upper body dressing/bathing sitting unsupported EOB with minimal assistance within 7 day(s). 3.  Patient will perform toilet transfers to/from [facility] with moderate assistance within 7 day(s). 4.  Patient will perform all aspects of toileting with moderate assistance within 7 day(s). 5.  Patient will participate in upper extremity therapeutic exercise/activities with minimal assistance for 10 minutes within 7 day(s). Initiated 4/10/2019 1. Patient will perform standing ADLs at sink with least restrictive DME with supervision/set-up within 7 day(s). 2.  Patient will perform lower body dressing with minimal assistance/contact guard assist using AE PRN within 7 day(s). 3.  Patient will perform bathing with minimal assistance/contact guard assist within 7 day(s). 4.  Patient will perform toilet transfers with supervision/set-up within 7 day(s). 5.  Patient will perform all aspects of toileting with minimal assistance/contact guard assist within 7 day(s). 6.  Patient will participate in upper extremity therapeutic exercise/activities with supervision/set-up for 10 minutes within 7 day(s). Outcome: Progressing Towards Goal 
 
OCCUPATIONAL THERAPY TREATMENT Patient: Jessica Agrawal (26 y.o. female) Date: 4/15/2019 Diagnosis: Perforated diverticulum of large intestine [K57.20] <principal problem not specified> Procedure(s) (LRB): 
LAPAROTOMY EXPLORATORY/ SIGMOIDECTOMY/ COLSTOMY (N/A) externalize Shunt Ventricular-Peritoneal (Right) 7 Days Post-Op Precautions: Fall(external  shunt) Chart, occupational therapy assessment, plan of care, and goals were reviewed. ASSESSMENT: Patient cleared for treatment. Earlier today was on bipap due to shortness of breath and anxiousness after rolling for wound care assessment. Did not feel up to attempting getting to EOB. Agreed to participate in BUE exercise. Pressure 165/84 before starting and 175/87 afterward. Nursing aware. Will need continued therapy due to gross deconditioning. Patient and family report they hope she will go to IR[facility] at [facility]. She will be having additional procedure this week to internalize external brain shunt. Currently has limited tolerance for activity, but will work toward progressing patient. Progression toward goals: 
?       Improving appropriately and progressing toward goals ? Improving slowly and progressing toward goals ? Not making progress toward goals and plan of care will be adjusted PLAN: 
Patient continues to benefit from skilled intervention to address the above impairments. Continue treatment per established plan of care. Discharge Recommendations:  Rehab (Level of rehab to be determined based on tolerance to activity and medical needs) Further Equipment Recommendations for Discharge:  to be determined SUBJECTIVE:  
Patient stated ? I don't really feel like it but will try.? OBJECTIVE DATA SUMMARY:  
Cognitive/Behavioral Status: 
Neurologic State: Alert Orientation Level: Oriented to person;Oriented to place;Oriented to situation Cognition: Follows commands ADL Intervention: 
 
Grooming Washing Face: Supervision/set-up Therapeutic Exercises:  
Performed sustained Shoulder flexion holding position 30 seconds and releasing and repeating for ten reps. Pain: 
Pain Scale 1: Numeric (0 - 10) Pain Intensity 1: 0 Activity Tolerance: 
Poor. BP remains elevated but HR and oxygen wihin normal range Please refer to the flowsheet for vital signs taken during this treatment. After treatment:  
? Patient left in no apparent distress sitting up in chair ? Patient left in no apparent distress in bed 
? Call bell left within reach ? Nursing notified ? Caregiver present ? Bed alarm activated COMMUNICATION/COLLABORATION:  
The patient?s plan of care was discussed with: Physical Therapist and Registered Nurse Deng Gray Time Calculation: 18 mins

## 2019-04-16 ENCOUNTER — ANESTHESIA EVENT (OUTPATIENT)
Dept: SURGERY | Age: 69
DRG: 329 | End: 2019-04-16
Payer: MEDICARE

## 2019-04-16 LAB
ANION GAP SERPL CALC-SCNC: 5 MMOL/L (ref 5–15)
BASOPHILS # BLD: 0 K/UL (ref 0–0.1)
BASOPHILS NFR BLD: 0 % (ref 0–1)
BUN SERPL-MCNC: 11 MG/DL (ref 6–20)
BUN/CREAT SERPL: 26 (ref 12–20)
CALCIUM SERPL-MCNC: 8.3 MG/DL (ref 8.5–10.1)
CHLORIDE SERPL-SCNC: 95 MMOL/L (ref 97–108)
CO2 SERPL-SCNC: 32 MMOL/L (ref 21–32)
CREAT SERPL-MCNC: 0.43 MG/DL (ref 0.55–1.02)
DIFFERENTIAL METHOD BLD: ABNORMAL
EOSINOPHIL # BLD: 0.6 K/UL (ref 0–0.4)
EOSINOPHIL NFR BLD: 4 % (ref 0–7)
ERYTHROCYTE [DISTWIDTH] IN BLOOD BY AUTOMATED COUNT: 14.3 % (ref 11.5–14.5)
GLUCOSE BLD STRIP.AUTO-MCNC: 210 MG/DL (ref 65–100)
GLUCOSE BLD STRIP.AUTO-MCNC: 226 MG/DL (ref 65–100)
GLUCOSE BLD STRIP.AUTO-MCNC: 238 MG/DL (ref 65–100)
GLUCOSE BLD STRIP.AUTO-MCNC: 98 MG/DL (ref 65–100)
GLUCOSE SERPL-MCNC: 190 MG/DL (ref 65–100)
HCT VFR BLD AUTO: 24.5 % (ref 35–47)
HGB BLD-MCNC: 7.7 G/DL (ref 11.5–16)
IMM GRANULOCYTES # BLD AUTO: 0.2 K/UL (ref 0–0.04)
IMM GRANULOCYTES NFR BLD AUTO: 1 % (ref 0–0.5)
LYMPHOCYTES # BLD: 2 K/UL (ref 0.8–3.5)
LYMPHOCYTES NFR BLD: 15 % (ref 12–49)
MCH RBC QN AUTO: 30.7 PG (ref 26–34)
MCHC RBC AUTO-ENTMCNC: 31.4 G/DL (ref 30–36.5)
MCV RBC AUTO: 97.6 FL (ref 80–99)
MONOCYTES # BLD: 1.2 K/UL (ref 0–1)
MONOCYTES NFR BLD: 8 % (ref 5–13)
NEUTS SEG # BLD: 10 K/UL (ref 1.8–8)
NEUTS SEG NFR BLD: 72 % (ref 32–75)
NRBC # BLD: 0 K/UL (ref 0–0.01)
NRBC BLD-RTO: 0 PER 100 WBC
PLATELET # BLD AUTO: 384 K/UL (ref 150–400)
PMV BLD AUTO: 9.6 FL (ref 8.9–12.9)
POTASSIUM SERPL-SCNC: 3.7 MMOL/L (ref 3.5–5.1)
RBC # BLD AUTO: 2.51 M/UL (ref 3.8–5.2)
SERVICE CMNT-IMP: ABNORMAL
SERVICE CMNT-IMP: NORMAL
SODIUM SERPL-SCNC: 132 MMOL/L (ref 136–145)
WBC # BLD AUTO: 14 K/UL (ref 3.6–11)

## 2019-04-16 PROCEDURE — 80048 BASIC METABOLIC PNL TOTAL CA: CPT

## 2019-04-16 PROCEDURE — 74011250636 HC RX REV CODE- 250/636: Performed by: INTERNAL MEDICINE

## 2019-04-16 PROCEDURE — 74011250637 HC RX REV CODE- 250/637: Performed by: SURGERY

## 2019-04-16 PROCEDURE — 74011636637 HC RX REV CODE- 636/637: Performed by: SURGERY

## 2019-04-16 PROCEDURE — 74011000250 HC RX REV CODE- 250: Performed by: SURGERY

## 2019-04-16 PROCEDURE — 85025 COMPLETE CBC W/AUTO DIFF WBC: CPT

## 2019-04-16 PROCEDURE — C9113 INJ PANTOPRAZOLE SODIUM, VIA: HCPCS | Performed by: SURGERY

## 2019-04-16 PROCEDURE — 74011250636 HC RX REV CODE- 250/636: Performed by: SURGERY

## 2019-04-16 PROCEDURE — 36415 COLL VENOUS BLD VENIPUNCTURE: CPT

## 2019-04-16 PROCEDURE — 97530 THERAPEUTIC ACTIVITIES: CPT

## 2019-04-16 PROCEDURE — 74011000258 HC RX REV CODE- 258: Performed by: INTERNAL MEDICINE

## 2019-04-16 PROCEDURE — 82962 GLUCOSE BLOOD TEST: CPT

## 2019-04-16 PROCEDURE — 94640 AIRWAY INHALATION TREATMENT: CPT

## 2019-04-16 PROCEDURE — 65610000006 HC RM INTENSIVE CARE

## 2019-04-16 PROCEDURE — 74011000250 HC RX REV CODE- 250: Performed by: INTERNAL MEDICINE

## 2019-04-16 RX ORDER — HYDRALAZINE HYDROCHLORIDE 20 MG/ML
10 INJECTION INTRAMUSCULAR; INTRAVENOUS
Status: DISCONTINUED | OUTPATIENT
Start: 2019-04-16 | End: 2019-04-26 | Stop reason: HOSPADM

## 2019-04-16 RX ORDER — LABETALOL HCL 20 MG/4 ML
20 SYRINGE (ML) INTRAVENOUS
Status: DISCONTINUED | OUTPATIENT
Start: 2019-04-16 | End: 2019-04-26 | Stop reason: HOSPADM

## 2019-04-16 RX ORDER — ACETAMINOPHEN 325 MG/1
650 TABLET ORAL
Status: DISCONTINUED | OUTPATIENT
Start: 2019-04-16 | End: 2019-04-26 | Stop reason: HOSPADM

## 2019-04-16 RX ORDER — HYDROMORPHONE HYDROCHLORIDE 2 MG/1
2 TABLET ORAL
Status: DISCONTINUED | OUTPATIENT
Start: 2019-04-16 | End: 2019-04-26 | Stop reason: HOSPADM

## 2019-04-16 RX ADMIN — POTASSIUM & SODIUM PHOSPHATES POWDER PACK 280-160-250 MG 1 PACKET: 280-160-250 PACK at 09:24

## 2019-04-16 RX ADMIN — CEFEPIME HYDROCHLORIDE 2 G: 2 INJECTION, POWDER, FOR SOLUTION INTRAVENOUS at 08:30

## 2019-04-16 RX ADMIN — HYDROMORPHONE HYDROCHLORIDE 2 MG: 4 TABLET ORAL at 23:37

## 2019-04-16 RX ADMIN — CALCIUM CARBONATE (ANTACID) CHEW TAB 500 MG 400 MG: 500 CHEW TAB at 09:23

## 2019-04-16 RX ADMIN — POTASSIUM & SODIUM PHOSPHATES POWDER PACK 280-160-250 MG 1 PACKET: 280-160-250 PACK at 12:18

## 2019-04-16 RX ADMIN — METRONIDAZOLE 500 MG: 500 INJECTION, SOLUTION INTRAVENOUS at 06:50

## 2019-04-16 RX ADMIN — HUMAN INSULIN 3 UNITS: 100 INJECTION, SOLUTION SUBCUTANEOUS at 17:25

## 2019-04-16 RX ADMIN — HYDROMORPHONE HYDROCHLORIDE 2 MG: 4 TABLET ORAL at 13:12

## 2019-04-16 RX ADMIN — FUROSEMIDE 40 MG: 10 INJECTION, SOLUTION INTRAMUSCULAR; INTRAVENOUS at 17:20

## 2019-04-16 RX ADMIN — Medication 10 ML: at 06:51

## 2019-04-16 RX ADMIN — Medication 5 MG: at 04:52

## 2019-04-16 RX ADMIN — HYDROMORPHONE HYDROCHLORIDE 2 MG: 4 TABLET ORAL at 18:57

## 2019-04-16 RX ADMIN — METRONIDAZOLE 500 MG: 500 INJECTION, SOLUTION INTRAVENOUS at 14:22

## 2019-04-16 RX ADMIN — TRAZODONE HYDROCHLORIDE 50 MG: 50 TABLET ORAL at 21:10

## 2019-04-16 RX ADMIN — HYDROMORPHONE HYDROCHLORIDE 2 MG: 4 TABLET ORAL at 09:31

## 2019-04-16 RX ADMIN — Medication 10 ML: at 14:00

## 2019-04-16 RX ADMIN — IPRATROPIUM BROMIDE AND ALBUTEROL SULFATE 3 ML: .5; 3 SOLUTION RESPIRATORY (INHALATION) at 15:25

## 2019-04-16 RX ADMIN — IPRATROPIUM BROMIDE AND ALBUTEROL SULFATE 3 ML: .5; 3 SOLUTION RESPIRATORY (INHALATION) at 19:09

## 2019-04-16 RX ADMIN — LOSARTAN POTASSIUM 100 MG: 50 TABLET ORAL at 09:24

## 2019-04-16 RX ADMIN — PANTOPRAZOLE SODIUM 40 MG: 40 INJECTION, POWDER, FOR SOLUTION INTRAVENOUS at 17:15

## 2019-04-16 RX ADMIN — CALCIUM CARBONATE (ANTACID) CHEW TAB 500 MG 400 MG: 500 CHEW TAB at 17:10

## 2019-04-16 RX ADMIN — IPRATROPIUM BROMIDE AND ALBUTEROL SULFATE 3 ML: .5; 3 SOLUTION RESPIRATORY (INHALATION) at 07:14

## 2019-04-16 RX ADMIN — SERTRALINE 100 MG: 50 TABLET, FILM COATED ORAL at 09:24

## 2019-04-16 RX ADMIN — HYDRALAZINE HYDROCHLORIDE 100 MG: 25 TABLET ORAL at 21:10

## 2019-04-16 RX ADMIN — HUMAN INSULIN 3 UNITS: 100 INJECTION, SOLUTION SUBCUTANEOUS at 12:23

## 2019-04-16 RX ADMIN — LABETALOL 20 MG/4 ML (5 MG/ML) INTRAVENOUS SYRINGE 20 MG: at 10:35

## 2019-04-16 RX ADMIN — Medication 10 ML: at 21:11

## 2019-04-16 RX ADMIN — HUMAN INSULIN 3 UNITS: 100 INJECTION, SOLUTION SUBCUTANEOUS at 07:15

## 2019-04-16 RX ADMIN — METRONIDAZOLE 500 MG: 500 INJECTION, SOLUTION INTRAVENOUS at 21:10

## 2019-04-16 RX ADMIN — FLUCONAZOLE 400 MG: 400 INJECTION, SOLUTION INTRAVENOUS at 11:08

## 2019-04-16 RX ADMIN — LABETALOL 20 MG/4 ML (5 MG/ML) INTRAVENOUS SYRINGE 20 MG: at 14:21

## 2019-04-16 RX ADMIN — POTASSIUM & SODIUM PHOSPHATES POWDER PACK 280-160-250 MG 1 PACKET: 280-160-250 PACK at 21:11

## 2019-04-16 RX ADMIN — CALCIUM CARBONATE (ANTACID) CHEW TAB 500 MG 400 MG: 500 CHEW TAB at 12:18

## 2019-04-16 RX ADMIN — POTASSIUM & SODIUM PHOSPHATES POWDER PACK 280-160-250 MG 1 PACKET: 280-160-250 PACK at 17:10

## 2019-04-16 RX ADMIN — FUROSEMIDE 40 MG: 10 INJECTION, SOLUTION INTRAMUSCULAR; INTRAVENOUS at 08:31

## 2019-04-16 RX ADMIN — CEFEPIME HYDROCHLORIDE 2 G: 2 INJECTION, POWDER, FOR SOLUTION INTRAVENOUS at 17:13

## 2019-04-16 RX ADMIN — IPRATROPIUM BROMIDE AND ALBUTEROL SULFATE 3 ML: .5; 3 SOLUTION RESPIRATORY (INHALATION) at 11:32

## 2019-04-16 RX ADMIN — HYDRALAZINE HYDROCHLORIDE 100 MG: 25 TABLET ORAL at 09:24

## 2019-04-16 RX ADMIN — ALBUTEROL SULFATE 2.5 MG: 2.5 SOLUTION RESPIRATORY (INHALATION) at 00:20

## 2019-04-16 RX ADMIN — HYDRALAZINE HYDROCHLORIDE 100 MG: 25 TABLET ORAL at 17:10

## 2019-04-16 RX ADMIN — CEFEPIME HYDROCHLORIDE 2 G: 2 INJECTION, POWDER, FOR SOLUTION INTRAVENOUS at 00:08

## 2019-04-16 NOTE — PROGRESS NOTES
[facility] [place] Name: Charmain Lanes MRN: [id number] : 1950 Hospital[facility] Date: 2019 Subjective: 
 
:Cough but no sputum. denies CP. C/o stable abd pain. Former smoker and known COPD on spiriva. Pertinent items are noted in HPI. Nonda Fake reviewed - too much coughing - not accurate (22%) Objective: 
 
 
Exam 
Vital Signs: 
Visit Vitals /83 Pulse 85 Temp 98.7 °F (37.1 °C) Resp (!) 31 Ht 5' 8\" (1.727 m) Wt 88 kg (194 lb 0.1 oz) SpO2 97% BMI 29.50 kg/m² Temp (24hrs), Av.8 °F (37.1 °C), Min:98.6 °F (37 °C), Max:98.9 °F (37.2 °C) Intake/Output Summary (Last 24 hours) at 2019 1555 Last data filed at 2019 1500 Gross per 24 hour Intake 6216.92 ml Output 6511 ml Net -294.08 ml GENERAL: well developed and in no distress, HEENT:  PERRL, EOMI, no alar flaring or epistaxis, oral mucosa moist without cyanosis, NECK:  no jugular vein distention, no retractions, no thyromegaly or masses, LUNGS: decreased breath sounds billaterally and with rhonchi , HEART:  Regular rate and rhythm with no MGR; no edema is present, EXTREMITIES:  warm with no cyanosis and SKIN:  no jaundice or ecchymosis  
abd- drain and mild tenderness LQ no r/g/r Labs: 
Recent Labs 19 
0335 04/15/19 
1749 04/15/19 
0345 WBC 14.0* 11.0 11.8* HGB 7.7* 6.8* 6.8* HCT 24.5* 22.2* 22.1*  
 318 322 Recent Labs 19 
0335 04/15/19 
0345 19 
0215 19 
1818 * 133* 135* 136  
K 3.7 3.7 3.6 3.8 CL 95* 99 100 101 CO2 32 30 30 30 * 149* 166* 160* BUN 11 9 10 9 CREA 0.43* 0.33* 0.34* 0.34* CA 8.3* 8.5 8.0* 8.2* MG  --  2.2  --  2.1 No results for input(s): PHI, PO2I, PCO2I in the last 72 hours. Impression     Plan 1. 19 S/p xlap with sigmoid colectomy and end colostomy for perforated diverticulitis and fecal contamination. Pelvic abscess with drain in place. 2. 4/8/19 S/p externalization  shunt due to above- relocated to pleural cavity 3. Abdominal sepsis 4. Acute resp failure- RUL PNA and asymmetric pulm edema. Baseline COPD on spiriva 5. ECHO 4/19 EF 65% RV nml 6. HTN · Keep off bipap · Nebs sched · Continue current abx · Diurese. · Pain control · IS/OOB Pt is acutely ill and at risk for decline due to sepsis/resp failure Ashley Groves MD 
4/16/2019

## 2019-04-16 NOTE — PROGRESS NOTES
Problem: Mobility Impaired (Adult and Pediatric) Goal: *Acute Goals and Plan of Care (Insert Text) Description Physical Therapy Goals Goals re-evaluated 4/12/2019 1. Patient will move from supine to sit and sit to supine , scoot up and down and roll side to side in bed with moderate assistance within 7 day(s). 2.  Patient will transfer from bed to chair and chair to bed with moderate assistance using the least restrictive device within 7 day(s). 3.  Patient will perform sit to stand with moderate assist within 7 day(s). 4.  Patient will ambulate with moderate assist for 25 feet with the least restrictive device within 7 day(s). 5.  Patient will tolerate unsupported sitting for 10 minutes without assist within 7 days. Initiated 4/10/2019 1. Patient will move from supine to sit and sit to supine , scoot up and down and roll side to side in bed with modified independence within 7 day(s). 2.  Patient will transfer from bed to chair and chair to bed with minimal assistance/contact guard assist using the least restrictive device within 7 day(s). 3.  Patient will perform sit to stand with minimal assistance/contact guard assist within 7 day(s). 4.  Patient will ambulate with minimal assistance/contact guard assist for 25 feet with the least restrictive device within 7 day(s). Outcome: Progressing Towards Goal 
PHYSICAL THERAPY TREATMENT Patient: Malena Hernandez (47 y.o. female) Date: 4/16/2019 Diagnosis: Perforated diverticulum of large intestine [K57.20] <principal problem not specified> Procedure(s) (LRB): 
LAPAROTOMY EXPLORATORY/ SIGMOIDECTOMY/ COLSTOMY (N/A) externalize Shunt Ventricular-Peritoneal (Right) 8 Days Post-Op Precautions: Fall(external  shunt) Chart, physical therapy assessment, plan of care and goals were reviewed.  
 
ASSESSMENT: 
Patient received lying in bed on 3L O2, agreeable to mobilize with therapy. Patient required min to modA with mobility. Tolerated transfer from bed to chair with assist of 1 for physical assistance and second person for line management. Patients HR and O2 remained stable, BP elevated throughout session (RN aware). Patient complaining of abdominal discomfort during mobility, worse sitting EOB. Patient more confused this session, repeating the same questions and disoriented to time. Session ended with patient sitting up in a chair with all needs met, family present, and nursing updated. The following are barriers to independence while in acute care:  
-Cognitive and/or behavioral: orientation, processing, initiation, sequencing, safety awareness, insight into deficits and short term memory loss 
-Medical condition: strength, functional endurance, standing balance and pain tolerance   
-Other:    
 
Prior level of function: At baseline, patient lives with daughter, is independent with ADLs, and ambulates without an AD. Patient has history of short term memory loss and does not drive PLAN: 
Patient continues to benefit from skilled intervention to address the above impairments. Continue treatment per established plan of care. Discharge recommendations: Inpatient rehab (to maximize safety and independence with functional mobility) If above is not an option then recommend: Rehab at skilled nursing facility (SNF) (to regain functional baseline patient requires rehab) Patient's barriers to discharging home, in addition to above impairments: total assist driving to follow up medical appointment(s)/groceries/obtain medication 
entry and exit into the home 
level of physical assist required to maintain patient safety. Equipment recommendations for successful discharge (if) home: TBD by rehab SUBJECTIVE:  
Patient stated ? I love you. Thank you so much for helping me.? OBJECTIVE DATA SUMMARY:  
Critical Behavior: 
Neurologic State: Alert, Appropriate for age Orientation Level: Oriented X4 Cognition: Follows commands Safety/Judgement: Awareness of environment, Fall prevention Functional Mobility Training: 
Bed Mobility: 
  
Supine to Sit: Moderate assistance;Assist x2 Scooting: Moderate assistance;Assist x1 Transfers: 
Sit to Stand: Minimum assistance;Assist x1 Stand to Sit: Minimum assistance;Assist x1 Stand Pivot Transfers: Minimal assistance(second assist for line management ) Bed to Chair: Minimum assistance;Assist x1(second assist for line management ) Balance: 
Sitting: Intact; With support Standing: Impaired; With support Standing - Static: Fair;Constant support Standing - Dynamic : Fair;Constant support Ambulation/Gait Training: 
Distance (ft): 2 Feet (ft) Assistive Device: Gait belt(HHA) Ambulation - Level of Assistance: Minimal assistance;Assist x1(second assist for line management) Gait Abnormalities: Decreased step clearance;Shuffling gait;Trunk sway increased Base of Support: Widened Speed/Tracy: Shuffled; Slow Step Length: Left shortened;Right shortened Activity Tolerance:  
Fair Please refer to the flowsheet for vital signs taken during this treatment. After treatment patient left:  
Up in chair Call light within reach RN notified Family at bedside COMMUNICATION/COLLABORATION:  
The patient?s plan of care was discussed with: Occupational Therapist and Registered Nurse Domenic Cabrera PT, DPT Time Calculation: 31 mins

## 2019-04-16 NOTE — PROGRESS NOTES
Thoracic Surgery Simple Progress Note Admit Date: 3/26/2019 POD: 8 Days Post-Op Procedure:  Procedure(s): LAPAROTOMY EXPLORATORY/ SIGMOIDECTOMY/ COLSTOMY 
externalize Shunt Ventricular-Peritoneal 
 
 
Subjective:  
 
Patient has complaints: No significant medical complaints Posted for surgery tomorrow 2019 WBC inc to 14.0 Review of Systems: 
 
CARDIAC: positive for HTN 
RESP: positive for COPD & pneumonia, on O2 via NC 
NEURO:  negative EXT: Denies new swelling or pain in the legs or calves. Objective:  
 
Blood pressure 179/78, pulse 92, temperature 98.7 °F (37.1 °C), resp. rate 29, height 5' 8\" (1.727 m), weight 181 lb 7 oz (82.3 kg), SpO2 96 %. Temp (24hrs), Av.8 °F (37.1 °C), Min:98.6 °F (37 °C), Max:99.1 °F (37.3 °C) Hemodynamics PAP 
  CO 
  CI 
 
 0701 -  1900 In: 598.7 [I.V.:598.7] Out: 185 [Urine:175; Drains:10] 
 1901 -  0700 In: 6636.2 [P.O.:120; I.V.:6516.2] Out: Shadi Butler [NYOZW:4122; ICPGNN:285] EXAM: 
GENERAL: VSS, afrible, alert and cooperative HEART:  regular rate and rhythm LUNG: diminished breath sounds bilateral bases, on 3L O2 via NC 
NEURO:  normal without focal findings 
mental status, speech normal, A&O x3 SKIN: w/d/i EXTREMITIES:No evidence of DVT seen on physical exam. 
GI/: Abd soft, nontender. Voiding. On TPN Labs: 
Recent Results (from the past 24 hour(s)) GLUCOSE, POC Collection Time: 04/15/19 11:47 AM  
Result Value Ref Range Glucose (POC) 154 (H) 65 - 100 mg/dL Performed by [employer]   
GLUCOSE, POC Collection Time: 04/15/19  5:24 PM  
Result Value Ref Range Glucose (POC) 189 (H) 65 - 100 mg/dL Performed by [employer]   
GLUCOSE, POC Collection Time: 04/15/19 10:53 PM  
Result Value Ref Range Glucose (POC) 208 (H) 65 - 100 mg/dL Performed by Harleen Campos   
CBC WITH AUTOMATED DIFF Collection Time: 19  3:35 AM  
Result Value Ref Range WBC 14.0 (H) 3.6 - 11.0 K/uL RBC 2.51 (L) 3.80 - 5.20 M/uL HGB 7.7 (L) 11.5 - 16.0 g/dL HCT 24.5 (L) 35.0 - 47.0 % MCV 97.6 80.0 - 99.0 FL  
 MCH 30.7 26.0 - 34.0 PG  
 MCHC 31.4 30.0 - 36.5 g/dL  
 RDW 14.3 11.5 - 14.5 % PLATELET 242 884 - 568 K/uL MPV 9.6 8.9 - 12.9 FL  
 NRBC 0.0 0  WBC ABSOLUTE NRBC 0.00 0.00 - 0.01 K/uL NEUTROPHILS 72 32 - 75 % LYMPHOCYTES 15 12 - 49 % MONOCYTES 8 5 - 13 % EOSINOPHILS 4 0 - 7 % BASOPHILS 0 0 - 1 % IMMATURE GRANULOCYTES 1 (H) 0.0 - 0.5 % ABS. NEUTROPHILS 10.0 (H) 1.8 - 8.0 K/UL  
 ABS. LYMPHOCYTES 2.0 0.8 - 3.5 K/UL  
 ABS. MONOCYTES 1.2 (H) 0.0 - 1.0 K/UL  
 ABS. EOSINOPHILS 0.6 (H) 0.0 - 0.4 K/UL  
 ABS. BASOPHILS 0.0 0.0 - 0.1 K/UL  
 ABS. IMM. GRANS. 0.2 (H) 0.00 - 0.04 K/UL  
 DF AUTOMATED METABOLIC PANEL, BASIC Collection Time: 04/16/19  3:35 AM  
Result Value Ref Range Sodium 132 (L) 136 - 145 mmol/L Potassium 3.7 3.5 - 5.1 mmol/L Chloride 95 (L) 97 - 108 mmol/L  
 CO2 32 21 - 32 mmol/L Anion gap 5 5 - 15 mmol/L Glucose 190 (H) 65 - 100 mg/dL BUN 11 6 - 20 MG/DL Creatinine 0.43 (L) 0.55 - 1.02 MG/DL  
 BUN/Creatinine ratio 26 (H) 12 - 20 GFR est AA >60 >60 ml/min/1.73m2 GFR est non-AA >60 >60 ml/min/1.73m2 Calcium 8.3 (L) 8.5 - 10.1 MG/DL  
GLUCOSE, POC Collection Time: 04/16/19  7:12 AM  
Result Value Ref Range Glucose (POC) 238 (H) 65 - 100 mg/dL Performed by Ema Alvarez Assessment:  
No evidence of DVT. Active Problems: 
  Perforated diverticulum of large intestine (3/26/2019) Acute hypoxemic respiratory failure ([address] (4/12/2019) Pneumonia due to infectious organism (4/12/2019) Plan/Recommendations:  
 
NPO after MN Surgical consent signed Ms. Skyler Schumacher & daughters verbalized understanding and questions were answered to the best of my knowledge and ability. Signed By: SELWYN Flores April 16, 2019

## 2019-04-16 NOTE — PROGRESS NOTES
NUTRITION COMPLETE ASSESSMENT 
 
RECOMMENDATIONS:  
1. Discontinue lipid order since TPN to stop tonight 2. Continue current diet 3. Daily weights with lasix rx Interventions/Plan:  
Food/Nutrient Delivery: continue current diet - snacks added Assessment:  
Reason for Assessment: [x]Reassessment Diet: (GI lite) Supplements: Glucerna TID 
TPN: 5%AA, D15 @ 83ml/hr + 4 units insulin/liter + 110mg thiamine + 500ml, 20% lipids 3x/week Nutritionally Significant Medications: [x] Reviewed & Includes: tums, cefepime, diflucan, regular insulin, flagyl, protonix, neutra-phos, zoloft; PRN: zofran Subjective: \"I like snacks, fig newtons are good. \"  
 
Objective: 
Pt admitted with perforated diverticulum of the large intestine. PMHx: COPD, depression, HLD, shunt s/p aneurysm, GERD. S/p laparoscopic drainage of pelvic abscess on 3/29, returned to OR on 4/8 for sigmoidectomy and end colostomy with externalization of ventriculoperitoneal shunt. Abx continue. Plans for return to OR this week for  shunt re-siting. Hyponatremia and hypochloridemia noted. TPN started after surgery. TPN at goal and provides: 1848kcal, 100g protein, 300g CHO (GIR = 2.48mg/kg/min), 2000ml fluid. Meets 100% energy and protein needs. BG trending up with insulin increased from 2-4 units/liter. Seen by surgery today with plans to discontinue TPN since diet advanced over past couple of days and now tolerating GI lite diet with no issues. Pt visited today. Just finishing meal brought in by family with 100% consumed. Would like snacks between meals (pudding, PB crackers). Glucerna ordered, will continue for now but predict if appetite remains good may be able to reduce/discontinue - provides: 660kcal, 30g protein. Predict with current appetite pt to be meeting nutrition needs orally. Will continue to follow for PO intake, BG trends, wt. Estimated Nutrition Needs:  
Kcals/day: 2752 Kcals/day(5947-4605 kcal/day (MSJ x 1.2-1.3)) Protein: 100 g(100-118g (1.2-1.4 g/kg)) Fluid: 1800 ml(1 mL/kcal) Based On: [address] Weight Used: Actual wt(83.9 kg) Pt expected to meet estimated nutrient needs:  [x]   Yes  
Nutrition Diagnosis:  
1. Inadequate protein-energy intake(resolved) related to altered GI fx as evidenced by diet advance and tolerating >75% meals Goals:   
 Continued consumption of at least 75% meals in 5-7 days Monitoring & Evaluation: - Total energy intake, Liquid meal replacement, Protein intake - Weight/weight change Previous Nutrition Goals Met:   Yes Previous Recommendations:    Yes 
 
Education & Discharge Needs: 
 [] None Identified 
 [x] Identified and addressed  
 [] Participated in care plan, discharge planning, and/or interdisciplinary rounds Cultural, Buddhism and ethnic food preferences identified: None Skin Integrity: []Intact  [x]Other: surgical incision Edema: [x]None []Other Last BM: 4/16 - colostomy Food Allergies: []None [x]Other:egg (clarified-- she does not like whole eggs, but will eat foods containing eggs) Diet Restrictions: Cultural/Holiness Preference(s): None Anthropometrics:   
Weight Loss Metrics 4/15/2019 3/16/2019 10/4/2018 2/21/2017 1/24/2017 6/24/2016 6/2/2015 Today's Wt 181 lb 7 oz 192 lb 200 lb 12.8 oz 197 lb 12.8 oz 197 lb 3.2 oz 201 lb 6.4 oz 202 lb 12.8 oz  
BMI 27.59 kg/m2 29.19 kg/m2 30.53 kg/m2 30.08 kg/m2 29.98 kg/m2 30.63 kg/m2 30.84 kg/m2 Weight Source: Bed Height: 5' 8\" (172.7 cm), Body mass index is 27.59 kg/m². IBW : 69.9 kg (154 lb), Usual Body Weight: 88.5 kg (195 lb),   
 
Labs:   
Lab Results Component Value Date/Time  Sodium 132 (L) 04/16/2019 03:35 AM  
 Potassium 3.7 04/16/2019 03:35 AM  
 Chloride 95 (L) 04/16/2019 03:35 AM  
 CO2 32 04/16/2019 03:35 AM  
 Glucose 190 (H) 04/16/2019 03:35 AM  
 BUN 11 04/16/2019 03:35 AM  
 Creatinine 0.43 (L) 04/16/2019 03:35 AM  
 Calcium 8.3 (L) 04/16/2019 03:35 AM  
 Magnesium 2.2 04/15/2019 03:45 AM  
 Phosphorus 2.9 04/12/2019 03:00 PM  
 Albumin 2.0 (L) 04/12/2019 04:30 AM  
 
Lab Results Component Value Date/Time Hemoglobin A1c 5.9 (H) 10/05/2018 08:25 AM  
 
Roque Murcia RD Pager #[phone] or [phone]

## 2019-04-16 NOTE — PROGRESS NOTES
0730; Bedside shift change report given to Madeleine Spence RN (oncoming nurse) by Margi Baldwin (offgoing nurse). Report included the following information SBAR, Kardex, ED Summary, Procedure Summary, Intake/Output, MAR, Accordion, Recent Results and Med Rec Status. 1450: TAMIKO removed, Nails out. 1630: Dr. Judith Piedra paged for patients temp 100.5.

## 2019-04-16 NOTE — PROGRESS NOTES
Neurosurgery Progress Note Date: 2019 Admit Date: 3/26/2019 LOS: 21 days Chief Complaint:  abscess Interval History: lateral transfer to ICU, breathing better Subjective: No acute complaints Objective:  
 
Patient Vitals for the past 8 hrs: 
 BP Temp Pulse Resp SpO2 Weight 19 1700 148/75 99.2 °F (37.3 °C) 88 25 96 %   
19 1600 138/67 (!) 100.5 °F (38.1 °C) 84 19 94 %   
19 1529      88 kg (194 lb 0.1 oz) 19 1500 176/83  85 (!) 31 97 %   
19 1400 173/75  89 20 98 %   
19 1300 171/70  92 28 98 %   
19 1200 141/58 98.7 °F (37.1 °C) 83 22 97 %   
19 1100 150/68  84 29 99 %   
19 1000 179/78  92 29 96 %  Temp (24hrs), Av °F (37.2 °C), Min:98.6 °F (37 °C), Max:100.5 °F (38.1 °C) 
 
 
 0701 -  1900 In: 1662.7 [I.V.:1662.7] Out:  [Urine:1710; Drains:79] Physical Exam:   
General : NAD Follows commands readily. Awake, Alert, oriented x3   Speech fluent. Recent and remote memory intact. PERRL EOMI, face symmetric, tongue and uvula midline, shoulder shrug nl. No pronator drift. Motor 5/5, Sensation intact. Reflexes intact. Labs:   
Recent Results (from the past 24 hour(s)) GLUCOSE, POC Collection Time: 04/15/19 10:53 PM  
Result Value Ref Range Glucose (POC) 208 (H) 65 - 100 mg/dL Performed by Ever Fitzpatrick   
CBC WITH AUTOMATED DIFF Collection Time: 19  3:35 AM  
Result Value Ref Range WBC 14.0 (H) 3.6 - 11.0 K/uL  
 RBC 2.51 (L) 3.80 - 5.20 M/uL HGB 7.7 (L) 11.5 - 16.0 g/dL HCT 24.5 (L) 35.0 - 47.0 % MCV 97.6 80.0 - 99.0 FL  
 MCH 30.7 26.0 - 34.0 PG  
 MCHC 31.4 30.0 - 36.5 g/dL  
 RDW 14.3 11.5 - 14.5 % PLATELET 025 658 - 331 K/uL MPV 9.6 8.9 - 12.9 FL  
 NRBC 0.0 0  WBC ABSOLUTE NRBC 0.00 0.00 - 0.01 K/uL NEUTROPHILS 72 32 - 75 % LYMPHOCYTES 15 12 - 49 % MONOCYTES 8 5 - 13 % EOSINOPHILS 4 0 - 7 % BASOPHILS 0 0 - 1 % IMMATURE GRANULOCYTES 1 (H) 0.0 - 0.5 % ABS. NEUTROPHILS 10.0 (H) 1.8 - 8.0 K/UL  
 ABS. LYMPHOCYTES 2.0 0.8 - 3.5 K/UL  
 ABS. MONOCYTES 1.2 (H) 0.0 - 1.0 K/UL  
 ABS. EOSINOPHILS 0.6 (H) 0.0 - 0.4 K/UL  
 ABS. BASOPHILS 0.0 0.0 - 0.1 K/UL  
 ABS. IMM. GRANS. 0.2 (H) 0.00 - 0.04 K/UL  
 DF AUTOMATED METABOLIC PANEL, BASIC Collection Time: 04/16/19  3:35 AM  
Result Value Ref Range Sodium 132 (L) 136 - 145 mmol/L Potassium 3.7 3.5 - 5.1 mmol/L Chloride 95 (L) 97 - 108 mmol/L  
 CO2 32 21 - 32 mmol/L Anion gap 5 5 - 15 mmol/L Glucose 190 (H) 65 - 100 mg/dL BUN 11 6 - 20 MG/DL Creatinine 0.43 (L) 0.55 - 1.02 MG/DL  
 BUN/Creatinine ratio 26 (H) 12 - 20 GFR est AA >60 >60 ml/min/1.73m2 GFR est non-AA >60 >60 ml/min/1.73m2 Calcium 8.3 (L) 8.5 - 10.1 MG/DL  
GLUCOSE, POC Collection Time: 04/16/19  7:12 AM  
Result Value Ref Range Glucose (POC) 238 (H) 65 - 100 mg/dL Performed by Angely Bravo, POC Collection Time: 04/16/19 12:20 PM  
Result Value Ref Range Glucose (POC) 226 (H) 65 - 100 mg/dL Performed by Brien You, POC Collection Time: 04/16/19  5:22 PM  
Result Value Ref Range Glucose (POC) 210 (H) 65 - 100 mg/dL Performed by [employer] Assessment:  
 
Active Problems: 
  Perforated diverticulum of large intestine (3/26/2019) Acute hypoxemic respiratory failure ([address] (4/12/2019) Pneumonia due to infectious organism (4/12/2019) Plan:  
 
She is doing better, although still SOB, T was 100.5, will watch overnight Low threshold to cancel for fevers. Otherwise, plan placement of VPS in pleural space tomorrow. Again went over particulars of surgery with patient and daughter.  
 
 
 
Mino Rahman MD 
25 minutes were spent with the patient, over half of which was face to face  counseling and coordination of care, discussing with nursing, obtaining history, examining patient and discussing treatment plans.

## 2019-04-16 NOTE — WOUND CARE
WOCN Ostomy Progress Note:  
  
Postoperative visit in ICU. Surgery: Laparotomy with sigmoidectomy and end colostomy  
Date of Surgery: 4.8. 19      Surgeon: Dr. Day Gutiérrez Type of Ostomy: End Colostomy  
Stoma Location: LUQ Daughters Alicia Hartman and Rosey was at the bedside. Bed:  Sport.  If patient is transferred out of ICU then order an Envision on [employer] bed. Offloaded heels with pillows. Sacrum and buttocks intact with blanching pink.  Protect skin with Aloe Vesta. Turn team to assist with q2 turns. 
  
Ostomy Assessment: 
Stoma appearance: pink, moist, edematous and flushed. Peristomal skin: intact without irritation Abdomen: midline incision with staples and a scant about of serosanguinous drainage near umbilicus; TAMIKO drain to right upper quadrant with serosanguinous drainage. Output: liquid brown stool with flatus. Current pouch:  2 piece 2 1/4 pouch flat pouch with eakins ring. 
  
Teaching/Subjects covered today: 
Reviewed with daughter. 
-How to clean stoma & peristomal skin 
-How/when to empty pouch 
-How/When to change pouch 
  
Recommendations: 1.  Mepilex transfer under bipap mask when in use. 2.  Bed:  Sport.  If patient is transferred out of ICU then order an Envision on [facility] bed. 3.  Prevalon boots or offload with pillows. 4.  Sacrum and buttocks:  Twice daily and as needed apply Aloe Layton. 5.  Turn team 
  
1. Empty appliance when 1/3 full and PRN. Encourage patient/family to notify nurse and 
assist w/ pouch emptying to promote self-care. Change appliance twice weekly and PRN for leaking ASAP. DO NOT REINFORCE LEAKS to avoid skin breakdown. 
  
Transition of Care: 
Ostomy nurse to return and reinforce teaching Wednesday. 
  
Srinath Blake BSN RN [facility] [place] [facility] [phone] Pager [phone]

## 2019-04-16 NOTE — DIABETES MGMT
DTC Progress Note Recommendations/ Comments: Chart reviewed for hyperglycemia. Noted TPN started. Pt required ~ 12 units of correction yesterday, 9 units correction the day prior. If appropriate, please consider: 
1. Increasing insulin in TPN from 4 units/L to 8 units/ L Current hospital DM medication: lispro correction q 6 hours - normal sensitivity Chart reviewed on [facility] [place]. Patient is a 79 y.o. female with no hx of DM per H&P; however, previous A1cs ranging from 5.9 - 6.1%. A1c:  
Lab Results Component Value Date/Time Hemoglobin A1c 5.9 (H) 10/05/2018 08:25 AM  
 Hemoglobin A1c 6.1 (H) 02/21/2017 11:00 AM  
 
 
Recent Glucose Results:  
Lab Results Component Value Date/Time  (H) 04/16/2019 03:35 AM  
 GLUCPOC 226 (H) 04/16/2019 12:20 PM  
 GLUCPOC 238 (H) 04/16/2019 07:12 AM  
 GLUCPOC 208 (H) 04/15/2019 10:53 PM  
  
 
Lab Results Component Value Date/Time Creatinine 0.43 (L) 04/16/2019 03:35 AM  
 
Estimated Creatinine Clearance: 144.9 mL/min (A) (based on SCr of 0.43 mg/dL (L)). Active Orders Diet DIET GI LITE (POST SURGICAL) DIET NPO  
  
 
PO intake: No data found. Will continue to follow as needed. Thank you Anisa Traore RD [facility] Office: [phone] Time spent: 2 minutes

## 2019-04-16 NOTE — PROGRESS NOTES
Neurosurgery Progress Note Discussed case with Dr. Erica Ricketts. WBC up to 14. Pt has been afebrile. If she remains afebrile overnight, I think the shunt can be replaced if thoracic surgery is ok with it. CSF clear draining 0-10 cc per hour. NPO after midnight.  
 
Marilyn Roberts NP

## 2019-04-16 NOTE — PROGRESS NOTES
Bedside and Verbal shift change report given to [id number] Eliceo Go (oncoming nurse) by Suzanna Washington RN (offgoing nurse). Report included the following information SBAR, MAR, Recent Results and Cardiac Rhythm NSR/ST. 450 patient HR up, went to check on patient. She was having a panic attack. Placed on bipap. Valium given IV. Daughter at bedside. 4/16/19 0730 Bedside and Verbal shift change report given to Priscilla Bhardwaj RN (oncoming nurse) by Stephy Riddle RN (offgoing nurse). Report included the following information SBAR, MAR, Recent Results and Cardiac Rhythm NSR.

## 2019-04-17 ENCOUNTER — ANESTHESIA (OUTPATIENT)
Dept: SURGERY | Age: 69
DRG: 329 | End: 2019-04-17
Payer: MEDICARE

## 2019-04-17 ENCOUNTER — APPOINTMENT (OUTPATIENT)
Dept: GENERAL RADIOLOGY | Age: 69
DRG: 329 | End: 2019-04-17
Attending: PHYSICIAN ASSISTANT
Payer: MEDICARE

## 2019-04-17 ENCOUNTER — APPOINTMENT (OUTPATIENT)
Dept: GENERAL RADIOLOGY | Age: 69
DRG: 329 | End: 2019-04-17
Attending: INTERNAL MEDICINE
Payer: MEDICARE

## 2019-04-17 LAB
ANION GAP SERPL CALC-SCNC: 6 MMOL/L (ref 5–15)
ATRIAL RATE: 110 BPM
BASOPHILS # BLD: 0 K/UL (ref 0–0.1)
BASOPHILS NFR BLD: 0 % (ref 0–1)
BUN SERPL-MCNC: 11 MG/DL (ref 6–20)
BUN/CREAT SERPL: 26 (ref 12–20)
CALCIUM SERPL-MCNC: 8.4 MG/DL (ref 8.5–10.1)
CALCULATED R AXIS, ECG10: 86 DEGREES
CALCULATED T AXIS, ECG11: 69 DEGREES
CHLORIDE SERPL-SCNC: 100 MMOL/L (ref 97–108)
CO2 SERPL-SCNC: 34 MMOL/L (ref 21–32)
CREAT SERPL-MCNC: 0.43 MG/DL (ref 0.55–1.02)
DIAGNOSIS, 93000: NORMAL
DIFFERENTIAL METHOD BLD: ABNORMAL
EOSINOPHIL # BLD: 0.5 K/UL (ref 0–0.4)
EOSINOPHIL NFR BLD: 5 % (ref 0–7)
ERYTHROCYTE [DISTWIDTH] IN BLOOD BY AUTOMATED COUNT: 14.3 % (ref 11.5–14.5)
GLUCOSE BLD STRIP.AUTO-MCNC: 147 MG/DL (ref 65–100)
GLUCOSE BLD STRIP.AUTO-MCNC: 262 MG/DL (ref 65–100)
GLUCOSE BLD STRIP.AUTO-MCNC: 96 MG/DL (ref 65–100)
GLUCOSE SERPL-MCNC: 100 MG/DL (ref 65–100)
HCT VFR BLD AUTO: 24.6 % (ref 35–47)
HGB BLD-MCNC: 7.4 G/DL (ref 11.5–16)
IMM GRANULOCYTES # BLD AUTO: 0.1 K/UL (ref 0–0.04)
IMM GRANULOCYTES NFR BLD AUTO: 1 % (ref 0–0.5)
LYMPHOCYTES # BLD: 2.2 K/UL (ref 0.8–3.5)
LYMPHOCYTES NFR BLD: 21 % (ref 12–49)
MCH RBC QN AUTO: 29.1 PG (ref 26–34)
MCHC RBC AUTO-ENTMCNC: 30.1 G/DL (ref 30–36.5)
MCV RBC AUTO: 96.9 FL (ref 80–99)
MONOCYTES # BLD: 1.1 K/UL (ref 0–1)
MONOCYTES NFR BLD: 10 % (ref 5–13)
NEUTS SEG # BLD: 6.6 K/UL (ref 1.8–8)
NEUTS SEG NFR BLD: 63 % (ref 32–75)
NRBC # BLD: 0 K/UL (ref 0–0.01)
NRBC BLD-RTO: 0 PER 100 WBC
PLATELET # BLD AUTO: 360 K/UL (ref 150–400)
PMV BLD AUTO: 9.5 FL (ref 8.9–12.9)
POTASSIUM SERPL-SCNC: 3.6 MMOL/L (ref 3.5–5.1)
Q-T INTERVAL, ECG07: 346 MS
QRS DURATION, ECG06: 84 MS
QTC CALCULATION (BEZET), ECG08: 514 MS
RBC # BLD AUTO: 2.54 M/UL (ref 3.8–5.2)
SERVICE CMNT-IMP: ABNORMAL
SERVICE CMNT-IMP: ABNORMAL
SERVICE CMNT-IMP: NORMAL
SODIUM SERPL-SCNC: 140 MMOL/L (ref 136–145)
VENTRICULAR RATE, ECG03: 133 BPM
WBC # BLD AUTO: 10.6 K/UL (ref 3.6–11)

## 2019-04-17 PROCEDURE — 74011000250 HC RX REV CODE- 250: Performed by: ANESTHESIOLOGY

## 2019-04-17 PROCEDURE — 85025 COMPLETE CBC W/AUTO DIFF WBC: CPT

## 2019-04-17 PROCEDURE — 77010033678 HC OXYGEN DAILY

## 2019-04-17 PROCEDURE — 77030014143 HC TY PUNC LUMBR BD -A: Performed by: SPECIALIST

## 2019-04-17 PROCEDURE — 77030012270 HC CATH PASS SUBQ MEDT -C: Performed by: SPECIALIST

## 2019-04-17 PROCEDURE — 80048 BASIC METABOLIC PNL TOTAL CA: CPT

## 2019-04-17 PROCEDURE — 77030002996 HC SUT SLK J&J -A: Performed by: SPECIALIST

## 2019-04-17 PROCEDURE — 36415 COLL VENOUS BLD VENIPUNCTURE: CPT

## 2019-04-17 PROCEDURE — C9113 INJ PANTOPRAZOLE SODIUM, VIA: HCPCS | Performed by: SPECIALIST

## 2019-04-17 PROCEDURE — 97530 THERAPEUTIC ACTIVITIES: CPT

## 2019-04-17 PROCEDURE — 36592 COLLECT BLOOD FROM PICC: CPT

## 2019-04-17 PROCEDURE — 77030003862 HC BIT DRL SYNT -B: Performed by: SPECIALIST

## 2019-04-17 PROCEDURE — 74011250636 HC RX REV CODE- 250/636: Performed by: INTERNAL MEDICINE

## 2019-04-17 PROCEDURE — 71045 X-RAY EXAM CHEST 1 VIEW: CPT

## 2019-04-17 PROCEDURE — 74011250637 HC RX REV CODE- 250/637: Performed by: SURGERY

## 2019-04-17 PROCEDURE — 74011000250 HC RX REV CODE- 250: Performed by: SPECIALIST

## 2019-04-17 PROCEDURE — 74011000250 HC RX REV CODE- 250: Performed by: INTERNAL MEDICINE

## 2019-04-17 PROCEDURE — 97530 THERAPEUTIC ACTIVITIES: CPT | Performed by: OCCUPATIONAL THERAPIST

## 2019-04-17 PROCEDURE — 74011250636 HC RX REV CODE- 250/636: Performed by: SURGERY

## 2019-04-17 PROCEDURE — 74011250636 HC RX REV CODE- 250/636: Performed by: ANESTHESIOLOGY

## 2019-04-17 PROCEDURE — 82962 GLUCOSE BLOOD TEST: CPT

## 2019-04-17 PROCEDURE — 74011250636 HC RX REV CODE- 250/636: Performed by: SPECIALIST

## 2019-04-17 PROCEDURE — 77030018673: Performed by: SPECIALIST

## 2019-04-17 PROCEDURE — 77030002933 HC SUT MCRYL J&J -A: Performed by: SPECIALIST

## 2019-04-17 PROCEDURE — 00160J4 BYPASS CEREBRAL VENTRICLE TO PLEURAL CAVITY WITH SYNTHETIC SUBSTITUTE, OPEN APPROACH: ICD-10-PCS | Performed by: SPECIALIST

## 2019-04-17 PROCEDURE — 94640 AIRWAY INHALATION TREATMENT: CPT

## 2019-04-17 PROCEDURE — 74011000258 HC RX REV CODE- 258: Performed by: INTERNAL MEDICINE

## 2019-04-17 PROCEDURE — 74011250637 HC RX REV CODE- 250/637: Performed by: SPECIALIST

## 2019-04-17 PROCEDURE — 65610000006 HC RM INTENSIVE CARE

## 2019-04-17 PROCEDURE — 76010000113 HC CV SURG 1 TO 1.5 HR: Performed by: SPECIALIST

## 2019-04-17 PROCEDURE — 77030031139 HC SUT VCRL2 J&J -A: Performed by: SPECIALIST

## 2019-04-17 PROCEDURE — 77030011640 HC PAD GRND REM COVD -A: Performed by: SPECIALIST

## 2019-04-17 PROCEDURE — 93005 ELECTROCARDIOGRAM TRACING: CPT

## 2019-04-17 PROCEDURE — 76010000149 HC OR TIME 1 TO 1.5 HR: Performed by: SPECIALIST

## 2019-04-17 PROCEDURE — 77030038600 HC TU BPLR IRR DISP STRY -B: Performed by: SPECIALIST

## 2019-04-17 PROCEDURE — 74011636637 HC RX REV CODE- 636/637: Performed by: SPECIALIST

## 2019-04-17 PROCEDURE — 76210000017 HC OR PH I REC 1.5 TO 2 HR: Performed by: SPECIALIST

## 2019-04-17 PROCEDURE — 77030005537 HC CATH URETH BARD -A: Performed by: SPECIALIST

## 2019-04-17 PROCEDURE — 74011000250 HC RX REV CODE- 250

## 2019-04-17 PROCEDURE — 77030008684 HC TU ET CUF COVD -B: Performed by: ANESTHESIOLOGY

## 2019-04-17 PROCEDURE — 77030038269 HC DRN EXT URIN PURWCK BARD -A

## 2019-04-17 PROCEDURE — 77030004831 HC CATH CSF PERI MEDT -C: Performed by: SPECIALIST

## 2019-04-17 PROCEDURE — 74011000258 HC RX REV CODE- 258: Performed by: ANESTHESIOLOGY

## 2019-04-17 PROCEDURE — 76060000033 HC ANESTHESIA 1 TO 1.5 HR: Performed by: SPECIALIST

## 2019-04-17 PROCEDURE — 74011250636 HC RX REV CODE- 250/636

## 2019-04-17 PROCEDURE — 74011000272 HC RX REV CODE- 272: Performed by: SPECIALIST

## 2019-04-17 RX ORDER — FENTANYL CITRATE 50 UG/ML
50 INJECTION, SOLUTION INTRAMUSCULAR; INTRAVENOUS AS NEEDED
Status: DISCONTINUED | OUTPATIENT
Start: 2019-04-17 | End: 2019-04-17 | Stop reason: HOSPADM

## 2019-04-17 RX ORDER — SODIUM CHLORIDE 0.9 % (FLUSH) 0.9 %
5-40 SYRINGE (ML) INJECTION EVERY 8 HOURS
Status: DISCONTINUED | OUTPATIENT
Start: 2019-04-17 | End: 2019-04-17 | Stop reason: HOSPADM

## 2019-04-17 RX ORDER — SODIUM CHLORIDE 0.9 % (FLUSH) 0.9 %
5-40 SYRINGE (ML) INJECTION AS NEEDED
Status: DISCONTINUED | OUTPATIENT
Start: 2019-04-17 | End: 2019-04-17 | Stop reason: HOSPADM

## 2019-04-17 RX ORDER — CEFAZOLIN SODIUM/WATER 2 G/20 ML
2 SYRINGE (ML) INTRAVENOUS ONCE
Status: COMPLETED | OUTPATIENT
Start: 2019-04-17 | End: 2019-04-17

## 2019-04-17 RX ORDER — FENTANYL CITRATE 50 UG/ML
INJECTION, SOLUTION INTRAMUSCULAR; INTRAVENOUS AS NEEDED
Status: DISCONTINUED | OUTPATIENT
Start: 2019-04-17 | End: 2019-04-17 | Stop reason: HOSPADM

## 2019-04-17 RX ORDER — DILTIAZEM HYDROCHLORIDE 5 MG/ML
10 INJECTION INTRAVENOUS ONCE
Status: COMPLETED | OUTPATIENT
Start: 2019-04-17 | End: 2019-04-17

## 2019-04-17 RX ORDER — HYDROMORPHONE HYDROCHLORIDE 1 MG/ML
0.2 INJECTION, SOLUTION INTRAMUSCULAR; INTRAVENOUS; SUBCUTANEOUS
Status: DISCONTINUED | OUTPATIENT
Start: 2019-04-17 | End: 2019-04-17 | Stop reason: HOSPADM

## 2019-04-17 RX ORDER — DIGOXIN 0.25 MG/ML
250 INJECTION INTRAMUSCULAR; INTRAVENOUS EVERY 4 HOURS
Status: COMPLETED | OUTPATIENT
Start: 2019-04-17 | End: 2019-04-17

## 2019-04-17 RX ORDER — SODIUM CHLORIDE, SODIUM LACTATE, POTASSIUM CHLORIDE, CALCIUM CHLORIDE 600; 310; 30; 20 MG/100ML; MG/100ML; MG/100ML; MG/100ML
100 INJECTION, SOLUTION INTRAVENOUS CONTINUOUS
Status: DISCONTINUED | OUTPATIENT
Start: 2019-04-17 | End: 2019-04-17 | Stop reason: HOSPADM

## 2019-04-17 RX ORDER — LIDOCAINE HYDROCHLORIDE 10 MG/ML
0.1 INJECTION, SOLUTION EPIDURAL; INFILTRATION; INTRACAUDAL; PERINEURAL AS NEEDED
Status: DISCONTINUED | OUTPATIENT
Start: 2019-04-17 | End: 2019-04-17 | Stop reason: HOSPADM

## 2019-04-17 RX ORDER — CEFAZOLIN SODIUM/WATER 2 G/20 ML
2 SYRINGE (ML) INTRAVENOUS EVERY 8 HOURS
Status: COMPLETED | OUTPATIENT
Start: 2019-04-17 | End: 2019-04-18

## 2019-04-17 RX ORDER — LIDOCAINE HYDROCHLORIDE 20 MG/ML
INJECTION, SOLUTION EPIDURAL; INFILTRATION; INTRACAUDAL; PERINEURAL AS NEEDED
Status: DISCONTINUED | OUTPATIENT
Start: 2019-04-17 | End: 2019-04-17 | Stop reason: HOSPADM

## 2019-04-17 RX ORDER — DIPHENHYDRAMINE HYDROCHLORIDE 50 MG/ML
12.5 INJECTION, SOLUTION INTRAMUSCULAR; INTRAVENOUS AS NEEDED
Status: DISCONTINUED | OUTPATIENT
Start: 2019-04-17 | End: 2019-04-17 | Stop reason: HOSPADM

## 2019-04-17 RX ORDER — MIDAZOLAM HYDROCHLORIDE 1 MG/ML
INJECTION, SOLUTION INTRAMUSCULAR; INTRAVENOUS AS NEEDED
Status: DISCONTINUED | OUTPATIENT
Start: 2019-04-17 | End: 2019-04-17 | Stop reason: HOSPADM

## 2019-04-17 RX ORDER — SUCCINYLCHOLINE CHLORIDE 20 MG/ML
INJECTION INTRAMUSCULAR; INTRAVENOUS AS NEEDED
Status: DISCONTINUED | OUTPATIENT
Start: 2019-04-17 | End: 2019-04-17 | Stop reason: HOSPADM

## 2019-04-17 RX ORDER — ACETAMINOPHEN 10 MG/ML
INJECTION, SOLUTION INTRAVENOUS AS NEEDED
Status: DISCONTINUED | OUTPATIENT
Start: 2019-04-17 | End: 2019-04-17 | Stop reason: HOSPADM

## 2019-04-17 RX ORDER — DEXMEDETOMIDINE HYDROCHLORIDE 4 UG/ML
INJECTION, SOLUTION INTRAVENOUS AS NEEDED
Status: DISCONTINUED | OUTPATIENT
Start: 2019-04-17 | End: 2019-04-17 | Stop reason: HOSPADM

## 2019-04-17 RX ORDER — ROCURONIUM BROMIDE 10 MG/ML
INJECTION, SOLUTION INTRAVENOUS AS NEEDED
Status: DISCONTINUED | OUTPATIENT
Start: 2019-04-17 | End: 2019-04-17 | Stop reason: HOSPADM

## 2019-04-17 RX ORDER — ONDANSETRON 2 MG/ML
INJECTION INTRAMUSCULAR; INTRAVENOUS AS NEEDED
Status: DISCONTINUED | OUTPATIENT
Start: 2019-04-17 | End: 2019-04-17 | Stop reason: HOSPADM

## 2019-04-17 RX ORDER — FENTANYL CITRATE 50 UG/ML
25 INJECTION, SOLUTION INTRAMUSCULAR; INTRAVENOUS
Status: DISCONTINUED | OUTPATIENT
Start: 2019-04-17 | End: 2019-04-17 | Stop reason: HOSPADM

## 2019-04-17 RX ORDER — HYDRALAZINE HYDROCHLORIDE 25 MG/1
25 TABLET, FILM COATED ORAL 3 TIMES DAILY
Status: DISCONTINUED | OUTPATIENT
Start: 2019-04-17 | End: 2019-04-18

## 2019-04-17 RX ORDER — PROPOFOL 10 MG/ML
INJECTION, EMULSION INTRAVENOUS AS NEEDED
Status: DISCONTINUED | OUTPATIENT
Start: 2019-04-17 | End: 2019-04-17 | Stop reason: HOSPADM

## 2019-04-17 RX ORDER — METOPROLOL TARTRATE 5 MG/5ML
5 INJECTION INTRAVENOUS EVERY 4 HOURS
Status: DISCONTINUED | OUTPATIENT
Start: 2019-04-17 | End: 2019-04-18

## 2019-04-17 RX ORDER — ROPIVACAINE HYDROCHLORIDE 5 MG/ML
150 INJECTION, SOLUTION EPIDURAL; INFILTRATION; PERINEURAL AS NEEDED
Status: DISCONTINUED | OUTPATIENT
Start: 2019-04-17 | End: 2019-04-17 | Stop reason: HOSPADM

## 2019-04-17 RX ORDER — DEXAMETHASONE SODIUM PHOSPHATE 4 MG/ML
INJECTION, SOLUTION INTRA-ARTICULAR; INTRALESIONAL; INTRAMUSCULAR; INTRAVENOUS; SOFT TISSUE AS NEEDED
Status: DISCONTINUED | OUTPATIENT
Start: 2019-04-17 | End: 2019-04-17 | Stop reason: HOSPADM

## 2019-04-17 RX ORDER — LOSARTAN POTASSIUM 50 MG/1
50 TABLET ORAL DAILY
Status: DISCONTINUED | OUTPATIENT
Start: 2019-04-18 | End: 2019-04-26 | Stop reason: HOSPADM

## 2019-04-17 RX ADMIN — FENTANYL CITRATE 50 MCG: 50 INJECTION, SOLUTION INTRAMUSCULAR; INTRAVENOUS at 15:14

## 2019-04-17 RX ADMIN — SERTRALINE 100 MG: 50 TABLET, FILM COATED ORAL at 08:15

## 2019-04-17 RX ADMIN — ONDANSETRON 4 MG: 2 INJECTION INTRAMUSCULAR; INTRAVENOUS at 15:45

## 2019-04-17 RX ADMIN — DEXAMETHASONE SODIUM PHOSPHATE 4 MG: 4 INJECTION, SOLUTION INTRA-ARTICULAR; INTRALESIONAL; INTRAMUSCULAR; INTRAVENOUS; SOFT TISSUE at 15:45

## 2019-04-17 RX ADMIN — METRONIDAZOLE 500 MG: 500 INJECTION, SOLUTION INTRAVENOUS at 08:13

## 2019-04-17 RX ADMIN — METOPROLOL TARTRATE 5 MG: 5 INJECTION INTRAVENOUS at 20:25

## 2019-04-17 RX ADMIN — Medication 10 ML: at 19:09

## 2019-04-17 RX ADMIN — MIDAZOLAM HYDROCHLORIDE 1 MG: 1 INJECTION, SOLUTION INTRAMUSCULAR; INTRAVENOUS at 14:56

## 2019-04-17 RX ADMIN — DIGOXIN 250 MCG: 0.25 INJECTION INTRAMUSCULAR; INTRAVENOUS at 19:11

## 2019-04-17 RX ADMIN — Medication 10 ML: at 08:14

## 2019-04-17 RX ADMIN — ACETAMINOPHEN 1000 MG: 10 INJECTION, SOLUTION INTRAVENOUS at 15:17

## 2019-04-17 RX ADMIN — FUROSEMIDE 40 MG: 10 INJECTION, SOLUTION INTRAMUSCULAR; INTRAVENOUS at 08:24

## 2019-04-17 RX ADMIN — HYDROMORPHONE HYDROCHLORIDE 2 MG: 4 TABLET ORAL at 08:15

## 2019-04-17 RX ADMIN — HYDROMORPHONE HYDROCHLORIDE 2 MG: 4 TABLET ORAL at 21:51

## 2019-04-17 RX ADMIN — POTASSIUM & SODIUM PHOSPHATES POWDER PACK 280-160-250 MG 1 PACKET: 280-160-250 PACK at 21:50

## 2019-04-17 RX ADMIN — Medication 10 ML: at 21:51

## 2019-04-17 RX ADMIN — IPRATROPIUM BROMIDE AND ALBUTEROL SULFATE 3 ML: .5; 3 SOLUTION RESPIRATORY (INHALATION) at 07:11

## 2019-04-17 RX ADMIN — Medication 2 G: at 15:17

## 2019-04-17 RX ADMIN — SODIUM CHLORIDE, SODIUM LACTATE, POTASSIUM CHLORIDE, AND CALCIUM CHLORIDE 100 ML/HR: 600; 310; 30; 20 INJECTION, SOLUTION INTRAVENOUS at 14:14

## 2019-04-17 RX ADMIN — FLUCONAZOLE 400 MG: 400 INJECTION, SOLUTION INTRAVENOUS at 12:28

## 2019-04-17 RX ADMIN — CEFEPIME HYDROCHLORIDE 2 G: 2 INJECTION, POWDER, FOR SOLUTION INTRAVENOUS at 01:25

## 2019-04-17 RX ADMIN — IPRATROPIUM BROMIDE AND ALBUTEROL SULFATE 3 ML: .5; 3 SOLUTION RESPIRATORY (INHALATION) at 17:38

## 2019-04-17 RX ADMIN — HYDROMORPHONE HYDROCHLORIDE 2 MG: 4 TABLET ORAL at 04:29

## 2019-04-17 RX ADMIN — DEXMEDETOMIDINE HYDROCHLORIDE 4 MCG: 4 INJECTION, SOLUTION INTRAVENOUS at 16:00

## 2019-04-17 RX ADMIN — METRONIDAZOLE 500 MG: 500 INJECTION, SOLUTION INTRAVENOUS at 21:50

## 2019-04-17 RX ADMIN — PROPOFOL 90 MG: 10 INJECTION, EMULSION INTRAVENOUS at 15:06

## 2019-04-17 RX ADMIN — HYDRALAZINE HYDROCHLORIDE 100 MG: 25 TABLET ORAL at 08:15

## 2019-04-17 RX ADMIN — FENTANYL CITRATE 50 MCG: 50 INJECTION, SOLUTION INTRAMUSCULAR; INTRAVENOUS at 15:34

## 2019-04-17 RX ADMIN — PANTOPRAZOLE SODIUM 40 MG: 40 INJECTION, POWDER, FOR SOLUTION INTRAVENOUS at 19:04

## 2019-04-17 RX ADMIN — DILTIAZEM HYDROCHLORIDE 10 MG: 5 INJECTION INTRAVENOUS at 16:28

## 2019-04-17 RX ADMIN — DILTIAZEM HYDROCHLORIDE 10 MG/HR: 5 INJECTION, SOLUTION INTRAVENOUS at 16:32

## 2019-04-17 RX ADMIN — METOPROLOL TARTRATE 5 MG: 5 INJECTION INTRAVENOUS at 23:54

## 2019-04-17 RX ADMIN — CEFEPIME HYDROCHLORIDE 2 G: 2 INJECTION, POWDER, FOR SOLUTION INTRAVENOUS at 09:13

## 2019-04-17 RX ADMIN — IPRATROPIUM BROMIDE AND ALBUTEROL SULFATE 3 ML: .5; 3 SOLUTION RESPIRATORY (INHALATION) at 11:45

## 2019-04-17 RX ADMIN — DEXMEDETOMIDINE HYDROCHLORIDE 6 MCG: 4 INJECTION, SOLUTION INTRAVENOUS at 14:56

## 2019-04-17 RX ADMIN — Medication 10 ML: at 21:50

## 2019-04-17 RX ADMIN — LOSARTAN POTASSIUM 100 MG: 50 TABLET ORAL at 08:15

## 2019-04-17 RX ADMIN — SUCCINYLCHOLINE CHLORIDE 120 MG: 20 INJECTION INTRAMUSCULAR; INTRAVENOUS at 15:06

## 2019-04-17 RX ADMIN — HUMAN INSULIN 2 UNITS: 100 INJECTION, SOLUTION SUBCUTANEOUS at 18:00

## 2019-04-17 RX ADMIN — HUMAN INSULIN 5 UNITS: 100 INJECTION, SOLUTION SUBCUTANEOUS at 23:54

## 2019-04-17 RX ADMIN — DILTIAZEM HYDROCHLORIDE 10 MG/HR: 5 INJECTION, SOLUTION INTRAVENOUS at 19:08

## 2019-04-17 RX ADMIN — FUROSEMIDE 40 MG: 10 INJECTION, SOLUTION INTRAMUSCULAR; INTRAVENOUS at 19:04

## 2019-04-17 RX ADMIN — ROCURONIUM BROMIDE 25 MG: 10 INJECTION, SOLUTION INTRAVENOUS at 15:10

## 2019-04-17 RX ADMIN — LIDOCAINE HYDROCHLORIDE 100 MG: 20 INJECTION, SOLUTION EPIDURAL; INFILTRATION; INTRACAUDAL; PERINEURAL at 15:06

## 2019-04-17 RX ADMIN — ROCURONIUM BROMIDE 20 MG: 10 INJECTION, SOLUTION INTRAVENOUS at 15:31

## 2019-04-17 RX ADMIN — DILTIAZEM HYDROCHLORIDE 10 MG: 5 INJECTION INTRAVENOUS at 16:48

## 2019-04-17 RX ADMIN — Medication 2 G: at 22:36

## 2019-04-17 RX ADMIN — METRONIDAZOLE 500 MG: 500 INJECTION, SOLUTION INTRAVENOUS at 14:22

## 2019-04-17 RX ADMIN — ROCURONIUM BROMIDE 5 MG: 10 INJECTION, SOLUTION INTRAVENOUS at 15:06

## 2019-04-17 RX ADMIN — DIGOXIN 250 MCG: 0.25 INJECTION INTRAMUSCULAR; INTRAVENOUS at 22:34

## 2019-04-17 RX ADMIN — IPRATROPIUM BROMIDE AND ALBUTEROL SULFATE 3 ML: .5; 3 SOLUTION RESPIRATORY (INHALATION) at 19:30

## 2019-04-17 NOTE — PROGRESS NOTES
Bedside and Verbal shift change report given to SUSHANT Dunn (oncoming nurse) by Madan Mena RN (offgoing nurse). Report included the following information SBAR, MAR, Recent Results and Cardiac Rhythm NSr.  
 
04/17/19 7730 Bedside and Verbal shift change report given to Madan Mena RN (oncoming nurse) by SUSHANT Dunn (offgoing nurse). Report included the following information SBAR, MAR, Recent Results and Cardiac Rhythm NSR.

## 2019-04-17 NOTE — PROGRESS NOTES
[facility] Critical Care, and Sleep Medicine Name: Derrick Miller MRN: [id number] : 1950 [facility] Subjective: 
:Cough but no sputum. denies CP. 
NPO for surgery - wants to eat. Surgery planned at 2:30 pm 
Former smoker and known COPD on spiriva. Sriram Leon reviewed - too much coughing - not accurate (22%) No cxr this am. 
 
Objective: 
 
 
Exam 
Vital Signs: 
Visit Vitals /65 Pulse 94 Temp 98.7 °F (37.1 °C) Resp 28 Ht 5' 8\" (1.727 m) Wt 88 kg (194 lb 0.1 oz) SpO2 97% BMI 29.50 kg/m² Temp (24hrs), Av.3 °F (37.4 °C), Min:98.7 °F (37.1 °C), Max:100.5 °F (38.1 °C) Intake/Output Summary (Last 24 hours) at 2019 1201 Last data filed at 2019 1056 Gross per 24 hour Intake 2014.7 ml Output 5460 ml Net -3445.3 ml GENERAL: well developed and in no distress, HEENT:  PERRL, EOMI, no alar flaring or epistaxis, oral mucosa moist without cyanosis, NECK:  no jugular vein distention, no retractions, no thyromegaly or masses, LUNGS: decreased breath sounds billaterally and with rhonchi , HEART:  Regular rate and rhythm with no MGR; no edema is present, EXTREMITIES:  warm with no cyanosis and SKIN:  no jaundice or ecchymosis  
abd- drain and mild tenderness LQ no r/g/r Labs: 
Recent Labs 19 
4160 19 
0335 04/15/19 
5151 WBC 10.6 14.0* 11.0 HGB 7.4* 7.7* 6.8* HCT 24.6* 24.5* 22.2*  
 384 318 Recent Labs 19 
5985 19 
0335 04/15/19 
0345  132* 133* K 3.6 3.7 3.7  95* 99  
CO2 34* 32 30  190* 149* BUN 11 11 9 CREA 0.43* 0.43* 0.33* CA 8.4* 8.3* 8.5 MG  --   --  2.2 No results for input(s): PHI, PO2I, PCO2I in the last 72 hours. Impression     Plan 1. 19 S/p xlap with sigmoid colectomy and end colostomy for perforated diverticulitis and fecal contamination.  Pelvic abscess with drain in place.  
2. 4/8/19 S/p externalization  shunt due to above- relocated to pleural cavity 3. Abdominal sepsis 4. Acute resp failure- RUL PNA and asymmetric pulm edema. Baseline COPD on spiriva 5. ECHO 4/19 EF 65% RV nml 6. HTN · Keep off bipap · cxr now. · Nebs sched · Continue current abx · Diurese. · Pain control Pt is acutely ill and at risk for decline due to sepsis/resp failure Kahlil Park MD 
4/17/2019

## 2019-04-17 NOTE — ANESTHESIA POSTPROCEDURE EVALUATION
Procedure(s): RIGHT SIDE INTERPLEURAL PLACEMENT OF VENTRICULO PLEURAL SHUNT. 
 
general 
 
Anesthesia Post Evaluation Multimodal analgesia: multimodal analgesia used between 6 hours prior to anesthesia start to PACU discharge Patient location during evaluation: bedside Patient participation: waiting for patient participation Level of consciousness: awake Pain management: adequate Airway patency: patent Anesthetic complications: no 
Cardiovascular status: acceptable Respiratory status: unassisted Hydration status: acceptable Comments: Post-Anesthesia Evaluation and Assessment I have evaluated the patient and they will need to be evaluated by cardiology for postop development of atrial fibrillation. Pt started on Diltiazem in PACU. Patient: Malena Hernandez MRN: [id number]  SSN: [id number] YOB: 1950  Age: 79 y.o. Sex: female Cardiovascular Function/Vital Signs /58   Pulse (!) 133   Temp 37.3 °C (99.2 °F)   Resp 23   Ht 5' 8\" (1.727 m)   Wt 88 kg (194 lb 0.1 oz)   SpO2 93%   BMI 29.50 kg/m² Patient is status post General anesthesia for Procedure(s): RIGHT SIDE INTERPLEURAL PLACEMENT OF VENTRICULO PLEURAL SHUNT. Nausea/Vomiting: None Postoperative hydration reviewed and adequate. Pain: 
Pain Scale 1: Numeric (0 - 10) (04/17/19 1700) Pain Intensity 1: 0 (04/17/19 1700) Managed Neurological Status:  
Neuro (WDL): Within Defined Limits (04/17/19 1700) Neuro Neurologic State: Drowsy (04/17/19 1200) Orientation Level: Oriented to person;Oriented to place; Disoriented to situation;Disoriented to time(STM loss) (04/17/19 1200) Cognition: Decreased attention/concentration; Follows commands (04/17/19 1200) Speech: Clear (04/17/19 0800) Assessment L Pupil: Brisk;Round (04/17/19 0800) Size L Pupil (mm): 3 (04/17/19 0800) Assessment R Pupil: Brisk;Round (04/17/19 0800) Size R Pupil (mm): 3 (04/17/19 0800) LUE Motor Response: Purposeful;Spontaneous  (04/17/19 0800) LLE Motor Response: Purposeful;Spontaneous  (04/17/19 0800) RUE Motor Response: Purposeful;Spontaneous  (04/17/19 0800) RLE Motor Response: Purposeful;Spontaneous  (04/17/19 0800) At baseline Mental Status, Level of Consciousness: Alert and  oriented to person, place, and time Pulmonary Status:  
O2 Device: Nasal cannula (04/17/19 1700) Adequate oxygenation and airway patent Complications related to anesthesia: None Post-anesthesia assessment completed. No concerns Signed By: Vinod Araya MD  
 April 17, 2019 Vitals Value Taken Time /65 4/17/2019  5:00 PM  
Temp 37.3 °C (99.2 °F) 4/17/2019  5:00 PM  
Pulse 142 4/17/2019  5:11 PM  
Resp 22 4/17/2019  5:11 PM  
SpO2 92 % 4/17/2019  5:11 PM  
Vitals shown include unvalidated device data.

## 2019-04-17 NOTE — PROGRESS NOTES
Problem: Mobility Impaired (Adult and Pediatric) Goal: *Acute Goals and Plan of Care (Insert Text) Description Physical Therapy Goals Goals re-evaluated 4/12/2019 1. Patient will move from supine to sit and sit to supine , scoot up and down and roll side to side in bed with moderate assistance within 7 day(s). 2.  Patient will transfer from bed to chair and chair to bed with moderate assistance using the least restrictive device within 7 day(s). 3.  Patient will perform sit to stand with moderate assist within 7 day(s). 4.  Patient will ambulate with moderate assist for 25 feet with the least restrictive device within 7 day(s). 5.  Patient will tolerate unsupported sitting for 10 minutes without assist within 7 days. Initiated 4/10/2019 1. Patient will move from supine to sit and sit to supine , scoot up and down and roll side to side in bed with modified independence within 7 day(s). 2.  Patient will transfer from bed to chair and chair to bed with minimal assistance/contact guard assist using the least restrictive device within 7 day(s). 3.  Patient will perform sit to stand with minimal assistance/contact guard assist within 7 day(s). 4.  Patient will ambulate with minimal assistance/contact guard assist for 25 feet with the least restrictive device within 7 day(s). Outcome: Progressing Towards Goal 
 
PHYSICAL THERAPY TREATMENT Patient: Derrick Miller (25 y.o. female) Date: 4/17/2019 Diagnosis: Perforated diverticulum of large intestine [K57.20] <principal problem not specified> Procedure(s) (LRB): 
LAPAROTOMY EXPLORATORY/ SIGMOIDECTOMY/ COLSTOMY (N/A) externalize Shunt Ventricular-Peritoneal (Right) 9 Days Post-Op Precautions: Fall(external  shunt) Chart, physical therapy assessment, plan of care and goals were reviewed.  
 
ASSESSMENT: 
Cleared for mobility progression by RN, anticipating re-internalization of  shunt this PM by neurosurgery. Pt demonstrates gradual progress towards established tx goals - remains limited by poor STM (baseline), decreased cardiopulmonary tolerance to activity (RR up to 40 with minimal activity), anxiety, and generalized weakness limiting ability to complete mobility tasks. Overall, today she was able to complete supine>sit with up to mod A for initiations and righting trunk; min A for sit<>stands from various surfaces; and min A to take several steps over to chair with RW. Fatigues very quickly with minimal activity. Remained up in chair at end of session, NAD. She remains significantly below her baseline level - recommending discharge to acute IP rehab once medically cleared. Prior level of function: ambulatory; lives with daughter who provides 24/7 SUP and assist as needed PLAN: 
Patient continues to benefit from skilled intervention to address the above impairments. Continue treatment per established plan of care. Discharge recommendations: Rehab: patient is working towards tolerating 3 hours of therapy (to regain functional baseline patient requires rehab) If above is not an option then recommend: Rehab at skilled nursing facility (SNF) (to regain functional baseline patient requires rehab) Patient's barriers to discharging home, in addition to above impairments: level of physical assist required to maintain patient safety. Equipment recommendations for successful discharge (if) home: TBD SUBJECTIVE:  
Patient stated ? I want something to drink. ? OBJECTIVE DATA SUMMARY:  
Critical Behavior: 
Neurologic State: Drowsy Orientation Level: Oriented to person, Oriented to place, Disoriented to situation, Disoriented to time(STM loss) Cognition: Decreased attention/concentration, Follows commands Safety/Judgement: Decreased awareness of environment, Decreased awareness of need for assistance, Decreased awareness of need for safety, Decreased insight into deficits, Fall prevention Functional Mobility Training: 
Bed Mobility: 
Supine to Sit: Additional time; Moderate assistance Scooting: Minimum assistance; Additional time;Assist x1 Transfers: 
Sit to Stand: Minimum assistance Stand to Sit: Minimum assistance Bed to Chair: Minimum assistance Balance: 
Sitting: Intact Standing: Impaired; With support Standing - Static: Fair Standing - Dynamic : Fair Activity Tolerance:  
requires rest breaks Please refer to the flowsheet for vital signs taken during this treatment. After treatment patient left:  
Up in chair Call light within reach RN notified Family at bedside COMMUNICATION/COLLABORATION:  
The patient?s plan of care was discussed with: Occupational Therapist and Registered Nurse Harmony De Los Santos, PT, DPT Time Calculation: 31 mins

## 2019-04-17 NOTE — WOUND CARE
WOCN Ostomy Progress Note:  
  
Postoperative visit in ICU. Surgery: Laparotomy with sigmoidectomy and end colostomy  
Date of Surgery: 4.8. 19      Surgeon: Dr. Kyung Church Type of Ostomy: End Colostomy  
Stoma Location: LUQ Daughter Yuriy Found at the bedside. Bed:  Sport.  If patient is transferred out of ICU then order an Envision on [employer] bed. Offloaded heels with pillows. Turn team to assist with q2 turns. 
  
Ostomy Assessment: 
Stoma appearance: pink, moist, edematous and flushed. Output: liquid brown stool with flatus. Current pouch:  2 piece 2 1/4 pouch flat pouch with eakins ring intact without leakage. 
   
Recommendations: 1.  Mepilex transfer under bipap mask when in use. 2.  Bed:  Sport.  If patient is transferred out of ICU then order an Envision on [employer] bed. 3.  Prevalon boots or offload with pillows. 4.  Sacrum and buttocks:  Twice daily and as needed apply Aloe [place]. 5.  Turn team 
  
1. Empty appliance when 1/3 full and PRN. Encourage patient/family to notify nurse and assist w/ pouch emptying to promote self-care. Change appliance twice weekly and PRN for leaking ASAP. DO NOT REINFORCE LEAKS to avoid skin breakdown. 
  
Transition of Care: 
Ostomy nurse to return Thursday. 
  
[facility] [phone] Pager [phone]

## 2019-04-17 NOTE — ROUTINE PROCESS
Patient: Chai Oconnor MRN: [id number]  SSN: [id number] YOB: 1950  Age: 79 y.o. Sex: female Patient is status post Procedure(s): RIGHT SIDE INTERPLEURAL PLACEMENT OF VENTRICULO PLEURAL SHUNT. Surgeon(s) and Role: Richie Bansal MD - Primary Local/Dose/Irrigation: See [facility] 
 
       
PICC Double Lumen 48/79/57 Right;Basilic (Active) Central Line Being Utilized Yes 4/17/2019 12:00 PM  
Criteria for Appropriate Use Limited/no vessel suitable for conventional peripheral access 4/17/2019 12:00 PM  
Site Assessment Clean, dry, & intact 4/17/2019 12:00 PM  
Phlebitis Assessment 0 4/17/2019 12:00 PM  
Infiltration Assessment 0 4/17/2019 12:00 PM  
Arm Circumference (cm) 32 cm 4/8/2019 10:26 AM  
Date of Last Dressing Change 04/15/19 4/17/2019 12:00 PM  
Dressing Status Clean, dry, & intact 4/17/2019 12:00 PM  
Action Taken Open ports on tubing capped 4/17/2019 12:00 PM  
External Catheter Length (cm) 0 centimeters 4/12/2019  8:00 PM  
Dressing Type Disk with Chlorhexadine gluconate (CHG); Transparent;Tape 4/17/2019 12:00 PM  
Hub Color/Line Status Purple; Infusing 4/17/2019 12:00 PM  
Positive Blood Return (Site #1) Yes 4/17/2019 12:00 PM  
Hub Color/Line Status Red;Capped 4/17/2019 12:00 PM  
Positive Blood Return (Site #2) Yes 4/17/2019 12:00 PM  
Alcohol Cap Used Yes 4/17/2019 12:00 PM  
      
Peripheral IV 04/12/19 Anterior;Right Hand (Active) Site Assessment Clean, dry, & intact 4/17/2019 12:00 PM  
Phlebitis Assessment 0 4/17/2019 12:00 PM  
Infiltration Assessment 0 4/17/2019 12:00 PM  
Dressing Status Clean, dry, & intact 4/17/2019 12:00 PM  
Dressing Type Transparent;Tape 4/17/2019 12:00 PM  
Hub Color/Line Status Pink;Capped 4/17/2019 12:00 PM  
Action Taken Open ports on tubing capped 4/17/2019 12:00 PM  
Alcohol Cap Used Yes 4/17/2019 12:00 PM  
      
Drain Ventriculopleural Shunt Right Chest 04/17/19 Right;Upper Other (comment) (Active) External Female Catheter 04/16/19 (Active) Site Assessment Clean, dry, & intact 4/17/2019 12:00 PM  
Repositioned No 4/17/2019  8:00 AM  
Perineal Care No 4/17/2019  8:00 AM  
Wick Changed No 4/17/2019  8:00 AM  
Suction Canister/Tubing Changed No 4/17/2019  8:00 AM  
Urine Output (mL) 300 ml 4/17/2019 10:56 AM  
           
 
 
 
Dressing/Packing:  Wound Abdomen Medial-Dressing Type: Staples; Open to air (04/17/19 0800) Wound Chest Right-Dressing Type: 4 x 4;Adhesive wound closure strips (Steri-Strips); Adhesive wound dressing (Mastisol)(Occlusive dressing) (04/17/19 1548) Wound Abdomen-Dressing Type: Topical skin adhesive/glue (04/17/19 0800) Other:  Shunt placement right chest

## 2019-04-17 NOTE — PROGRESS NOTES
0730: Bedside shift change report given to Cheyanne Hays RN (oncoming nurse) by Candace Chen (offgoing nurse). Report included the following information SBAR, Kardex, ED Summary, OR Summary, Procedure Summary, Intake/Output, MAR, Accordion and Recent Results. 1400: TRANSFER - OUT REPORT: 
 
Verbal report given to Reanna Duque RN(name) on Newton Wilkins  being transferred to [facility](unit) for routine progression of care Report consisted of patients Situation, Background, Assessment and  
Recommendations(SBAR). Information from the following report(s) SBAR, Kardex, ED Summary, Procedure Summary, Intake/Output, MAR, Accordion and Recent Results was reviewed with the receiving nurse. Lines: PICC Double Lumen 95/85/04 Right;Basilic (Active) Central Line Being Utilized Yes 4/17/2019 12:00 PM  
Criteria for Appropriate Use Limited/no vessel suitable for conventional peripheral access 4/17/2019 12:00 PM  
Site Assessment Clean, dry, & intact 4/17/2019 12:00 PM  
Phlebitis Assessment 0 4/17/2019 12:00 PM  
Infiltration Assessment 0 4/17/2019 12:00 PM  
Arm Circumference (cm) 32 cm 4/8/2019 10:26 AM  
Date of Last Dressing Change 04/15/19 4/17/2019 12:00 PM  
Dressing Status Clean, dry, & intact 4/17/2019 12:00 PM  
Action Taken Open ports on tubing capped 4/17/2019 12:00 PM  
External Catheter Length (cm) 0 centimeters 4/12/2019  8:00 PM  
Dressing Type Disk with Chlorhexadine gluconate (CHG); Transparent;Tape 4/17/2019 12:00 PM  
Hub Color/Line Status Purple; Infusing 4/17/2019 12:00 PM  
Positive Blood Return (Site #1) Yes 4/17/2019 12:00 PM  
Hub Color/Line Status Red;Capped 4/17/2019 12:00 PM  
Positive Blood Return (Site #2) Yes 4/17/2019 12:00 PM  
Alcohol Cap Used Yes 4/17/2019 12:00 PM  
   
Peripheral IV 04/12/19 Anterior;Right Hand (Active) Site Assessment Clean, dry, & intact 4/17/2019 12:00 PM  
Phlebitis Assessment 0 4/17/2019 12:00 PM  
Infiltration Assessment 0 4/17/2019 12:00 PM  
 Dressing Status Clean, dry, & intact 4/17/2019 12:00 PM  
Dressing Type Transparent;Tape 4/17/2019 12:00 PM  
Hub Color/Line Status Pink;Capped 4/17/2019 12:00 PM  
Action Taken Open ports on tubing capped 4/17/2019 12:00 PM  
Alcohol Cap Used Yes 4/17/2019 12:00 PM  
  
 
Opportunity for questions and clarification was provided. Patient transported with: 
 Monitor O2 @ 3 liters Registered Nurse Tech

## 2019-04-17 NOTE — BRIEF OP NOTE
BRIEF OPERATIVE NOTE Date of Procedure: 4/17/2019 Preoperative Diagnosis: HYDROCEPHALUS Postoperative Diagnosis: HYDROCEPHALUS Procedure(s): RIGHT SIDE INTERPLEURAL PLACEMENT OF VENTRICULO PLEURAL SHUNT Surgeon(s) and Role: Maximino Conway MD - Primary Event Time In Time Out Incision Start 04/17/2019 1531 Incision Close 04/17/2019 1556 Anesthesia: General  
Estimated Blood Loss: minimal 
Specimens: * No specimens in log * Findings: good placement Complications: none Implants: * No implants in log *

## 2019-04-17 NOTE — PERIOP NOTES
Spoke with answering service for [facility] just now. On call cardiologist Dr. Alee Segundo has been paged for inpatient consult new onset atrial fib per Dr. Ingrid Montes.

## 2019-04-17 NOTE — ROUTINE PROCESS
TRANSFER - IN REPORT: 
 
Verbal report received from Jeanie(name) on Joseph Arch  being received from 7101(unit) for ordered procedure Report consisted of patients Situation, Background, Assessment and  
Recommendations(SBAR). Information from the following report(s) SBAR and Kardex was reviewed with the receiving nurse. Opportunity for questions and clarification was provided. Assessment completed upon patients arrival to unit and care assumed.

## 2019-04-17 NOTE — CONSULTS
Cardiology Consult    Patient: Roque Erwin MRN: [id number]  SSN: [id number]    YOB: 1950  Age: 79 y.o. Sex: female       Subjective:      Date of  Admission: 3/26/2019     Admission type: Emergency    Roque Erwin is a 79 y.o. female admitted for Perforated diverticulum of large intestine [K57.20]. Reviewed hospital course. Cardiology consulted for new afib RVR. Pt with COPD, HTN, SAH, initially admit with perforated diverticulitis s/p surgery 3/29, externalized  shunt, s/p relocation of  shunt into right pleural space today, noted this afternoon to be in afib with RVR. Started on diltiazem gtt. She is still in afib, HR now in 120s. Denies any palpitations and was unaware she was in afib. No h/o of afib. No chest pain. Has been short of breath. Echo 4/2/19 noted EF 65%. Primary Care Provider: Elvis Lomeli MD  Past Medical History:   Diagnosis Date    ACP (advance care planning) 2/21/2017    Initial ACP discussion w/ pt and daughter 2-2017. AMD form reviewed and copy provided.  NN/facilitator to discuss with patient     Asthma     hx of    Benign essential hypertension 6/24/2016    Bilateral hip pain 6/3/2014    xrays 2012, negative    COPD (chronic obstructive pulmonary disease) ([facility] [address] 3/29/2010    Depression 3/29/2010    Diverticulitis     DVT of Rt Lower Extremity 3/29/2010    Elevated LFT's, Fatty Liver 3/29/2010    Essential tremor 7/14/2010    GERD (gastroesophageal reflux disease) 9/2/2011    H/O Subarachnoid hemorrhage due to ruptured aneurysm 7/14/2010    Hyperlipemia 3/29/2010    Impaired fasting glucose 7/13/2014 6/2014    Thyroiditis, subacute 3/29/2010    Tobacco Abuse 3/29/2010    Vitamin D deficiency 6/3/2014    2012      Past Surgical History:   Procedure Laterality Date    ECHO STRESS  2005    Negative    HX COLONOSCOPY  3/2013, Dr Red Ballard    benign cecal polyp, f/u 10 yrs    HX CRANIOTOMY  10/06    for clipping of ruptured aneurysm; Dr Bañuelso Snare a shunt    HX ENDOSCOPY  EGD, Dr Karthik Duggan    \"ok\" per pt report    HX HYSTERECTOMY      Cervical Dysplasia (Dr Maxi Sher)    TX COLONOSCOPY W/BIOPSY SINGLE/MULTIPLE      Benign polyp     Family History   Problem Relation Age of Onset    Anxiety Daughter     Attention Deficit Disorder Daughter     Drug Abuse Daughter     Anxiety Daughter     Thyroid Disease Daughter     Thyroid Disease Daughter         Hypothyroidism    Asthma Daughter     Cancer Father         kidney    Diabetes Father    Buster Speed Father     Diabetes Mother     Hypertension Mother    Buster Speed Mother     Stroke Mother     Hypertension Sister     Depression Sister     Pneumonia Sister          age 72    Diabetes Sister     Thyroid Disease Sister     Thyroid Disease Sister     Heart Disease Sister     Diabetes Maternal Grandmother     No Known Problems Brother     Thyroid Disease Sister     Anxiety Sister     Depression Sister     Alcohol abuse Sister    [facility] Hypertension Sister     Hypertension Sister     Thyroid Disease Sister     Thyroid Disease Sister       Social History     Tobacco Use    Smoking status: Current Every Day Smoker     Packs/day: 1.00     Years: 20.00     Pack years: 20.00     Types: Cigarettes    Smokeless tobacco: Never Used    Tobacco comment: cut back from 1ppd to 3/4 ppd    Substance Use Topics    Alcohol use: No     Alcohol/week: 0.0 oz      Current Facility-Administered Medications   Medication Dose Route Frequency    ceFAZolin (ANCEF) 2 g/20 mL in sterile water IV syringe  2 g IntraVENous Q8H    dilTIAZem (CARDIZEM) 125 mg in dextrose 5% 125 mL infusion  10 mg/hr IntraVENous CONTINUOUS    digoxin (LANOXIN) injection 250 mcg  250 mcg IntraVENous Q4H    metoprolol (LOPRESSOR) injection 5 mg  5 mg IntraVENous Q4H    [START ON 2019] losartan (COZAAR) tablet 50 mg  50 mg Oral DAILY    hydrALAZINE (APRESOLINE) tablet 25 mg  25 mg Oral TID    HYDROmorphone (DILAUDID) tablet 2 mg  2 mg Oral Q4H PRN    hydrALAZINE (APRESOLINE) 20 mg/mL injection 10 mg  10 mg IntraVENous Q6H PRN    labetalol (NORMODYNE;TRANDATE) 20 mg/4 mL (5 mg/mL) injection 20 mg  20 mg IntraVENous Q4H PRN    acetaminophen (TYLENOL) tablet 650 mg  650 mg Oral Q4H PRN    furosemide (LASIX) injection 40 mg  40 mg IntraVENous BID    diazePAM (VALIUM) injection 5 mg  5 mg IntraVENous Q4H PRN    cefepime (MAXIPIME) 2 g in 0.9% sodium chloride (MBP/ADV) 100 mL  2 g IntraVENous Q8H    calcium carbonate (TUMS) chewable tablet 400 mg [elemental]  400 mg Oral TID WITH MEALS    albuterol-ipratropium (DUO-NEB) 2.5 MG-0.5 MG/3 ML  3 mL Nebulization QID RT    potassium, sodium phosphates (NEUTRA-PHOS) packet 1 Packet  1 Packet Oral QID    diphenhydrAMINE (BENADRYL) injection 25 mg  25 mg IntraVENous QHS PRN    bacitracin 500 unit/gram packet 1 Packet  1 Packet Topical PRN    metroNIDAZOLE (FLAGYL) IVPB premix 500 mg  500 mg IntraVENous Q8H    sodium chloride (NS) flush 5-40 mL  5-40 mL IntraVENous Q8H    sodium chloride (NS) flush 5-40 mL  5-40 mL IntraVENous PRN    glucose chewable tablet 16 g  4 Tab Oral PRN    dextrose (D50W) injection syrg 12.5-25 g  12.5-25 g IntraVENous PRN    glucagon (GLUCAGEN) injection 1 mg  1 mg IntraMUSCular PRN    insulin regular (NOVOLIN R, HUMULIN R) injection   SubCUTAneous Q6H    fluconazole (DIFLUCAN) 400mg/200 mL IVPB (premix)  400 mg IntraVENous DAILY    cloNIDine (CATAPRES) 0.2 mg/24 hr patch 1 Patch  1 Patch TransDERmal Q7D    sodium chloride (NS) flush 5-40 mL  5-40 mL IntraVENous Q8H    sodium chloride (NS) flush 5-40 mL  5-40 mL IntraVENous PRN    ondansetron (ZOFRAN) injection 4 mg  4 mg IntraVENous Q4H PRN    pantoprazole (PROTONIX) 40 mg in sodium chloride 0.9% 10 mL injection  40 mg IntraVENous Q24H    albuterol (PROVENTIL VENTOLIN) nebulizer solution 2.5 mg  2.5 mg Nebulization Q6H PRN    sertraline (ZOLOFT) tablet 100 mg  100 mg Oral DAILY    traZODone (DESYREL) tablet 50 mg  50 mg Oral QHS PRN        Allergies   Allergen Reactions    Ativan [Lorazepam] Hives    Pcn [Penicillins] Other (comments)     Unsure of reaction; pt reports unsure if have taken cephalosporin before.  Wellbutrin [Bupropion Hcl] Anxiety    Xanax [Alprazolam] Hives        Review of Systems:  A comprehensive review of systems was negative except for that written in the History of Present Illness. Subjective:     Visit Vitals  /71 (BP 1 Location: Left arm, BP Patient Position: At rest)   Pulse (!) 128   Temp 98.4 °F (36.9 °C)   Resp 29   Ht 5' 8\" (1.727 m)   Wt 194 lb 0.1 oz (88 kg)   SpO2 98%   BMI 29.50 kg/m²        Physical Exam:  Visit Vitals  /71 (BP 1 Location: Left arm, BP Patient Position: At rest)   Pulse (!) 128   Temp 98.4 °F (36.9 °C)   Resp 29   Ht 5' 8\" (1.727 m)   Wt 194 lb 0.1 oz (88 kg)   SpO2 98%   BMI 29.50 kg/m²     General Appearance:  Somnolent but arousable, appropriate, no acute distress. Ears/Nose/Mouth/Throat:   Hearing grossly normal.         Neck: Supple. No JVD   Chest:   Scattered rhonchi, decreased BS bases   Cardiovascular:  Irregularly irregular tachy, S1, S2 normal, no murmur. Abdomen:   Soft, non-tender, bowel sounds are active. Extremities: trace edema bilaterally. Skin: Warm and dry. Cardiographics:  Telemetry: AFIB  ECG: nonspecific ST and T waves changes, atrial fibrillation with rapid HR      Data Reviewed: All lab results for the last 24 hours reviewed.      Assessment:         Hospital Problems  Date Reviewed: 4/17/2019          Codes Class Noted POA    Acute hypoxemic respiratory failure [facility]) ICD-10-CM: J96.01  ICD-9-CM: 518.81  4/12/2019 No        Pneumonia due to infectious organism ICD-10-CM: J18.9  ICD-9-CM: 136.9, 484.8  4/12/2019 No        Perforated diverticulum of large intestine ICD-10-CM: K57.20  ICD-9-CM: 562.10  3/26/2019 Yes               Plan:     Afib with RVR. New diagnosis for her. Continue diltiazem gtt. Start metoprolol IV with hold parameters. Limited options with borderline BP. Dig x2. Will adjust other antihypertensives down to allow for meds for rate control (hydralazine, losartan). Had echo recently with preserved LV systolic function and does not need to be repeated. Goal HR overnight HR<120 and discussed with nursing.

## 2019-04-17 NOTE — ANESTHESIA PREPROCEDURE EVALUATION
Relevant Problems No relevant active problems Anesthetic History No history of anesthetic complications Review of Systems / Medical History Patient summary reviewed, nursing notes reviewed and pertinent labs reviewed Pulmonary Within defined limits COPD Asthma Neuro/Psych Within defined limits Psychiatric history Cardiovascular Within defined limits Hypertension GI/Hepatic/Renal 
Within defined limits GERD Endo/Other Within defined limits Hypothyroidism Other Findings Physical Exam 
 
Airway Mallampati: II 
TM Distance: > 6 cm Neck ROM: normal range of motion Mouth opening: Normal 
 
 Cardiovascular Regular rate and rhythm,  S1 and S2 normal,  no murmur, click, rub, or gallop Dental 
No notable dental hx Pulmonary Breath sounds clear to auscultation Abdominal 
GI exam deferred Other Findings Anesthetic Plan ASA: 4 Anesthesia type: general 
 
 
 
 
Induction: Intravenous Anesthetic plan and risks discussed with: Patient

## 2019-04-17 NOTE — PROGRESS NOTES
Progress Note Patient: Marianne Chan MRN: [id number]  SSN: [id number] YOB: 1950  Age: 79 y.o. Sex: female Admit Date: 3/26/2019 
 
9 Days Post-Op Procedure:  Procedure(s): LAPAROTOMY EXPLORATORY/ SIGMOIDECTOMY/ COLOSTOMY 
externalize Shunt Ventricular-Peritoneal 
 
Subjective:  
 
Patient now on nasal canula; denies chills; tolerating GI Lite diet. Objective:  
 
Visit Vitals /67 Pulse 89 Temp 98.7 °F (37.1 °C) Resp 29 Ht 5' 8\" (1.727 m) Wt 194 lb 0.1 oz (88 kg) SpO2 95% BMI 29.50 kg/m² Temp (24hrs), Av.2 °F (37.3 °C), Min:98.7 °F (37.1 °C), Max:100.5 °F (38.1 °C) Physical Exam: ABDOMEN: Obese, non-distended; midline wound without signs of infection; productive colostomy; appropriate incisional pain with palpation. Data Review: VS, I/O's, Labs Lab Review:  
Recent Results (from the past 12 hour(s)) GLUCOSE, POC Collection Time: 19 11:42 PM  
Result Value Ref Range Glucose (POC) 98 65 - 100 mg/dL Performed by Austen Catherine METABOLIC PANEL, BASIC Collection Time: 19  4:36 AM  
Result Value Ref Range Sodium 140 136 - 145 mmol/L Potassium 3.6 3.5 - 5.1 mmol/L Chloride 100 97 - 108 mmol/L  
 CO2 34 (H) 21 - 32 mmol/L Anion gap 6 5 - 15 mmol/L Glucose 100 65 - 100 mg/dL BUN 11 6 - 20 MG/DL Creatinine 0.43 (L) 0.55 - 1.02 MG/DL  
 BUN/Creatinine ratio 26 (H) 12 - 20 GFR est AA >60 >60 ml/min/1.73m2 GFR est non-AA >60 >60 ml/min/1.73m2 Calcium 8.4 (L) 8.5 - 10.1 MG/DL  
CBC WITH AUTOMATED DIFF Collection Time: 19  4:36 AM  
Result Value Ref Range WBC 10.6 3.6 - 11.0 K/uL  
 RBC 2.54 (L) 3.80 - 5.20 M/uL HGB 7.4 (L) 11.5 - 16.0 g/dL HCT 24.6 (L) 35.0 - 47.0 % MCV 96.9 80.0 - 99.0 FL  
 MCH 29.1 26.0 - 34.0 PG  
 MCHC 30.1 30.0 - 36.5 g/dL  
 RDW 14.3 11.5 - 14.5 % PLATELET 343 178 - 262 K/uL MPV 9.5 8.9 - 12.9 FL  
 NRBC 0.0 0  WBC ABSOLUTE NRBC 0.00 0.00 - 0.01 K/uL NEUTROPHILS 63 32 - 75 % LYMPHOCYTES 21 12 - 49 % MONOCYTES 10 5 - 13 % EOSINOPHILS 5 0 - 7 % BASOPHILS 0 0 - 1 % IMMATURE GRANULOCYTES 1 (H) 0.0 - 0.5 % ABS. NEUTROPHILS 6.6 1.8 - 8.0 K/UL  
 ABS. LYMPHOCYTES 2.2 0.8 - 3.5 K/UL  
 ABS. MONOCYTES 1.1 (H) 0.0 - 1.0 K/UL  
 ABS. EOSINOPHILS 0.5 (H) 0.0 - 0.4 K/UL  
 ABS. BASOPHILS 0.0 0.0 - 0.1 K/UL  
 ABS. IMM. GRANS. 0.1 (H) 0.00 - 0.04 K/UL  
 DF AUTOMATED Assessment:  
 
Hospital Problems  Date Reviewed: 2019 Codes Class Noted POA Acute hypoxemic respiratory failure ([facility] [address] ICD-10-CM: J96.01 
ICD-9-CM: 518.81  2019 No  
   
 Pneumonia due to infectious organism ICD-10-CM: J18.9 ICD-9-CM: 136.9, 484.8  2019 No  
   
 Perforated diverticulum of large intestine ICD-10-CM: K57.20 ICD-9-CM: 562.10  3/26/2019 Yes Normalization of WBC count, afebrile, tolerating diet. Plan/Recommendations/Medical Decision Makin. Resiting of  shunt later today. 2. PT. 
3. Antibiotics. 4. Discharge planning for Inpatient Rehab.

## 2019-04-17 NOTE — PERIOP NOTES
Okayed by Dr. Cindy Mark to transfer patient back to ICU now that cardiology consult has been phoned.

## 2019-04-17 NOTE — BRIEF OP NOTE
BRIEF OPERATIVE NOTE Date of Procedure: 4/17/2019 Preoperative Diagnosis: HYDROCEPHALUS Postoperative Diagnosis: HYDROCEPHALUS Procedure(s): RIGHT SIDE INTERPLEURAL PLACEMENT OF VENTRICULO PLEURAL SHUNT Surgeon(s) and Role: Steve Domingo MD - Primary Surgical Assistant: Elizabeth Maldonado Surgical Staff: 
Circ-1: Candace Givens RN Scrub Tech-1: Barragan Hung Surg Asst-1: Mohamud Xie Float Staff: Darren Kitchen RN; Promise Lora RN Event Time In Time Out Incision Start 04/17/2019 1531 Incision Close 04/17/2019 1556 Anesthesia: General  
Estimated Blood Loss: 5cc Specimens: * No specimens in log * Findings: shunt placed in right chest  
Complications: none Implants: * No implants in log *

## 2019-04-17 NOTE — PERIOP NOTES
TRANSFER - OUT REPORT: 
 
Verbal report given to ICU RN Irina(name) on Charmain Lanes  being transferred to ICU 1(unit) for routine post - op Report consisted of patients Situation, Background, Assessment and  
Recommendations(SBAR). Time Pre op antibiotic given:1517 Anesthesia Stop time: 3327 Nails Present on Transfer to floor:nn Order for Nails on Chart:n 
Discharge Prescriptions with Chart:n Information from the following report(s) SBAR, OR Summary, Intake/Output, MAR, Recent Results and Cardiac Rhythm Atrial fib/sinus tach was reviewed with the receiving nurse. Opportunity for questions and clarification was provided. Is the patient on 02? YES 
     L/Min 2 Other Is the patient on a monitor? YES Is the nurse transporting with the patient? YES Surgical Waiting Area notified of patient's transfer from PACU? YES, via volunteer Thomas Lucero The following personal items collected during your admission accompanied patient upon transfer:  
[address] [place] [address] PACU Pacu RN spoke with Dr. Henry Barbosa over phone. He will be seeing patient in ICU [facility]. No immediate orders at this time.

## 2019-04-17 NOTE — PROGRESS NOTES
Problem: Self Care Deficits Care Plan (Adult) Goal: *Acute Goals and Plan of Care (Insert Text) Description Occupational Therapy Goals Initiated 4/10/2019, re-evaluation s/p rapid response 4/12/2019 - all goals adjusted to reflect decline 1. Patient will perform grooming sitting unsupported EOB with minimal assistance within 7 day(s). 2.  Patient will perform upper body dressing/bathing sitting unsupported EOB with minimal assistance within 7 day(s). 3.  Patient will perform toilet transfers to/from [facility] with moderate assistance within 7 day(s). 4.  Patient will perform all aspects of toileting with moderate assistance within 7 day(s). 5.  Patient will participate in upper extremity therapeutic exercise/activities with minimal assistance for 10 minutes within 7 day(s). Initiated 4/10/2019 1. Patient will perform standing ADLs at sink with least restrictive DME with supervision/set-up within 7 day(s). 2.  Patient will perform lower body dressing with minimal assistance/contact guard assist using AE PRN within 7 day(s). 3.  Patient will perform bathing with minimal assistance/contact guard assist within 7 day(s). 4.  Patient will perform toilet transfers with supervision/set-up within 7 day(s). 5.  Patient will perform all aspects of toileting with minimal assistance/contact guard assist within 7 day(s). 6.  Patient will participate in upper extremity therapeutic exercise/activities with supervision/set-up for 10 minutes within 7 day(s). Outcome: Progressing Towards Goal 
  
OCCUPATIONAL THERAPY TREATMENT Patient: Derrick Miller (70 y.o. female) Date: 4/17/2019 Diagnosis: Perforated diverticulum of large intestine [K57.20] <principal problem not specified> Procedure(s) (LRB): 
LAPAROTOMY EXPLORATORY/ SIGMOIDECTOMY/ COLSTOMY (N/A) externalize Shunt Ventricular-Peritoneal (Right) 9 Days Post-Op Precautions: Fall(external  shunt) Chart, occupational therapy assessment, plan of care, and goals were reviewed. ASSESSMENT: 
Pt making slow, steady progress towards goals. 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 She performed bed mobility with mod A, sit to stand with min A and OOB mobility to chair using RW with min A.  LE dressing tasks including slipper sock management required total A. Pt requesting to wash hair, but once in chair too exhausted for task. Recommend IP rehab at discharge. If declined then SNF. Progression toward goals: 
?       Improving appropriately and progressing toward goals ? Improving slowly and progressing toward goals ? Not making progress toward goals and plan of care will be adjusted PLAN: 
Patient continues to benefit from skilled intervention to address the above impairments. Continue treatment per established plan of care. Discharge Recommendations:  Inpatient Rehab Further Equipment Recommendations for Discharge:  TBD SUBJECTIVE:  
Patient stated ? Are you the boss?? OBJECTIVE DATA SUMMARY:  
Cognitive/Behavioral Status: 
Neurologic State: Drowsy Orientation Level: Oriented to person;Oriented to place; Disoriented to situation;Disoriented to time(STM loss) Cognition: Decreased attention/concentration; Follows commands Perception: Appears intact Perseveration: No perseveration noted Safety/Judgement: Decreased awareness of environment;Decreased awareness of need for assistance;Decreased awareness of need for safety;Decreased insight into deficits; Fall prevention Functional Mobility and Transfers for ADLs: 
Bed Mobility: 
Supine to Sit: Moderate assistance; Additional time;Assist x1 Scooting: Minimum assistance; Additional time;Assist x1 Transfers: Sit to Stand: Minimum assistance; Additional time;Assist x1(RW in front of pt) Bed to Chair: Minimum assistance; Additional time;Assist x1(using RW) Balance: 
Sitting: Intact Standing: Impaired; With support Standing - Static: Fair Standing - Dynamic : Fair ADL Intervention: 
Grooming Washing Face: Supervision/set-up Cues: Verbal cues provided Lower Body Dressing Assistance Socks: Total assistance (dependent) Leg Crossed Method Used: No(attempted) Position Performed: Seated edge of bed Cues: Don;Physical assistance; Tactile cues provided;Verbal cues provided;Visual cues provided Cognitive Retraining Safety/Judgement: Decreased awareness of environment;Decreased awareness of need for assistance;Decreased awareness of need for safety;Decreased insight into deficits; Fall prevention Pain: 
Pain Scale 1: Numeric (0 - 10) Pain Intensity 1: 2 Pain Location 1: Abdomen Pain Orientation 1: Lower Pain Description 1: Dull;Aching Pain Intervention(s) 1: Medication (see MAR) Activity Tolerance:  
Fair Please refer to the flowsheet for vital signs taken during this treatment. After treatment:  
? Patient left in no apparent distress sitting up in chair ? Patient left in no apparent distress in bed 
? Call bell left within reach ? Nursing notified ? Caregiver present- pt's daughter ? Bed alarm activated COMMUNICATION/COLLABORATION:  
The patient?s plan of care was discussed with: Physical Therapist and Registered Nurse Jessy Sher OT Time Calculation: 31 mins

## 2019-04-18 ENCOUNTER — APPOINTMENT (OUTPATIENT)
Dept: GENERAL RADIOLOGY | Age: 69
DRG: 329 | End: 2019-04-18
Attending: PHYSICIAN ASSISTANT
Payer: MEDICARE

## 2019-04-18 LAB
ANION GAP SERPL CALC-SCNC: 6 MMOL/L (ref 5–15)
BACTERIA SPEC CULT: NORMAL
BASOPHILS # BLD: 0 K/UL (ref 0–0.1)
BASOPHILS NFR BLD: 0 % (ref 0–1)
BUN SERPL-MCNC: 16 MG/DL (ref 6–20)
BUN/CREAT SERPL: 31 (ref 12–20)
CALCIUM SERPL-MCNC: 8.1 MG/DL (ref 8.5–10.1)
CHLORIDE SERPL-SCNC: 99 MMOL/L (ref 97–108)
CO2 SERPL-SCNC: 32 MMOL/L (ref 21–32)
CREAT SERPL-MCNC: 0.51 MG/DL (ref 0.55–1.02)
DIFFERENTIAL METHOD BLD: ABNORMAL
EOSINOPHIL # BLD: 0 K/UL (ref 0–0.4)
EOSINOPHIL NFR BLD: 0 % (ref 0–7)
ERYTHROCYTE [DISTWIDTH] IN BLOOD BY AUTOMATED COUNT: 14.5 % (ref 11.5–14.5)
GLUCOSE BLD STRIP.AUTO-MCNC: 142 MG/DL (ref 65–100)
GLUCOSE BLD STRIP.AUTO-MCNC: 204 MG/DL (ref 65–100)
GLUCOSE BLD STRIP.AUTO-MCNC: 218 MG/DL (ref 65–100)
GLUCOSE SERPL-MCNC: 196 MG/DL (ref 65–100)
HCT VFR BLD AUTO: 25.2 % (ref 35–47)
HGB BLD-MCNC: 7.5 G/DL (ref 11.5–16)
IMM GRANULOCYTES # BLD AUTO: 0.1 K/UL (ref 0–0.04)
IMM GRANULOCYTES NFR BLD AUTO: 1 % (ref 0–0.5)
LYMPHOCYTES # BLD: 1.7 K/UL (ref 0.8–3.5)
LYMPHOCYTES NFR BLD: 14 % (ref 12–49)
MAGNESIUM SERPL-MCNC: 2.2 MG/DL (ref 1.6–2.4)
MCH RBC QN AUTO: 29.3 PG (ref 26–34)
MCHC RBC AUTO-ENTMCNC: 29.8 G/DL (ref 30–36.5)
MCV RBC AUTO: 98.4 FL (ref 80–99)
MONOCYTES # BLD: 1 K/UL (ref 0–1)
MONOCYTES NFR BLD: 8 % (ref 5–13)
NEUTS SEG # BLD: 9.6 K/UL (ref 1.8–8)
NEUTS SEG NFR BLD: 77 % (ref 32–75)
NRBC # BLD: 0 K/UL (ref 0–0.01)
NRBC BLD-RTO: 0 PER 100 WBC
PLATELET # BLD AUTO: 415 K/UL (ref 150–400)
PMV BLD AUTO: 9.8 FL (ref 8.9–12.9)
POTASSIUM SERPL-SCNC: 3.7 MMOL/L (ref 3.5–5.1)
RBC # BLD AUTO: 2.56 M/UL (ref 3.8–5.2)
SERVICE CMNT-IMP: ABNORMAL
SERVICE CMNT-IMP: NORMAL
SODIUM SERPL-SCNC: 137 MMOL/L (ref 136–145)
WBC # BLD AUTO: 12.5 K/UL (ref 3.6–11)

## 2019-04-18 PROCEDURE — 74011000258 HC RX REV CODE- 258: Performed by: SPECIALIST

## 2019-04-18 PROCEDURE — 74011250637 HC RX REV CODE- 250/637: Performed by: INTERNAL MEDICINE

## 2019-04-18 PROCEDURE — 74011250637 HC RX REV CODE- 250/637: Performed by: SPECIALIST

## 2019-04-18 PROCEDURE — 74011636637 HC RX REV CODE- 636/637: Performed by: SPECIALIST

## 2019-04-18 PROCEDURE — 74011250636 HC RX REV CODE- 250/636: Performed by: SPECIALIST

## 2019-04-18 PROCEDURE — 74011000258 HC RX REV CODE- 258: Performed by: ANESTHESIOLOGY

## 2019-04-18 PROCEDURE — 85025 COMPLETE CBC W/AUTO DIFF WBC: CPT

## 2019-04-18 PROCEDURE — 77030027138 HC INCENT SPIROMETER -A

## 2019-04-18 PROCEDURE — 83735 ASSAY OF MAGNESIUM: CPT

## 2019-04-18 PROCEDURE — 36415 COLL VENOUS BLD VENIPUNCTURE: CPT

## 2019-04-18 PROCEDURE — 74011000250 HC RX REV CODE- 250: Performed by: SPECIALIST

## 2019-04-18 PROCEDURE — 94640 AIRWAY INHALATION TREATMENT: CPT

## 2019-04-18 PROCEDURE — 82962 GLUCOSE BLOOD TEST: CPT

## 2019-04-18 PROCEDURE — 77010033678 HC OXYGEN DAILY

## 2019-04-18 PROCEDURE — 97530 THERAPEUTIC ACTIVITIES: CPT

## 2019-04-18 PROCEDURE — 74011000250 HC RX REV CODE- 250: Performed by: ANESTHESIOLOGY

## 2019-04-18 PROCEDURE — 74011000250 HC RX REV CODE- 250: Performed by: INTERNAL MEDICINE

## 2019-04-18 PROCEDURE — 65660000000 HC RM CCU STEPDOWN

## 2019-04-18 PROCEDURE — 97168 OT RE-EVAL EST PLAN CARE: CPT

## 2019-04-18 PROCEDURE — 71045 X-RAY EXAM CHEST 1 VIEW: CPT

## 2019-04-18 PROCEDURE — 97535 SELF CARE MNGMENT TRAINING: CPT

## 2019-04-18 PROCEDURE — 97164 PT RE-EVAL EST PLAN CARE: CPT

## 2019-04-18 PROCEDURE — 77030038269 HC DRN EXT URIN PURWCK BARD -A

## 2019-04-18 PROCEDURE — C9113 INJ PANTOPRAZOLE SODIUM, VIA: HCPCS | Performed by: SPECIALIST

## 2019-04-18 PROCEDURE — 77030029684 HC NEB SM VOL KT MONA -A

## 2019-04-18 PROCEDURE — 94760 N-INVAS EAR/PLS OXIMETRY 1: CPT

## 2019-04-18 PROCEDURE — 97116 GAIT TRAINING THERAPY: CPT

## 2019-04-18 PROCEDURE — 80048 BASIC METABOLIC PNL TOTAL CA: CPT

## 2019-04-18 RX ORDER — METOPROLOL TARTRATE 25 MG/1
25 TABLET, FILM COATED ORAL EVERY 12 HOURS
Status: DISCONTINUED | OUTPATIENT
Start: 2019-04-18 | End: 2019-04-19

## 2019-04-18 RX ORDER — DILTIAZEM HYDROCHLORIDE 30 MG/1
60 TABLET, FILM COATED ORAL EVERY 8 HOURS
Status: DISCONTINUED | OUTPATIENT
Start: 2019-04-18 | End: 2019-04-18

## 2019-04-18 RX ORDER — DILTIAZEM HYDROCHLORIDE 30 MG/1
90 TABLET, FILM COATED ORAL EVERY 8 HOURS
Status: DISCONTINUED | OUTPATIENT
Start: 2019-04-18 | End: 2019-04-19

## 2019-04-18 RX ORDER — DILTIAZEM HYDROCHLORIDE 60 MG/1
TABLET, FILM COATED ORAL
Status: DISPENSED
Start: 2019-04-18 | End: 2019-04-18

## 2019-04-18 RX ORDER — METOPROLOL TARTRATE 25 MG/1
TABLET, FILM COATED ORAL
Status: DISPENSED
Start: 2019-04-18 | End: 2019-04-18

## 2019-04-18 RX ADMIN — HYDROMORPHONE HYDROCHLORIDE 2 MG: 4 TABLET ORAL at 21:30

## 2019-04-18 RX ADMIN — SERTRALINE 100 MG: 50 TABLET, FILM COATED ORAL at 08:08

## 2019-04-18 RX ADMIN — PANTOPRAZOLE SODIUM 40 MG: 40 INJECTION, POWDER, FOR SOLUTION INTRAVENOUS at 17:09

## 2019-04-18 RX ADMIN — IPRATROPIUM BROMIDE AND ALBUTEROL SULFATE 3 ML: .5; 3 SOLUTION RESPIRATORY (INHALATION) at 16:55

## 2019-04-18 RX ADMIN — CEFEPIME HYDROCHLORIDE 2 G: 2 INJECTION, POWDER, FOR SOLUTION INTRAVENOUS at 08:09

## 2019-04-18 RX ADMIN — POTASSIUM & SODIUM PHOSPHATES POWDER PACK 280-160-250 MG 1 PACKET: 280-160-250 PACK at 12:13

## 2019-04-18 RX ADMIN — FUROSEMIDE 40 MG: 10 INJECTION, SOLUTION INTRAMUSCULAR; INTRAVENOUS at 08:14

## 2019-04-18 RX ADMIN — CEFEPIME HYDROCHLORIDE 2 G: 2 INJECTION, POWDER, FOR SOLUTION INTRAVENOUS at 01:01

## 2019-04-18 RX ADMIN — TRAZODONE HYDROCHLORIDE 50 MG: 50 TABLET ORAL at 02:45

## 2019-04-18 RX ADMIN — HYDROMORPHONE HYDROCHLORIDE 2 MG: 4 TABLET ORAL at 16:23

## 2019-04-18 RX ADMIN — POTASSIUM & SODIUM PHOSPHATES POWDER PACK 280-160-250 MG 1 PACKET: 280-160-250 PACK at 08:07

## 2019-04-18 RX ADMIN — METOPROLOL TARTRATE 25 MG: 25 TABLET ORAL at 21:00

## 2019-04-18 RX ADMIN — METOPROLOL TARTRATE 5 MG: 5 INJECTION INTRAVENOUS at 04:42

## 2019-04-18 RX ADMIN — IPRATROPIUM BROMIDE AND ALBUTEROL SULFATE 3 ML: .5; 3 SOLUTION RESPIRATORY (INHALATION) at 07:45

## 2019-04-18 RX ADMIN — METRONIDAZOLE 500 MG: 500 INJECTION, SOLUTION INTRAVENOUS at 13:59

## 2019-04-18 RX ADMIN — DILTIAZEM HYDROCHLORIDE 10 MG/HR: 5 INJECTION, SOLUTION INTRAVENOUS at 05:33

## 2019-04-18 RX ADMIN — DILTIAZEM HYDROCHLORIDE 60 MG: 60 TABLET, FILM COATED ORAL at 13:59

## 2019-04-18 RX ADMIN — HYDROMORPHONE HYDROCHLORIDE 2 MG: 4 TABLET ORAL at 08:08

## 2019-04-18 RX ADMIN — CALCIUM CARBONATE (ANTACID) CHEW TAB 500 MG 400 MG: 500 CHEW TAB at 12:13

## 2019-04-18 RX ADMIN — HYDROMORPHONE HYDROCHLORIDE 2 MG: 4 TABLET ORAL at 01:33

## 2019-04-18 RX ADMIN — Medication 10 ML: at 05:33

## 2019-04-18 RX ADMIN — Medication 10 ML: at 05:34

## 2019-04-18 RX ADMIN — Medication 2 G: at 06:40

## 2019-04-18 RX ADMIN — IPRATROPIUM BROMIDE AND ALBUTEROL SULFATE 3 ML: .5; 3 SOLUTION RESPIRATORY (INHALATION) at 13:28

## 2019-04-18 RX ADMIN — FUROSEMIDE 40 MG: 10 INJECTION, SOLUTION INTRAMUSCULAR; INTRAVENOUS at 17:09

## 2019-04-18 RX ADMIN — HUMAN INSULIN 2 UNITS: 100 INJECTION, SOLUTION SUBCUTANEOUS at 16:50

## 2019-04-18 RX ADMIN — DILTIAZEM HYDROCHLORIDE 90 MG: 30 TABLET, FILM COATED ORAL at 21:15

## 2019-04-18 RX ADMIN — IPRATROPIUM BROMIDE AND ALBUTEROL SULFATE 3 ML: .5; 3 SOLUTION RESPIRATORY (INHALATION) at 19:30

## 2019-04-18 RX ADMIN — FLUCONAZOLE 400 MG: 400 INJECTION, SOLUTION INTRAVENOUS at 10:39

## 2019-04-18 RX ADMIN — METOPROLOL TARTRATE 25 MG: 25 TABLET ORAL at 08:32

## 2019-04-18 RX ADMIN — METRONIDAZOLE 500 MG: 500 INJECTION, SOLUTION INTRAVENOUS at 05:33

## 2019-04-18 RX ADMIN — HUMAN INSULIN 3 UNITS: 100 INJECTION, SOLUTION SUBCUTANEOUS at 05:42

## 2019-04-18 RX ADMIN — HYDROMORPHONE HYDROCHLORIDE 2 MG: 4 TABLET ORAL at 12:13

## 2019-04-18 RX ADMIN — Medication 10 ML: at 13:59

## 2019-04-18 RX ADMIN — POTASSIUM & SODIUM PHOSPHATES POWDER PACK 280-160-250 MG 1 PACKET: 280-160-250 PACK at 17:09

## 2019-04-18 RX ADMIN — CALCIUM CARBONATE (ANTACID) CHEW TAB 500 MG 400 MG: 500 CHEW TAB at 08:08

## 2019-04-18 RX ADMIN — LOSARTAN POTASSIUM 50 MG: 50 TABLET ORAL at 08:07

## 2019-04-18 RX ADMIN — Medication 10 ML: at 22:00

## 2019-04-18 RX ADMIN — POTASSIUM & SODIUM PHOSPHATES POWDER PACK 280-160-250 MG 1 PACKET: 280-160-250 PACK at 22:00

## 2019-04-18 RX ADMIN — CALCIUM CARBONATE (ANTACID) CHEW TAB 500 MG 400 MG: 500 CHEW TAB at 17:09

## 2019-04-18 RX ADMIN — CEFEPIME HYDROCHLORIDE 2 G: 2 INJECTION, POWDER, FOR SOLUTION INTRAVENOUS at 16:29

## 2019-04-18 RX ADMIN — METRONIDAZOLE 500 MG: 500 INJECTION, SOLUTION INTRAVENOUS at 21:18

## 2019-04-18 RX ADMIN — HUMAN INSULIN 3 UNITS: 100 INJECTION, SOLUTION SUBCUTANEOUS at 12:48

## 2019-04-18 RX ADMIN — DILTIAZEM HYDROCHLORIDE 60 MG: 60 TABLET, FILM COATED ORAL at 08:32

## 2019-04-18 NOTE — PROGRESS NOTES
Problem: Mobility Impaired (Adult and Pediatric) Goal: *Acute Goals and Plan of Care (Insert Text) Description Physical Therapy Goals Goals re-evaluated 4/18/2019 1. Patient will move from supine to sit and sit to supine , scoot up and down and roll side to side in bed with minimal assist/contact guard assistance within 7 day(s). 2.  Patient will transfer from bed to chair and chair to bed with contact guard assistance using the least restrictive device within 7 day(s). 3.  Patient will perform sit to stand with contact guard assist within 7 day(s). 4.  Patient will ambulate with minimal assist for 50 feet with the least restrictive device within 7 day(s). Goals re-evaluated 4/12/2019 1. Patient will move from supine to sit and sit to supine , scoot up and down and roll side to side in bed with moderate assistance within 7 day(s). 2.  Patient will transfer from bed to chair and chair to bed with moderate assistance using the least restrictive device within 7 day(s). 3.  Patient will perform sit to stand with moderate assist within 7 day(s). 4.  Patient will ambulate with moderate assist for 25 feet with the least restrictive device within 7 day(s). 5.  Patient will tolerate unsupported sitting for 10 minutes without assist within 7 days. Initiated 4/10/2019 1. Patient will move from supine to sit and sit to supine , scoot up and down and roll side to side in bed with modified independence within 7 day(s). 2.  Patient will transfer from bed to chair and chair to bed with minimal assistance/contact guard assist using the least restrictive device within 7 day(s). 3.  Patient will perform sit to stand with minimal assistance/contact guard assist within 7 day(s). 4.  Patient will ambulate with minimal assistance/contact guard assist for 25 feet with the least restrictive device within 7 day(s).   
 
    
Outcome: Progressing Towards Goal 
PHYSICAL THERAPY REEVALUATION 
 Patient: Diana Giang (35 y.o. female) Date: 4/18/2019 Primary Diagnosis: Perforated diverticulum of large intestine [K57.20] Procedure(s) (LRB): 
RIGHT SIDE INTERPLEURAL PLACEMENT OF VENTRICULO PLEURAL SHUNT (Right) 1 Day Post-Op Precautions:   Fall Chart, physical therapy assessment, plan of care and goals were reviewed. ASSESSMENT : 
Based on the objective data described below, the patient presents with impaired mobility with associated decreased strength, impaired standing balance, pain, poor sequencing, poor initiation, and impaired short term memory. Patient re-evaluated s/p re-localization of  shunt to the right pleural space 4/17/19. Patient received lying in bed pleasant and agreeable to work with therapy. Patient transferred supine<>sit with modA x2. Patient able to maintain static sitting balance unsupported while cleaning and changing her gown, and performing ADL tasks. Patient requesting to use bathroom. Transferred bed to [facility] and ambulated a few feet BSC to chair with Renard and HHA. Patient with unsteadiness and slow shuffled gait during ambulation, but no overt LOB. Patient left sitting up in the chair with all needs met and VSS with daughter at bedside. Patient remains an excellent candidate for IP rehab upon discharge to maximize safety and independence with functional mobility. Patient will benefit from skilled intervention to address the above impairments. Patient?s rehabilitation potential is considered to be Fair Factors which may influence rehabilitation potential include:  
? None noted ? Mental ability/status ? Medical condition ? Home/family situation and support systems ? Safety awareness 
? Pain tolerance/management 
? Other: PLAN : 
Recommendations and Planned Interventions: ?             Bed Mobility Training             ? Neuromuscular Re-Education ?             Transfer Training                   ? Orthotic/Prosthetic Training ? Gait Training                         ? Modalities ? Therapeutic Exercises           ? Edema Management/Control ? Therapeutic Activities            ? Patient and Family Training/Education ? Other (comment): Frequency/Duration: Patient will be followed by physical therapy 5 times a week to address goals. Discharge Recommendations: Rehab, patient can tolerate 3 hours of therapy, is motivated, and is far below her baseline function. Further Equipment Recommendations for Discharge: TBD SUBJECTIVE:  
Patient stated ? I am getting better day by day. ? OBJECTIVE DATA SUMMARY:  
 
Past Medical History:  
Diagnosis Date  
 ACP (advance care planning) 2/21/2017 Initial ACP discussion w/ pt and daughter 2-2017. AMD form reviewed and copy provided. NN/facilitator to discuss with patient Asthma   
 hx of Benign essential hypertension 6/24/2016 Bilateral hip pain 6/3/2014  
 xrays 2012, negative COPD (chronic obstructive pulmonary disease) ([facility] [address] 3/29/2010 Depression 3/29/2010 Diverticulitis DVT of Rt Lower Extremity 3/29/2010 Elevated LFT's, Fatty Liver 3/29/2010 Essential tremor 7/14/2010 GERD (gastroesophageal reflux disease) 9/2/2011 H/O Subarachnoid hemorrhage due to ruptured aneurysm 7/14/2010 Hyperlipemia 3/29/2010 Impaired fasting glucose 7/13/2014 6/2014 Thyroiditis, subacute 3/29/2010 Tobacco Abuse 3/29/2010 Vitamin D deficiency 6/3/2014 2012 Past Surgical History:  
Procedure Laterality Date ECHO STRESS  2005 Negative HX COLONOSCOPY  3/2013, Dr Beltran Perez  
 benign cecal polyp, f/u 10 yrs HX CRANIOTOMY  10/06  
 for clipping of ruptured aneurysm; Dr Omar Wall a shunt HX ENDOSCOPY  EGD, Dr Beltran Perez \"ok\" per pt report [facility] Cervical Dysplasia (Dr Basia Brown) [place] COLONOSCOPY W/BIOPSY SINGLE/MULTIPLE   Benign polyp Hospital course since last seen and reason for reevaluation: re-localization of  shunt to the right pleural space 19 Critical Behavior: 
Neurologic State: Alert Orientation Level: Disoriented to time, Oriented to person, Oriented to place, Oriented to situation Cognition: Follows commands Safety/Judgement: Decreased insight into deficits Strength:   
Strength: Generally decreased, functional 
 
Tone & Sensation:  
Tone: Normal 
  
Sensation: Intact Range Of Motion: 
AROM: Generally decreased, functional 
  
PROM: Generally decreased, functional 
  
Coordination: 
Coordination: Generally decreased, functional 
 
Functional Mobility: 
Bed Mobility: 
  
Supine to Sit: Moderate assistance;Assist x2 Transfers: 
Sit to Stand: Minimum assistance; Additional time Stand to Sit: Minimum assistance; Additional time Bed to Chair: Minimum assistance;Assist x1 Balance:  
Sitting: Intact Sitting - Static: Good (unsupported) Standing: Impaired; With support Standing - Static: Fair;Constant support Standing - Dynamic : Fair;Constant support Ambulation/Gait Training: 
Distance (ft): 8 Feet (ft) Assistive Device: Gait belt(HHA) Ambulation - Level of Assistance: Minimal assistance;Assist x1(second person for line management) Gait Abnormalities: Decreased step clearance;Shuffling gait;Trunk sway increased Base of Support: Widened Speed/Tracy: Slow;Shuffled Step Length: Left shortened;Right shortened Functional Measure: 
Barthel Index: 
Bathin Bladder: 0 Bowels: 5 Groomin Dressin Feedin Mobility: 0 Stairs: 0 Toilet Use: 0 Transfer (Bed to Chair and Back): 5 Total: 15/100 Percentage of impairment  
0% 1-19% 20-39% 40-59% 60-79% 80-99% 100% Barthel Score 0-100 100 99-80 79-60 59-40 20-39 1-19 
 0 The Barthel ADL Index: Guidelines 1. The index should be used as a record of what a patient does, not as a record of what a patient could do. 2. The main aim is to establish degree of independence from any help, physical or verbal, however minor and for whatever reason. 3. The need for supervision renders the patient not independent. 4. A patient's performance should be established using the best available evidence. Asking the patient, friends/relatives and nurses are the usual sources, but direct observation and common sense are also important. However direct testing is not needed. 5. Usually the patient's performance over the preceding 24-48 hours is important, but occasionally longer periods will be relevant. 6. Middle categories imply that the patient supplies over 50 per cent of the effort. 7. Use of aids to be independent is allowed. Gabby Pete., Barthel, D.W. (3118). Functional evaluation: the Barthel Index. [address] [facility] (14)2. Adis Phillip cheryl ASHUTOSH Benton, Ivan Cudara., [facility], [address] [place] [address] (1999). Measuring the change indisability after inpatient rehabilitation; comparison of the responsiveness of the Barthel Index and Functional [place] Measure. Journal of Neurology, Neurosurgery, and Psychiatry, 66(4), 709-129. Francisca Kaminski, N.J.A, PARISA Wakefield, & Natasha Cook MTRISTIN. (2004.) Assessment of post-stroke quality of life in cost-effectiveness studies: The usefulness of the Barthel Index and the EuroQoL-5D. [facility], 13, 291-08 Pain: 
Pain Scale 1: Numeric (0 - 10) Pain Intensity 1: 0 Pain Location 1: Chest 
Pain Orientation 1: Right Pain Description 1: Sore Pain Intervention(s) 1: Medication (see MAR) Activity Tolerance:  
Fair Please refer to the flowsheet for vital signs taken during this treatment. After treatment:  
?  Patient left in no apparent distress sitting up in chair ? Patient left in no apparent distress in bed 
? Call bell left within reach ? Nursing notified ?  Caregiver present ? Bed alarm activated COMMUNICATION/EDUCATION:  
The patient?s plan of care was discussed with: Occupational Therapist and Registered Nurse. ?  Fall prevention education was provided and the patient/caregiver indicated understanding. ? Patient/family have participated as able in goal setting and plan of care. ?  Patient/family agree to work toward stated goals and plan of care. ?  Patient understands intent and goals of therapy, but is neutral about his/her participation. ? Patient is unable to participate in goal setting and plan of care. Thank you for this referral. 
Dov Goncalves, PT, DPT Time Calculation: 38 mins

## 2019-04-18 NOTE — PROGRESS NOTES
Problem: Self Care Deficits Care Plan (Adult) Goal: *Acute Goals and Plan of Care (Insert Text) Description Occupational Therapy Goals Re-Evaluation 4/18- continue all goals as below Initiated 4/10/2019, re-evaluation s/p rapid response 4/12/2019 - all goals adjusted to reflect decline 1. Patient will perform grooming sitting unsupported EOB with minimal assistance within 7 day(s). 2.  Patient will perform upper body dressing/bathing sitting unsupported EOB with minimal assistance within 7 day(s). 3.  Patient will perform toilet transfers to/from [facility] with moderate assistance within 7 day(s). 4.  Patient will perform all aspects of toileting with moderate assistance within 7 day(s). 5.  Patient will participate in upper extremity therapeutic exercise/activities with minimal assistance for 10 minutes within 7 day(s). Initiated 4/10/2019 1. Patient will perform standing ADLs at sink with least restrictive DME with supervision/set-up within 7 day(s). 2.  Patient will perform lower body dressing with minimal assistance/contact guard assist using AE PRN within 7 day(s). 3.  Patient will perform bathing with minimal assistance/contact guard assist within 7 day(s). 4.  Patient will perform toilet transfers with supervision/set-up within 7 day(s). 5.  Patient will perform all aspects of toileting with minimal assistance/contact guard assist within 7 day(s). 6.  Patient will participate in upper extremity therapeutic exercise/activities with supervision/set-up for 10 minutes within 7 day(s). Outcome: Progressing Towards Goal 
 
OCCUPATIONAL THERAPY RE-EVALUATION Patient: Ismael Freeman (2 y.o. female) Date: 4/18/2019 Primary Diagnosis: Perforated diverticulum of large intestine [K57.20] Procedure(s) (LRB): 
RIGHT SIDE INTERPLEURAL PLACEMENT OF VENTRICULO PLEURAL SHUNT (Right) 1 Day Post-Op Precautions:   Fall(external  shunt) ASSESSMENT : 
 Based on the objective data described below, patient presents with Moderate Assistance upper body ADLs, Maximum assistance and Total assistance lower body ADLs, and mod A x 2 assist functional mobility via HHA. Pt is POD 1 re-localization of her  shunt to R pleural space. This date, she demonstrated improvement with bed mobility, sitting tolerance for UB bathing and gown change and transfers from bed>BSC> chair. She continues to fatigue quickly but progresses each session. Pt is extremely motivated to regain her functional independence and educated on importance of attempting ADLs herself (ie feeing). Recommend IP rehab at discharge to increase safety, endurance, strength and functional independence. The following are barriers to ADL independence while in acute care:  
- Cognitive and/or behavioral: short term memory loss from hx of brain aneurysm 2006 - Medical condition: strength, standing balance and cardiopulmonary tolerance   
- Other:    
 
Patient will benefit from skilled acute intervention to address the above impairments. Patients rehabilitation potential is considered to be Good Discharge recommendations: Rehab at inpatient facility: patient can tolerate 3 hours of therapy (to regain functional baseline patient requires rehab) If above is not an option then recommend: Rehab at skilled nursing facility (SNF) (to regain functional baseline patient requires rehab) Barriers to discharging home, in addition to above listed impairments: level of physical assist required to maintain patient safety. Equipment recommendations for successful discharge (if) home: tbd PLAN : 
5x/week for OT SUBJECTIVE:  
Patient stated when I eat something?  (pt with short term memory loss, perseverative on food) OBJECTIVE DATA SUMMARY:  
Hospital course since last seen and reason for reevaluation: re-localization of  shunt to the right pleural space 4/17/19 EXAMINATION OF PERFORMANCE DEFICITS: 
 Cognitive/Behavioral Status: 
Neurologic State: Alert; Appropriate for age Orientation Level: Oriented to person;Oriented to place;Oriented to situation;Disoriented to time Cognition: Follows commands Perception: Appears intact Perseveration: Perseverates during conversation(focus on eating) Safety/Judgement: Decreased insight into deficits Skin: R pleural space bandage C/D/I Edema: None noted Hearing: Auditory Auditory Impairment: None Vision/Perceptual:   
Tracking: Able to track stimulus in all quadrants w/o difficulty Range of Motion: 
 
AROM: Generally decreased, functional 
  
  
  
  
  
  
  
Strength: 
 
Strength: Generally decreased, functional 
  
  
  
  
Coordination: 
Coordination: Generally decreased, functional 
Fine Motor Skills-Upper: Left Impaired;Right Impaired(increased tremors with fatigue) Gross Motor Skills-Upper: Left Intact; Right Intact Tone & Sensation: 
 
Tone: Normal 
  
  
  
  
  
  
  
Balance: 
Sitting: Intact Sitting - Static: Good (unsupported) Standing: Impaired; With support Standing - Static: Constant support; Fair 
Standing - Dynamic : Fair Functional Mobility and Transfers for ADLs: 
Bed Mobility: 
Supine to Sit: Moderate assistance;Assist x2 Transfers: 
Sit to Stand: Minimum assistance;Assist x2; Additional time(increased time to stand) Stand to Sit: Minimum assistance;Assist x2; Additional time; Adaptive equipment(tacticle and verbal cues to reach back) Bed to Chair: Minimum assistance;Assist x2; Additional time(HHA x 2) Toilet Transfer : Minimum assistance;Assist x2; Additional time; Adaptive equipment(to [facility]) ADL Assessment: 
Feeding: Minimum assistance Oral Facial Hygiene/Grooming: Minimum assistance Bathing: Maximum assistance Upper Body Dressing: Moderate assistance(don/doff gown EOB) Lower Body Dressing: Maximum assistance Toileting: Total assistance ADL Intervention and task modifications: 
  
 
  
Pt required mod A X 2 for supine to sit, intact static sitting balance. Pt incontinent of urine with brief and linens saturated. She assisted with UB bathing with mod A (OT washed back and pt able to wipe under arms and breasts). Completed sit to stand with cues for hand placement to stand from EOB, HHA x 2 with mod A to transfer to [facility]. Total A for hygiene. Post transfer to chair, pt tolerated standing additional 2 minutes for brief donning. With fatigue, pt demonstrated increased B UE tremors and forward flexed posture. VSS Cognitive Retraining Safety/Judgement: Decreased insight into deficits Functional Measure: 
Barthel Index: 
 
Bathin Bladder: 0 Bowels: 5 Groomin Dressin Feedin Mobility: 0 Stairs: 0 Toilet Use: 0 Transfer (Bed to Chair and Back): 5 Total: 15/100 Percentage of impairment  
0% 1-19% 20-39% 40-59% 60-79% 80-99% 100% Barthel Score 0-100 100 99-80 79-60 59-40 20-39 1-19 
 0 The Barthel ADL Index: Guidelines 1. The index should be used as a record of what a patient does, not as a record of what a patient could do. 2. The main aim is to establish degree of independence from any help, physical or verbal, however minor and for whatever reason. 3. The need for supervision renders the patient not independent. 4. A patient's performance should be established using the best available evidence. Asking the patient, friends/relatives and nurses are the usual sources, but direct observation and common sense are also important. However direct testing is not needed. 5. Usually the patient's performance over the preceding 24-48 hours is important, but occasionally longer periods will be relevant. 6. Middle categories imply that the patient supplies over 50 per cent of the effort. 7. Use of aids to be independent is allowed. Eduardo Hernandez., Barthel, D.W. (1280). Functional evaluation: the Barthel Index. [address] (14)2. ASHUTOSH Brian, Fer Bell.Paul., [place], [address] (1999). Measuring the change indisability after inpatient rehabilitation; comparison of the responsiveness of the Barthel Index and Functional [place] Measure. Journal of Neurology, Neurosurgery, and Psychiatry, 66(4), 194-626. PRATIK Issa, PARISA Wakefield, & Maxi Conner M.A. (2004.) Assessment of post-stroke quality of life in cost-effectiveness studies: The usefulness of the Barthel Index and the EuroQoL-5D. [facility], 13, 303-88 Occupational Therapy Evaluation Charge Determination History Examination Decision-Making MEDIUM Complexity : Expanded review of history including physical, cognitive and psychosocial  history  MEDIUM Complexity : 3-5 performance deficits relating to physical, cognitive , or psychosocial skils that result in activity limitations and / or participation restrictions MEDIUM Complexity : Patient may present with comorbidities that affect occupational performnce. Miniml to moderate modification of tasks or assistance (eg, physical or verbal ) with assesment(s) is necessary to enable patient to complete evaluation Based on the above components, the patient evaluation is determined to be of the following complexity level: MEDIUM Activity Tolerance:  
fair+ Please refer to the flowsheet for vital signs taken during this treatment. After treatment patient left:  
Up in chair Call light within reach RN notified Family at bedside COMMUNICATION/EDUCATION:  
The patients evaluation was discussed with: Physical Therapist, Registered Nurse and Rehabilitation Attendant. Thank you for this referral. 
Milagros Novoa OT Time Calculation: 42 mins

## 2019-04-18 NOTE — PROGRESS NOTES
CM noted patient was transferred from ICU for progression of care. CM also noted PT/OT recommendation for Inpatient Rehab. CM faxed required clinical information and demographics to [address] [place] [address] at [phone]. CM to call [employer] at [phone] regarding insurance auth in am. A referral was sent to [facility] via [employer]. Lorin Licea MSA, RN,.CRM. 4:00 pm. Received a call from [employer] with [address] [place] [address], she said she would forward fax to [facility] for review for Inpatient Rehab. Zoom said CM could call [employer] at [phone] check the auth status. CM also notified  [facility] with [facility] of same. Lorin Licea MSA, RN, CRM.

## 2019-04-18 NOTE — PROGRESS NOTES
[facility] Name: Roque Erwin MRN: [id number] : 1950 Hospital: [facility]  Date: 2019 Subjective: 
: S/p Ventriculo pleural shunt placement - Developed afib with rvr - intraoperatively. Cough but no sputum. denies CP. Former smoker and known COPD on spiriva. Joseph Mom reviewed - too much coughing - not accurate (22%) CXR - improved venous congestion. On 3 lpm by noe baltazar gtt Objective: 
 
 
Exam 
Vital Signs: 
Visit Vitals /50 (BP 1 Location: Left arm, BP Patient Position: At rest) Pulse 73 Temp 98.4 °F (36.9 °C) Resp (!) 35 Ht 5' 8\" (1.727 m) Wt 84.2 kg (185 lb 10 oz) SpO2 100% BMI 28.22 kg/m² Temp (24hrs), Av.8 °F (37.1 °C), Min:98.4 °F (36.9 °C), Max:99.2 °F (37.3 °C) Intake/Output Summary (Last 24 hours) at 2019 6407 Last data filed at 2019 0400 Gross per 24 hour Intake 521.33 ml Output 3477 ml Net -2955.67 ml GENERAL: well developed and in no distress, HEENT:  PERRL, EOMI, no alar flaring or epistaxis, oral mucosa moist without cyanosis, NECK:  no jugular vein distention, no retractions, no thyromegaly or masses, LUNGS: decreased breath sounds billaterally and with rhonchi , HEART:  Regular rate and rhythm with no MGR; no edema is present, EXTREMITIES:  warm with no cyanosis and SKIN:  no jaundice or ecchymosis  
abd- drain and mild tenderness LQ no r/g/r Labs: 
Recent Labs 19 
5434 19 
1727 19 
0335 WBC 12.5* 10.6 14.0* HGB 7.5* 7.4* 7.7* HCT 25.2* 24.6* 24.5*  
* 360 384 Recent Labs 040 19 
5414 19 
0335  140 132* K 3.7 3.6 3.7 CL 99 100 95* CO2 32 34* 32 * 100 190* BUN 16 11 11 CREA 0.51* 0.43* 0.43* CA 8.1* 8.4* 8.3*  
MG 2.2  --   -- No results for input(s): PHI, PO2I, PCO2I in the last 72 hours. Impression     Plan 1. 4/8/19 S/p xlap with sigmoid colectomy and end colostomy for perforated diverticulitis and fecal contamination. Pelvic abscess with drain in place. 2. 4/8/19 S/p externalization  shunt due to above 3. 4/18/19 S/p placement of Ventriculopleural shunt. 4. Abdominal sepsis 5. Acute resp failure- RUL PNA and asymmetric pulm edema. Baseline COPD on spiriva 6. ECHO 4/19 EF 65% RV nml 7. HTN 8. AFib with RVR · Prn BiPAP. · Titrate O2. 
· ISP. · Nebs sched · Continue current abx (cefepime/ flagly/ fluconazole) per surg. · Diurese. · Cardiazem gtt => po cardiazem. · Pain control Can go to the floor from my stand point. Polo Kenney MD 
4/18/2019

## 2019-04-18 NOTE — OP NOTES
[address]   OPERATIVE REPORT    Name:  Elva Kim  MR#:  [id number]  :  1950  ACCOUNT #:  [de-identified]  DATE OF SERVICE:  2019      PREOPERATIVE DIAGNOSIS:  Hydrocephalus. POSTOPERATIVE DIAGNOSIS:  Hydrocephalus. PROCEDURE PERFORMED:  Insertion of right-sided ventriculopleural shunt. SURGEON:  Jocelyn Merrill MD    ASSISTANT:  Renetta Alba MD    ANESTHESIA:  General endotracheal with a single-lumen tube. COMPLICATIONS:  None. SPECIMENS REMOVED:  None. IMPLANTS:  Ventriculopleural shunt. ESTIMATED BLOOD LOSS:  Less than 5 mL. DRAINS:  None. 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 Shunt then needed to be relocated into her right chest.    DESCRIPTION OF OPERATIVE PROCEDURE:  The patient was taken to the operating room and placed supine on the operating room table. After induction of general anesthesia, her right chest, neck and head were prepped and draped in the usual sterile fashion. The shunt was externalized by Neurosurgery at the level of the clavicle, and in the inframammary fold, an incision was made approximately 2.5 cm, this was carried down to the chest wall. Tunneler was then used to tunnel the shunt from the chest cavity to the infraclavicular region. The shunt was then divided, and the new shunt was then affixed by Neurosurgery. For details, see operative note per Neurosurgery. Once this was completed and the shunt had been trimmed to fashion, I entered the chest cavity bluntly with a hemostat. My finger was placed to ensure there was free cavity, and there was. There was some clear serous effusion noted as well. The shunt was then fed into the chest cavity without difficulty. Figure-of-eight sutures were then placed in the muscle.   Red rubber catheter was placed into the chest cavity. A Valsalva was held at 20 cm in water while the red rubber was placed on suction and removed. Figure-of-eight sutures were then tied down, and then the skin incision was closed in the usual fashion. The patient was extubated in the operating room and taken to PACU in stable condition.       MD IGNACIO [email or web address]  D:  04/17/2019 16:09  T:  04/17/2019 18:31  JOB #:  [id number]

## 2019-04-18 NOTE — PROGRESS NOTES
Mrs. Bayron Chavira is POD#1 after a re-localization of her  shunt to the right pleural space. No acute events overnight. Afebrile HD stable On exam she is resting comfortably I did not awaken her Right infraclavicular dressing c/d/i CXR- good expansion, no effusion or pneumothorax Diagnoses  1- Hydrocepahlus Mrs. Bayron Chavira is progressing well. No further Thoracic involvement indicated. She does not need a post op appointment in our clinic. Thank you for allowing us to care for Mrs. Bayron Chavira.

## 2019-04-18 NOTE — DIABETES MGMT
DTC Progress Note Recommendations/ Comments: Chart reviewed for hyperglycemia. Per chart review, pt was receiving TPN. Does not appear to be on TPN at this time per [facility] review, but some BG still above 200 mg/dl. Pt did receive one time dose of Dexamethasone in the last 24 hours. May consider obtaining updated A1c at this time. May consider resistant sensitivity correction scale. Current hospital DM medication: Regular insulin correction scale - normal sensitivity Chart reviewed on L[facility] Far[facility]. Patient is a 79 y.o. female with no hx of DM per H&P; however, previous A1cs ranging from 5.9 - 6.1%. A1c:  
Lab Results Component Value Date/Time Hemoglobin A1c 5.9 (H) 10/05/2018 08:25 AM  
 Hemoglobin A1c 6.1 (H) 02/21/2017 11:00 AM  
 
 
Recent Glucose Results:  
Lab Results Component Value Date/Time  (H) 04/18/2019 04:36 AM  
 GLUCPOC 218 (H) 04/18/2019 12:17 PM  
 GLUCPOC 204 (H) 04/18/2019 05:35 AM  
 GLUCPOC 262 (H) 04/17/2019 11:31 PM  
  
 
Lab Results Component Value Date/Time Creatinine 0.51 (L) 04/18/2019 04:36 AM  
 
Estimated Creatinine Clearance: 123.3 mL/min (A) (based on SCr of 0.51 mg/dL (L)). Active Orders Diet DIET GI LITE (POST SURGICAL) PO intake:  
Patient Vitals for the past 72 hrs: 
 % Diet Eaten 04/16/19 2000 90 % 04/15/19 2000 80 % Will continue to follow as needed. Thank you Arthur Correa [facility] Time Spent: 4 minutes

## 2019-04-18 NOTE — PROGRESS NOTES
1052: TRANSFER - OUT REPORT: 
 
Verbal report given to Maurice CANCHOLA(name) on Jolie Hill  being transferred to (unit) for routine progression of care Report consisted of patients Situation, Background, Assessment and  
Recommendations(SBAR). Information from the following report(s) SBAR, Kardex, ED Summary, OR Summary, Procedure Summary, Intake/Output, MAR, Accordion and Recent Results was reviewed with the receiving nurse. Lines: PICC Double Lumen 07/77/66 Right;Basilic (Active) Central Line Being Utilized Yes 4/18/2019  8:00 AM  
Criteria for Appropriate Use Limited/no vessel suitable for conventional peripheral access 4/18/2019  8:00 AM  
Site Assessment Clean, dry, & intact 4/18/2019  8:00 AM  
Phlebitis Assessment 0 4/18/2019  8:00 AM  
Infiltration Assessment 0 4/18/2019  8:00 AM  
Arm Circumference (cm) 32 cm 4/8/2019 10:26 AM  
Date of Last Dressing Change 04/15/19 4/18/2019  8:00 AM  
Dressing Status Clean, dry, & intact 4/18/2019  8:00 AM  
Action Taken Open ports on tubing capped 4/18/2019  8:00 AM  
External Catheter Length (cm) 0 centimeters 4/12/2019  8:00 PM  
Dressing Type Disk with Chlorhexadine gluconate (CHG); Transparent;Tape 4/18/2019  8:00 AM  
Hub Color/Line Status Purple; Infusing 4/18/2019  8:00 AM  
Positive Blood Return (Site #1) Yes 4/18/2019  8:00 AM  
Hub Color/Line Status Red;Capped 4/18/2019  8:00 AM  
Positive Blood Return (Site #2) Yes 4/18/2019  8:00 AM  
Alcohol Cap Used Yes 4/18/2019  8:00 AM  
   
Peripheral IV 04/12/19 Anterior;Right Hand (Active) Site Assessment Clean, dry, & intact 4/18/2019  8:00 AM  
Phlebitis Assessment 0 4/18/2019  8:00 AM  
Infiltration Assessment 0 4/18/2019  8:00 AM  
Dressing Status Clean, dry, & intact 4/18/2019  8:00 AM  
Dressing Type Tape;Transparent 4/18/2019  8:00 AM  
Hub Color/Line Status Pink 4/18/2019  8:00 AM  
Action Taken Open ports on tubing capped 4/18/2019  8:00 AM  
Alcohol Cap Used Yes 4/18/2019  8:00 AM  
  
 
 Opportunity for questions and clarification was provided. Patient transported with: 
 Monitor Patient-specific medications from Pharmacy Registered Nurse Tech

## 2019-04-18 NOTE — PROGRESS NOTES
TRANSFER - IN REPORT: 
 
Verbal report received from [facility] on Jolie Hill  being received from ICU for routine progression of care Report consisted of patients Situation, Background, Assessment and  
Recommendations(SBAR). Information from the following report(s) SBAR, Kardex, OR Summary, Procedure Summary, Intake/Output, MAR, Recent Results and Cardiac Rhythm Afib was reviewed with the receiving nurse. Opportunity for questions and clarification was provided. Assessment completed upon patients arrival to unit and care assumed.

## 2019-04-18 NOTE — OP NOTES
[address] [facility]  OPERATIVE REPORT    Name:  Mary Kay James  MR#:  [id number]  :  1950  ACCOUNT #:  [de-identified]  DATE OF SERVICE:  2019      PREOPERATIVE DIAGNOSIS:  Hydrocephalus. POSTOPERATIVE DIAGNOSIS:  Hydrocephalus. PROCEDURE PERFORMED:  Ventriculoperitoneal shunt distal placement in the pleural space. SURGEON:  Cher Cummings MD    CO-SURGEON:  Mark Riggs MD    ASSISTANT:  none    ANESTHESIA:  General endotracheal.    COMPLICATIONS:  No complications. SPECIMENS REMOVED:   n.    IMPLANTS:   n.    ESTIMATED BLOOD LOSS:  Minimal.    FINDINGS:  Good placement of shunt. INDICATIONS:  The patient is a 81-year-old female. She had a previous ventriculoperitoneal shunt placed in  after a subarachnoid hemorrhage, aneurysm rupture. She had a pelvic abscess, we externalized the shunt and because of the clots, we decided to place in the pleural space. PROCEDURE:  The patient was brought to the operating room. After appropriate anesthesia was administered, she was prepped and draped in the usual sterile fashion. The shunt was palpated over the right clavicle and incision was made using a 15-blade. This was then cut down until I had found the shunt. This was then stripped away from the soft tissue, pulled back some and incised. Dr. Brittany Castellanos then did procedure on the right breast, which will be dictated separately. Flaps were then placed inferior to superiorly of the incision and the peritoneal catheter was then passed down the passer and the passer was then removed. A plastic connector was then used to connect from the exiting shunt to the new peritoneal shunt. This was then tied off using silk ties. This was then pulled back down and then Dr. Brittany Castellanos did the procedure placing it in the pleural cavity. This was then closed with anatomical layers. The skin was then reapproximated using Steri-Strips. Sponge and needle counts were correct x2.      Kd Dear was present for the entire case from start to finish.         Chrissy Cedillo MD [id number]  D:  04/17/2019 16:17  T:  04/18/2019 0:54  JOB #:  [id number]

## 2019-04-18 NOTE — WOUND CARE
WOCN Ostomy Progress Note:  
  
Postoperative visit on [address] 
Surgery: Laparotomy with sigmoidectomy and end colostomy  
Date of Surgery: 4.8. 19      Surgeon: Dr. Mariza Ny Type of Ostomy: End Colostomy  
Stoma Location: LUQ Patient having lunch. Daughter Rosey at the bedside. 
    
Recommendations: 1.  Prevalon boots or offload with pillows. 2.  Sacrum and buttocks:  Twice daily and as needed apply Aloe Goldfield. 3.  Turn team 
  
1. Empty appliance when 1/3 full and PRN. Encourage patient/family to notify nurse and assist w/ pouch emptying to promote self-care. Change appliance twice weekly and PRN for leaking ASAP. DO NOT REINFORCE LEAKS to avoid skin breakdown. 
  
Transition of Care: 
Ostomy nurse to return Friday for pouch change and to bring supplies. 
  
Pretty LOZOYAN RN [facility] Wound Care Office [phone] Pager [phone]

## 2019-04-18 NOTE — PROGRESS NOTES
Problem: Pressure Injury - Risk of 
Goal: *Prevention of pressure injury Description Document Moe Scale and appropriate interventions in the flowsheet. Outcome: Progressing Towards Goal 
  
Problem: Patient Education: Go to Patient Education Activity Goal: Patient/Family Education Outcome: Progressing Towards Goal 
  
Problem: Falls - Risk of 
Goal: *Absence of Falls Description Document Pelon Reas Fall Risk and appropriate interventions in the flowsheet. Outcome: Progressing Towards Goal 
  
Problem: Patient Education: Go to Patient Education Activity Goal: Patient/Family Education Outcome: Progressing Towards Goal 
  
Problem: General Medical Care Plan Goal: *Vital signs within specified parameters Outcome: Progressing Towards Goal 
Goal: *Labs within defined limits Outcome: Progressing Towards Goal 
Goal: *Absence of infection signs and symptoms Outcome: Progressing Towards Goal 
Goal: *Optimal pain control at patient's stated goal 
Outcome: Progressing Towards Goal 
Goal: *Skin integrity maintained Outcome: Progressing Towards Goal 
Goal: *Fluid volume balance Outcome: Progressing Towards Goal 
Goal: *Optimize nutritional status Outcome: Progressing Towards Goal 
Goal: *Anxiety reduced or absent Outcome: Progressing Towards Goal 
Goal: *Progressive mobility and function (eg: ADL's) Outcome: Progressing Towards Goal 
  
Problem: Patient Education: Go to Patient Education Activity Goal: Patient/Family Education Outcome: Progressing Towards Goal 
  
Problem: Patient Education: Go to Patient Education Activity Goal: Patient/Family Education Outcome: Progressing Towards Goal

## 2019-04-18 NOTE — PROGRESS NOTES
Progress Note Patient: Eden Kellogg MRN: [id number]  SSN: [id number] YOB: 1950  Age: 79 y.o. Sex: female Admit Date: 3/26/2019 
 
10 Days Post-Op Procedure:  Procedure(s): LAPAROTOMY EXPLORATORY/ SIGMOIDECTOMY/ COLOSTOMY 
externalize Shunt Ventricular-Peritoneal 
 
Subjective:  
 
Patient now off nasal canula; denies chills; tolerating GI Lite diet. Objective:  
 
Visit Vitals /50 (BP 1 Location: Left arm, BP Patient Position: At rest) Pulse 73 Temp 98.4 °F (36.9 °C) Resp (!) 35 Ht 5' 8\" (1.727 m) Wt 185 lb 10 oz (84.2 kg) SpO2 100% BMI 28.22 kg/m² Temp (24hrs), Av.8 °F (37.1 °C), Min:98.4 °F (36.9 °C), Max:99.2 °F (37.3 °C) Physical Exam: ABDOMEN: Obese, non-distended; midline wound without signs of infection; productive colostomy; appropriate incisional pain with palpation. Data Review: VS, I/O's, Labs Lab Review:  
Recent Results (from the past 12 hour(s)) GLUCOSE, POC Collection Time: 19 11:31 PM  
Result Value Ref Range Glucose (POC) 262 (H) 65 - 100 mg/dL Performed by Amaya Tello METABOLIC PANEL, BASIC Collection Time: 19  4:36 AM  
Result Value Ref Range Sodium 137 136 - 145 mmol/L Potassium 3.7 3.5 - 5.1 mmol/L Chloride 99 97 - 108 mmol/L  
 CO2 32 21 - 32 mmol/L Anion gap 6 5 - 15 mmol/L Glucose 196 (H) 65 - 100 mg/dL BUN 16 6 - 20 MG/DL Creatinine 0.51 (L) 0.55 - 1.02 MG/DL  
 BUN/Creatinine ratio 31 (H) 12 - 20 GFR est AA >60 >60 ml/min/1.73m2 GFR est non-AA >60 >60 ml/min/1.73m2 Calcium 8.1 (L) 8.5 - 10.1 MG/DL  
CBC WITH AUTOMATED DIFF Collection Time: 19  4:36 AM  
Result Value Ref Range WBC 12.5 (H) 3.6 - 11.0 K/uL  
 RBC 2.56 (L) 3.80 - 5.20 M/uL HGB 7.5 (L) 11.5 - 16.0 g/dL HCT 25.2 (L) 35.0 - 47.0 % MCV 98.4 80.0 - 99.0 FL  
 MCH 29.3 26.0 - 34.0 PG  
 MCHC 29.8 (L) 30.0 - 36.5 g/dL  
 RDW 14.5 11.5 - 14.5 % PLATELET 504 (H) 086 - 400 K/uL MPV 9.8 8.9 - 12.9 FL  
 NRBC 0.0 0  WBC ABSOLUTE NRBC 0.00 0.00 - 0.01 K/uL NEUTROPHILS 77 (H) 32 - 75 % LYMPHOCYTES 14 12 - 49 % MONOCYTES 8 5 - 13 % EOSINOPHILS 0 0 - 7 % BASOPHILS 0 0 - 1 % IMMATURE GRANULOCYTES 1 (H) 0.0 - 0.5 % ABS. NEUTROPHILS 9.6 (H) 1.8 - 8.0 K/UL  
 ABS. LYMPHOCYTES 1.7 0.8 - 3.5 K/UL  
 ABS. MONOCYTES 1.0 0.0 - 1.0 K/UL  
 ABS. EOSINOPHILS 0.0 0.0 - 0.4 K/UL  
 ABS. BASOPHILS 0.0 0.0 - 0.1 K/UL  
 ABS. IMM. GRANS. 0.1 (H) 0.00 - 0.04 K/UL  
 DF AUTOMATED MAGNESIUM Collection Time: 19  4:36 AM  
Result Value Ref Range Magnesium 2.2 1.6 - 2.4 mg/dL GLUCOSE, POC Collection Time: 19  5:35 AM  
Result Value Ref Range Glucose (POC) 204 (H) 65 - 100 mg/dL Performed by Antoni Tipton Assessment:  
 
Hospital Problems  Date Reviewed: 2019 Codes Class Noted POA [facility] hypox[facility] ICD-10-CM: J96.01 
ICD-9-CM: 518.81  2019 No  
   
 Pneumonia due to infectious organism ICD-10-CM: J18.9 ICD-9-CM: 136.9, 484.8  2019 No  
   
 Perforated diverticulum of large intestine ICD-10-CM: K57.20 ICD-9-CM: 562.10  3/26/2019 Yes  shunt internalized to right pleural space yesterday. Plan/Recommendations/Medical Decision Makin. GI Lite diet. 2. PT. 
3. Antibiotics. 4. Discharge planning for Inpatient Rehab.

## 2019-04-18 NOTE — PROGRESS NOTES
Neurosurgery Doing well No HA, Neuro intact Wounds clean and dry CXR shows no pleural effusion Doing well with PT Plans rehab in next couple days./

## 2019-04-19 ENCOUNTER — APPOINTMENT (OUTPATIENT)
Dept: GENERAL RADIOLOGY | Age: 69
DRG: 329 | End: 2019-04-19
Attending: PHYSICIAN ASSISTANT
Payer: MEDICARE

## 2019-04-19 LAB
ANION GAP SERPL CALC-SCNC: 6 MMOL/L (ref 5–15)
BASOPHILS # BLD: 0 K/UL (ref 0–0.1)
BASOPHILS NFR BLD: 0 % (ref 0–1)
BUN SERPL-MCNC: 15 MG/DL (ref 6–20)
BUN/CREAT SERPL: 28 (ref 12–20)
CALCIUM SERPL-MCNC: 8.3 MG/DL (ref 8.5–10.1)
CHLORIDE SERPL-SCNC: 101 MMOL/L (ref 97–108)
CO2 SERPL-SCNC: 30 MMOL/L (ref 21–32)
CREAT SERPL-MCNC: 0.53 MG/DL (ref 0.55–1.02)
DIFFERENTIAL METHOD BLD: ABNORMAL
EOSINOPHIL # BLD: 0.1 K/UL (ref 0–0.4)
EOSINOPHIL NFR BLD: 1 % (ref 0–7)
ERYTHROCYTE [DISTWIDTH] IN BLOOD BY AUTOMATED COUNT: 14.7 % (ref 11.5–14.5)
GLUCOSE BLD STRIP.AUTO-MCNC: 115 MG/DL (ref 65–100)
GLUCOSE BLD STRIP.AUTO-MCNC: 117 MG/DL (ref 65–100)
GLUCOSE BLD STRIP.AUTO-MCNC: 129 MG/DL (ref 65–100)
GLUCOSE BLD STRIP.AUTO-MCNC: 165 MG/DL (ref 65–100)
GLUCOSE BLD STRIP.AUTO-MCNC: 95 MG/DL (ref 65–100)
GLUCOSE SERPL-MCNC: 92 MG/DL (ref 65–100)
HCT VFR BLD AUTO: 24.6 % (ref 35–47)
HGB BLD-MCNC: 7.2 G/DL (ref 11.5–16)
IMM GRANULOCYTES # BLD AUTO: 0 K/UL
IMM GRANULOCYTES NFR BLD AUTO: 0 %
LYMPHOCYTES # BLD: 4.1 K/UL (ref 0.8–3.5)
LYMPHOCYTES NFR BLD: 31 % (ref 12–49)
MCH RBC QN AUTO: 28.8 PG (ref 26–34)
MCHC RBC AUTO-ENTMCNC: 29.3 G/DL (ref 30–36.5)
MCV RBC AUTO: 98.4 FL (ref 80–99)
MONOCYTES # BLD: 1.6 K/UL (ref 0–1)
MONOCYTES NFR BLD: 12 % (ref 5–13)
NEUTS SEG # BLD: 7.4 K/UL (ref 1.8–8)
NEUTS SEG NFR BLD: 56 % (ref 32–75)
NRBC # BLD: 0 K/UL (ref 0–0.01)
NRBC BLD-RTO: 0 PER 100 WBC
PLATELET # BLD AUTO: 402 K/UL (ref 150–400)
PLATELET COMMENTS,PCOM: ABNORMAL
PMV BLD AUTO: 9.7 FL (ref 8.9–12.9)
POTASSIUM SERPL-SCNC: 3.4 MMOL/L (ref 3.5–5.1)
RBC # BLD AUTO: 2.5 M/UL (ref 3.8–5.2)
RBC MORPH BLD: ABNORMAL
SERVICE CMNT-IMP: ABNORMAL
SERVICE CMNT-IMP: NORMAL
SODIUM SERPL-SCNC: 137 MMOL/L (ref 136–145)
WBC # BLD AUTO: 13.2 K/UL (ref 3.6–11)

## 2019-04-19 PROCEDURE — 82962 GLUCOSE BLOOD TEST: CPT

## 2019-04-19 PROCEDURE — 36415 COLL VENOUS BLD VENIPUNCTURE: CPT

## 2019-04-19 PROCEDURE — 77030010520

## 2019-04-19 PROCEDURE — 77030011641 HC PASTE OST ADH BMS -A

## 2019-04-19 PROCEDURE — 74011250637 HC RX REV CODE- 250/637: Performed by: SPECIALIST

## 2019-04-19 PROCEDURE — 94760 N-INVAS EAR/PLS OXIMETRY 1: CPT

## 2019-04-19 PROCEDURE — 82103 ALPHA-1-ANTITRYPSIN TOTAL: CPT

## 2019-04-19 PROCEDURE — 74011000250 HC RX REV CODE- 250: Performed by: SPECIALIST

## 2019-04-19 PROCEDURE — 77030010522

## 2019-04-19 PROCEDURE — 74011250637 HC RX REV CODE- 250/637: Performed by: INTERNAL MEDICINE

## 2019-04-19 PROCEDURE — 65660000000 HC RM CCU STEPDOWN

## 2019-04-19 PROCEDURE — 74011250636 HC RX REV CODE- 250/636: Performed by: SPECIALIST

## 2019-04-19 PROCEDURE — C9113 INJ PANTOPRAZOLE SODIUM, VIA: HCPCS | Performed by: SPECIALIST

## 2019-04-19 PROCEDURE — 94640 AIRWAY INHALATION TREATMENT: CPT

## 2019-04-19 PROCEDURE — 97116 GAIT TRAINING THERAPY: CPT

## 2019-04-19 PROCEDURE — 80048 BASIC METABOLIC PNL TOTAL CA: CPT

## 2019-04-19 PROCEDURE — 97110 THERAPEUTIC EXERCISES: CPT

## 2019-04-19 PROCEDURE — 77030010535 HC BELT OST ADJ HOLL -A

## 2019-04-19 PROCEDURE — 97530 THERAPEUTIC ACTIVITIES: CPT

## 2019-04-19 PROCEDURE — 74011000258 HC RX REV CODE- 258: Performed by: SPECIALIST

## 2019-04-19 PROCEDURE — 85025 COMPLETE CBC W/AUTO DIFF WBC: CPT

## 2019-04-19 PROCEDURE — 77030010541

## 2019-04-19 PROCEDURE — 71045 X-RAY EXAM CHEST 1 VIEW: CPT

## 2019-04-19 RX ORDER — DILTIAZEM HYDROCHLORIDE 120 MG/1
120 CAPSULE, COATED, EXTENDED RELEASE ORAL DAILY
Status: DISCONTINUED | OUTPATIENT
Start: 2019-04-20 | End: 2019-04-26 | Stop reason: HOSPADM

## 2019-04-19 RX ORDER — HYDROMORPHONE HYDROCHLORIDE 2 MG/1
2 TABLET ORAL
Qty: 20 TAB | Refills: 0 | Status: SHIPPED | OUTPATIENT
Start: 2019-04-19 | End: 2019-04-19

## 2019-04-19 RX ORDER — DILTIAZEM HYDROCHLORIDE 90 MG/1
90 TABLET, FILM COATED ORAL EVERY 8 HOURS
Qty: 90 TAB | Refills: 1 | Status: SHIPPED | OUTPATIENT
Start: 2019-04-19 | End: 2019-04-19

## 2019-04-19 RX ORDER — HYDROMORPHONE HYDROCHLORIDE 2 MG/1
2 TABLET ORAL
Qty: 20 TAB | Refills: 0 | Status: SHIPPED
Start: 2019-04-19 | End: 2019-04-22

## 2019-04-19 RX ADMIN — DILTIAZEM HYDROCHLORIDE 90 MG: 30 TABLET, FILM COATED ORAL at 06:18

## 2019-04-19 RX ADMIN — IPRATROPIUM BROMIDE AND ALBUTEROL SULFATE 3 ML: .5; 3 SOLUTION RESPIRATORY (INHALATION) at 16:42

## 2019-04-19 RX ADMIN — SERTRALINE 100 MG: 50 TABLET, FILM COATED ORAL at 09:04

## 2019-04-19 RX ADMIN — METOPROLOL TARTRATE 25 MG: 25 TABLET ORAL at 09:04

## 2019-04-19 RX ADMIN — IPRATROPIUM BROMIDE AND ALBUTEROL SULFATE 3 ML: .5; 3 SOLUTION RESPIRATORY (INHALATION) at 19:41

## 2019-04-19 RX ADMIN — TRAZODONE HYDROCHLORIDE 50 MG: 50 TABLET ORAL at 01:27

## 2019-04-19 RX ADMIN — CALCIUM CARBONATE (ANTACID) CHEW TAB 500 MG 400 MG: 500 CHEW TAB at 09:05

## 2019-04-19 RX ADMIN — HYDROMORPHONE HYDROCHLORIDE 2 MG: 4 TABLET ORAL at 01:27

## 2019-04-19 RX ADMIN — Medication 10 ML: at 06:00

## 2019-04-19 RX ADMIN — CEFEPIME HYDROCHLORIDE 2 G: 2 INJECTION, POWDER, FOR SOLUTION INTRAVENOUS at 01:41

## 2019-04-19 RX ADMIN — Medication 10 ML: at 13:46

## 2019-04-19 RX ADMIN — HYDROMORPHONE HYDROCHLORIDE 2 MG: 4 TABLET ORAL at 23:12

## 2019-04-19 RX ADMIN — CEFEPIME HYDROCHLORIDE 2 G: 2 INJECTION, POWDER, FOR SOLUTION INTRAVENOUS at 17:54

## 2019-04-19 RX ADMIN — LOSARTAN POTASSIUM 50 MG: 50 TABLET ORAL at 09:04

## 2019-04-19 RX ADMIN — CEFEPIME HYDROCHLORIDE 2 G: 2 INJECTION, POWDER, FOR SOLUTION INTRAVENOUS at 09:37

## 2019-04-19 RX ADMIN — HYDROMORPHONE HYDROCHLORIDE 2 MG: 4 TABLET ORAL at 13:41

## 2019-04-19 RX ADMIN — POTASSIUM & SODIUM PHOSPHATES POWDER PACK 280-160-250 MG 1 PACKET: 280-160-250 PACK at 22:29

## 2019-04-19 RX ADMIN — IPRATROPIUM BROMIDE AND ALBUTEROL SULFATE 3 ML: .5; 3 SOLUTION RESPIRATORY (INHALATION) at 08:28

## 2019-04-19 RX ADMIN — METRONIDAZOLE 500 MG: 500 INJECTION, SOLUTION INTRAVENOUS at 06:19

## 2019-04-19 RX ADMIN — HYDROMORPHONE HYDROCHLORIDE 2 MG: 4 TABLET ORAL at 18:46

## 2019-04-19 RX ADMIN — FLUCONAZOLE 400 MG: 400 INJECTION, SOLUTION INTRAVENOUS at 11:07

## 2019-04-19 RX ADMIN — POTASSIUM & SODIUM PHOSPHATES POWDER PACK 280-160-250 MG 1 PACKET: 280-160-250 PACK at 13:41

## 2019-04-19 RX ADMIN — DILTIAZEM HYDROCHLORIDE 90 MG: 30 TABLET, FILM COATED ORAL at 13:41

## 2019-04-19 RX ADMIN — METRONIDAZOLE 500 MG: 500 INJECTION, SOLUTION INTRAVENOUS at 22:29

## 2019-04-19 RX ADMIN — PANTOPRAZOLE SODIUM 40 MG: 40 INJECTION, POWDER, FOR SOLUTION INTRAVENOUS at 17:53

## 2019-04-19 RX ADMIN — POTASSIUM & SODIUM PHOSPHATES POWDER PACK 280-160-250 MG 1 PACKET: 280-160-250 PACK at 09:05

## 2019-04-19 RX ADMIN — FUROSEMIDE 40 MG: 10 INJECTION, SOLUTION INTRAMUSCULAR; INTRAVENOUS at 09:05

## 2019-04-19 RX ADMIN — IPRATROPIUM BROMIDE AND ALBUTEROL SULFATE 3 ML: .5; 3 SOLUTION RESPIRATORY (INHALATION) at 11:43

## 2019-04-19 RX ADMIN — FUROSEMIDE 40 MG: 10 INJECTION, SOLUTION INTRAMUSCULAR; INTRAVENOUS at 17:53

## 2019-04-19 RX ADMIN — POTASSIUM & SODIUM PHOSPHATES POWDER PACK 280-160-250 MG 1 PACKET: 280-160-250 PACK at 17:54

## 2019-04-19 RX ADMIN — HYDROMORPHONE HYDROCHLORIDE 2 MG: 4 TABLET ORAL at 06:18

## 2019-04-19 RX ADMIN — METRONIDAZOLE 500 MG: 500 INJECTION, SOLUTION INTRAVENOUS at 13:41

## 2019-04-19 NOTE — PROGRESS NOTES
Nigel contacted Lesley Ward with [facility] ([phone]) and she informed nigel that [facility] medically approved and she is starting to work on authorization with [facility] ([phone]) Nigel met with the patient and her daughter Katerine Sees to inform them that we are waiting on authorization at this time. Patients daughter Katerine Sees informed cm that she can provide transportation to [address] when discharged.

## 2019-04-19 NOTE — ROUTINE PROCESS
Bedside and Verbal shift change report given to Roberto Ventura rn (oncoming nurse) by Amber Martin (offgoing nurse). Report included the following information SBAR, Kardex, OR Summary, Procedure Summary, Intake/Output, MAR, Recent Results and Cardiac Rhythm NSR.

## 2019-04-19 NOTE — PROGRESS NOTES
Problem: Mobility Impaired (Adult and Pediatric) Goal: *Acute Goals and Plan of Care (Insert Text) Description Physical Therapy Goals Goals re-evaluated 4/18/2019 1. Patient will move from supine to sit and sit to supine , scoot up and down and roll side to side in bed with minimal assist/contact guard assistance within 7 day(s). 2.  Patient will transfer from bed to chair and chair to bed with contact guard assistance using the least restrictive device within 7 day(s). 3.  Patient will perform sit to stand with contact guard assist within 7 day(s). 4.  Patient will ambulate with minimal assist for 50 feet with the least restrictive device within 7 day(s). Goals re-evaluated 4/12/2019 1. Patient will move from supine to sit and sit to supine , scoot up and down and roll side to side in bed with moderate assistance within 7 day(s). 2.  Patient will transfer from bed to chair and chair to bed with moderate assistance using the least restrictive device within 7 day(s). 3.  Patient will perform sit to stand with moderate assist within 7 day(s). 4.  Patient will ambulate with moderate assist for 25 feet with the least restrictive device within 7 day(s). 5.  Patient will tolerate unsupported sitting for 10 minutes without assist within 7 days. Initiated 4/10/2019 1. Patient will move from supine to sit and sit to supine , scoot up and down and roll side to side in bed with modified independence within 7 day(s). 2.  Patient will transfer from bed to chair and chair to bed with minimal assistance/contact guard assist using the least restrictive device within 7 day(s). 3.  Patient will perform sit to stand with minimal assistance/contact guard assist within 7 day(s). 4.  Patient will ambulate with minimal assistance/contact guard assist for 25 feet with the least restrictive device within 7 day(s). Outcome: Progressing Towards Goal 
 
PHYSICAL THERAPY TREATMENT Patient: Dorothy Carl (76 y.o. female) Date: 4/19/2019 Diagnosis: Perforated diverticulum of large intestine [K57.20] <principal problem not specified> Procedure(s) (LRB): 
RIGHT SIDE INTERPLEURAL PLACEMENT OF VENTRICULO PLEURAL SHUNT (Right) 2 Days Post-Op Precautions: Fall(external  shunt) Chart, physical therapy assessment, plan of care and goals were reviewed. ASSESSMENT: 
Pt was eager to mobilize. Pt required mod A for bed mobility and min A for sit to stand. Pt was able to ambulate 12 feet x2 with min A x2. Pt expressed LE weakness and dizziness. /59. HR 66 SpO2 94% on room air. Pt was unsteady requiring assistance to prevent fall. Pt was instructed on seated LE exercises. Daughter present to reinforce. PTA pt was independent living with daughter with baseline short term memory loss and is currently below baseline requiring assistance for functional mobility. Pt could benefit from inpt rehab to progress mobility. Progression toward goals: 
?    Improving appropriately and progressing toward goals ? Improving slowly and progressing toward goals ? Not making progress toward goals and plan of care will be adjusted PLAN: 
Patient continues to benefit from skilled intervention to address the above impairments. Continue treatment per established plan of care. Discharge Recommendations:  Inpatient Rehab If unable then SNF Further Equipment Recommendations for Discharge:  TBD SUBJECTIVE:  
Patient stated I am weak.  OBJECTIVE DATA SUMMARY:  
Critical Behavior: 
Neurologic State: Alert Orientation Level: Disoriented to time, Oriented to person, Oriented to place Cognition: Follows commands Safety/Judgement: Decreased insight into deficits Functional Mobility Training: 
Bed Mobility: 
  
Supine to Sit: Moderate assistance Transfers: 
Sit to Stand: Minimum assistance Stand to Sit: Minimum assistance Balance: 
Sitting: Intact Standing: Impaired Standing - Static: Fair Standing - Dynamic : Fair Ambulation/Gait Training: 
Distance (ft): 12 Feet (ft)(x 2 seated rest break) Assistive Device: Gait belt Ambulation - Level of Assistance: Minimal assistance; Additional time;Assist x2 Gait Abnormalities: Decreased step clearance; Step to gait Speed/Tracy: Slow Step Length: Left shortened;Right shortened Stairs: Therapeutic Exercises:  
Educated on ankle pumps, LAQ, hip flexion. Pt demonstrated understanding. Daughter present and acknowledge. Pain: 
  
  
  
  
  
  
Activity Tolerance:  
Limited Please refer to the flowsheet for vital signs taken during this treatment. After treatment:  
?    Patient left in no apparent distress sitting up in chair ? Patient left in no apparent distress in bed 
? Call bell left within reach ? Nursing notified ? Caregiver present ? Bed alarm activated COMMUNICATION/COLLABORATION:  
The patients plan of care was discussed with: Registered Nurse Karin Lui PTA Time Calculation: 24 mins

## 2019-04-19 NOTE — PROGRESS NOTES
[facility] Pulmonary, Critical Care, and Sleep Medicine Name: Charmain Lanes MRN: [id number] : 1950 Hospital: [place] [address] Date: 2019 Subjective: 
 
: Resting in bed. No WOB. Has not had a formal work up for COPD before. : S/p Ventriculo pleural shunt placement - Developed afib with rvr - intraoperatively. Cough but no sputum. denies CP. Former smoker and known COPD on spiriva. Nonda Fake reviewed - too much coughing - not accurate (22%) CXR - improved venous congestion. On 3 lpm by noe baltazar gtt Objective: 
 
 
Exam 
Vital Signs: 
Visit Vitals /58 Pulse 73 Temp 98.4 °F (36.9 °C) Resp 18 Ht 5' 8\" (1.727 m) Wt 86.8 kg (191 lb 5.8 oz) SpO2 92% BMI 29.10 kg/m² Temp (24hrs), Av.8 °F (37.1 °C), Min:98.4 °F (36.9 °C), Max:99.5 °F (37.5 °C) Intake/Output Summary (Last 24 hours) at 2019 1038 Last data filed at 2019 1018 Gross per 24 hour Intake 420 ml Output 1650 ml Net -1230 ml GENERAL: well developed and in no distress, HEENT:  PERRL, EOMI, no alar flaring or epistaxis, oral mucosa moist without cyanosis, NECK:  no jugular vein distention, no retractions, no thyromegaly or masses, LUNGS: decreased breath sounds billaterally and with rhonchi , HEART:  Regular rate and rhythm with no MGR; no edema is present, EXTREMITIES:  warm with no cyanosis and SKIN:  no jaundice or ecchymosis  
abd- drain and mild tenderness LQ no r/g/r Labs: 
Recent Labs 19 
7196 19 
3839 19 
7474 WBC 13.2* 12.5* 10.6 HGB 7.2* 7.5* 7.4* HCT 24.6* 25.2* 24.6* * 415* 360 Recent Labs 19 
1879 19 
6909 19 
0433  137 140  
K 3.4* 3.7 3.6  99 100 CO2 30 32 34* GLU 92 196* 100 BUN 15 16 11 CREA 0.53* 0.51* 0.43* CA 8.3* 8.1* 8.4* MG  --  2.2  -- No results for input(s): PHI, PO2I, PCO2I in the last 72 hours. Impression     Plan 1. 4/8/19 S/p xlap with sigmoid colectomy and end colostomy for perforated diverticulitis and fecal contamination. Pelvic abscess with drain in place. 2. 4/8/19 S/p externalization  shunt due to above 3. 4/18/19 S/p placement of Ventriculopleural shunt. 4. Abdominal sepsis 5. Acute resp failure- RUL PNA and asymmetric pulm edema. Baseline COPD on spiriva. FEV1 was 0.61L @ 22% 6. ECHO 4/19 EF 65% RV nml 7. HTN 8. AFib with RVR · O2 titraiton above 90% · On spiriva on an outpatient basis; would recommend adding symbicort 160 two puffs bid at discharge along with the spiriva · ISP. · Nebs sched · Continue current abx  per surg. · Diurese. · Will need full PFTs in 3-4 wks in outpatient clinic · Prn over the weekend SELWYN Mayer 
4/19/2019

## 2019-04-19 NOTE — PROGRESS NOTES
Progress Note Patient: Roque Erwin MRN: [id number]  SSN: [id number] YOB: 1950  Age: 79 y.o. Sex: female Admit Date: 3/26/2019 
 
11 Days Post-Op Procedure:  Procedure(s): LAPAROTOMY EXPLORATORY/ SIGMOIDECTOMY/ COLOSTOMY 
externalize Shunt Ventricular-Peritoneal 
 
Subjective:  
 
Patient denies chills; tolerating GI Lite diet. Objective:  
 
Visit Vitals /58 Pulse 73 Temp 98.4 °F (36.9 °C) Resp 18 Ht 5' 8\" (1.727 m) Wt 191 lb 5.8 oz (86.8 kg) SpO2 92% BMI 29.10 kg/m² Temp (24hrs), Av.8 °F (37.1 °C), Min:98.4 °F (36.9 °C), Max:99.5 °F (37.5 °C) Physical Exam: ABDOMEN: Obese, non-distended; midline wound without signs of infection; productive colostomy; appropriate incisional pain with palpation. Data Review: VS, I/O's, Labs Lab Review:  
Recent Results (from the past 12 hour(s)) GLUCOSE, POC Collection Time: 19  1:32 AM  
Result Value Ref Range Glucose (POC) 165 (H) 65 - 100 mg/dL Performed by John Blum CBC WITH AUTOMATED DIFF Collection Time: 19  4:34 AM  
Result Value Ref Range WBC 13.2 (H) 3.6 - 11.0 K/uL  
 RBC 2.50 (L) 3.80 - 5.20 M/uL HGB 7.2 (L) 11.5 - 16.0 g/dL HCT 24.6 (L) 35.0 - 47.0 % MCV 98.4 80.0 - 99.0 FL  
 MCH 28.8 26.0 - 34.0 PG  
 MCHC 29.3 (L) 30.0 - 36.5 g/dL  
 RDW 14.7 (H) 11.5 - 14.5 % PLATELET 482 (H) 346 - 400 K/uL MPV 9.7 8.9 - 12.9 FL  
 NRBC 0.0 0  WBC ABSOLUTE NRBC 0.00 0.00 - 0.01 K/uL NEUTROPHILS 56 32 - 75 % LYMPHOCYTES 31 12 - 49 % MONOCYTES 12 5 - 13 % EOSINOPHILS 1 0 - 7 % BASOPHILS 0 0 - 1 % IMMATURE GRANULOCYTES 0 %  
 ABS. NEUTROPHILS 7.4 1.8 - 8.0 K/UL  
 ABS. LYMPHOCYTES 4.1 (H) 0.8 - 3.5 K/UL  
 ABS. MONOCYTES 1.6 (H) 0.0 - 1.0 K/UL  
 ABS. EOSINOPHILS 0.1 0.0 - 0.4 K/UL  
 ABS. BASOPHILS 0.0 0.0 - 0.1 K/UL  
 ABS. IMM. GRANS. 0.0 K/UL  
 DF MANUAL PLATELET COMMENTS Large Platelets  RBC COMMENTS ANISOCYTOSIS 
 1+ 
    
 RBC COMMENTS MACROCYTOSIS 1+ 
    
 RBC COMMENTS POLYCHROMASIA PRESENT 
    
 RBC COMMENTS STOMATOCYTES PRESENT 
    
METABOLIC PANEL, BASIC Collection Time: 19  4:34 AM  
Result Value Ref Range Sodium 137 136 - 145 mmol/L Potassium 3.4 (L) 3.5 - 5.1 mmol/L Chloride 101 97 - 108 mmol/L  
 CO2 30 21 - 32 mmol/L Anion gap 6 5 - 15 mmol/L Glucose 92 65 - 100 mg/dL BUN 15 6 - 20 MG/DL Creatinine 0.53 (L) 0.55 - 1.02 MG/DL  
 BUN/Creatinine ratio 28 (H) 12 - 20 GFR est AA >60 >60 ml/min/1.73m2 GFR est non-AA >60 >60 ml/min/1.73m2 Calcium 8.3 (L) 8.5 - 10.1 MG/DL  
GLUCOSE, POC Collection Time: 19  6:15 AM  
Result Value Ref Range Glucose (POC) 95 65 - 100 mg/dL Performed by Candelario Souza Assessment:  
 
Hospital Problems  Date Reviewed: 2019 Codes Class Noted POA Acute hypoxemic respiratory failure ([facility] [address] ICD-10-CM: J96.01 
ICD-9-CM: 518.81  2019 No  
   
 Pneumonia due to infectious organism ICD-10-CM: J18.9 ICD-9-CM: 136.9, 484.8  2019 No  
   
 Perforated diverticulum of large intestine ICD-10-CM: K57.20 ICD-9-CM: 562.10  3/26/2019 Yes  shunt internalized to right pleural space Wednesday Plan/Recommendations/Medical Decision Makin. GI Lite diet. 2. PT. 
3. Antibiotics to stop after PM dose. 4. Discharge planning for Inpatient Rehab.

## 2019-04-19 NOTE — PROGRESS NOTES
Cardiology Progress Note Patient: Radha Ghotra MRN: [id number]  SSN: [id number] YOB: 1950  Age: 79 y.o. Sex: female Subjective:  
  
Date of  Admission: 3/26/2019 Admission type: Emergency Radha Ghotra is a 79 y.o. female admitted for Perforated diverticulum of large intestine [K57.20] on 03/26/2019. In ER, found to have new atrial fibrillation with RVR. States unaware of AF, denied palpitations. Rate controlled on diltiazem IR & metoprolol tartrate. Converted to NSR on 04/18/2019, has maintained sinus rhythm since then. During admission, she has undergone multiple surgeries (ex lap sigmoidectomy/colostomy,  shunt). Currently NSR 60s-70s on telemetry. /53. Seen for Dr. Phylliss Spurling today per his request. 
 
 
Echo (04/02/2019):  LVEF 61-65%. Mod MR, mod MAC. LA mod dilated. Mild aortic sclerosis. Mild pulmonary HTN. Primary Care Provider: Sara Huynh MD 
Past Medical History:  
Diagnosis Date  ACP (advance care planning) 2/21/2017 Initial ACP discussion w/ pt and daughter 2-2017. AMD form reviewed and copy provided. NN/facilitator to discuss with patient  Asthma   
 hx of  Benign essential hypertension 6/24/2016  Bilateral hip pain 6/3/2014  
 xrays 2012, negative  COPD (chronic obstructive pulmonary disease) ([facility] [address] 3/29/2010  Depression 3/29/2010  Diverticulitis  DVT of Rt Lower Extremity 3/29/2010  Elevated LFT's, Fatty Liver 3/29/2010  Essential tremor 7/14/2010  GERD (gastroesophageal reflux disease) 9/2/2011  H/O Subarachnoid hemorrhage due to ruptured aneurysm 7/14/2010  Hyperlipemia 3/29/2010  Impaired fasting glucose 7/13/2014 6/2014  Thyroiditis, subacute 3/29/2010  Tobacco Abuse 3/29/2010  Vitamin D deficiency 6/3/2014 2012 Past Surgical History:  
Procedure Laterality Date  ECHO STRESS  2005 Negative  HX COLONOSCOPY  3/2013, Dr Armani Vu benign cecal polyp, f/u 10 yrs  HX CRANIOTOMY  10/06  
 for clipping of ruptured aneurysm; Dr Renea Kanner a shunt  HX ENDOSCOPY  EGD, Dr Vale Barahona \"ok\" per pt report [facility] Cervical Dysplasia (Dr Rand Nicholson)  NV COLONOSCOPY W/BIOPSY SINGLE/MULTIPLE   Benign polyp Family History Problem Relation Age of Onset  Anxiety Daughter  Attention Deficit Disorder Daughter  Drug Abuse Daughter  Anxiety Daughter  Thyroid Disease Daughter  Thyroid Disease Daughter Hypothyroidism  Asthma Daughter  Cancer Father   
     kidney  Diabetes Father  Arthritis-osteo Father  Diabetes Mother  Hypertension Mother [facility] Arthritis-osteo Mother  Stroke Mother  Hypertension Sister  Depression Sister  Pneumonia Sister   
      age 72  Diabetes Sister  Thyroid Disease Sister  Thyroid Disease Sister  Heart Disease Sister  Diabetes Maternal Grandmother  No Known Problems Brother  Thyroid Disease Sister  Anxiety Sister  Depression Sister  Alcohol abuse Sister  Hypertension Sister  Hypertension Sister  Thyroid Disease Sister  Thyroid Disease Sister Social History Tobacco Use  Smoking status: Current Every Day Smoker Packs/day: 1.00 Years: 20.00 Pack years: 20.00 Types: Cigarettes  Smokeless tobacco: Never Used  Tobacco comment: cut back from 1ppd to 3/4 ppd Substance Use Topics  Alcohol use: No  
  Alcohol/week: 0.0 oz  
  
Current Facility-Administered Medications Medication Dose Route Frequency  [START ON 2019] dilTIAZem CD (CARDIZEM CD) capsule 120 mg  120 mg Oral DAILY  losartan (COZAAR) tablet 50 mg  50 mg Oral DAILY  HYDROmorphone (DILAUDID) tablet 2 mg  2 mg Oral Q4H PRN  
 hydrALAZINE (APRESOLINE) 20 mg/mL injection 10 mg  10 mg IntraVENous Q6H PRN  
  labetalol (NORMODYNE;TRANDATE) 20 mg/4 mL (5 mg/mL) injection 20 mg  20 mg IntraVENous Q4H PRN  
 acetaminophen (TYLENOL) tablet 650 mg  650 mg Oral Q4H PRN  
 furosemide (LASIX) injection 40 mg  40 mg IntraVENous BID  diazePAM (VALIUM) injection 5 mg  5 mg IntraVENous Q4H PRN  
 cefepime (MAXIPIME) 2 g in 0.9% sodium chloride (MBP/ADV) 100 mL  2 g IntraVENous Q8H  
 calcium carbonate (TUMS) chewable tablet 400 mg [elemental]  400 mg Oral TID WITH MEALS  
 albuterol-ipratropium (DUO-NEB) 2.5 MG-0.5 MG/3 ML  3 mL Nebulization QID RT  
 potassium, sodium phosphates (NEUTRA-PHOS) packet 1 Packet  1 Packet Oral QID  diphenhydrAMINE (BENADRYL) injection 25 mg  25 mg IntraVENous QHS PRN  
 bacitracin 500 unit/gram packet 1 Packet  1 Packet Topical PRN  
 metroNIDAZOLE (FLAGYL) IVPB premix 500 mg  500 mg IntraVENous Q8H  
 sodium chloride (NS) flush 5-40 mL  5-40 mL IntraVENous Q8H  
 sodium chloride (NS) flush 5-40 mL  5-40 mL IntraVENous PRN  
 glucose chewable tablet 16 g  4 Tab Oral PRN  
 dextrose (D50W) injection syrg 12.5-25 g  12.5-25 g IntraVENous PRN  
 glucagon (GLUCAGEN) injection 1 mg  1 mg IntraMUSCular PRN  
 insulin regular (NOVOLIN R, HUMULIN R) injection   SubCUTAneous Q6H  
 fluconazole (DIFLUCAN) 400mg/200 mL IVPB (premix)  400 mg IntraVENous DAILY  cloNIDine (CATAPRES) 0.2 mg/24 hr patch 1 Patch  1 Patch TransDERmal Q7D  
 sodium chloride (NS) flush 5-40 mL  5-40 mL IntraVENous Q8H  
 sodium chloride (NS) flush 5-40 mL  5-40 mL IntraVENous PRN  
 ondansetron (ZOFRAN) injection 4 mg  4 mg IntraVENous Q4H PRN  pantoprazole (PROTONIX) 40 mg in sodium chloride 0.9% 10 mL injection  40 mg IntraVENous Q24H  
 albuterol (PROVENTIL VENTOLIN) nebulizer solution 2.5 mg  2.5 mg Nebulization Q6H PRN  
 sertraline (ZOLOFT) tablet 100 mg  100 mg Oral DAILY  traZODone (DESYREL) tablet 50 mg  50 mg Oral QHS PRN Allergies Allergen Reactions  Ativan [Lorazepam] Hives  Pcn [Penicillins] Other (comments) Unsure of reaction; pt reports unsure if have taken cephalosporin before.  Wellbutrin [Bupropion Hcl] Anxiety  Xanax [Alprazolam] Hives Review of Systems: A comprehensive review of systems was negative except for that written in the History of Present Illness. Subjective:  
  
 
Physical Exam: 
Visit Vitals /53 Pulse 64 Temp 98.4 °F (36.9 °C) Resp 16 Ht 5' 8\" (1.727 m) Wt 191 lb 5.8 oz (86.8 kg) SpO2 94% BMI 29.10 kg/m² General Appearance:  Well developed & well nourished, appropriate, no acute distress. Ears/Nose/Mouth/Throat:   Hearing grossly normal. 
  
    Neck: Supple. No JVD Chest:   Respirations unlabored at rest.  Clear to auscultation bilaterally. Cardiovascular:  Regular rate & rhythm, no murmur. Abdomen:   Soft, bowel sounds are active. Extremities: No edema. Skin: Warm and dry. Cardiographics: 
ECG: nonspecific ST and T waves changes, atrial fibrillation with rapid HR Data Reviewed: All lab results for the last 24 hours reviewed. Assessment:  
  
  
Hospital Problems  Date Reviewed: 4/17/2019 Codes Class Noted POA Acute hypoxemic respiratory failure [facility] ICD-10-CM: J96.01 
ICD-9-CM: 518.81  4/12/2019 No  
   
 Pneumonia due to infectious organism ICD-10-CM: J18.9 ICD-9-CM: 136.9, 484.8  4/12/2019 No  
   
 Perforated diverticulum of large intestine ICD-10-CM: K57.20 ICD-9-CM: 562.10  3/26/2019 Yes Plan: Ms. Gerald Romberg had newly diagnosed AF with RVR, initially noted in ER. She converted to NSR on 04/18/2019, has maintained NSR thereafter. BP has been well controlled. Stop metoprolol & diltiazem IR. Start diltiazem  mg po daily. Continue losartan. [id number] 3, but unable to use oral anticoagulant at this time due to recent surgery. Also with previous history of SAH with ruptured aneurysm. Would potentially consider anticoagulation at some point in the future, but as she is in NSR now, it's not currently urgent to anticoagulate. Follow up with Dr. Phylliss Spurling in clinic, will be set up by his nurse. JEREMI Betancourt 9 [facility] 04/19/19 Addendum from EP attending: 
 I have seen, examined patient, and discussed with nurse practitioner, registered nurse, reviewed, updated note and agree with the assessment and plan I have talked to her this pm. Her family was present Vital signs are stable but BP is normal and she has converted to NSR Exam shows regular rhythm and no rub Lungs clear Assessment and Plan: 
Continue to monitor PAF. May not take NOAC for now due to surgery and hx of aneurysm rupture Will stop metoprolol and increase cardizem CD Will ask Dr Caryn Souaz nurse to make appt for Dr Phylliss Spurling to see her in office Tara Franklin M.D. Electrophysiology/Cardiology [address] [facility] [place] [address], [address] 
[phone]                                        [phone]

## 2019-04-19 NOTE — PROGRESS NOTES
Bedside and Verbal shift change report given to oncoming nurse Libby Smith R.N. Opportunity for questions and clarifications provided.

## 2019-04-19 NOTE — PROGRESS NOTES
Problem: Self Care Deficits Care Plan (Adult) Goal: *Acute Goals and Plan of Care (Insert Text) Description Occupational Therapy Goals Re-Evaluation 4/18- continue all goals as below Initiated 4/10/2019, re-evaluation s/p rapid response 4/12/2019 - all goals adjusted to reflect decline 1. Patient will perform grooming sitting unsupported EOB with minimal assistance within 7 day(s). 2.  Patient will perform upper body dressing/bathing sitting unsupported EOB with minimal assistance within 7 day(s). 3.  Patient will perform toilet transfers to/from [facility] with moderate assistance within 7 day(s). 4.  Patient will perform all aspects of toileting with moderate assistance within 7 day(s). 5.  Patient will participate in upper extremity therapeutic exercise/activities with minimal assistance for 10 minutes within 7 day(s). Initiated 4/10/2019 1. Patient will perform standing ADLs at sink with least restrictive DME with supervision/set-up within 7 day(s). 2.  Patient will perform lower body dressing with minimal assistance/contact guard assist using AE PRN within 7 day(s). 3.  Patient will perform bathing with minimal assistance/contact guard assist within 7 day(s). 4.  Patient will perform toilet transfers with supervision/set-up within 7 day(s). 5.  Patient will perform all aspects of toileting with minimal assistance/contact guard assist within 7 day(s). 6.  Patient will participate in upper extremity therapeutic exercise/activities with supervision/set-up for 10 minutes within 7 day(s). Outcome: Progressing Towards Goal 
 OCCUPATIONAL THERAPY TREATMENT Patient: Katie Bone (65 y.o. female) Date: 4/19/2019 Diagnosis: Perforated diverticulum of large intestine [K57.20] <principal problem not specified> Procedure(s) (LRB): 
RIGHT SIDE INTERPLEURAL PLACEMENT OF VENTRICULO PLEURAL SHUNT (Right) 2 Days Post-Op Precautions: Fall(external  shunt) Chart, occupational therapy assessment, plan of care, and goals were reviewed. ASSESSMENT:  
The patient presents with ability to complete self care transfer chair to bed with CGA. Limited progression with ADL tasks secondary to reports fatigue with sitting in chair x 5-6 hours. SpO2 with mild drop with light activity with return with PLB. Completed BUE AROM in supine with HOB elevated with extended rest breaks, in order to increase act tolerance for ADL tasks. Recommend IPR as patient is presenting below baseline. The following are barriers to ADL independence while in acute care:  
- Cognitive and/or behavioral: insight into deficits and short term memory loss - Medical condition: functional endurance, standing balance and medical history   
- Other:    
 
Prior level of function: Home with dtr. Indep with ADLs PLAN: 
Patient continues to benefit from skilled intervention to address the above impairments. Continue treatment per established plan of care. Recommend with staff: Recommend with nursing patient to complete as able in order to maintain strength, endurance and independence: ADLs with supervision/setup, OOB to chair 3x/day and mobilizing to the bathroom for toileting with min A. Thank you for your assistance. Recommend next OT session: standing ADL tasks Discharge recommendations: Rehab: patient is working towards tolerating 3 hours of therapy (to regain functional baseline patient requires rehab) If above is not an option then recommend: Rehab at skilled nursing facility (SNF) (to regain functional baseline patient requires rehab) SUBJECTIVE:  
Patient stated I even have a crown, that goes with my name, Maurice MILLERAdam OBJECTIVE DATA SUMMARY:  
Cognitive/Behavioral Status: 
Neurologic State: Alert Orientation Level: Oriented to person;Oriented to place Functional Mobility and Transfers for ADLs: 
Bed Mobility: 
Supine to Sit: Moderate assistance Sit to Supine: Moderate assistance Transfers: 
Sit to Stand: Contact guard assistance Bed to Chair: Contact guard assistance(HHA) Balance: 
Sitting: Intact Standing: Impaired Standing - Static: Fair Standing - Dynamic : Fair ADL Intervention: 
  
Provided verbal cues for hand placement, body mechanics and sequencing with self care transfer. Demo good increase in functional mobility, CGA with self care transfer. Ronrlysantino Punch In supine with HOB elevated completed BUE AROM:  
Bicep curls x 10 Mild overhead reaching x 10 Forward reach x 10 Additional rest between each set Pain: 
Reports mild pain in stomach, Activity Tolerance:  
Fair RA: Spo2 following activity 88%, increase to 96% with PLB and rest.  
HR 60-80 BPM 
 
After treatment patient left:  
Supine in bed Bed in low position Call light within reach RN notified Family at bedside COMMUNICATION/COLLABORATION:  
The patients plan of care was discussed with: Registered Nurse Bárbara Johnson, OT Time Calculation: 25 mins

## 2019-04-19 NOTE — PROCEDURES
[address]  PULMONARY FUNCTION TEST    Name:  Angeline Baltazar  MR#:  [id number]  :  1950  ACCOUNT #:  [de-identified]  DATE OF SERVICE:  04/15/2019      REQUESTING PHYSICIAN:  Tea Pedroza DO    SPIROMETRY:  FEV1/FVC ratio is 91. FEV1 is 0.61 L, which is 22% predicted. FVC is 0.67 L, which is 18% predicted. This is suggestive of very severe obstruction; however, the patient was coughing during the entire test and did not meet the ATS/ERS criteria for reproducibility and acceptability. Consider repeating spirometry.         Krystle Rivero MD [id number]  D:  2019 16:34  T:  2019 2:02  JOB #:  [id number]  CC:  Tea Pedroza DO

## 2019-04-19 NOTE — WOUND CARE
WOCN Ostomy Progress Note:  
  
Postoperative visit in 4W. Surgery: Laparotomy with sigmoidectomy and end colostomy  
Date of Surgery: 4.8. 19      Surgeon: Dr. Kimberly Licea Type of Ostomy: End Colostomy  
Stoma Location: LUCHAMP Daughter Leti Dove was at the bedside. Bed:  Sport. Offloaded heels with pillows. Sacrum and buttocks intact with blanching pink.  Protect skin with Aloe Vesta. Turn team to assist with q2 turns. 
  
Ostomy Assessment: 
Stoma appearance: pink, moist and flushed. Peristomal skin: intact without irritation Abdomen: midline incision with staples. Output: liquid brown stool  
  
Treatments: 
Pouch removed, stoma and peristomal skin cleaned and assessed, new pouch prepared and applied with eakins ring. Pouch changed by Maximiliano Rios. Applied 2 piece 2 1/4 pouch flat pouch with eakins ring cut just a little larger than 1 1/4 brittany.  Supplies left at the bedside. 
  
  
Teaching/Subjects covered today: 
-How to order 
  
Recommendations: 
Bed:  Sport.   
Prevalon boots or offload with pillows. Sacrum and buttocks:  Twice daily and as needed apply Aloe Vesta. Turn team 
  
1. Empty appliance when 1/3 full and PRN. Encourage patient/family to notify nurse and 
assist w/ pouch emptying to promote self-care. Change appliance twice weekly and PRN for leaking ASAP. DO NOT REINFORCE LEAKS to avoid skin breakdown. 
  
Transition of Care: 
Teaching completed. Patient will be going to rehab prior to going home. Supplies and ordering information provided. 
  
Yesi LOZOYAN RN [facility] [phone] Pager [phone]

## 2019-04-20 ENCOUNTER — APPOINTMENT (OUTPATIENT)
Dept: GENERAL RADIOLOGY | Age: 69
DRG: 329 | End: 2019-04-20
Attending: PHYSICIAN ASSISTANT
Payer: MEDICARE

## 2019-04-20 LAB
A1AT SERPL-MCNC: 260 MG/DL (ref 90–200)
GLUCOSE BLD STRIP.AUTO-MCNC: 106 MG/DL (ref 65–100)
GLUCOSE BLD STRIP.AUTO-MCNC: 128 MG/DL (ref 65–100)
GLUCOSE BLD STRIP.AUTO-MCNC: 165 MG/DL (ref 65–100)
GLUCOSE BLD STRIP.AUTO-MCNC: 210 MG/DL (ref 65–100)
SERVICE CMNT-IMP: ABNORMAL

## 2019-04-20 PROCEDURE — 65660000000 HC RM CCU STEPDOWN

## 2019-04-20 PROCEDURE — 74011250637 HC RX REV CODE- 250/637: Performed by: INTERNAL MEDICINE

## 2019-04-20 PROCEDURE — 74011250636 HC RX REV CODE- 250/636: Performed by: SPECIALIST

## 2019-04-20 PROCEDURE — 74011000250 HC RX REV CODE- 250: Performed by: SPECIALIST

## 2019-04-20 PROCEDURE — 74011250637 HC RX REV CODE- 250/637: Performed by: SPECIALIST

## 2019-04-20 PROCEDURE — 82962 GLUCOSE BLOOD TEST: CPT

## 2019-04-20 PROCEDURE — 71045 X-RAY EXAM CHEST 1 VIEW: CPT

## 2019-04-20 PROCEDURE — 74011636637 HC RX REV CODE- 636/637: Performed by: SPECIALIST

## 2019-04-20 PROCEDURE — 74011000258 HC RX REV CODE- 258: Performed by: SPECIALIST

## 2019-04-20 PROCEDURE — C9113 INJ PANTOPRAZOLE SODIUM, VIA: HCPCS | Performed by: SPECIALIST

## 2019-04-20 PROCEDURE — 94640 AIRWAY INHALATION TREATMENT: CPT

## 2019-04-20 PROCEDURE — 74011250637 HC RX REV CODE- 250/637: Performed by: NURSE PRACTITIONER

## 2019-04-20 PROCEDURE — 94760 N-INVAS EAR/PLS OXIMETRY 1: CPT

## 2019-04-20 RX ORDER — IPRATROPIUM BROMIDE AND ALBUTEROL SULFATE 2.5; .5 MG/3ML; MG/3ML
3 SOLUTION RESPIRATORY (INHALATION)
Status: DISCONTINUED | OUTPATIENT
Start: 2019-04-20 | End: 2019-04-26 | Stop reason: HOSPADM

## 2019-04-20 RX ADMIN — CALCIUM CARBONATE (ANTACID) CHEW TAB 500 MG 400 MG: 500 CHEW TAB at 11:43

## 2019-04-20 RX ADMIN — PANTOPRAZOLE SODIUM 40 MG: 40 INJECTION, POWDER, FOR SOLUTION INTRAVENOUS at 17:58

## 2019-04-20 RX ADMIN — FUROSEMIDE 40 MG: 10 INJECTION, SOLUTION INTRAMUSCULAR; INTRAVENOUS at 08:45

## 2019-04-20 RX ADMIN — SERTRALINE 100 MG: 50 TABLET, FILM COATED ORAL at 08:46

## 2019-04-20 RX ADMIN — CALCIUM CARBONATE (ANTACID) CHEW TAB 500 MG 400 MG: 500 CHEW TAB at 18:01

## 2019-04-20 RX ADMIN — POTASSIUM & SODIUM PHOSPHATES POWDER PACK 280-160-250 MG 1 PACKET: 280-160-250 PACK at 08:45

## 2019-04-20 RX ADMIN — HYDROMORPHONE HYDROCHLORIDE 2 MG: 4 TABLET ORAL at 04:39

## 2019-04-20 RX ADMIN — HUMAN INSULIN 2 UNITS: 100 INJECTION, SOLUTION SUBCUTANEOUS at 17:54

## 2019-04-20 RX ADMIN — DILTIAZEM HYDROCHLORIDE 120 MG: 120 CAPSULE, COATED, EXTENDED RELEASE ORAL at 08:46

## 2019-04-20 RX ADMIN — HUMAN INSULIN 3 UNITS: 100 INJECTION, SOLUTION SUBCUTANEOUS at 11:43

## 2019-04-20 RX ADMIN — POTASSIUM & SODIUM PHOSPHATES POWDER PACK 280-160-250 MG 1 PACKET: 280-160-250 PACK at 17:58

## 2019-04-20 RX ADMIN — FUROSEMIDE 40 MG: 10 INJECTION, SOLUTION INTRAMUSCULAR; INTRAVENOUS at 17:55

## 2019-04-20 RX ADMIN — HYDROMORPHONE HYDROCHLORIDE 2 MG: 4 TABLET ORAL at 15:51

## 2019-04-20 RX ADMIN — IPRATROPIUM BROMIDE AND ALBUTEROL SULFATE 3 ML: .5; 3 SOLUTION RESPIRATORY (INHALATION) at 07:33

## 2019-04-20 RX ADMIN — ACETAMINOPHEN 650 MG: 325 TABLET ORAL at 08:47

## 2019-04-20 RX ADMIN — POTASSIUM & SODIUM PHOSPHATES POWDER PACK 280-160-250 MG 1 PACKET: 280-160-250 PACK at 11:35

## 2019-04-20 RX ADMIN — FLUCONAZOLE 400 MG: 400 INJECTION, SOLUTION INTRAVENOUS at 11:47

## 2019-04-20 RX ADMIN — POTASSIUM & SODIUM PHOSPHATES POWDER PACK 280-160-250 MG 1 PACKET: 280-160-250 PACK at 21:00

## 2019-04-20 RX ADMIN — METRONIDAZOLE 500 MG: 500 INJECTION, SOLUTION INTRAVENOUS at 06:34

## 2019-04-20 RX ADMIN — LOSARTAN POTASSIUM 50 MG: 50 TABLET ORAL at 08:46

## 2019-04-20 RX ADMIN — LABETALOL 20 MG/4 ML (5 MG/ML) INTRAVENOUS SYRINGE 20 MG: at 07:20

## 2019-04-20 RX ADMIN — HYDROMORPHONE HYDROCHLORIDE 2 MG: 4 TABLET ORAL at 20:58

## 2019-04-20 RX ADMIN — CEFEPIME HYDROCHLORIDE 2 G: 2 INJECTION, POWDER, FOR SOLUTION INTRAVENOUS at 00:54

## 2019-04-20 RX ADMIN — CALCIUM CARBONATE (ANTACID) CHEW TAB 500 MG 400 MG: 500 CHEW TAB at 08:47

## 2019-04-20 NOTE — PROGRESS NOTES
Problem: Pressure Injury - Risk of 
Goal: *Prevention of pressure injury Description Document Moe Scale and appropriate interventions in the flowsheet. Outcome: Progressing Towards Goal 
  
Problem: Patient Education: Go to Patient Education Activity Goal: Patient/Family Education Outcome: Progressing Towards Goal 
  
Problem: Falls - Risk of 
Goal: *Absence of Falls Description Document Clide Failing Fall Risk and appropriate interventions in the flowsheet. Outcome: Progressing Towards Goal 
  
Problem: Patient Education: Go to Patient Education Activity Goal: Patient/Family Education Outcome: Progressing Towards Goal 
  
Problem: General Medical Care Plan Goal: *Vital signs within specified parameters Outcome: Progressing Towards Goal 
Goal: *Labs within defined limits Outcome: Progressing Towards Goal 
Goal: *Absence of infection signs and symptoms Outcome: Progressing Towards Goal 
Goal: *Optimal pain control at patient's stated goal 
Outcome: Progressing Towards Goal 
Goal: *Skin integrity maintained Outcome: Progressing Towards Goal 
Goal: *Fluid volume balance Outcome: Progressing Towards Goal 
Goal: *Optimize nutritional status Outcome: Progressing Towards Goal 
Goal: *Anxiety reduced or absent Outcome: Progressing Towards Goal 
Goal: *Progressive mobility and function (eg: ADL's) Outcome: Progressing Towards Goal 
  
Problem: Patient Education: Go to Patient Education Activity Goal: Patient/Family Education Outcome: Progressing Towards Goal 
  
Problem: Patient Education: Go to Patient Education Activity Goal: Patient/Family Education Outcome: Progressing Towards Goal 
  
Problem: Surgical Pathway: Discharge Outcomes Goal: *Hemodynamically stable Outcome: Progressing Towards Goal 
Goal: *Lungs clear or at baseline Outcome: Progressing Towards Goal 
Goal: *Demonstrates independent activity or return to baseline Outcome: Progressing Towards Goal 
 Goal: *Optimal pain control at patient's stated goal 
Outcome: Progressing Towards Goal 
Goal: *Verbalizes understanding and describes prescribed diet Outcome: Progressing Towards Goal 
Goal: *Tolerating diet Outcome: Progressing Towards Goal 
Goal: *Verbalizes name, dosage, time, side effects, and number of days to continue medications Outcome: Progressing Towards Goal 
Goal: *No signs and symptoms of infection or wound complications Outcome: Progressing Towards Goal 
Goal: *Anxiety reduced or absent Outcome: Progressing Towards Goal 
Goal: *Understands and describes signs and symptoms to report to providers(Stroke Metric) Outcome: Progressing Towards Goal 
Goal: *Describes follow-up/return visits to physicians Outcome: Progressing Towards Goal 
Goal: *Describes available resources and support systems Outcome: Progressing Towards Goal 
  
Problem: Nutrition Deficit Goal: *Optimize nutritional status Outcome: Progressing Towards Goal 
  
Problem: Patient Education: Go to Patient Education Activity Goal: Patient/Family Education Outcome: Progressing Towards Goal 
  
Problem: Patient Education: Go to Patient Education Activity Goal: Patient/Family Education Outcome: Progressing Towards Goal 
  
Problem: Infection - Risk of, Central Venous Catheter-Associated Bloodstream Infection Goal: *Absence of infection signs and symptoms Outcome: Progressing Towards Goal 
  
Problem: Patient Education: Go to Patient Education Activity Goal: Patient/Family Education Outcome: Progressing Towards Goal 
  
Problem: Ostomy Care Goal: *Patient pouching appliance will fit properly and maintain integrity at least three to five days Description Infection control procedures (eg, clean dressings, clean gloves, hand washing, precautions to isolate wound from contamination, sterile instruments used for wound debridement) should be implemented. Outcome: Progressing Towards Goal 
Goal: *Acceptance of change in body image Outcome: Progressing Towards Goal 
  
Problem: Patient Education: Go to Patient Education Activity Goal: Patient/Family Education Outcome: Progressing Towards Goal

## 2019-04-20 NOTE — PROGRESS NOTES
CM called [address] [place] [address] and spoke with Adelaida Langston, they have not heard from [facility]. [facility]. GRACIA will continue to follow. Lidia Milan MSA, RN, CRM.

## 2019-04-20 NOTE — ROUTINE PROCESS
Bedside and Verbal shift change report given to SUSHANT Fuller (oncoming nurse) by Ebonie Rondon RN (offgoing nurse).  Report included the following information SBAR, Intake/Output, MAR and Cardiac Rhythm SR.

## 2019-04-20 NOTE — ROUTINE PROCESS
ADULT PROTOCOL: JET AEROSOL ASSESSMENT Patient  Jack Philip     79 y.o.   female     4/20/2019  10:16 AM 
 
Breath Sounds Pre Procedure: Right Breath Sounds: Diminished Left Breath Sounds: Diminished Breath Sounds Post Procedure: Right Breath Sounds: Diminished Left Breath Sounds: Diminished Breathing pattern: Pre procedure Breathing Pattern: Regular Post procedure Breathing Pattern: Regular Heart Rate: Pre procedure Pulse: 73 Post procedure Pulse: 84 Resp Rate: Pre procedure Respirations: 18 Post procedure Respirations: 16 Incentive Spirometry:  Actual Volume (ml): 1250 ml 
   
Cough: Pre procedure Cough: Non-productive Post procedure Cough: Non-productive Suctioned: NO Oxygen: O2 Device: Room air   FiO2 (%) 0 Changed: NO SpO2: Pre procedure SpO2: 94 %   without oxygen Post procedure SpO2: 94 %  without oxygen Nebulizer Therapy: Current medications Aerosolized Medications: DuoNeb Changed: YES to [facility] Smoking History:  
 
Problem List:  
Patient Active Problem List  
Diagnosis Code  Cough variant asthma J45.991  
 Tobacco Abuse F17.210  
 AR (allergic rhinitis) J30.9  ADD (attention deficit disorder) F98.8  Depression F32.9  Anxiety F41.9  
 COPD (chronic obstructive pulmonary disease) ([facility] [address] J44.9  Thyroiditis, subacute E06.1  Hyperlipemia E78.5  DVT of Rt Lower Extremity I82.409  Elevated LFT's, Fatty Liver R94.5  
 H/O Subarachnoid hemorrhage due to ruptured aneurysm I60.8  Essential tremor G25.0  GERD (gastroesophageal reflux disease) K21.9  Bilateral hip pain M25.551, M25.552  Vitamin D deficiency E55.9  Impaired fasting glucose/Prediabetes R73.01  
 Benign essential hypertension I10  
 ACP (advance care planning) Z71.89  
 Perforated diverticulum of large intestine K57.20  Acute hypoxemic respiratory failure (HCC) J96.01  
 Pneumonia due to infectious organism J18.9 Respiratory Therapist: Glenroy Loera RT

## 2019-04-20 NOTE — PROGRESS NOTES
POD 3 Doing well 
afeb No HA, Neuro intact Dressings C/D/I 
S/P: ventriculopleural shunt placment  
CXR this am with suggestion of possible right pleural effusion Will continue to follow

## 2019-04-20 NOTE — PROGRESS NOTES
Progress Note Patient: Gael Bojorquez MRN: [id number]  SSN: [id number] YOB: 1950  Age: 79 y.o. Sex: female Admit Date: 3/26/2019 
 
3 Days Post-Op Procedure:  Procedure(s): RIGHT SIDE INTERPLEURAL PLACEMENT OF VENTRICULO PLEURAL SHUNT Subjective: No acute surgical issues. Pt doing well. Tolerating diet without nausea or vomiting. Pain is under control. Ostomy is productive Objective:  
 
Visit Vitals /77 (BP 1 Location: Left arm, BP Patient Position: At rest) Pulse 87 Temp 98.8 °F (37.1 °C) Resp 17 Ht 5' 8\" (1.727 m) Wt 191 lb 2.2 oz (86.7 kg) SpO2 94% BMI 29.06 kg/m² Temp (24hrs), Av.9 °F (37.2 °C), Min:98.4 °F (36.9 °C), Max:99.4 °F (37.4 °C) Physical Exam:   
Gen:  NAD Pulm:  Unlabored Abd:  S/ND/appropriate TTP Wound:  C/D/I Ostomy:  Pink, patent and productive Recent Results (from the past 24 hour(s)) GLUCOSE, POC Collection Time: 19 11:08 AM  
Result Value Ref Range Glucose (POC) 115 (H) 65 - 100 mg/dL Performed by Andry Valerio ALPHA-1-ANTITRYPSIN, TOTAL Collection Time: 19  1:57 PM  
Result Value Ref Range Alpha-1 Antitrypsin 260 (H) 90 - 200 mg/dL GLUCOSE, POC Collection Time: 19  4:28 PM  
Result Value Ref Range Glucose (POC) 129 (H) 65 - 100 mg/dL Performed by Sharad Reid GLUCOSE, POC Collection Time: 19 11:12 PM  
Result Value Ref Range Glucose (POC) 117 (H) 65 - 100 mg/dL Performed by Ginny Donovan) GLUCOSE, POC Collection Time: 19  5:34 AM  
Result Value Ref Range Glucose (POC) 106 (H) 65 - 100 mg/dL Performed by Ginny Donovan) Assessment:  
 
Hospital Problems  Date Reviewed: 2019 Codes Class Noted POA Acute hypoxemic respiratory failure ([address] ICD-10-CM: J96.01 
ICD-9-CM: 518.81  2019 No  
   
 Pneumonia due to infectious organism ICD-10-CM: J18.9 ICD-9-CM: 136.9, 484.8  2019 No  
 Perforated diverticulum of large intestine ICD-10-CM: K57.20 ICD-9-CM: 562.10  3/26/2019 Yes Plan/Recommendations/Medical Decision Making:  
 
- GI lite diet 
- Pain control - Out of bed as tolerated - Possible dispo to rehab this coming week

## 2019-04-20 NOTE — PROGRESS NOTES
Bedside and Verbal shift change report given to Alina (oncoming nurse) by Justina De Leon (offgoing nurse). Report included the following information SBAR, ED Summary, OR Summary, Procedure Summary, Intake/Output, Recent Results, Med Rec Status and Cardiac Rhythm normal sinus.

## 2019-04-20 NOTE — PROGRESS NOTES
Bedside shift change report given to Maryuri Rao RN (oncoming nurse) by Maranda Carrillo RN (offgoing nurse). Report included the following information SBAR, Kardex, MAR, Accordion and Cardiac Rhythm NSR.

## 2019-04-21 LAB
GLUCOSE BLD STRIP.AUTO-MCNC: 121 MG/DL (ref 65–100)
GLUCOSE BLD STRIP.AUTO-MCNC: 125 MG/DL (ref 65–100)
GLUCOSE BLD STRIP.AUTO-MCNC: 137 MG/DL (ref 65–100)
SERVICE CMNT-IMP: ABNORMAL

## 2019-04-21 PROCEDURE — 74011250637 HC RX REV CODE- 250/637: Performed by: SPECIALIST

## 2019-04-21 PROCEDURE — 74011250636 HC RX REV CODE- 250/636: Performed by: SPECIALIST

## 2019-04-21 PROCEDURE — 74011000250 HC RX REV CODE- 250: Performed by: SPECIALIST

## 2019-04-21 PROCEDURE — 74011250637 HC RX REV CODE- 250/637: Performed by: INTERNAL MEDICINE

## 2019-04-21 PROCEDURE — 65660000000 HC RM CCU STEPDOWN

## 2019-04-21 PROCEDURE — 94760 N-INVAS EAR/PLS OXIMETRY 1: CPT

## 2019-04-21 PROCEDURE — 82962 GLUCOSE BLOOD TEST: CPT

## 2019-04-21 PROCEDURE — C9113 INJ PANTOPRAZOLE SODIUM, VIA: HCPCS | Performed by: SPECIALIST

## 2019-04-21 PROCEDURE — 74011250637 HC RX REV CODE- 250/637: Performed by: NURSE PRACTITIONER

## 2019-04-21 RX ORDER — SODIUM CHLORIDE 0.9 % (FLUSH) 0.9 %
10 SYRINGE (ML) INJECTION EVERY 24 HOURS
Status: DISCONTINUED | OUTPATIENT
Start: 2019-04-21 | End: 2019-04-26 | Stop reason: HOSPADM

## 2019-04-21 RX ORDER — SODIUM CHLORIDE 0.9 % (FLUSH) 0.9 %
20 SYRINGE (ML) INJECTION AS NEEDED
Status: DISCONTINUED | OUTPATIENT
Start: 2019-04-21 | End: 2019-04-26 | Stop reason: HOSPADM

## 2019-04-21 RX ADMIN — POTASSIUM & SODIUM PHOSPHATES POWDER PACK 280-160-250 MG 1 PACKET: 280-160-250 PACK at 22:57

## 2019-04-21 RX ADMIN — PANTOPRAZOLE SODIUM 40 MG: 40 INJECTION, POWDER, FOR SOLUTION INTRAVENOUS at 18:00

## 2019-04-21 RX ADMIN — CALCIUM CARBONATE (ANTACID) CHEW TAB 500 MG 400 MG: 500 CHEW TAB at 09:30

## 2019-04-21 RX ADMIN — HYDROMORPHONE HYDROCHLORIDE 2 MG: 4 TABLET ORAL at 22:57

## 2019-04-21 RX ADMIN — HYDROMORPHONE HYDROCHLORIDE 2 MG: 4 TABLET ORAL at 09:30

## 2019-04-21 RX ADMIN — SERTRALINE 100 MG: 50 TABLET, FILM COATED ORAL at 09:30

## 2019-04-21 RX ADMIN — POTASSIUM & SODIUM PHOSPHATES POWDER PACK 280-160-250 MG 1 PACKET: 280-160-250 PACK at 18:00

## 2019-04-21 RX ADMIN — HYDROMORPHONE HYDROCHLORIDE 2 MG: 4 TABLET ORAL at 19:15

## 2019-04-21 RX ADMIN — ACETAMINOPHEN 650 MG: 325 TABLET ORAL at 17:59

## 2019-04-21 RX ADMIN — CALCIUM CARBONATE (ANTACID) CHEW TAB 500 MG 400 MG: 500 CHEW TAB at 12:37

## 2019-04-21 RX ADMIN — FUROSEMIDE 40 MG: 10 INJECTION, SOLUTION INTRAMUSCULAR; INTRAVENOUS at 09:30

## 2019-04-21 RX ADMIN — POTASSIUM & SODIUM PHOSPHATES POWDER PACK 280-160-250 MG 1 PACKET: 280-160-250 PACK at 13:50

## 2019-04-21 RX ADMIN — HYDROMORPHONE HYDROCHLORIDE 2 MG: 4 TABLET ORAL at 13:50

## 2019-04-21 RX ADMIN — ACETAMINOPHEN 650 MG: 325 TABLET ORAL at 12:37

## 2019-04-21 RX ADMIN — FUROSEMIDE 40 MG: 10 INJECTION, SOLUTION INTRAMUSCULAR; INTRAVENOUS at 18:00

## 2019-04-21 RX ADMIN — FLUCONAZOLE 400 MG: 400 INJECTION, SOLUTION INTRAVENOUS at 11:07

## 2019-04-21 RX ADMIN — Medication 10 ML: at 22:57

## 2019-04-21 RX ADMIN — POTASSIUM & SODIUM PHOSPHATES POWDER PACK 280-160-250 MG 1 PACKET: 280-160-250 PACK at 09:31

## 2019-04-21 RX ADMIN — Medication 10 ML: at 22:58

## 2019-04-21 RX ADMIN — DILTIAZEM HYDROCHLORIDE 120 MG: 120 CAPSULE, COATED, EXTENDED RELEASE ORAL at 09:30

## 2019-04-21 RX ADMIN — LOSARTAN POTASSIUM 50 MG: 50 TABLET ORAL at 09:30

## 2019-04-21 RX ADMIN — CALCIUM CARBONATE (ANTACID) CHEW TAB 500 MG 400 MG: 500 CHEW TAB at 18:00

## 2019-04-21 RX ADMIN — TRAZODONE HYDROCHLORIDE 50 MG: 50 TABLET ORAL at 22:57

## 2019-04-21 RX ADMIN — HYDROMORPHONE HYDROCHLORIDE 2 MG: 4 TABLET ORAL at 05:46

## 2019-04-21 NOTE — PROGRESS NOTES
Problem: Pressure Injury - Risk of 
Goal: *Prevention of pressure injury Description Document Moe Scale and appropriate interventions in the flowsheet. Outcome: Progressing Towards Goal 
  
Problem: Patient Education: Go to Patient Education Activity Goal: Patient/Family Education Outcome: Progressing Towards Goal 
  
Problem: Falls - Risk of 
Goal: *Absence of Falls Description Document Sayda Shaw Fall Risk and appropriate interventions in the flowsheet. Outcome: Progressing Towards Goal 
  
Problem: Patient Education: Go to Patient Education Activity Goal: Patient/Family Education Outcome: Progressing Towards Goal 
  
Problem: General Medical Care Plan Goal: *Vital signs within specified parameters Outcome: Progressing Towards Goal 
Goal: *Labs within defined limits Outcome: Progressing Towards Goal 
Goal: *Absence of infection signs and symptoms Outcome: Progressing Towards Goal 
Goal: *Optimal pain control at patient's stated goal 
Outcome: Progressing Towards Goal 
Goal: *Skin integrity maintained Outcome: Progressing Towards Goal 
Goal: *Fluid volume balance Outcome: Progressing Towards Goal 
Goal: *Optimize nutritional status Outcome: Progressing Towards Goal 
Goal: *Anxiety reduced or absent Outcome: Progressing Towards Goal 
Goal: *Progressive mobility and function (eg: ADL's) Outcome: Progressing Towards Goal 
  
Problem: Patient Education: Go to Patient Education Activity Goal: Patient/Family Education Outcome: Progressing Towards Goal 
  
Problem: Patient Education: Go to Patient Education Activity Goal: Patient/Family Education Outcome: Progressing Towards Goal 
  
Problem: Surgical Pathway: Discharge Outcomes Goal: *Hemodynamically stable Outcome: Progressing Towards Goal 
Goal: *Lungs clear or at baseline Outcome: Progressing Towards Goal 
Goal: *Demonstrates independent activity or return to baseline Outcome: Progressing Towards Goal 
 Goal: *Optimal pain control at patient's stated goal 
Outcome: Progressing Towards Goal 
Goal: *Verbalizes understanding and describes prescribed diet Outcome: Progressing Towards Goal 
Goal: *Tolerating diet Outcome: Progressing Towards Goal 
Goal: *Verbalizes name, dosage, time, side effects, and number of days to continue medications Outcome: Progressing Towards Goal 
Goal: *No signs and symptoms of infection or wound complications Outcome: Progressing Towards Goal 
Goal: *Anxiety reduced or absent Outcome: Progressing Towards Goal 
Goal: *Understands and describes signs and symptoms to report to providers(Stroke Metric) Outcome: Progressing Towards Goal 
Goal: *Describes follow-up/return visits to physicians Outcome: Progressing Towards Goal 
Goal: *Describes available resources and support systems Outcome: Progressing Towards Goal 
  
Problem: Nutrition Deficit Goal: *Optimize nutritional status Outcome: Progressing Towards Goal 
  
Problem: Patient Education: Go to Patient Education Activity Goal: Patient/Family Education Outcome: Progressing Towards Goal 
  
Problem: Patient Education: Go to Patient Education Activity Goal: Patient/Family Education Outcome: Progressing Towards Goal 
  
Problem: Infection - Risk of, Central Venous Catheter-Associated Bloodstream Infection Goal: *Absence of infection signs and symptoms Outcome: Progressing Towards Goal 
  
Problem: Patient Education: Go to Patient Education Activity Goal: Patient/Family Education Outcome: Progressing Towards Goal 
  
Problem: Ostomy Care Goal: *Patient pouching appliance will fit properly and maintain integrity at least three to five days Description Infection control procedures (eg, clean dressings, clean gloves, hand washing, precautions to isolate wound from contamination, sterile instruments used for wound debridement) should be implemented. Outcome: Progressing Towards Goal 
Goal: *Acceptance of change in body image Outcome: Progressing Towards Goal 
  
Problem: Patient Education: Go to Patient Education Activity Goal: Patient/Family Education Outcome: Progressing Towards Goal

## 2019-04-21 NOTE — PROGRESS NOTES
Problem: Pressure Injury - Risk of 
Goal: *Prevention of pressure injury Description Document Moe Scale and appropriate interventions in the flowsheet. Outcome: Progressing Towards Goal 
  
Problem: Patient Education: Go to Patient Education Activity Goal: Patient/Family Education Outcome: Progressing Towards Goal 
  
Problem: Falls - Risk of 
Goal: *Absence of Falls Description Document Dejuan Wisdom Fall Risk and appropriate interventions in the flowsheet. Outcome: Progressing Towards Goal 
  
Problem: Patient Education: Go to Patient Education Activity Goal: Patient/Family Education Outcome: Progressing Towards Goal 
  
Problem: General Medical Care Plan Goal: *Vital signs within specified parameters Outcome: Progressing Towards Goal 
Goal: *Labs within defined limits Outcome: Progressing Towards Goal 
Goal: *Absence of infection signs and symptoms Outcome: Progressing Towards Goal 
Goal: *Optimal pain control at patient's stated goal 
Outcome: Progressing Towards Goal 
Goal: *Skin integrity maintained Outcome: Progressing Towards Goal 
Goal: *Fluid volume balance Outcome: Progressing Towards Goal 
Goal: *Optimize nutritional status Outcome: Progressing Towards Goal 
Goal: *Anxiety reduced or absent Outcome: Progressing Towards Goal 
Goal: *Progressive mobility and function (eg: ADL's) Outcome: Progressing Towards Goal 
  
Problem: Patient Education: Go to Patient Education Activity Goal: Patient/Family Education Outcome: Progressing Towards Goal 
  
Problem: Patient Education: Go to Patient Education Activity Goal: Patient/Family Education Outcome: Progressing Towards Goal 
  
Problem: Surgical Pathway: Discharge Outcomes Goal: *Hemodynamically stable Outcome: Progressing Towards Goal 
Goal: *Lungs clear or at baseline Outcome: Progressing Towards Goal 
Goal: *Demonstrates independent activity or return to baseline Outcome: Progressing Towards Goal 
 Goal: *Optimal pain control at patient's stated goal 
Outcome: Progressing Towards Goal 
Goal: *Verbalizes understanding and describes prescribed diet Outcome: Progressing Towards Goal 
Goal: *Tolerating diet Outcome: Progressing Towards Goal 
Goal: *Verbalizes name, dosage, time, side effects, and number of days to continue medications Outcome: Progressing Towards Goal 
Goal: *No signs and symptoms of infection or wound complications Outcome: Progressing Towards Goal 
Goal: *Anxiety reduced or absent Outcome: Progressing Towards Goal 
Goal: *Understands and describes signs and symptoms to report to providers(Stroke Metric) Outcome: Progressing Towards Goal 
Goal: *Describes follow-up/return visits to physicians Outcome: Progressing Towards Goal 
Goal: *Describes available resources and support systems Outcome: Progressing Towards Goal 
  
Problem: Nutrition Deficit Goal: *Optimize nutritional status Outcome: Progressing Towards Goal 
  
Problem: Patient Education: Go to Patient Education Activity Goal: Patient/Family Education Outcome: Progressing Towards Goal 
  
Problem: Patient Education: Go to Patient Education Activity Goal: Patient/Family Education Outcome: Progressing Towards Goal 
  
Problem: Infection - Risk of, Central Venous Catheter-Associated Bloodstream Infection Goal: *Absence of infection signs and symptoms Outcome: Progressing Towards Goal 
  
Problem: Patient Education: Go to Patient Education Activity Goal: Patient/Family Education Outcome: Progressing Towards Goal 
  
Problem: Ostomy Care Goal: *Patient pouching appliance will fit properly and maintain integrity at least three to five days Description Infection control procedures (eg, clean dressings, clean gloves, hand washing, precautions to isolate wound from contamination, sterile instruments used for wound debridement) should be implemented. Outcome: Progressing Towards Goal 
Goal: *Acceptance of change in body image Outcome: Progressing Towards Goal 
  
Problem: Patient Education: Go to Patient Education Activity Goal: Patient/Family Education Outcome: Progressing Towards Goal

## 2019-04-21 NOTE — PROGRESS NOTES
Bedside shift change report given to April, SUSHANT (oncoming nurse) by Steven Garcia RN (offgoing nurse). Report included the following information SBAR, Kardex, MAR, Accordion and Cardiac Rhythm NSR.

## 2019-04-21 NOTE — PROGRESS NOTES
Progress Note Patient: David Snyder MRN: [id number]  SSN: [id number] YOB: 1950  Age: 79 y.o. Sex: female Admit Date: 3/26/2019 
 
4 Days Post-Op Procedure:  Procedure(s): RIGHT SIDE INTERPLEURAL PLACEMENT OF VENTRICULO PLEURAL SHUNT Subjective: No acute surgical issues. Pt doing well. Tolerating diet without nausea or vomiting. Pain is under control. Ostomy is productive Objective:  
 
Visit Vitals /86 (BP 1 Location: Left arm, BP Patient Position: Sitting) Pulse 80 Temp 99.2 °F (37.3 °C) Resp 17 Ht 5' 8\" (1.727 m) Wt 188 lb 4.4 oz (85.4 kg) SpO2 97% BMI 28.63 kg/m² Temp (24hrs), Av.8 °F (37.1 °C), Min:98.4 °F (36.9 °C), Max:99.4 °F (37.4 °C) Physical Exam:   
Gen:  NAD Pulm:  Unlabored Abd:  S/ND/appropriate TTP Wound:  C/D/I Ostomy:  Pink, patent and productive Recent Results (from the past 24 hour(s)) GLUCOSE, POC Collection Time: 19  5:33 PM  
Result Value Ref Range Glucose (POC) 165 (H) 65 - 100 mg/dL Performed by Trena Hurd (CON) GLUCOSE, POC Collection Time: 19 11:40 PM  
Result Value Ref Range Glucose (POC) 128 (H) 65 - 100 mg/dL Performed by Adrianna Fields, POC Collection Time: 19  5:27 AM  
Result Value Ref Range Glucose (POC) 121 (H) 65 - 100 mg/dL Performed by Adrianna Fields, POC Collection Time: 19 12:05 PM  
Result Value Ref Range Glucose (POC) 137 (H) 65 - 100 mg/dL Performed by Nora Johnston Assessment:  
 
Hospital Problems  Date Reviewed: 2019 Codes Class Noted POA Acute hypoxemic respiratory failure ([address] ICD-10-CM: J96.01 
ICD-9-CM: 518.81  2019 No  
   
 Pneumonia due to infectious organism ICD-10-CM: J18.9 ICD-9-CM: 136.9, 484.8  2019 No  
   
 Perforated diverticulum of large intestine ICD-10-CM: K57.20 ICD-9-CM: 562.10  3/26/2019 Yes Plan/Recommendations/Medical Decision Making:  
 
- GI lite diet 
- Pain control - Out of bed as tolerated - Possible dispo to rehab this coming week pending insurance approval for [facility] - Labs in am

## 2019-04-21 NOTE — PROGRESS NOTES
Bedside shift change report given to Jorje Segovia RN (oncoming nurse) by SUSHANT Kelley (offgoing nurse). Report included the following information SBAR, Kardex, Intake/Output, MAR, Accordion and Cardiac Rhythm NSR.

## 2019-04-22 ENCOUNTER — TELEPHONE (OUTPATIENT)
Dept: CARDIOLOGY CLINIC | Age: 69
End: 2019-04-22

## 2019-04-22 LAB
ERYTHROCYTE [DISTWIDTH] IN BLOOD BY AUTOMATED COUNT: 14.9 % (ref 11.5–14.5)
GLUCOSE BLD STRIP.AUTO-MCNC: 109 MG/DL (ref 65–100)
GLUCOSE BLD STRIP.AUTO-MCNC: 114 MG/DL (ref 65–100)
GLUCOSE BLD STRIP.AUTO-MCNC: 118 MG/DL (ref 65–100)
GLUCOSE BLD STRIP.AUTO-MCNC: 165 MG/DL (ref 65–100)
GLUCOSE BLD STRIP.AUTO-MCNC: 90 MG/DL (ref 65–100)
HCT VFR BLD AUTO: 29.1 % (ref 35–47)
HGB BLD-MCNC: 8.8 G/DL (ref 11.5–16)
MCH RBC QN AUTO: 28.9 PG (ref 26–34)
MCHC RBC AUTO-ENTMCNC: 30.2 G/DL (ref 30–36.5)
MCV RBC AUTO: 95.7 FL (ref 80–99)
NRBC # BLD: 0 K/UL (ref 0–0.01)
NRBC BLD-RTO: 0 PER 100 WBC
PLATELET # BLD AUTO: 378 K/UL (ref 150–400)
PMV BLD AUTO: 9.4 FL (ref 8.9–12.9)
RBC # BLD AUTO: 3.04 M/UL (ref 3.8–5.2)
SERVICE CMNT-IMP: ABNORMAL
SERVICE CMNT-IMP: NORMAL
WBC # BLD AUTO: 8.2 K/UL (ref 3.6–11)

## 2019-04-22 PROCEDURE — 74011250636 HC RX REV CODE- 250/636: Performed by: SPECIALIST

## 2019-04-22 PROCEDURE — C9113 INJ PANTOPRAZOLE SODIUM, VIA: HCPCS | Performed by: SPECIALIST

## 2019-04-22 PROCEDURE — 85027 COMPLETE CBC AUTOMATED: CPT

## 2019-04-22 PROCEDURE — 82962 GLUCOSE BLOOD TEST: CPT

## 2019-04-22 PROCEDURE — 97116 GAIT TRAINING THERAPY: CPT

## 2019-04-22 PROCEDURE — 74011250637 HC RX REV CODE- 250/637: Performed by: SPECIALIST

## 2019-04-22 PROCEDURE — 65660000000 HC RM CCU STEPDOWN

## 2019-04-22 PROCEDURE — 74011250637 HC RX REV CODE- 250/637: Performed by: NURSE PRACTITIONER

## 2019-04-22 PROCEDURE — 74011250637 HC RX REV CODE- 250/637: Performed by: INTERNAL MEDICINE

## 2019-04-22 PROCEDURE — 94760 N-INVAS EAR/PLS OXIMETRY 1: CPT

## 2019-04-22 PROCEDURE — 74011636637 HC RX REV CODE- 636/637: Performed by: SPECIALIST

## 2019-04-22 PROCEDURE — 36415 COLL VENOUS BLD VENIPUNCTURE: CPT

## 2019-04-22 PROCEDURE — 74011000250 HC RX REV CODE- 250: Performed by: SPECIALIST

## 2019-04-22 RX ADMIN — FLUCONAZOLE 400 MG: 400 INJECTION, SOLUTION INTRAVENOUS at 11:42

## 2019-04-22 RX ADMIN — FUROSEMIDE 40 MG: 10 INJECTION, SOLUTION INTRAMUSCULAR; INTRAVENOUS at 09:01

## 2019-04-22 RX ADMIN — HYDROMORPHONE HYDROCHLORIDE 2 MG: 4 TABLET ORAL at 12:58

## 2019-04-22 RX ADMIN — LABETALOL 20 MG/4 ML (5 MG/ML) INTRAVENOUS SYRINGE 20 MG: at 23:33

## 2019-04-22 RX ADMIN — Medication 10 ML: at 22:09

## 2019-04-22 RX ADMIN — Medication 10 ML: at 06:00

## 2019-04-22 RX ADMIN — SERTRALINE 100 MG: 50 TABLET, FILM COATED ORAL at 09:01

## 2019-04-22 RX ADMIN — HYDROMORPHONE HYDROCHLORIDE 2 MG: 4 TABLET ORAL at 18:17

## 2019-04-22 RX ADMIN — CALCIUM CARBONATE (ANTACID) CHEW TAB 500 MG 400 MG: 500 CHEW TAB at 17:33

## 2019-04-22 RX ADMIN — HUMAN INSULIN 2 UNITS: 100 INJECTION, SOLUTION SUBCUTANEOUS at 11:49

## 2019-04-22 RX ADMIN — POTASSIUM & SODIUM PHOSPHATES POWDER PACK 280-160-250 MG 1 PACKET: 280-160-250 PACK at 12:58

## 2019-04-22 RX ADMIN — HYDROMORPHONE HYDROCHLORIDE 2 MG: 4 TABLET ORAL at 22:07

## 2019-04-22 RX ADMIN — ACETAMINOPHEN 650 MG: 325 TABLET ORAL at 13:39

## 2019-04-22 RX ADMIN — CALCIUM CARBONATE (ANTACID) CHEW TAB 500 MG 400 MG: 500 CHEW TAB at 09:01

## 2019-04-22 RX ADMIN — LOSARTAN POTASSIUM 50 MG: 50 TABLET ORAL at 09:01

## 2019-04-22 RX ADMIN — POTASSIUM & SODIUM PHOSPHATES POWDER PACK 280-160-250 MG 1 PACKET: 280-160-250 PACK at 09:01

## 2019-04-22 RX ADMIN — HYDROMORPHONE HYDROCHLORIDE 2 MG: 4 TABLET ORAL at 03:38

## 2019-04-22 RX ADMIN — PANTOPRAZOLE SODIUM 40 MG: 40 INJECTION, POWDER, FOR SOLUTION INTRAVENOUS at 17:33

## 2019-04-22 RX ADMIN — HYDROMORPHONE HYDROCHLORIDE 2 MG: 4 TABLET ORAL at 09:01

## 2019-04-22 RX ADMIN — DILTIAZEM HYDROCHLORIDE 120 MG: 120 CAPSULE, COATED, EXTENDED RELEASE ORAL at 09:01

## 2019-04-22 RX ADMIN — POTASSIUM & SODIUM PHOSPHATES POWDER PACK 280-160-250 MG 1 PACKET: 280-160-250 PACK at 22:07

## 2019-04-22 RX ADMIN — Medication 10 ML: at 22:07

## 2019-04-22 RX ADMIN — TRAZODONE HYDROCHLORIDE 50 MG: 50 TABLET ORAL at 22:13

## 2019-04-22 RX ADMIN — FUROSEMIDE 40 MG: 10 INJECTION, SOLUTION INTRAMUSCULAR; INTRAVENOUS at 17:33

## 2019-04-22 RX ADMIN — POTASSIUM & SODIUM PHOSPHATES POWDER PACK 280-160-250 MG 1 PACKET: 280-160-250 PACK at 17:33

## 2019-04-22 NOTE — PROGRESS NOTES
Progress Note Patient: Priti Orta MRN: [id number]  SSN: [id number] YOB: 1950  Age: 79 y.o. Sex: female Admit Date: 3/26/2019 
 
5 Days Post-Op Procedure:  Procedure(s): RIGHT SIDE INTERPLEURAL PLACEMENT OF VENTRICULO PLEURAL SHUNT Subjective:  
 
Patient has good pain control; walked yesterday; no fever or chills. Objective:  
 
Visit Vitals /80 (BP 1 Location: Left arm, BP Patient Position: At rest) Pulse 83 Temp 99 °F (37.2 °C) Resp 18 Ht 5' 8\" (1.727 m) Wt 187 lb 13.3 oz (85.2 kg) SpO2 95% BMI 28.56 kg/m² Temp (24hrs), Av.8 °F (37.1 °C), Min:98.3 °F (36.8 °C), Max:99.2 °F (37.3 °C) Physical Exam: ABDOMEN: Obese, non-distended, soft. Incision intact without signs of infection; healthy colostomy with stool in bag; no pain with palpation. Data Review: VS, I/O's, Labs Lab Review:  
Recent Results (from the past 12 hour(s)) GLUCOSE, POC Collection Time: 19 12:13 AM  
Result Value Ref Range Glucose (POC) 109 (H) 65 - 100 mg/dL Performed by Earnest Lee (VARGHESE) CBC W/O DIFF Collection Time: 19  3:49 AM  
Result Value Ref Range WBC 8.2 3.6 - 11.0 K/uL  
 RBC 3.04 (L) 3.80 - 5.20 M/uL HGB 8.8 (L) 11.5 - 16.0 g/dL HCT 29.1 (L) 35.0 - 47.0 % MCV 95.7 80.0 - 99.0 FL  
 MCH 28.9 26.0 - 34.0 PG  
 MCHC 30.2 30.0 - 36.5 g/dL  
 RDW 14.9 (H) 11.5 - 14.5 % PLATELET 087 599 - 154 K/uL MPV 9.4 8.9 - 12.9 FL  
 NRBC 0.0 0  WBC ABSOLUTE NRBC 0.00 0.00 - 0.01 K/uL GLUCOSE, POC Collection Time: 19  5:28 AM  
Result Value Ref Range Glucose (POC) 118 (H) 65 - 100 mg/dL Performed by Earnest Lee (VARGHESE) Assessment:  
 
Hospital Problems  Date Reviewed: 2019 Codes Class Noted POA Acute hypoxemic respiratory failure ([facility] [address] ICD-10-CM: J96.01 
ICD-9-CM: 518.81  2019 No  
   
 Pneumonia due to infectious organism ICD-10-CM: J18.9 ICD-9-CM: 136.9, 484.8  2019 No  
   
 Perforated diverticulum of large intestine ICD-10-CM: K57.20 ICD-9-CM: 562.10  3/26/2019 Yes Plan/Recommendations/Medical Decision Makin. Diet as tolerated. 2. Staple removal. 
3. PT, out of bed in chair. 4. Discharge planning-awaiting word from [facility].

## 2019-04-22 NOTE — PROGRESS NOTES
Bedside shift change report given to Demetrius Myers RN (oncoming nurse) by SUSHANT Kelley (offgoing nurse). Report included the following information SBAR, Kardex, Intake/Output, MAR, Accordion and Cardiac Rhythm NSR.

## 2019-04-22 NOTE — PROGRESS NOTES
Bedside shift change report given to April. Report included the following information SBAR, Kardex, ED Summary, Intake/Output, MAR, Recent Results and Cardiac Rhythm NSR.

## 2019-04-22 NOTE — PROGRESS NOTES
Problem: Pressure Injury - Risk of 
Goal: *Prevention of pressure injury Description Document Moe Scale and appropriate interventions in the flowsheet. Outcome: Progressing Towards Goal 
  
Problem: Patient Education: Go to Patient Education Activity Goal: Patient/Family Education Outcome: Progressing Towards Goal 
  
Problem: Falls - Risk of 
Goal: *Absence of Falls Description Document Timoteo Borrero Fall Risk and appropriate interventions in the flowsheet. Outcome: Progressing Towards Goal 
  
Problem: Patient Education: Go to Patient Education Activity Goal: Patient/Family Education Outcome: Progressing Towards Goal 
  
Problem: General Medical Care Plan Goal: *Vital signs within specified parameters Outcome: Progressing Towards Goal 
Goal: *Labs within defined limits Outcome: Progressing Towards Goal 
Goal: *Absence of infection signs and symptoms Outcome: Progressing Towards Goal 
Goal: *Optimal pain control at patient's stated goal 
Outcome: Progressing Towards Goal 
Goal: *Skin integrity maintained Outcome: Progressing Towards Goal 
Goal: *Fluid volume balance Outcome: Progressing Towards Goal 
Goal: *Optimize nutritional status Outcome: Progressing Towards Goal 
Goal: *Anxiety reduced or absent Outcome: Progressing Towards Goal 
Goal: *Progressive mobility and function (eg: ADL's) Outcome: Progressing Towards Goal 
  
Problem: Patient Education: Go to Patient Education Activity Goal: Patient/Family Education Outcome: Progressing Towards Goal 
  
Problem: Patient Education: Go to Patient Education Activity Goal: Patient/Family Education Outcome: Progressing Towards Goal 
  
Problem: Surgical Pathway: Discharge Outcomes Goal: *Hemodynamically stable Outcome: Progressing Towards Goal 
Goal: *Lungs clear or at baseline Outcome: Progressing Towards Goal 
Goal: *Demonstrates independent activity or return to baseline Outcome: Progressing Towards Goal 
 Goal: *Optimal pain control at patient's stated goal 
Outcome: Progressing Towards Goal 
Goal: *Verbalizes understanding and describes prescribed diet Outcome: Progressing Towards Goal 
Goal: *Tolerating diet Outcome: Progressing Towards Goal 
Goal: *Verbalizes name, dosage, time, side effects, and number of days to continue medications Outcome: Progressing Towards Goal 
Goal: *No signs and symptoms of infection or wound complications Outcome: Progressing Towards Goal 
Goal: *Anxiety reduced or absent Outcome: Progressing Towards Goal 
Goal: *Understands and describes signs and symptoms to report to providers(Stroke Metric) Outcome: Progressing Towards Goal 
Goal: *Describes follow-up/return visits to physicians Outcome: Progressing Towards Goal 
Goal: *Describes available resources and support systems Outcome: Progressing Towards Goal 
  
Problem: Nutrition Deficit Goal: *Optimize nutritional status Outcome: Progressing Towards Goal 
  
Problem: Patient Education: Go to Patient Education Activity Goal: Patient/Family Education Outcome: Progressing Towards Goal 
  
Problem: Patient Education: Go to Patient Education Activity Goal: Patient/Family Education Outcome: Progressing Towards Goal 
  
Problem: Infection - Risk of, Central Venous Catheter-Associated Bloodstream Infection Goal: *Absence of infection signs and symptoms Outcome: Progressing Towards Goal 
  
Problem: Patient Education: Go to Patient Education Activity Goal: Patient/Family Education Outcome: Progressing Towards Goal 
  
Problem: Ostomy Care Goal: *Patient pouching appliance will fit properly and maintain integrity at least three to five days Description Infection control procedures (eg, clean dressings, clean gloves, hand washing, precautions to isolate wound from contamination, sterile instruments used for wound debridement) should be implemented. Outcome: Progressing Towards Goal 
Goal: *Acceptance of change in body image Outcome: Progressing Towards Goal 
  
Problem: Patient Education: Go to Patient Education Activity Goal: Patient/Family Education Outcome: Progressing Towards Goal

## 2019-04-22 NOTE — PROGRESS NOTES
Occupational Therapy: NP with patient at this time. Will follow and see as able and appropriate.  
ALKA Vital/CARLOS

## 2019-04-22 NOTE — WOUND CARE
WOCN Ostomy Progress Note:  
  
Postoperative visit in 4W. Surgery: Laparotomy with sigmoidectomy and end colostomy  
Date of Surgery: 4.8. 19      Surgeon: Dr. Elmer Gastelum Type of Ostomy: End Colostomy  
Stoma Location: LUQ Daughter Rosey at the bedside. Patient having lunch. Bed:  Sport. Offloaded heels with pillows. 
     
Recommendations: 
Bed:  Sport.   
Prevalon boots or offload with pillows. Sacrum and buttocks:  Twice daily and as needed apply Aloe Vesta. Turn team 
  
1. Empty appliance when 1/3 full and PRN. Encourage patient/family to notify nurse and 
assist w/ pouch emptying to promote self-care. Change appliance twice weekly and PRN for leaking ASAP. DO NOT REINFORCE LEAKS to avoid skin breakdown. 
  
Transition of Care: 
Teaching completed. Patient awaiting authorization to go to rehab. Supplies and ordering information provided. Will return tomorrow for pouch change. 
  
Serge DIAZ RN [facility] Wound Care Office [phone] Pager [phone]

## 2019-04-22 NOTE — PROGRESS NOTES
CM called [address] [place] [address] and spoke with Kerri Carey regarding the status of the insurance auth. She said she has not heard back from [facility] but will call this CM back following their rounds this am. Luetta Aase, MSA, RN, CRM.

## 2019-04-22 NOTE — PROGRESS NOTES
Neurosurgery POD7 s/p ventriculo-pleural shunt placement. Right chest dressing is C/D/I. Pt has remained off oxygen all weekend. Generalized weakness. Waiting on [facility] authorization for inpt rehab.  
 
Abhinav Clarke NP

## 2019-04-22 NOTE — TELEPHONE ENCOUNTER
Appt. Scheduled for 5/9      Left message with daughter to call back. She reports she will have her sister Gaetano Cote call back.   ----- Message from Janette Rodriguez RN sent at 4/19/2019  4:01 PM EDT -----      ----- Message -----  From: John Copeland MD  Sent: 4/19/2019   3:53 PM  To: Zena Roper MD, Janette Rodriguez RN    Seen in hospital for PAF for Dr Nida Izquierdo today  pls make appt for Dr Nida Izquierdo to see her in office

## 2019-04-22 NOTE — PROGRESS NOTES
Problem: Mobility Impaired (Adult and Pediatric) Goal: *Acute Goals and Plan of Care (Insert Text) Description Physical Therapy Goals Goals re-evaluated 4/18/2019 1. Patient will move from supine to sit and sit to supine , scoot up and down and roll side to side in bed with minimal assist/contact guard assistance within 7 day(s). 2.  Patient will transfer from bed to chair and chair to bed with contact guard assistance using the least restrictive device within 7 day(s). 3.  Patient will perform sit to stand with contact guard assist within 7 day(s). 4.  Patient will ambulate with minimal assist for 50 feet with the least restrictive device within 7 day(s). Goals re-evaluated 4/12/2019 1. Patient will move from supine to sit and sit to supine , scoot up and down and roll side to side in bed with moderate assistance within 7 day(s). 2.  Patient will transfer from bed to chair and chair to bed with moderate assistance using the least restrictive device within 7 day(s). 3.  Patient will perform sit to stand with moderate assist within 7 day(s). 4.  Patient will ambulate with moderate assist for 25 feet with the least restrictive device within 7 day(s). 5.  Patient will tolerate unsupported sitting for 10 minutes without assist within 7 days. Initiated 4/10/2019 1. Patient will move from supine to sit and sit to supine , scoot up and down and roll side to side in bed with modified independence within 7 day(s). 2.  Patient will transfer from bed to chair and chair to bed with minimal assistance/contact guard assist using the least restrictive device within 7 day(s). 3.  Patient will perform sit to stand with minimal assistance/contact guard assist within 7 day(s). 4.  Patient will ambulate with minimal assistance/contact guard assist for 25 feet with the least restrictive device within 7 day(s). Outcome: Progressing Towards Goal 
 PHYSICAL THERAPY TREATMENT Patient: Ismael Freeman (64 y.o. female) Date: 4/22/2019 Diagnosis: Perforated diverticulum of large intestine [K57.20] <principal problem not specified> Procedure(s) (LRB): 
RIGHT SIDE INTERPLEURAL PLACEMENT OF VENTRICULO PLEURAL SHUNT (Right) 5 Days Post-Op Precautions: Fall(external  shunt) Chart, physical therapy assessment, plan of care and goals were reviewed. ASSESSMENT: 
Based on the objective data described below, the patient presents with mod A for bed mobility secondary to pain and abdominal strength. Gait training completed at Minimum assistance and Assist x2, 30 feet and using a gait belt and HHAx2 . The following are barriers to independence while in acute care:  
-Cognitive and/or behavioral: processing, safety awareness, insight into deficits, insight into abilities, fear of falling and short term memory loss 
-Medical condition: strength, functional reach, functional endurance, standing balance, cardiopulmonary tolerance, pain tolerance and medical history   
-Other:    
 
Prior level of function: pt has a H/O short term memory loss at baseline pt ambulates independently with no A.D. And lives with supportive daughter. PLAN: 
Patient continues to benefit from skilled intervention to address the above impairments. Continue treatment per established plan of care. Recommend with staff: Hans Mooring at tolerated Recommend next PT session: Increase activity tolerance to the hallway and back. Discharge recommendations: Rehab at inpatient facility: patient can tolerate 3 hours of therapy (to regain functional baseline patient requires rehab) If above is not an option then recommend: Home health (to increase independence and safety) 24 supervision 
physical assist during all functional mobility Patient's barriers to discharging home, in addition to above impairments: family availability to assist 
total assist driving to follow up medical appointment(s)/groceries/obtain medication entry and exit into the home 
family availability for education/training to then follow up at home 
level of physical assist required to maintain patient safety. Equipment recommendations for successful discharge (if) home: bedside commode, gait belt, hospital bed and rolling walker SUBJECTIVE:  
Patient stated ? My legs feel weak. ? OBJECTIVE DATA SUMMARY:  
Critical Behavior: 
Neurologic State: Alert Orientation Level: Oriented X4, Other (Comment)(Short term memory loss, daughter [facility] assists) Cognition: Follows commands Safety/Judgement: Decreased insight into deficits Functional Mobility Training: 
Bed Mobility: 
  
Supine to Sit: Moderate assistance(trunk management) Transfers: 
Sit to Stand: Minimum assistance Stand to Sit: Minimum assistance(to assist with control descent ) Balance: 
Sitting: Intact Standing: Impaired Standing - Static: Fair Standing - Dynamic : Fair Ambulation/Gait Training: 
Distance (ft): 30 Feet (ft) Assistive Device: Gait belt(HHAx2) Ambulation - Level of Assistance: Minimal assistance;Assist x2 Gait Abnormalities: Antalgic; Altered arm swing;Decreased step clearance Base of Support: Center of gravity altered Speed/Tracy: Slow Step Length: Left shortened;Right shortened Stairs: Therapeutic Exercises:  
 
Pain: 
Pt reported abdominal pain but did not rank on pain scale. Activity Tolerance:  
Fair Please refer to the flowsheet for vital signs taken during this treatment. After treatment patient left:  
Up in chair Caregiver at bedside Call light within reach RN notified COMMUNICATION/COLLABORATION:  
The patient?s plan of care was discussed with: Registered Nurse Solmon Lesches, PTA Time Calculation: 21 mins

## 2019-04-22 NOTE — PROGRESS NOTES
[facility] Pulmonary, Critical Care, and Sleep Medicine Name: Bisi Hernadez MRN: [id number] : 1950 [facility] Date: 2019 Subjective: 
 
:Off supplemental O2. Doing really good from a pulmonary aspect : Resting in bed. No WOB. Has not had a formal work up for COPD before. : S/p Ventriculo pleural shunt placement - Developed afib with rvr - intraoperatively. Cough but no sputum. denies CP. Former smoker and known COPD on spiriva. Lana Mcgill reviewed - too much coughing - not accurate (22%) CXR - improved venous congestion. On 3 lpm by noe wheat Objective: 
 
 
Exam 
Vital Signs: 
Visit Vitals /80 (BP 1 Location: Left arm, BP Patient Position: At rest) Pulse 83 Temp 99 °F (37.2 °C) Resp 18 Ht 5' 8\" (1.727 m) Wt 85.2 kg (187 lb 13.3 oz) SpO2 95% BMI 28.56 kg/m² Temp (24hrs), Av.8 °F (37.1 °C), Min:98.3 °F (36.8 °C), Max:99.2 °F (37.3 °C) Intake/Output Summary (Last 24 hours) at 2019 3061 Last data filed at 2019 1120 Gross per 24 hour Intake 240 ml Output 800 ml Net -560 ml GENERAL: well developed and in no distress, HEENT:  PERRL, EOMI, no alar flaring or epistaxis, oral mucosa moist without cyanosis, NECK:  no jugular vein distention, no retractions, no thyromegaly or masses, LUNGS: decreased breath sounds billaterally and with rhonchi , HEART:  Regular rate and rhythm with no MGR; no edema is present, EXTREMITIES:  warm with no cyanosis and SKIN:  no jaundice or ecchymosis  
abd- drain and mild tenderness LQ no r/g/r Labs: 
Recent Labs  
  19 
4643 WBC 8.2 HGB 8.8* HCT 29.1*  
 No results for input(s): NA, K, CL, CO2, GLU, BUN, CREA, CA, MG, PHOS, ALB, TBIL, TBILI, SGOT, ALT, INR in the last 72 hours. No lab exists for component: INREXT, INREXT No results for input(s): PHI, PO2I, PCO2I in the last 72 hours. Impression     Plan 1. 4/8/19 S/p xlap with sigmoid colectomy and end colostomy for perforated diverticulitis and fecal contamination. Pelvic abscess with drain in place. 2. 4/8/19 S/p externalization  shunt due to above 3. 4/18/19 S/p placement of Ventriculopleural shunt. 4. Abdominal sepsis 5. Acute resp failure- RUL PNA and asymmetric pulm edema. Baseline COPD on spiriva. FEV1 was 0.61L @ 22% 6. ECHO 4/19 EF 65% RV nml 7. HTN 8. AFib with RVR · O2 titraiton above 90% · On spiriva on an outpatient basis; would recommend adding symbicort 160 two puffs bid at discharge along with the spiriva · ISP. · Nebs sched · Continue current abx  per surg. · Diurese. · Will need full PFTs in 3-4 wks in outpatient clinic · We will be available again to see if needed Matt Redding 
4/22/2019

## 2019-04-23 LAB
GLUCOSE BLD STRIP.AUTO-MCNC: 104 MG/DL (ref 65–100)
GLUCOSE BLD STRIP.AUTO-MCNC: 123 MG/DL (ref 65–100)
GLUCOSE BLD STRIP.AUTO-MCNC: 183 MG/DL (ref 65–100)
SERVICE CMNT-IMP: ABNORMAL

## 2019-04-23 PROCEDURE — 74011000250 HC RX REV CODE- 250: Performed by: SPECIALIST

## 2019-04-23 PROCEDURE — 97535 SELF CARE MNGMENT TRAINING: CPT

## 2019-04-23 PROCEDURE — 74011250637 HC RX REV CODE- 250/637: Performed by: INTERNAL MEDICINE

## 2019-04-23 PROCEDURE — 74011250636 HC RX REV CODE- 250/636: Performed by: SPECIALIST

## 2019-04-23 PROCEDURE — 74011250637 HC RX REV CODE- 250/637: Performed by: SPECIALIST

## 2019-04-23 PROCEDURE — 74011250637 HC RX REV CODE- 250/637: Performed by: NURSE PRACTITIONER

## 2019-04-23 PROCEDURE — 97530 THERAPEUTIC ACTIVITIES: CPT

## 2019-04-23 PROCEDURE — C9113 INJ PANTOPRAZOLE SODIUM, VIA: HCPCS | Performed by: SPECIALIST

## 2019-04-23 PROCEDURE — 97116 GAIT TRAINING THERAPY: CPT

## 2019-04-23 PROCEDURE — 65660000000 HC RM CCU STEPDOWN

## 2019-04-23 PROCEDURE — 82962 GLUCOSE BLOOD TEST: CPT

## 2019-04-23 RX ADMIN — POTASSIUM & SODIUM PHOSPHATES POWDER PACK 280-160-250 MG 1 PACKET: 280-160-250 PACK at 21:09

## 2019-04-23 RX ADMIN — Medication 10 ML: at 22:00

## 2019-04-23 RX ADMIN — Medication 5 MG: at 09:16

## 2019-04-23 RX ADMIN — POTASSIUM & SODIUM PHOSPHATES POWDER PACK 280-160-250 MG 1 PACKET: 280-160-250 PACK at 09:16

## 2019-04-23 RX ADMIN — Medication 20 ML: at 06:19

## 2019-04-23 RX ADMIN — DILTIAZEM HYDROCHLORIDE 120 MG: 120 CAPSULE, COATED, EXTENDED RELEASE ORAL at 09:16

## 2019-04-23 RX ADMIN — PANTOPRAZOLE SODIUM 40 MG: 40 INJECTION, POWDER, FOR SOLUTION INTRAVENOUS at 17:52

## 2019-04-23 RX ADMIN — FUROSEMIDE 40 MG: 10 INJECTION, SOLUTION INTRAMUSCULAR; INTRAVENOUS at 17:52

## 2019-04-23 RX ADMIN — POTASSIUM & SODIUM PHOSPHATES POWDER PACK 280-160-250 MG 1 PACKET: 280-160-250 PACK at 12:27

## 2019-04-23 RX ADMIN — LOSARTAN POTASSIUM 50 MG: 50 TABLET ORAL at 09:16

## 2019-04-23 RX ADMIN — HYDROMORPHONE HYDROCHLORIDE 2 MG: 4 TABLET ORAL at 21:46

## 2019-04-23 RX ADMIN — HYDROMORPHONE HYDROCHLORIDE 2 MG: 4 TABLET ORAL at 12:27

## 2019-04-23 RX ADMIN — Medication 5 MG: at 18:28

## 2019-04-23 RX ADMIN — POTASSIUM & SODIUM PHOSPHATES POWDER PACK 280-160-250 MG 1 PACKET: 280-160-250 PACK at 17:52

## 2019-04-23 RX ADMIN — FLUCONAZOLE 400 MG: 400 INJECTION, SOLUTION INTRAVENOUS at 10:56

## 2019-04-23 RX ADMIN — TRAZODONE HYDROCHLORIDE 50 MG: 50 TABLET ORAL at 21:43

## 2019-04-23 RX ADMIN — FUROSEMIDE 40 MG: 10 INJECTION, SOLUTION INTRAMUSCULAR; INTRAVENOUS at 09:16

## 2019-04-23 RX ADMIN — SERTRALINE 100 MG: 50 TABLET, FILM COATED ORAL at 09:16

## 2019-04-23 RX ADMIN — LABETALOL 20 MG/4 ML (5 MG/ML) INTRAVENOUS SYRINGE 20 MG: at 21:43

## 2019-04-23 RX ADMIN — HYDROMORPHONE HYDROCHLORIDE 2 MG: 4 TABLET ORAL at 04:13

## 2019-04-23 RX ADMIN — Medication 10 ML: at 21:09

## 2019-04-23 RX ADMIN — Medication 10 ML: at 06:19

## 2019-04-23 NOTE — PROGRESS NOTES
Progress Note Patient: Didier Carlos MRN: [id number]  SSN: [id number] YOB: 1950  Age: 79 y.o. Sex: female Admit Date: 3/26/2019 
 
6 Days Post-Op Procedure:  Procedure(s): RIGHT SIDE INTERPLEURAL PLACEMENT OF VENTRICULO PLEURAL SHUNT Subjective:  
 
Patient has good pain control; walked yesterday; no fever or chills. She is tolerating diet. Objective:  
 
Visit Vitals /79 (BP 1 Location: Left arm, BP Patient Position: At rest) Pulse 77 Temp 98.8 °F (37.1 °C) Resp 20 Ht 5' 8\" (1.727 m) Wt 172 lb 13.5 oz (78.4 kg) SpO2 93% BMI 26.28 kg/m² Temp (24hrs), Av.1 °F (37.3 °C), Min:98.7 °F (37.1 °C), Max:100.1 °F (37.8 °C) Physical Exam: ABDOMEN: Obese, non-distended, soft. Incision intact without signs of infection; healthy colostomy with stool in bag; no pain with palpation. Data Review: VS, I/O's, Labs Lab Review:  
Recent Results (from the past 12 hour(s)) GLUCOSE, POC Collection Time: 19 11:24 PM  
Result Value Ref Range Glucose (POC) 114 (H) 65 - 100 mg/dL Performed by Isa Coleman, POC Collection Time: 19  6:23 AM  
Result Value Ref Range Glucose (POC) 104 (H) 65 - 100 mg/dL Performed by Kathleen Gonzalez Assessment:  
 
Hospital Problems  Date Reviewed: 2019 Codes Class Noted POA Acute hypoxemic respiratory failure ([facility] [address] ICD-10-CM: J96.01 
ICD-9-CM: 518.81  2019 No  
   
 Pneumonia due to infectious organism ICD-10-CM: J18.9 ICD-9-CM: 136.9, 484.8  2019 No  
   
 Perforated diverticulum of large intestine ICD-10-CM: K57.20 ICD-9-CM: 562.10  3/26/2019 Yes Plan/Recommendations/Medical Decision Makin. Diet as tolerated. 2. Spirometry. 3. PT, out of bed in chair. 4. Discharge planning-awaiting word from [facility] Would need SNF if [employer] denies.

## 2019-04-23 NOTE — PROGRESS NOTES
04/22/19 2314 Vital Signs Temp 98.9 °F (37.2 °C) Temp Source Oral  
Pulse (Heart Rate) 74 Heart Rate Source Monitor Resp Rate 18  
O2 Sat (%) 96 % Level of Consciousness Alert  
BP (!) 211/85 MAP (Calculated) 127 BP 1 Method Automatic  
BP 1 Location Left arm BP Patient Position At rest  
MEWS Score 3 Gave Labetalol IV PRN order 04/23/19 0018 Vital Signs O2 Sat (%) 97 % /77 MAP (Calculated) 103 BP 1 Method Automatic  
BP 1 Location Left arm BP Patient Position At rest  
Oxygen Therapy O2 Device Nasal cannula O2 Flow Rate (L/min) 2 l/min Post Labetalol IV

## 2019-04-23 NOTE — PROGRESS NOTES
Bedside and Verbal shift change report given to Nadir RN (oncoming nurse) by Lorenzo Tim (offgoing nurse). Report included the following information SBAR.  
 
1600: PT alert, room air, SAWYER purposefully. Family at bedside

## 2019-04-23 NOTE — WOUND CARE
WOCN Ostomy Progress Note:  
  
Follow up visit. Surgery: Laparotomy with sigmoidectomy and end colostomy  
Date of Surgery: 4.8. 19      Surgeon: Dr. Amrit De Dios Type of Ostomy: End Colostomy  
Stoma Location: LUQ Bed:  Sport. Offloaded heels with pillows. Ostomy Assessment: 
Stoma appearance: pink, moist and flushed. Peristomal skin: intact without irritation Abdomen: midline incision Output: soft brown stool  
  
Treatments: 
Pouch removed, stoma and peristomal skin cleaned and assessed, new pouch prepared and applied with eakins ring. Applied 2 piece 2 1/4 pouch flat pouch with eakins ring cut just a little larger than 1 1/4 brittany. Supplies left at the bedside. Recommendations: 
Bed:  Sport.   
Prevalon boots or offload with pillows. Sacrum and buttocks:  Twice daily and as needed apply Aloe Vesta. Turn team 
  
1. Empty appliance when 1/3 full and PRN. Encourage patient/family to notify nurse and 
assist w/ pouch emptying to promote self-care. Change appliance twice weekly and PRN for leaking ASAP. DO NOT REINFORCE LEAKS to avoid skin breakdown. 
  
Transition of Care: 
Teaching completed. Patient awaiting authorization to go to rehab. Supplies and ordering information provided. Will follow as needed. 
  
Jocelin LOZOYAN RN [facility] Wound Care Office [phone] Pager [phone]

## 2019-04-23 NOTE — PROGRESS NOTES
9:30 am. GRACIA notified  that patient was denied admission to [facility] and that a Peer to Peer is necessary as [facility] said patient would benefit from intensive physical therapy in order to return to her pre admission functional level. GRACIA called Lucy at [phone] ext. [phone] to set up a P2P review with Dr. Kimberly Licea. This CM was told that the P2P call will be made within 48 hours. CM asked if call could be expedited as patient has been waiting for an insurance auth since last Friday. They said the would try but not guaranteed. 9:50 am. GRACIA notified patient of denial and that a  P2P review will be done by Dr. Kimberly Licea to attempt to overturn Lucy's decision. GRACIA also spoke with Ellen Cohen with [facility] regarding disposition, she said they would likely have a bed for patient tomorrow. Breonna Blanco MSA, RN, CRM.

## 2019-04-23 NOTE — PROGRESS NOTES
Problem: Self Care Deficits Care Plan (Adult) Goal: *Acute Goals and Plan of Care (Insert Text) Description Occupational Therapy Goals Re-Evaluation 4/18- continue all goals as below Initiated 4/10/2019, re-evaluation s/p rapid response 4/12/2019 - all goals adjusted to reflect decline 1. Patient will perform grooming sitting unsupported EOB with minimal assistance within 7 day(s). 2.  Patient will perform upper body dressing/bathing sitting unsupported EOB with minimal assistance within 7 day(s). 3.  Patient will perform toilet transfers to/from [facility] with moderate assistance within 7 day(s). 4.  Patient will perform all aspects of toileting with moderate assistance within 7 day(s). 5.  Patient will participate in upper extremity therapeutic exercise/activities with minimal assistance for 10 minutes within 7 day(s). Initiated 4/10/2019 1. Patient will perform standing ADLs at sink with least restrictive DME with supervision/set-up within 7 day(s). 2.  Patient will perform lower body dressing with minimal assistance/contact guard assist using AE PRN within 7 day(s). 3.  Patient will perform bathing with minimal assistance/contact guard assist within 7 day(s). 4.  Patient will perform toilet transfers with supervision/set-up within 7 day(s). 5.  Patient will perform all aspects of toileting with minimal assistance/contact guard assist within 7 day(s). 6.  Patient will participate in upper extremity therapeutic exercise/activities with supervision/set-up for 10 minutes within 7 day(s). Outcome: Progressing Towards Goal 
 OCCUPATIONAL THERAPY TREATMENT Patient: Roque Erwin (72 y.o. female) Date: 4/23/2019 Diagnosis: Perforated diverticulum of large intestine [K57.20] <principal problem not specified> Procedure(s) (LRB): 
RIGHT SIDE INTERPLEURAL PLACEMENT OF VENTRICULO PLEURAL SHUNT (Right) 6 Days Post-Op Precautions: Fall(external  shunt) Chart, occupational therapy assessment, plan of care, and goals were reviewed. ASSESSMENT:  
The patient presents with Setup upper body ADLs, Moderate Assistance lower body ADLs, and Minimum assistance, Moderate Assistance and Assist x1 assist functional mobility. The following are barriers to ADL independence while in acute care:  
- Cognitive and/or behavioral: orientation, attention to task, processing, sequencing, insight into deficits and short term memory loss - Medical condition: functional reach, functional endurance, standing balance, medical history, motor planning and coordination   
- Other:    
 
Prior level of function:  independent in ADLs; Memory loss, lives with family. PLAN: 
Patient continues to benefit from skilled intervention to address the above impairments. Continue treatment per established plan of care. Recommend with staff: Amber Luna in chair 3 times a day, participation in self care with assist, toileting on toilet during the day Recommend next OT session: LE self care, toileting on toilet Discharge recommendations: Rehab at inpatient facility: patient can tolerate 3 hours of therapy (to regain functional baseline patient requires rehab) If above is not an option then recommend: Rehab at skilled nursing facility (SNF) (to regain functional baseline patient requires rehab) Barriers to discharging home, in addition to above listed impairments: level of physical assist required to maintain patient safety. Equipment recommendations for successful discharge (if) home: none SUBJECTIVE:  
Patient stated ? I know we just had Easter. ? OBJECTIVE DATA SUMMARY:  
Cognitive/Behavioral Status: 
Neurologic State: Alert Orientation Level: Oriented to person;Oriented to place;Oriented to situation Cognition: Decreased attention/concentration; Follows commands;Memory loss Perception: Appears intact Perseveration: No perseveration noted Safety/Judgement: Decreased insight into deficits; Awareness of environment Functional Mobility and Transfers for ADLs: 
Bed Mobility: 
Supine to Sit: Supervision;Assist x1(using bed rail) Sit to Supine: Minimum assistance;Assist x1 Transfers: 
Sit to Stand: Minimum assistance;Assist x1 Balance: 
Sitting: Intact Sitting - Static: Good (unsupported) Sitting - Dynamic: Good (unsupported) Standing: Impaired Standing - Static: Fair Standing - Dynamic : Fair ADL Intervention: 
  
 
  
 
Upper Body Bathing Bathing Assistance: Set-up Position Performed: Seated edge of bed Lower Body Bathing Bathing Assistance: (inferred from [facility]) Perineal  : Moderate assistance Lower Body : Moderate assistance Position Performed: Standing;Seated edge of bed Cues: Verbal cues provided Lower Body Dressing Assistance Pants With Elastic Waist: Moderate assistance Socks: Supervision/set-up Leg Crossed Method Used: Yes Position Performed: Seated edge of bed;Standing Cognitive Retraining Orientation Retraining: Reorienting;Time Organizing/Sequencing: Breaking task down Attention to Task: Distractibility Following Commands: Follows one step commands/directions Safety/Judgement: Decreased insight into deficits; Awareness of environment Cues: Verbal cues provided; Tactile cues provided Activity Tolerance:  
Fair Please refer to the flowsheet for vital signs taken during this treatment. After treatment patient left:  
Bed/Chair-wheels locked Bed in low position Call light within reach RN notified In bed with HOB elevated COMMUNICATION/COLLABORATION:  
The patient?s plan of care was discussed with: Registered Nurse Thelma Burgos [facility] Time Calculation: 14 mins

## 2019-04-23 NOTE — PROGRESS NOTES
Bedside shift change report given to Buddy Solares (oncoming nurse) by Alexandra Figueroa (offgoing nurse). Report included the following information SBAR, Intake/Output, Recent Results and Cardiac Rhythm NSR.

## 2019-04-23 NOTE — PROGRESS NOTES
Problem: Mobility Impaired (Adult and Pediatric) Goal: *Acute Goals and Plan of Care (Insert Text) Description Physical Therapy Goals Goals re-evaluated 4/18/2019 1. Patient will move from supine to sit and sit to supine , scoot up and down and roll side to side in bed with minimal assist/contact guard assistance within 7 day(s). 2.  Patient will transfer from bed to chair and chair to bed with contact guard assistance using the least restrictive device within 7 day(s). 3.  Patient will perform sit to stand with contact guard assist within 7 day(s). 4.  Patient will ambulate with minimal assist for 50 feet with the least restrictive device within 7 day(s). Goals re-evaluated 4/12/2019 1. Patient will move from supine to sit and sit to supine , scoot up and down and roll side to side in bed with moderate assistance within 7 day(s). 2.  Patient will transfer from bed to chair and chair to bed with moderate assistance using the least restrictive device within 7 day(s). 3.  Patient will perform sit to stand with moderate assist within 7 day(s). 4.  Patient will ambulate with moderate assist for 25 feet with the least restrictive device within 7 day(s). 5.  Patient will tolerate unsupported sitting for 10 minutes without assist within 7 days. Initiated 4/10/2019 1. Patient will move from supine to sit and sit to supine , scoot up and down and roll side to side in bed with modified independence within 7 day(s). 2.  Patient will transfer from bed to chair and chair to bed with minimal assistance/contact guard assist using the least restrictive device within 7 day(s). 3.  Patient will perform sit to stand with minimal assistance/contact guard assist within 7 day(s). 4.  Patient will ambulate with minimal assistance/contact guard assist for 25 feet with the least restrictive device within 7 day(s). Outcome: Progressing Towards Goal 
 
PHYSICAL THERAPY TREATMENT Patient: Hiren Rubi (18 y.o. female) Date: 4/23/2019 Diagnosis: Perforated diverticulum of large intestine [K57.20] <principal problem not specified> Procedure(s) (LRB): 
RIGHT SIDE INTERPLEURAL PLACEMENT OF VENTRICULO PLEURAL SHUNT (Right) 6 Days Post-Op Precautions: Fall(external  shunt) Chart, physical therapy assessment, plan of care and goals were reviewed. ASSESSMENT: 
Based on the objective data described below, the patient presents with min A for sit to stand and mod A for sit to supine. Gait training completed at Minimum assistance and Assist x2, 60 feet and using a gait belt and HHAx2. . Pt was dependant in colostomy care The following are barriers to independence while in acute care:  
-Cognitive and/or behavioral: safety awareness, insight into deficits, insight into abilities, fear of falling and short term memory loss 
-Medical condition: strength, functional endurance, cardiopulmonary tolerance, pain tolerance and medical history   
-Other:    
 
Prior level of function: Independent with short term memory loss. Pt has supportive daughter. PLAN: 
Patient continues to benefit from skilled intervention to address the above impairments. Continue treatment per established plan of care. Recommend with staff: OOB 3x a day Recommend next PT session: increase gait tolerance. Standing exercises? Discharge recommendations: Rehab at inpatient facility: patient can tolerate 3 hours of therapy (to regain functional baseline patient requires rehab) If above is not an option then recommend: Rehab at skilled nursing facility (SNF) (to regain functional baseline patient requires rehab) Patient's barriers to discharging home, in addition to above impairments: family availability to assist 
total assist driving to follow up medical appointment(s)/groceries/obtain medication 
entry and exit into the home 
family availability for education/training to then follow up at home level of physical assist required to maintain patient safety. Equipment recommendations for successful discharge (if) home: bedside commode, gait belt, hospital bed, rolling walker and wheelchair SUBJECTIVE:  
Patient stated my legs feel so heavy.  OBJECTIVE DATA SUMMARY:  
Critical Behavior: 
Neurologic State: Alert, Confused Orientation Level: Disoriented to time, Oriented to person, Oriented to place, Oriented to situation Cognition: Memory loss, Impaired decision making Safety/Judgement: Decreased insight into deficits Functional Mobility Training: 
Bed Mobility: 
  
  
Sit to Supine: Moderate assistance Transfers: 
Sit to Stand: Minimum assistance Stand to Sit: Minimum assistance 
     
  
    pt colostomy bag clip came off during gait. requiring attention to empty and cleaning and reattachment in seated position Balance: 
Sitting: Intact Standing: Impaired Standing - Static: Fair Standing - Dynamic : Fair Ambulation/Gait Training: 
Distance (ft): 60 Feet (ft) Assistive Device: Gait belt(HHAx2) Ambulation - Level of Assistance: Minimal assistance;Assist x2 Gait Abnormalities: Decreased step clearance Base of Support: Center of gravity altered Speed/Tracy: Slow Step Length: Left shortened;Right shortened Stairs: Therapeutic Exercises:  
 
Pain: 
Pt reports midline pain Activity Tolerance:  
limited Please refer to the flowsheet for vital signs taken during this treatment. After treatment patient left:  
Supine in bed Caregiver at bedside Call light within reach RN notified COMMUNICATION/COLLABORATION:  
The patients plan of care was discussed with: Registered Nurse Minh Nascimento PTA Time Calculation: 27 mins

## 2019-04-23 NOTE — PROGRESS NOTES
Problem: Surgical Pathway: Discharge Outcomes Goal: *Hemodynamically stable Outcome: Progressing Towards Goal 
 
Monitor manage high BP Goal: *Demonstrates independent activity or return to baseline Outcome: Progressing Towards Goal 
 
ENCOURAGE Goal: *Optimal pain control at patient's stated goal 
Outcome: Progressing Towards Goal 
Goal: *Tolerating diet Outcome: Progressing Towards Goal 
 
Tolerated diet Goal: *No signs and symptoms of infection or wound complications Outcome: Progressing Towards Goal 
  
Problem: General Medical Care Plan Goal: *Absence of infection signs and symptoms Outcome: Progressing Towards Goal 
 
Incision looks good Goal: *Skin integrity maintained Outcome: Progressing Towards Goal 
  
Problem: Nutrition Deficit Goal: *Optimize nutritional status Outcome: Progressing Towards Goal 
  
Problem: Infection - Risk of, Central Venous Catheter-Associated Bloodstream Infection Goal: *Absence of infection signs and symptoms Outcome: Progressing Towards Goal 
  
Problem: Ostomy Care Goal: *Patient pouching appliance will fit properly and maintain integrity at least three to five days Description Infection control procedures (eg, clean dressings, clean gloves, hand washing, precautions to isolate wound from contamination, sterile instruments used for wound debridement) should be implemented. Outcome: Progressing Towards Goal 
  
Problem: Cardiac Output -  Decreased Goal: *Vital signs within specified parameters Outcome: Progressing Towards Goal 
Goal: *Optimal cardiac output Outcome: Progressing Towards Goal 
 
NSR night shift so far Problem: Falls - Risk of 
Goal: *Absence of Falls Description Document Leonel Jiménez Fall Risk and appropriate interventions in the flowsheet. Outcome: Progressing Towards Goal 
 
ENCOURAGE ambulation Problem: Pressure Injury - Risk of 
Goal: *Prevention of pressure injury Description Document Moe Scale and appropriate interventions in the flowsheet.  
Outcome: Progressing Towards Goal 
 
Turn team

## 2019-04-24 LAB
GLUCOSE BLD STRIP.AUTO-MCNC: 122 MG/DL (ref 65–100)
SERVICE CMNT-IMP: ABNORMAL

## 2019-04-24 PROCEDURE — 74011250637 HC RX REV CODE- 250/637: Performed by: SPECIALIST

## 2019-04-24 PROCEDURE — 74011250636 HC RX REV CODE- 250/636: Performed by: SPECIALIST

## 2019-04-24 PROCEDURE — 74011250637 HC RX REV CODE- 250/637: Performed by: INTERNAL MEDICINE

## 2019-04-24 PROCEDURE — 82962 GLUCOSE BLOOD TEST: CPT

## 2019-04-24 PROCEDURE — 74011000250 HC RX REV CODE- 250: Performed by: SPECIALIST

## 2019-04-24 PROCEDURE — 65660000000 HC RM CCU STEPDOWN

## 2019-04-24 PROCEDURE — 74011250637 HC RX REV CODE- 250/637: Performed by: SURGERY

## 2019-04-24 PROCEDURE — 97535 SELF CARE MNGMENT TRAINING: CPT

## 2019-04-24 PROCEDURE — 74011250637 HC RX REV CODE- 250/637: Performed by: NURSE PRACTITIONER

## 2019-04-24 PROCEDURE — C9113 INJ PANTOPRAZOLE SODIUM, VIA: HCPCS | Performed by: SPECIALIST

## 2019-04-24 RX ORDER — DIAZEPAM 5 MG/1
5 TABLET ORAL
Status: DISCONTINUED | OUTPATIENT
Start: 2019-04-24 | End: 2019-04-26 | Stop reason: HOSPADM

## 2019-04-24 RX ADMIN — DILTIAZEM HYDROCHLORIDE 120 MG: 120 CAPSULE, COATED, EXTENDED RELEASE ORAL at 08:55

## 2019-04-24 RX ADMIN — FUROSEMIDE 40 MG: 10 INJECTION, SOLUTION INTRAMUSCULAR; INTRAVENOUS at 08:55

## 2019-04-24 RX ADMIN — Medication 10 ML: at 22:18

## 2019-04-24 RX ADMIN — PANTOPRAZOLE SODIUM 40 MG: 40 INJECTION, POWDER, FOR SOLUTION INTRAVENOUS at 17:24

## 2019-04-24 RX ADMIN — POTASSIUM & SODIUM PHOSPHATES POWDER PACK 280-160-250 MG 1 PACKET: 280-160-250 PACK at 08:54

## 2019-04-24 RX ADMIN — POTASSIUM & SODIUM PHOSPHATES POWDER PACK 280-160-250 MG 1 PACKET: 280-160-250 PACK at 12:58

## 2019-04-24 RX ADMIN — Medication 10 ML: at 03:50

## 2019-04-24 RX ADMIN — POTASSIUM & SODIUM PHOSPHATES POWDER PACK 280-160-250 MG 1 PACKET: 280-160-250 PACK at 17:24

## 2019-04-24 RX ADMIN — SERTRALINE 100 MG: 50 TABLET, FILM COATED ORAL at 08:55

## 2019-04-24 RX ADMIN — FUROSEMIDE 40 MG: 10 INJECTION, SOLUTION INTRAMUSCULAR; INTRAVENOUS at 17:23

## 2019-04-24 RX ADMIN — ACETAMINOPHEN 650 MG: 325 TABLET ORAL at 15:44

## 2019-04-24 RX ADMIN — FLUCONAZOLE 400 MG: 400 INJECTION, SOLUTION INTRAVENOUS at 11:17

## 2019-04-24 RX ADMIN — Medication 10 ML: at 09:04

## 2019-04-24 RX ADMIN — HYDROMORPHONE HYDROCHLORIDE 2 MG: 4 TABLET ORAL at 22:18

## 2019-04-24 RX ADMIN — CALCIUM CARBONATE (ANTACID) CHEW TAB 500 MG 400 MG: 500 CHEW TAB at 17:24

## 2019-04-24 RX ADMIN — CALCIUM CARBONATE (ANTACID) CHEW TAB 500 MG 400 MG: 500 CHEW TAB at 08:54

## 2019-04-24 RX ADMIN — LABETALOL 20 MG/4 ML (5 MG/ML) INTRAVENOUS SYRINGE 20 MG: at 03:56

## 2019-04-24 RX ADMIN — DIAZEPAM 5 MG: 5 TABLET ORAL at 17:29

## 2019-04-24 RX ADMIN — LOSARTAN POTASSIUM 50 MG: 50 TABLET ORAL at 08:55

## 2019-04-24 RX ADMIN — POTASSIUM & SODIUM PHOSPHATES POWDER PACK 280-160-250 MG 1 PACKET: 280-160-250 PACK at 22:18

## 2019-04-24 RX ADMIN — TRAZODONE HYDROCHLORIDE 50 MG: 50 TABLET ORAL at 22:18

## 2019-04-24 RX ADMIN — CALCIUM CARBONATE (ANTACID) CHEW TAB 500 MG 400 MG: 500 CHEW TAB at 11:16

## 2019-04-24 RX ADMIN — HYDROMORPHONE HYDROCHLORIDE 2 MG: 4 TABLET ORAL at 09:16

## 2019-04-24 RX ADMIN — DIAZEPAM 5 MG: 5 TABLET ORAL at 11:39

## 2019-04-24 RX ADMIN — HYDROMORPHONE HYDROCHLORIDE 2 MG: 4 TABLET ORAL at 13:01

## 2019-04-24 NOTE — PROGRESS NOTES
Met with patient in her room. Daughter present. Patient has been up all day in the chair. She walked in the hallway with nursing and bathed with OT. Patient is trying to nap and asking if PT can return later. Unfortunately it's nearing the end of the day. Patient is encouraged to walk again with nursing this evening. Will follow up for weekly reassessment tomorrow. Continue to recommend inpatient rehab upon discharge. Awaiting auth from [facility] [place]. Cynthia Hanley PT, DPT Geriatric Clinical Specialist

## 2019-04-24 NOTE — PROGRESS NOTES
Patient and family wanting to go outside. Discussed complications and fall risk. Patient naping. Bedside and Verbal shift change report given to [facility] (oncoming nurse) by Amy Arteaga (offgoing nurse). Report included the following information SBAR, Kardex, Intake/Output, MAR, Med Rec Status and Cardiac Rhythm Sinus arrythmia.

## 2019-04-24 NOTE — PROGRESS NOTES
Problem: Self Care Deficits Care Plan (Adult) Goal: *Acute Goals and Plan of Care (Insert Text) Description Occupational Therapy Goals Re-Evaluation 4/18- continue all goals as below Initiated 4/10/2019, re-evaluation s/p rapid response 4/12/2019 - all goals adjusted to reflect decline 1. Patient will perform grooming sitting unsupported EOB with minimal assistance within 7 day(s). 2.  Patient will perform upper body dressing/bathing sitting unsupported EOB with minimal assistance within 7 day(s). 3.  Patient will perform toilet transfers to/from [address] with moderate assistance within 7 day(s). 4.  Patient will perform all aspects of toileting with moderate assistance within 7 day(s). 5.  Patient will participate in upper extremity therapeutic exercise/activities with minimal assistance for 10 minutes within 7 day(s). Initiated 4/10/2019 1. Patient will perform standing ADLs at sink with least restrictive DME with supervision/set-up within 7 day(s). 2.  Patient will perform lower body dressing with minimal assistance/contact guard assist using AE PRN within 7 day(s). 3.  Patient will perform bathing with minimal assistance/contact guard assist within 7 day(s). 4.  Patient will perform toilet transfers with supervision/set-up within 7 day(s). 5.  Patient will perform all aspects of toileting with minimal assistance/contact guard assist within 7 day(s). 6.  Patient will participate in upper extremity therapeutic exercise/activities with supervision/set-up for 10 minutes within 7 day(s). Outcome: Progressing Towards Goal 
 OCCUPATIONAL THERAPY TREATMENT Patient: Theresa Mayes (74 y.o. female) Date: 4/24/2019 Diagnosis: Perforated diverticulum of large intestine [K57.20] <principal problem not specified> Procedure(s) (LRB): 
RIGHT SIDE INTERPLEURAL PLACEMENT OF VENTRICULO PLEURAL SHUNT (Right) 7 Days Post-Op Precautions: Fall(external  shunt) Chart, occupational therapy assessment, plan of care, and goals were reviewed. ASSESSMENT:  
The patient presents with Minimum assistance upper body ADLs, min A for bathing lower body ADLs, and Minimum assistance functional mobility for toileting. Agreeable and motivated for therapy. Received in bed. Provided ECT, safety ed, and body mechanics for toileting, bathing and dressing tasks. Fatigued with light ADL tasks good recognition with self implemented rest breaks and good understanding of alternating sitting and standing tasks. Patient with extended hospitalization. She is presenting below baseline(indep ADL tasks) and requiring overall set up to mod A for ADL tasks. She is an excellent candidate for [facility] as she is motivated, good rehab potential and excellent family support. If she is to dc home she is had risk for deconditioning and falls which could be avoided with additional skilled therapy at [facility]. The following are barriers to ADL independence while in acute care:  
- Cognitive and/or behavioral: safety awareness and short term memory loss - Medical condition: standing balance and medical history   
- Other: PLAN: 
Patient continues to benefit from skilled intervention to address the above impairments. Continue treatment per established plan of care. Recommend with staff: Recommend with nursing patient to complete as able in order to maintain strength, endurance and independence: ADLs with supervision/setu- mod A, OOB to chair 3x/day and mobilizing to the bathroom for toileting with min A. Thank you for your assistance. Recommend next OT session: LB dressing tasks Discharge recommendations: Rehab at inpatient facility: patient can tolerate 3 hours of therapy (to regain functional baseline patient requires rehab) Barriers to discharging home, in addition to above listed impairments: below baseline of indep level, lives with daughters but will increase caregiver burden SUBJECTIVE:  
Patient stated I get so tired, its a chore to wash up.  OBJECTIVE DATA SUMMARY:  
Cognitive/Behavioral Status: 
Neurologic State: Alert Orientation Level: Oriented to person;Oriented to place;Oriented to situation Cognition: Decreased attention/concentration; Follows commands Functional Mobility and Transfers for ADLs: 
Bed Mobility: 
Supine to Sit: Supervision Transfers: 
Sit to Stand: Minimum assistance Functional Transfers Toilet Transfer : Minimum assistance Balance: 
Sitting: Intact Sitting - Static: Good (unsupported) Sitting - Dynamic: Good (unsupported) Standing: Impaired Standing - Static: Fair Standing - Dynamic : Fair ADL Intervention: 
  
Grooming Washing Face: Supervision/set-up Washing Hands: Supervision/set-up Upper Body Bathing Bathing Assistance: Set-up Position Performed: Seated in chair(in front of sink) Lower Body Bathing Perineal  : Contact guard assistance; Compensatory technique training Position Performed: Standing Lower Body : Minimum assistance Position Performed: Standing;Seated in chair Upper Body Dressing Assistance Hospital Gown: Minimum  assistance- seated Toileting Bladder Hygiene: Stand-by assistance- seated Provided education on energy conservation techniques in regards to ADL/IADL tasks. Use of example of phone battery in regards to draining of all the energy and having to have extended time to recharge vs using small amounts of energy then less time required to recharge. Discussion with examples of pacing, planning, completion of heavy activity during strongest part of day, seated vs standing, and asking for assistance as needed. Patient with good participation in discussion and fair understanding.   
 
Patient instructed and indicated understanding the benefits of maintaining activity tolerance, functional mobility, and independence with self care tasks during acute stay  to ensure safe return home and to baseline. Encouraged patient to increase frequency and duration OOB, be out of bed for all meals, perform daily ADLs (as approved by RN/MD regarding bathing etc), and performing functional mobility to/from bathroom. Pain: 
Reports mild pain on L abdomin Activity Tolerance:  
Fair Please refer to the flowsheet for vital signs taken during this treatment. After treatment patient left:  
Up in chair RN notified Family at bedside COMMUNICATION/COLLABORATION:  
The patients plan of care was discussed with: Physical Therapist and Registered Nurse Chavez Keller OT Time Calculation: 39 mins

## 2019-04-24 NOTE — PROGRESS NOTES
Tolerating diet, working with PT, off oxygen. Denied IPR via Human; awaiting call for [id number]. Transition to regular diet, continue PT.

## 2019-04-24 NOTE — PROGRESS NOTES
Problem: General Medical Care Plan Goal: *Vital signs within specified parameters Outcome: Progressing Towards Goal 
Goal: *Labs within defined limits Outcome: Progressing Towards Goal 
Goal: *Absence of infection signs and symptoms Outcome: Progressing Towards Goal 
Goal: *Skin integrity maintained Outcome: Progressing Towards Goal 
Goal: *Fluid volume balance Outcome: Progressing Towards Goal 
Goal: *Progressive mobility and function (eg: ADL's) Outcome: Progressing Towards Goal 
  
Problem: Surgical Pathway: Discharge Outcomes Goal: *Hemodynamically stable Outcome: Progressing Towards Goal 
Goal: *Demonstrates independent activity or return to baseline Outcome: Progressing Towards Goal 
Goal: *Optimal pain control at patient's stated goal 
Outcome: Progressing Towards Goal 
Goal: *Verbalizes understanding and describes prescribed diet Outcome: Progressing Towards Goal 
Goal: *Tolerating diet Outcome: Progressing Towards Goal 
Goal: *No signs and symptoms of infection or wound complications Outcome: Progressing Towards Goal 
  
Problem: Nutrition Deficit Goal: *Optimize nutritional status Outcome: Progressing Towards Goal 
  
Problem: Infection - Risk of, Central Venous Catheter-Associated Bloodstream Infection Goal: *Absence of infection signs and symptoms Outcome: Progressing Towards Goal 
  
Problem: Cardiac Output -  Decreased Goal: *Vital signs within specified parameters Outcome: Progressing Towards Goal 
  
Problem: Falls - Risk of 
Goal: *Absence of Falls Description Document Durenda Aj Fall Risk and appropriate interventions in the flowsheet. Outcome: Progressing Towards Goal 
  
Problem: Pressure Injury - Risk of 
Goal: *Prevention of pressure injury Description Document Moe Scale and appropriate interventions in the flowsheet.  
Outcome: Progressing Towards Goal

## 2019-04-24 NOTE — PROGRESS NOTES
9:40 am - CM called [facility], spoke with Lorenzo Smith and peer to peer confirmed set up yesterday - CM explained Dr. Lolita Minaya is in and out of surgery today but to still call him at his number and the actual best time to reach him is between 2- 4 pm today - CM spoke with Pastor Alves with [employer] [address] and she will check about bed availability for today and let CM know. TAMIA Thomas 
 
 
2:18 pm - CM spoke with Tamika Jimenez RN and she called [facility] [employer] regarding status of peer to peer - [phone] Miranda MD called Dr. Lolita Minaya once and was not able to reach him ( Dr. Lolita Minaya was likely in surgery) - Lucy KRUSE will make one more attempt to reach Dr. Lolita Minaya - CM will continue to follow. TAMIA Thomas  
 
4:40 pm -  CM called Lucy [phone] to check on peer to peer status- spok with Laila Maier MD has not made second call to Dr. Lolita Minaya at this time and Lorenzo Smith unable to indicate whether their MD will call today or tomorrow but will only attempt to call one more time. Cm will continue to follow.  TAMIA Thomas

## 2019-04-24 NOTE — PROGRESS NOTES
NUTRITION COMPLETE ASSESSMENT 
 
RECOMMENDATIONS:  
1. Reduce ONS to 1x daily - glucerna shake 2. Continue current diet 3. Daily weights with lasix rx Interventions/Plan:  
Food/Nutrient Delivery: continue current diet - snacks & ONS 1x daily continue Assessment:  
Reason for Assessment: [x]Reassessment Diet: Regular Supplements: Glucerna TID 
TPN: 5%AA, D15 @ 83ml/hr + 4 units insulin/liter + 110mg thiamine + 500ml, 20% lipids 3x/week Nutritionally Significant Medications: [x] Reviewed & Includes: tums, cefepime, diflucan, regular insulin, flagyl, protonix, neutra-phos, zoloft; PRN: zofran Subjective: \"I like snacks, fig newtons are good. \"  
Objective: 
Pt admitted with perforated diverticulum of the large intestine. PMHx: COPD, depression, HLD, shunt s/p aneurysm, GERD. S/p laparoscopic drainage of pelvic abscess on 3/29, returned to OR on 4/8 for sigmoidectomy and end colostomy with externalization of ventriculoperitoneal shunt. Abx continue. Plans for return to OR this week for  shunt re-siting. Hyponatremia and hypochloridemia noted. TPN started after surgery & has been stopped. TPN at goal and provided: 1848kcal, 100g protein, 300g CHO (GIR = 2.48mg/kg/min), 2000ml fluid. Meets 100% energy and protein needs. BG trending up with insulin increased from 2-4 units/liter. Seen by surgery today with plans to discontinue TPN since diet advanced over past couple of days and now tolerating GI lite diet with no issues. 4/24/19: Pt visited today. Just finishing meal brought in by family with 100% consumed. Likes snacks between meals (pudding, PB crackers). Glucerna ordered but pt isn't taking more than 1 per day. Predict with current appetite pt to be meeting nutrition needs orally. Pt asked for diet to be advanced to Regular. Surgeon liberalized. D/C planning for rehab awaiting authorization.  Family at bedside very supportive and bringing in foods as desired. No further nutrition needs identified. Will continue to follow for PO intake, BG trends, wt. Estimated Nutrition Needs:  
Kcals/day: 0892 Kcals/day(7806-2346 kcal/day (MSJ x 1.2-1.3)) Protein: 100 g(100-118g (1.2-1.4 g/kg)) Fluid: 1800 ml(1 mL/kcal) Based On: [place] 1898 Weight Used: Actual wt(83.9 kg) Pt expected to meet estimated nutrient needs:  [x]   Yes  
Nutrition Diagnosis: 1. No nutritional diagnosis at this time Goals:   
 Continued consumption of at least 75% meals in 5-7 days Monitoring & Evaluation: - Total energy intake, Liquid meal replacement, Protein intake - Weight/weight change Previous Nutrition Goals Met:   Yes Previous Recommendations:    Yes 
 
Education & Discharge Needs: 
 [] None Identified 
 [x] Identified and addressed [x] Participated in care plan, discharge planning, and/or interdisciplinary rounds Cultural, Lutheran and ethnic food preferences identified: None Skin Integrity: []Intact  [x]Other: surgical incision Edema: [x]None []Other Last BM: 4/24 - colostomy, active Food Allergies: []None [x]Other:egg (clarified-- she does not like whole eggs, but will eat foods containing eggs) Diet Restrictions: Cultural/Mandaen Preference(s): None Anthropometrics:   
Weight Loss Metrics 4/24/2019 3/16/2019 10/4/2018 2/21/2017 1/24/2017 6/24/2016 6/2/2015 Today's Wt 172 lb 9.9 oz 192 lb 200 lb 12.8 oz 197 lb 12.8 oz 197 lb 3.2 oz 201 lb 6.4 oz 202 lb 12.8 oz  
BMI 26.25 kg/m2 29.19 kg/m2 30.53 kg/m2 30.08 kg/m2 29.98 kg/m2 30.63 kg/m2 30.84 kg/m2 Weight Source: Bed Height: 5' 8\" (172.7 cm), Body mass index is 26.25 kg/m². IBW : 69.9 kg (154 lb), Usual Body Weight: 88.5 kg (195 lb),   
 
Labs:   
Lab Results Component Value Date/Time  Sodium 137 04/19/2019 04:34 AM  
 Potassium 3.4 (L) 04/19/2019 04:34 AM  
 Chloride 101 04/19/2019 04:34 AM  
 CO2 30 04/19/2019 04:34 AM  
 Glucose 92 04/19/2019 04:34 AM  
 BUN 15 04/19/2019 04:34 AM  
 Creatinine 0.53 (L) 04/19/2019 04:34 AM  
 Calcium 8.3 (L) 04/19/2019 04:34 AM  
 Magnesium 2.2 04/18/2019 04:36 AM  
 Phosphorus 2.9 04/12/2019 03:00 PM  
 Albumin 2.0 (L) 04/12/2019 04:30 AM  
 
Lab Results Component Value Date/Time  Hemoglobin A1c 5.9 (H) 10/05/2018 08:25 AM  
 
Fan Aviles, JANET, MS, CDE

## 2019-04-24 NOTE — PROGRESS NOTES
Bedside shift change report given to Ton (oncoming nurse) by Keo Bobby (offgoing nurse). Report included the following information SBAR, Intake/Output, Recent Results and Cardiac Rhythm NSR, Sinus Arrythmia.

## 2019-04-25 LAB
ANION GAP SERPL CALC-SCNC: 7 MMOL/L (ref 5–15)
BUN SERPL-MCNC: 10 MG/DL (ref 6–20)
BUN/CREAT SERPL: 16 (ref 12–20)
CALCIUM SERPL-MCNC: 8.8 MG/DL (ref 8.5–10.1)
CHLORIDE SERPL-SCNC: 102 MMOL/L (ref 97–108)
CO2 SERPL-SCNC: 25 MMOL/L (ref 21–32)
CREAT SERPL-MCNC: 0.62 MG/DL (ref 0.55–1.02)
GLUCOSE SERPL-MCNC: 90 MG/DL (ref 65–100)
POTASSIUM SERPL-SCNC: 4.2 MMOL/L (ref 3.5–5.1)
SODIUM SERPL-SCNC: 134 MMOL/L (ref 136–145)

## 2019-04-25 PROCEDURE — 74011250636 HC RX REV CODE- 250/636: Performed by: SPECIALIST

## 2019-04-25 PROCEDURE — 74011250637 HC RX REV CODE- 250/637: Performed by: SPECIALIST

## 2019-04-25 PROCEDURE — 65660000000 HC RM CCU STEPDOWN

## 2019-04-25 PROCEDURE — 74011250636 HC RX REV CODE- 250/636: Performed by: SURGERY

## 2019-04-25 PROCEDURE — 97116 GAIT TRAINING THERAPY: CPT

## 2019-04-25 PROCEDURE — 74011000250 HC RX REV CODE- 250: Performed by: SURGERY

## 2019-04-25 PROCEDURE — 97110 THERAPEUTIC EXERCISES: CPT

## 2019-04-25 PROCEDURE — 36415 COLL VENOUS BLD VENIPUNCTURE: CPT

## 2019-04-25 PROCEDURE — C9113 INJ PANTOPRAZOLE SODIUM, VIA: HCPCS | Performed by: SPECIALIST

## 2019-04-25 PROCEDURE — 74011250637 HC RX REV CODE- 250/637: Performed by: INTERNAL MEDICINE

## 2019-04-25 PROCEDURE — 80048 BASIC METABOLIC PNL TOTAL CA: CPT

## 2019-04-25 PROCEDURE — 97530 THERAPEUTIC ACTIVITIES: CPT

## 2019-04-25 PROCEDURE — 74011250637 HC RX REV CODE- 250/637: Performed by: NURSE PRACTITIONER

## 2019-04-25 PROCEDURE — 74011000250 HC RX REV CODE- 250: Performed by: SPECIALIST

## 2019-04-25 RX ORDER — LABETALOL HCL 20 MG/4 ML
20 SYRINGE (ML) INTRAVENOUS ONCE
Status: COMPLETED | OUTPATIENT
Start: 2019-04-25 | End: 2019-04-25

## 2019-04-25 RX ADMIN — PANTOPRAZOLE SODIUM 40 MG: 40 INJECTION, POWDER, FOR SOLUTION INTRAVENOUS at 17:45

## 2019-04-25 RX ADMIN — HYDROMORPHONE HYDROCHLORIDE 2 MG: 4 TABLET ORAL at 13:11

## 2019-04-25 RX ADMIN — ACETAMINOPHEN 650 MG: 325 TABLET ORAL at 14:20

## 2019-04-25 RX ADMIN — Medication 10 ML: at 11:21

## 2019-04-25 RX ADMIN — Medication 10 ML: at 04:23

## 2019-04-25 RX ADMIN — HYDROMORPHONE HYDROCHLORIDE 2 MG: 4 TABLET ORAL at 04:56

## 2019-04-25 RX ADMIN — Medication 10 ML: at 04:22

## 2019-04-25 RX ADMIN — FLUCONAZOLE 400 MG: 400 INJECTION, SOLUTION INTRAVENOUS at 11:19

## 2019-04-25 RX ADMIN — LOSARTAN POTASSIUM 50 MG: 50 TABLET ORAL at 08:15

## 2019-04-25 RX ADMIN — LABETALOL 20 MG/4 ML (5 MG/ML) INTRAVENOUS SYRINGE 20 MG: at 06:19

## 2019-04-25 RX ADMIN — POTASSIUM & SODIUM PHOSPHATES POWDER PACK 280-160-250 MG 1 PACKET: 280-160-250 PACK at 11:19

## 2019-04-25 RX ADMIN — ACETAMINOPHEN 650 MG: 325 TABLET ORAL at 08:14

## 2019-04-25 RX ADMIN — POTASSIUM & SODIUM PHOSPHATES POWDER PACK 280-160-250 MG 1 PACKET: 280-160-250 PACK at 17:45

## 2019-04-25 RX ADMIN — DILTIAZEM HYDROCHLORIDE 120 MG: 120 CAPSULE, COATED, EXTENDED RELEASE ORAL at 08:14

## 2019-04-25 RX ADMIN — CALCIUM CARBONATE (ANTACID) CHEW TAB 500 MG 400 MG: 500 CHEW TAB at 08:13

## 2019-04-25 RX ADMIN — FUROSEMIDE 40 MG: 10 INJECTION, SOLUTION INTRAMUSCULAR; INTRAVENOUS at 08:16

## 2019-04-25 RX ADMIN — POTASSIUM & SODIUM PHOSPHATES POWDER PACK 280-160-250 MG 1 PACKET: 280-160-250 PACK at 21:21

## 2019-04-25 RX ADMIN — HYDROMORPHONE HYDROCHLORIDE 2 MG: 4 TABLET ORAL at 13:08

## 2019-04-25 RX ADMIN — SERTRALINE 100 MG: 50 TABLET, FILM COATED ORAL at 08:14

## 2019-04-25 RX ADMIN — ALTEPLASE 1 MG: 2.2 INJECTION, POWDER, LYOPHILIZED, FOR SOLUTION INTRAVENOUS at 06:25

## 2019-04-25 RX ADMIN — FUROSEMIDE 40 MG: 10 INJECTION, SOLUTION INTRAMUSCULAR; INTRAVENOUS at 17:45

## 2019-04-25 RX ADMIN — Medication 10 ML: at 21:25

## 2019-04-25 RX ADMIN — Medication 10 ML: at 21:24

## 2019-04-25 RX ADMIN — CALCIUM CARBONATE (ANTACID) CHEW TAB 500 MG 400 MG: 500 CHEW TAB at 11:19

## 2019-04-25 RX ADMIN — Medication 10 ML: at 20:29

## 2019-04-25 RX ADMIN — HYDROMORPHONE HYDROCHLORIDE 2 MG: 4 TABLET ORAL at 20:28

## 2019-04-25 RX ADMIN — POTASSIUM & SODIUM PHOSPHATES POWDER PACK 280-160-250 MG 1 PACKET: 280-160-250 PACK at 08:13

## 2019-04-25 RX ADMIN — TRAZODONE HYDROCHLORIDE 50 MG: 50 TABLET ORAL at 21:21

## 2019-04-25 RX ADMIN — CALCIUM CARBONATE (ANTACID) CHEW TAB 500 MG 400 MG: 500 CHEW TAB at 17:44

## 2019-04-25 RX ADMIN — HYDROMORPHONE HYDROCHLORIDE 2 MG: 4 TABLET ORAL at 08:15

## 2019-04-25 NOTE — PROGRESS NOTES
Bedside and Verbal shift change report given to Doyle Montanez (oncoming nurse) by Suhail Hair (offgoing nurse). Report included the following information SBAR, ED Summary, OR Summary, Intake/Output, MAR, Med Rec Status and Cardiac Rhythm normal sinus.

## 2019-04-25 NOTE — PROGRESS NOTES
Bedside shift change report given to Roya Laguerre (oncoming nurse) by General Hicks (offgoing nurse). Report included the following information SBAR, Intake/Output, Recent Results and Cardiac Rhythm Sinus Arrythmia.

## 2019-04-25 NOTE — PROGRESS NOTES
Patient continues to work with PT; tolerating diet, off oxygen. [facility] Patient will likely need to transition to SNF tomorrow for remainder of recovery before returning home; reviewed with family who acknowledge understanding and agree with plan.

## 2019-04-25 NOTE — PROGRESS NOTES
Problem: Mobility Impaired (Adult and Pediatric) Goal: *Acute Goals and Plan of Care (Insert Text) Description Physical Therapy Goals Revised 4/25/2019 1. Patient will move from supine to sit and sit to supine , scoot up and down and roll side to side in bed with supervision/set-up within 7 day(s). 2.  Patient will transfer from bed to chair and chair to bed with supervision/set-up using the least restrictive device within 7 day(s). 3.  Patient will perform sit to stand with supervision/set-up within 7 day(s). 4.  Patient will ambulate with supervision/set-up for 150 feet with the least restrictive device within 7 day(s). 5.  Patient will ascend/descend 3 stairs with one handrail(s) with supervision/set-up within 7 day(s). Physical Therapy Goals Goals re-evaluated 4/18/2019 1. Patient will move from supine to sit and sit to supine , scoot up and down and roll side to side in bed with minimal assist/contact guard assistance within 7 day(s). 2.  Patient will transfer from bed to chair and chair to bed with contact guard assistance using the least restrictive device within 7 day(s). 3.  Patient will perform sit to stand with contact guard assist within 7 day(s). 4.  Patient will ambulate with minimal assist for 50 feet with the least restrictive device within 7 day(s). Goals re-evaluated 4/12/2019 1. Patient will move from supine to sit and sit to supine , scoot up and down and roll side to side in bed with moderate assistance within 7 day(s). 2.  Patient will transfer from bed to chair and chair to bed with moderate assistance using the least restrictive device within 7 day(s). 3.  Patient will perform sit to stand with moderate assist within 7 day(s). 4.  Patient will ambulate with moderate assist for 25 feet with the least restrictive device within 7 day(s). 5.  Patient will tolerate unsupported sitting for 10 minutes without assist within 7 days. Initiated 4/10/2019 1.  Patient will move from supine to sit and sit to supine , scoot up and down and roll side to side in bed with modified independence within 7 day(s). 2.  Patient will transfer from bed to chair and chair to bed with minimal assistance/contact guard assist using the least restrictive device within 7 day(s). 3.  Patient will perform sit to stand with minimal assistance/contact guard assist within 7 day(s). 4.  Patient will ambulate with minimal assistance/contact guard assist for 25 feet with the least restrictive device within 7 day(s). Outcome: Progressing Towards Goal 
 
 
PHYSICAL THERAPY TREATMENT: WEEKLY REASSESSMENT Patient: Michael De Jesus (13 y.o. female) Date: 4/25/2019 Diagnosis: Perforated diverticulum of large intestine [K57.20] <principal problem not specified> Procedure(s) (LRB): 
RIGHT SIDE INTERPLEURAL PLACEMENT OF VENTRICULO PLEURAL SHUNT (Right) 8 Days Post-Op Precautions: Fall(external  shunt) Chart, physical therapy assessment, plan of care and goals were reviewed. ASSESSMENT: 
Pt received in supine agreeable to PT. She was able to come to sitting at the EOB with supervision, move sit <> stand with contact guard except for when using low commode then she required min assist. For amb had her amb with bilateral hand held assist was min assist X 2 and had her try a rolling walker. She was able to amb with assist X 1, contact guard to min assist (variable assist required) when using the walker. Her daughters Rosey and Ford Donnelly were present and observed PT session. Per Ford Donnelly, pt lives with her and Ford Donnelly is presently on [place]. The home is two story with 3 stairs to enter with an upstairs bedroom and a half bath downstairs. Ford Donnelly reported that a bed could be moved downstairs. If pt were to be discharged to home, Ford Donnelly would have to sleep on same level of the home near pt to assist her with toileting during the night as needed. Pt has some incontinence issues. Pt's insurance had declined inpt rehab which has been the recommendation of therapists working with pt. Tomorrow plan to try stairs in an effort to assess if discharge to home is a feasible option. Plan to see pt early in the am.  
 
 
 
 
Patient's progression toward goals since last assessment: gains in all areas, goal upgraded PLAN: 
Goals have been updated based on progression since last assessment. Patient continues to benefit from skilled intervention to address the above impairments. Continue to follow the patient 5 times a week to address goals. Planned Interventions: 
?              Bed Mobility Training             ? Neuromuscular Re-Education ? Transfer Training                   ? Orthotic/Prosthetic Training 
? Gait Training                         ? Modalities ? Therapeutic Exercises           ? Edema Management/Control ? Therapeutic Activities            ? Patient and Family Training/Education ? Other (comment): 
Discharge Recommendations: Home Health and Inpatient Rehab (has been preferred recommendation) Further Equipment Recommendations for Discharge: if discharged to home: bedside commode and rolling walker SUBJECTIVE:  
Patient stated I wine about having to do PT but then afterwards I'm glad I did it. Jasson Lynn OBJECTIVE DATA SUMMARY:  
Chart checked, pt cleared by nursing Critical Behavior: 
Neurologic State: Alert Orientation Level: Oriented to person, Oriented to place, Oriented to situation Cognition: Decreased attention/concentration, Memory loss(short term) Safety/Judgement: Decreased insight into deficits, Awareness of environment Strength:  
  
  
  
 Decreased functional 
  
  
  
 
Functional Mobility Training: 
Bed Mobility: 
  
Supine to Sit: Supervision Sit to Supine: Stand-by assistance Transfers: 
Sit to Stand: Contact guard assistance Stand to Sit: Contact guard assistance Balance: 
Sitting: Intact; Without support Sitting - Static: Good (unsupported) Sitting - Dynamic: Good (unsupported) Standing: Impaired; With support Standing - Static: Fair Standing - Dynamic : Fair Ambulation/Gait Training: 
Distance (ft): 100 Feet (ft) Assistive Device: Gait belt(rolling walker and hand held assist) Ambulation - Level of Assistance: Assist x2;Minimal assistance;Assist x1 Gait Abnormalities: Decreased step clearance Base of Support: Center of gravity altered Speed/Tracy: Fluctuations Step Length: Left shortened;Right shortened Stairs: 
  
  
  
Neuro Re-Education: 
 
Therapeutic Exercises:  
 
Pain: 
Pain Scale 1: Numeric (0 - 10)(tylenol was given for  headache) Pain Intensity 1: 0 Pain Location 1: Abdomen Pain Orientation 1: Right Pain Intervention(s) 1: Medication (see MAR) Activity Tolerance:  
Pt in NAD After treatment:  
?  Patient left in no apparent distress sitting up in chair ? Patient left in no apparent distress in bed with a plan to get up to the chair for dinner ? Call bell left within reach ? Nursing notified ? Caregiver present ? Bed alarm activated COMMUNICATION/COLLABORATION:  
The patients plan of care was discussed with: Registered Nurse and  Pricila Maldonado Time Calculation: 30 mins

## 2019-04-25 NOTE — WOUND CARE
WOCN Ostomy Progress Note:  
  
Follow up visit. Surgery: Laparotomy with sigmoidectomy and end colostomy  
Date of Surgery: 4.8. 19      Surgeon: Dr. Loleta Mohs Type of Ostomy: End Colostomy  
Stoma Location: LUQ Bed:  Sport. Offloaded heels with pillows. 
  
Ostomy Assessment: 
Stoma appearance: pink, moist and flushed. Peristomal skin: intact without irritation Abdomen: midline incision Output: soft brown stool  
  
Treatments: 
Pouch removed, stoma and peristomal skin cleaned and assessed, new pouch prepared and applied with eakins ring. Applied 2 piece 2 1/4 pouch flat pouch with eakins ring cut just a little larger than 1 1/4 brittany. Supplies left at the bedside. 
  
Recommendations: 
Bed:  Sport.   
Prevalon boots or offload with pillows. Sacrum and buttocks:  Twice daily and as needed apply Aloe Vesta. Turn team 
  
1. Empty appliance when 1/3 full and PRN. Encourage patient/family to notify nurse and assist w/ pouch emptying to promote self-care. Change appliance twice weekly and PRN for leaking ASAP. DO NOT REINFORCE LEAKS to avoid skin breakdown. 
  
Transition of Care: 
Teaching completed. Patient awaiting authorization to go to rehab. Supplies and ordering information provided. Will follow as needed. 
  
Angus LOZOYAN RN [facility] Office [phone] Pager [phone]

## 2019-04-25 NOTE — PROGRESS NOTES
Problem: Surgical Pathway: Discharge Outcomes Goal: *Hemodynamically stable Outcome: Progressing Towards Goal 
 
Goal: *Demonstrates independent activity or return to baseline Outcome: Progressing Towards Goal 
 
X 1 assist 
Goal: *Anxiety reduced or absent Outcome: Progressing Towards Goal 
 
Valium PRN Problem: General Medical Care Plan Goal: *Optimal pain control at patient's stated goal 
Outcome: Progressing Towards Goal 
Goal: *Progressive mobility and function (eg: ADL's) Outcome: Progressing Towards Goal 
  
Problem: Falls - Risk of 
Goal: *Absence of Falls Description Document Edgar Brunner Fall Risk and appropriate interventions in the flowsheet.  
Outcome: Progressing Towards Goal

## 2019-04-25 NOTE — PROGRESS NOTES
OCCUPATIONAL THERAPY TREATMENT: WEEKLY REASSESSMENT Patient: John Alston (23 y.o. female) Date: 4/25/2019 Diagnosis: Perforated diverticulum of large intestine [K57.20] <principal problem not specified> Procedure(s) (LRB): 
RIGHT SIDE INTERPLEURAL PLACEMENT OF VENTRICULO PLEURAL SHUNT (Right) 8 Days Post-Op Precautions: Fall(external  shunt) Chart, occupational therapy assessment, plan of care, and goals were reviewed. ASSESSMENT: 
Nursing cleared for therapy. Received in bed, agreeable to therapy. Patient motivated for therapy. Provided ed on sequencing and safety with self care transfer with min A HHA to chair. Completed light BUE exercises with red theraband. Noted mild BUE tremors with functional tasks, nursing reports she has had this before. Left up in chair for lunch. Patient continues to present below baseline with ADLs as she was indep at baseline. Patient with excellent progression with OT services. Anticipate continued progression with additional skilled therapy services. Recommend IPR. Progression toward goals: ?            Improving appropriately and progressing toward goals ? Improving slowly and progressing toward goals ? Not making progress toward goals and plan of care will be adjusted PLAN: 
Goals have been updated based on progression since last assessment. Patient continues to benefit from skilled intervention to address the above impairments. Continue to follow patient 5 times a week to address goals. Planned Interventions: 
?x                    Self Care Training                  ? x            Therapeutic Activities ?x                    Functional Mobility Training    ? Cognitive Retraining ? x                   Therapeutic Exercises           ?  x           Endurance Activities ? x                   Balance Training                   ? Neuromuscular Re-Education ?                    Visual/Perceptual Training     ? x       Home Safety Training ? x                   Patient Education                 ? x            Family Training/Education ? Other (comment): 
Discharge Recommendations: Inpatient Rehab Further Equipment Recommendations for Discharge: TBD rehab SUBJECTIVE:  
Patient stated I am feeling trembling on the inside.  OBJECTIVE DATA SUMMARY:  
Cognitive/Behavioral Status: 
Neurologic State: Alert Functional Mobility and Transfers for ADLs: 
Bed Mobility: 
Supine to Sit: Supervision(bed rail) Transfers: 
Sit to Stand: Minimum assistance Balance: 
Sitting: Intact Sitting - Static: Good (unsupported) Sitting - Dynamic: Good (unsupported) Standing: Impaired Standing - Static: Fair Standing - Dynamic : Fair ADL Intervention: 
 Ed on safety, pacing and body mechanics with self care transfer. Bed mob with supervision with bed rail and bed > chair with min A with HHA, slow pace. Denies dizziness. Min vc for hand placement. Therapeutic Exercises: BUE light exercises red theraband [place] Horizontal abduction 2 x10 Tricep pull down 1x 10 Dtr present and active in therex to assist with carry over secondary to patient's STM Pain: 
Pain Scale 1: Numeric (0 - 10) Pain Intensity 1: 9 Pain Location 1: Abdomen Pain Orientation 1: Right Pain Intervention(s) 1: Medication (see MAR) Activity Tolerance:  
HR 59-70 seen on monitor After treatment:  
x Patient left in no apparent distress sitting up in chair ? Patient left in no apparent distress in bed 
x? Call bell left within reach 
x Nursing notified 
x? Caregiver present ? Bed alarm activated COMMUNICATION/COLLABORATION:  
The patients plan of care was discussed with: Registered Nurse, Physician and  Hannah Gee OT Time Calculation: 26 mins

## 2019-04-25 NOTE — PROGRESS NOTES
CM called [facility] [email or web address] [phone] x [phone] - spoke with Alicia Contreras and awaiting for [facility] MD to call Dr. Mariza Ny to complete a peer to peer to [facility] - [facility] to reach Dr. Mariza Ny at this time. TAMIA Berumen CM called [facility] and spoke with Adelia Hassan at [phone] [id number] attempt made for [facility] MD to call Dr. Mariza Ny at 9:27 am and they were not able to reach him - IPR has been denied and case has been closed. CM spoke with unit CM who spoke with MD and CM will discuss plans for SNF rehab with patient/family. TAMIA Berumen Cm spoke with patient and Rosey, daughter with update to above - SNF list provided and they will review and let CM know top 3 choices this afternoon for referrals to be made - will await call back at this time and awaiting updated PT/OT notes as well - PT made aware. TAMIA Berumen CM spoke with Edwin Cordero and Eusebiamatthew Izquierdo, daughter - now they are considering SNF rehab vs skilled home health - will await PT recommendations in the am - CM spoke with PT staff for early tx in am - obtained choices for SNF 1. [facility] 2. [facility] 3. [address] also home health [facility] and PT/OT if patient/family decides to go home -Referrals made to [facility] via [facility] and to [facility] and [facility] via [facility]- CM started authorization with [facility] [employer] [facility] for [facility] [phone] fax #[phone] - GRACIA faxed clinicals into [facility] [employer] [facility] for SNF authorization. GRACIA will follow-up with PT and the family in the am to inquire about decision for SNF vs Home Health.  TAMIA Berumen

## 2019-04-25 NOTE — PROGRESS NOTES
participated in [id number] rounds this am.  I appreciated noted from my  earlier. I just spke with Dr Sadia Queen and he has not received a call yet from [facility] [employer] MD. He is aware that patient may need to consider a skilled nursing facility and asks that family be made aware. I did speak with my  and she will move ahead and consider follow up for skilled nursing after speaking with family members and [employer]. and initiate an authorization unless [employer] does reach out to the MD who will be in his office this afternoon and will be available to speak with a physician. The tentative discharge is 4/26/2019.

## 2019-04-26 ENCOUNTER — DOCUMENTATION ONLY (OUTPATIENT)
Dept: SURGERY | Age: 69
End: 2019-04-26

## 2019-04-26 ENCOUNTER — HOME HEALTH ADMISSION (OUTPATIENT)
Dept: HOME HEALTH SERVICES | Facility: HOME HEALTH | Age: 69
End: 2019-04-26
Payer: MEDICARE

## 2019-04-26 VITALS
RESPIRATION RATE: 16 BRPM | TEMPERATURE: 99.3 F | WEIGHT: 177 LBS | DIASTOLIC BLOOD PRESSURE: 62 MMHG | HEART RATE: 90 BPM | BODY MASS INDEX: 26.83 KG/M2 | OXYGEN SATURATION: 98 % | SYSTOLIC BLOOD PRESSURE: 166 MMHG | HEIGHT: 68 IN

## 2019-04-26 LAB
ERYTHROCYTE [DISTWIDTH] IN BLOOD BY AUTOMATED COUNT: 14.7 % (ref 11.5–14.5)
HCT VFR BLD AUTO: 30.5 % (ref 35–47)
HGB BLD-MCNC: 9.1 G/DL (ref 11.5–16)
MCH RBC QN AUTO: 28.7 PG (ref 26–34)
MCHC RBC AUTO-ENTMCNC: 29.8 G/DL (ref 30–36.5)
MCV RBC AUTO: 96.2 FL (ref 80–99)
NRBC # BLD: 0 K/UL (ref 0–0.01)
NRBC BLD-RTO: 0 PER 100 WBC
PLATELET # BLD AUTO: 331 K/UL (ref 150–400)
PMV BLD AUTO: 9.3 FL (ref 8.9–12.9)
RBC # BLD AUTO: 3.17 M/UL (ref 3.8–5.2)
WBC # BLD AUTO: 6.8 K/UL (ref 3.6–11)

## 2019-04-26 PROCEDURE — 74011250637 HC RX REV CODE- 250/637: Performed by: INTERNAL MEDICINE

## 2019-04-26 PROCEDURE — 36415 COLL VENOUS BLD VENIPUNCTURE: CPT

## 2019-04-26 PROCEDURE — 97116 GAIT TRAINING THERAPY: CPT

## 2019-04-26 PROCEDURE — 74011250637 HC RX REV CODE- 250/637: Performed by: SPECIALIST

## 2019-04-26 PROCEDURE — 74011250637 HC RX REV CODE- 250/637: Performed by: NURSE PRACTITIONER

## 2019-04-26 PROCEDURE — 74011000250 HC RX REV CODE- 250: Performed by: SPECIALIST

## 2019-04-26 PROCEDURE — 85027 COMPLETE CBC AUTOMATED: CPT

## 2019-04-26 PROCEDURE — 74011250637 HC RX REV CODE- 250/637: Performed by: SURGERY

## 2019-04-26 PROCEDURE — 74011250636 HC RX REV CODE- 250/636: Performed by: SPECIALIST

## 2019-04-26 PROCEDURE — 97530 THERAPEUTIC ACTIVITIES: CPT

## 2019-04-26 RX ORDER — PANTOPRAZOLE SODIUM 40 MG/1
40 TABLET, DELAYED RELEASE ORAL
Status: DISCONTINUED | OUTPATIENT
Start: 2019-04-26 | End: 2019-04-26 | Stop reason: HOSPADM

## 2019-04-26 RX ADMIN — ACETAMINOPHEN 650 MG: 325 TABLET ORAL at 11:28

## 2019-04-26 RX ADMIN — FUROSEMIDE 40 MG: 10 INJECTION, SOLUTION INTRAMUSCULAR; INTRAVENOUS at 08:29

## 2019-04-26 RX ADMIN — POTASSIUM & SODIUM PHOSPHATES POWDER PACK 280-160-250 MG 1 PACKET: 280-160-250 PACK at 08:29

## 2019-04-26 RX ADMIN — PANTOPRAZOLE SODIUM 40 MG: 40 TABLET, DELAYED RELEASE ORAL at 12:00

## 2019-04-26 RX ADMIN — HYDRALAZINE HYDROCHLORIDE 10 MG: 20 INJECTION INTRAMUSCULAR; INTRAVENOUS at 08:39

## 2019-04-26 RX ADMIN — HYDROMORPHONE HYDROCHLORIDE 2 MG: 4 TABLET ORAL at 08:40

## 2019-04-26 RX ADMIN — Medication 10 ML: at 06:17

## 2019-04-26 RX ADMIN — CALCIUM CARBONATE (ANTACID) CHEW TAB 500 MG 400 MG: 500 CHEW TAB at 13:34

## 2019-04-26 RX ADMIN — SERTRALINE 100 MG: 50 TABLET, FILM COATED ORAL at 08:29

## 2019-04-26 RX ADMIN — Medication 10 ML: at 08:39

## 2019-04-26 RX ADMIN — DILTIAZEM HYDROCHLORIDE 120 MG: 120 CAPSULE, COATED, EXTENDED RELEASE ORAL at 08:29

## 2019-04-26 RX ADMIN — POTASSIUM & SODIUM PHOSPHATES POWDER PACK 280-160-250 MG 1 PACKET: 280-160-250 PACK at 13:34

## 2019-04-26 RX ADMIN — LOSARTAN POTASSIUM 50 MG: 50 TABLET ORAL at 08:29

## 2019-04-26 RX ADMIN — BACITRACIN 1 PACKET: 500 OINTMENT TOPICAL at 13:41

## 2019-04-26 RX ADMIN — CALCIUM CARBONATE (ANTACID) CHEW TAB 500 MG 400 MG: 500 CHEW TAB at 08:29

## 2019-04-26 NOTE — PROGRESS NOTES
CM spoke with Kip Gibson, RN CRM and patient/family plans to go home with home health - GRACIA spoke with Dae Hogue with [facility]  to notify for [facility]rm[facility] Carroll to be cancelled at this time.  TAMIA Eden

## 2019-04-26 NOTE — PROGRESS NOTES
No problems overnight; tolerating diet, working with PT. Awaiting disposition for SNF vs . [facility] for discharge later today.

## 2019-04-26 NOTE — ROUTINE PROCESS
1341:  Central lined removed without difficulty. Pt and Helen Ruiz notified how to care for site and incisional care. 1500:  Reviewed discharge instruction with Helen Ruiz and patient. Allow time for questions and answer.

## 2019-04-26 NOTE — PROGRESS NOTES
Auth received from [facility] for SNF placement, however, patient has progressed to go home with Home Care. A referral was already sent to [facility] yesterday. GRACIA spoke with Yudi Dougherty regarding discharge today. [facility] is willing to accept patient and start care tomorrow. Freedom of Choice form signed and placed in patient's chart. Orders for home care and discharge received. Rickey Alexis MSA, RN,CRM.

## 2019-04-26 NOTE — DISCHARGE INSTRUCTIONS
Patient Education   Call [phone] to schedule appointment with Dr. Mayra Goodrich for 2 weeks after hospital discharge. Call [facility] for post-op appointment following hospital discharge     Open Bowel Resection: What to Expect at [address] [place] [address] are likely to have pain that comes and goes for the next few days after bowel surgery. You may have bowel cramps, and your cut (incision) may hurt. You may also feel like you have the flu. You may have a low fever and feel tired and nauseated. This is common. You should feel better after a week and will probably be back to normal in 2 to 3 weeks. This care sheet gives you a general idea about how long it will take for you to recover. But each person recovers at a different pace. Follow the steps below to get better as quickly as possible. How can you care for yourself at home? Activity    · Rest when you feel tired. Getting enough sleep will help you recover.     · Try to walk each day. Start by walking a little more than you did the day before. Bit by bit, increase the amount you walk. Walking boosts blood flow and helps prevent pneumonia and constipation.     · Avoid strenuous activities, such as biking, jogging, weight lifting, or aerobic exercise, until your doctor says it is okay.     · Ask your doctor when you can drive again.     · You will probably need to take 3 to 4 weeks off from work. It depends on the type of work you do and how you feel. You may need to take off 4 to 6 weeks if you lift heavy objects in your job.     · You may shower 24 to 48 hours after surgery, if your doctor says it is okay. Pat the cut (incision) dry. Do not take a bath for the first 2 weeks, or until your doctor tells you it is okay.     · Ask your doctor when it is okay for you to have sex. Diet    · You may not have much appetite after the surgery. But try to eat a healthy diet.  Your doctor will tell you about any foods you should not eat.     · Eat a low-fiber diet for several weeks after surgery. Eat many small meals throughout the day. Add high-fiber foods a little at a time.     · Eat yogurt. It puts good bacteria into your colon and helps prevent diarrhea.     · Try to avoid nuts, seeds, and corn for a while. They may be hard to digest.     · You may need to take vitamins that contain sodium and potassium. Ask your doctor.     · Drink plenty of fluids to avoid becoming dehydrated. Medicines    · Your doctor will tell you if and when you can restart your medicines. He or she will also give you instructions about taking any new medicines.     · If you take blood thinners, such as warfarin (Coumadin), clopidogrel (Plavix), or aspirin, be sure to talk to your doctor. He or she will tell you if and when to start taking those medicines again. Make sure that you understand exactly what your doctor wants you to do.     · Take pain medicines exactly as directed. ? If the doctor gave you a prescription medicine for pain, take it as prescribed. ? If you are not taking a prescription pain medicine, ask your doctor if you can take an over-the-counter medicine. ? Do not take two or more pain medicines at the same time unless the doctor told you to. Many pain medicines have acetaminophen, which is Tylenol. Too much acetaminophen (Tylenol) can be harmful.     · If you think your pain medicine is making you sick to your stomach:  ? Take your medicine after meals (unless your doctor tells you not to). ? Ask your doctor for a different pain medicine.     · If your doctor prescribed antibiotics, take them as directed. Do not stop taking them just because you feel better. You need to take the full course of antibiotics.     · You may need to take some medicines in a different form. You will be told whether to crush pills or take a liquid form of the medicine.     · If your doctor gives you a stool softener, take it as directed.    Incision care    · If you have strips of tape on the incision, leave the tape on for a week or until it falls off.     · Wash the area daily with warm, soapy water, and pat it dry. Follow-up care is a key part of your treatment and safety. Be sure to make and go to all appointments, and call your doctor if you are having problems. It's also a good idea to know your test results and keep a list of the medicines you take. When should you call for help? Call 911 anytime you think you may need emergency care. For example, call if:    · You passed out (lost consciousness).     · You are short of breath.    Call your doctor now or seek immediate medical care if:    · You are sick to your stomach and cannot drink fluids or keep them down.     · You have signs of a blood clot in your leg (called a deep vein thrombosis), such as:  ? Pain in your calf, back of the knee, thigh, or groin. ? Redness and swelling in your leg or groin.     · You have signs of infection, such as:  ? Increased pain, swelling, warmth, or redness. ? Red streaks leading from the incision. ? Pus draining from the incision. ? A fever.     · You have pain that does not get better after you take pain medicine.     · You have loose stitches, or your incision comes open.     · Bright red blood has soaked through the bandage.     · You cannot pass stools or gas.    Watch closely for any changes in your health, and be sure to contact your doctor if you have any problems. DISCHARGE SUMMARY from Nurse    PATIENT INSTRUCTIONS:    After general anesthesia or intravenous sedation, for 24 hours or while taking prescription Narcotics:  · Limit your activities  · Do not drive and operate hazardous machinery  · Do not make important personal or business decisions  · Do  not drink alcoholic beverages  · If you have not urinated within 8 hours after discharge, please contact your surgeon on call.     Report the following to your surgeon:  · Excessive pain, swelling, redness or odor of or around the surgical area  · Temperature over 100.5  · Nausea and vomiting lasting longer than 4 hours or if unable to take medications  · Any signs of decreased circulation or nerve impairment to extremity: change in color, persistent  numbness, tingling, coldness or increase pain  · Any questions    What to do at Home:  *  Please give a list of your current medications to your Primary Care Provider. *  Please update this list whenever your medications are discontinued, doses are      changed, or new medications (including over-the-counter products) are added. *  Please carry medication information at all times in case of emergency situations. These are general instructions for a healthy lifestyle:    No smoking/ No tobacco products/ Avoid exposure to second hand smoke  Surgeon General's Warning:  Quitting smoking now greatly reduces serious risk to your health. Obesity, smoking, and sedentary lifestyle greatly increases your risk for illness    A healthy diet, regular physical exercise & weight monitoring are important for maintaining a healthy lifestyle    You may be retaining fluid if you have a history of heart failure or if you experience any of the following symptoms:  Weight gain of 3 pounds or more overnight or 5 pounds in a week, increased swelling in our hands or feet or shortness of breath while lying flat in bed. Please call your doctor as soon as you notice any of these symptoms; do not wait until your next office visit. Recognize signs and symptoms of STROKE:    F-face looks uneven    A-arms unable to move or move unevenly    S-speech slurred or non-existent    T-time-call 911 as soon as signs and symptoms begin-DO NOT go       Back to bed or wait to see if you get better-TIME IS BRAIN. Warning Signs of HEART ATTACK     Call 911 if you have these symptoms:   Chest discomfort.  Most heart attacks involve discomfort in the center of the chest that lasts more than a few minutes, or that goes away and comes back. It can feel like uncomfortable pressure, squeezing, fullness, or pain.  Discomfort in other areas of the upper body. Symptoms can include pain or discomfort in one or both arms, the back, neck, jaw, or stomach.  Shortness of breath with or without chest discomfort.  Other signs may include breaking out in a cold sweat, nausea, or lightheadedness. Don't wait more than five minutes to call 911 - MINUTES MATTER! Fast action can save your life. Calling 911 is almost always the fastest way to get lifesaving treatment. Emergency Medical Services staff can begin treatment when they arrive -- up to an hour sooner than if someone gets to the hospital by car. The discharge information has been reviewed with the patient. The patient verbalized understanding. Discharge medications reviewed with the patient and appropriate educational materials and side effects teaching were provided. Where can you learn more? Go to http://jama-obinna.info/. Enter 505 [id number] in the search box to learn more about \"Open Bowel Resection: What to Expect at Home. \"  Current as of: March 27, 2018  Content Version: 11.9  © 4602-8050 [employer]. Care instructions adapted under license by [employer] (which disclaims liability or warranty for this information). If you have questions about a medical condition or this instruction, always ask your healthcare professional. [address] any warranty or liability for your use of this information.

## 2019-04-26 NOTE — PROGRESS NOTES
Hospital follow-up PCP transitional care appointment has been scheduled with Dr. Calvin Martínez for Thursday, 5/2/19 at 10:20 a.m. Pending patient discharge.   Sean Winchester, Care Management Specialist.

## 2019-04-26 NOTE — PROGRESS NOTES
Problem: Pressure Injury - Risk of 
Goal: *Prevention of pressure injury Description Document Moe Scale and appropriate interventions in the flowsheet. Outcome: Progressing Towards Goal 
Note:  
Pressure Injury Interventions: 
Sensory Interventions: Maintain/enhance activity level, Pressure redistribution bed/mattress (bed type), Assess changes in LOC Moisture Interventions: Absorbent underpads, Internal/External urinary devices Activity Interventions: Increase time out of bed, PT/OT evaluation Mobility Interventions: HOB 30 degrees or less, PT/OT evaluation Nutrition Interventions: Document food/fluid/supplement intake Friction and Shear Interventions: HOB 30 degrees or less, Lift sheet Problem: Falls - Risk of 
Goal: *Absence of Falls Description Document Catherene Bunting Fall Risk and appropriate interventions in the flowsheet. Outcome: Progressing Towards Goal 
Note:  
Fall Risk Interventions: 
Mobility Interventions: Patient to call before getting OOB, PT Consult for mobility concerns, PT Consult for assist device competence, OT consult for ADLs Mentation Interventions: Door open when patient unattended, Adequate sleep, hydration, pain control, More frequent rounding, Increase mobility, Update white board Medication Interventions: Patient to call before getting OOB, Teach patient to arise slowly Elimination Interventions: Call light in reach, Patient to call for help with toileting needs, Toileting schedule/hourly rounds History of Falls Interventions: Evaluate medications/consider consulting pharmacy

## 2019-04-26 NOTE — ROUTINE PROCESS
Bedside and Verbal shift change report given to SUSHANT Lopez (oncoming nurse) by Zenaida Vargas RN (offgoing nurse).  Report included the following information SBAR, Kardex, Procedure Summary, Intake/Output, MAR, Recent Results and Cardiac Rhythm SR.

## 2019-04-26 NOTE — ROUTINE PROCESS
1030: lower midline incision with yellowish, thin drainage. UPper abd incision with sutures and steristrip. Spoke with [employer] NP; she voiced Dr. Cheo Madison will round on patient before she goes home. No new order obtain.

## 2019-04-26 NOTE — PROGRESS NOTES
Problem: Mobility Impaired (Adult and Pediatric) Goal: *Acute Goals and Plan of Care (Insert Text) Description Physical Therapy Goals Revised 4/25/2019 1. Patient will move from supine to sit and sit to supine , scoot up and down and roll side to side in bed with supervision/set-up within 7 day(s). 2.  Patient will transfer from bed to chair and chair to bed with supervision/set-up using the least restrictive device within 7 day(s). 3.  Patient will perform sit to stand with supervision/set-up within 7 day(s). 4.  Patient will ambulate with supervision/set-up for 150 feet with the least restrictive device within 7 day(s). 5.  Patient will ascend/descend 3 stairs with one handrail(s) with supervision/set-up within 7 day(s). Physical Therapy Goals Goals re-evaluated 4/18/2019 1. Patient will move from supine to sit and sit to supine , scoot up and down and roll side to side in bed with minimal assist/contact guard assistance within 7 day(s). 2.  Patient will transfer from bed to chair and chair to bed with contact guard assistance using the least restrictive device within 7 day(s). 3.  Patient will perform sit to stand with contact guard assist within 7 day(s). 4.  Patient will ambulate with minimal assist for 50 feet with the least restrictive device within 7 day(s). Goals re-evaluated 4/12/2019 1. Patient will move from supine to sit and sit to supine , scoot up and down and roll side to side in bed with moderate assistance within 7 day(s). 2.  Patient will transfer from bed to chair and chair to bed with moderate assistance using the least restrictive device within 7 day(s). 3.  Patient will perform sit to stand with moderate assist within 7 day(s). 4.  Patient will ambulate with moderate assist for 25 feet with the least restrictive device within 7 day(s). 5.  Patient will tolerate unsupported sitting for 10 minutes without assist within 7 days. Initiated 4/10/2019 1.  Patient will move from supine to sit and sit to supine , scoot up and down and roll side to side in bed with modified independence within 7 day(s). 2.  Patient will transfer from bed to chair and chair to bed with minimal assistance/contact guard assist using the least restrictive device within 7 day(s). 3.  Patient will perform sit to stand with minimal assistance/contact guard assist within 7 day(s). 4.  Patient will ambulate with minimal assistance/contact guard assist for 25 feet with the least restrictive device within 7 day(s). Outcome: Progressing Towards Goal 
 
PHYSICAL THERAPY TREATMENT Patient: Michael De Jesus (12 y.o. female) Date: 4/26/2019 Diagnosis: Perforated diverticulum of large intestine [K57.20] <principal problem not specified> Procedure(s) (LRB): 
RIGHT SIDE INTERPLEURAL PLACEMENT OF VENTRICULO PLEURAL SHUNT (Right) 9 Days Post-Op Precautions: Fall(external  shunt) Chart, physical therapy assessment, plan of care and goals were reviewed. ASSESSMENT: 
Based on the objective data described below, the patient presents with SBA for supine sit and CGA for sit to stand. Gait training completed at Contact guard assistance, 70 feet and using a gait belt and rolling walker. 3 steps with CGA for home entry. Pt has been denies inpt rehab and pt\"s family want her to discharge home with HHPT/OT. Pt continues to progress with mobility and was able to perform steps to enter home. Pt has good family support that can provide 24/7 support. Pt is cleared from PT standpoint to discharge home with HHPT/OT The following are barriers to independence while in acute care:  
-Cognitive and/or behavioral: processing, sequencing, safety awareness, insight into deficits and insight into abilities -Medical condition: strength, functional endurance, standing balance, cardiopulmonary tolerance, pain tolerance and medical history   
-Other: Prior level of function: independent with baseline short term memory loss. Family lives with pt. PLAN: 
Patient continues to benefit from skilled intervention to address the above impairments. Continue treatment per established plan of care. Recommend with staff:gait with rolling walker Recommend next PT session: gait with rolling walker Discharge recommendations: Home health (to increase independence and safety) 24 supervision 
physical assist during all functional mobility If above is not an option then recommend: Rehab at skilled nursing facility (SNF) (to regain functional baseline patient requires rehab) Patient's barriers to discharging home, in addition to above impairments: none. Equipment recommendations for successful discharge (if) home: bedside commode, gait belt given to daughter and rolling walker ordered and delivered SUBJECTIVE:  
Patient stated I am excited .  OBJECTIVE DATA SUMMARY:  
Critical Behavior: 
Neurologic State: Alert Orientation Level: Oriented to person, Oriented to place, Oriented to situation, Disoriented to time Cognition: Follows commands, Memory loss(short term memory loss) Safety/Judgement: Decreased insight into deficits, Awareness of environment Functional Mobility Training: 
Bed Mobility: 
  
Supine to Sit: Stand-by assistance Transfers: 
Sit to Stand: Contact guard assistance Stand to Sit: Contact guard assistance Balance: 
Sitting: Intact Standing: Impaired Standing - Static: Fair Standing - Dynamic : Fair Ambulation/Gait Training: 
Distance (ft): 70 Feet (ft)(90feet ) Assistive Device: Gait belt;Walker, rolling Ambulation - Level of Assistance: Contact guard assistance Gait Abnormalities: Decreased step clearance Base of Support: Center of gravity altered Speed/Tracy: Slow Step Length: Left shortened;Right shortened Stairs: 
Number of Stairs Trained: 3 Stairs - Level of Assistance: Contact guard assistance Rail Use: Both Therapeutic Exercises:  
 
Pain: No complaints Activity Tolerance:  
Limited Please refer to the flowsheet for vital signs taken during this treatment. After treatment patient left:  
Up in chair Caregiver at bedside Call light within reach RN notified COMMUNICATION/COLLABORATION:  
The patients plan of care was discussed with: Registered Nurse Kathy Patterson PTA Time Calculation: 42 mins

## 2019-04-27 ENCOUNTER — HOME CARE VISIT (OUTPATIENT)
Dept: SCHEDULING | Facility: HOME HEALTH | Age: 69
End: 2019-04-27
Payer: MEDICARE

## 2019-04-27 PROCEDURE — 3331090002 HH PPS REVENUE DEBIT

## 2019-04-27 PROCEDURE — G0299 HHS/HOSPICE OF RN EA 15 MIN: HCPCS

## 2019-04-27 PROCEDURE — 400013 HH SOC

## 2019-04-27 PROCEDURE — 3331090001 HH PPS REVENUE CREDIT

## 2019-04-28 PROCEDURE — 3331090002 HH PPS REVENUE DEBIT

## 2019-04-28 PROCEDURE — 3331090001 HH PPS REVENUE CREDIT

## 2019-04-29 ENCOUNTER — PATIENT OUTREACH (OUTPATIENT)
Dept: FAMILY MEDICINE CLINIC | Age: 69
End: 2019-04-29

## 2019-04-29 ENCOUNTER — TELEPHONE (OUTPATIENT)
Dept: SURGERY | Age: 69
End: 2019-04-29

## 2019-04-29 ENCOUNTER — HOME CARE VISIT (OUTPATIENT)
Dept: SCHEDULING | Facility: HOME HEALTH | Age: 69
End: 2019-04-29
Payer: MEDICARE

## 2019-04-29 ENCOUNTER — TELEPHONE (OUTPATIENT)
Dept: FAMILY MEDICINE CLINIC | Age: 69
End: 2019-04-29

## 2019-04-29 VITALS
TEMPERATURE: 99.7 F | RESPIRATION RATE: 16 BRPM | HEART RATE: 81 BPM | OXYGEN SATURATION: 94 % | SYSTOLIC BLOOD PRESSURE: 130 MMHG | DIASTOLIC BLOOD PRESSURE: 72 MMHG

## 2019-04-29 VITALS
BODY MASS INDEX: 26.83 KG/M2 | SYSTOLIC BLOOD PRESSURE: 128 MMHG | OXYGEN SATURATION: 96 % | TEMPERATURE: 98.3 F | RESPIRATION RATE: 18 BRPM | HEIGHT: 68 IN | HEART RATE: 74 BPM | DIASTOLIC BLOOD PRESSURE: 70 MMHG | WEIGHT: 177.03 LBS

## 2019-04-29 VITALS
DIASTOLIC BLOOD PRESSURE: 78 MMHG | SYSTOLIC BLOOD PRESSURE: 134 MMHG | RESPIRATION RATE: 12 BRPM | TEMPERATURE: 99.8 F | OXYGEN SATURATION: 95 % | HEART RATE: 88 BPM

## 2019-04-29 PROCEDURE — G0151 HHCP-SERV OF PT,EA 15 MIN: HCPCS

## 2019-04-29 PROCEDURE — 3331090001 HH PPS REVENUE CREDIT

## 2019-04-29 PROCEDURE — 3331090002 HH PPS REVENUE DEBIT

## 2019-04-29 PROCEDURE — G0299 HHS/HOSPICE OF RN EA 15 MIN: HCPCS

## 2019-04-29 NOTE — TELEPHONE ENCOUNTER
----- Message from Parish Winslow sent at 4/29/2019 10:43 AM EDT -----  Regarding: Dr Antonio Sorto, from [facility], called and stated the pt started home health care on Saturday.  Her family wanted to request a prescription for the pt's panic attack and anxiety be sent to the [facility] on file [phone]      Best contact number is [phone]

## 2019-04-29 NOTE — TELEPHONE ENCOUNTER
Spoke to [place], she said that the pt reported that she had a few panic attacks while in the hospital.  They gave her some valium and that seemed to help. The pt and dtr wanted to know if they could have a rx for some valium for the anxiety that she is experiencing. The pt describes it as feeling really shaky inside. Advised that Dr Sangeetha Duarte has gone for the day and will get back with her tomorrow.

## 2019-04-29 NOTE — PROGRESS NOTES
[facility] Discharge Follow-Up Date/Time:  2019 12:53 PM 
 
Patient was admitted to [facility] [address] on 3/26/19 and discharged on 19 for Perforated Diverticulum. The physician discharge summary was available at the time of outreach. Patient was contacted within 1 business days of discharge. Top Challenges reviewed with the provider · Consider repeat CBC-risk for infection -colostomy, HGB 9.1       at discharge. · [facility] to follow for PT/OT/nursing · PCP follow up 19 · Daughter Nelda Silva) cares for patient at home · Consider repeat CXR-19-right pleural effusion,                   pulmonary vessel congestion · Patient is smoker Method of communication with provider : staff message, phone Inpatient RRAT score: 20 Was this a readmission? no  
Patient stated reason for the readmission: n/a Nurse Navigator (NN) contacted the family (daughter, Cary Orellana) by telephone to perform post hospital discharge assessment. Verified name and  with family as identifiers. Provided introduction to self, and explanation of the Nurse Navigator role. Reviewed discharge instructions and red flags with caregiver who verbalized understanding. Caregiver given an opportunity to ask questions and does not have any further questions or concerns at this time. The caregiver agrees to contact the PCP office for questions related to their healthcare. NN provided contact information for future reference. Disease Specific:   N/A Summary of patient's top problems: 1. Acute pain R/t externalized ventricular shunt--  From [place] [facility], Admitted for abdominal pain and was found to have a pelvic abscess. She had an aneurysm rupture in  which was clipped by Dr. Viola Case. She ended up with a shunt after that. Never had a problem, she had one CT scan in  for dizziness that showed small ventricles This admission showed the shunt needed to be relocated to the pleural cavity.    Dr. Elmer Gastelum did a Lap- drainage of the abscess- no peritonitis, Post procedure, patient found to have a shunt in her Abdominal cavity. Discussed with family, risk of continued infection. Will watch closely. 2. Nosocomial Pneumonia- complained of SOB, acute hypoxic respiratory insufficiency-  CXR positive for pneumonia. 3. Perforated Diverticulum- treat with antibiotic therapy and bowel rest- patient has colostomy. Home Health orders at discharge:PT/OT/Nursing Home Health company: [facility] Date of initial visit: 4/27/19 Durable Medical Equipment ordered/company: none Durable Medical Equipment received: Patient has walker Barriers to care? Patient has memory loss, cared for by daughter, Brian Hayes Advance Care Planning:  
Does patient have an Advance Directive:  not on file; education provided. NN encouraged daughter to come in complete forms given to her by Provider. Daughter says, her mother has memory problems and unsure if she understands decisions. Will talk with provider during visit. Medication(s):  
New Medications at Discharge: none Changed Medications at Discharge: none Discontinued Medications at Discharge: none Medication reconciliation was performed with caregiver, who verbalizes understanding of administration of home medications. There were no barriers to obtaining medications identified at this time. Referral to Pharm D needed: no  
 
Current Outpatient Medications Medication Sig  
 dilTIAZem (CARDIZEM) 90 mg tablet Take 90 mg by mouth three (3) times daily.  sertraline (ZOLOFT) 100 mg tablet TAKE 1.5 TABS BY MOUTH DAILY  omeprazole (PRILOSEC) 20 mg capsule TAKE 1 CAPSULE BY MOUTH EVERY DAY  losartan-hydroCHLOROthiazide (HYZAAR) 50-12.5 mg per tablet Take 1 Tab by mouth daily. Indications: hypertension  atorvastatin (LIPITOR) 20 mg tablet TAKE 1 TABLET BY MOUTH EVERY DAY  traZODone (DESYREL) 50 mg tablet TAKE 1 TABLET BY MOUTH EVERYDAY AT BEDTIME  
  dicyclomine (BENTYL) 10 mg capsule TAKE 1-2 CAPSULES BY MOUTH EVERY 6 HOURS AS NEEDED FOR ABDOMINAL CRAMPS  albuterol (VENTOLIN HFA) 90 mcg/actuation inhaler Take 2 Puffs by inhalation every six (6) hours as needed for Wheezing or Shortness of Breath.  MULTIVITAMINS (MULTIVITAMIN PO) Take  by mouth daily.  ferrous sulfate (IRON) 325 mg (65 mg Iron) tablet Take  by mouth two (2) times a day. No current facility-administered medications for this visit. There are no discontinued medications. [facility] follow up appointment(s):  
Future Appointments Date Time Provider Mona Adamson 5/2/2019 10:20 AM Maurice Matias MD [facility]  
5/3/2019 To Be Determined MICHAEL Barrett  
5/7/2019 To Be Determined Ivette Fulton LPN [facility]  
5/10/2019 To Be Determined Ivette Fulton LPN [facility]  
5/14/2019 To Be Determined Luis Gonzalez RN [address]  
5/17/2019 To Be Determined Ivette Fulton LPN [address]  
5/22/2019 To Be Determined Ivette Fulton LPN [address]  
5/29/2019 To Be Determined Ivette Fulton LPN [address]  
6/5/2019 To Be Determined Ivette Fulton LPN [address]  
6/12/2019 To Be Determined Ivette Fulton LPN [address]  
6/19/2019 To Be Determined Luis Gonzalez RN [address]  
6/25/2019 To Be Determined Luis Gonzalez RN [facility] [place] [address] [facility] follow up appointment(s): none [facility]:  information provided as a resource Goals  Attends follow-up appointments as directed. · Scheduled for follow-up with PCP on 5/2/19 · Reviewed appointment and discharge instructions with daughter · To follow up with [address] [place] [address] Drive- initial visit was on 4/27/19 · Declined follow up with [facility], information reviewed NN will follow in one week. pk 
  
  Demonstrate self-management skills for Perforated Diverticulum · Observe output from drains to include colostomy to include color, clarity, and smell. · Administer antibiotics as ordered. · Instructed daughter Merlene Laughlin) on care of Ostomy, and how to obtain supplies · [facility] to follow for PT/OT/nursing · Use walker for mobility · Instructed on diet/nutrition for patient with colostomy. NN will follow in one week. pk 
  
  Understands red flags post discharge. · Review Red Flags: Risk for infection' · Assess vital signs making note of trends showing signs of sepsis (increased HR, decreased BP, fever). · Assess neuro status including changes in level of consciousness or new onset confusion. · Encouraged to look for signs of active bleeding such as change in VS (increased HR, decreased BP), flank bruising, jeffery blood coming from ostomy. · NN to follow in one week.  pk

## 2019-04-30 ENCOUNTER — HOME CARE VISIT (OUTPATIENT)
Dept: HOME HEALTH SERVICES | Facility: HOME HEALTH | Age: 69
End: 2019-04-30
Payer: MEDICARE

## 2019-04-30 ENCOUNTER — TELEPHONE (OUTPATIENT)
Dept: SURGERY | Age: 69
End: 2019-04-30

## 2019-04-30 PROCEDURE — 3331090001 HH PPS REVENUE CREDIT

## 2019-04-30 PROCEDURE — 3331090002 HH PPS REVENUE DEBIT

## 2019-04-30 NOTE — TELEPHONE ENCOUNTER
I called and spoke to Merrick Carcamo from [facility] and I gave her a verbal order for Home PT 2 times a week for 6 weeks. Amy in agreement.

## 2019-04-30 NOTE — TELEPHONE ENCOUNTER
This is a controlled med and I would not prescribe this for more than 7 day supply once I see her. Would need longer term maintenance mgt of her anxiety w/ a non controlled daily med. I last saw her  and advised 3 month follow up which would have been due in Jan and she never followed up. They have her scheduled to see me on 5/2 at 10:20 for a short routine appt. This needs to be rescheduled as a MELBA appt in the right slot. You can see who booked and get them to correct it in a MELBA slot per my template.

## 2019-04-30 NOTE — TELEPHONE ENCOUNTER
Piotr  returning call, she said you can leave the verbal orders on the message if you can't get her. Please call.

## 2019-05-01 ENCOUNTER — HOME CARE VISIT (OUTPATIENT)
Dept: SCHEDULING | Facility: HOME HEALTH | Age: 69
End: 2019-05-01
Payer: MEDICARE

## 2019-05-01 VITALS
RESPIRATION RATE: 16 BRPM | TEMPERATURE: 99.5 F | SYSTOLIC BLOOD PRESSURE: 126 MMHG | OXYGEN SATURATION: 95 % | DIASTOLIC BLOOD PRESSURE: 80 MMHG | HEART RATE: 73 BPM

## 2019-05-01 PROCEDURE — G0300 HHS/HOSPICE OF LPN EA 15 MIN: HCPCS

## 2019-05-01 PROCEDURE — 3331090001 HH PPS REVENUE CREDIT

## 2019-05-01 PROCEDURE — 3331090002 HH PPS REVENUE DEBIT

## 2019-05-01 PROCEDURE — G0151 HHCP-SERV OF PT,EA 15 MIN: HCPCS

## 2019-05-02 ENCOUNTER — OFFICE VISIT (OUTPATIENT)
Dept: FAMILY MEDICINE CLINIC | Age: 69
End: 2019-05-02

## 2019-05-02 VITALS
WEIGHT: 169.2 LBS | SYSTOLIC BLOOD PRESSURE: 130 MMHG | BODY MASS INDEX: 26.56 KG/M2 | HEART RATE: 88 BPM | HEIGHT: 67 IN | TEMPERATURE: 98.7 F | RESPIRATION RATE: 17 BRPM | DIASTOLIC BLOOD PRESSURE: 80 MMHG | OXYGEN SATURATION: 96 %

## 2019-05-02 VITALS
TEMPERATURE: 98.6 F | SYSTOLIC BLOOD PRESSURE: 130 MMHG | RESPIRATION RATE: 18 BRPM | HEART RATE: 70 BPM | OXYGEN SATURATION: 96 % | DIASTOLIC BLOOD PRESSURE: 62 MMHG

## 2019-05-02 DIAGNOSIS — D64.9 ANEMIA, UNSPECIFIED TYPE: ICD-10-CM

## 2019-05-02 DIAGNOSIS — I10 BENIGN ESSENTIAL HYPERTENSION: ICD-10-CM

## 2019-05-02 DIAGNOSIS — K57.20 PERFORATION AND ABSCESS OF LARGE INTESTINE CONCURRENT WITH AND DUE TO DIVERTICULITIS: ICD-10-CM

## 2019-05-02 DIAGNOSIS — F41.1 GENERALIZED ANXIETY DISORDER WITH PANIC ATTACKS: ICD-10-CM

## 2019-05-02 DIAGNOSIS — F41.0 GENERALIZED ANXIETY DISORDER WITH PANIC ATTACKS: ICD-10-CM

## 2019-05-02 DIAGNOSIS — Z09 HOSPITAL DISCHARGE FOLLOW-UP: Primary | ICD-10-CM

## 2019-05-02 DIAGNOSIS — Z93.3 S/P COLOSTOMY (HCC): ICD-10-CM

## 2019-05-02 DIAGNOSIS — Z90.49 S/P PARTIAL COLECTOMY: ICD-10-CM

## 2019-05-02 DIAGNOSIS — Z23 NEED FOR VACCINATION WITH 13-POLYVALENT PNEUMOCOCCAL CONJUGATE VACCINE: ICD-10-CM

## 2019-05-02 DIAGNOSIS — F32.A CHRONIC DEPRESSION: ICD-10-CM

## 2019-05-02 DIAGNOSIS — I48.91 ATRIAL FIBRILLATION, TRANSIENT (HCC): ICD-10-CM

## 2019-05-02 PROCEDURE — 3331090001 HH PPS REVENUE CREDIT

## 2019-05-02 PROCEDURE — 3331090002 HH PPS REVENUE DEBIT

## 2019-05-02 RX ORDER — BUSPIRONE HYDROCHLORIDE 5 MG/1
5 TABLET ORAL 2 TIMES DAILY
Qty: 60 TAB | Refills: 1 | Status: SHIPPED | OUTPATIENT
Start: 2019-05-02 | End: 2019-08-13 | Stop reason: SDUPTHER

## 2019-05-02 NOTE — PROGRESS NOTES
Chief Complaint   Patient presents with   [facility] Follow Up     [facility] 3/26/2019- 4/26/2019 [facility] for Perforated diverticulum of large intestines      1. Have you been to the ER, urgent care clinic since your last visit? Hospitalized since your last visit? 3/26/2019- 4/26/2019 [place] for Perforated diverticulum of large intestines     2. Have you seen or consulted any other health care providers outside of the [address] [place] [address] since your last visit? Include any pap smears or colon screening.  No

## 2019-05-02 NOTE — PATIENT INSTRUCTIONS

## 2019-05-02 NOTE — PROGRESS NOTES
Chief Complaint   Patient presents with   [facility] Follow Up     [place] 3/26/2019- 4/26/2019 [facility] for Perforated diverticulum of large intestines      HISTORY OF PRESENT ILLNESS   HPI  Alli Beckett is a 79y.o. year old female brought in today by her primary caregiver (daughter Justin Merino) for Transition of Care services following a hospital discharge from [facility] on 4/26/19 for diagnoses listed below. Our office Nurse Navigator performed an outreach to Ms. Marcell Sampson on 4-29-19 to complete medication reconciliation and a telephonic assessment of her condition. Date of Admission: 3-  Date of Discharge: 4-  Discharge Diagnoses: Acute Perforation of Sigmoid Diverticulum w/ Diverticulitis and Abscess Formation, s/p Drainage of Abscess and subsequent Exp Laparotomy/Sigmoidectomy and Colostomy,  Shunt relocation, Post Procedure A. Fib. Discharge Disposition: Home w/ daughter providing round the clock care, [address] [place] [address] Nurse 3 x a week, In-Home PT 2 x a week (Insurance denied inpatient rehab at [address] [place] [address])    Today's history obtained from daughter, patient, review of outpatient office records leading up to hospitalization, and inpatient hospital notes, records and consultant reports. Only the pertinent findings were summarized in today's office note as detailed in HPI, ancillary tests and A/P. There was NO hospital discharge available at the time of patient's follow up visit today. HPI  3/16/2019 patient seen at PCP office by Dr. Stefanie Camejo for complaints of URI sx, N/V/D, flu like symptoms, and home temperature reading up to 102F. She tested negative for strep and flu but was empirically started on Tamiflu and Zpack. Shortly after that her GI symptoms worsened along w/ complaints of abdominal pain, weakness and SOB. So on 3-26-19, her daughters initially took her to [facility] but pt was deemed to be critically ill and referred to [facility] ER.  Upon arrival to the ED, her BP was 147/86, pulse 92, RR 22, temp 98, sats 94%. She was noted to have decreased BS and wheezes as well as generalized abdominal tenderness. WBC was 12.6, Hgb was 14.0, K+ was 2.6, glucose 115, Cr 0.94, calcium and liver enzymes were normal, lactic acid was normal, CXR was negative, Chest CTA was negative. CT Abd/Pelvis showed sigmoid diverticulitis w/ perforation and abscess formation. She was admitted, cardiac monitoring, IVF, K+ repletion, and stabilized. She was seen by general sugery. 3/26/19 Lap Drainage of Pelvic Abscess per Dr. Emma Prado. 4/8/19 Exploratory Laparotomy, Sigmoidectomy, Colostomy per Dr. Emma Prado (anticipated reversal 3-6 months)  4/8/19 Externalize  Shunt, Dr. Annie Inman, NS  4/17/19  Right Intrapleural  Shunt Relocation, Dr. Annie Inman, NS  4/17/19 Post Procedure Atrial Fibrillation, medication mgt per Cardio Dr. Ruchi Diaz drip, IV Metoprolol, Dig x 2. Echo done 4-2 indicated reserved LV function, EF 61-65%  4/26/19 Dc'd home w/ daughter and in home care  Discharge labs are included below. Her Hgb on DC was down to 9.1. She is currently on oral iron therapy. Since being back home, daughter has been primary caregiver, changing her ostomy bag, and managing her medications. Merged with [facility] nurse visits are 3 x a week and PT is 2 x a week. She has been getting around progressively better each day using the rolling walker that she was dc'd home with. Both pt and daughter feel she is making steady progress. Her appetite is very good and she is tolerating a regular diet well. Daughter trying to keep her on heart healthy foods. No ostomy problems. Stool is soft and normal in appearance. She denies generalized abdominal pain, N/V, fevers or bleeding. She has surgical site soreness/sporadic pain that tends to be worse depending on how [facility] activity/moving around she has done that particular day, iw/ w/ PT. She takes Dilaudid anywhere from 1-3 x a day prn which does give her pain relief.  They will be transitioning her over the Tylenol q6-8hrs and reserving Dilaudid for any breakthrough pain, but that is getting better. Both patient and daughter feel her depression is very well controlled. She has been in good spirits and denies feelings of sadness, apathy, depression. She in general has been well controlled on her regimen of Zoloft and Trazodone over time and doing really well w/ those alone. However since her hospitalization she has been [place] ball of nerves\". She has been feeling extremely anxious, nervous and \"jittery/trembly on her insides\" pretty much all day. While she was in the hospital she had a \"severe panic attack\" on 4-14. The response team was called but her evaluation ended up being ok. She was dx w/ a panic attack and prescribed prn Valium. She had another panic attack on 4-24 and was prescribed another dose. According to dtr those were the only times she required the Valium (it worked but [employer] her\", \"knocked her out\" and she slept through it). She was given 5 mg each time. Since being home she has not had anymore panic attacks but states \"I wish I could just stop feeling all trembly inside and like I am going to come out of my skin\". She frequently tells her daughter, \"I am so nervous, worried, anxious; if I could stop my mind from racing and shut it down that would be great\". REVIEW OF SYMPTOMS   Review of Systems   Constitutional: Negative for chills and fever. Respiratory: Negative. Negative for cough and shortness of breath. Cardiovascular: Negative. Negative for chest pain and palpitations. Gastrointestinal: Negative for blood in stool, heartburn, nausea and vomiting. Genitourinary: Negative. Neurological: Negative for dizziness, sensory change, focal weakness and headaches. Psychiatric/Behavioral: Negative for depression. The patient is nervous/anxious.             PROBLEM LIST/MEDICAL HISTORY     Problem List  Date Reviewed: 5/2/2019          Codes Class Noted    Perforation and abscess of large intestine concurrent with and due to diverticulitis ICD-10-CM: K57.20  ICD-9-CM: 562.11, 569.5  5/2/2019    Overview Signed 5/2/2019 11:40 AM by Prince Michelle MD     Lap Drain 3-29-19, Exp Laparotomy/Colostomy 4-8-19, Dr. Cha Antonio fibrillation, transient [facility]) ICD-10-CM: I48.91  ICD-9-CM: 427.31  5/2/2019    Overview Signed 5/2/2019 11:53 AM by Prince Michelle MD     Post Op Acute A. Fib after  Shunt replacement, 4-17-19, [facility] Torres, Dr. Shannon Huynh (no cardioversion required, medication mgt only)             Acute hypoxemic respiratory failure [facility]) ICD-10-CM: J96.01  ICD-9-CM: 518.81  4/12/2019        Pneumonia due to infectious organism ICD-10-CM: J18.9  ICD-9-CM: 136.9, 484.8  4/12/2019        Perforated diverticulum of large intestine ICD-10-CM: K57.20  ICD-9-CM: 562.10  3/26/2019        ACP (advance care planning) ICD-10-CM: Z71.89  ICD-9-CM: V65.49  2/21/2017    Overview Signed 2/21/2017 11:02 AM by Prince Michelle MD     Initial ACP discussion w/ pt and daughter 2-2017. AMD form reviewed and copy provided.  NN/facilitator to discuss with patient               Benign essential hypertension ICD-10-CM: I10  ICD-9-CM: 401.1  6/24/2016        Impaired fasting glucose/Prediabetes ICD-10-CM: R73.01  ICD-9-CM: 790.21  7/13/2014    Overview Signed 7/13/2014 10:50 PM by Prince Michelle MD     6/2014             Bilateral hip pain ICD-10-CM: M25.551, M25.552  ICD-9-CM: 719.45  6/3/2014    Overview Signed 6/3/2014  2:46 PM by Prince Michelle MD     xrays 2012, negative             Vitamin D deficiency ICD-10-CM: E55.9  ICD-9-CM: 268.9  6/3/2014    Overview Signed 6/3/2014  2:48 PM by Prince Michelle MD     2012             GERD (gastroesophageal reflux disease) ICD-10-CM: K21.9  ICD-9-CM: 530.81  9/2/2011        H/O Subarachnoid hemorrhage due to ruptured aneurysm ICD-10-CM: I60.8  ICD-9-CM: 430  7/14/2010    Overview Signed 7/14/2010  1:08 PM by Christianne Santana MD     10/10/06 S/P Rt Frontal Craniotomy and clipping, Dr Sousa Setting 10/27/06             Essential tremor ICD-10-CM: G25.0  ICD-9-CM: 333.1  7/14/2010        Cough variant asthma ICD-10-CM: J45.991  ICD-9-CM: 493.82  3/29/2010        Tobacco Abuse ICD-10-CM: F17.210  ICD-9-CM: 305.1  3/29/2010    Overview Signed 3/29/2010  6:13 PM by Dave Franco               AR (allergic rhinitis) ICD-10-CM: J30.9  ICD-9-CM: 477.9  3/29/2010        ADD (attention deficit disorder) ICD-10-CM: F98.8  ICD-9-CM: 314.00  3/29/2010        Depression ICD-10-CM: F32.9  ICD-9-CM: 998  3/29/2010        Anxiety ICD-10-CM: F41.9  ICD-9-CM: 300.00  3/29/2010        COPD (chronic obstructive pulmonary disease) ([facility] ICD-10-CM: J44.9  ICD-9-CM: 823  3/29/2010    Overview Signed 11/10/2014  2:05 PM by Christianne Santana MD     [address]             Thyroiditis, subacute ICD-10-CM: E06.1  ICD-9-CM: 245.1  3/29/2010        Hyperlipemia ICD-10-CM: E78.5  ICD-9-CM: 272.4  3/29/2010    Overview Signed 7/14/2010  1:05 PM by Christianne Santana MD     2005             DVT of Rt Lower Extremity ICD-10-CM: I82.409  ICD-9-CM: 453.40  3/29/2010    Overview Addendum 7/14/2010  1:04 PM by Christianne Santana MD     11/06             Elevated LFT's, Fatty Liver ICD-10-CM: R94.5  ICD-9-CM: 790.6  3/29/2010    Overview Addendum 7/14/2010  1:04 PM by Christianne Santana MD     [facility] 7/09 Hepatic Steatosis                       PAST SURGICAL HISTORY     Past Surgical History:   Procedure Laterality Date    ECHO STRESS  2005    Negative    HX COLONOSCOPY  3/2013, Dr Karthik Duggan    benign cecal polyp, f/u 10 yrs    HX COLONOSCOPY  18's    Benign polypectomy    HX COLOSTOMY  04/08/2019    Exp Laparotomy, Sigmoidectomy & Colostomy for Perforated Sigmoid Diverticulum, Dr. Stephani Glass  10/2006    for clipping of ruptured aneurysm; Dr Bañuelos Snare a shunt    HX CSF SHUNT  04/17/2019    Right Intrapleural Placement  Shunt (relocated due to peritoneal/abdominal surgery 4/8/19), [facility], Dr. Gladys Kaur  EGD, Dr Aguilar James    \"ok\" per pt report    HX HYSTERECTOMY  1991    Cervical Dysplasia (Dr Aguilar Police)    IR DRAIN CUTANEOUS/SUBCU ABSCESS  03/29/2019    Lap Drain Pelvic Abscess for Perforated Sigmoid Diverticulum, Dr. Cornelia Fuentes  04/08/2019    Externalize  Shunt Right Abdomen for Exp Lap/Colostomy, [facility], Dr. Mark Martini     Current Outpatient Medications   Medication Sig    pneumococcal 13 kaleigh conj dip (PREVNAR-13) 0.5 mL syrg injection 0.5 mL by IntraMUSCular route once for 1 dose. UPON ADMINISTRATION PLEASE FAX VERIFICATION -[phone], THANK YOU!    busPIRone (BUSPAR) 5 mg tablet Take 1 Tab by mouth two (2) times a day. Indications: Repeated Episodes of Anxiety    HYDROmorphone (DILAUDID) 2 mg tablet Take 2 mg by mouth every four (4) hours as needed for Pain.  dilTIAZem (CARDIZEM) 90 mg tablet Take 90 mg by mouth three (3) times daily.  sertraline (ZOLOFT) 100 mg tablet TAKE 1.5 TABS BY MOUTH DAILY    omeprazole (PRILOSEC) 20 mg capsule TAKE 1 CAPSULE BY MOUTH EVERY DAY    losartan-hydroCHLOROthiazide (HYZAAR) 50-12.5 mg per tablet Take 1 Tab by mouth daily. Indications: hypertension    atorvastatin (LIPITOR) 20 mg tablet TAKE 1 TABLET BY MOUTH EVERY DAY    traZODone (DESYREL) 50 mg tablet TAKE 1 TABLET BY MOUTH EVERYDAY AT BEDTIME    dicyclomine (BENTYL) 10 mg capsule TAKE 1-2 CAPSULES BY MOUTH EVERY 6 HOURS AS NEEDED FOR ABDOMINAL CRAMPS    albuterol (VENTOLIN HFA) 90 mcg/actuation inhaler Take 2 Puffs by inhalation every six (6) hours as needed for Wheezing or Shortness of Breath.  MULTIVITAMINS (MULTIVITAMIN PO) Take  by mouth daily.  ferrous sulfate (IRON) 325 mg (65 mg Iron) tablet Take  by mouth two (2) times a day. No current facility-administered medications for this visit. ALLERGIES     Allergies   Allergen Reactions    Ativan [Lorazepam] Hives    Egg Yolk Other (comments)     indigestion    Pcn [Penicillins] Other (comments)     Unsure of reaction; pt reports unsure if have taken cephalosporin before.     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, manufacturing data, employee supervision   Tobacco Use    Smoking status: Current Every Day Smoker     Packs/day: 0.50     Years: 20.00     Pack years: 10.00     Types: Cigarettes    Smokeless tobacco: Never Used   Substance and Sexual Activity    Alcohol use: No     Alcohol/week: 0.0 oz    Drug use: No    Sexual activity: Never   Other Topics Concern     Service No    Blood Transfusions No    Caffeine Concern Yes     Comment: 1 pot of coffee a day (~ 6-8 cups)    Occupational Exposure No    Hobby Hazards No    Sleep Concern No    Stress Concern No    Weight Concern No    Special Diet No    Back Care No    Bike Helmet No    Seat Belt Yes    Self-Exams Yes   Social History Narrative        Lives in 2 story home    2 of her daughters (Elizabeth Arellano) live with her and one grandbaby    Daughter prepares most meals but patient still cooks some as well    No asst devices for ambulation; no canes or walkers    No longer drives since the [facility] and stroke    Daughters help w/ finances, cleaning, transportation and manage her medications (daughter Edy Stern who accompanies her to all her appts)    Does other ADL's, dressing, bathing, grooming all on her own; some help w/ meal preparation; pt can use microwave and the stove when daughters are home. Patient does some cleaning. No toileting problems; uses independently    Initial ACP discussion w/ pt and daughter -. AMD form reviewed and copy provided. Discussed w/ pt and daughter again , they think patient has AMD and will check. IMMUNIZATIONS  Immunization History   Administered Date(s) Administered    Pneumococcal Polysaccharide (PPSV-23) 07/10/2014         FAMILY HISTORY     Family History   Problem Relation Age of Onset    Anxiety Daughter     Attention Deficit Disorder Daughter     Drug Abuse Daughter     Anxiety Daughter     Thyroid Disease Daughter     Thyroid Disease Daughter         Hypothyroidism    Asthma Daughter     Cancer Father         kidney    Diabetes Father    [place] Father     Diabetes Mother     Hypertension Mother    [place] Mother    [facility] Stroke Mother     Hypertension Sister     Depression Sister     Pneumonia Sister          age 72    Diabetes Sister     Thyroid Disease Sister     Thyroid Disease Sister     Heart Disease Sister     Diabetes Maternal Grandmother     No Known Problems Brother     Thyroid Disease Sister     Anxiety Sister     Depression Sister     Alcohol abuse Sister     Hypertension Sister     Hypertension Sister     Thyroid Disease Sister     Thyroid Disease Sister          VITALS     Visit Vitals  /80 (BP 1 Location: Left arm, BP Patient Position: Sitting)   Pulse 88   Temp 98.7 °F (37.1 °C) (Oral)   Resp 17   Ht 5' 7\" (1.702 m)   Wt 169 lb 3.2 oz (76.7 kg)   SpO2 96%   BMI 26.50 kg/m²          PHYSICAL EXAMINATION   Physical Exam   Constitutional: She is oriented to person, place, and time and well-developed, well-nourished, and in no distress. Cardiovascular: Normal rate and regular rhythm. Pulmonary/Chest: Effort normal and breath sounds normal. No respiratory distress. Abdominal: Soft. Bowel sounds are normal. She exhibits no distension.  There is no rebound and no guarding. Ostomy bag in place, bag half full of soft brown stool, site clean. Mild generalized tenderness. Non focal   Musculoskeletal: She exhibits no edema. Neurological: She is alert and oriented to person, place, and time. Gait stable using rolling walker   Skin: Skin is warm and dry. Psychiatric: Mood, affect and judgment normal.   Vitals reviewed. DIAGNOSTIC DATA     Final result (Exam End: 3/26/2019 15:38) Provider Status: Open   Study Result     INDICATION: abdominal pain      EXAM: CT Abdomen and Pelvis is performed with 100 mL Isovue 370 contrast IV  without complication. CT dose reduction was achieved through use of a  standardized protocol tailored for this examination and automatic exposure  control for dose modulation.     FINDINGS:     There is sigmoid colon inflammation, likely diverticular in etiology, with a  contiguous irregular fluid/gas collection, without well-defined wall, measuring  approximately 4.6 x 3.8 x 2.6 cm.     There is no ascites, pneumoperitoneum or significant adenopathy.     Liver is fatty. Bile ducts are not enlarged. Pancreas shows no mass or  inflammation. Spleen is unremarkable. There is a small right adrenal probable  adenoma. Left adrenal is unremarkable. Kidneys show no mass or hydronephrosis. Aorta is calcified without aneurysm.      The appendix is normal. The bladder is not distended. The distal ureters are not  dilated. There is no apparent pelvic mass.     IMPRESSION  IMPRESSION: Sigmoid diverticulitis with perforation with nonencapsulated  fluid/gas collection.               LABORATORY DATA     Results for orders placed or performed during the hospital encounter of 03/26/19   CULTURE, WOUND W GRAM STAIN   Result Value Ref Range    Special Requests: LOOKING FOR ANAEROBES     GRAM STAIN 1+ WBCS SEEN      GRAM STAIN 2+ GRAM POSITIVE COCCI IN CLUSTERS      GRAM STAIN MODERATE GRAM POSITIVE COCCI IN CHAINS      GRAM STAIN MODERATE GRAM NEGATIVE RODS      Culture result: HEAVY ESCHERICHIA COLI (A)      Culture result: HEAVY STAPHYLOCOCCUS SPECIES, COAGULASE NEGATIVE (A)      Culture result: (A)       HEAVY  MIXED  ANAEROBIC GRAM NEGATIVE RODS  BETA LACTAMASE POSITIVE  AND   ANAEROBIC GRAM POSITIVE COCCI      Culture result: LIGHT YEAST (A)         Susceptibility    Escherichia coli - ARMIDA     Amikacin ($) <=16 Susceptible ug/mL     Ampicillin ($) >16 Resistant ug/mL     Ampicillin/sulbactam ($) 16/8 Intermediate ug/mL     Aztreonam ($$$$) <=4 Susceptible ug/mL     Cefazolin ($) <=8 Susceptible ug/mL     Ceftazidime ($) <=1 Susceptible ug/mL     Ceftriaxone ($) <=1 Susceptible ug/mL     Cefuroxime ($) <=4 Susceptible ug/mL     Cefotaxime <=2 Susceptible ug/mL     Cefepime ($$) <=4 Susceptible ug/mL     Ciprofloxacin ($) >2 Resistant ug/mL     Gentamicin ($) >8 Resistant ug/mL     Imipenem <=1 Susceptible ug/mL     Levofloxacin ($) >4 Resistant ug/mL     Meropenem ($$) <=1 Susceptible ug/mL     Piperacillin/Tazobac ($) <=16 Susceptible ug/mL     Tobramycin ($) >8 Resistant ug/mL     Trimeth/Sulfa >2/38 Resistant ug/mL   CULTURE, BLOOD   Result Value Ref Range    Special Requests: NO SPECIAL REQUESTS      Culture result: NO GROWTH 6 DAYS     CULTURE, RESPIRATORY/SPUTUM/BRONCH W GRAM STAIN   Result Value Ref Range    Special Requests: NO SPECIAL REQUESTS      GRAM STAIN FEW EPITHELIAL CELLS SEEN      GRAM STAIN NO WBC'S SEEN      GRAM STAIN NO ORGANISMS SEEN      Culture result: SCANT NORMAL RESPIRATORY DON        TROPONIN I   Result Value Ref Range    Troponin-I, Qt. <0.05 <0.05 ng/mL   CK W/ CKMB & INDEX   Result Value Ref Range     (H) 26 - 192 U/L    CK - MB 1.7 <3.6 NG/ML    CK-MB Index 0.6 0.0 - 2.5     LACTIC ACID   Result Value Ref Range    Lactic acid 1.1 0.4 - 2.0 MMOL/L   PTT   Result Value Ref Range    aPTT 26.5 22.1 - 32.0 sec    aPTT, therapeutic range     58.0 - 77.0 SECS   PROTHROMBIN TIME + INR   Result Value Ref Range    INR 1.2 (H) 0.9 - 1.1      Prothrombin time 11.6 (H) 9.0 - 11.1 sec   POC G3 - PUL   Result Value Ref Range    pH (POC) 7.373 7.35 - 7.45     NT-PRO BNP   Result Value Ref Range    NT pro-BNP 1,063 (H) <125 PG/ML   MAGNESIUM   Result Value Ref Range    Magnesium 2.2 1.6 - 2.4 mg/dL       Lab Results   Component Value Date/Time    WBC 6.8 04/26/2019 03:47 AM    HGB 9.1 (L) 04/26/2019 03:47 AM    Hemoglobin (POC) 9.5 (L) 04/08/2019 04:39 PM    HCT 30.5 (L) 04/26/2019 03:47 AM    PLATELET 246 63/12/5047 03:47 AM    MCV 96.2 04/26/2019 03:47 AM     Lab Results   Component Value Date/Time    Hemoglobin A1c 5.9 (H) 10/05/2018 08:25 AM    Hemoglobin A1c 6.1 (H) 02/21/2017 11:00 AM    Hemoglobin A1c 6.0 (H) 06/15/2016 10:03 AM    Glucose 90 04/25/2019 04:44 AM    Glucose (POC) 122 (H) 04/24/2019 12:31 AM    LDL, calculated 89 10/05/2018 08:25 AM    Creatinine 0.62 04/25/2019 04:44 AM      Lab Results   Component Value Date/Time    Cholesterol, total 157 10/05/2018 08:25 AM    HDL Cholesterol 40 10/05/2018 08:25 AM    LDL, calculated 89 10/05/2018 08:25 AM    Triglyceride 141 10/05/2018 08:25 AM    CHOL/HDL Ratio 2.7 10/05/2009 09:40 AM     Lab Results   Component Value Date/Time    TSH 1.160 10/05/2018 08:25 AM      Lab Results   Component Value Date/Time    Sodium 134 (L) 04/25/2019 04:44 AM    Potassium 4.2 04/25/2019 04:44 AM    Chloride 102 04/25/2019 04:44 AM    CO2 25 04/25/2019 04:44 AM    Anion gap 7 04/25/2019 04:44 AM    Glucose 90 04/25/2019 04:44 AM    BUN 10 04/25/2019 04:44 AM    Creatinine 0.62 04/25/2019 04:44 AM    BUN/Creatinine ratio 16 04/25/2019 04:44 AM    GFR est AA >60 04/25/2019 04:44 AM    GFR est non-AA >60 04/25/2019 04:44 AM    Calcium 8.8 04/25/2019 04:44 AM      Lab Results   Component Value Date/Time    Sodium 134 (L) 04/25/2019 04:44 AM    Potassium 4.2 04/25/2019 04:44 AM    Chloride 102 04/25/2019 04:44 AM    CO2 25 04/25/2019 04:44 AM    Anion gap 7 04/25/2019 04:44 AM    Glucose 90 04/25/2019 04:44 AM    BUN 10 04/25/2019 04:44 AM    Creatinine 0.62 04/25/2019 04:44 AM    BUN/Creatinine ratio 16 04/25/2019 04:44 AM    GFR est AA >60 04/25/2019 04:44 AM    GFR est non-AA >60 04/25/2019 04:44 AM    Calcium 8.8 04/25/2019 04:44 AM    Bilirubin, total 0.5 04/12/2019 04:30 AM    ALT (SGPT) 18 04/12/2019 04:30 AM    AST (SGOT) 28 04/12/2019 04:30 AM    Alk. phosphatase 72 04/12/2019 04:30 AM    Protein, total 6.8 04/12/2019 04:30 AM    Albumin 2.0 (L) 04/12/2019 04:30 AM    Globulin 4.8 (H) 04/12/2019 04:30 AM    A-G Ratio 0.4 (L) 04/12/2019 04:30 AM      Lab Results   Component Value Date/Time    Hemoglobin A1c 5.9 (H) 10/05/2018 08:25 AM          ASSESSMENT & PLAN       ICD-10-CM ICD-9-CM    1. Hospital discharge follow-up Z09 V67.59    2. Perforation and abscess of large intestine concurrent with and due to diverticulitis K57.20 562.11      569.5    3. S/P partial colectomy Z90.49 V45.89    4. S/P colostomy ([facility] Z93.3 V44.3    5. Benign essential hypertension I10 401.1    6. Atrial fibrillation, transient ([facility] I48.91 427.31    7. Anemia, unspecified type D64.9 285.9 CBC W/O DIFF      FERRITIN      FOLATE      VITAMIN B12      IRON   8. Chronic depression F32.9 311    9. Generalized anxiety disorder with panic attacks F41.1 300.02 busPIRone (BUSPAR) 5 mg tablet    F41.0 300.01    10. Need for vaccination with 13-polyvalent pneumococcal conjugate vaccine Z23 V03.82 pneumococcal 13 kaleigh conj dip (PREVNAR-13) 0.5 mL syrg injection     Avery Eid is a 79y.o. year old female brought in today by her primary caregiver (daughter Brandie Wilkins) for Transition of Care services following a hospital discharge from [facility] on 4/26/19 for diagnoses listed below. Our office Nurse Navigator performed an outreach to Ms. Juju Lema on 4-29-19 to complete medication reconciliation and a telephonic assessment of her condition.     Date of Admission: 3-  Date of Discharge: 4-  Discharge Diagnoses: Acute Perforation of Sigmoid Diverticulum w/ Diverticulitis and Abscess Formation, s/p Drainage of Abscess and subsequent Exp Laparotomy/Sigmoidectomy and Colostomy,  Shunt relocation, Post Procedure A. Fib. Discharge Disposition: Home w/ daughter providing round the clock care, [address] [place] [address] Nurse 3 x a week, In-Home PT 2 x a week (Insurance denied inpatient rehab at [address] [place] [address])    Today's history obtained from daughter, patient, review of outpatient office records leading up to hospitalization, and inpatient hospital notes, records and consultant reports. Only the pertinent findings were summarized in today's office note as detailed in HPI, ancillary tests and A/P. There was NO hospital discharge available at the time of patient's follow    Patient clinically improving and showing good progress. She is tolerating a regular diet, appetite is good, BM's soft/normal appearing, good ostomy care per daughter [facility]. No fevers or other signs of infection. BP well controlled and stable in office today. She was sent home on Diltiazem tid per cardio for A Fib. Rate and rhythm stable. She still had an old Catapres patch left in place on her left arm (this was started while on hospital, appears to have been ordered 4-2-19 while she was unable to take po and order was written to dc it once she was taking and tolerating po, but this was never removed even though she has been po since prior to dc. Ok to remove. HH is there 3 days a week to monitor BP's and daughter will have them notify me if her readings climb. She is to follow up w/ Cardio Dr. Tony Correa next week on 5-9-19. She is scheduled for follow up w/ GS Dr. Arabella Long on 5-16-19. Her depression has been under good control. She will continue Zoloft current regimen and add Buspar bid for now for the KHRIS/panicky feelings.  We all agree this is likely situational and hopefully she will be able to just be controlled on the Zoloft/Trazadone regimen alone like before once she conditions to improve physically as well. She does have h/o essential tremor which increases when her anxiety does. But she is on CCB for A Fib, so BBlocker wont be used. If Buspar is ineffective or not tolerating, we could consider low dose of Klonopin but not while she is on pain meds due to potential risks and interactions. Reviewed all medications, effects, precautions, risks/benefits and potential side effects in detail w/ pt and daughter today    Her Hgb on admission was 14.0 and on DC was down to 9.1. She is currently on oral iron therapy. Blood counts will be rechecked today. Further recs after that time. Otherwise will plan on seeing her back for follow up in about 2-3 months, sooner prn. They will keep me updated on her progress and w/ how her anxiety is doing.

## 2019-05-03 LAB
ERYTHROCYTE [DISTWIDTH] IN BLOOD BY AUTOMATED COUNT: 14.6 % (ref 12.3–15.4)
FERRITIN SERPL-MCNC: 613 NG/ML (ref 15–150)
FOLATE SERPL-MCNC: 4.1 NG/ML
HCT VFR BLD AUTO: 31.4 % (ref 34–46.6)
HGB BLD-MCNC: 10 G/DL (ref 11.1–15.9)
IRON SERPL-MCNC: 31 UG/DL (ref 27–139)
MCH RBC QN AUTO: 28.2 PG (ref 26.6–33)
MCHC RBC AUTO-ENTMCNC: 31.8 G/DL (ref 31.5–35.7)
MCV RBC AUTO: 89 FL (ref 79–97)
PLATELET # BLD AUTO: 434 X10E3/UL (ref 150–379)
RBC # BLD AUTO: 3.54 X10E6/UL (ref 3.77–5.28)
VIT B12 SERPL-MCNC: 708 PG/ML (ref 232–1245)
WBC # BLD AUTO: 7.3 X10E3/UL (ref 3.4–10.8)

## 2019-05-03 PROCEDURE — 3331090001 HH PPS REVENUE CREDIT

## 2019-05-03 PROCEDURE — 3331090002 HH PPS REVENUE DEBIT

## 2019-05-04 PROCEDURE — 3331090002 HH PPS REVENUE DEBIT

## 2019-05-04 PROCEDURE — 3331090001 HH PPS REVENUE CREDIT

## 2019-05-05 PROCEDURE — 3331090001 HH PPS REVENUE CREDIT

## 2019-05-05 PROCEDURE — 3331090002 HH PPS REVENUE DEBIT

## 2019-05-06 PROCEDURE — 3331090001 HH PPS REVENUE CREDIT

## 2019-05-06 PROCEDURE — 3331090002 HH PPS REVENUE DEBIT

## 2019-05-06 NOTE — DISCHARGE SUMMARY
Physician Discharge Summary     Patient ID:  Erma Cleary  [id number]  89 y.o.  1950    Allergies: Ativan [lorazepam]; Egg yolk; Pcn [penicillins]; Wellbutrin [bupropion hcl]; and Xanax [alprazolam]    Admit Date: 3/26/2019    Discharge Date: 4/26/2019    * Admission Diagnoses: Perforated diverticulum of large intestine [K57.20]    * Discharge Diagnoses:    Hospital Problems as of 4/26/2019 Date Reviewed: 4/17/2019          Codes Class Noted - Resolved POA    Acute hypoxemic respiratory failure ([facility] [address] ICD-10-CM: J96.01  ICD-9-CM: 518.81  4/12/2019 - Present No        Pneumonia due to infectious organism ICD-10-CM: J18.9  ICD-9-CM: 136.9, 484.8  4/12/2019 - Present No        Perforated diverticulum of large intestine ICD-10-CM: K57.20  ICD-9-CM: 562.10  3/26/2019 - Present Yes               Admission Condition: fair    * Discharge Condition: good    * Procedures: Procedure(s):  RIGHT SIDE INTERPLEURAL PLACEMENT OF VENTRICULO PLEURAL SHUNT    * Hospital Course:   She was admitted and IV antibiotics initiated. Mariela-colic abscess enlarged on interval CT scan but was not amenable to CT drainage. She underwent laparoscopic drainage on 3/29. Initial post-op course revealed improvement in pain, WBC count, but on 4/5 drain fluid became feculent. This persisted over weekend and she was taken to the operating room 4/8 for Sachin's procedure and externalization of  shunt. TPN was initiated, antibiotics continued; she developed respiratory failure and atrial fibrillation managed by [facility], Cardiology. Ileus resolved and diet was advanced;  shunt replaced in right pleural space. She was debilitated but made improvements with PT/OT.      Consults: Cardiology, Hospitalist, Pulmonary/Critical Care and Neurosurgery    Significant Diagnostic Studies: radiology: pneumoperitoneum    * Disposition: Home    Discharge Medications:   Discharge Medication List as of 4/26/2019  2:13 PM      CONTINUE these medications which have CHANGED    Details   HYDROmorphone (DILAUDID) 2 mg tablet Take 1 Tab by mouth every four (4) hours as needed for Pain for up to 3 days. Max Daily Amount: 12 mg., No Print, Disp-20 Tab, R-0         CONTINUE these medications which have NOT CHANGED    Details   sertraline (ZOLOFT) 100 mg tablet TAKE 1.5 TABS BY MOUTH DAILY, NormalPAST DUE FOLLOW UPDisp-45 Tab, R-0      omeprazole (PRILOSEC) 20 mg capsule TAKE 1 CAPSULE BY MOUTH EVERY DAY, NormalPAST DUE FOLLOW UPDisp-30 Cap, R-0      losartan-hydroCHLOROthiazide (HYZAAR) 50-12.5 mg per tablet Take 1 Tab by mouth daily. Indications: hypertension, Normal, Disp-30 Tab, R-2      atorvastatin (LIPITOR) 20 mg tablet TAKE 1 TABLET BY MOUTH EVERY DAY, Normal, Disp-90 Tab, R-0      traZODone (DESYREL) 50 mg tablet TAKE 1 TABLET BY MOUTH EVERYDAY AT BEDTIME, Normal, Disp-30 Tab, R-0      dicyclomine (BENTYL) 10 mg capsule TAKE 1-2 CAPSULES BY MOUTH EVERY 6 HOURS AS NEEDED FOR ABDOMINAL CRAMPS, Normal, Disp-60 Cap, R-1      albuterol (VENTOLIN HFA) 90 mcg/actuation inhaler Take 2 Puffs by inhalation every six (6) hours as needed for Wheezing or Shortness of Breath., Historical Med      MULTIVITAMINS (MULTIVITAMIN PO) Take  by mouth daily. , Historical Med      ferrous sulfate (IRON) 325 mg (65 mg Iron) tablet Oral, 2 TIMES DAILY Starting 7/14/2010, Until Discontinued, Historical Med         STOP taking these medications       dilTIAZem (CARDIZEM) 90 mg tablet Comments:   Reason for Stopping:               * Follow-up Care/Patient Instructions:   Activity: No heavy lifting, pushing, pulling x 2 weeks  Diet: Regular Diet  Wound Care: routine colostomy care    Follow-up Information     Follow up With Specialties Details Why Contact Info    Prince Michelle MD Family Practice On 5/2/2019 Hospital f/u PCP appointment Thursday, 5/2/19 @ 10:20 a.m.  [id number] [place] [address] care will call tomorrow to schedule a visit, no call by 12 noon, call Vivek Bird  [phone]          Future Appointments   Date Time Provider Mona Adamson   5/7/2019 To Be Determined Mukesh Reason, LPN [facility]   5/7/2019 To Be Determined Albina Kelly PT [facility]   5/9/2019 To Be Determined Albina Kelly,  [address]   5/9/2019 10:40 AM Edd Naylor  [address]   5/10/2019 To Be Determined Mukesh Reason, LPN [facility]   5/13/2019 To Be Determined Albina Kelly,  [address]   5/14/2019 To Be Determined Juan Manuel Luis RN [facility]   5/16/2019 To Be Determined Albina Kelly PT [facility]   5/16/2019  1:00 PM Olita Libman, MD [place]   5/17/2019 To Be Determined Mukesh Reason, LPN [address] [facility]   5/20/2019 To Be Determined Albina Kelly PT [address]   5/22/2019 To Be Determined Albina Kelly PT [address]   5/22/2019 To Be Determined Mukesh Reason, [address]   5/28/2019 To Be Determined Albina Kelly PT [address] [facility]   5/29/2019 To Be Determined Mukesh Reason, LPN [address] [facility]   5/30/2019 To Be Determined Albina Kelly PT [address]   6/3/2019 To Be Determined Albina Kelly PT [facility]   6/5/2019 To Be Determined Mukesh Reason, LPN [address] [facility]   6/6/2019 To Be Determined Albina Kelly PT [facility]   6/12/2019 To Be Determined Mukesh Reason, LPN [address] [facility]   6/19/2019 To Be Determined Juan Manuel Luis RN [address] [facility]   6/25/2019 To Be Determined Juan Manuel Luis RN [facility]         Signed:  Rogelio Adkins MD  5/6/2019  3:26 PM

## 2019-05-07 ENCOUNTER — HOME CARE VISIT (OUTPATIENT)
Dept: SCHEDULING | Facility: HOME HEALTH | Age: 69
End: 2019-05-07
Payer: MEDICARE

## 2019-05-07 VITALS
SYSTOLIC BLOOD PRESSURE: 142 MMHG | RESPIRATION RATE: 16 BRPM | DIASTOLIC BLOOD PRESSURE: 70 MMHG | TEMPERATURE: 99.4 F | OXYGEN SATURATION: 98 % | HEART RATE: 94 BPM

## 2019-05-07 PROCEDURE — 3331090002 HH PPS REVENUE DEBIT

## 2019-05-07 PROCEDURE — G0151 HHCP-SERV OF PT,EA 15 MIN: HCPCS

## 2019-05-07 PROCEDURE — 3331090001 HH PPS REVENUE CREDIT

## 2019-05-07 PROCEDURE — G0300 HHS/HOSPICE OF LPN EA 15 MIN: HCPCS

## 2019-05-08 PROCEDURE — 3331090002 HH PPS REVENUE DEBIT

## 2019-05-08 PROCEDURE — 3331090001 HH PPS REVENUE CREDIT

## 2019-05-09 ENCOUNTER — HOME CARE VISIT (OUTPATIENT)
Dept: SCHEDULING | Facility: HOME HEALTH | Age: 69
End: 2019-05-09
Payer: MEDICARE

## 2019-05-09 ENCOUNTER — OFFICE VISIT (OUTPATIENT)
Dept: CARDIOLOGY CLINIC | Age: 69
End: 2019-05-09

## 2019-05-09 VITALS
BODY MASS INDEX: 26.68 KG/M2 | SYSTOLIC BLOOD PRESSURE: 140 MMHG | HEART RATE: 96 BPM | RESPIRATION RATE: 18 BRPM | WEIGHT: 170 LBS | OXYGEN SATURATION: 97 % | DIASTOLIC BLOOD PRESSURE: 70 MMHG | HEIGHT: 67 IN

## 2019-05-09 VITALS
HEART RATE: 109 BPM | RESPIRATION RATE: 16 BRPM | SYSTOLIC BLOOD PRESSURE: 142 MMHG | OXYGEN SATURATION: 97 % | DIASTOLIC BLOOD PRESSURE: 72 MMHG | TEMPERATURE: 99.6 F

## 2019-05-09 VITALS
TEMPERATURE: 64.4 F | RESPIRATION RATE: 18 BRPM | DIASTOLIC BLOOD PRESSURE: 80 MMHG | SYSTOLIC BLOOD PRESSURE: 160 MMHG | HEART RATE: 100 BPM | OXYGEN SATURATION: 98 %

## 2019-05-09 DIAGNOSIS — E78.2 MIXED HYPERLIPIDEMIA: ICD-10-CM

## 2019-05-09 DIAGNOSIS — J44.9 CHRONIC OBSTRUCTIVE PULMONARY DISEASE, UNSPECIFIED COPD TYPE (HCC): ICD-10-CM

## 2019-05-09 DIAGNOSIS — I10 BENIGN ESSENTIAL HYPERTENSION: ICD-10-CM

## 2019-05-09 DIAGNOSIS — I48.0 PAROXYSMAL A-FIB (HCC): Primary | ICD-10-CM

## 2019-05-09 DIAGNOSIS — Z87.891 HISTORY OF TOBACCO USE: ICD-10-CM

## 2019-05-09 PROCEDURE — G0151 HHCP-SERV OF PT,EA 15 MIN: HCPCS

## 2019-05-09 PROCEDURE — 3331090002 HH PPS REVENUE DEBIT

## 2019-05-09 PROCEDURE — 3331090001 HH PPS REVENUE CREDIT

## 2019-05-09 RX ORDER — DILTIAZEM HYDROCHLORIDE 300 MG/1
300 CAPSULE, COATED, EXTENDED RELEASE ORAL DAILY
Qty: 90 CAP | Refills: 3 | Status: SHIPPED | OUTPATIENT
Start: 2019-05-09 | End: 2020-05-22

## 2019-05-09 RX ORDER — DILTIAZEM HYDROCHLORIDE 300 MG/1
300 CAPSULE, COATED, EXTENDED RELEASE ORAL DAILY
COMMUNITY
End: 2019-05-09 | Stop reason: SDUPTHER

## 2019-05-09 NOTE — PROGRESS NOTES
[place]: Jessica Greene  [phone]    History of Present Illness: Ms. Ashtyn Rivero is a 80 yo F with recent hospitalization for perforated diverticulum, history of multiple surgeries including sigmoidectomy, colostomy and  shunt, during her hospitalization developed atrial fibrillation with RVR and was started on Diltiazem and Metoprolol and converted to sinus rhythm. She was maintained outpatient on Diltiazem. She had an echocardiogram on 04/02/2019 that demonstrated preserved LV function. Since her hospitalization, she has been feeling okay, but just overall \"nervous\". She says that she has not had any surgical complications since her hospitalization. She denies any palpitations, but when she was in the hospital, she was unaware when she was in atrial fibrillation. No lightheadedness, dizziness. She denies any chest pain and her breathing has been stable. She is compensated on exam with clear lungs. She is in a normal rhythm today. Blood pressure was 140/70 with a heart rate of 96. Assessment and Plan:    1. Paroxysmal atrial fibrillation. This was likely exacerbated by her medical condition during her hospitalization and she continues in sinus rhythm now. Will switch her short acting Diltiazem to a once a day pill. She did have a significant CHADS VASC score, but has a history of subarachnoid hemorrhage, as well as recent surgery, and I think that the risks outweigh the benefits for anticoagulation. We discussed this. Will have her follow back in six months. 2. Essential hypertension. Blood pressure is controlled. 3. COPD. 4. Perforated diverticulum, status post surgery in 03/29/2019.    5.  shunt.         She  has a past medical history of ACP (advance care planning) (2/21/2017), Asthma, Atrial fibrillation, transient ([facility] [address] (5/2/2019), Benign essential hypertension (6/24/2016), Bilateral hip pain (6/3/2014), COPD (chronic obstructive pulmonary disease) ([facility] [address] (3/29/2010), Depression (3/29/2010), Diverticulitis, DVT of Rt Lower Extremity (3/29/2010), Elevated LFT's, Fatty Liver (3/29/2010), Essential tremor (7/14/2010), GERD (gastroesophageal reflux disease) (9/2/2011), H/O Subarachnoid hemorrhage due to ruptured aneurysm (7/14/2010), Hyperlipemia (3/29/2010), Impaired fasting glucose (7/13/2014), Perforation and abscess of large intestine concurrent with and due to diverticulitis (5/2/2019), Thyroiditis, subacute (3/29/2010), Tobacco Abuse (3/29/2010), and Vitamin D deficiency (6/3/2014). She also has no past medical history of Abuse, Anemia NEC, Arthritis, Autoimmune disease ([address] CAD (coronary artery disease), Calculus of kidney, Cancer ([address] Chronic kidney disease, Chronic pain, Congestive heart failure, unspecified, Contact dermatitis and other eczema, due to unspecified cause, Diabetes ([address] Headache(784.0), Liver disease, Psychotic disorder ([address] PUD (peptic ulcer disease), Seizures ([address] Stroke ([address] Trauma, or Unspecified sleep apnea. All other systems negative except as above. PE  Vitals:    05/09/19 1056   BP: 140/70   Pulse: 96   Resp: 18   SpO2: 97%   Weight: 170 lb (77.1 kg)   Height: 5' 7\" (1.702 m)    Body mass index is 26.63 kg/m².    General appearance - alert, well appearing, and in no distress  Mental status - affect appropriate to mood  Eyes - sclera anicteric, moist mucous membranes  Neck - supple, no JVD  Chest - clear to auscultation, no wheezes, rales or rhonchi  Heart - normal rate, regular rhythm, normal S1, S2, I/VI systolic murmur LUSB  Abdomen - soft, nontender, nondistended, no masses or organomegaly  Extremities - peripheral pulses normal, no pedal edema    Recent Labs:  Lab Results   Component Value Date/Time    Cholesterol, total 157 10/05/2018 08:25 AM    HDL Cholesterol 40 10/05/2018 08:25 AM    LDL, calculated 89 10/05/2018 08:25 AM    Triglyceride 141 10/05/2018 08:25 AM    CHOL/HDL Ratio 2.7 10/05/2009 09:40 AM     Lab Results Component Value Date/Time    Creatinine 0.62 04/25/2019 04:44 AM     Lab Results   Component Value Date/Time    BUN 10 04/25/2019 04:44 AM     Lab Results   Component Value Date/Time    Potassium 4.2 04/25/2019 04:44 AM     Lab Results   Component Value Date/Time    Hemoglobin A1c 5.9 (H) 10/05/2018 08:25 AM     Lab Results   Component Value Date/Time    Hemoglobin (POC) 9.5 (L) 04/08/2019 04:39 PM    HGB 10.0 (L) 05/02/2019 11:49 AM     Lab Results   Component Value Date/Time    PLATELET 993 (H) 61/37/4309 11:49 AM       Reviewed:  Past Medical History:   Diagnosis Date    ACP (advance care planning) 2/21/2017    Initial ACP discussion w/ pt and daughter 2-2017. AMD form reviewed and copy provided. NN/facilitator to discuss with patient     Asthma     hx of    Atrial fibrillation, transient ([facility] [address] 5/2/2019    Post Op Acute A.  Fib after  Shunt replacement, 4-17-19, [facility], Cardio, Dr. Vinod Simons (no cardioversion required, medication mgt only)    Benign essential hypertension 6/24/2016    Bilateral hip pain 6/3/2014    xrays 2012, negative    COPD (chronic obstructive pulmonary disease) ([facility] [address] 3/29/2010    Depression 3/29/2010    Diverticulitis     DVT of Rt Lower Extremity 3/29/2010    Elevated LFT's, Fatty Liver 3/29/2010    Essential tremor 7/14/2010    GERD (gastroesophageal reflux disease) 9/2/2011    H/O Subarachnoid hemorrhage due to ruptured aneurysm 7/14/2010    Hyperlipemia 3/29/2010    Impaired fasting glucose 7/13/2014 6/2014    Perforation and abscess of large intestine concurrent with and due to diverticulitis 5/2/2019    Lap Drain 3-29-19, Exp Laparotomy/Colostomy 4-8-19, Dr. Aga Echevarria, subacute 3/29/2010    Tobacco Abuse 3/29/2010    Vitamin D deficiency 6/3/2014    2012     Social History     Tobacco Use   Smoking Status Current Every Day Smoker    Packs/day: 0.50    Years: 20.00    Pack years: 10.00    Types: Cigarettes   Smokeless Tobacco Never Used     Social History Substance and Sexual Activity   Alcohol Use No    Alcohol/week: 0.0 oz     Allergies   Allergen Reactions    Ativan [Lorazepam] Hives    Egg Yolk Other (comments)     indigestion    Pcn [Penicillins] Other (comments)     Unsure of reaction; pt reports unsure if have taken cephalosporin before.  Wellbutrin [Bupropion Hcl] Anxiety    Xanax [Alprazolam] Hives       Current Outpatient Medications   Medication Sig    busPIRone (BUSPAR) 5 mg tablet Take 1 Tab by mouth two (2) times a day. Indications: Repeated Episodes of Anxiety    HYDROmorphone (DILAUDID) 2 mg tablet Take 2 mg by mouth every four (4) hours as needed for Pain.  dilTIAZem (CARDIZEM) 90 mg tablet Take 90 mg by mouth three (3) times daily.  sertraline (ZOLOFT) 100 mg tablet TAKE 1.5 TABS BY MOUTH DAILY    omeprazole (PRILOSEC) 20 mg capsule TAKE 1 CAPSULE BY MOUTH EVERY DAY    losartan-hydroCHLOROthiazide (HYZAAR) 50-12.5 mg per tablet Take 1 Tab by mouth daily. Indications: hypertension    atorvastatin (LIPITOR) 20 mg tablet TAKE 1 TABLET BY MOUTH EVERY DAY    traZODone (DESYREL) 50 mg tablet TAKE 1 TABLET BY MOUTH EVERYDAY AT BEDTIME    dicyclomine (BENTYL) 10 mg capsule TAKE 1-2 CAPSULES BY MOUTH EVERY 6 HOURS AS NEEDED FOR ABDOMINAL CRAMPS    albuterol (VENTOLIN HFA) 90 mcg/actuation inhaler Take 2 Puffs by inhalation every six (6) hours as needed for Wheezing or Shortness of Breath.  MULTIVITAMINS (MULTIVITAMIN PO) Take  by mouth daily.  ferrous sulfate (IRON) 325 mg (65 mg Iron) tablet Take  by mouth two (2) times a day. No current facility-administered medications for this visit.         Thea Bailey MD  [facility] [place]  [address] [facility] [address]  [address]

## 2019-05-10 ENCOUNTER — HOME CARE VISIT (OUTPATIENT)
Dept: SCHEDULING | Facility: HOME HEALTH | Age: 69
End: 2019-05-10
Payer: MEDICARE

## 2019-05-10 ENCOUNTER — PATIENT OUTREACH (OUTPATIENT)
Dept: FAMILY MEDICINE CLINIC | Age: 69
End: 2019-05-10

## 2019-05-10 VITALS
RESPIRATION RATE: 18 BRPM | SYSTOLIC BLOOD PRESSURE: 140 MMHG | DIASTOLIC BLOOD PRESSURE: 62 MMHG | OXYGEN SATURATION: 96 % | TEMPERATURE: 99.1 F | HEART RATE: 82 BPM

## 2019-05-10 PROCEDURE — 3331090002 HH PPS REVENUE DEBIT

## 2019-05-10 PROCEDURE — G0300 HHS/HOSPICE OF LPN EA 15 MIN: HCPCS

## 2019-05-10 PROCEDURE — 3331090001 HH PPS REVENUE CREDIT

## 2019-05-10 NOTE — PROGRESS NOTES
MELBA Follow-up assessment: Perforation and abscess of large intestine concurrent with and due to diverticulitis. Outbound call made to patient today to complete MELBA follow up assessment. Patient verified by 3 identifiers. Patient continues to be followed by [facility]. Home with daughter, who is caregiver. Has followed up with PCP and cardiology. Follow-up appointments reviewed, and medication reconciliation completed. NN contact information given for questions and concerns. Current Outpatient Medications on File Prior to Visit Medication Sig Dispense Refill  dilTIAZem CD (CARDIZEM CD) 300 mg ER capsule Take 1 Cap by mouth daily. 90 Cap 3  
 busPIRone (BUSPAR) 5 mg tablet Take 1 Tab by mouth two (2) times a day. Indications: Repeated Episodes of Anxiety 60 Tab 1  
 HYDROmorphone (DILAUDID) 2 mg tablet Take 2 mg by mouth every four (4) hours as needed for Pain.  sertraline (ZOLOFT) 100 mg tablet TAKE 1.5 TABS BY MOUTH DAILY 45 Tab 0  
 omeprazole (PRILOSEC) 20 mg capsule TAKE 1 CAPSULE BY MOUTH EVERY DAY 30 Cap 0  
 losartan-hydroCHLOROthiazide (HYZAAR) 50-12.5 mg per tablet Take 1 Tab by mouth daily. Indications: hypertension 30 Tab 2  
 atorvastatin (LIPITOR) 20 mg tablet TAKE 1 TABLET BY MOUTH EVERY DAY 90 Tab 0  
 traZODone (DESYREL) 50 mg tablet TAKE 1 TABLET BY MOUTH EVERYDAY AT BEDTIME 30 Tab 0  
 dicyclomine (BENTYL) 10 mg capsule TAKE 1-2 CAPSULES BY MOUTH EVERY 6 HOURS AS NEEDED FOR ABDOMINAL CRAMPS 60 Cap 1  
 albuterol (VENTOLIN HFA) 90 mcg/actuation inhaler Take 2 Puffs by inhalation every six (6) hours as needed for Wheezing or Shortness of Breath.  MULTIVITAMINS (MULTIVITAMIN PO) Take  by mouth daily.  ferrous sulfate (IRON) 325 mg (65 mg Iron) tablet Take  by mouth two (2) times a day. No current facility-administered medications on file prior to visit. Goals Addressed This Visit's Progress  Attends follow-up appointments as directed. On track · Scheduled for follow-up with PCP on 5/2/19 · Reviewed appointment and discharge instructions with daughter · To follow up with [facility]- initial visit was on 4/27/19 · Declined follow up with [facility], information reviewed NN will follow in one week. pk 
 
05/10/19 · Patient has completed follow up with PCP and Cardiologist 
· [facility] to follow for PT/OT 
· NN will follow in one week. pk 
  
  Demonstrate self-management skills for Perforated Diverticulum   On track · Observe output from drains to include colostomy to include color, clarity, and smell. · Administer antibiotics as ordered. · Instructed daughter Darrian Gordon) on care of Ostomy, and how to obtain supplies · [facility] to follow for PT/OT/nursing · Use walker for mobility · Instructed on diet/nutrition for patient with colostomy. NN will follow in one week. pk 
 
05/10/19 · Patient and daughter verbalize understanding of plan of care. · Follow up appointments reviewed · Denies GI or problems with ostomy · NN will follow in one week. pk 
  
  Understands red flags post discharge. On track · Review Red Flags: Risk for infection' · Assess vital signs making note of trends showing signs of sepsis (increased HR, decreased BP, fever). · Assess neuro status including changes in level of consciousness or new onset confusion. · Encouraged to look for signs of active bleeding such as change in VS (increased HR, decreased BP), flank bruising, jeffery blood coming from ostomy. · NN to follow in one week. pk 
  
  
 
Case management Plan: NN will continue to attempt contacts with patient by telephone or during office visit within next 7-10 days. Will continue to follow as necessary for the remaining 30 days post visit and will reassess for needs before discharge

## 2019-05-11 PROCEDURE — 3331090001 HH PPS REVENUE CREDIT

## 2019-05-11 PROCEDURE — 3331090002 HH PPS REVENUE DEBIT

## 2019-05-12 PROCEDURE — 3331090001 HH PPS REVENUE CREDIT

## 2019-05-12 PROCEDURE — 3331090002 HH PPS REVENUE DEBIT

## 2019-05-13 PROCEDURE — 3331090001 HH PPS REVENUE CREDIT

## 2019-05-13 PROCEDURE — 3331090002 HH PPS REVENUE DEBIT

## 2019-05-14 ENCOUNTER — HOME CARE VISIT (OUTPATIENT)
Dept: SCHEDULING | Facility: HOME HEALTH | Age: 69
End: 2019-05-14
Payer: MEDICARE

## 2019-05-14 ENCOUNTER — TELEPHONE (OUTPATIENT)
Dept: CARDIOLOGY CLINIC | Age: 69
End: 2019-05-14

## 2019-05-14 VITALS
OXYGEN SATURATION: 95 % | HEART RATE: 88 BPM | SYSTOLIC BLOOD PRESSURE: 170 MMHG | DIASTOLIC BLOOD PRESSURE: 98 MMHG | TEMPERATURE: 98.1 F

## 2019-05-14 VITALS
DIASTOLIC BLOOD PRESSURE: 82 MMHG | SYSTOLIC BLOOD PRESSURE: 146 MMHG | TEMPERATURE: 98.6 F | RESPIRATION RATE: 14 BRPM | HEART RATE: 72 BPM | OXYGEN SATURATION: 95 %

## 2019-05-14 PROCEDURE — G0300 HHS/HOSPICE OF LPN EA 15 MIN: HCPCS

## 2019-05-14 PROCEDURE — 3331090001 HH PPS REVENUE CREDIT

## 2019-05-14 PROCEDURE — 3331090002 HH PPS REVENUE DEBIT

## 2019-05-14 PROCEDURE — G0151 HHCP-SERV OF PT,EA 15 MIN: HCPCS

## 2019-05-15 PROCEDURE — 3331090002 HH PPS REVENUE DEBIT

## 2019-05-15 PROCEDURE — 3331090001 HH PPS REVENUE CREDIT

## 2019-05-16 ENCOUNTER — TELEPHONE (OUTPATIENT)
Dept: SURGERY | Age: 69
End: 2019-05-16

## 2019-05-16 ENCOUNTER — HOME CARE VISIT (OUTPATIENT)
Dept: SCHEDULING | Facility: HOME HEALTH | Age: 69
End: 2019-05-16
Payer: MEDICARE

## 2019-05-16 ENCOUNTER — OFFICE VISIT (OUTPATIENT)
Dept: SURGERY | Age: 69
End: 2019-05-16

## 2019-05-16 VITALS
DIASTOLIC BLOOD PRESSURE: 90 MMHG | HEART RATE: 101 BPM | OXYGEN SATURATION: 98 % | RESPIRATION RATE: 20 BRPM | WEIGHT: 173 LBS | SYSTOLIC BLOOD PRESSURE: 160 MMHG | BODY MASS INDEX: 27.15 KG/M2 | TEMPERATURE: 98.6 F | HEIGHT: 67 IN

## 2019-05-16 VITALS
HEART RATE: 74 BPM | OXYGEN SATURATION: 98 % | RESPIRATION RATE: 14 BRPM | TEMPERATURE: 99.4 F | SYSTOLIC BLOOD PRESSURE: 142 MMHG | DIASTOLIC BLOOD PRESSURE: 78 MMHG

## 2019-05-16 DIAGNOSIS — R53.82 CHRONIC FATIGUE: ICD-10-CM

## 2019-05-16 DIAGNOSIS — Z09 POSTOPERATIVE EXAMINATION: Primary | ICD-10-CM

## 2019-05-16 PROCEDURE — 3331090002 HH PPS REVENUE DEBIT

## 2019-05-16 PROCEDURE — G0151 HHCP-SERV OF PT,EA 15 MIN: HCPCS

## 2019-05-16 PROCEDURE — 3331090001 HH PPS REVENUE CREDIT

## 2019-05-16 NOTE — TELEPHONE ENCOUNTER
Megan Rodriguez from home health called stating she sent a plan of care fax on 5/14/19 and wanted the nurse to discard that specific fax and she will send a new one today with an update.

## 2019-05-16 NOTE — PROGRESS NOTES
1. Have you been to the ER, urgent care clinic since your last visit? Hospitalized since your last visit? No    2. Have you seen or consulted any other health care providers outside of the [address] [place] [address] since your last visit? Include any pap smears or colon screening.  No

## 2019-05-17 ENCOUNTER — HOME CARE VISIT (OUTPATIENT)
Dept: SCHEDULING | Facility: HOME HEALTH | Age: 69
End: 2019-05-17
Payer: MEDICARE

## 2019-05-17 LAB
ERYTHROCYTE [DISTWIDTH] IN BLOOD BY AUTOMATED COUNT: 15.8 % (ref 12.3–15.4)
HCT VFR BLD AUTO: 37.5 % (ref 34–46.6)
HGB BLD-MCNC: 11.9 G/DL (ref 11.1–15.9)
MCH RBC QN AUTO: 27.9 PG (ref 26.6–33)
MCHC RBC AUTO-ENTMCNC: 31.7 G/DL (ref 31.5–35.7)
MCV RBC AUTO: 88 FL (ref 79–97)
PLATELET # BLD AUTO: 298 X10E3/UL (ref 150–379)
PREALB SERPL-MCNC: 21 MG/DL (ref 10–36)
RBC # BLD AUTO: 4.27 X10E6/UL (ref 3.77–5.28)
WBC # BLD AUTO: 10.1 X10E3/UL (ref 3.4–10.8)

## 2019-05-17 PROCEDURE — 3331090002 HH PPS REVENUE DEBIT

## 2019-05-17 PROCEDURE — 3331090001 HH PPS REVENUE CREDIT

## 2019-05-17 PROCEDURE — G0300 HHS/HOSPICE OF LPN EA 15 MIN: HCPCS

## 2019-05-17 NOTE — PROGRESS NOTES
Advise pt daughter Ashanti Pérez that mom's iron and Hgb improving nicely. Cont current regimen. Follow up office visit w/ me in 2 months. That will be good for Ashanti Pérez bc she is the one who brings patient to her appts and Ashanti Pérez is due to have follow up LAB ONLY here in 2 months anyway so she can do it all at one time.

## 2019-05-18 PROCEDURE — 3331090002 HH PPS REVENUE DEBIT

## 2019-05-18 PROCEDURE — 3331090001 HH PPS REVENUE CREDIT

## 2019-05-18 NOTE — PROGRESS NOTES
Subjective:      Adrian Villalta is a 79 y.o. female presents for postop care 5.5 weeks following Sachin's procedure. Shaji Wren Appetite is good. Eating a regular diet without difficulty. Bowel movements are regular via colostomy. The patient is voiding without difficulty. The patient is not having any pain. She is doing well with outpatient PT. Objective:     Visit Vitals  /90   Pulse (!) 101   Temp 98.6 °F (37 °C)   Resp 20   Ht 5' 7\" (1.702 m)   Wt 173 lb (78.5 kg)   SpO2 98%   BMI 27.10 kg/m²       General:  alert, cooperative, no distress, appears stated age, mildly obese   Abdomen: soft, bowel sounds active, non-tender, no hernias, healthy colostomy   Incision:   well healed     Assessment:     Doing well postoperatively. Endurance, appetite continue to improve. Plan:     1. Continue current medications. 2. Increase protein intake. 3. Pre-albumin, CBC today. 4. Anticipate laparoscopic colostomy takedown in mid-July.

## 2019-05-18 NOTE — PATIENT INSTRUCTIONS
Fatigue: Care Instructions  Your Care Instructions    Fatigue is a feeling of tiredness, exhaustion, or lack of energy. You may feel fatigue because of too much or not enough activity. It can also come from stress, lack of sleep, boredom, and poor diet. Many medical problems, such as viral infections, can cause fatigue. Emotional problems, especially depression, are often the cause of fatigue. Fatigue is most often a symptom of another problem. Treatment for fatigue depends on the cause. For example, if you have fatigue because you have a certain health problem, treating this problem also treats your fatigue. If depression or anxiety is the cause, treatment may help. Follow-up care is a key part of your treatment and safety. Be sure to make and go to all appointments, and call your doctor if you are having problems. It's also a good idea to know your test results and keep a list of the medicines you take. How can you care for yourself at home? · Get regular exercise. But don't overdo it. Go back and forth between rest and exercise. · Get plenty of rest.  · Eat a healthy diet. Do not skip meals, especially breakfast.  · Reduce your use of caffeine, tobacco, and alcohol. Caffeine is most often found in coffee, tea, cola drinks, and chocolate. · Limit medicines that can cause fatigue. This includes tranquilizers and cold and allergy medicines. When should you call for help? Watch closely for changes in your health, and be sure to contact your doctor if:    · You have new symptoms such as fever or a rash.     · Your fatigue gets worse.     · You have been feeling down, depressed, or hopeless. Or you may have lost interest in things that you usually enjoy.     · You are not getting better as expected. Where can you learn more? Go to http://jama-obinna.info/. Enter S418 in the search box to learn more about \"Fatigue: Care Instructions. \"  Current as of: September 23, 2018  Content Version: 11.9  © 1095-4531 [employer], [employer]orporated. Care instructions adapted under license by [employer] (which disclaims liability or warranty for this information). If you have questions about a medical condition or this instruction, always ask your healthcare professional. [address] any warranty or liability for your use of this information.

## 2019-05-19 PROCEDURE — 3331090001 HH PPS REVENUE CREDIT

## 2019-05-19 PROCEDURE — 3331090002 HH PPS REVENUE DEBIT

## 2019-05-20 VITALS
DIASTOLIC BLOOD PRESSURE: 88 MMHG | RESPIRATION RATE: 18 BRPM | OXYGEN SATURATION: 98 % | SYSTOLIC BLOOD PRESSURE: 150 MMHG | HEART RATE: 76 BPM | TEMPERATURE: 98.7 F

## 2019-05-20 PROCEDURE — 3331090002 HH PPS REVENUE DEBIT

## 2019-05-20 PROCEDURE — 3331090001 HH PPS REVENUE CREDIT

## 2019-05-20 NOTE — PROGRESS NOTES
dtr Melquiades Robertson informed of results.   F/u appt scheduled for  Friday, July 12, 2019 08:20 AM

## 2019-05-21 PROCEDURE — 3331090001 HH PPS REVENUE CREDIT

## 2019-05-21 PROCEDURE — 3331090002 HH PPS REVENUE DEBIT

## 2019-05-22 ENCOUNTER — HOME CARE VISIT (OUTPATIENT)
Dept: SCHEDULING | Facility: HOME HEALTH | Age: 69
End: 2019-05-22
Payer: MEDICARE

## 2019-05-22 ENCOUNTER — TELEPHONE (OUTPATIENT)
Dept: CARDIOLOGY CLINIC | Age: 69
End: 2019-05-22

## 2019-05-22 ENCOUNTER — DOCUMENTATION ONLY (OUTPATIENT)
Dept: SURGERY | Age: 69
End: 2019-05-22

## 2019-05-22 VITALS
OXYGEN SATURATION: 97 % | DIASTOLIC BLOOD PRESSURE: 82 MMHG | TEMPERATURE: 98.9 F | HEART RATE: 80 BPM | SYSTOLIC BLOOD PRESSURE: 132 MMHG | DIASTOLIC BLOOD PRESSURE: 82 MMHG | RESPIRATION RATE: 18 BRPM | SYSTOLIC BLOOD PRESSURE: 122 MMHG | TEMPERATURE: 98.8 F | HEART RATE: 83 BPM | OXYGEN SATURATION: 96 % | RESPIRATION RATE: 16 BRPM

## 2019-05-22 PROCEDURE — 3331090002 HH PPS REVENUE DEBIT

## 2019-05-22 PROCEDURE — G0300 HHS/HOSPICE OF LPN EA 15 MIN: HCPCS

## 2019-05-22 PROCEDURE — G0151 HHCP-SERV OF PT,EA 15 MIN: HCPCS

## 2019-05-22 PROCEDURE — 3331090001 HH PPS REVENUE CREDIT

## 2019-05-22 NOTE — TELEPHONE ENCOUNTER
Returned call to [facility] nurse. Informed her of message. She stated that she will talk to the daughter and inform her of the message.

## 2019-05-22 NOTE — TELEPHONE ENCOUNTER
Dayana from [facility] care is calling to report bp readings for patient.      Today it 122/82    5/21: 159/87    5/20: Am - 153/80 Pm- 153/79    5/19: Am- 161/90 Pm - 148/79    Phone: [phone]

## 2019-05-22 NOTE — TELEPHONE ENCOUNTER
Rhona Sommers MD  You 8 minutes ago (2:07 PM)      Please let pt know I reviewed her BP.  At this point can check BP once a week.  Would continue current meds.  thx    Routing comment

## 2019-05-23 ENCOUNTER — HOME CARE VISIT (OUTPATIENT)
Dept: SCHEDULING | Facility: HOME HEALTH | Age: 69
End: 2019-05-23
Payer: MEDICARE

## 2019-05-23 VITALS
RESPIRATION RATE: 14 BRPM | SYSTOLIC BLOOD PRESSURE: 142 MMHG | OXYGEN SATURATION: 94 % | HEART RATE: 99 BPM | DIASTOLIC BLOOD PRESSURE: 70 MMHG | TEMPERATURE: 98.9 F

## 2019-05-23 PROCEDURE — 3331090001 HH PPS REVENUE CREDIT

## 2019-05-23 PROCEDURE — 3331090002 HH PPS REVENUE DEBIT

## 2019-05-23 PROCEDURE — G0151 HHCP-SERV OF PT,EA 15 MIN: HCPCS

## 2019-05-24 PROCEDURE — 3331090002 HH PPS REVENUE DEBIT

## 2019-05-24 PROCEDURE — 3331090001 HH PPS REVENUE CREDIT

## 2019-05-25 PROCEDURE — 3331090002 HH PPS REVENUE DEBIT

## 2019-05-25 PROCEDURE — 3331090001 HH PPS REVENUE CREDIT

## 2019-05-26 PROCEDURE — 3331090002 HH PPS REVENUE DEBIT

## 2019-05-26 PROCEDURE — 3331090001 HH PPS REVENUE CREDIT

## 2019-05-27 PROCEDURE — 3331090001 HH PPS REVENUE CREDIT

## 2019-05-27 PROCEDURE — 3331090002 HH PPS REVENUE DEBIT

## 2019-05-28 ENCOUNTER — HOME CARE VISIT (OUTPATIENT)
Dept: SCHEDULING | Facility: HOME HEALTH | Age: 69
End: 2019-05-28
Payer: MEDICARE

## 2019-05-28 VITALS
HEART RATE: 78 BPM | OXYGEN SATURATION: 98 % | TEMPERATURE: 98.6 F | RESPIRATION RATE: 14 BRPM | SYSTOLIC BLOOD PRESSURE: 150 MMHG | DIASTOLIC BLOOD PRESSURE: 78 MMHG

## 2019-05-28 PROCEDURE — 3331090001 HH PPS REVENUE CREDIT

## 2019-05-28 PROCEDURE — G0151 HHCP-SERV OF PT,EA 15 MIN: HCPCS

## 2019-05-28 PROCEDURE — 3331090002 HH PPS REVENUE DEBIT

## 2019-05-29 ENCOUNTER — HOME CARE VISIT (OUTPATIENT)
Dept: SCHEDULING | Facility: HOME HEALTH | Age: 69
End: 2019-05-29
Payer: MEDICARE

## 2019-05-29 PROCEDURE — 3331090001 HH PPS REVENUE CREDIT

## 2019-05-29 PROCEDURE — G0300 HHS/HOSPICE OF LPN EA 15 MIN: HCPCS

## 2019-05-29 PROCEDURE — 3331090002 HH PPS REVENUE DEBIT

## 2019-05-30 ENCOUNTER — HOME CARE VISIT (OUTPATIENT)
Dept: SCHEDULING | Facility: HOME HEALTH | Age: 69
End: 2019-05-30
Payer: MEDICARE

## 2019-05-30 VITALS
SYSTOLIC BLOOD PRESSURE: 140 MMHG | HEART RATE: 104 BPM | TEMPERATURE: 99.1 F | OXYGEN SATURATION: 97 % | DIASTOLIC BLOOD PRESSURE: 84 MMHG | RESPIRATION RATE: 18 BRPM

## 2019-05-30 VITALS
RESPIRATION RATE: 14 BRPM | OXYGEN SATURATION: 96 % | HEART RATE: 78 BPM | TEMPERATURE: 99.3 F | SYSTOLIC BLOOD PRESSURE: 146 MMHG | DIASTOLIC BLOOD PRESSURE: 74 MMHG

## 2019-05-30 PROCEDURE — 3331090002 HH PPS REVENUE DEBIT

## 2019-05-30 PROCEDURE — 3331090001 HH PPS REVENUE CREDIT

## 2019-05-30 PROCEDURE — G0151 HHCP-SERV OF PT,EA 15 MIN: HCPCS

## 2019-05-31 ENCOUNTER — PATIENT OUTREACH (OUTPATIENT)
Dept: FAMILY MEDICINE CLINIC | Age: 69
End: 2019-05-31

## 2019-05-31 PROCEDURE — 3331090002 HH PPS REVENUE DEBIT

## 2019-05-31 PROCEDURE — 3331090001 HH PPS REVENUE CREDIT

## 2019-05-31 NOTE — PROGRESS NOTES
.  Patient has graduated from the Transitions of Care Coordination  program on 5/31/19. Patient's symptoms are stable at this time. Patient/family has the ability to self-manage. Care management goals have been completed at this time. No further nurse navigator follow up scheduled. Goals Addressed     None          Pt has nurse navigator's contact information for any further questions, concerns, or needs.   Patients upcoming visits:    Future Appointments   Date Time Provider Department Center   6/3/2019 To Be Determined Claudio Woodard, PT [address] [facility]   6/5/2019 To Be Determined Fer Johns LPN [address] [place] [address]   6/6/2019 To Be Determined Claudio Woodard,  [address]   6/6/2019  2:40 PM Hima Lara MD [facility]   6/12/2019 To Be Determined Fer Johns, MICHAEL [facility]   6/19/2019 To Be Determined Mc Reed RN [facility]   6/25/2019 To Be Determined Mc Reed RN [address] [place] [address]   7/12/2019  8:20 AM Adelina Matias MD [facility]   11/13/2019 11:00 AM Yong Dong  [address]

## 2019-06-01 PROCEDURE — 3331090002 HH PPS REVENUE DEBIT

## 2019-06-01 PROCEDURE — 3331090001 HH PPS REVENUE CREDIT

## 2019-06-02 PROCEDURE — 3331090001 HH PPS REVENUE CREDIT

## 2019-06-02 PROCEDURE — 3331090002 HH PPS REVENUE DEBIT

## 2019-06-02 NOTE — TELEPHONE ENCOUNTER
I got a refill request for this medication, but it has disappeared from her med list in the system. It looks like a nurse removed it back in 3-2019 as being \"not a current medication\". I dont see that I or another provider actually dc'd this medication, so I am not sure if pt is still taking it or not. Please call and ask pt's daughter Deb Ghulam (pt's caregiver, manages her meds and brings her to appts). After you confirm nicolle/ Deb Parmar route back and let me know.

## 2019-06-03 ENCOUNTER — HOME CARE VISIT (OUTPATIENT)
Dept: SCHEDULING | Facility: HOME HEALTH | Age: 69
End: 2019-06-03
Payer: MEDICARE

## 2019-06-03 VITALS
DIASTOLIC BLOOD PRESSURE: 78 MMHG | SYSTOLIC BLOOD PRESSURE: 130 MMHG | TEMPERATURE: 99.8 F | OXYGEN SATURATION: 97 % | HEART RATE: 83 BPM | RESPIRATION RATE: 16 BRPM

## 2019-06-03 PROCEDURE — 3331090001 HH PPS REVENUE CREDIT

## 2019-06-03 PROCEDURE — 3331090002 HH PPS REVENUE DEBIT

## 2019-06-03 PROCEDURE — G0151 HHCP-SERV OF PT,EA 15 MIN: HCPCS

## 2019-06-04 PROCEDURE — 3331090001 HH PPS REVENUE CREDIT

## 2019-06-04 PROCEDURE — 3331090002 HH PPS REVENUE DEBIT

## 2019-06-05 ENCOUNTER — HOME CARE VISIT (OUTPATIENT)
Dept: SCHEDULING | Facility: HOME HEALTH | Age: 69
End: 2019-06-05
Payer: MEDICARE

## 2019-06-05 PROCEDURE — 3331090001 HH PPS REVENUE CREDIT

## 2019-06-05 PROCEDURE — 3331090002 HH PPS REVENUE DEBIT

## 2019-06-05 PROCEDURE — G0300 HHS/HOSPICE OF LPN EA 15 MIN: HCPCS

## 2019-06-05 NOTE — TELEPHONE ENCOUNTER
LVM for dtr to call back about the [facility]. Daughter Debbie Galvez is calling to let MJ know that she is not on Amlodipine,  And she need smoking patch for the surgery in July , she needs the Rx for the patch for 2 months. Pharmacy verified   LOV : May 02, 2019  BCB# [phone]  Spoke to Debbie Galvez and she said that the doctors all thought the other MD's d/c'd the [facility]. Debbie Galvez said that the pt wasn't given that when she was in the hospital and she wasn't sent home on it. Pt hasn't taken it since. Also pt saw the surgeon and he wants her to stop smoking before the surgery. Debbie Galvez said that pt is going to need help with that. She didn't know if she needed patches or gum. Told her I would be back in touch.

## 2019-06-06 ENCOUNTER — OFFICE VISIT (OUTPATIENT)
Dept: SURGERY | Age: 69
End: 2019-06-06

## 2019-06-06 ENCOUNTER — TELEPHONE (OUTPATIENT)
Dept: SURGERY | Age: 69
End: 2019-06-06

## 2019-06-06 ENCOUNTER — HOME CARE VISIT (OUTPATIENT)
Dept: HOME HEALTH SERVICES | Facility: HOME HEALTH | Age: 69
End: 2019-06-06
Payer: MEDICARE

## 2019-06-06 ENCOUNTER — HOME CARE VISIT (OUTPATIENT)
Dept: SCHEDULING | Facility: HOME HEALTH | Age: 69
End: 2019-06-06
Payer: MEDICARE

## 2019-06-06 VITALS
RESPIRATION RATE: 20 BRPM | BODY MASS INDEX: 27.78 KG/M2 | DIASTOLIC BLOOD PRESSURE: 70 MMHG | SYSTOLIC BLOOD PRESSURE: 130 MMHG | OXYGEN SATURATION: 96 % | HEART RATE: 86 BPM | HEIGHT: 67 IN | TEMPERATURE: 99.4 F | WEIGHT: 177 LBS

## 2019-06-06 VITALS
DIASTOLIC BLOOD PRESSURE: 70 MMHG | TEMPERATURE: 98.7 F | SYSTOLIC BLOOD PRESSURE: 118 MMHG | RESPIRATION RATE: 18 BRPM | HEART RATE: 77 BPM | OXYGEN SATURATION: 98 %

## 2019-06-06 VITALS
RESPIRATION RATE: 14 BRPM | HEART RATE: 81 BPM | OXYGEN SATURATION: 97 % | SYSTOLIC BLOOD PRESSURE: 132 MMHG | TEMPERATURE: 99.2 F | DIASTOLIC BLOOD PRESSURE: 78 MMHG

## 2019-06-06 DIAGNOSIS — R53.82 CHRONIC FATIGUE: Primary | ICD-10-CM

## 2019-06-06 PROCEDURE — G0151 HHCP-SERV OF PT,EA 15 MIN: HCPCS

## 2019-06-06 PROCEDURE — 3331090002 HH PPS REVENUE DEBIT

## 2019-06-06 PROCEDURE — 3331090001 HH PPS REVENUE CREDIT

## 2019-06-06 NOTE — TELEPHONE ENCOUNTER
Physical therapist is calling to let Dr Donna Aguirre know that the patient fell yesterday, her cat got under feet, and she has a small abrasion on her left hand and other than that she is fine. And she said other than that patient is doing very well and will be discharged from physical therapy. Patient is coming in this afternoon, just wanted to make aware before she came in.

## 2019-06-06 NOTE — PROGRESS NOTES
1. Have you been to the ER, urgent care clinic since your last visit? Hospitalized since your last visit? No    2. Have you seen or consulted any other health care providers outside of the [address] [place] [address] since your last visit? Include any pap smears or colon screening.  No

## 2019-06-07 ENCOUNTER — TELEPHONE (OUTPATIENT)
Dept: FAMILY MEDICINE CLINIC | Age: 69
End: 2019-06-07

## 2019-06-07 LAB — PREALB SERPL-MCNC: 24 MG/DL (ref 10–36)

## 2019-06-07 PROCEDURE — 3331090001 HH PPS REVENUE CREDIT

## 2019-06-07 PROCEDURE — 3331090002 HH PPS REVENUE DEBIT

## 2019-06-07 NOTE — TELEPHONE ENCOUNTER
Daughter Leslee Canas is calling to let MJ know that she is not on Amlodipine,  And she need smoking patch for the surgery in July , she needs the Rx for the patch for 2 months.    Pharmacy verified   LOV : May 02, 2019  BCB# [phone]

## 2019-06-08 PROCEDURE — 3331090002 HH PPS REVENUE DEBIT

## 2019-06-08 PROCEDURE — 3331090001 HH PPS REVENUE CREDIT

## 2019-06-09 PROCEDURE — 3331090002 HH PPS REVENUE DEBIT

## 2019-06-09 PROCEDURE — 3331090001 HH PPS REVENUE CREDIT

## 2019-06-09 RX ORDER — AMLODIPINE BESYLATE 10 MG/1
TABLET ORAL
Qty: 90 TAB | Refills: 1 | OUTPATIENT
Start: 2019-06-09

## 2019-06-09 NOTE — PATIENT INSTRUCTIONS
Laparoscopy: Before Your Surgery What is laparoscopy? Laparoscopy (say \"weg-ir-CHEI-kuchilango-pee\") is a type of surgery that uses very small cuts. These cuts are called incisions. To do this surgery, a doctor puts a lighted tube through incisions in your belly. This tube is called a scope. Then the doctor puts special tools through the tube to take out whatever is causing problems. This may be an organ, such as the spleen, gallbladder, or appendix. Or it could be an ovary, a fallopian tube, or part of the intestine. With this kind of surgery, a doctor can also repair a hernia. Or he or she can take out small tumors, cysts, or other growths. It can also be used to close a woman's fallopian tubes. For some surgeries, you can usually go home the same day. These include hernia repair and gallbladder removal. 
The incisions from the surgery usually leave several scars about half an inch long. Follow-up care is a key part of your treatment and safety. Be sure to make and go to all appointments, and call your doctor if you are having problems. It's also a good idea to know your test results and keep a list of the medicines you take. What happens before surgery? 
 Surgery can be stressful. This information will help you understand what you can expect. And it will help you safely prepare for your surgery. 
 Preparing for surgery 
  · Understand exactly what surgery is planned, along with the risks, benefits, and other options. · Tell your doctors ALL the medicines, vitamins, supplements, and herbal remedies you take. Some of these can increase the risk of bleeding or interact with anesthesia.  
  · If you take blood thinners, such as warfarin (Coumadin), clopidogrel (Plavix), or aspirin, be sure to talk to your doctor. He or she will tell you if you should stop taking these medicines before your surgery. Make sure that you understand exactly what your doctor wants you to do.   · Your doctor will tell you which medicines to take or stop before your surgery. You may need to stop taking certain medicines a week or more before surgery. So talk to your doctor as soon as you can.  
  · If you have an advance directive, let your doctor know. It may include a living will and a durable power of  for health care. Bring a copy to the hospital. If you don't have one, you may want to prepare one. It lets your doctor and loved ones know your health care wishes. Doctors advise that everyone prepare these papers before any type of surgery or procedure.  
  · You may need to empty your colon with an enema or laxative. Your doctor will tell you how to do this. What happens on the day of surgery? · Follow the instructions exactly about when to stop eating and drinking. If you don't, your surgery may be canceled. If your doctor told you to take your medicines on the day of surgery, take them with only a sip of water.  
  · Take a bath or shower before you come in for your surgery. Do not apply lotions, perfumes, deodorants, or nail polish.  
  · Do not shave the surgical site yourself.  
  · Take off all jewelry and piercings. And take out contact lenses, if you wear them.  
 At the hospital or surgery center · Bring a picture ID.  
  · The area for surgery is often marked to make sure there are no errors.  
  · You will be kept comfortable and safe by your anesthesia provider. You will be asleep during the surgery.  
  · The surgery may take about 1 to 4 hours. It depends on what needs to be done. Hernia surgery may take 1 to 2 hours. But surgery to remove the spleen or part of the bowel may take 3 to 4 hours. Going home · Be sure you have someone to drive you home. Anesthesia and pain medicine make it unsafe for you to drive.  
  · You will be given more specific instructions about recovering from your surgery.  They will cover things like diet, wound care, follow-up care, driving, and getting back to your normal routine. When should you call your doctor? · You have questions or concerns.  
  · You don't understand how to prepare for your surgery.  
  · You become ill before the surgery (such as fever, flu, or a cold).  
  · You need to reschedule or have changed your mind about having the surgery. Where can you learn more? Go to http://jama-obinna.info/. Enter T345 in the search box to learn more about \"Laparoscopy: Before Your Surgery. \" Current as of: March 27, 2018 Content Version: 11.9 © 6529-9069 [employer]. Care instructions adapted under license by [employer] (which disclaims liability or warranty for this information). If you have questions about a medical condition or this instruction, always ask your healthcare professional. [address] any warranty or liability for your use of this information.

## 2019-06-10 PROCEDURE — 3331090002 HH PPS REVENUE DEBIT

## 2019-06-10 PROCEDURE — 3331090001 HH PPS REVENUE CREDIT

## 2019-06-10 NOTE — TELEPHONE ENCOUNTER
1) Then I went ahead and cancelled/refused the [facility] refill request that came from pharmacy. Looks like her BP has been good for now without it but they will need to monitor her BP 1-2 x a week and if consistently running 150 or above, or 90 or above, let me know so we can resume it    2) Pt can do either one, the Nicotine gum or patch. The patch will cost and she will have less flexibility with it. Comes down to cost, preference, convenience, safety.

## 2019-06-11 PROCEDURE — 3331090001 HH PPS REVENUE CREDIT

## 2019-06-11 PROCEDURE — 3331090002 HH PPS REVENUE DEBIT

## 2019-06-12 ENCOUNTER — HOME CARE VISIT (OUTPATIENT)
Dept: SCHEDULING | Facility: HOME HEALTH | Age: 69
End: 2019-06-12
Payer: MEDICARE

## 2019-06-12 PROCEDURE — 3331090001 HH PPS REVENUE CREDIT

## 2019-06-12 PROCEDURE — 3331090002 HH PPS REVENUE DEBIT

## 2019-06-12 PROCEDURE — G0300 HHS/HOSPICE OF LPN EA 15 MIN: HCPCS

## 2019-06-13 ENCOUNTER — TELEPHONE (OUTPATIENT)
Dept: FAMILY MEDICINE CLINIC | Age: 69
End: 2019-06-13

## 2019-06-13 VITALS
RESPIRATION RATE: 18 BRPM | HEART RATE: 68 BPM | DIASTOLIC BLOOD PRESSURE: 82 MMHG | OXYGEN SATURATION: 98 % | TEMPERATURE: 98.6 F | SYSTOLIC BLOOD PRESSURE: 124 MMHG

## 2019-06-13 PROCEDURE — 3331090001 HH PPS REVENUE CREDIT

## 2019-06-13 PROCEDURE — 3331090002 HH PPS REVENUE DEBIT

## 2019-06-13 NOTE — TELEPHONE ENCOUNTER
Juany Malhotra wanted to know which med to take for the smoking. Advised that Dr Scott Simpson recommended that pt use the gum . Juany Malhotra will get her started on that.

## 2019-06-13 NOTE — TELEPHONE ENCOUNTER
----- Message from [facility] sent at 6/13/2019 10:39 AM EDT -----  Regarding: Dr. Maxwell Ferreira: [phone]  Dav García, daughter, returning call concerning PT.   Best contact [phone]

## 2019-06-14 PROCEDURE — 3331090002 HH PPS REVENUE DEBIT

## 2019-06-14 PROCEDURE — 3331090001 HH PPS REVENUE CREDIT

## 2019-06-15 PROCEDURE — 3331090002 HH PPS REVENUE DEBIT

## 2019-06-15 PROCEDURE — 3331090001 HH PPS REVENUE CREDIT

## 2019-06-16 PROCEDURE — 3331090001 HH PPS REVENUE CREDIT

## 2019-06-16 PROCEDURE — 3331090002 HH PPS REVENUE DEBIT

## 2019-06-17 ENCOUNTER — HOME CARE VISIT (OUTPATIENT)
Dept: SCHEDULING | Facility: HOME HEALTH | Age: 69
End: 2019-06-17
Payer: MEDICARE

## 2019-06-17 VITALS
RESPIRATION RATE: 12 BRPM | DIASTOLIC BLOOD PRESSURE: 74 MMHG | OXYGEN SATURATION: 96 % | SYSTOLIC BLOOD PRESSURE: 148 MMHG | HEART RATE: 70 BPM | TEMPERATURE: 97.7 F

## 2019-06-17 PROCEDURE — 3331090001 HH PPS REVENUE CREDIT

## 2019-06-17 PROCEDURE — G0299 HHS/HOSPICE OF RN EA 15 MIN: HCPCS

## 2019-06-17 PROCEDURE — 3331090002 HH PPS REVENUE DEBIT

## 2019-06-27 ENCOUNTER — OFFICE VISIT (OUTPATIENT)
Dept: SURGERY | Age: 69
End: 2019-06-27

## 2019-06-27 VITALS
SYSTOLIC BLOOD PRESSURE: 140 MMHG | OXYGEN SATURATION: 97 % | WEIGHT: 179 LBS | DIASTOLIC BLOOD PRESSURE: 80 MMHG | TEMPERATURE: 98.9 F | HEIGHT: 67 IN | BODY MASS INDEX: 28.09 KG/M2 | RESPIRATION RATE: 20 BRPM | HEART RATE: 78 BPM

## 2019-06-27 DIAGNOSIS — Z09 POSTOPERATIVE EXAMINATION: Primary | ICD-10-CM

## 2019-06-27 DIAGNOSIS — K57.20 PERFORATED DIVERTICULUM OF LARGE INTESTINE: ICD-10-CM

## 2019-06-27 NOTE — PROGRESS NOTES
1. Have you been to the ER, urgent care clinic since your last visit? Hospitalized since your last visit? No    2. Have you seen or consulted any other health care providers outside of the [address] [place] [address] since your last visit? Include any pap smears or colon screening.  No

## 2019-06-29 RX ORDER — METRONIDAZOLE 500 MG/1
TABLET ORAL
Qty: 3 TAB | Refills: 0 | Status: SHIPPED | OUTPATIENT
Start: 2019-06-29 | End: 2019-07-22

## 2019-06-29 RX ORDER — METOCLOPRAMIDE 10 MG/1
TABLET ORAL
Qty: 3 TAB | Refills: 0 | Status: SHIPPED | OUTPATIENT
Start: 2019-06-29 | End: 2019-07-11

## 2019-06-29 RX ORDER — NEOMYCIN SULFATE 500 MG/1
TABLET ORAL
Qty: 6 TAB | Refills: 0 | Status: SHIPPED | OUTPATIENT
Start: 2019-06-29 | End: 2019-07-22

## 2019-06-29 NOTE — PROGRESS NOTES
Subjective:      Jolie Hill is a 79 y.o. female presents for postop care 11 weeks following Hartmanns's procedure for perforated diverticulitis. Appetite is good. Eating a regular diet without difficulty. She is focusing on protein intake. Bowel movements are regular via colostomy. The patient is voiding without difficulty. The patient is not having any pain. Down to 5 cigarettes/day    Objective:     Visit Vitals  /80   Pulse 78   Temp 98.9 °F (37.2 °C)   Resp 20   Ht 5' 7\" (1.702 m)   Wt 179 lb (81.2 kg)   SpO2 97%   BMI 28.04 kg/m²       General:  alert, cooperative, no distress, appears stated age, mildly obese   Abdomen: soft, non-tender, healthy colostomy   Incision:   well healed     Assessment:     Doing well postoperatively. Protein stores are replete and she is scheduled for laparoscopic colostomy closure 7/19. Reviewed technical aspects of procedure along with risks to include bleeding, infection, leak, open procedure, visceral injury. Also reviewed anticipated hospital course, activity restrictions. She understands and desires to proceed. All questions answered. Plan:     1. Continue current medications. Add Nicotine patches, wean tobacco.  2. Routine colostomy care. 3. ERAS handbook, bowel prep, pre-op oral antibiotics provided.

## 2019-06-29 NOTE — PATIENT INSTRUCTIONS

## 2019-07-11 ENCOUNTER — HOSPITAL ENCOUNTER (OUTPATIENT)
Dept: PREADMISSION TESTING | Age: 69
Discharge: HOME | End: 2019-07-11
Payer: MEDICARE

## 2019-07-11 ENCOUNTER — HOSPITAL ENCOUNTER (OUTPATIENT)
Dept: GENERAL RADIOLOGY | Age: 69
Discharge: HOME | End: 2019-07-11
Attending: SURGERY
Payer: MEDICARE

## 2019-07-11 VITALS
SYSTOLIC BLOOD PRESSURE: 126 MMHG | TEMPERATURE: 98.2 F | BODY MASS INDEX: 26.98 KG/M2 | RESPIRATION RATE: 16 BRPM | DIASTOLIC BLOOD PRESSURE: 72 MMHG | WEIGHT: 178 LBS | HEIGHT: 68 IN

## 2019-07-11 LAB
ALBUMIN SERPL-MCNC: 3.9 G/DL (ref 3.5–5)
ALBUMIN/GLOB SERPL: 1 {RATIO} (ref 1.1–2.2)
ALP SERPL-CCNC: 72 U/L (ref 45–117)
ALT SERPL-CCNC: 27 U/L (ref 12–78)
ANION GAP SERPL CALC-SCNC: 7 MMOL/L (ref 5–15)
AST SERPL-CCNC: 17 U/L (ref 15–37)
ATRIAL RATE: 59 BPM
BASOPHILS # BLD: 0 K/UL (ref 0–0.1)
BASOPHILS NFR BLD: 0 % (ref 0–1)
BILIRUB SERPL-MCNC: 0.2 MG/DL (ref 0.2–1)
BUN SERPL-MCNC: 10 MG/DL (ref 6–20)
BUN/CREAT SERPL: 12 (ref 12–20)
CALCIUM SERPL-MCNC: 9.4 MG/DL (ref 8.5–10.1)
CALCULATED P AXIS, ECG09: 69 DEGREES
CALCULATED R AXIS, ECG10: 79 DEGREES
CALCULATED T AXIS, ECG11: 84 DEGREES
CHLORIDE SERPL-SCNC: 105 MMOL/L (ref 97–108)
CO2 SERPL-SCNC: 26 MMOL/L (ref 21–32)
CREAT SERPL-MCNC: 0.83 MG/DL (ref 0.55–1.02)
DIAGNOSIS, 93000: NORMAL
DIFFERENTIAL METHOD BLD: ABNORMAL
EOSINOPHIL # BLD: 0.3 K/UL (ref 0–0.4)
EOSINOPHIL NFR BLD: 3 % (ref 0–7)
ERYTHROCYTE [DISTWIDTH] IN BLOOD BY AUTOMATED COUNT: 14.7 % (ref 11.5–14.5)
GLOBULIN SER CALC-MCNC: 3.8 G/DL (ref 2–4)
GLUCOSE SERPL-MCNC: 113 MG/DL (ref 65–100)
HCT VFR BLD AUTO: 43.3 % (ref 35–47)
HGB BLD-MCNC: 13.7 G/DL (ref 11.5–16)
IMM GRANULOCYTES # BLD AUTO: 0 K/UL (ref 0–0.04)
IMM GRANULOCYTES NFR BLD AUTO: 0 % (ref 0–0.5)
LYMPHOCYTES # BLD: 3.8 K/UL (ref 0.8–3.5)
LYMPHOCYTES NFR BLD: 47 % (ref 12–49)
MAGNESIUM SERPL-MCNC: 2.3 MG/DL (ref 1.6–2.4)
MCH RBC QN AUTO: 28.4 PG (ref 26–34)
MCHC RBC AUTO-ENTMCNC: 31.6 G/DL (ref 30–36.5)
MCV RBC AUTO: 89.8 FL (ref 80–99)
MONOCYTES # BLD: 0.5 K/UL (ref 0–1)
MONOCYTES NFR BLD: 6 % (ref 5–13)
NEUTS SEG # BLD: 3.6 K/UL (ref 1.8–8)
NEUTS SEG NFR BLD: 44 % (ref 32–75)
NRBC # BLD: 0 K/UL (ref 0–0.01)
NRBC BLD-RTO: 0 PER 100 WBC
P-R INTERVAL, ECG05: 166 MS
PLATELET # BLD AUTO: 253 K/UL (ref 150–400)
PMV BLD AUTO: 10.3 FL (ref 8.9–12.9)
POTASSIUM SERPL-SCNC: 3.7 MMOL/L (ref 3.5–5.1)
PROT SERPL-MCNC: 7.7 G/DL (ref 6.4–8.2)
Q-T INTERVAL, ECG07: 478 MS
QRS DURATION, ECG06: 92 MS
QTC CALCULATION (BEZET), ECG08: 473 MS
RBC # BLD AUTO: 4.82 M/UL (ref 3.8–5.2)
SODIUM SERPL-SCNC: 138 MMOL/L (ref 136–145)
VENTRICULAR RATE, ECG03: 59 BPM
WBC # BLD AUTO: 8.2 K/UL (ref 3.6–11)

## 2019-07-11 PROCEDURE — 71046 X-RAY EXAM CHEST 2 VIEWS: CPT

## 2019-07-11 PROCEDURE — 93005 ELECTROCARDIOGRAM TRACING: CPT

## 2019-07-11 PROCEDURE — 83735 ASSAY OF MAGNESIUM: CPT

## 2019-07-11 PROCEDURE — 80053 COMPREHEN METABOLIC PANEL: CPT

## 2019-07-11 PROCEDURE — 85025 COMPLETE CBC W/AUTO DIFF WBC: CPT

## 2019-07-11 NOTE — PERIOP NOTES
DO NOT EAT ANYTHING AFTER MIDNIGHT, except as instructed THE NIGHT BEFORE SURGERY. PT GIVEN OPPORTUNITY TO ASK ADDITIONAL QUESTIONS. Patient given two bottles CHG soap and instruction sheet, instructions for use reviewed with patient. PATIENT GIVEN WRITTEN INSTRUCTIONS TO GO TO THE IMAGING CENTER/CANCER CENTER [address] [place] [address] [address] TO HAVE CHEST XRAY COMPLETED. Pt given incentive spirometer and instructed in use and patient performed return demonstration x 5. Patient given surgical site infection information FAQs handout and hand hygiene tips sheet. Pre-operative instructions reviewed and patient verbalizes understanding of instructions. Patient has been given the opportunity to ask additional questions. ERAS BOOKLET PAGES 7-11 REVIEWED WITH PATIENT AND DAUGHTER, NIMO.

## 2019-07-12 ENCOUNTER — OFFICE VISIT (OUTPATIENT)
Dept: FAMILY MEDICINE CLINIC | Age: 69
End: 2019-07-12

## 2019-07-12 VITALS
HEART RATE: 75 BPM | OXYGEN SATURATION: 97 % | RESPIRATION RATE: 17 BRPM | DIASTOLIC BLOOD PRESSURE: 80 MMHG | TEMPERATURE: 97.4 F | HEIGHT: 68 IN | BODY MASS INDEX: 27.43 KG/M2 | SYSTOLIC BLOOD PRESSURE: 132 MMHG | WEIGHT: 181 LBS

## 2019-07-12 DIAGNOSIS — F41.0 GENERALIZED ANXIETY DISORDER WITH PANIC ATTACKS: Primary | ICD-10-CM

## 2019-07-12 DIAGNOSIS — I10 BENIGN ESSENTIAL HYPERTENSION: ICD-10-CM

## 2019-07-12 DIAGNOSIS — G25.0 BENIGN ESSENTIAL TREMOR: ICD-10-CM

## 2019-07-12 DIAGNOSIS — F41.1 GENERALIZED ANXIETY DISORDER WITH PANIC ATTACKS: Primary | ICD-10-CM

## 2019-07-12 DIAGNOSIS — F32.A CHRONIC DEPRESSION: ICD-10-CM

## 2019-07-12 NOTE — PROGRESS NOTES
Chief Complaint   Patient presents with    Hypertension     follow up     1. Have you been to the ER, urgent care clinic since your last visit? Hospitalized since your last visit? No    2. Have you seen or consulted any other health care providers outside of the [address] [place] [address] since your last visit? Include any pap smears or colon screening.  No

## 2019-07-12 NOTE — PATIENT INSTRUCTIONS

## 2019-07-17 RX ORDER — HYDROCHLOROTHIAZIDE 25 MG/1
TABLET ORAL
Qty: 90 TAB | Refills: 0 | OUTPATIENT
Start: 2019-07-17

## 2019-07-17 RX ORDER — OMEPRAZOLE 20 MG/1
CAPSULE, DELAYED RELEASE ORAL
Qty: 90 CAP | Refills: 3 | Status: SHIPPED | OUTPATIENT
Start: 2019-07-17 | End: 2020-07-30 | Stop reason: SDUPTHER

## 2019-07-17 RX ORDER — SERTRALINE HYDROCHLORIDE 100 MG/1
TABLET, FILM COATED ORAL
Qty: 135 TAB | Refills: 3 | Status: SHIPPED | OUTPATIENT
Start: 2019-07-17 | End: 2019-10-08

## 2019-07-19 ENCOUNTER — ANESTHESIA EVENT (OUTPATIENT)
Dept: SURGERY | Age: 69
DRG: 331 | End: 2019-07-19
Payer: MEDICARE

## 2019-07-19 ENCOUNTER — ANESTHESIA (OUTPATIENT)
Dept: SURGERY | Age: 69
DRG: 331 | End: 2019-07-19
Payer: MEDICARE

## 2019-07-19 ENCOUNTER — HOSPITAL ENCOUNTER (INPATIENT)
Age: 69
LOS: 3 days | Discharge: HOME HEALTH | DRG: 331 | End: 2019-07-22
Attending: SURGERY | Admitting: SURGERY
Payer: MEDICARE

## 2019-07-19 DIAGNOSIS — Z90.49 S/P LAPAROSCOPIC COLECTOMY: Primary | ICD-10-CM

## 2019-07-19 PROCEDURE — 77030032490 HC SLV COMPR SCD KNE COVD -B: Performed by: SURGERY

## 2019-07-19 PROCEDURE — 77030013567 HC DRN WND RESERV BARD -A: Performed by: SURGERY

## 2019-07-19 PROCEDURE — 77030002996 HC SUT SLK J&J -A: Performed by: SURGERY

## 2019-07-19 PROCEDURE — 77030002933 HC SUT MCRYL J&J -A: Performed by: SURGERY

## 2019-07-19 PROCEDURE — 74011000250 HC RX REV CODE- 250: Performed by: SURGERY

## 2019-07-19 PROCEDURE — 77030011640 HC PAD GRND REM COVD -A: Performed by: SURGERY

## 2019-07-19 PROCEDURE — 74011000250 HC RX REV CODE- 250

## 2019-07-19 PROCEDURE — 77030009965 HC RELD STPLR ENDOS COVD -D: Performed by: SURGERY

## 2019-07-19 PROCEDURE — 77030022952 HC TRCR ENDOSC COVD -C: Performed by: SURGERY

## 2019-07-19 PROCEDURE — 77030031139 HC SUT VCRL2 J&J -A: Performed by: SURGERY

## 2019-07-19 PROCEDURE — 77030035045 HC TRCR ENDOSC VRSPRT BLDLSS COVD -B: Performed by: SURGERY

## 2019-07-19 PROCEDURE — 77030035048 HC TRCR ENDOSC OPTCL COVD -B: Performed by: SURGERY

## 2019-07-19 PROCEDURE — 0DJD8ZZ INSPECTION OF LOWER INTESTINAL TRACT, VIA NATURAL OR ARTIFICIAL OPENING ENDOSCOPIC: ICD-10-PCS | Performed by: SURGERY

## 2019-07-19 PROCEDURE — 76010000136 HC OR TIME 4.5 TO 5 HR: Performed by: SURGERY

## 2019-07-19 PROCEDURE — 77030020263 HC SOL INJ SOD CL0.9% LFCR 1000ML: Performed by: SURGERY

## 2019-07-19 PROCEDURE — 77030012407 HC DRN WND BARD -B: Performed by: SURGERY

## 2019-07-19 PROCEDURE — 77030002895 HC DEV VASC CLOSR COVD -B: Performed by: SURGERY

## 2019-07-19 PROCEDURE — 77030016151 HC PROTCTR LNS DFOG COVD -B: Performed by: SURGERY

## 2019-07-19 PROCEDURE — 74011250636 HC RX REV CODE- 250/636

## 2019-07-19 PROCEDURE — 77030002916 HC SUT ETHLN J&J -A: Performed by: SURGERY

## 2019-07-19 PROCEDURE — 77030034849: Performed by: SURGERY

## 2019-07-19 PROCEDURE — 76060000041 HC ANESTHESIA 5 TO 5.5 HR: Performed by: SURGERY

## 2019-07-19 PROCEDURE — 74011250636 HC RX REV CODE- 250/636: Performed by: SURGERY

## 2019-07-19 PROCEDURE — 77030002966 HC SUT PDS J&J -A: Performed by: SURGERY

## 2019-07-19 PROCEDURE — 77030038160 HC SHR COAG HARM J&J -F: Performed by: SURGERY

## 2019-07-19 PROCEDURE — 77030008756 HC TU IRR SUC STRY -B: Performed by: SURGERY

## 2019-07-19 PROCEDURE — 88307 TISSUE EXAM BY PATHOLOGIST: CPT

## 2019-07-19 PROCEDURE — 77030008684 HC TU ET CUF COVD -B: Performed by: ANESTHESIOLOGY

## 2019-07-19 PROCEDURE — 77030022474 HC RELD STPLR ENDO GIA COVD -C: Performed by: SURGERY

## 2019-07-19 PROCEDURE — 0DN84ZZ RELEASE SMALL INTESTINE, PERCUTANEOUS ENDOSCOPIC APPROACH: ICD-10-PCS | Performed by: SURGERY

## 2019-07-19 PROCEDURE — 77030027502 HC TRCR ENDOSC BLNT COVD -B: Performed by: SURGERY

## 2019-07-19 PROCEDURE — 0DQE4ZZ REPAIR LARGE INTESTINE, PERCUTANEOUS ENDOSCOPIC APPROACH: ICD-10-PCS | Performed by: SURGERY

## 2019-07-19 PROCEDURE — 77030038157 HC DEV PWR CNTR DISP SIGNIA COVD -C: Performed by: SURGERY

## 2019-07-19 PROCEDURE — 65270000032 HC RM SEMIPRIVATE

## 2019-07-19 PROCEDURE — 77030026438 HC STYL ET INTUB CARD -A: Performed by: ANESTHESIOLOGY

## 2019-07-19 PROCEDURE — 77030039266 HC ADH SKN EXOFIN S2SG -A: Performed by: SURGERY

## 2019-07-19 PROCEDURE — P9045 ALBUMIN (HUMAN), 5%, 250 ML: HCPCS

## 2019-07-19 PROCEDURE — 0DQP4ZZ REPAIR RECTUM, PERCUTANEOUS ENDOSCOPIC APPROACH: ICD-10-PCS | Performed by: SURGERY

## 2019-07-19 PROCEDURE — 77030010286 HC STPLR ENDOSC COVD -D: Performed by: SURGERY

## 2019-07-19 PROCEDURE — 77030035051: Performed by: SURGERY

## 2019-07-19 PROCEDURE — 76210000017 HC OR PH I REC 1.5 TO 2 HR: Performed by: SURGERY

## 2019-07-19 PROCEDURE — 77030020747 HC TU INSUF ENDOSC TELE -A: Performed by: SURGERY

## 2019-07-19 PROCEDURE — 88304 TISSUE EXAM BY PATHOLOGIST: CPT

## 2019-07-19 PROCEDURE — 74011000258 HC RX REV CODE- 258

## 2019-07-19 PROCEDURE — 74011250637 HC RX REV CODE- 250/637: Performed by: SURGERY

## 2019-07-19 RX ORDER — ACETAMINOPHEN 500 MG
1000 TABLET ORAL ONCE
Status: COMPLETED | OUTPATIENT
Start: 2019-07-19 | End: 2019-07-19

## 2019-07-19 RX ORDER — ENOXAPARIN SODIUM 100 MG/ML
40 INJECTION SUBCUTANEOUS EVERY 24 HOURS
Status: DISCONTINUED | OUTPATIENT
Start: 2019-07-20 | End: 2019-07-22 | Stop reason: HOSPADM

## 2019-07-19 RX ORDER — SODIUM CHLORIDE, SODIUM LACTATE, POTASSIUM CHLORIDE, CALCIUM CHLORIDE 600; 310; 30; 20 MG/100ML; MG/100ML; MG/100ML; MG/100ML
INJECTION, SOLUTION INTRAVENOUS
Status: DISCONTINUED | OUTPATIENT
Start: 2019-07-19 | End: 2019-07-19 | Stop reason: HOSPADM

## 2019-07-19 RX ORDER — SERTRALINE HYDROCHLORIDE 50 MG/1
150 TABLET, FILM COATED ORAL DAILY
Status: DISCONTINUED | OUTPATIENT
Start: 2019-07-20 | End: 2019-07-22 | Stop reason: HOSPADM

## 2019-07-19 RX ORDER — ALBUMIN HUMAN 50 G/1000ML
SOLUTION INTRAVENOUS AS NEEDED
Status: DISCONTINUED | OUTPATIENT
Start: 2019-07-19 | End: 2019-07-19 | Stop reason: HOSPADM

## 2019-07-19 RX ORDER — DEXAMETHASONE SODIUM PHOSPHATE 4 MG/ML
INJECTION, SOLUTION INTRA-ARTICULAR; INTRALESIONAL; INTRAMUSCULAR; INTRAVENOUS; SOFT TISSUE AS NEEDED
Status: DISCONTINUED | OUTPATIENT
Start: 2019-07-19 | End: 2019-07-19 | Stop reason: HOSPADM

## 2019-07-19 RX ORDER — FENTANYL CITRATE 50 UG/ML
INJECTION, SOLUTION INTRAMUSCULAR; INTRAVENOUS AS NEEDED
Status: DISCONTINUED | OUTPATIENT
Start: 2019-07-19 | End: 2019-07-19 | Stop reason: HOSPADM

## 2019-07-19 RX ORDER — LIDOCAINE HYDROCHLORIDE ANHYDROUS AND DEXTROSE MONOHYDRATE .8; 5 G/100ML; G/100ML
1 INJECTION, SOLUTION INTRAVENOUS CONTINUOUS
Status: DISPENSED | OUTPATIENT
Start: 2019-07-19 | End: 2019-07-21

## 2019-07-19 RX ORDER — CELECOXIB 100 MG/1
100 CAPSULE ORAL 2 TIMES DAILY
Status: DISCONTINUED | OUTPATIENT
Start: 2019-07-20 | End: 2019-07-22 | Stop reason: HOSPADM

## 2019-07-19 RX ORDER — HYDRALAZINE HYDROCHLORIDE 20 MG/ML
INJECTION INTRAMUSCULAR; INTRAVENOUS AS NEEDED
Status: DISCONTINUED | OUTPATIENT
Start: 2019-07-19 | End: 2019-07-19 | Stop reason: HOSPADM

## 2019-07-19 RX ORDER — NALOXONE HYDROCHLORIDE 0.4 MG/ML
0.4 INJECTION, SOLUTION INTRAMUSCULAR; INTRAVENOUS; SUBCUTANEOUS AS NEEDED
Status: DISCONTINUED | OUTPATIENT
Start: 2019-07-19 | End: 2019-07-22 | Stop reason: HOSPADM

## 2019-07-19 RX ORDER — GABAPENTIN 300 MG/1
600 CAPSULE ORAL ONCE
Status: COMPLETED | OUTPATIENT
Start: 2019-07-19 | End: 2019-07-19

## 2019-07-19 RX ORDER — ONDANSETRON 2 MG/ML
INJECTION INTRAMUSCULAR; INTRAVENOUS AS NEEDED
Status: DISCONTINUED | OUTPATIENT
Start: 2019-07-19 | End: 2019-07-19 | Stop reason: HOSPADM

## 2019-07-19 RX ORDER — ONDANSETRON 2 MG/ML
4 INJECTION INTRAMUSCULAR; INTRAVENOUS AS NEEDED
Status: DISCONTINUED | OUTPATIENT
Start: 2019-07-19 | End: 2019-07-19 | Stop reason: HOSPADM

## 2019-07-19 RX ORDER — BUPIVACAINE HYDROCHLORIDE 5 MG/ML
50 INJECTION, SOLUTION EPIDURAL; INTRACAUDAL ONCE
Status: DISCONTINUED | OUTPATIENT
Start: 2019-07-19 | End: 2019-07-19 | Stop reason: HOSPADM

## 2019-07-19 RX ORDER — MIDAZOLAM HYDROCHLORIDE 1 MG/ML
1 INJECTION, SOLUTION INTRAMUSCULAR; INTRAVENOUS AS NEEDED
Status: DISCONTINUED | OUTPATIENT
Start: 2019-07-19 | End: 2019-07-19 | Stop reason: HOSPADM

## 2019-07-19 RX ORDER — ACETAMINOPHEN 500 MG
1000 TABLET ORAL EVERY 6 HOURS
Status: DISCONTINUED | OUTPATIENT
Start: 2019-07-19 | End: 2019-07-22 | Stop reason: HOSPADM

## 2019-07-19 RX ORDER — MIDAZOLAM HYDROCHLORIDE 1 MG/ML
INJECTION, SOLUTION INTRAMUSCULAR; INTRAVENOUS AS NEEDED
Status: DISCONTINUED | OUTPATIENT
Start: 2019-07-19 | End: 2019-07-19 | Stop reason: HOSPADM

## 2019-07-19 RX ORDER — MAGNESIUM SULFATE HEPTAHYDRATE 40 MG/ML
INJECTION, SOLUTION INTRAVENOUS AS NEEDED
Status: DISCONTINUED | OUTPATIENT
Start: 2019-07-19 | End: 2019-07-19 | Stop reason: HOSPADM

## 2019-07-19 RX ORDER — GLYCOPYRROLATE 0.2 MG/ML
INJECTION INTRAMUSCULAR; INTRAVENOUS AS NEEDED
Status: DISCONTINUED | OUTPATIENT
Start: 2019-07-19 | End: 2019-07-19 | Stop reason: HOSPADM

## 2019-07-19 RX ORDER — BUPIVACAINE HYDROCHLORIDE 5 MG/ML
INJECTION, SOLUTION EPIDURAL; INTRACAUDAL AS NEEDED
Status: DISCONTINUED | OUTPATIENT
Start: 2019-07-19 | End: 2019-07-19 | Stop reason: HOSPADM

## 2019-07-19 RX ORDER — TRAZODONE HYDROCHLORIDE 50 MG/1
50 TABLET ORAL
Status: DISCONTINUED | OUTPATIENT
Start: 2019-07-19 | End: 2019-07-22 | Stop reason: HOSPADM

## 2019-07-19 RX ORDER — LIDOCAINE HYDROCHLORIDE ANHYDROUS AND DEXTROSE MONOHYDRATE .8; 5 G/100ML; G/100ML
INJECTION, SOLUTION INTRAVENOUS
Status: DISCONTINUED | OUTPATIENT
Start: 2019-07-19 | End: 2019-07-19 | Stop reason: HOSPADM

## 2019-07-19 RX ORDER — OMEPRAZOLE 20 MG/1
20 CAPSULE, DELAYED RELEASE ORAL DAILY
Status: DISCONTINUED | OUTPATIENT
Start: 2019-07-20 | End: 2019-07-19 | Stop reason: CLARIF

## 2019-07-19 RX ORDER — PANTOPRAZOLE SODIUM 40 MG/1
40 TABLET, DELAYED RELEASE ORAL
Status: DISCONTINUED | OUTPATIENT
Start: 2019-07-20 | End: 2019-07-22 | Stop reason: HOSPADM

## 2019-07-19 RX ORDER — FENTANYL CITRATE 50 UG/ML
25 INJECTION, SOLUTION INTRAMUSCULAR; INTRAVENOUS
Status: DISCONTINUED | OUTPATIENT
Start: 2019-07-19 | End: 2019-07-19 | Stop reason: HOSPADM

## 2019-07-19 RX ORDER — ALVIMOPAN 12 MG/1
12 CAPSULE ORAL ONCE
Status: COMPLETED | OUTPATIENT
Start: 2019-07-19 | End: 2019-07-19

## 2019-07-19 RX ORDER — OMEPRAZOLE 20 MG/1
20 CAPSULE, DELAYED RELEASE ORAL DAILY
Status: DISCONTINUED | OUTPATIENT
Start: 2019-07-20 | End: 2019-07-19 | Stop reason: SDUPTHER

## 2019-07-19 RX ORDER — ALVIMOPAN 12 MG/1
12 CAPSULE ORAL 2 TIMES DAILY
Status: DISCONTINUED | OUTPATIENT
Start: 2019-07-20 | End: 2019-07-22 | Stop reason: HOSPADM

## 2019-07-19 RX ORDER — SODIUM CHLORIDE, SODIUM LACTATE, POTASSIUM CHLORIDE, CALCIUM CHLORIDE 600; 310; 30; 20 MG/100ML; MG/100ML; MG/100ML; MG/100ML
75 INJECTION, SOLUTION INTRAVENOUS CONTINUOUS
Status: DISCONTINUED | OUTPATIENT
Start: 2019-07-19 | End: 2019-07-19 | Stop reason: HOSPADM

## 2019-07-19 RX ORDER — PROPOFOL 10 MG/ML
INJECTION, EMULSION INTRAVENOUS AS NEEDED
Status: DISCONTINUED | OUTPATIENT
Start: 2019-07-19 | End: 2019-07-19 | Stop reason: HOSPADM

## 2019-07-19 RX ORDER — EPHEDRINE SULFATE/0.9% NACL/PF 50 MG/5 ML
SYRINGE (ML) INTRAVENOUS AS NEEDED
Status: DISCONTINUED | OUTPATIENT
Start: 2019-07-19 | End: 2019-07-19 | Stop reason: HOSPADM

## 2019-07-19 RX ORDER — ONDANSETRON 4 MG/1
4 TABLET, ORALLY DISINTEGRATING ORAL
Status: DISCONTINUED | OUTPATIENT
Start: 2019-07-19 | End: 2019-07-22 | Stop reason: HOSPADM

## 2019-07-19 RX ORDER — HYDROCHLOROTHIAZIDE 25 MG/1
12.5 TABLET ORAL DAILY
Status: DISCONTINUED | OUTPATIENT
Start: 2019-07-20 | End: 2019-07-22 | Stop reason: HOSPADM

## 2019-07-19 RX ORDER — NEOSTIGMINE METHYLSULFATE 1 MG/ML
INJECTION INTRAVENOUS AS NEEDED
Status: DISCONTINUED | OUTPATIENT
Start: 2019-07-19 | End: 2019-07-19 | Stop reason: HOSPADM

## 2019-07-19 RX ORDER — BUSPIRONE HYDROCHLORIDE 10 MG/1
5 TABLET ORAL 2 TIMES DAILY
Status: DISCONTINUED | OUTPATIENT
Start: 2019-07-19 | End: 2019-07-22 | Stop reason: HOSPADM

## 2019-07-19 RX ORDER — SODIUM CHLORIDE, SODIUM LACTATE, POTASSIUM CHLORIDE, CALCIUM CHLORIDE 600; 310; 30; 20 MG/100ML; MG/100ML; MG/100ML; MG/100ML
75 INJECTION, SOLUTION INTRAVENOUS CONTINUOUS
Status: DISPENSED | OUTPATIENT
Start: 2019-07-19 | End: 2019-07-20

## 2019-07-19 RX ORDER — DEXMEDETOMIDINE HYDROCHLORIDE 4 UG/ML
INJECTION, SOLUTION INTRAVENOUS AS NEEDED
Status: DISCONTINUED | OUTPATIENT
Start: 2019-07-19 | End: 2019-07-19 | Stop reason: HOSPADM

## 2019-07-19 RX ORDER — KETAMINE HYDROCHLORIDE 100 MG/ML
INJECTION, SOLUTION INTRAMUSCULAR; INTRAVENOUS AS NEEDED
Status: DISCONTINUED | OUTPATIENT
Start: 2019-07-19 | End: 2019-07-19 | Stop reason: HOSPADM

## 2019-07-19 RX ORDER — ALBUTEROL SULFATE 0.83 MG/ML
2.5 SOLUTION RESPIRATORY (INHALATION)
Status: DISCONTINUED | OUTPATIENT
Start: 2019-07-19 | End: 2019-07-22 | Stop reason: HOSPADM

## 2019-07-19 RX ORDER — DILTIAZEM HYDROCHLORIDE 300 MG/1
300 CAPSULE, COATED, EXTENDED RELEASE ORAL DAILY
Status: DISCONTINUED | OUTPATIENT
Start: 2019-07-20 | End: 2019-07-22 | Stop reason: HOSPADM

## 2019-07-19 RX ORDER — CELECOXIB 200 MG/1
200 CAPSULE ORAL ONCE
Status: COMPLETED | OUTPATIENT
Start: 2019-07-19 | End: 2019-07-19

## 2019-07-19 RX ORDER — ACETAMINOPHEN 10 MG/ML
INJECTION, SOLUTION INTRAVENOUS AS NEEDED
Status: DISCONTINUED | OUTPATIENT
Start: 2019-07-19 | End: 2019-07-19 | Stop reason: HOSPADM

## 2019-07-19 RX ORDER — ALBUTEROL SULFATE 90 UG/1
2 AEROSOL, METERED RESPIRATORY (INHALATION)
Status: DISCONTINUED | OUTPATIENT
Start: 2019-07-19 | End: 2019-07-19 | Stop reason: CLARIF

## 2019-07-19 RX ORDER — OXYCODONE HYDROCHLORIDE 5 MG/1
5 TABLET ORAL
Status: DISCONTINUED | OUTPATIENT
Start: 2019-07-19 | End: 2019-07-22 | Stop reason: HOSPADM

## 2019-07-19 RX ORDER — AMLODIPINE BESYLATE 5 MG/1
10 TABLET ORAL DAILY
Status: DISCONTINUED | OUTPATIENT
Start: 2019-07-20 | End: 2019-07-22 | Stop reason: HOSPADM

## 2019-07-19 RX ORDER — LOSARTAN POTASSIUM 50 MG/1
50 TABLET ORAL DAILY
Status: DISCONTINUED | OUTPATIENT
Start: 2019-07-20 | End: 2019-07-22 | Stop reason: HOSPADM

## 2019-07-19 RX ORDER — LIDOCAINE HYDROCHLORIDE 20 MG/ML
INJECTION, SOLUTION EPIDURAL; INFILTRATION; INTRACAUDAL; PERINEURAL AS NEEDED
Status: DISCONTINUED | OUTPATIENT
Start: 2019-07-19 | End: 2019-07-19 | Stop reason: HOSPADM

## 2019-07-19 RX ORDER — ROCURONIUM BROMIDE 10 MG/ML
INJECTION, SOLUTION INTRAVENOUS AS NEEDED
Status: DISCONTINUED | OUTPATIENT
Start: 2019-07-19 | End: 2019-07-19 | Stop reason: HOSPADM

## 2019-07-19 RX ADMIN — SODIUM CHLORIDE, SODIUM LACTATE, POTASSIUM CHLORIDE, CALCIUM CHLORIDE: 600; 310; 30; 20 INJECTION, SOLUTION INTRAVENOUS at 15:25

## 2019-07-19 RX ADMIN — BUSPIRONE HYDROCHLORIDE 5 MG: 10 TABLET ORAL at 22:15

## 2019-07-19 RX ADMIN — ROCURONIUM BROMIDE 5 MG: 10 INJECTION, SOLUTION INTRAVENOUS at 12:54

## 2019-07-19 RX ADMIN — ALBUMIN HUMAN 250 ML: 50 SOLUTION INTRAVENOUS at 13:00

## 2019-07-19 RX ADMIN — SODIUM CHLORIDE, SODIUM LACTATE, POTASSIUM CHLORIDE, CALCIUM CHLORIDE: 600; 310; 30; 20 INJECTION, SOLUTION INTRAVENOUS at 13:06

## 2019-07-19 RX ADMIN — ACETAMINOPHEN 1000 MG: 10 INJECTION, SOLUTION INTRAVENOUS at 17:47

## 2019-07-19 RX ADMIN — DEXAMETHASONE SODIUM PHOSPHATE 4 MG: 4 INJECTION, SOLUTION INTRA-ARTICULAR; INTRALESIONAL; INTRAMUSCULAR; INTRAVENOUS; SOFT TISSUE at 14:36

## 2019-07-19 RX ADMIN — DEXMEDETOMIDINE HYDROCHLORIDE 4 MCG: 4 INJECTION, SOLUTION INTRAVENOUS at 13:10

## 2019-07-19 RX ADMIN — SODIUM CHLORIDE, SODIUM LACTATE, POTASSIUM CHLORIDE, AND CALCIUM CHLORIDE 75 ML/HR: 600; 310; 30; 20 INJECTION, SOLUTION INTRAVENOUS at 11:03

## 2019-07-19 RX ADMIN — ROCURONIUM BROMIDE 20 MG: 10 INJECTION, SOLUTION INTRAVENOUS at 14:16

## 2019-07-19 RX ADMIN — ALBUMIN HUMAN 250 ML: 50 SOLUTION INTRAVENOUS at 15:05

## 2019-07-19 RX ADMIN — MIDAZOLAM HYDROCHLORIDE 2 MG: 1 INJECTION, SOLUTION INTRAMUSCULAR; INTRAVENOUS at 12:43

## 2019-07-19 RX ADMIN — ONDANSETRON 4 MG: 2 INJECTION INTRAMUSCULAR; INTRAVENOUS at 17:06

## 2019-07-19 RX ADMIN — ACETAMINOPHEN 1000 MG: 500 TABLET ORAL at 22:15

## 2019-07-19 RX ADMIN — NEOSTIGMINE METHYLSULFATE 3 MG: 1 INJECTION INTRAVENOUS at 17:22

## 2019-07-19 RX ADMIN — GLYCOPYRROLATE 0.4 MG: 0.2 INJECTION INTRAMUSCULAR; INTRAVENOUS at 17:22

## 2019-07-19 RX ADMIN — ALVIMOPAN 12 MG: 12 CAPSULE ORAL at 11:07

## 2019-07-19 RX ADMIN — SODIUM CHLORIDE, SODIUM LACTATE, POTASSIUM CHLORIDE, CALCIUM CHLORIDE: 600; 310; 30; 20 INJECTION, SOLUTION INTRAVENOUS at 12:42

## 2019-07-19 RX ADMIN — LIDOCAINE HYDROCHLORIDE ANHYDROUS AND DEXTROSE MONOHYDRATE 2 MG/KG/HR: .8; 5 INJECTION, SOLUTION INTRAVENOUS at 13:00

## 2019-07-19 RX ADMIN — GLYCOPYRROLATE 0.2 MG: 0.2 INJECTION INTRAMUSCULAR; INTRAVENOUS at 16:22

## 2019-07-19 RX ADMIN — Medication 10 MG: at 16:46

## 2019-07-19 RX ADMIN — GLYCOPYRROLATE 0.2 MG: 0.2 INJECTION INTRAMUSCULAR; INTRAVENOUS at 14:28

## 2019-07-19 RX ADMIN — ROCURONIUM BROMIDE 30 MG: 10 INJECTION, SOLUTION INTRAVENOUS at 13:02

## 2019-07-19 RX ADMIN — ACETAMINOPHEN 1000 MG: 500 TABLET ORAL at 11:07

## 2019-07-19 RX ADMIN — DEXMEDETOMIDINE HYDROCHLORIDE 8 MCG: 4 INJECTION, SOLUTION INTRAVENOUS at 17:14

## 2019-07-19 RX ADMIN — MAGNESIUM SULFATE HEPTAHYDRATE 2 G: 40 INJECTION, SOLUTION INTRAVENOUS at 13:00

## 2019-07-19 RX ADMIN — DEXMEDETOMIDINE HYDROCHLORIDE 8 MCG: 4 INJECTION, SOLUTION INTRAVENOUS at 16:53

## 2019-07-19 RX ADMIN — GABAPENTIN 600 MG: 300 CAPSULE ORAL at 11:07

## 2019-07-19 RX ADMIN — LIDOCAINE HYDROCHLORIDE 100 MG: 20 INJECTION, SOLUTION EPIDURAL; INFILTRATION; INTRACAUDAL; PERINEURAL at 12:53

## 2019-07-19 RX ADMIN — FENTANYL CITRATE 100 MCG: 50 INJECTION, SOLUTION INTRAMUSCULAR; INTRAVENOUS at 12:53

## 2019-07-19 RX ADMIN — ROCURONIUM BROMIDE 5 MG: 10 INJECTION, SOLUTION INTRAVENOUS at 16:38

## 2019-07-19 RX ADMIN — PROPOFOL 120 MG: 10 INJECTION, EMULSION INTRAVENOUS at 12:53

## 2019-07-19 RX ADMIN — KETAMINE HYDROCHLORIDE 30 MG: 100 INJECTION, SOLUTION INTRAMUSCULAR; INTRAVENOUS at 13:00

## 2019-07-19 RX ADMIN — CELECOXIB 200 MG: 200 CAPSULE ORAL at 11:08

## 2019-07-19 RX ADMIN — HYDRALAZINE HYDROCHLORIDE 10 MG: 20 INJECTION INTRAMUSCULAR; INTRAVENOUS at 13:58

## 2019-07-19 NOTE — H&P
Subjective:      Shelli Hall is a 79 y.o. female with a history of perforated sigmoid diverticulitis in March 2019, ultimately managed with open Sachin's procedure in April 2019. She gradually recovered and was discharged to home. She has continued to improve with repletion of protein stores and cessation of smoking. She denies abdominal pain, fever, chills, chest pain, or wheezing. Patient Active Problem List    Diagnosis Date Noted    Chronic depression 07/12/2019    Generalized anxiety disorder with panic attacks 07/12/2019    Chronic fatigue 05/16/2019    Perforation and abscess of large intestine concurrent with and due to diverticulitis 05/02/2019    Atrial fibrillation, transient ([address] 05/02/2019    Acute hypoxemic respiratory failure ([address] 04/12/2019    Pneumonia due to infectious organism 04/12/2019    Perforated diverticulum of large intestine 03/26/2019    ACP (advance care planning) 02/21/2017    Benign essential hypertension 06/24/2016    Impaired fasting glucose/Prediabetes 07/13/2014    Bilateral hip pain 06/03/2014    Vitamin D deficiency 06/03/2014    GERD (gastroesophageal reflux disease) 09/02/2011    H/O Subarachnoid hemorrhage due to ruptured aneurysm 07/14/2010    Essential tremor 07/14/2010    Cough variant asthma 03/29/2010    Tobacco Abuse 03/29/2010    AR (allergic rhinitis) 03/29/2010    ADD (attention deficit disorder) 03/29/2010    COPD (chronic obstructive pulmonary disease) ([address] 03/29/2010    Thyroiditis, subacute 03/29/2010    Hyperlipemia 03/29/2010    DVT of Rt Lower Extremity 03/29/2010    Elevated LFT's, Fatty Liver 03/29/2010     Past Medical History:   Diagnosis Date    ACP (advance care planning) 2/21/2017    Initial ACP discussion w/ pt and daughter 2-2017. AMD form reviewed and copy provided.  NN/facilitator to discuss with patient     Aneurysm ([address] 2006    [facility]    Asthma     hx of    Atrial fibrillation, transient ([address] 5/2/2019 Post Op Acute A.  Fib after  Shunt replacement, 4-17-19, [facility], Cardio, Dr. Lupillo Corona (no cardioversion required, medication mgt only)    Benign essential hypertension 6/24/2016    Bilateral hip pain 6/3/2014    xrays 2012, negative    Cancer ([facility] 1990ish    UTERINE    Chronic depression 7/12/2019    COPD (chronic obstructive pulmonary disease) ([facility] 3/29/2010    Diverticulitis     DVT of Rt Lower Extremity 3/29/2010    Elevated LFT's, Fatty Liver 3/29/2010    Essential tremor 7/14/2010    Generalized anxiety disorder with panic attacks 7/12/2019    GERD (gastroesophageal reflux disease) 9/2/2011    H/O Subarachnoid hemorrhage due to ruptured aneurysm 7/14/2010    Hyperlipemia 3/29/2010    Impaired fasting glucose 7/13/2014 6/2014    Perforation and abscess of large intestine concurrent with and due to diverticulitis 5/2/2019    Lap Drain 3-29-19, Exp Laparotomy/Colostomy 4-8-19, Dr. Haleigh Lucero, subacute 3/29/2010    Tobacco Abuse 3/29/2010    Vitamin D deficiency 6/3/2014    2012      Past Surgical History:   Procedure Laterality Date    ECHO STRESS  2005    Negative    HX BREAST BIOPSY      HX COLONOSCOPY  3/2013, Dr Lani Chopra    benign cecal polyp, f/u 10 yrs    HX COLONOSCOPY  1980's    Benign polypectomy    HX COLOSTOMY  04/08/2019    Exp Laparotomy, Sigmoidectomy & Colostomy for Perforated Sigmoid Diverticulum, Dr. Miguel Angel Clark  10/2006    for clipping of ruptured aneurysm; Dr Kvng Ellison a shunt    HX CSF SHUNT  04/17/2019    Right Intrapleural Placement  Shunt (relocated due to peritoneal/abdominal surgery 4/8/19[facility], Dr. Yocasta Duncan  EGD, Dr Lani Chopra    \"ok\" per pt report    HX HYSTERECTOMY  1991    Cervical Dysplasia (Dr Mary Polanco)    IR DRAIN CUTANEOUS/SUBCU ABSCESS  03/29/2019    Lap Drain Pelvic Abscess for Perforated Sigmoid Diverticulum, Dr. Mancilla Sensing  04/08/2019    Externalize  Shunt Right Abdomen for Exp Lap/Colostomy, [facility], Dr. Ryan Arauz      Social History     Tobacco Use    Smoking status: Current Every Day Smoker     Packs/day: 0.25     Years: 20.00     Pack years: 5.00     Types: Cigarettes    Smokeless tobacco: Never Used   Substance Use Topics    Alcohol use: No     Alcohol/week: 0.0 standard drinks      Family History   Problem Relation Age of Onset    Anxiety Daughter     Attention Deficit Disorder Daughter     Drug Abuse Daughter     Anxiety Daughter     Thyroid Disease Daughter     Thyroid Disease Daughter         Hypothyroidism    Asthma Daughter     Cancer Father         kidney    Diabetes Father    Tomy Diaz Father     Diabetes Mother     Hypertension Mother    Tomy Diaz Mother    Hernandez Stroke Mother     Hypertension Sister     Depression Sister     Pneumonia Sister          age 72    Diabetes Sister     Thyroid Disease Sister     Thyroid Disease Sister     Heart Disease Sister     Diabetes Maternal Grandmother     No Known Problems Brother     Thyroid Disease Sister     Anxiety Sister     Depression Sister     Alcohol abuse Sister     Hypertension Sister     Hypertension Sister     Thyroid Disease Sister     Thyroid Disease Sister     Anesth Problems Neg Hx       No current facility-administered medications for this encounter. Current Outpatient Medications   Medication Sig    sertraline (ZOLOFT) 100 mg tablet TAKE 1.5 TABS BY MOUTH DAILY    omeprazole (PRILOSEC) 20 mg capsule TAKE 1 CAPSULE BY MOUTH EVERY DAY    AMLODIPINE BESYLATE, BULK, Take 10 mg by mouth daily.     metroNIDAZOLE (FLAGYL) 500 mg tablet Take 1 tablet at 1 PM, 2 PM, and 10 PM on day before surgery    neomycin (MYCIFRADIN) 500 mg tablet Take 2 tablets at 1 PM, 2 PM, and 10 PM on day before surgery    losartan-hydroCHLOROthiazide (HYZAAR) 50-12.5 mg per tablet TAKE 1 TABLET BY MOUTH EVERY DAY    ferrous sulfate 325 mg (65 mg iron) tablet Take 1 Tab by mouth two (2) times a day.  dilTIAZem CD (CARDIZEM CD) 300 mg ER capsule Take 1 Cap by mouth daily.  busPIRone (BUSPAR) 5 mg tablet Take 1 Tab by mouth two (2) times a day. Indications: Repeated Episodes of Anxiety    sertraline (ZOLOFT) 100 mg tablet TAKE 1.5 TABS BY MOUTH DAILY    omeprazole (PRILOSEC) 20 mg capsule TAKE 1 CAPSULE BY MOUTH EVERY DAY    traZODone (DESYREL) 50 mg tablet TAKE 1 TABLET BY MOUTH EVERYDAY AT BEDTIME    albuterol (VENTOLIN HFA) 90 mcg/actuation inhaler Take 2 Puffs by inhalation every six (6) hours as needed for Wheezing or Shortness of Breath.  MULTIVITAMINS (MULTIVITAMIN PO) Take  by mouth daily. Allergies   Allergen Reactions    Adderall [Dextroamphetamine-Amphetamine] Unknown (comments)    Ativan [Lorazepam] Hives    Egg Yolk Other (comments)     indigestion    Pcn [Penicillins] Other (comments)     Unsure of reaction; pt reports unsure if have taken cephalosporin before.  Wellbutrin [Bupropion Hcl] Anxiety    Xanax [Alprazolam] Hives       Review of Systems:    A complete review of systems was negative except as noted in the HPI. Objective:     Visit Vitals  /88 (BP 1 Location: Left arm, BP Patient Position: At rest)   Pulse 62   Temp 98.6 °F (37 °C)   Resp 16   Ht 5' 8\" (1.727 m)   Wt 178 lb 9.2 oz (81 kg)   SpO2 95%   BMI 27.15 kg/m²       Physical Exam:  GENERAL: alert, cooperative, no distress, appears older than stated age, EYE: negative, THROAT & NECK: normal, LUNG: clear to auscultation bilaterally, HEART: regular rate and rhythm, S1, S2 normal, no murmur, click, rub or gallop, ABDOMEN: NABS, non-distended, soft. Healthy left-sided colostomy; well healed midline wound. No pain with palpation, mass, or hernia. EXTREMITIES:  extremities normal, atraumatic, no cyanosis or edema, SKIN: Normal., NEUROLOGIC: negative    Assessment:     History of perforated diverticulitis; protein stores are replete. Plan:     1.  I recommend proceeding with laparoscopic colostomy closure with proctoscopy. 2. Discussed aspects of surgical intervention, methods, risks (including by not limited to infection, bleeding, hematoma, open procedure, leak, hernia, respiratory complications, and perforation of the intestines or solid organs), and the risks of general anesthetic. The patient understands the risks; all questions were answered to the patient's satisfaction. She did not stop smoking as recommended (still smoking 3 cigarettes/day)-I reviewed higher risk for leak, infection, pneumonia/pulmonary complications with her and daughter-they nonetheless desire to proceed under these conditions. 3. Patient does wish to proceed with surgery.

## 2019-07-19 NOTE — ANESTHESIA POSTPROCEDURE EVALUATION
Procedure(s):  LAPAROSCOPIC COLOSTOMY CLOSURE, RIGID PROCTOSCOPY (E R A S). general    Anesthesia Post Evaluation      Multimodal analgesia: multimodal analgesia used between 6 hours prior to anesthesia start to PACU discharge  Patient location during evaluation: PACU  Patient participation: complete - patient participated  Level of consciousness: awake  Pain score: 2  Pain management: adequate  Airway patency: patent  Anesthetic complications: no  Cardiovascular status: acceptable  Respiratory status: acceptable  Hydration status: acceptable  Comments: I have evaluated the patient and meets criteria for discharge from PACU. Cornelius Domínguez MD  Post anesthesia nausea and vomiting:  controlled      Vitals Value Taken Time   /62 7/19/2019  6:45 PM   Temp 36.1 °C (97 °F) 7/19/2019  5:50 PM   Pulse 58 7/19/2019  6:57 PM   Resp 16 7/19/2019  6:57 PM   SpO2 97 % 7/19/2019  6:57 PM   Vitals shown include unvalidated device data.

## 2019-07-19 NOTE — ANESTHESIA PREPROCEDURE EVALUATION
Relevant Problems   No relevant active problems       Anesthetic History   No history of anesthetic complications            Review of Systems / Medical History  Patient summary reviewed, nursing notes reviewed and pertinent labs reviewed    Pulmonary    COPD: mild        Asthma        Neuro/Psych   Within defined limits           Cardiovascular    Hypertension: well controlled                   GI/Hepatic/Renal     GERD: well controlled           Endo/Other      Hypothyroidism: well controlled       Other Findings              Physical Exam    Airway  Mallampati: II  TM Distance: > 6 cm  Neck ROM: normal range of motion   Mouth opening: Normal     Cardiovascular  Regular rate and rhythm,  S1 and S2 normal,  no murmur, click, rub, or gallop             Dental  No notable dental hx       Pulmonary  Breath sounds clear to auscultation               Abdominal  GI exam deferred       Other Findings            Anesthetic Plan    ASA: 3  Anesthesia type: general          Induction: Intravenous  Anesthetic plan and risks discussed with: Patient

## 2019-07-19 NOTE — ROUTINE PROCESS
Patient: Lela Riddle MRN: [id number]  SSN: [id number]   YOB: 1950  Age: 79 y.o. Sex: female     Patient is status post Procedure(s):  LAPAROSCOPIC COLOSTOMY CLOSURE, RIGID PROCTOSCOPY (E R A S). Surgeon(s) and Role:     Lizzy Olson MD - Primary    Local/Dose/Irrigation:  0.5 BUPIVICAINE PLAIN 30 ML                  Peripheral IV 07/19/19 Right Hand (Active)   Site Assessment Clean, dry, & intact 7/19/2019 11:00 AM   Phlebitis Assessment 0 7/19/2019 11:00 AM   Infiltration Assessment 0 7/19/2019 11:00 AM   Dressing Status New 7/19/2019 11:00 AM   Dressing Type Transparent 7/19/2019 11:00 AM   Hub Color/Line Status Green; Infusing 7/19/2019 11:00 AM            Airway - Endotracheal Tube 07/19/19 (Active)                   Dressing/Packing:  Wound Abdomen-Dressing Type: Fluffs; Topical skin adhesive/glue (07/19/19 1700)    Splint/Cast:  ]    Other:

## 2019-07-19 NOTE — PERIOP NOTES
Patient interviewed in holding area, identity and procedure verified. 1000 ml 0.9% NaCl as irrigation. Family member(daughter) updated as to start of surgery.

## 2019-07-19 NOTE — BRIEF OP NOTE
BRIEF OPERATIVE NOTE    Date of Procedure: 7/19/2019   Preoperative Diagnosis: PERFORATED DIVERTICULITIS  Postoperative Diagnosis: PERFORATED DIVERTICULITIS    Procedure(s):  LAPAROSCOPIC COLOSTOMY CLOSURE, RIGID PROCTOSCOPY (E R A S)  Surgeon(s) and Role:     Sofia Kennedy MD - Primary         Surgical Assistant: Matt Gamino    Surgical Staff:  Kristel Hayling: Micky Bee RN  Circ-Relief: Michelle Rowley RN  Physician Assistant: SELWYN Sheehan  Scrub Tech-1: Jose Lee  Scrub RN-Relief: Raquel Mays RN  Event Time In Time Out   Incision Start 1334    Incision Close 1724      Anesthesia: General   Estimated Blood Loss: 100 cc  Specimens:   ID Type Source Tests Collected by Time Destination   1 : COLOSTOMY AND ANASTOMOTIC DONUT Fresh Colon  Kaleb Hair MD 7/19/2019 1640 Pathology      Findings: dense adhesions; stapled 29EEA colorectal anastomosis; no air leak on proctoscopy   Complications: none  Implants: * No implants in log *

## 2019-07-19 NOTE — PERIOP NOTES
TRANSFER - OUT REPORT:    Verbal report given to Arabella(name) on Anna Marie Asp  being transferred to [facility](unit) for routine post - op       Report consisted of patients Situation, Background, Assessment and   Recommendations(SBAR). Information from the following report(s) SBAR, OR Summary, Intake/Output, MAR, Recent Results and Cardiac Rhythm SB. was reviewed with the receiving nurse. Opportunity for questions and clarification was provided. Is the patient on 02? NO    Is the patient on a monitor? NO    Is the nurse transporting with the patient? NO    Surgical Waiting Area notified of patient's transfer from PACU? YES      The following personal items collected during your admission accompanied patient upon transfer:   Dental Appliance: Dental Appliances: (daughter has patient's uppers)  Vision: Visual Aid: (Glasses are not taken to pacu; daughter has glasses)  Hearing Aid:    Jewelry:    Clothing: Clothing: With patient  Other Valuables:  Other Valuables: Eyeglasses(daughter has pt's glasses)  Valuables sent to safe:

## 2019-07-20 LAB
ANION GAP SERPL CALC-SCNC: 8 MMOL/L (ref 5–15)
BUN SERPL-MCNC: 5 MG/DL (ref 6–20)
BUN/CREAT SERPL: 7 (ref 12–20)
CALCIUM SERPL-MCNC: 8.6 MG/DL (ref 8.5–10.1)
CHLORIDE SERPL-SCNC: 106 MMOL/L (ref 97–108)
CO2 SERPL-SCNC: 25 MMOL/L (ref 21–32)
CREAT SERPL-MCNC: 0.74 MG/DL (ref 0.55–1.02)
ERYTHROCYTE [DISTWIDTH] IN BLOOD BY AUTOMATED COUNT: 14.1 % (ref 11.5–14.5)
GLUCOSE SERPL-MCNC: 135 MG/DL (ref 65–100)
HCT VFR BLD AUTO: 39.3 % (ref 35–47)
HGB BLD-MCNC: 12.7 G/DL (ref 11.5–16)
MAGNESIUM SERPL-MCNC: 2.5 MG/DL (ref 1.6–2.4)
MCH RBC QN AUTO: 28.4 PG (ref 26–34)
MCHC RBC AUTO-ENTMCNC: 32.3 G/DL (ref 30–36.5)
MCV RBC AUTO: 87.9 FL (ref 80–99)
NRBC # BLD: 0 K/UL (ref 0–0.01)
NRBC BLD-RTO: 0 PER 100 WBC
PHOSPHATE SERPL-MCNC: 3 MG/DL (ref 2.6–4.7)
PLATELET # BLD AUTO: 223 K/UL (ref 150–400)
PMV BLD AUTO: 10.1 FL (ref 8.9–12.9)
POTASSIUM SERPL-SCNC: 3.9 MMOL/L (ref 3.5–5.1)
RBC # BLD AUTO: 4.47 M/UL (ref 3.8–5.2)
SODIUM SERPL-SCNC: 139 MMOL/L (ref 136–145)
WBC # BLD AUTO: 12 K/UL (ref 3.6–11)

## 2019-07-20 PROCEDURE — 84100 ASSAY OF PHOSPHORUS: CPT

## 2019-07-20 PROCEDURE — 65270000032 HC RM SEMIPRIVATE

## 2019-07-20 PROCEDURE — 80048 BASIC METABOLIC PNL TOTAL CA: CPT

## 2019-07-20 PROCEDURE — 74011000250 HC RX REV CODE- 250: Performed by: SURGERY

## 2019-07-20 PROCEDURE — 74011250637 HC RX REV CODE- 250/637: Performed by: SURGERY

## 2019-07-20 PROCEDURE — 85027 COMPLETE CBC AUTOMATED: CPT

## 2019-07-20 PROCEDURE — 36415 COLL VENOUS BLD VENIPUNCTURE: CPT

## 2019-07-20 PROCEDURE — 74011250636 HC RX REV CODE- 250/636: Performed by: SURGERY

## 2019-07-20 PROCEDURE — 83735 ASSAY OF MAGNESIUM: CPT

## 2019-07-20 RX ORDER — HYDROMORPHONE HYDROCHLORIDE 1 MG/ML
1 INJECTION, SOLUTION INTRAMUSCULAR; INTRAVENOUS; SUBCUTANEOUS ONCE
Status: COMPLETED | OUTPATIENT
Start: 2019-07-20 | End: 2019-07-20

## 2019-07-20 RX ORDER — HYDROMORPHONE HYDROCHLORIDE 5 MG/5ML
1 SOLUTION ORAL ONCE
Status: DISCONTINUED | OUTPATIENT
Start: 2019-07-20 | End: 2019-07-20

## 2019-07-20 RX ADMIN — TRAZODONE HYDROCHLORIDE 50 MG: 50 TABLET ORAL at 21:03

## 2019-07-20 RX ADMIN — HYDROCHLOROTHIAZIDE 12.5 MG: 25 TABLET ORAL at 09:07

## 2019-07-20 RX ADMIN — BUSPIRONE HYDROCHLORIDE 5 MG: 10 TABLET ORAL at 18:58

## 2019-07-20 RX ADMIN — ACETAMINOPHEN 1000 MG: 500 TABLET ORAL at 03:09

## 2019-07-20 RX ADMIN — ALVIMOPAN 12 MG: 12 CAPSULE ORAL at 18:58

## 2019-07-20 RX ADMIN — ALVIMOPAN 12 MG: 12 CAPSULE ORAL at 09:07

## 2019-07-20 RX ADMIN — LOSARTAN POTASSIUM 50 MG: 50 TABLET ORAL at 09:07

## 2019-07-20 RX ADMIN — HYDROMORPHONE HYDROCHLORIDE 1 MG: 1 INJECTION, SOLUTION INTRAMUSCULAR; INTRAVENOUS; SUBCUTANEOUS at 13:56

## 2019-07-20 RX ADMIN — ENOXAPARIN SODIUM 40 MG: 40 INJECTION SUBCUTANEOUS at 09:12

## 2019-07-20 RX ADMIN — SERTRALINE HYDROCHLORIDE 150 MG: 50 TABLET ORAL at 09:06

## 2019-07-20 RX ADMIN — CELECOXIB 100 MG: 100 CAPSULE ORAL at 09:08

## 2019-07-20 RX ADMIN — AMLODIPINE BESYLATE 10 MG: 5 TABLET ORAL at 09:05

## 2019-07-20 RX ADMIN — LIDOCAINE HYDROCHLORIDE 1 MG/KG/HR: 8 INJECTION, SOLUTION INTRAVENOUS at 16:17

## 2019-07-20 RX ADMIN — DILTIAZEM HYDROCHLORIDE 300 MG: 300 CAPSULE, COATED, EXTENDED RELEASE ORAL at 09:07

## 2019-07-20 RX ADMIN — BUSPIRONE HYDROCHLORIDE 5 MG: 10 TABLET ORAL at 09:06

## 2019-07-20 RX ADMIN — ACETAMINOPHEN 1000 MG: 500 TABLET ORAL at 15:42

## 2019-07-20 RX ADMIN — CELECOXIB 100 MG: 100 CAPSULE ORAL at 18:58

## 2019-07-20 RX ADMIN — OXYCODONE HYDROCHLORIDE 5 MG: 5 TABLET ORAL at 11:02

## 2019-07-20 RX ADMIN — ACETAMINOPHEN 1000 MG: 500 TABLET ORAL at 09:05

## 2019-07-20 RX ADMIN — PANTOPRAZOLE SODIUM 40 MG: 40 TABLET, DELAYED RELEASE ORAL at 08:50

## 2019-07-20 RX ADMIN — OXYCODONE HYDROCHLORIDE 5 MG: 5 TABLET ORAL at 19:16

## 2019-07-20 NOTE — PROGRESS NOTES
Progress Note    Patient: Earnest Gracia MRN: [id number]  SSN: [id number]    YOB: 1950  Age: 79 y.o. Sex: female      Admit Date: 2019    1 Day Post-Op    Procedure:  Procedure(s):  LAPAROSCOPIC COLOSTOMY CLOSURE, RIGID PROCTOSCOPY (E R A S)    Subjective:     Patient complains of some pain at her incisions. She otherwise feels well. She is toelrating a regular diet and has had a BM. Objective:     Visit Vitals  /73   Pulse 74   Temp 98.4 °F (36.9 °C)   Resp 18   Ht 5' 8\" (1.727 m)   Wt 180 lb 9.6 oz (81.9 kg)   SpO2 98%   BMI 27.46 kg/m²       Temp (24hrs), Av.8 °F (36.6 °C), Min:97 °F (36.1 °C), Max:99.3 °F (37.4 °C)      Physical Exam:    Gen- Alert in NAD  Lungs- CTA  H- RRR  Abd- S/ ND/ appropriately tender. Data Review: images and reports reviewed    Lab Review: All lab results for the last 24 hours reviewed.   Recent Results (from the past 24 hour(s))   CBC W/O DIFF    Collection Time: 19  3:21 AM   Result Value Ref Range    WBC 12.0 (H) 3.6 - 11.0 K/uL    RBC 4.47 3.80 - 5.20 M/uL    HGB 12.7 11.5 - 16.0 g/dL    HCT 39.3 35.0 - 47.0 %    MCV 87.9 80.0 - 99.0 FL    MCH 28.4 26.0 - 34.0 PG    MCHC 32.3 30.0 - 36.5 g/dL    RDW 14.1 11.5 - 14.5 %    PLATELET 985 340 - 631 K/uL    MPV 10.1 8.9 - 12.9 FL    NRBC 0.0 0  WBC    ABSOLUTE NRBC 0.00 0.00 - 5.89 K/uL   METABOLIC PANEL, BASIC    Collection Time: 19  3:21 AM   Result Value Ref Range    Sodium 139 136 - 145 mmol/L    Potassium 3.9 3.5 - 5.1 mmol/L    Chloride 106 97 - 108 mmol/L    CO2 25 21 - 32 mmol/L    Anion gap 8 5 - 15 mmol/L    Glucose 135 (H) 65 - 100 mg/dL    BUN 5 (L) 6 - 20 MG/DL    Creatinine 0.74 0.55 - 1.02 MG/DL    BUN/Creatinine ratio 7 (L) 12 - 20      GFR est AA >60 >60 ml/min/1.73m2    GFR est non-AA >60 >60 ml/min/1.73m2    Calcium 8.6 8.5 - 10.1 MG/DL   MAGNESIUM    Collection Time: 19  3:21 AM   Result Value Ref Range    Magnesium 2.5 (H) 1.6 - 2.4 mg/dL   PHOSPHORUS Collection Time: 07/20/19  3:21 AM   Result Value Ref Range    Phosphorus 3.0 2.6 - 4.7 MG/DL       Assessment:     Hospital Problems  Date Reviewed: 7/19/2019          Codes Class Noted POA    Perforated diverticulum of large intestine ICD-10-CM: K57.20  ICD-9-CM: 562.10  3/26/2019 Unknown              Plan/Recommendations/Medical Decision Making:   S/p takedown of colostomy   Doing well on ERAS protocol. Continue to ambulate  Monitor TAMIKO output  Continue regular diet.

## 2019-07-20 NOTE — OP NOTES
[address]   OPERATIVE REPORT    Name:  Shruthi Brandon  MR#:  [id number]  :  1950  ACCOUNT #:  [de-identified]  DATE OF SERVICE:  2019    PREOPERATIVE DIAGNOSIS:  History of perforated diverticulitis. POSTOPERATIVE DIAGNOSES:  1. History of perforated diverticulitis. 2.  Dense intra-abdominal adhesions. PROCEDURES PERFORMED:  1. Laparoscopic closure of colostomy (CPT [id number]). 2.  Intraoperative rigid proctoscopy. SURGEON:  Anyi De Jesus MD    ASSISTANT:  SELWYN Enriquez    ANESTHESIA:  General endotracheal.    DRAIN:  19-mm Sam drain. COUNTS:  Sponge count correct. Needle count correct. COMPLICATIONS:  None. SPECIMENS REMOVED:  Segment of colon with anastomotic donuts. IMPLANTS:  None. ESTIMATED BLOOD LOSS:  100 mL. INDICATION:  The patient is a 82-year-old white female with multiple medical problems, with a history of perforated diverticulitis in 2019, ultimately managed through laparotomy with sigmoidectomy and end colostomy. She has recovered from this procedure, with repletion of protein stores. She has decreased tobacco use. She is taken to the operating room today for laparoscopic colostomy takedown. I have asked SELWYN Enriquez, to assist with the procedure given the technical complexity of laparoscopic colorectal surgery. She will run the laparoscope, assist in mobilization of the proximal colon, and assist in creation of the colorectal anastomosis. FINDINGS:  1. Dense intra-abdominal adhesions, requiring 3 hours of operative time for lysis. 2.  Stapled 29 EEA coloproctostomy. 3.  No insufflation air leak or hemorrhage on rigid proctoscopy. PROCEDURE:  The patient was identified as the correct patient in the preoperative holding area and informed consent was confirmed.   After answering the patient's remaining questions and those of her daughter, she was taken to the operating room and placed on the operating room table in the supine position. Sequential compression devices were placed on both lower extremities. Following the uneventful initiation of general anesthesia, she was carefully secured to the operating room table in the low lithotomy position. A Nails catheter was placed within her bladder, all potential pressure points were padded with eggcrate. Her colostomy was closed with a silk suture. Her right arm was tucked to her side. Her abdomen was prepped and draped in the usual sterile fashion. Final time-out was performed, and it was confirmed she had received intravenous antibiotics. An elliptical incision was made around the left lower quadrant colostomy. The dissection was carried through the dermis, into the subcutaneous fat circumferentially. This allowed identification of a plane between the colon, its blood supply, and the surrounding subcutaneous fat. This plane was developed posteriorly, until the anterior fascia was identified. The colon and its blood supply were then freed from the fascial edges using electrocautery. This revealed a small parastomal hernia containing two loops of small bowel adherent to the sac. These loops of bowel were freed from the hernia sac and reduced into the abdominal space. A segment of omentum was likewise identified. This was herniated through the opening in the abdominal wall, and once freed from the colon, the omentum was reduced into the abdominal space. Circumferential freeing of the colon and its blood supply was then performed, allowing the colon to be reduced into the abdominal space. The opening in the fascia was then closed with interrupted #1 PDS sutures around the balloon-tipped trocar. Insufflation was then initiated using carbon dioxide gas. Once adequate working sites had been developed, the 5-mm, 30-degree laparoscope was inserted. No signs of trocar injury were present.   Multiple adhesions were identified throughout the abdominal space, mainly between loops of bowel and the anterior abdominal wall. The right upper quadrant 5 mm trocar was then inserted using visual guidance with the laparoscope. Lysis of adhesions was initiated using a combination of blunt dissection and sharp dissection. This ultimately allowed placement of two additional 5 mm trocars, one in the epigastrium and one in the right lower quadrant, using visual guidance with the laparoscope. Lysis of adhesions was then continued, freeing all loops of bowel from the anterior abdominal wall. Multiple adhesions were then identified between the descending colon and loops of small bowel. These adhesions were taken down using careful sharp dissection. Adhesions between small bowel and the left sidewall were then taken down using identical technique. The omentum was then freed from the colon using the Harmonic scalpel. These maneuvers allowed the proximal colon to reach easily to the pelvis without undue tension. The splenic flexure had been previously taken down at her [facility] procedure. With the patient in steep Trendelenburg position, multiple pelvic adhesions were identified, and adhesions between adjacent loops of bowel were taken down using careful sharp dissection, allowing gradual reduction of loops of bowel from the pelvis. Three hours of operative time were dedicated to freeing adhesions to allow the pelvis to be cleared of small bowel adhesions to the rectal stump and the sacral promontory. At this juncture, the rectal stump was identified and bluntly freed from lateral attachments. Efforts were then made to pass EEA sizers transanally to the end of the rectal stump. Tethering of the rectal stump in the pelvis precluded the ability to pass EEA sizers to the end of the pouch. Therefore, additional careful blunt dissection was performed in the pelvis, freeing anterior and lateral attachments to the rectal stump.   EEA sizers were then subsequently successfully passed to the end of the rectal stump. The proximal colon was then passed through the prior colostomy site. After ensuring no twisting of the bowel or its blood supply, cautery was used to make an opening on the colon just proximal to the colostomy site, and the colon was dilated using EEA sizers. A 29 EEA anvil with spike attached was passed through the antimesenteric border of the colon and secured in position with a pursestring 3-0 Vicryl suture. The open segment of colon to include the colostomy site was amputated with a purple load linear stapler firing. After confirming adequate hemostasis, the colon was returned to the abdominal space. The fascia at the prior colostomy site was closed with interrupted #1 PDS sutures. Once pneumoperitoneum had been reestablished, the patient was returned to the Trendelenburg position, and loops of bowel were reduced from the pelvis. The 34 EEA stapler was passed transanally, and successfully passed to the end of the rectal stump. The stapler spike was deployed to the end of the rectal stump and mated with the anvil within the colon. The stapler was slowly and carefully closed with care to avoid twisting of the colon or incorporation of extraneous structures. After confirming correct stapler closure, the stapler was fired and removed. Both proximal and distal donuts were noted to be intact. The pelvis was flooded with sterile saline. The colon was then manually compressed above the anastomosis. Rigid proctoscopy was then performed. The proctoscope was passed transanally, into the rectal vault. With insufflation using the proctoscope, adequate distention of the rectum and colon above the anastomosis was noted. No insufflation air leak occurred. The bowel was decompressed and the proctoscope was removed. Sterile saline was evacuated from the upper abdomen. Sterile saline was evacuated from the pelvis. A 19-mm Sam drain was inserted into the abdominal space.   It was allowed to lie in the pelvis and brought out through the right lower quadrant 5 mm trocar site. It was secured to the skin with a 2-0 nylon suture. After confirming adequate hemostasis, pneumoperitoneum was released and all trocars were removed. All wounds were infiltrated with 0.5% Marcaine without epinephrine. The laparoscopic wounds were closed with skin staples, and the prior colostomy site was partially closed with skin staples with packing with moist cotton gauze. A cover sponge was then applied. The patient tolerated the procedure well. She was extubated in the operating room and transported to the recovery area in stable condition. The attending surgeon, Dr. Nino Sosa, was scrubbed and present for the entire procedure.       Vj Bran MD      [id number]  D:  07/19/2019 21:31  T:  07/19/2019 21:41  JOB #:  [id number]

## 2019-07-20 NOTE — PROGRESS NOTES
Spiritual Care Partner Volunteer visited patient in [address] [facility] [place] on 7/20/19. Documented by:   Chaplain Singh MDiv, MEENU Sharma PRASURJIT ([id number])

## 2019-07-20 NOTE — PROGRESS NOTES
Bedside shift change report given to [facility] 9967 (oncoming nurse) by Fidel Steven RN (offgoing nurse). Report included the following information SBAR, Kardex, Intake/Output and MAR.

## 2019-07-21 PROCEDURE — 74011250637 HC RX REV CODE- 250/637: Performed by: SURGERY

## 2019-07-21 PROCEDURE — 74011250636 HC RX REV CODE- 250/636: Performed by: SURGERY

## 2019-07-21 PROCEDURE — 65270000032 HC RM SEMIPRIVATE

## 2019-07-21 RX ADMIN — BUSPIRONE HYDROCHLORIDE 5 MG: 10 TABLET ORAL at 08:37

## 2019-07-21 RX ADMIN — ALVIMOPAN 12 MG: 12 CAPSULE ORAL at 08:37

## 2019-07-21 RX ADMIN — DILTIAZEM HYDROCHLORIDE 300 MG: 300 CAPSULE, COATED, EXTENDED RELEASE ORAL at 08:37

## 2019-07-21 RX ADMIN — CELECOXIB 100 MG: 100 CAPSULE ORAL at 19:02

## 2019-07-21 RX ADMIN — PANTOPRAZOLE SODIUM 40 MG: 40 TABLET, DELAYED RELEASE ORAL at 07:30

## 2019-07-21 RX ADMIN — ACETAMINOPHEN 1000 MG: 500 TABLET ORAL at 13:55

## 2019-07-21 RX ADMIN — ACETAMINOPHEN 1000 MG: 500 TABLET ORAL at 00:05

## 2019-07-21 RX ADMIN — ACETAMINOPHEN 1000 MG: 500 TABLET ORAL at 07:29

## 2019-07-21 RX ADMIN — LOSARTAN POTASSIUM 50 MG: 50 TABLET ORAL at 08:37

## 2019-07-21 RX ADMIN — AMLODIPINE BESYLATE 10 MG: 5 TABLET ORAL at 08:37

## 2019-07-21 RX ADMIN — BUSPIRONE HYDROCHLORIDE 5 MG: 10 TABLET ORAL at 19:02

## 2019-07-21 RX ADMIN — ONDANSETRON 4 MG: 4 TABLET, ORALLY DISINTEGRATING ORAL at 16:40

## 2019-07-21 RX ADMIN — ENOXAPARIN SODIUM 40 MG: 40 INJECTION SUBCUTANEOUS at 08:38

## 2019-07-21 RX ADMIN — SERTRALINE HYDROCHLORIDE 150 MG: 50 TABLET ORAL at 08:37

## 2019-07-21 RX ADMIN — ACETAMINOPHEN 1000 MG: 500 TABLET ORAL at 19:02

## 2019-07-21 RX ADMIN — CELECOXIB 100 MG: 100 CAPSULE ORAL at 08:37

## 2019-07-21 RX ADMIN — HYDROCHLOROTHIAZIDE 12.5 MG: 25 TABLET ORAL at 08:37

## 2019-07-21 NOTE — PROGRESS NOTES
Bedside shift change report given to [address] (oncoming nurse) by Kulwant Mc RN (offgoing nurse). Report included the following information SBAR, Kardex, Procedure Summary, MAR and Recent Results.

## 2019-07-21 NOTE — PROGRESS NOTES
Progress Note    Patient: Tanvi England MRN: [id number]  SSN: [id number]    YOB: 1950  Age: 79 y.o. Sex: female      Admit Date: 2019    2 Days Post-Op    Procedure:  Procedure(s):  LAPAROSCOPIC COLOSTOMY CLOSURE, RIGID PROCTOSCOPY (E R A S)    Subjective:     Patient feeling well. She has  Had another BM today. Objective:     Visit Vitals  /62   Pulse 86   Temp 97.5 °F (36.4 °C)   Resp 18   Ht 5' 8\" (1.727 m)   Wt 176 lb 1.6 oz (79.9 kg)   SpO2 98%   BMI 26.78 kg/m²       Temp (24hrs), Av.3 °F (36.8 °C), Min:97.5 °F (36.4 °C), Max:98.8 °F (37.1 °C)      Physical Exam:    Gen- Alert in NAD  Lungs-CTA  H-RRR  Abd- S/approriately tender. Abdominal wound inspected and appears to be clean- repacked. Data Review: images and reports reviewed    Lab Review: All lab results for the last 24 hours reviewed. No results found for this or any previous visit (from the past 24 hour(s)). Assessment:     Hospital Problems  Date Reviewed: 2019          Codes Class Noted POA    Perforated diverticulum of large intestine ICD-10-CM: K57.20  ICD-9-CM: 562.10  3/26/2019 Unknown              Plan/Recommendations/Medical Decision Making:   Doing well   Continue to pack the wound  Continue Diet  Will need home health for the wound.

## 2019-07-21 NOTE — PROGRESS NOTES
Bedside shift change report given to [address] Edmond 88 (oncoming nurse) by Molly Nesbitt RN (offgoing nurse). Report included the following information SBAR, Kardex, Intake/Output and MAR.

## 2019-07-22 ENCOUNTER — HOME HEALTH ADMISSION (OUTPATIENT)
Dept: HOME HEALTH SERVICES | Facility: HOME HEALTH | Age: 69
End: 2019-07-22
Payer: MEDICARE

## 2019-07-22 VITALS
TEMPERATURE: 98.2 F | BODY MASS INDEX: 27.01 KG/M2 | HEIGHT: 68 IN | WEIGHT: 178.2 LBS | SYSTOLIC BLOOD PRESSURE: 166 MMHG | RESPIRATION RATE: 16 BRPM | DIASTOLIC BLOOD PRESSURE: 84 MMHG | HEART RATE: 78 BPM | OXYGEN SATURATION: 98 %

## 2019-07-22 PROCEDURE — 74011250637 HC RX REV CODE- 250/637: Performed by: SURGERY

## 2019-07-22 RX ORDER — OXYCODONE HYDROCHLORIDE 5 MG/1
5 TABLET ORAL
Qty: 25 TAB | Refills: 0 | Status: SHIPPED | OUTPATIENT
Start: 2019-07-22 | End: 2019-07-27

## 2019-07-22 RX ADMIN — ACETAMINOPHEN 1000 MG: 500 TABLET ORAL at 01:43

## 2019-07-22 RX ADMIN — OXYCODONE HYDROCHLORIDE 5 MG: 5 TABLET ORAL at 11:28

## 2019-07-22 RX ADMIN — HYDROCHLOROTHIAZIDE 12.5 MG: 25 TABLET ORAL at 09:19

## 2019-07-22 RX ADMIN — DILTIAZEM HYDROCHLORIDE 300 MG: 300 CAPSULE, COATED, EXTENDED RELEASE ORAL at 09:20

## 2019-07-22 RX ADMIN — BUSPIRONE HYDROCHLORIDE 5 MG: 10 TABLET ORAL at 09:20

## 2019-07-22 RX ADMIN — AMLODIPINE BESYLATE 10 MG: 5 TABLET ORAL at 09:19

## 2019-07-22 RX ADMIN — ACETAMINOPHEN 1000 MG: 500 TABLET ORAL at 07:14

## 2019-07-22 RX ADMIN — LOSARTAN POTASSIUM 50 MG: 50 TABLET ORAL at 09:20

## 2019-07-22 RX ADMIN — ALVIMOPAN 12 MG: 12 CAPSULE ORAL at 09:14

## 2019-07-22 RX ADMIN — SERTRALINE HYDROCHLORIDE 150 MG: 50 TABLET ORAL at 09:19

## 2019-07-22 RX ADMIN — CELECOXIB 100 MG: 100 CAPSULE ORAL at 09:20

## 2019-07-22 RX ADMIN — PANTOPRAZOLE SODIUM 40 MG: 40 TABLET, DELAYED RELEASE ORAL at 07:14

## 2019-07-22 NOTE — PROGRESS NOTES
Bedside and Verbal shift change report given to Jamison Anderson (oncoming nurse) by Liu Cox RN (offgoing nurse). Report included the following information SBAR, Kardex, ED Summary, Intake/Output, MAR and Recent Results.

## 2019-07-22 NOTE — PROGRESS NOTES
MELBA plan:  - home today with home health  -follow up with surgery in 2 weeks      Reason for Admission: Laparoscopic colostomy closure, rigid proctoscopy (ERAS)                    RRAT Score:     18             Do you (patient/family) have any concerns for transition/discharge? None voiced              Plan for utilizing home health: [address]    Current Advanced Directive/Advance Care Plan:  No advanced directive, daughter Sariah Waite is next of kin            Transition of Care Plan:   Cm met with patient and her daughter Sariah Waite, with whom she lives, at the bedside to explain role and offer support. Patient is alert and oriented x4, with short term memory loss. Her daughter is her caregiver, arranges all of her medications, writes on a whiteboard for her daily, assists with her IADLs. Patient will need home health for wound care and teaching, daughter will be taught how to do the wound care. Cm offered choice and they would like to use [address] Referral sent and they can accept. Raimundo TORRES, ACM    Care Management Interventions  PCP Verified by CM:  Yes  Palliative Care Criteria Met (RRAT>21 & CHF Dx)?: No  Transition of Care Consult (CM Consult): [address]: Yes  MyChart Signup: No  Discharge Durable Medical Equipment: No  Health Maintenance Reviewed: Yes  Physical Therapy Consult: No  Occupational Therapy Consult: No  Speech Therapy Consult: No  Current Support Network: Lives with Caregiver  Confirm Follow Up Transport: Family  Plan discussed with Pt/Family/Caregiver: Yes  Freedom of Choice Offered: Yes  [address] Provided?: No  Discharge Location  Discharge Placement: Home with home health    Lori Mckee 84, ACM

## 2019-07-22 NOTE — PROGRESS NOTES
Hospital follow-up PCP transitional care appointment has been scheduled with Dr. Verna Tuttle for Tuesday, 7/30/19 at 10:20 a.m. Pending patient discharge.   [place] Rash, Care Management Specialist.

## 2019-07-22 NOTE — DISCHARGE INSTRUCTIONS
Patient Education     Future Appointments   Date Time Provider Mona Adamson   8/6/2019 10:00 AM Carlos Ramon MD Antonio Obi   11/13/2019 11:00 AM David Finney  [address]          Laparoscopic Bowel Resection: What to Expect at [address] have had part of your small or large intestine taken out. You are likely to have pain that comes and goes for the next few days. You may feel like you have the flu. You also may have a low fever and feel tired and nauseated. This is common. You should feel better after 1 to 2 weeks and will probably be back to normal in 2 to 4 weeks. Your bowel movements may not be regular for several weeks. Also, you may have some blood in your stool. This care sheet gives you a general idea about how long it will take for you to recover. But each person recovers at a different pace. Follow the steps below to get better as quickly as possible. How can you care for yourself at home? Activity    · Rest when you feel tired. Getting enough sleep will help you recover.     · Try to walk each day. Start by walking a little more than you did the day before. Bit by bit, increase the amount you walk. Walking boosts blood flow and helps prevent pneumonia and constipation.     · Avoid strenuous activities, such as biking, jogging, weight lifting, or aerobic exercise, until your doctor says it is okay.     · Avoid lifting anything that would make you strain. This may include heavy grocery bags and milk containers, a heavy briefcase or backpack, cat litter or dog food bags, a vacuum , or a child.     · Ask your doctor when you can drive again.     · You will probably need to take 2 to 4 weeks off from work. It depends on the type of work you do and how you feel.     · You may shower 48 hours after surgery. Pat the cuts (incisions) dry. Do not take a bath for the first 2 weeks, or until your doctor tells you it is okay.  Home Nursing to manage open wound.     · Ask your doctor when it is okay for you to have sex. Diet    · You may not have much appetite after the surgery. But try to eat a healthy diet. Your doctor will tell you about any foods you should not eat.     · Eat a low-fiber diet for several weeks after surgery. Eat many small meals throughout the day. Add high-fiber foods a little at a time.     · Eat yogurt. It puts good bacteria into your colon and helps prevent diarrhea.     · Try to avoid nuts, seeds, and corn for a while. They may be hard to digest.     · You may need to take vitamins that contain sodium and potassium. Your doctor will tell you whether you should take any vitamins or supplements.     · Drink plenty of fluids (enough so that your urine is light yellow or clear like water) to prevent dehydration. Choose water and other caffeine-free clear liquids until you feel better. If you have kidney, heart, or liver disease and have to limit fluids, talk with your doctor before you increase the amount of fluids you drink. Medicines    · Your doctor will tell you if and when you can restart your medicines. He or she will also give you instructions about taking any new medicines.     · If you take blood thinners, such as warfarin (Coumadin), clopidogrel (Plavix), or aspirin, be sure to talk to your doctor. He or she will tell you if and when to start taking those medicines again. Make sure that you understand exactly what your doctor wants you to do.     · Take pain medicines exactly as directed. ? If the doctor gave you a prescription medicine for pain, take it as prescribed. ? If you are not taking a prescription pain medicine, ask your doctor if you can take an over-the-counter medicine.     · If your doctor prescribed antibiotics, take them as directed. Do not stop taking them just because you feel better. You need to take the full course of antibiotics.     · You may need to take some medicines in a different form.  You will be told whether to crush pills or take a liquid form of the medicine.     · If your doctor gives you a stool softener, take it as directed. Incision care    · If you have strips of tape on the incisions, leave the tape on for a week or until it falls off.     · Wash the area daily with warm, soapy water and pat it dry. Don't use hydrogen peroxide or alcohol, which can slow healing. You may cover the area with a gauze bandage if it weeps or rubs against clothing. Change the bandage every day. Follow-up care is a key part of your treatment and safety. Be sure to make and go to all appointments, and call your doctor if you are having problems. It's also a good idea to know your test results and keep a list of the medicines you take. When should you call for help? Call 911 anytime you think you may need emergency care. For example, call if:    · You passed out (lost consciousness).     · You are short of breath.    Call your doctor now or seek immediate medical care if:    · You have pain that does not get better after you take pain medicine.     · You cannot pass stool or gas.     · You are sick to your stomach and cannot keep fluids down.     · Bright red blood has soaked through your bandage.     · You have loose stitches, or your incision comes open.     · You have signs of a blood clot in your leg (called a deep vein thrombosis), such as:  ? Pain in your calf, back of the knee, thigh, or groin. ? Redness and swelling in your leg or groin.     · You have signs of infection, such as:  ? Increased pain, swelling, warmth, or redness. ? Red streaks leading from the incision. ? Pus draining from the incision. ? A fever.    Watch closely for any changes in your health, and be sure to contact your doctor if you have any problems. Where can you learn more? Go to http://jama-obinna.info/. Enter H191 in the search box to learn more about \"Laparoscopic Bowel Resection: What to Expect at Home. \"  Current as of: November 7, 2018  Content Version: 12.1  © 4754-1512 [employer], [employer]. Care instructions adapted under license by [employer] (which disclaims liability or warranty for this information). If you have questions about a medical condition or this instruction, always ask your healthcare professional. [address] any warranty or liability for your use of this information.

## 2019-07-22 NOTE — PROGRESS NOTES
Problem: Patient Education: Go to Patient Education Activity  Goal: Patient/Family Education  Outcome: Progressing Towards Goal     Problem: Patient Education: Go to Patient Education Activity  Goal: Patient/Family Education  Outcome: Progressing Towards Goal

## 2019-07-22 NOTE — PROGRESS NOTES
Progress Note    Patient: Gustavo Bartlett MRN: [id number]  SSN: [id number]    YOB: 1950  Age: 79 y.o. Sex: female      Admit Date: 2019    3 Days Post-Op    Procedure:  Procedure(s):  LAPAROSCOPIC COLOSTOMY CLOSURE, RIGID PROCTOSCOPY (E R A S)    Subjective:     Patient has had BM x 3; ambulating, tolerating diet; good pain control. Objective:     Visit Vitals  /75   Pulse 80   Temp 98.2 °F (36.8 °C)   Resp 18   Ht 5' 8\" (1.727 m)   Wt 178 lb 3.2 oz (80.8 kg)   SpO2 95%   BMI 27.10 kg/m²       Temp (24hrs), Av °F (36.7 °C), Min:97.4 °F (36.3 °C), Max:98.9 °F (37.2 °C)      Physical Exam:    ABDOMEN: Non-distended, soft. Sero-sanguinous drain fluid. LLQ open wound packed. Appropriate incisional pain with palpation. Data Review: VS, I/O's      Assessment:     Hospital Problems  Date Reviewed: 2019          Codes Class Noted POA    Perforated diverticulum of large intestine ICD-10-CM: K57.20  ICD-9-CM: 562.10  3/26/2019 Unknown              Plan/Recommendations/Medical Decision Makin. Remove drain. 2. Discharge planned for later today once home wound care arranged.

## 2019-07-23 ENCOUNTER — PATIENT OUTREACH (OUTPATIENT)
Dept: FAMILY MEDICINE CLINIC | Age: 69
End: 2019-07-23

## 2019-07-23 ENCOUNTER — HOME CARE VISIT (OUTPATIENT)
Dept: SCHEDULING | Facility: HOME HEALTH | Age: 69
End: 2019-07-23
Payer: MEDICARE

## 2019-07-23 ENCOUNTER — TELEPHONE (OUTPATIENT)
Dept: SURGERY | Age: 69
End: 2019-07-23

## 2019-07-23 VITALS
SYSTOLIC BLOOD PRESSURE: 112 MMHG | DIASTOLIC BLOOD PRESSURE: 60 MMHG | HEART RATE: 80 BPM | TEMPERATURE: 98.8 F | OXYGEN SATURATION: 96 % | RESPIRATION RATE: 18 BRPM

## 2019-07-23 PROCEDURE — A4216 STERILE WATER/SALINE, 10 ML: HCPCS

## 2019-07-23 PROCEDURE — 400013 HH SOC

## 2019-07-23 PROCEDURE — 3331090002 HH PPS REVENUE DEBIT

## 2019-07-23 PROCEDURE — 3331090001 HH PPS REVENUE CREDIT

## 2019-07-23 PROCEDURE — G0299 HHS/HOSPICE OF RN EA 15 MIN: HCPCS

## 2019-07-23 PROCEDURE — A4452 WATERPROOF TAPE: HCPCS

## 2019-07-23 NOTE — TELEPHONE ENCOUNTER
Spoke with Gilford Marinas with [facility] regarding patient wound care. They are seen patient daily for wound care. Tolerating wound care well. Her daughter will be able to take over doing wound care. Jose Baltazar

## 2019-07-23 NOTE — PROGRESS NOTES
Called patient back, advised that 7/30 with  is soonest I can schedule for her. She wants to keep that appt and see Aimee Oscar only. Home Health is scheduled to come today. Gave her my direct number if any questions/concerns.

## 2019-07-24 ENCOUNTER — HOME CARE VISIT (OUTPATIENT)
Dept: SCHEDULING | Facility: HOME HEALTH | Age: 69
End: 2019-07-24
Payer: MEDICARE

## 2019-07-24 VITALS
DIASTOLIC BLOOD PRESSURE: 86 MMHG | HEART RATE: 71 BPM | OXYGEN SATURATION: 97 % | TEMPERATURE: 98.7 F | SYSTOLIC BLOOD PRESSURE: 152 MMHG | RESPIRATION RATE: 18 BRPM

## 2019-07-24 PROCEDURE — 3331090001 HH PPS REVENUE CREDIT

## 2019-07-24 PROCEDURE — G0299 HHS/HOSPICE OF RN EA 15 MIN: HCPCS

## 2019-07-24 PROCEDURE — 3331090002 HH PPS REVENUE DEBIT

## 2019-07-25 ENCOUNTER — DOCUMENTATION ONLY (OUTPATIENT)
Dept: SURGERY | Age: 69
End: 2019-07-25

## 2019-07-25 ENCOUNTER — HOME CARE VISIT (OUTPATIENT)
Dept: SCHEDULING | Facility: HOME HEALTH | Age: 69
End: 2019-07-25
Payer: MEDICARE

## 2019-07-25 PROCEDURE — G0300 HHS/HOSPICE OF LPN EA 15 MIN: HCPCS

## 2019-07-25 PROCEDURE — 3331090001 HH PPS REVENUE CREDIT

## 2019-07-25 PROCEDURE — 3331090002 HH PPS REVENUE DEBIT

## 2019-07-25 NOTE — DISCHARGE SUMMARY
Physician Discharge Summary     Patient ID:  Anna Marie Gale  [id number]  50 y.o.  1950    Allergies: Adderall [dextroamphetamine-amphetamine]; Ativan [lorazepam]; Egg yolk; Pcn [penicillins]; Wellbutrin [bupropion hcl]; and Xanax [alprazolam]    Admit Date: 7/19/2019    Discharge Date: 7/22/2019    * Admission Diagnoses: Perforated diverticulum of large intestine [K57.20]    * Discharge Diagnoses:    Hospital Problems as of 7/22/2019 Date Reviewed: 7/19/2019          Codes Class Noted - Resolved POA    Perforated diverticulum of large intestine ICD-10-CM: K57.20  ICD-9-CM: 562.10  3/26/2019 - Present Unknown               Admission Condition: Good    * Discharge Condition: good    * Procedures: Procedure(s):  LAPAROSCOPIC COLOSTOMY CLOSURE, RIGID PROCTOSCOPY (E R A S)    * Hospital Course:   Normal hospital course for this procedure. Rapid return of bowel function and good tolerance of diet. Consults: None    Significant Diagnostic Studies: N/A    * Disposition: Home    Discharge Medications:   Discharge Medication List as of 7/22/2019 11:00 AM      START taking these medications    Details   oxyCODONE IR (ROXICODONE) 5 mg immediate release tablet Take 1 Tab by mouth every four (4) hours as needed for Pain for up to 5 days. Max Daily Amount: 30 mg., Print, Disp-25 Tab, R-0         CONTINUE these medications which have NOT CHANGED    Details   AMLODIPINE BESYLATE, BULK, Take 10 mg by mouth daily. , Historical Med      losartan-hydroCHLOROthiazide (HYZAAR) 50-12.5 mg per tablet TAKE 1 TABLET BY MOUTH EVERY DAY, Normal, Disp-90 Tab, R-3      dilTIAZem CD (CARDIZEM CD) 300 mg ER capsule Take 1 Cap by mouth daily. , Normal, Disp-90 Cap, R-3      busPIRone (BUSPAR) 5 mg tablet Take 1 Tab by mouth two (2) times a day.  Indications: Repeated Episodes of Anxiety, Normal, Disp-60 Tab, R-1      !! sertraline (ZOLOFT) 100 mg tablet TAKE 1.5 TABS BY MOUTH DAILY, NormalPAST DUE FOLLOW UPDisp-45 Tab, R-0      !! omeprazole (PRILOSEC) 20 mg capsule TAKE 1 CAPSULE BY MOUTH EVERY DAY, NormalPAST DUE FOLLOW UPDisp-30 Cap, R-0      traZODone (DESYREL) 50 mg tablet TAKE 1 TABLET BY MOUTH EVERYDAY AT BEDTIME, Normal, Disp-30 Tab, R-0      !! sertraline (ZOLOFT) 100 mg tablet TAKE 1.5 TABS BY MOUTH DAILY, Normal, Disp-135 Tab, R-3      !! omeprazole (PRILOSEC) 20 mg capsule TAKE 1 CAPSULE BY MOUTH EVERY DAY, Normal, Disp-90 Cap, R-3      ferrous sulfate 325 mg (65 mg iron) tablet Take 1 Tab by mouth two (2) times a day., Historical Med      albuterol (VENTOLIN HFA) 90 mcg/actuation inhaler Take 2 Puffs by inhalation every six (6) hours as needed for Wheezing or Shortness of Breath., Historical Med      MULTIVITAMINS (MULTIVITAMIN PO) Take  by mouth daily. , Historical Med       !! - Potential duplicate medications found. Please discuss with provider. STOP taking these medications       metroNIDAZOLE (FLAGYL) 500 mg tablet Comments:   Reason for Stopping:         neomycin (MYCIFRADIN) 500 mg tablet Comments:   Reason for Stopping:               * Follow-up Care/Patient Instructions:   Activity: No heavy lifting, pushing, pulling x 3 weeks  Diet: Regular Diet  Wound Care: daily wet-dry packing of LLQ open wound    Follow-up Information     Follow up With Specialties Details Why Contact Info    Cedric Matias MD Family Practice On 7/30/2019 [facility] f/u PCP appointment Tuesday, 7/30/19 @ 10:20 a.m. [address] [phone]      [facility]  This is your home health agency [address] [place] [address]  [phone]          Future Appointments   Date Time Provider Mona Adamson   7/25/2019 To Be Determined Mukesh Reyes LPN [address] [facility]   7/26/2019 To Be Determined Mukesh Reyes LPN [address] [facility]   7/29/2019 To Be Determined Reza Kapadia RN [address] [facility]   7/30/2019 10:20 AM Cedric Matias MD [facility] SCHED   7/31/2019 To Be Determined Jayden Tapia,  [facility] [address]   8/2/2019 To Be Determined Celestia Pilar, N [facility]   8/5/2019 To Be Determined Celestia Pilar, [facility]   8/6/2019 10:00 AM MD Antonio Long   8/7/2019 To Be Determined Celestia Pilar, N [facility]   8/9/2019 To Be Determined Celestia Pilar, LPN [address]   8/12/2019 To Be Determined Celestia Pilar, LPN [address]   8/14/2019 To Be Determined Celestia Pilar, LPN [address]   8/16/2019 To Be Determined Celestia Pilar, LPN [address]   8/19/2019 To Be Determined Celestia Pilar, N [address] [facility] [address]   8/21/2019 To Be Determined Celestia Pilar, LPN [address]   8/23/2019 To Be Determined Celestia Pilar, LPN [address]   8/26/2019 To Be Determined Celestia Pilar, N [address] [facility] [address]   8/28/2019 To Be Determined Celestia Pilar, LPN [address]   8/30/2019 To Be Determined Celestia Pilar, LPN [address]   9/2/2019 To Be Determined Celestia Pilar, LPN [address]   9/4/2019 To Be Determined Celestia Pilar, LPN [address]   9/6/2019 To Be Determined Celestia Pilar, LPN [address]   9/9/2019 To Be Determined Celestia Pilar, LPN [address]   9/11/2019 To Be Determined Celestia Pilar, LPN [address]   9/13/2019 To Be Determined Nikhil Moses, LPN [address]   9/16/2019 To Be Determined Nikhil Moses, LPN [address]   9/18/2019 To Be Determined Jayden Tapia RN [address]   11/13/2019 11:00 AM Reji Painter  [address]         Signed:  Sharifa Torres MD  7/24/2019  9:45 PM

## 2019-07-25 NOTE — PROGRESS NOTES
Daughter Elida Donaldson listed on PHI form [employer] FMLA forms completed faxed confirmation received.   Signed release on file

## 2019-07-26 ENCOUNTER — HOME CARE VISIT (OUTPATIENT)
Dept: SCHEDULING | Facility: HOME HEALTH | Age: 69
End: 2019-07-26
Payer: MEDICARE

## 2019-07-26 VITALS
DIASTOLIC BLOOD PRESSURE: 82 MMHG | SYSTOLIC BLOOD PRESSURE: 148 MMHG | TEMPERATURE: 99 F | RESPIRATION RATE: 12 BRPM | OXYGEN SATURATION: 98 % | HEART RATE: 87 BPM

## 2019-07-26 VITALS
SYSTOLIC BLOOD PRESSURE: 148 MMHG | RESPIRATION RATE: 18 BRPM | DIASTOLIC BLOOD PRESSURE: 88 MMHG | OXYGEN SATURATION: 98 % | HEART RATE: 90 BPM | TEMPERATURE: 97.5 F

## 2019-07-26 PROCEDURE — A9270 NON-COVERED ITEM OR SERVICE: HCPCS

## 2019-07-26 PROCEDURE — G0299 HHS/HOSPICE OF RN EA 15 MIN: HCPCS

## 2019-07-26 PROCEDURE — 3331090001 HH PPS REVENUE CREDIT

## 2019-07-26 PROCEDURE — 3331090002 HH PPS REVENUE DEBIT

## 2019-07-27 PROCEDURE — 3331090002 HH PPS REVENUE DEBIT

## 2019-07-27 PROCEDURE — 3331090001 HH PPS REVENUE CREDIT

## 2019-07-28 PROCEDURE — 3331090001 HH PPS REVENUE CREDIT

## 2019-07-28 PROCEDURE — 3331090002 HH PPS REVENUE DEBIT

## 2019-07-29 ENCOUNTER — HOME CARE VISIT (OUTPATIENT)
Dept: SCHEDULING | Facility: HOME HEALTH | Age: 69
End: 2019-07-29
Payer: MEDICARE

## 2019-07-29 VITALS
OXYGEN SATURATION: 97 % | TEMPERATURE: 99 F | HEART RATE: 81 BPM | RESPIRATION RATE: 12 BRPM | SYSTOLIC BLOOD PRESSURE: 124 MMHG | DIASTOLIC BLOOD PRESSURE: 68 MMHG

## 2019-07-29 PROCEDURE — 3331090001 HH PPS REVENUE CREDIT

## 2019-07-29 PROCEDURE — G0299 HHS/HOSPICE OF RN EA 15 MIN: HCPCS

## 2019-07-29 PROCEDURE — 3331090002 HH PPS REVENUE DEBIT

## 2019-07-30 PROCEDURE — 3331090001 HH PPS REVENUE CREDIT

## 2019-07-30 PROCEDURE — 3331090002 HH PPS REVENUE DEBIT

## 2019-07-31 ENCOUNTER — PATIENT OUTREACH (OUTPATIENT)
Dept: FAMILY MEDICINE CLINIC | Age: 69
End: 2019-07-31

## 2019-07-31 ENCOUNTER — HOME CARE VISIT (OUTPATIENT)
Dept: SCHEDULING | Facility: HOME HEALTH | Age: 69
End: 2019-07-31
Payer: MEDICARE

## 2019-07-31 VITALS
TEMPERATURE: 98.7 F | HEART RATE: 75 BPM | RESPIRATION RATE: 18 BRPM | SYSTOLIC BLOOD PRESSURE: 140 MMHG | OXYGEN SATURATION: 97 % | DIASTOLIC BLOOD PRESSURE: 78 MMHG

## 2019-07-31 PROCEDURE — 3331090001 HH PPS REVENUE CREDIT

## 2019-07-31 PROCEDURE — 3331090002 HH PPS REVENUE DEBIT

## 2019-07-31 PROCEDURE — G0300 HHS/HOSPICE OF LPN EA 15 MIN: HCPCS

## 2019-07-31 NOTE — PROGRESS NOTES
Called and spoke to patient. Says she is doing well. Home Health nurse came today and changed her bandage. Said incision looks clean and dry. Still has some pain but has decreased. Deep down pain. Occasionally #6-7 but says ESTylenol eases it. Currently pain about #4. Has good appetite and having regular bowel movements. Goes to see surgeon, [address], on 8/6. Reminded to call if any questions/concerns. Goals        Patient Stated     education on management after bowel surgery (pt-stated)      7/23/19 [facility] 7/19-7/22 lap closure of colostomy  · Reminded to get rest  · Walk in house to get blood flowing and prevent pneumonia and constipation  · No lifting  · Shower only for first 2 weeks after 48 hours after having surgey  · Eat healthy diet  · Yogurt will put good bacteria in colon and help prevent diarrhea  · Plenty of fluids-water and caffeine free best  · Take pain meds as directed-caution with the Oxycodone(0pioid)-as pain lessens, take fewer and farther between  · Reviewed red flags: fever, pus from incision,red streaks from incision, increased pain/warmth/redness. · F/u with  8/6 10am.  · MELBA with pcp 7/30 10:20am.  · Call NN if any questions/concerns. mbt  7/31/19  Patient says [facility] nurse came today and changed bandage-incision looks good per [facility] nurse. Healing well. Still some pain but ESTylenol is easing the pain. Denies any red flag symptoms. Eating well and drinking fluids. Reports having bowel movements without any problem. Did not see pcp for MELBA yesterday-cancelled by provider. Will see  on 8/6. Reminded to call if any questions/concerns. NN to call back in 7 days. mbt         Other     Prevent complications post hospitalization. 7/23/19 [facility] 7/19-7/22 Lap closure of colostomy/rigid protoscopy  · Reviewed discharge instructions with patient and daughter, Radha Garcia (caretaker)  · Reviewed medications- education using teach back method on new medication,opioid.   · Cautions on use of opioid discussed  · Reviewed red flags: fever, signs of infection at incision site, nausea,vomiting,diarrhea, sob,confusion,chest pain. · MELBA with pcp 7/30 at 10:20am-trying to schedule sooner appt with pcp. · F/u with surgeon, - 8/6 at 10am.  · Given info on [facility] as resource. · Given NN contact info if any questions/concerns. · NN to call back in 5-7 days for update. mbt  7/31/19  Denied any red flags. Pain has decreased- managing with ESTylenol. Saw [facility] nurse today, bandage changed on incision. No sign of infection-healing well. MELBA with pcp 7/30 cancelled per pcp. Sees surgeon 8/6 at 10am.  Reminded to call if any questions/concerns. NN to call back in 5-7 days. mbt

## 2019-08-01 PROCEDURE — 3331090001 HH PPS REVENUE CREDIT

## 2019-08-01 PROCEDURE — 3331090002 HH PPS REVENUE DEBIT

## 2019-08-02 ENCOUNTER — HOME CARE VISIT (OUTPATIENT)
Dept: HOME HEALTH SERVICES | Facility: HOME HEALTH | Age: 69
End: 2019-08-02
Payer: MEDICARE

## 2019-08-02 ENCOUNTER — HOME CARE VISIT (OUTPATIENT)
Dept: SCHEDULING | Facility: HOME HEALTH | Age: 69
End: 2019-08-02
Payer: MEDICARE

## 2019-08-02 VITALS
HEART RATE: 89 BPM | RESPIRATION RATE: 18 BRPM | SYSTOLIC BLOOD PRESSURE: 138 MMHG | OXYGEN SATURATION: 98 % | TEMPERATURE: 97.1 F | DIASTOLIC BLOOD PRESSURE: 78 MMHG

## 2019-08-02 PROCEDURE — 3331090001 HH PPS REVENUE CREDIT

## 2019-08-02 PROCEDURE — 3331090002 HH PPS REVENUE DEBIT

## 2019-08-02 PROCEDURE — G0300 HHS/HOSPICE OF LPN EA 15 MIN: HCPCS

## 2019-08-03 PROCEDURE — 3331090001 HH PPS REVENUE CREDIT

## 2019-08-03 PROCEDURE — 3331090002 HH PPS REVENUE DEBIT

## 2019-08-04 PROCEDURE — 3331090001 HH PPS REVENUE CREDIT

## 2019-08-04 PROCEDURE — 3331090002 HH PPS REVENUE DEBIT

## 2019-08-05 ENCOUNTER — HOME CARE VISIT (OUTPATIENT)
Dept: SCHEDULING | Facility: HOME HEALTH | Age: 69
End: 2019-08-05
Payer: MEDICARE

## 2019-08-05 VITALS
HEART RATE: 76 BPM | RESPIRATION RATE: 18 BRPM | DIASTOLIC BLOOD PRESSURE: 72 MMHG | TEMPERATURE: 99 F | SYSTOLIC BLOOD PRESSURE: 140 MMHG | OXYGEN SATURATION: 97 %

## 2019-08-05 PROCEDURE — 3331090002 HH PPS REVENUE DEBIT

## 2019-08-05 PROCEDURE — 3331090001 HH PPS REVENUE CREDIT

## 2019-08-05 PROCEDURE — G0300 HHS/HOSPICE OF LPN EA 15 MIN: HCPCS

## 2019-08-05 PROCEDURE — A4216 STERILE WATER/SALINE, 10 ML: HCPCS

## 2019-08-06 ENCOUNTER — OFFICE VISIT (OUTPATIENT)
Dept: SURGERY | Age: 69
End: 2019-08-06

## 2019-08-06 VITALS
WEIGHT: 176 LBS | HEART RATE: 75 BPM | BODY MASS INDEX: 26.67 KG/M2 | DIASTOLIC BLOOD PRESSURE: 70 MMHG | OXYGEN SATURATION: 99 % | HEIGHT: 68 IN | SYSTOLIC BLOOD PRESSURE: 140 MMHG | RESPIRATION RATE: 20 BRPM | TEMPERATURE: 99.1 F

## 2019-08-06 DIAGNOSIS — Z09 POSTOPERATIVE EXAMINATION: Primary | ICD-10-CM

## 2019-08-06 PROCEDURE — 3331090002 HH PPS REVENUE DEBIT

## 2019-08-06 PROCEDURE — 3331090001 HH PPS REVENUE CREDIT

## 2019-08-06 NOTE — PROGRESS NOTES
1. Have you been to the ER, urgent care clinic since your last visit? Hospitalized since your last visit? No    2. Have you seen or consulted any other health care providers outside of the [address] [place] [address] since your last visit? Include any pap smears or colon screening.  No

## 2019-08-07 ENCOUNTER — HOME CARE VISIT (OUTPATIENT)
Dept: SCHEDULING | Facility: HOME HEALTH | Age: 69
End: 2019-08-07
Payer: MEDICARE

## 2019-08-07 VITALS
DIASTOLIC BLOOD PRESSURE: 82 MMHG | SYSTOLIC BLOOD PRESSURE: 130 MMHG | HEART RATE: 68 BPM | OXYGEN SATURATION: 96 % | TEMPERATURE: 98.3 F

## 2019-08-07 PROCEDURE — 3331090001 HH PPS REVENUE CREDIT

## 2019-08-07 PROCEDURE — 3331090002 HH PPS REVENUE DEBIT

## 2019-08-07 PROCEDURE — G0300 HHS/HOSPICE OF LPN EA 15 MIN: HCPCS

## 2019-08-07 NOTE — PROGRESS NOTES
Subjective:      Sherri Owen is a 79 y.o. female presents for postop care 2.5 weeks following laparoscopic colostomy closure. Appetite is good. Eating a regular diet without difficulty. Bowel movements are regular. The patient is not having any pain. Home nursing is performing wound care. Objective:     Visit Vitals  /70   Pulse 75   Temp 99.1 °F (37.3 °C)   Resp 20   Ht 5' 8\" (1.727 m)   Wt 176 lb (79.8 kg)   SpO2 99%   BMI 26.76 kg/m²       General:  alert, cooperative, no distress, appears stated age   Abdomen: soft, non-tender, no hernias   Incision:   Laparoscopic wounds healed; LLQ open wound decreasing in size with healthy granulation tissue present. Wound loosely re-packed. Assessment:     Doing well postoperatively. Plan:     1. Continue current medications. 2. Wound care discussed. 3. Wound check in 3-4 weeks; activity as tolerated.

## 2019-08-08 PROCEDURE — 3331090002 HH PPS REVENUE DEBIT

## 2019-08-08 PROCEDURE — 3331090001 HH PPS REVENUE CREDIT

## 2019-08-09 ENCOUNTER — HOME CARE VISIT (OUTPATIENT)
Dept: SCHEDULING | Facility: HOME HEALTH | Age: 69
End: 2019-08-09
Payer: MEDICARE

## 2019-08-09 PROCEDURE — 3331090002 HH PPS REVENUE DEBIT

## 2019-08-09 PROCEDURE — 3331090001 HH PPS REVENUE CREDIT

## 2019-08-10 PROCEDURE — 3331090001 HH PPS REVENUE CREDIT

## 2019-08-10 PROCEDURE — 3331090002 HH PPS REVENUE DEBIT

## 2019-08-11 PROCEDURE — 3331090001 HH PPS REVENUE CREDIT

## 2019-08-11 PROCEDURE — 3331090002 HH PPS REVENUE DEBIT

## 2019-08-12 ENCOUNTER — HOME CARE VISIT (OUTPATIENT)
Dept: SCHEDULING | Facility: HOME HEALTH | Age: 69
End: 2019-08-12
Payer: MEDICARE

## 2019-08-12 PROCEDURE — 3331090001 HH PPS REVENUE CREDIT

## 2019-08-12 PROCEDURE — 3331090002 HH PPS REVENUE DEBIT

## 2019-08-13 ENCOUNTER — PATIENT OUTREACH (OUTPATIENT)
Dept: FAMILY MEDICINE CLINIC | Age: 69
End: 2019-08-13

## 2019-08-13 PROCEDURE — 3331090002 HH PPS REVENUE DEBIT

## 2019-08-13 PROCEDURE — 3331090001 HH PPS REVENUE CREDIT

## 2019-08-13 NOTE — PROGRESS NOTES
Called patient for update. Currently in [facility] on vacation. Doing very well. Goals        Patient Stated     education on management after bowel surgery (pt-stated)      7/23/19 [facility] 7/19-7/22 lap closure of colostomy  · Reminded to get rest  · Walk in house to get blood flowing and prevent pneumonia and constipation  · No lifting  · Shower only for first 2 weeks after 48 hours after having surgey  · Eat healthy diet  · Yogurt will put good bacteria in colon and help prevent diarrhea  · Plenty of fluids-water and caffeine free best  · Take pain meds as directed-caution with the Oxycodone(0pioid)-as pain lessens, take fewer and farther between  · Reviewed red flags: fever, pus from incision,red streaks from incision, increased pain/warmth/redness. · F/u with  8/6 10am.  · MELBA with pcp 7/30 10:20am.  · Call NN if any questions/concerns. mbt  7/31/19  Patient says [facility] nurse came today and changed bandage-incision looks good per [facility] nurse. Healing well. Still some pain but ESTylenol is easing the pain. Denies any red flag symptoms. Eating well and drinking fluids. Reports having bowel movements without any problem. Did not see pcp for MELBA yesterday-cancelled by provider. Will see  on 8/6. Reminded to call if any questions/concerns. NN to call back in 7 days. mbt  8/13/19  Patient in [facility] with sisters and daughter on vacation. Doing very well she says. BMs regular, only occasional discomfort. Still has dressing-changes daily-no drainage. Saw  8/6 and he was pleased with her recovery. Goes back to see him in a few months. Denies any red flags. mbt         Other     Prevent complications post hospitalization. 7/23/19 [facility] 7/19-7/22 Lap closure of colostomy/rigid protoscopy  · Reviewed discharge instructions with patient and daughter, Allie Bloom (caretaker)  · Reviewed medications- education using teach back method on new medication,opioid.   · Cautions on use of opioid discussed  · Reviewed red flags: fever, signs of infection at incision site, nausea,vomiting,diarrhea, sob,confusion,chest pain. · MELBA with pcp 7/30 at 10:20am-trying to schedule sooner appt with pcp. · F/u with surgeon, - 8/6 at 10am.  · Given info on [address] [place] [address],[address] as resource. · Given NN contact info if any questions/concerns. · NN to call back in 5-7 days for update. mbt  7/31/19  Denied any red flags. Pain has decreased- managing with ESTylenol. Saw [facility] nurse today, bandage changed on incision. No sign of infection-healing well. MELBA with pcp 7/30 cancelled per pcp. Sees surgeon 8/6 at 10am.  Reminded to call if any questions/concerns. NN to call back in 5-7 days. mbt  8/13/19  Doing great, on vacation. No red flags. Only occasional,minimal discomfort. BMS normal, good appetite. Saw surgeon, pleased with her progress. Reminded to call if questions/concerns. mbt

## 2019-08-14 ENCOUNTER — HOME CARE VISIT (OUTPATIENT)
Dept: SCHEDULING | Facility: HOME HEALTH | Age: 69
End: 2019-08-14
Payer: MEDICARE

## 2019-08-14 PROCEDURE — 3331090002 HH PPS REVENUE DEBIT

## 2019-08-14 PROCEDURE — 3331090001 HH PPS REVENUE CREDIT

## 2019-08-15 PROCEDURE — 3331090002 HH PPS REVENUE DEBIT

## 2019-08-15 PROCEDURE — 3331090001 HH PPS REVENUE CREDIT

## 2019-08-16 ENCOUNTER — HOME CARE VISIT (OUTPATIENT)
Dept: SCHEDULING | Facility: HOME HEALTH | Age: 69
End: 2019-08-16
Payer: MEDICARE

## 2019-08-16 PROCEDURE — 3331090001 HH PPS REVENUE CREDIT

## 2019-08-16 PROCEDURE — 3331090002 HH PPS REVENUE DEBIT

## 2019-08-17 PROCEDURE — 3331090002 HH PPS REVENUE DEBIT

## 2019-08-17 PROCEDURE — 3331090001 HH PPS REVENUE CREDIT

## 2019-08-18 PROCEDURE — 3331090002 HH PPS REVENUE DEBIT

## 2019-08-18 PROCEDURE — 3331090001 HH PPS REVENUE CREDIT

## 2019-08-19 ENCOUNTER — HOME CARE VISIT (OUTPATIENT)
Dept: SCHEDULING | Facility: HOME HEALTH | Age: 69
End: 2019-08-19
Payer: MEDICARE

## 2019-08-19 VITALS
SYSTOLIC BLOOD PRESSURE: 130 MMHG | HEART RATE: 56 BPM | TEMPERATURE: 98.7 F | DIASTOLIC BLOOD PRESSURE: 80 MMHG | RESPIRATION RATE: 18 BRPM | OXYGEN SATURATION: 97 %

## 2019-08-19 PROCEDURE — G0300 HHS/HOSPICE OF LPN EA 15 MIN: HCPCS

## 2019-08-19 PROCEDURE — 3331090001 HH PPS REVENUE CREDIT

## 2019-08-19 PROCEDURE — 3331090002 HH PPS REVENUE DEBIT

## 2019-08-20 PROCEDURE — 3331090001 HH PPS REVENUE CREDIT

## 2019-08-20 PROCEDURE — 3331090002 HH PPS REVENUE DEBIT

## 2019-08-21 ENCOUNTER — PATIENT OUTREACH (OUTPATIENT)
Dept: FAMILY MEDICINE CLINIC | Age: 69
End: 2019-08-21

## 2019-08-21 ENCOUNTER — HOME CARE VISIT (OUTPATIENT)
Dept: SCHEDULING | Facility: HOME HEALTH | Age: 69
End: 2019-08-21
Payer: MEDICARE

## 2019-08-21 PROCEDURE — 3331090002 HH PPS REVENUE DEBIT

## 2019-08-21 PROCEDURE — G0300 HHS/HOSPICE OF LPN EA 15 MIN: HCPCS

## 2019-08-21 PROCEDURE — 3331090001 HH PPS REVENUE CREDIT

## 2019-08-21 NOTE — PROGRESS NOTES
Patient in good spirits, no complaints. Wound healing well. Only has one more week of [facility] before being discharged. No red flags noted. No pain. Goals        Patient Stated     education on management after bowel surgery (pt-stated)      7/23/19 [facility] 7/19-7/22 lap closure of colostomy  · Reminded to get rest  · Walk in house to get blood flowing and prevent pneumonia and constipation  · No lifting  · Shower only for first 2 weeks after 48 hours after having surgey  · Eat healthy diet  · Yogurt will put good bacteria in colon and help prevent diarrhea  · Plenty of fluids-water and caffeine free best  · Take pain meds as directed-caution with the Oxycodone(0pioid)-as pain lessens, take fewer and farther between  · Reviewed red flags: fever, pus from incision,red streaks from incision, increased pain/warmth/redness. · F/u with  8/6 10am.  · MELBA with pcp 7/30 10:20am.  · Call NN if any questions/concerns. mbt  7/31/19  Patient says [facility] nurse came today and changed bandage-incision looks good per [facility] nurse. Healing well. Still some pain but ESTylenol is easing the pain. Denies any red flag symptoms. Eating well and drinking fluids. Reports having bowel movements without any problem. Did not see pcp for MELBA yesterday-cancelled by provider. Will see  on 8/6. Reminded to call if any questions/concerns. NN to call back in 7 days. mbt  8/13/19  Patient in [facility] with sisters and daughter on vacation. Doing very well she says. BMs regular, only occasional discomfort. Still has dressing-changes daily-no drainage. Saw  8/6 and he was pleased with her recovery. Goes back to see him in a few months. Denies any red flags. mbt  8/21/19  Patient says she is feeling well. No pain from surgery. Saw [facility] nurse today to change dressing- wound is closing up well. [facility] nurse will come next week and that will be her final week. Saw  8/6, will f/u in few months.  Daughter has scheduled that appointment. Reminded to call if any questions./concerns. mbt         Other     Prevent complications post hospitalization. 7/23/19 [facility] 7/19-7/22 Lap closure of colostomy/rigid protoscopy  · Reviewed discharge instructions with patient and daughter, Radhika Michelle (caretaker)  · Reviewed medications- education using teach back method on new medication,opioid. · Cautions on use of opioid discussed  · Reviewed red flags: fever, signs of infection at incision site, nausea,vomiting,diarrhea, sob,confusion,chest pain. · MELBA with pcp 7/30 at 10:20am-trying to schedule sooner appt with pcp. · F/u with surgeon, - 8/6 at 10am.  · Given info on [facility] as resource. · Given NN contact info if any questions/concerns. · NN to call back in 5-7 days for update. mbt  7/31/19  Denied any red flags. Pain has decreased- managing with ESTylenol. Saw [place] nurse today, bandage changed on incision. No sign of infection-healing well. MELBA with pcp 7/30 cancelled per pcp. Sees surgeon 8/6 at 10am.  Reminded to call if any questions/concerns. NN to call back in 5-7 days. mbt  8/13/19  Doing great, on vacation. No red flags. Only occasional,minimal discomfort. BMS normal, good appetite. Saw surgeon, pleased with her progress. Reminded to call if questions/concerns. mbt  8/21/19  Feels very good. No red flags. Wound healing up well. [place] nurse comes for final visit next week. Was there today to change her dressing. Denies any pain at this time. Reminded to call if questions/concerns. mbt

## 2019-08-22 VITALS
TEMPERATURE: 98.9 F | RESPIRATION RATE: 18 BRPM | DIASTOLIC BLOOD PRESSURE: 70 MMHG | HEART RATE: 67 BPM | OXYGEN SATURATION: 97 % | SYSTOLIC BLOOD PRESSURE: 140 MMHG

## 2019-08-22 PROCEDURE — 3331090002 HH PPS REVENUE DEBIT

## 2019-08-22 PROCEDURE — 3331090001 HH PPS REVENUE CREDIT

## 2019-08-23 ENCOUNTER — HOME CARE VISIT (OUTPATIENT)
Dept: SCHEDULING | Facility: HOME HEALTH | Age: 69
End: 2019-08-23
Payer: MEDICARE

## 2019-08-23 PROCEDURE — 3331090001 HH PPS REVENUE CREDIT

## 2019-08-23 PROCEDURE — 3331090002 HH PPS REVENUE DEBIT

## 2019-08-24 PROCEDURE — 3331090001 HH PPS REVENUE CREDIT

## 2019-08-24 PROCEDURE — 3331090002 HH PPS REVENUE DEBIT

## 2019-08-25 PROCEDURE — 3331090002 HH PPS REVENUE DEBIT

## 2019-08-25 PROCEDURE — 3331090001 HH PPS REVENUE CREDIT

## 2019-08-26 ENCOUNTER — HOME CARE VISIT (OUTPATIENT)
Dept: SCHEDULING | Facility: HOME HEALTH | Age: 69
End: 2019-08-26
Payer: MEDICARE

## 2019-08-26 PROCEDURE — 3331090001 HH PPS REVENUE CREDIT

## 2019-08-26 PROCEDURE — 3331090002 HH PPS REVENUE DEBIT

## 2019-08-27 PROCEDURE — 3331090001 HH PPS REVENUE CREDIT

## 2019-08-27 PROCEDURE — 3331090002 HH PPS REVENUE DEBIT

## 2019-08-28 ENCOUNTER — HOME CARE VISIT (OUTPATIENT)
Dept: SCHEDULING | Facility: HOME HEALTH | Age: 69
End: 2019-08-28
Payer: MEDICARE

## 2019-08-28 VITALS
DIASTOLIC BLOOD PRESSURE: 82 MMHG | RESPIRATION RATE: 14 BRPM | SYSTOLIC BLOOD PRESSURE: 144 MMHG | HEART RATE: 78 BPM | OXYGEN SATURATION: 98 % | TEMPERATURE: 98.5 F

## 2019-08-28 PROCEDURE — G0299 HHS/HOSPICE OF RN EA 15 MIN: HCPCS

## 2019-08-28 PROCEDURE — 3331090002 HH PPS REVENUE DEBIT

## 2019-08-28 PROCEDURE — 3331090001 HH PPS REVENUE CREDIT

## 2019-10-03 ENCOUNTER — OFFICE VISIT (OUTPATIENT)
Dept: SURGERY | Age: 69
End: 2019-10-03

## 2019-10-03 ENCOUNTER — TELEPHONE (OUTPATIENT)
Dept: CARDIOLOGY CLINIC | Age: 69
End: 2019-10-03

## 2019-10-03 VITALS
BODY MASS INDEX: 26.52 KG/M2 | RESPIRATION RATE: 18 BRPM | HEART RATE: 85 BPM | DIASTOLIC BLOOD PRESSURE: 80 MMHG | SYSTOLIC BLOOD PRESSURE: 130 MMHG | HEIGHT: 68 IN | OXYGEN SATURATION: 97 % | WEIGHT: 175 LBS | TEMPERATURE: 98.3 F

## 2019-10-03 DIAGNOSIS — Z09 FOLLOW-UP EXAMINATION AFTER ABDOMINAL SURGERY: Primary | ICD-10-CM

## 2019-10-03 DIAGNOSIS — I49.9 IRREGULAR HEART RHYTHM: ICD-10-CM

## 2019-10-03 NOTE — PROGRESS NOTES
HISTORY OF PRESENT ILLNESS  Kolby Sunshine is a 79 y.o. female. HPI  Chief Complaint   Patient presents with    Surgical Follow-up     2 1/2 months S/P 7/19/19 Colostomy takedown     Kolby Sunshine is 2+ months s/p colostomy takedown. She is feeling \"great\" and no complaints. Review of Systems   Constitutional: Negative for chills, diaphoresis, fever, malaise/fatigue and weight loss. Respiratory: Positive for cough (chronic ) and shortness of breath (at baseline ). Negative for wheezing. Cardiovascular: Negative for chest pain, palpitations and leg swelling. Gastrointestinal: Negative for abdominal pain, constipation, diarrhea, heartburn, nausea and vomiting. Reports having normal formed BM    Genitourinary: Negative for dysuria. Musculoskeletal: Negative for falls. Neurological: Negative for dizziness. Psychiatric/Behavioral:        Feels good        Physical Exam   Constitutional: She is oriented to person, place, and time. No distress. /80 (BP 1 Location: Left arm, BP Patient Position: Sitting)   Pulse 85 Comment: irregular  Temp 98.3 °F (36.8 °C) (Oral)   Resp 18   Ht 5' 8\" (1.727 m)   Wt 175 lb (79.4 kg)   SpO2 97%   BMI 26.61 kg/m²   White female here with daughter   Curtis Pay well    Cardiovascular:   Normal rate  Irregular rhythm, afib    Pulmonary/Chest: Effort normal and breath sounds normal. No respiratory distress. Abdominal: Soft. Bowel sounds are normal. She exhibits no distension. There is no tenderness. Incisions well healed    Musculoskeletal:   Ambulating independently    Neurological: She is alert and oriented to person, place, and time. Skin: Skin is warm and dry. She is not diaphoretic. ASSESSMENT and PLAN    ICD-10-CM ICD-9-CM    1. Follow-up examination after abdominal surgery Z09 V67.09    2.  Irregular heart rhythm I49.9 427.9      Doing well from colostomy takedown  Diet well tolerated and bowels are working normally with formed stool Weight is stable   She is back in afib with rate control, called Dr. Gem Benitez office for follow up appt and they will see her next week   If develops sob, chest pain, dizziness she will f/u sooner   Discharged from surgical care with prn follow up   Stoney Severs verbalized understanding and questions were answered to the best of my knowledge and ability. Healthy lifestyle  educational materials were provided.

## 2019-10-03 NOTE — TELEPHONE ENCOUNTER
Nurse from Dr. Shola Lester office called because patient was in for post op appointment and her heart rate was irregular but rate was 85. Pt has a hx of Afib. Pt has no complaints. Pt was scheduled for an appointment with Dr. Naga Frye next week. They informed patient to call the office if she has any concerns.      Future Appointments   Date Time Provider oMna Adamson   10/8/2019  2:20 PM Gaynel Nageotte,  [address] 11/13/2019 11:00 AM Gaynel Nageotte,  [address]

## 2019-10-03 NOTE — PATIENT INSTRUCTIONS
Cardiologist 10/8/19 at 2:20pm     If you develop chest pain, shortness of breath, dizziness please seek urgent follow up

## 2019-10-03 NOTE — PROGRESS NOTES
1. Have you been to the ER, urgent care clinic since your last visit? Hospitalized since your last visit? No    2. Have you seen or consulted any other health care providers outside of the [address] [place] [address] since your last visit? Include any pap smears or colon screening.   No

## 2019-10-08 ENCOUNTER — OFFICE VISIT (OUTPATIENT)
Dept: CARDIOLOGY CLINIC | Age: 69
End: 2019-10-08

## 2019-10-08 VITALS
DIASTOLIC BLOOD PRESSURE: 63 MMHG | RESPIRATION RATE: 17 BRPM | OXYGEN SATURATION: 96 % | BODY MASS INDEX: 26.46 KG/M2 | SYSTOLIC BLOOD PRESSURE: 140 MMHG | HEIGHT: 68 IN | HEART RATE: 71 BPM | WEIGHT: 174.6 LBS

## 2019-10-08 DIAGNOSIS — E78.2 MIXED HYPERLIPIDEMIA: ICD-10-CM

## 2019-10-08 DIAGNOSIS — I10 BENIGN ESSENTIAL HYPERTENSION: ICD-10-CM

## 2019-10-08 DIAGNOSIS — Z87.891 HISTORY OF TOBACCO USE: ICD-10-CM

## 2019-10-08 DIAGNOSIS — I48.0 PAROXYSMAL A-FIB (HCC): Primary | ICD-10-CM

## 2019-10-08 DIAGNOSIS — J44.9 CHRONIC OBSTRUCTIVE PULMONARY DISEASE, UNSPECIFIED COPD TYPE (HCC): ICD-10-CM

## 2019-10-08 NOTE — PROGRESS NOTES
[address]: Steph Ramp  [phone]    History of Present Illness: Ms. Ruthie Marcos is a 78 yo F with h/o hospitalization for perforated diverticulum, history of multiple surgeries including sigmoidectomy, colostomy and  shunt, during her hospitalization developed atrial fibrillation with RVR and was started on Diltiazem and Metoprolol and converted to sinus rhythm. Echo 04/02/2019 that demonstrated preserved LV function. Since her last visit, she has been doing okay. She had a recent colostomy takedown surgery and had a follow-up appointment a few days ago. At that time, they noted that she was in rate controlled atrial fibrillation and recommended she get checked here for followup. She has rare palpitations, maybe once every few months with no associated symptoms. Overall, her breathing has been stable. She denies any chest pain. She has been compliant with her medications. She is compensated on exam with clear lungs. Assessment and Plan:    1. Paroxysmal atrial fibrillation. She is currently in atrial fibrillation, but her heart rate is controlled. Will continue her current cardiac regimen. We did talk about symptoms to look out for that would need to make adjustment, but none needed at this time. She has significant CHADS VASC score, but history of subarachnoid hemorrhage and risks outweigh the benefits of anticoagulation. We did discuss this in the past as well. Will have her follow back in six months. 2. Essential hypertension. Blood pressure is controlled. 3. Perforated diverticulum, status post surgery in 03/2019, also status post colostomy takedown. 4. COPD. 5.  shunt.         She  has a past medical history of ACP (advance care planning) (2/21/2017), Aneurysm ([address] (2006), Asthma, Atrial fibrillation, transient ([address] (5/2/2019), Benign essential hypertension (6/24/2016), Bilateral hip pain (6/3/2014), Cancer ([address] ([id number]), Chronic depression (7/12/2019), COPD (chronic obstructive pulmonary disease) ([facility] (3/29/2010), Diverticulitis, DVT of Rt Lower Extremity (3/29/2010), Elevated LFT's, Fatty Liver (3/29/2010), Essential tremor (7/14/2010), Generalized anxiety disorder with panic attacks (7/12/2019), GERD (gastroesophageal reflux disease) (9/2/2011), H/O Subarachnoid hemorrhage due to ruptured aneurysm (7/14/2010), Hyperlipemia (3/29/2010), Impaired fasting glucose (7/13/2014), Perforation and abscess of large intestine concurrent with and due to diverticulitis (5/2/2019), Thyroiditis, subacute (3/29/2010), Tobacco Abuse (3/29/2010), and Vitamin D deficiency (6/3/2014). She also has no past medical history of Abuse, Anemia NEC, Calculus of kidney, Congestive heart failure, unspecified, Contact dermatitis and other eczema, due to unspecified cause, Headache(784.0), Psychotic disorder ([facility] or Trauma. All other systems negative except as above. PE  Vitals:    10/08/19 1425 10/08/19 1434   BP: 142/70 140/63   Pulse: 71    Resp: 17    SpO2: 96%    Weight: 174 lb 9.6 oz (79.2 kg)    Height: 5' 8\" (1.727 m)     Body mass index is 26.55 kg/m².    General appearance - alert, well appearing, and in no distress  Mental status - affect appropriate to mood  Eyes - sclera anicteric, moist mucous membranes  Neck - supple, no JVD  Chest - clear to auscultation, no wheezes, rales or rhonchi  Heart - normal rate, regular rhythm, normal S1, S2, I/VI systolic murmur LUSB  Abdomen - soft, nontender, nondistended, no masses or organomegaly  Extremities - peripheral pulses normal, no pedal edema    Recent Labs:  Lab Results   Component Value Date/Time    Cholesterol, total 157 10/05/2018 08:25 AM    HDL Cholesterol 40 10/05/2018 08:25 AM    LDL, calculated 89 10/05/2018 08:25 AM    Triglyceride 141 10/05/2018 08:25 AM    CHOL/HDL Ratio 2.7 10/05/2009 09:40 AM     Lab Results   Component Value Date/Time    Creatinine 0.74 07/20/2019 03:21 AM     Lab Results   Component Value Date/Time    BUN 5 (L) 07/20/2019 03:21 AM     Lab Results   Component Value Date/Time    Potassium 3.9 07/20/2019 03:21 AM     Lab Results   Component Value Date/Time    Hemoglobin A1c 5.9 (H) 10/05/2018 08:25 AM     Lab Results   Component Value Date/Time    Hemoglobin (POC) 9.5 (L) 04/08/2019 04:39 PM    HGB 12.7 07/20/2019 03:21 AM     Lab Results   Component Value Date/Time    PLATELET 251 86/18/9015 03:21 AM       Reviewed:  Past Medical History:   Diagnosis Date    ACP (advance care planning) 2/21/2017    Initial ACP discussion w/ pt and daughter 2-2017. AMD form reviewed and copy provided. NN/facilitator to discuss with patient     A[facility] 2006    [facility]    Asthma     hx of    Atrial fibrillation, transient ([facility] 5/2/2019    Post Op Acute A.  Fib after  Shunt replacement, 4-17-19, [facility], Cardio, Dr. Estefanía Sorto (no cardioversion required, medication mgt only)    Benign essential hypertension 6/24/2016    Bilateral hip pain 6/3/2014    xrays 2012, negative    Cancer ([address] 1990ish    UTERINE    Chronic depression 7/12/2019    COPD (chronic obstructive pulmonary disease) ([address] 3/29/2010    Diverticulitis     DVT of Rt Lower Extremity 3/29/2010    Elevated LFT's, Fatty Liver 3/29/2010    Essential tremor 7/14/2010    Generalized anxiety disorder with panic attacks 7/12/2019    GERD (gastroesophageal reflux disease) 9/2/2011    H/O Subarachnoid hemorrhage due to ruptured aneurysm 7/14/2010    Hyperlipemia 3/29/2010    Impaired fasting glucose 7/13/2014 6/2014    Perforation and abscess of large intestine concurrent with and due to diverticulitis 5/2/2019    Lap Drain 3-29-19, Exp Laparotomy/Colostomy 4-8-19, Dr. Louisa Morrow, subacute 3/29/2010    Tobacco Abuse 3/29/2010    Vitamin D deficiency 6/3/2014    2012     Social History     Tobacco Use   Smoking Status Current Every Day Smoker    Packs/day: 0.25    Years: 20.00    Pack years: 5.00    Types: Cigarettes   Smokeless Tobacco Never Used Social History     Substance and Sexual Activity   Alcohol Use No    Alcohol/week: 0.0 standard drinks     Allergies   Allergen Reactions    Adderall [Dextroamphetamine-Amphetamine] Unknown (comments)    Ativan [Lorazepam] Hives    Egg Yolk Other (comments)     indigestion    Pcn [Penicillins] Other (comments)     Unsure of reaction; pt reports unsure if have taken cephalosporin before.  Wellbutrin [Bupropion Hcl] Anxiety    Xanax [Alprazolam] Hives       Current Outpatient Medications   Medication Sig    busPIRone (BUSPAR) 5 mg tablet TAKE 1 TABLET BY MOUTH TWICE A DAY    omeprazole (PRILOSEC) 20 mg capsule TAKE 1 CAPSULE BY MOUTH EVERY DAY    losartan-hydroCHLOROthiazide (HYZAAR) 50-12.5 mg per tablet TAKE 1 TABLET BY MOUTH EVERY DAY    ferrous sulfate 325 mg (65 mg iron) tablet Take 1 Tab by mouth two (2) times a day.  dilTIAZem CD (CARDIZEM CD) 300 mg ER capsule Take 1 Cap by mouth daily.  sertraline (ZOLOFT) 100 mg tablet TAKE 1.5 TABS BY MOUTH DAILY    MULTIVITAMINS (MULTIVITAMIN PO) Take 1 Tab by mouth daily.  AMLODIPINE BESYLATE, BULK, Take 10 mg by mouth daily.  traZODone (DESYREL) 50 mg tablet TAKE 1 TABLET BY MOUTH EVERYDAY AT BEDTIME    albuterol (VENTOLIN HFA) 90 mcg/actuation inhaler Take 2 Puffs by inhalation every six (6) hours as needed for Wheezing or Shortness of Breath. No current facility-administered medications for this visit.         Jakob Osorio MD  [facility] heart and Vascular [place]  [address] [facility] [address]  [facility], [address]

## 2019-11-13 ENCOUNTER — OFFICE VISIT (OUTPATIENT)
Dept: CARDIOLOGY CLINIC | Age: 69
End: 2019-11-13

## 2019-11-13 VITALS
HEIGHT: 68 IN | BODY MASS INDEX: 26.07 KG/M2 | RESPIRATION RATE: 16 BRPM | OXYGEN SATURATION: 94 % | DIASTOLIC BLOOD PRESSURE: 60 MMHG | HEART RATE: 94 BPM | SYSTOLIC BLOOD PRESSURE: 110 MMHG | WEIGHT: 172 LBS

## 2019-11-13 DIAGNOSIS — J44.9 CHRONIC OBSTRUCTIVE PULMONARY DISEASE, UNSPECIFIED COPD TYPE (HCC): ICD-10-CM

## 2019-11-13 DIAGNOSIS — I48.0 PAROXYSMAL A-FIB (HCC): Primary | ICD-10-CM

## 2019-11-13 DIAGNOSIS — I10 BENIGN ESSENTIAL HYPERTENSION: ICD-10-CM

## 2019-11-13 DIAGNOSIS — Z87.891 HISTORY OF TOBACCO USE: ICD-10-CM

## 2019-11-13 DIAGNOSIS — E78.2 MIXED HYPERLIPIDEMIA: ICD-10-CM

## 2019-11-13 NOTE — PROGRESS NOTES
[address]: [facility]  [phone]    History of Present Illness: Ms. Rashad Wall is a 80 yo F with h/o hospitalization for perforated diverticulum, history of multiple surgeries including sigmoidectomy, colostomy and  shunt, during her hospitalization developed atrial fibrillation with RVR and was started on Diltiazem and Metoprolol and converted to sinus rhythm. Echo 04/02/2019 that demonstrated preserved LV function. Since her last visit, she continues to do well. No significant palpitations. Her breathing has been stable. No chest pain. She is compensated on exam with clear lungs. She is in rate controlled atrial fibrillation. Her blood pressure is 110/60. Assessment and Plan:    1. Paroxysmal atrial fibrillation. Stable and heart rate is controlled in atrial fibrillation. No changes were made. She does have a significant CHADS VASC score, but with a history of subarachnoid hemorrhage, the risks outweigh the benefits of oral anticoagulation. We discussed this in the past as well. Will have her follow back in six months. 2. Essential hypertension. Blood pressure is controlled. 3. Perforated diverticulum, status post surgery in March 2019.    4. Status post colostomy takedown. 5. COPD. 6.  shunt.       She  has a past medical history of ACP (advance care planning) (2/21/2017), Aneurysm ([address] (2006), Asthma, Atrial fibrillation, transient ([address] (5/2/2019), Benign essential hypertension (6/24/2016), Bilateral hip pain (6/3/2014), Cancer ([address] ([id number]), Chronic depression (7/12/2019), COPD (chronic obstructive pulmonary disease) ([address] (3/29/2010), Diverticulitis, DVT of Rt Lower Extremity (3/29/2010), Elevated LFT's, Fatty Liver (3/29/2010), Essential tremor (7/14/2010), Generalized anxiety disorder with panic attacks (7/12/2019), GERD (gastroesophageal reflux disease) (9/2/2011), H/O Subarachnoid hemorrhage due to ruptured aneurysm (7/14/2010), Hyperlipemia (3/29/2010), Impaired fasting glucose (7/13/2014), Perforation and abscess of large intestine concurrent with and due to diverticulitis (5/2/2019), Thyroiditis, subacute (3/29/2010), Tobacco Abuse (3/29/2010), and Vitamin D deficiency (6/3/2014). She also has no past medical history of Abuse, Anemia NEC, Calculus of kidney, Congestive heart failure, unspecified, Contact dermatitis and other eczema, due to unspecified cause, Headache(784.0), Psychotic disorder ([address] or Trauma. All other systems negative except as above. PE  Vitals:    11/13/19 1116   BP: 110/60   Pulse: 94   Resp: 16   SpO2: 94%   Weight: 172 lb (78 kg)   Height: 5' 8\" (1.727 m)    Body mass index is 26.15 kg/m².    General appearance - alert, well appearing, and in no distress  Mental status - affect appropriate to mood  Eyes - sclera anicteric, moist mucous membranes  Neck - supple, no JVD  Chest - clear to auscultation, no wheezes, rales or rhonchi  Heart - normal rate, regular rhythm, normal S1, S2, I/VI systolic murmur LUSB  Abdomen - soft, nontender, nondistended, no masses or organomegaly  Extremities - peripheral pulses normal, no pedal edema    Recent Labs:  Lab Results   Component Value Date/Time    Cholesterol, total 157 10/05/2018 08:25 AM    HDL Cholesterol 40 10/05/2018 08:25 AM    LDL, calculated 89 10/05/2018 08:25 AM    Triglyceride 141 10/05/2018 08:25 AM    CHOL/HDL Ratio 2.7 10/05/2009 09:40 AM     Lab Results   Component Value Date/Time    Creatinine 0.74 07/20/2019 03:21 AM     Lab Results   Component Value Date/Time    BUN 5 (L) 07/20/2019 03:21 AM     Lab Results   Component Value Date/Time    Potassium 3.9 07/20/2019 03:21 AM     Lab Results   Component Value Date/Time    Hemoglobin A1c 5.9 (H) 10/05/2018 08:25 AM     Lab Results   Component Value Date/Time    Hemoglobin (POC) 9.5 (L) 04/08/2019 04:39 PM    HGB 12.7 07/20/2019 03:21 AM     Lab Results   Component Value Date/Time    PLATELET 060 26/90/6037 03:21 AM       Reviewed:  Past Medical History:   Diagnosis Date    ACP (advance care planning) 2/21/2017    Initial ACP discussion w/ pt and daughter 2-2017. AMD form reviewed and copy provided. NN/facilitator to discuss with patient     Aneurysm ([facility] 2006    [address]    Asthma     hx of    Atrial fibrillation, transient ([facility] 5/2/2019    Post Op Acute A. Fib after  Shunt replacement, 4-17-19, [facility], Cardio, Dr. Reyna Client (no cardioversion required, medication mgt only)    Benign essential hypertension 6/24/2016    Bilateral hip pain 6/3/2014    xrays 2012, negative    Cancer ([facility] 1990ish    UTERINE    Chronic depression 7/12/2019    COPD (chronic obstructive pulmonary disease) ([facility] 3/29/2010    Diverticulitis     DVT of Rt Lower Extremity 3/29/2010    Elevated LFT's, Fatty Liver 3/29/2010    Essential tremor 7/14/2010    Generalized anxiety disorder with panic attacks 7/12/2019    GERD (gastroesophageal reflux disease) 9/2/2011    H/O Subarachnoid hemorrhage due to ruptured aneurysm 7/14/2010    Hyperlipemia 3/29/2010    Impaired fasting glucose 7/13/2014 6/2014    Perforation and abscess of large intestine concurrent with and due to diverticulitis 5/2/2019    Lap Drain 3-29-19, Exp Laparotomy/Colostomy 4-8-19, Dr. Clementina Franco, subacute 3/29/2010    Tobacco Abuse 3/29/2010    Vitamin D deficiency 6/3/2014    2012     Social History     Tobacco Use   Smoking Status Current Every Day Smoker    Packs/day: 0.25    Years: 20.00    Pack years: 5.00    Types: Cigarettes   Smokeless Tobacco Never Used     Social History     Substance and Sexual Activity   Alcohol Use No    Alcohol/week: 0.0 standard drinks     Allergies   Allergen Reactions    Adderall [Dextroamphetamine-Amphetamine] Unknown (comments)    Ativan [Lorazepam] Hives    Egg Yolk Other (comments)     indigestion    Pcn [Penicillins] Other (comments)     Unsure of reaction; pt reports unsure if have taken cephalosporin before.      Wellbutrin [Bupropion Hcl] Anxiety    Xanax [Alprazolam] Hives       Current Outpatient Medications   Medication Sig    busPIRone (BUSPAR) 5 mg tablet TAKE 1 TABLET BY MOUTH TWICE A DAY    omeprazole (PRILOSEC) 20 mg capsule TAKE 1 CAPSULE BY MOUTH EVERY DAY    AMLODIPINE BESYLATE, BULK, Take 10 mg by mouth daily.  losartan-hydroCHLOROthiazide (HYZAAR) 50-12.5 mg per tablet TAKE 1 TABLET BY MOUTH EVERY DAY    ferrous sulfate 325 mg (65 mg iron) tablet Take 1 Tab by mouth two (2) times a day.  dilTIAZem CD (CARDIZEM CD) 300 mg ER capsule Take 1 Cap by mouth daily.  sertraline (ZOLOFT) 100 mg tablet TAKE 1.5 TABS BY MOUTH DAILY    traZODone (DESYREL) 50 mg tablet TAKE 1 TABLET BY MOUTH EVERYDAY AT BEDTIME    albuterol (VENTOLIN HFA) 90 mcg/actuation inhaler Take 2 Puffs by inhalation every six (6) hours as needed for Wheezing or Shortness of Breath.  MULTIVITAMINS (MULTIVITAMIN PO) Take 1 Tab by mouth daily. No current facility-administered medications for this visit.         Laura Sheppard MD  [facility] heart and Vascular [place]  [address] [facility] [address]  [address]

## 2020-01-26 RX ORDER — HYDROCHLOROTHIAZIDE 25 MG/1
TABLET ORAL
Qty: 90 TAB | Refills: 0 | OUTPATIENT
Start: 2020-01-26

## 2020-01-26 NOTE — TELEPHONE ENCOUNTER
I have sent this refusal and prior documentation previously, yet they have sent refill request again.  Please verbally cancel this rx

## 2020-01-27 NOTE — TELEPHONE ENCOUNTER
Called pharmacy and she said that it is out of the system and doesn't know why we are getting it. She will deactivate it again.

## 2020-01-31 NOTE — PATIENT INSTRUCTIONS
Advance Directives: Care Instructions  Your Care Instructions  An advance directive is a legal way to state your wishes at the end of your life. It tells your family and your doctor what to do if you can no longer say what you want. There are two main types of advance directives. You can change them any time that your wishes change. · A living will tells your family and your doctor your wishes about life support and other treatment. · A medical power of  lets you name a person to make treatment decisions for you when you can't speak for yourself. This person is called a health care agent. If you do not have an advance directive, decisions about your medical care may be made by a doctor or a  who doesn't know you. It may help to think of an advance directive as a gift to the people who care for you. If you have one, they won't have to make tough decisions by themselves. Follow-up care is a key part of your treatment and safety. Be sure to make and go to all appointments, and call your doctor if you are having problems. It's also a good idea to know your test results and keep a list of the medicines you take. How can you care for yourself at home? · Discuss your wishes with your loved ones and your doctor. This way, there are no surprises. · Many states have a unique form. Or you might use a universal form that has been approved by many states. This kind of form can sometimes be completed and stored online. Your electronic copy will then be available wherever you have a connection to the Internet. In most cases, doctors will respect your wishes even if you have a form from a different state. · You don't need a  to do an advance directive. But you may want to get legal advice. · Think about these questions when you prepare an advance directive:  ¨ Who do you want to make decisions about your medical care if you are not able to?  Many people choose a family member, close friend, or Returned patient call and informed xray results were received and given to Dr Moctezuma to review. Patient requested a call back with results. Nurse informed that once Dr Moctezuma as reviewed someone will call her. Patient verbalized understanding.   doctor. ¨ Do you know enough about life support methods that might be used? If not, talk to your doctor so you understand. ¨ What are you most afraid of that might happen? You might be afraid of having pain, losing your independence, or being kept alive by machines. ¨ Where would you prefer to die? Choices include your home, a hospital, or a nursing home. ¨ Would you like to have information about hospice care to support you and your family? ¨ Do you want to donate organs when you die? ¨ Do you want certain Mosque practices performed before you die? If so, put your wishes in the advance directive. · Read your advance directive every year, and make changes as needed. When should you call for help? Be sure to contact your doctor if you have any questions. Where can you learn more? Go to http://jama-obinna.info/. Enter R264 in the search box to learn more about \"Advance Directives: Care Instructions. \"  Current as of: February 24, 2016  Content Version: 11.1  © 0783-9515 [employer]. Care instructions adapted under license by [employer] (which disclaims liability or warranty for this information). If you have questions about a medical condition or this instruction, always ask your healthcare professional. [place] [address] any warranty or liability for your use of this information. Schedule of Personalized Health Plan  (Provide Copy to Patient)  The best way to stay healthy is to live a healthy lifestyle. A healthy lifestyle includes regular exercise, eating a well-balanced diet, keeping a healthy weight and not smoking. Regular physical exams and screening tests are another important way to take care of yourself. Preventive exams provided by health care providers can find health problems early when treatment works best and can keep you from getting certain diseases or illnesses.  Preventive services include exams, lab tests, screenings, shots, monitoring and information to help you take care of your own health. All people over 65 should have a pneumonia shot. Pneumonia shots are usually only needed once in a lifetime unless your doctor decides differently. All people over 65 should have a yearly flu shot. People over 65 are at medium to high risk for Hepatitis B. Three shots are needed for complete protection. In addition to your physical exam, some screening tests are recommended:    Bone mass measurement (dexa scan) is recommended every two years  Diabetes Mellitus screening is recommended every year. Glaucoma is an eye disease caused by high pressure in the eye. An eye exam is recommended every year. Cardiovascular screening tests that check your cholesterol and other blood fat (lipid) levels are recommended every five years. Colorectal Cancer screening tests help to find pre-cancerous polyps (growths in the colon) so they can be removed before they turn into cancer. Tests ordered for screening depend on your personal and family history risk factors.     Screening for Breast Cancer is recommended yearly with a mammogram.    Screening for Cervical Cancer is recommended every two years (annually for certain risk factors, such as previous history of STD or abnormal PAP in past 7 years), with a Pelvic Exam with PAP    Here is a list of your current Health Maintenance items with a due date:  Health Maintenance   Topic Date Due    BREAST CANCER SCRN MAMMOGRAM  02/21/2019    PAP AKA CERVICAL CYTOLOGY  06/24/2019    COLONOSCOPY  03/27/2023    DTaP/Tdap/Td series (2 - Td) 06/24/2026    Hepatitis C Screening  Completed    ZOSTER VACCINE AGE 60>  Addressed    Pneumococcal 19-64 Medium Risk  Completed    INFLUENZA AGE 9 TO ADULT  Addressed

## 2020-03-30 ENCOUNTER — TELEPHONE (OUTPATIENT)
Dept: CARDIOLOGY CLINIC | Age: 70
End: 2020-03-30

## 2020-05-14 NOTE — PROGRESS NOTES
VIRTUAL VISIT DOCUMENTATION     Pursuant to the emergency declaration under the [facility], [facility] waiver authority and the [employer] Act, this Virtual  Visit was conducted, with patient's consent, to reduce the patient's risk of exposure to COVID-19 and provide continuity of care for an established patient. Services were provided through a video synchronous discussion virtually to substitute for in-person clinic visit. CHIEF COMPLAINT      Lisa Toth is a 81 y.o. female who was seen by synchronous (real-time) audio-video technology on 5/18/2020. HISTORY OF PRESENTING ILLNESS      Ms. Abbey Tian is a 80 yo F with h/o hospitalization for perforated diverticulum, history of multiple surgeries including sigmoidectomy, colostomy and  shunt, during her hospitalization developed atrial fibrillation with RVR and was started on Diltiazem and Metoprolol and converted to sinus rhythm. Echo 04/02/2019 that demonstrated preserved LV function. Since her last visit, overall she has been doing okay. She denies any chest pain, shortness of breath, palpitations, lightheadedness or dizziness. She does try to do some walking around the house. Assessment and Plan:    1. Paroxysmal atrial fibrillation. Stable and well controlled. No changes were made. In the past, we have noted she has a significant CHADS VASC score, but with a history of some retinal hemorrhage, the risks outweigh the benefits of oral anticoagulation and will continue to hold off. Will have her follow back in six months. 2. Essential hypertension. Blood pressure is controlled. 3. Perforated diverticulum, status post surgery in 03/2019.   4. Status post colostomy takedown. 5. COPD. 6.  shunt. ASSESSMENT/PLAN:         We discussed the expected course, resolution and complications of the diagnosis(es) in detail.   Medication risks, benefits, costs, interactions, and alternatives were discussed as indicated. I advised her to contact the office if her condition worsens, changes or fails to improve as anticipated. She expressed understanding with the diagnosis(es) and plan       ACTIVE PROBLEM LIST     Patient Active Problem List    Diagnosis Date Noted    Chronic depression 07/12/2019    Generalized anxiety disorder with panic attacks 07/12/2019    Chronic fatigue 05/16/2019    Perforation and abscess of large intestine concurrent with and due to diverticulitis 05/02/2019    Atrial fibrillation, transient ([address] 05/02/2019    Acute hypoxemic respiratory failure ([address] 04/12/2019    Pneumonia due to infectious organism 04/12/2019    Perforated diverticulum of large intestine 03/26/2019    ACP (advance care planning) 02/21/2017    Benign essential hypertension 06/24/2016    Impaired fasting glucose/Prediabetes 07/13/2014    Bilateral hip pain 06/03/2014    Vitamin D deficiency 06/03/2014    GERD (gastroesophageal reflux disease) 09/02/2011    H/O Subarachnoid hemorrhage due to ruptured aneurysm 07/14/2010    Essential tremor 07/14/2010    Cough variant asthma 03/29/2010    Tobacco Abuse 03/29/2010    AR (allergic rhinitis) 03/29/2010    ADD (attention deficit disorder) 03/29/2010    COPD (chronic obstructive pulmonary disease) ([address] 03/29/2010    Thyroiditis, subacute 03/29/2010    Hyperlipemia 03/29/2010    DVT of Rt Lower Extremity 03/29/2010    Elevated LFT's, Fatty Liver 03/29/2010           PAST MEDICAL HISTORY     Past Medical History:   Diagnosis Date    ACP (advance care planning) 2/21/2017    Initial ACP discussion w/ pt and daughter 2-2017. AMD form reviewed and copy provided. NN/facilitator to discuss with patient     Aneurysm ([address]    Asthma     hx of    Atrial fibrillation, transient ([address] 5/2/2019    Post Op Acute A.  Fib after  Shunt replacement, 4-17-19, Dr. Farooq Kidd (no cardioversion required, medication mgt only)    Benign essential hypertension 6/24/2016    Bilateral hip pain 6/3/2014    xrays 2012, negative    Cancer (HCC) 1990ish    UTERINE    Chronic depression 7/12/2019    COPD (chronic obstructive pulmonary disease) ([facility] 3/29/2010    Diverticulitis     DVT of Rt Lower Extremity 3/29/2010    Elevated LFT's, Fatty Liver 3/29/2010    Essential tremor 7/14/2010    Generalized anxiety disorder with panic attacks 7/12/2019    GERD (gastroesophageal reflux disease) 9/2/2011    H/O Subarachnoid hemorrhage due to ruptured aneurysm 7/14/2010    Hyperlipemia 3/29/2010    Impaired fasting glucose 7/13/2014 6/2014    Perforation and abscess of large intestine concurrent with and due to diverticulitis 5/2/2019    Lap Drain 3-29-19, Exp Laparotomy/Colostomy 4-8-19, Dr. Sabiha Ramachandran, subacute 3/29/2010    Tobacco Abuse 3/29/2010    Vitamin D deficiency 6/3/2014    2012           PAST SURGICAL HISTORY     Past Surgical History:   Procedure Laterality Date    ECHO STRESS  2005    Negative    HX BREAST BIOPSY      HX COLONOSCOPY  3/2013, Dr Merry Salazar    benign cecal polyp, f/u 10 yrs    HX COLONOSCOPY  1980's    Benign polypectomy    HX COLOSTOMY  04/08/2019    Exp Laparotomy, Sigmoidectomy & Colostomy for Perforated Sigmoid Diverticulum, Dr. Owen Tinajero  10/2006    for clipping of ruptured aneurysm; Dr Alicja Kern a shunt    HX CSF SHUNT  04/17/2019    Right Intrapleural Placement  Shunt (relocated due to peritoneal/abdominal surgery 4/8/19[facility], Dr. Renata Del Castillo  EGD, Dr Merry Salazar    \"ok\" per pt report    HX GI  07/19/2019    Laparoscopic closure of colostomy     HX HYSTERECTOMY  1991    Cervical Dysplasia (Dr Waleska Benitez)    HX OTHER SURGICAL  07/19/2019    lap sig.  [id number]. b. Carmody    HX OTHER SURGICAL  04/08/2019    Laparotomy with sigmoidectomy and end [id number]. B. Carmody    IR DRAIN CUTANEOUS/SUBCU ABSCESS  2019    Lap Drain Pelvic Abscess for Perforated Sigmoid Diverticulum, Dr. Clem Cabrera  2019    Externalize  Shunt Right Abdomen for Exp Lap/Colostomy, [address] [place] [address], Dr. Radha Angulo     Allergies   Allergen Reactions    Adderall [Dextroamphetamine-Amphetamine] Unknown (comments)    Ativan [Lorazepam] Hives    Egg Yolk Other (comments)     indigestion    Pcn [Penicillins] Other (comments)     Unsure of reaction; pt reports unsure if have taken cephalosporin before.      Wellbutrin [Bupropion Hcl] Anxiety    Xanax [Alprazolam] Hives          FAMILY HISTORY     Family History   Problem Relation Age of Onset    Anxiety Daughter     Attention Deficit Disorder Daughter     Drug Abuse Daughter     Anxiety Daughter     Thyroid Disease Daughter     Thyroid Disease Daughter         Hypothyroidism    Asthma Daughter     Cancer Father         kidney    Diabetes Father    Renetta Ramp Father     Diabetes Mother     Hypertension Mother    Renetta Ramp Mother    Angeline Vern Stroke Mother     Hypertension Sister     Depression Sister     Pneumonia Sister          age 72    Diabetes Sister     Thyroid Disease Sister     Thyroid Disease Sister     Heart Disease Sister     Diabetes Maternal Grandmother     No Known Problems Brother     Thyroid Disease Sister     Anxiety Sister     Depression Sister     Alcohol abuse Sister     Hypertension Sister     Hypertension Sister     Thyroid Disease Sister     Thyroid Disease Sister     Anesth Problems Neg Hx     negative for cardiac disease       SOCIAL HISTORY     Social History     Socioeconomic History    Marital status:      Spouse name: Not on file    Number of children: 3    Years of education: Not on file    Highest education level: Not on file   Occupational History    Occupation: Former  for [employer]; ; involved mostly usage of computers, numbers, financial data, manufacturing data, employee supervision   Tobacco Use    Smoking status: Current Every Day Smoker     Packs/day: 0.25     Years: 20.00     Pack years: 5.00     Types: Cigarettes    Smokeless tobacco: Never Used   Substance and Sexual Activity    Alcohol use: No     Alcohol/week: 0.0 standard drinks    Drug use: No    Sexual activity: Never   Other Topics Concern     Service No    Blood Transfusions No    Caffeine Concern Yes     Comment: 1 pot of coffee a day (~ 6-8 cups)    Occupational Exposure No    Hobby Hazards No    Sleep Concern No    Stress Concern No    Weight Concern No    Special Diet No    Back Care No    Bike Helmet No    Seat Belt Yes    Self-Exams Yes   Social History Narrative        Lives in 2 story home    2 of her daughters (Meche Hayward) live with her and one grandbaby    Daughter prepares most meals but patient still cooks some as well    No asst devices for ambulation; no canes or walkers    No longer drives since the [facility] and stroke    Daughters help w/ finances, cleaning, transportation and manage her medications (daughter Renee Mccloud who accompanies her to all her appts)    Does other ADL's, dressing, bathing, grooming all on her own; some help w/ meal preparation; pt can use microwave and the stove when daughters are home. Patient does some cleaning. No toileting problems; uses independently    Initial ACP discussion w/ pt and daughter 2-2017. AMD form reviewed and copy provided. Discussed w/ pt and daughter again , they think patient has AMD and will check. MEDICATIONS     Current Outpatient Medications   Medication Sig    busPIRone (BUSPAR) 5 mg tablet TAKE 1 TABLET BY MOUTH TWICE A DAY    omeprazole (PRILOSEC) 20 mg capsule TAKE 1 CAPSULE BY MOUTH EVERY DAY    AMLODIPINE BESYLATE, BULK, Take 10 mg by mouth daily.     losartan-hydroCHLOROthiazide (HYZAAR) 50-12.5 mg per tablet TAKE 1 TABLET BY MOUTH EVERY DAY    ferrous sulfate 325 mg (65 mg iron) tablet Take 1 Tab by mouth two (2) times a day.  dilTIAZem CD (CARDIZEM CD) 300 mg ER capsule Take 1 Cap by mouth daily.  sertraline (ZOLOFT) 100 mg tablet TAKE 1.5 TABS BY MOUTH DAILY    traZODone (DESYREL) 50 mg tablet TAKE 1 TABLET BY MOUTH EVERYDAY AT BEDTIME    albuterol (VENTOLIN HFA) 90 mcg/actuation inhaler Take 2 Puffs by inhalation every six (6) hours as needed for Wheezing or Shortness of Breath.  MULTIVITAMINS (MULTIVITAMIN PO) Take 1 Tab by mouth daily. No current facility-administered medications for this visit. I have reviewed the nurses notes, vitals, problem list, allergy list, medical history, family, social history and medications. REVIEW OF SYMPTOMS     Constitutional: Negative for fever, chills, malaise/fatigue and diaphoresis. Respiratory: Negative for cough, hemoptysis, sputum production, shortness of breath and wheezing. Cardiovascular: Negative for chest pain, palpitations, orthopnea, claudication, leg swelling and PND. Gastrointestinal: Negative for heartburn, nausea, vomiting, blood in stool and melena. Genitourinary: Negative for dysuria and flank pain. Musculoskeletal: Negative for joint pain and back pain. Skin: Negative for rash. Neurological: Negative for focal weakness, seizures, loss of consciousness, weakness and headaches. Endo/Heme/Allergies: Negative for abnormal bleeding. Psychiatric/Behavioral: Negative for memory loss. PHYSICAL EXAMINATION      Due to this being a TeleHealth evaluation, many elements of the physical examination are unable to be assessed. General: Well developed, in no acute distress, cooperative and alert  HEENT: Pupils equal/round. No marked JVD visible on video.   Respiratory: No audible wheezing, no signs of respiratory distress, lips non cyanotic  Extremities:  No edema  Neuro: A&Ox3, speech clear, no facial droop, answering questions appropriately  Skin: Skin color is normal. No rashes or lesions. Non diaphoretic on visible skin during exam       DIAGNOSTIC DATA      No specialty comments available. LABORATORY DATA      Lab Results   Component Value Date/Time    WBC 12.0 (H) 07/20/2019 03:21 AM    Hemoglobin (POC) 9.5 (L) 04/08/2019 04:39 PM    HGB 12.7 07/20/2019 03:21 AM    HCT 39.3 07/20/2019 03:21 AM    PLATELET 827 93/00/0980 03:21 AM    MCV 87.9 07/20/2019 03:21 AM      Lab Results   Component Value Date/Time    Sodium 139 07/20/2019 03:21 AM    Potassium 3.9 07/20/2019 03:21 AM    Chloride 106 07/20/2019 03:21 AM    CO2 25 07/20/2019 03:21 AM    Anion gap 8 07/20/2019 03:21 AM    Glucose 135 (H) 07/20/2019 03:21 AM    BUN 5 (L) 07/20/2019 03:21 AM    Creatinine 0.74 07/20/2019 03:21 AM    BUN/Creatinine ratio 7 (L) 07/20/2019 03:21 AM    GFR est AA >60 07/20/2019 03:21 AM    GFR est non-AA >60 07/20/2019 03:21 AM    Calcium 8.6 07/20/2019 03:21 AM    Bilirubin, total 0.2 07/11/2019 12:52 PM    AST (SGOT) 17 07/11/2019 12:52 PM    Alk. phosphatase 72 07/11/2019 12:52 PM    Protein, total 7.7 07/11/2019 12:52 PM    Albumin 3.9 07/11/2019 12:52 PM    Globulin 3.8 07/11/2019 12:52 PM    A-G Ratio 1.0 (L) 07/11/2019 12:52 PM    ALT (SGPT) 27 07/11/2019 12:52 PM            Patient was made aware and verbalized understanding that an appointment will be scheduled for them for a virtual visit and/or office visit within the above time frame. Patient understanding his/her responsibility to call and change time/date if he/she so chooses. Greater than 20 minutes was spent in direct video patient care, planning and chart review. This visit was conducted using [employer] services.        Helena Frederick MD    [address], [address] [place] [address],[address]  [address]  [phone] / [phone] Fax

## 2020-05-18 ENCOUNTER — VIRTUAL VISIT (OUTPATIENT)
Dept: CARDIOLOGY CLINIC | Age: 70
End: 2020-05-18

## 2020-05-18 VITALS — SYSTOLIC BLOOD PRESSURE: 185 MMHG | DIASTOLIC BLOOD PRESSURE: 98 MMHG

## 2020-05-18 DIAGNOSIS — Z87.891 HISTORY OF TOBACCO USE: ICD-10-CM

## 2020-05-18 DIAGNOSIS — J44.9 CHRONIC OBSTRUCTIVE PULMONARY DISEASE, UNSPECIFIED COPD TYPE (HCC): ICD-10-CM

## 2020-05-18 DIAGNOSIS — I48.0 PAROXYSMAL A-FIB (HCC): Primary | ICD-10-CM

## 2020-05-18 DIAGNOSIS — E78.2 MIXED HYPERLIPIDEMIA: ICD-10-CM

## 2020-05-18 DIAGNOSIS — I10 BENIGN ESSENTIAL HYPERTENSION: ICD-10-CM

## 2020-05-22 RX ORDER — DILTIAZEM HYDROCHLORIDE 300 MG/1
CAPSULE, COATED, EXTENDED RELEASE ORAL
Qty: 90 CAP | Refills: 3 | Status: SHIPPED | OUTPATIENT
Start: 2020-05-22 | End: 2021-09-24 | Stop reason: SDUPTHER

## 2020-05-22 NOTE — TELEPHONE ENCOUNTER
Requested Prescriptions     Signed Prescriptions Disp Refills    dilTIAZem ER (CARDIZEM CD) 300 mg capsule 90 Cap 3     Sig: TAKE 1 CAPSULE BY MOUTH EVERY DAY     Authorizing Provider: Leda Monsivais     Ordering User: Kevin Vincent       Last office visit 5/18/20    Per Dr. Kike Bruce   Date Time Provider Mona Adamson   11/18/2020  9:40 AM Savanna Johnson  [address]

## 2020-06-21 ENCOUNTER — HOSPITAL ENCOUNTER (EMERGENCY)
Age: 70
Discharge: HOME | End: 2020-06-21
Attending: EMERGENCY MEDICINE
Payer: MEDICARE

## 2020-06-21 ENCOUNTER — APPOINTMENT (OUTPATIENT)
Dept: GENERAL RADIOLOGY | Age: 70
End: 2020-06-21
Attending: EMERGENCY MEDICINE
Payer: MEDICARE

## 2020-06-21 VITALS
WEIGHT: 197.75 LBS | BODY MASS INDEX: 29.97 KG/M2 | OXYGEN SATURATION: 99 % | DIASTOLIC BLOOD PRESSURE: 71 MMHG | TEMPERATURE: 99.5 F | HEART RATE: 65 BPM | HEIGHT: 68 IN | RESPIRATION RATE: 19 BRPM | SYSTOLIC BLOOD PRESSURE: 150 MMHG

## 2020-06-21 DIAGNOSIS — S90.32XA CONTUSION OF LEFT FOOT INCLUDING TOES, INITIAL ENCOUNTER: Primary | ICD-10-CM

## 2020-06-21 DIAGNOSIS — S90.122A CONTUSION OF LEFT FOOT INCLUDING TOES, INITIAL ENCOUNTER: Primary | ICD-10-CM

## 2020-06-21 DIAGNOSIS — M19.072 OSTEOARTHRITIS OF LEFT ANKLE AND FOOT: ICD-10-CM

## 2020-06-21 PROCEDURE — 99283 EMERGENCY DEPT VISIT LOW MDM: CPT

## 2020-06-21 PROCEDURE — 73630 X-RAY EXAM OF FOOT: CPT

## 2020-06-21 PROCEDURE — 74011250637 HC RX REV CODE- 250/637: Performed by: EMERGENCY MEDICINE

## 2020-06-21 RX ORDER — NAPROXEN 500 MG/1
500 TABLET ORAL 2 TIMES DAILY WITH MEALS
Qty: 20 TAB | Refills: 0 | Status: SHIPPED | OUTPATIENT
Start: 2020-06-21 | End: 2021-02-22 | Stop reason: ALTCHOICE

## 2020-06-21 RX ORDER — HYDROCODONE BITARTRATE AND ACETAMINOPHEN 5; 325 MG/1; MG/1
1 TABLET ORAL
Qty: 10 TAB | Refills: 0 | Status: SHIPPED | OUTPATIENT
Start: 2020-06-21 | End: 2020-06-26

## 2020-06-21 RX ORDER — HYDROCODONE BITARTRATE AND ACETAMINOPHEN 5; 325 MG/1; MG/1
1 TABLET ORAL
Status: COMPLETED | OUTPATIENT
Start: 2020-06-21 | End: 2020-06-21

## 2020-06-21 RX ADMIN — HYDROCODONE BITARTRATE AND ACETAMINOPHEN 1 TABLET: 5; 325 TABLET ORAL at 21:21

## 2020-06-22 NOTE — ED TRIAGE NOTES
Patient arrives with falling down a few steps while in a rush 2 days ago. Patient c/o left foot pain and swelling. Took BC powder with no relief.

## 2020-06-22 NOTE — ED NOTES
Patient given discharge papers and instructions by primary RN. Patient verbalized understanding and stated not having any questions or concerns regarding her care. Patient wheeled out of ED by primary RN. Daughter driving home.

## 2020-06-22 NOTE — ED PROVIDER NOTES
82-year-old female with a past medical history significant for atrial fibrillation, aneurysm, hypertension, hypercholesterolemia, gait instability who presents to the ED for evaluation of left foot pain and injury sustained approximately 2 days ago after the patient tripped, fell and twisted her left foot while going down steps. She describes a dull, throbbing discomfort, severity 8 out of 10, worse with movement, no relieving factors. The patient ambulates gingerly. She denies any further discomfort at this time. Past Medical History:   Diagnosis Date    ACP (advance care planning) 2/21/2017    Initial ACP discussion w/ pt and daughter 2-2017. AMD form reviewed and copy provided. NN/facilitator to discuss with patient     Aneurysm ([address] 2006    [facility]    Asthma     hx of    Atrial fibrillation, transient ([address] 5/2/2019    Post Op Acute A.  Fib after  Shunt replacement, 4-17-19, [facility], Cardio, Dr. Lashawn Myrick (no cardioversion required, medication mgt only)    Benign essential hypertension 6/24/2016    Bilateral hip pain 6/3/2014    xrays 2012, negative    Cancer ([address] 1990ish    [facility]    Chronic depression 7/12/2019    COPD (chronic obstructive pulmonary disease) ([address] 3/29/2010    Diverticulitis     DVT of Rt Lower Extremity 3/29/2010    Elevated LFT's, Fatty Liver 3/29/2010    Essential tremor 7/14/2010    Generalized anxiety disorder with panic attacks 7/12/2019    GERD (gastroesophageal reflux disease) 9/2/2011    H/O Subarachnoid hemorrhage due to ruptured aneurysm 7/14/2010    Hyperlipemia 3/29/2010    Impaired fasting glucose 7/13/2014 6/2014    Perforation and abscess of large intestine concurrent with and due to diverticulitis 5/2/2019    Lap Drain 3-29-19, Exp Laparotomy/Colostomy 4-8-19, Dr. Joyce Carey, subacute 3/29/2010    Tobacco Abuse 3/29/2010    Vitamin D deficiency 6/3/2014    2012       Past Surgical History:   Procedure Laterality Date    ECHO STRESS  2005 Negative    HX BREAST BIOPSY      HX COLONOSCOPY  3/2013, Dr Freddie Abraham    benign cecal polyp, f/u 10 yrs    HX COLONOSCOPY  18's    Benign polypectomy    HX COLOSTOMY  2019    Exp Laparotomy, Sigmoidectomy & Colostomy for Perforated Sigmoid Diverticulum, Dr. Stormy Dutta  10/2006    for clipping of ruptured aneurysm; Dr Nam Smith a shunt    HX CSF SHUNT  2019    Right Intrapleural Placement  Shunt (relocated due to peritoneal/abdominal surgery 19), [facility], Dr. Josie Catherine  EGD, Dr Freddie Abraham    \"ok\" per pt report    HX GI  2019    Laparoscopic closure of colostomy     HX HYSTERECTOMY  1991    Cervical Dysplasia (Dr Julianne Dubon)    HX OTHER SURGICAL  2019    lap sig.  [id number]. b. Carmody    HX OTHER SURGICAL  2019    Laparotomy with sigmoidectomy and end [id number]. B. Carmody    IR DRAIN CUTANEOUS/SUBCU ABSCESS  2019    Lap Drain Pelvic Abscess for Perforated Sigmoid Diverticulum, Dr. Rob Steve  2019    Externalize  Shunt Right Abdomen for Exp Lap/Colostomy, [facility], Dr. Allie Renee         Family History:   Problem Relation Age of Onset    Anxiety Daughter     Attention Deficit Disorder Daughter     Drug Abuse Daughter     Anxiety Daughter     Thyroid Disease Daughter     Thyroid Disease Daughter         Hypothyroidism    Asthma Daughter     Cancer Father         kidney    Diabetes Father    Little Beards Father     Diabetes Mother     Hypertension Mother    Little Beards Mother    Bernestine Gael Stroke Mother     Hypertension Sister     Depression Sister     Pneumonia Sister          age 72    Diabetes Sister     Thyroid Disease Sister     Thyroid Disease Sister     Heart Disease Sister     Diabetes Maternal Grandmother     No Known Problems Brother     Thyroid Disease Sister     Anxiety Sister     Depression Sister     Alcohol abuse Sister     Hypertension Sister     Hypertension Sister     Thyroid Disease Sister     Thyroid Disease Sister     Anesth Problems Neg Hx        Social History     Socioeconomic History    Marital status:      Spouse name: Not on file    Number of children: 3    Years of education: Not on file    Highest education level: Not on file   Occupational History    Occupation: Former  for [employer]; ; involved mostly usage of computers, numbers, financial data, manufacturing data, employee supervision   Social Needs    Financial resource strain: Not on file    Food insecurity     Worry: Not on file     Inability: Not on file   Rhomania needs     Medical: Not on file     Non-medical: Not on file   Tobacco Use    Smoking status: Current Every Day Smoker     Packs/day: 0.25     Years: 20.00     Pack years: 5.00     Types: Cigarettes    Smokeless tobacco: Never Used   Substance and Sexual Activity    Alcohol use: No     Alcohol/week: 0.0 standard drinks    Drug use: No    Sexual activity: Never   Lifestyle    Physical activity     Days per week: Not on file     Minutes per session: Not on file    Stress: Not on file   Relationships    Social connections     Talks on phone: Not on file     Gets together: Not on file     Attends Pentecostalism service: Not on file     Active member of club or organization: Not on file     Attends meetings of clubs or organizations: Not on file     Relationship status: Not on file    Intimate partner violence     Fear of current or ex partner: Not on file     Emotionally abused: Not on file     Physically abused: Not on file     Forced sexual activity: Not on file   Other Topics Concern     Service No    Blood Transfusions No    Caffeine Concern Yes     Comment: 1 pot of coffee a day (~ 6-8 cups)    Occupational Exposure No    Hobby Hazards No    Sleep Concern No    Stress Concern No    Weight Concern No    Special Diet No    Back Care No    Exercise Not Asked    Bike Helmet No    Seat Belt Yes    Self-Exams Yes   Social History Narrative        Lives in 2 story home    2 of her daughters (Nia Jeronimo) live with her and one grandbaby    Daughter prepares most meals but patient still cooks some as well    No asst devices for ambulation; no canes or walkers    No longer drives since the [address] and stroke    Daughters help w/ finances, cleaning, transportation and manage her medications (daughter Simone Oneil who accompanies her to all her appts)    Does other ADL's, dressing, bathing, grooming all on her own; some help w/ meal preparation; pt can use microwave and the stove when daughters are home. Patient does some cleaning. No toileting problems; uses independently    Initial ACP discussion w/ pt and daughter 2-2017. AMD form reviewed and copy provided. Discussed w/ pt and daughter again , they think patient has AMD and will check. ALLERGIES: Adderall [dextroamphetamine-amphetamine]; Ativan [lorazepam]; Egg yolk; Pcn [penicillins]; Wellbutrin [bupropion hcl]; and Xanax [alprazolam]    Review of Systems   All other systems reviewed and are negative. Vitals:    06/21/20 2042   BP: 167/68   Pulse: 73   Resp: 22   Temp: 99.5 °F (37.5 °C)   SpO2: 97%   Weight: 89.7 kg (197 lb 12 oz)   Height: 5' 8\" (1.727 m)            Physical Exam  Vitals signs and nursing note reviewed. Exam conducted with a chaperone present. CONSTITUTIONAL: Well-appearing; well-nourished; in no apparent distress  HEAD: Normocephalic; atraumatic  EYES: PERRL; EOM intact; conjunctiva and sclera are clear bilaterally. ENT: No rhinorrhea; normal pharynx with no tonsillar hypertrophy; mucous membranes pink/moist, no erythema, no exudate. NECK: Supple; non-tender; no cervical lymphadenopathy  CARD: Normal S1, S2; no murmurs, rubs, or gallops. Regular rate and rhythm.   RESP: Normal respiratory effort; breath sounds clear and equal bilaterally; no wheezes, rhonchi, or rales. ABD: Normal bowel sounds; non-distended; non-tender; no palpable organomegaly, no masses, no bruits. Back Exam: Normal inspection; no vertebral point tenderness, no CVA tenderness. Normal range of motion. EXT: Tenderness, decreased range of motion, swelling on the dorsal aspect of the left foot but no deformity; distal pulses are normal, no edema. SKIN: Warm; dry; no rash. NEURO:Alert and oriented x 3, coherent, MU-XII grossly intact, sensory and motor are non-focal.        MDM  Number of Diagnoses or Management Options  Diagnosis management comments: Assessment: Left foot injury rule out fracture    Plan: X-ray of the left foot/analgesia/walking boot or cast shoe/serial exam/ Monitor and Reevaluate. Amount and/or Complexity of Data Reviewed  Clinical lab tests: ordered and reviewed  Tests in the radiology section of CPT®: ordered and reviewed  Tests in the medicine section of CPT®: reviewed and ordered  Discussion of test results with the performing providers: yes  Decide to obtain previous medical records or to obtain history from someone other than the patient: yes  Obtain history from someone other than the patient: yes  Review and summarize past medical records: yes  Discuss the patient with other providers: yes  Independent visualization of images, tracings, or specimens: yes    Risk of Complications, Morbidity, and/or Mortality  Presenting problems: moderate  Diagnostic procedures: moderate  Management options: moderate    Patient Progress  Patient progress: stable         Procedures    XRAY INTERPRETATION (ED MD)  Xray of left  foot shows no fracture. No subluxation/dislocation. No bony abnormality. Otoniel Churchill MD 8:49 PM    Progress Note:   Pt has been reexamined by Tierra Hdez MD. Pt is feeling much better. Symptoms have improved. All available results have been reviewed with pt and any available family. Pt understands sx, dx, and tx in ED.  Care plan has been outlined and questions have been answered. Pt is ready to go home. Will send home on left foot injury/contusion instruction. Prescription of naproxen and Norco. Outpatient referral with PCP/podiatrist in a week for reevaluation and further treatment as needed. Written by Charles Smith MD,8:50 PM    .   .

## 2020-07-30 DIAGNOSIS — K21.9 GASTROESOPHAGEAL REFLUX DISEASE, ESOPHAGITIS PRESENCE NOT SPECIFIED: Primary | ICD-10-CM

## 2020-07-30 DIAGNOSIS — F51.05 INSOMNIA SECONDARY TO DEPRESSION WITH ANXIETY: ICD-10-CM

## 2020-07-30 DIAGNOSIS — F41.8 INSOMNIA SECONDARY TO DEPRESSION WITH ANXIETY: ICD-10-CM

## 2020-07-30 DIAGNOSIS — F32.A CHRONIC DEPRESSION: ICD-10-CM

## 2020-07-30 NOTE — TELEPHONE ENCOUNTER
Last Visit: 7/12/19  MD Mary Kay Barnett  Next Appointment: Not scheduled- Appt. due  Previous Refill Encounter(s):   Trazadone 5/10/18 30  (Pharmacy date 12/26/18  30 + 5 - last filled 11/15/19 per pharmacy)  Omeprazole 7/17/19  90 + 3    Requested Prescriptions     Pending Prescriptions Disp Refills    traZODone (DESYREL) 50 mg tablet 30 Tab 0     Sig: Take 1 Tab by mouth nightly.  omeprazole (PRILOSEC) 20 mg capsule 90 Cap 3     Sig: Take 1 Cap by mouth daily.

## 2020-07-31 RX ORDER — TRAZODONE HYDROCHLORIDE 50 MG/1
50 TABLET ORAL
Qty: 30 TAB | Refills: 0 | Status: SHIPPED | OUTPATIENT
Start: 2020-07-31 | End: 2020-08-29

## 2020-07-31 RX ORDER — OMEPRAZOLE 20 MG/1
20 CAPSULE, DELAYED RELEASE ORAL DAILY
Qty: 30 CAP | Refills: 0 | Status: SHIPPED | OUTPATIENT
Start: 2020-07-31 | End: 2020-08-29

## 2020-08-25 DIAGNOSIS — F41.1 GENERALIZED ANXIETY DISORDER WITH PANIC ATTACKS: ICD-10-CM

## 2020-08-25 DIAGNOSIS — I10 BENIGN ESSENTIAL HYPERTENSION: ICD-10-CM

## 2020-08-25 DIAGNOSIS — F32.A CHRONIC DEPRESSION: Primary | ICD-10-CM

## 2020-08-25 DIAGNOSIS — F41.0 GENERALIZED ANXIETY DISORDER WITH PANIC ATTACKS: ICD-10-CM

## 2020-08-25 RX ORDER — SERTRALINE HYDROCHLORIDE 100 MG/1
TABLET, FILM COATED ORAL
Qty: 45 TAB | Refills: 0 | Status: SHIPPED | OUTPATIENT
Start: 2020-08-25 | End: 2021-09-21 | Stop reason: SDUPTHER

## 2020-08-25 RX ORDER — LOSARTAN POTASSIUM AND HYDROCHLOROTHIAZIDE 12.5; 5 MG/1; MG/1
1 TABLET ORAL DAILY
Qty: 30 TAB | Refills: 0 | Status: SHIPPED | OUTPATIENT
Start: 2020-08-25 | End: 2020-12-07 | Stop reason: SDUPTHER

## 2020-08-25 NOTE — TELEPHONE ENCOUNTER
Last Visit: 7/12/19  MD Matias  Next Appointment: Not scheduled-Appt due-Looks like we contacted patient but nothing scheduled. Previous Refill Encounter(s):   hyzaar 6/15/19  90 + 3  Sertraline 7/17/19  135 + 3 (per pharmacy)    Requested Prescriptions     Pending Prescriptions Disp Refills    losartan-hydroCHLOROthiazide (HYZAAR) 50-12.5 mg per tablet 30 Tab 0     Sig: Take 1 Tab by mouth daily.     sertraline (ZOLOFT) 100 mg tablet 45 Tab 0     Sig: TAKE 1.5 TABS BY MOUTH DAILY

## 2020-09-03 DIAGNOSIS — F41.0 GENERALIZED ANXIETY DISORDER WITH PANIC ATTACKS: ICD-10-CM

## 2020-09-03 DIAGNOSIS — F41.1 GENERALIZED ANXIETY DISORDER WITH PANIC ATTACKS: ICD-10-CM

## 2020-09-03 RX ORDER — BUSPIRONE HYDROCHLORIDE 5 MG/1
5 TABLET ORAL 2 TIMES DAILY
Qty: 60 TAB | Refills: 0 | Status: SHIPPED | OUTPATIENT
Start: 2020-09-03 | End: 2021-02-25 | Stop reason: SDUPTHER

## 2020-09-03 NOTE — TELEPHONE ENCOUNTER
MD Matias,    Noted your request for appointment and entered 30 d/s if appropriate. Thanks, Anabelle Torres    Last Visit: 7/12/19  MD Balta Costa  Next Appointment: Not scheduled-Appt. due  Previous Refill Encounter(s): 8/13/19  60 + 5      Requested Prescriptions     Pending Prescriptions Disp Refills    busPIRone (BUSPAR) 5 mg tablet 60 Tab 0     Sig: Take 1 Tab by mouth two (2) times a day.

## 2020-09-04 NOTE — TELEPHONE ENCOUNTER
Rx sent for 30 day supply. Patient overdue follow up. Last seen 7-2019. Please schedule as soon as possible.

## 2020-09-08 NOTE — TELEPHONE ENCOUNTER
Telephone call to pt. No answer. Unable to leave message due to Home and cell phone mailbox's being full and unable to take messages.      Need pt to schedule a f/u ASAP to see dr. Rasheeda Hooper to get her buspar refilled for more than 30 days

## 2020-11-07 NOTE — PROGRESS NOTES
Cardiology Progress Note 4/18/2019 7:46 AM 
 
Admit Date: 3/26/2019 Admit Diagnosis: Perforated diverticulum of large intestine [K57.20] Subjective:  
 
John Alston Feeling better. No chest pain. Breathing ok. Continues in afib on telemetry but HR is much improved in 70-80s bpm. 
 
Visit Vitals /62 Pulse 78 Temp 98.7 °F (37.1 °C) Resp 26 Ht 5' 8\" (1.727 m) Wt 185 lb 10 oz (84.2 kg) SpO2 98% BMI 28.22 kg/m² Current Facility-Administered Medications Medication Dose Route Frequency  dilTIAZem (CARDIZEM) IR tablet 60 mg  60 mg Oral Q8H  
 metoprolol tartrate (LOPRESSOR) tablet 25 mg  25 mg Oral Q12H  ceFAZolin (ANCEF) 2 g/20 mL in sterile water IV syringe  2 g IntraVENous Q8H  
 losartan (COZAAR) tablet 50 mg  50 mg Oral DAILY  hydrALAZINE (APRESOLINE) tablet 25 mg  25 mg Oral TID  
 HYDROmorphone (DILAUDID) tablet 2 mg  2 mg Oral Q4H PRN  
 hydrALAZINE (APRESOLINE) 20 mg/mL injection 10 mg  10 mg IntraVENous Q6H PRN  
 labetalol (NORMODYNE;TRANDATE) 20 mg/4 mL (5 mg/mL) injection 20 mg  20 mg IntraVENous Q4H PRN  
 acetaminophen (TYLENOL) tablet 650 mg  650 mg Oral Q4H PRN  
 furosemide (LASIX) injection 40 mg  40 mg IntraVENous BID  diazePAM (VALIUM) injection 5 mg  5 mg IntraVENous Q4H PRN  
 cefepime (MAXIPIME) 2 g in 0.9% sodium chloride (MBP/ADV) 100 mL  2 g IntraVENous Q8H  
 calcium carbonate (TUMS) chewable tablet 400 mg [elemental]  400 mg Oral TID WITH MEALS  
 albuterol-ipratropium (DUO-NEB) 2.5 MG-0.5 MG/3 ML  3 mL Nebulization QID RT  
 potassium, sodium phosphates (NEUTRA-PHOS) packet 1 Packet  1 Packet Oral QID  diphenhydrAMINE (BENADRYL) injection 25 mg  25 mg IntraVENous QHS PRN  
 bacitracin 500 unit/gram packet 1 Packet  1 Packet Topical PRN  
 metroNIDAZOLE (FLAGYL) IVPB premix 500 mg  500 mg IntraVENous Q8H  
 sodium chloride (NS) flush 5-40 mL  5-40 mL IntraVENous Q8H  
  sodium chloride (NS) flush 5-40 mL  5-40 mL IntraVENous PRN  
 glucose chewable tablet 16 g  4 Tab Oral PRN  
 dextrose (D50W) injection syrg 12.5-25 g  12.5-25 g IntraVENous PRN  
 glucagon (GLUCAGEN) injection 1 mg  1 mg IntraMUSCular PRN  
 insulin regular (NOVOLIN R, HUMULIN R) injection   SubCUTAneous Q6H  
 fluconazole (DIFLUCAN) 400mg/200 mL IVPB (premix)  400 mg IntraVENous DAILY  cloNIDine (CATAPRES) 0.2 mg/24 hr patch 1 Patch  1 Patch TransDERmal Q7D  
 sodium chloride (NS) flush 5-40 mL  5-40 mL IntraVENous Q8H  
 sodium chloride (NS) flush 5-40 mL  5-40 mL IntraVENous PRN  
 ondansetron (ZOFRAN) injection 4 mg  4 mg IntraVENous Q4H PRN  pantoprazole (PROTONIX) 40 mg in sodium chloride 0.9% 10 mL injection  40 mg IntraVENous Q24H  
 albuterol (PROVENTIL VENTOLIN) nebulizer solution 2.5 mg  2.5 mg Nebulization Q6H PRN  
 sertraline (ZOLOFT) tablet 100 mg  100 mg Oral DAILY  traZODone (DESYREL) tablet 50 mg  50 mg Oral QHS PRN Objective:  
  
Physical Exam: 
Visit Vitals /65 Pulse 77 Temp 98.7 °F (37.1 °C) Resp 22 Ht 5' 8\" (1.727 m) Wt 185 lb 10 oz (84.2 kg) SpO2 98% BMI 28.22 kg/m² General Appearance:  Alert, appropriate, no acute distress. Ears/Nose/Mouth/Throat:   Hearing grossly normal. 
  
    Neck: Supple. No JVD Chest:   Scattered rhonchi, decreased BS bases Cardiovascular:  Irregularly irregular, S1, S2 normal, I/VI systolic murmur LUSB Abdomen:   Soft, non-tender, bowel sounds are active. Extremities: trace edema bilaterally. Skin: Warm and dry. Data Review:  
Labs:   
Recent Results (from the past 24 hour(s)) GLUCOSE, POC Collection Time: 04/17/19 12:30 PM  
Result Value Ref Range Glucose (POC) 96 65 - 100 mg/dL Performed by Jassi Girard   
EKG, 12 LEAD, INITIAL Collection Time: 04/17/19  5:08 PM  
Result Value Ref Range Ventricular Rate 133 BPM  
 Atrial Rate 110 BPM  
 QRS Duration 84 ms Q-T Interval 346 ms  
 QTC Calculation (Bezet) 514 ms Calculated R Axis 86 degrees Calculated T Axis 69 degrees Diagnosis Atrial fibrillation with rapid ventricular response Nonspecific ST abnormality Abnormal ECG When compared with ECG of 13-APR-2019 18:00, 
Atrial fibrillation has replaced Sinus rhythm Non-specific change in ST segment in Anterior leads T wave inversion no longer evident in Anterior leads Confirmed by Tracey Camacho MD, Asha Villa ([id number]) on 4/17/2019 5:35:23 PM 
  
GLUCOSE, POC Collection Time: 04/17/19  6:58 PM  
Result Value Ref Range Glucose (POC) 147 (H) 65 - 100 mg/dL Performed by Shanna Junior   
GLUCOSE, POC Collection Time: 04/17/19 11:31 PM  
Result Value Ref Range Glucose (POC) 262 (H) 65 - 100 mg/dL Performed by Denise Para METABOLIC PANEL, BASIC Collection Time: 04/18/19  4:36 AM  
Result Value Ref Range Sodium 137 136 - 145 mmol/L Potassium 3.7 3.5 - 5.1 mmol/L Chloride 99 97 - 108 mmol/L  
 CO2 32 21 - 32 mmol/L Anion gap 6 5 - 15 mmol/L Glucose 196 (H) 65 - 100 mg/dL BUN 16 6 - 20 MG/DL Creatinine 0.51 (L) 0.55 - 1.02 MG/DL  
 BUN/Creatinine ratio 31 (H) 12 - 20 GFR est AA >60 >60 ml/min/1.73m2 GFR est non-AA >60 >60 ml/min/1.73m2 Calcium 8.1 (L) 8.5 - 10.1 MG/DL  
CBC WITH AUTOMATED DIFF Collection Time: 04/18/19  4:36 AM  
Result Value Ref Range WBC 12.5 (H) 3.6 - 11.0 K/uL  
 RBC 2.56 (L) 3.80 - 5.20 M/uL HGB 7.5 (L) 11.5 - 16.0 g/dL HCT 25.2 (L) 35.0 - 47.0 % MCV 98.4 80.0 - 99.0 FL  
 MCH 29.3 26.0 - 34.0 PG  
 MCHC 29.8 (L) 30.0 - 36.5 g/dL  
 RDW 14.5 11.5 - 14.5 % PLATELET 098 (H) 212 - 400 K/uL MPV 9.8 8.9 - 12.9 FL  
 NRBC 0.0 0  WBC ABSOLUTE NRBC 0.00 0.00 - 0.01 K/uL NEUTROPHILS 77 (H) 32 - 75 % LYMPHOCYTES 14 12 - 49 % MONOCYTES 8 5 - 13 % EOSINOPHILS 0 0 - 7 % BASOPHILS 0 0 - 1 % IMMATURE GRANULOCYTES 1 (H) 0.0 - 0.5 % ABS. NEUTROPHILS 9.6 (H) 1.8 - 8.0 K/UL  
 ABS. LYMPHOCYTES 1.7 0.8 - 3.5 K/UL  
 ABS. MONOCYTES 1.0 0.0 - 1.0 K/UL  
 ABS. EOSINOPHILS 0.0 0.0 - 0.4 K/UL  
 ABS. BASOPHILS 0.0 0.0 - 0.1 K/UL  
 ABS. IMM. GRANS. 0.1 (H) 0.00 - 0.04 K/UL  
 DF AUTOMATED MAGNESIUM Collection Time: 04/18/19  4:36 AM  
Result Value Ref Range Magnesium 2.2 1.6 - 2.4 mg/dL GLUCOSE, POC Collection Time: 04/18/19  5:35 AM  
Result Value Ref Range Glucose (POC) 204 (H) 65 - 100 mg/dL Performed by Poppy Melara Telemetry: AFIB Assessment:  
 
Active Problems: 
  Perforated diverticulum of large intestine (3/26/2019) Acute hypoxemic respiratory failure ([address] (4/12/2019) Pneumonia due to infectious organism (4/12/2019) Plan: Afib with RVR. New diagnosis for her. HR now controlled. Will switch diltiazem and BB to po and stop diltiazem gtt. Titrate BP meds prn per HR/BP. Decreased her other antihypertensives down to allow for meds for rate control (hydralazine, losartan). Had echo recently with preserved LV systolic function and does not need to be repeated. Dr. Eusebia Thapa to follow over weekend. no

## 2020-12-07 DIAGNOSIS — I10 BENIGN ESSENTIAL HYPERTENSION: ICD-10-CM

## 2020-12-07 RX ORDER — LOSARTAN POTASSIUM AND HYDROCHLOROTHIAZIDE 12.5; 5 MG/1; MG/1
1 TABLET ORAL DAILY
Qty: 30 TAB | Refills: 0 | Status: SHIPPED | OUTPATIENT
Start: 2020-12-07 | End: 2021-01-05

## 2020-12-07 NOTE — TELEPHONE ENCOUNTER
MD Matias,    Request for refill. No appointment scheduled. Last rx of 30 filled 10/30/20. Attempt to call patient back in Sept with no voicemail per note. Last Visit: 7/12/19 MD Augie Ledesma  Next Appointment: Not scheduled- Added appointment request for further refills if appropriate. Last rx: 8/25/20 30 (filled 10/30/20 per pharmacy)      Requested Prescriptions     Pending Prescriptions Disp Refills    losartan-hydroCHLOROthiazide (HYZAAR) 50-12.5 mg per tablet 30 Tab 0     Sig: Take 1 Tab by mouth daily. Must have appointment for further refills!

## 2020-12-08 NOTE — TELEPHONE ENCOUNTER
I have not seen patient in over a year. Last visit w/ me 7-2019 w/ previous reminders. I sent in 30 day supply for now. Please call and get pt booked asap.

## 2021-02-22 ENCOUNTER — OFFICE VISIT (OUTPATIENT)
Dept: FAMILY MEDICINE CLINIC | Age: 71
End: 2021-02-22
Payer: MEDICARE

## 2021-02-22 VITALS
TEMPERATURE: 98.9 F | SYSTOLIC BLOOD PRESSURE: 136 MMHG | OXYGEN SATURATION: 95 % | BODY MASS INDEX: 32.46 KG/M2 | HEART RATE: 90 BPM | HEIGHT: 68 IN | RESPIRATION RATE: 18 BRPM | WEIGHT: 214.2 LBS | DIASTOLIC BLOOD PRESSURE: 70 MMHG

## 2021-02-22 DIAGNOSIS — F41.1 GENERALIZED ANXIETY DISORDER WITH PANIC ATTACKS: ICD-10-CM

## 2021-02-22 DIAGNOSIS — Z23 ENCOUNTER FOR IMMUNIZATION: ICD-10-CM

## 2021-02-22 DIAGNOSIS — F32.A CHRONIC DEPRESSION: ICD-10-CM

## 2021-02-22 DIAGNOSIS — J44.9 CHRONIC OBSTRUCTIVE PULMONARY DISEASE, UNSPECIFIED COPD TYPE (HCC): ICD-10-CM

## 2021-02-22 DIAGNOSIS — R73.03 PREDIABETES: ICD-10-CM

## 2021-02-22 DIAGNOSIS — Z00.00 MEDICARE ANNUAL WELLNESS VISIT, SUBSEQUENT: Primary | ICD-10-CM

## 2021-02-22 DIAGNOSIS — I10 BENIGN ESSENTIAL HYPERTENSION: ICD-10-CM

## 2021-02-22 DIAGNOSIS — F41.0 GENERALIZED ANXIETY DISORDER WITH PANIC ATTACKS: ICD-10-CM

## 2021-02-22 DIAGNOSIS — Z12.31 ENCOUNTER FOR SCREENING MAMMOGRAM FOR MALIGNANT NEOPLASM OF BREAST: ICD-10-CM

## 2021-02-22 DIAGNOSIS — E78.00 HYPERCHOLESTEROLEMIA: ICD-10-CM

## 2021-02-22 DIAGNOSIS — I48.0 PAROXYSMAL ATRIAL FIBRILLATION (HCC): ICD-10-CM

## 2021-02-22 PROBLEM — Z87.09 HISTORY OF ACUTE RESPIRATORY FAILURE: Status: ACTIVE | Noted: 2021-02-22

## 2021-02-22 PROBLEM — J96.01 ACUTE HYPOXEMIC RESPIRATORY FAILURE (HCC): Status: RESOLVED | Noted: 2019-04-12 | Resolved: 2021-02-22

## 2021-02-22 PROBLEM — Z86.79: Status: ACTIVE | Noted: 2021-02-22

## 2021-02-22 PROBLEM — Z86.718 HISTORY OF DVT OF LOWER EXTREMITY: Status: ACTIVE | Noted: 2021-02-22

## 2021-02-22 PROCEDURE — 3017F COLORECTAL CA SCREEN DOC REV: CPT | Performed by: FAMILY MEDICINE

## 2021-02-22 PROCEDURE — G9717 DOC PT DX DEP/BP F/U NT REQ: HCPCS | Performed by: FAMILY MEDICINE

## 2021-02-22 PROCEDURE — G8400 PT W/DXA NO RESULTS DOC: HCPCS | Performed by: FAMILY MEDICINE

## 2021-02-22 PROCEDURE — 1101F PT FALLS ASSESS-DOCD LE1/YR: CPT | Performed by: FAMILY MEDICINE

## 2021-02-22 PROCEDURE — G9899 SCRN MAM PERF RSLTS DOC: HCPCS | Performed by: FAMILY MEDICINE

## 2021-02-22 PROCEDURE — G8427 DOCREV CUR MEDS BY ELIG CLIN: HCPCS | Performed by: FAMILY MEDICINE

## 2021-02-22 PROCEDURE — G8417 CALC BMI ABV UP PARAM F/U: HCPCS | Performed by: FAMILY MEDICINE

## 2021-02-22 PROCEDURE — 1090F PRES/ABSN URINE INCON ASSESS: CPT | Performed by: FAMILY MEDICINE

## 2021-02-22 PROCEDURE — 90732 PPSV23 VACC 2 YRS+ SUBQ/IM: CPT | Performed by: FAMILY MEDICINE

## 2021-02-22 PROCEDURE — G0009 ADMIN PNEUMOCOCCAL VACCINE: HCPCS | Performed by: FAMILY MEDICINE

## 2021-02-22 PROCEDURE — G0439 PPPS, SUBSEQ VISIT: HCPCS | Performed by: FAMILY MEDICINE

## 2021-02-22 PROCEDURE — G8754 DIAS BP LESS 90: HCPCS | Performed by: FAMILY MEDICINE

## 2021-02-22 PROCEDURE — 99214 OFFICE O/P EST MOD 30 MIN: CPT | Performed by: FAMILY MEDICINE

## 2021-02-22 PROCEDURE — G8752 SYS BP LESS 140: HCPCS | Performed by: FAMILY MEDICINE

## 2021-02-22 PROCEDURE — G8536 NO DOC ELDER MAL SCRN: HCPCS | Performed by: FAMILY MEDICINE

## 2021-02-22 RX ORDER — AMLODIPINE BESYLATE 10 MG/1
10 TABLET ORAL DAILY
COMMUNITY

## 2021-02-22 NOTE — PROGRESS NOTES
Chief Complaint   Patient presents with    Annual Wellness Visit    Medication Refill        1. Have you been to the ER, urgent care clinic since your last visit? Hospitalized since your last visit? No    2. Have you seen or consulted any other health care providers outside of the [address] [place] [address] since your last visit? Include any pap smears or colon screening. No    3 most recent PHQ Screens 5/2/2019   Little interest or pleasure in doing things Nearly every day   Feeling down, depressed, irritable, or hopeless Several days   Total Score PHQ 2 4       Fall Risk Assessment, last 12 mths 2/22/2021   Able to walk? Yes   Fall in past 12 months? 0   Do you feel unsteady? 0   Are you worried about falling 0   Number of falls in past 12 months -   Fall with injury? -               ADL Assessment 2/22/2021   Feeding yourself No Help Needed   Getting from bed to chair No Help Needed   Getting dressed No Help Needed   Bathing or showering No Help Needed   Walk across the room (includes cane/walker) No Help Needed   Using the telphone No Help Needed   Taking your medications No Help Needed   Preparing meals No Help Needed   Managing money (expenses/bills) No Help Needed   Moderately strenuous housework (laundry) No Help Needed   Shopping for personal items (toiletries/medicines) No Help Needed   Shopping for groceries No Help Needed   Driving No Help Needed   Climbing a flight of stairs No Help Needed   Getting to places beyond walking distances No Help Needed         Abuse Screening Questionnaire 2/22/2021   Do you ever feel afraid of your partner? N   Are you in a relationship with someone who physically or mentally threatens you? -   Is it safe for you to go home?  James Roberts

## 2021-02-22 NOTE — PROGRESS NOTES
Tavares Michel is a 78 y.o. female  who presents for PNU 23  immunizations. she denies any symptoms , reactions or allergies that would exclude them from being immunized today. Risks and adverse reactions were discussed and the VIS was given to them before injection. All questions were addressed. she was observed for 10 min post injection. There were no reactions observed.     Kamilla Jeong LPN

## 2021-02-22 NOTE — PROGRESS NOTES
Chief Complaint   Patient presents with    Annual Wellness Visit    Hypertension    Cholesterol Problem    Medication Check       HISTORY OF PRESENT ILLNESS   HPI  Patient accompanied by her daughter Talia Padgett today, long past due routine follow up hypertension, hypercholesterolemia, anxiety, depression, and medication check. Also overdue labs. Not fasting today but difficult for daughter to get her back in. Daughter manages medications. Complying w/ regimen updated below and tolerating well. Home BP's not monitored. Patient and daughter feel she is doing really well, stable overall, no complaints or concerns at this time. Past due several HM items, addressed/updated today as noted. REVIEW OF SYMPTOMS   Review of Systems   Constitutional: Negative. HENT: Negative. Eyes: Negative. Respiratory: Negative. Cardiovascular: Negative. Gastrointestinal: Negative. Genitourinary: Negative. Musculoskeletal: Negative for myalgias. Neurological: Negative. Endo/Heme/Allergies: Negative. Psychiatric/Behavioral: Negative. Mood has been good and stable on regimen of Zoloft daily           PROBLEM LIST/MEDICAL HISTORY     Problem List  Date Reviewed: 2/22/2021          Codes Class Noted    Chronic depression ICD-10-CM: F32.9  ICD-9-CM: 522  7/12/2019        Generalized anxiety disorder with panic attacks ICD-10-CM: F41.1, F41.0  ICD-9-CM: 300.02, 300.01  7/12/2019        Chronic fatigue ICD-10-CM: R53.82  ICD-9-CM: 780.79  5/16/2019        Perforation and abscess of large intestine concurrent with and due to diverticulitis ICD-10-CM: K57.20  ICD-9-CM: 562.11, 569.5  5/2/2019    Overview Signed 5/2/2019 11:40 AM by Chemo Blake MD     Lap Drain 3-29-19, Exp Laparotomy/Colostomy 4-8-19, Dr. Vern Ruvalcaba fibrillation, transient [facility]) ICD-10-CM: I48.91  ICD-9-CM: 427.31  5/2/2019    Overview Signed 5/2/2019 11:53 AM by Chemo Blake MD     Post Op Acute A. Fib after  Shunt replacement, 4-17-19, [facility], Dr. Iam Avalos (no cardioversion required, medication mgt only)             Acute hypoxemic respiratory failure [facility]) ICD-10-CM: J96.01  ICD-9-CM: 518.81  4/12/2019        Pneumonia due to infectious organism ICD-10-CM: J18.9  ICD-9-CM: 486  4/12/2019        Perforated diverticulum of large intestine ICD-10-CM: K57.20  ICD-9-CM: 562.10  3/26/2019        ACP (advance care planning) ICD-10-CM: Z71.89  ICD-9-CM: V65.49  2/21/2017    Overview Signed 2/21/2017 11:02 AM by Booker Butcher MD     Initial ACP discussion w/ pt and daughter 2-2017. AMD form reviewed and copy provided.  NN/facilitator to discuss with patient               Benign essential hypertension ICD-10-CM: I10  ICD-9-CM: 401.1  6/24/2016        Impaired fasting glucose/Prediabetes ICD-10-CM: R73.01  ICD-9-CM: 790.21  7/13/2014    Overview Signed 7/13/2014 10:50 PM by Booker Butcher MD     6/2014             Bilateral hip pain ICD-10-CM: M25.551, M25.552  ICD-9-CM: 719.45  6/3/2014    Overview Signed 6/3/2014  2:46 PM by Booker Butcher MD     xrays 2012, negative             Vitamin D deficiency ICD-10-CM: E55.9  ICD-9-CM: 268.9  6/3/2014    Overview Signed 6/3/2014  2:48 PM by Booker Butcher MD     2012             GERD (gastroesophageal reflux disease) ICD-10-CM: K21.9  ICD-9-CM: 530.81  9/2/2011        H/O Subarachnoid hemorrhage due to ruptured aneurysm ICD-10-CM: I60.8  ICD-9-CM: 430  7/14/2010    Overview Signed 7/14/2010  1:08 PM by Booker Butcher MD     10/10/06 S/P Rt Frontal Craniotomy and clipping, Dr Robert Cook 10/27/06             Essential tremor ICD-10-CM: G25.0  ICD-9-CM: 333.1  7/14/2010        Cough variant asthma ICD-10-CM: J45.991  ICD-9-CM: 493.82  3/29/2010        Tobacco Abuse ICD-10-CM: R08.400  ICD-9-CM: 305.1  3/29/2010    Overview Signed 3/29/2010  6:13 PM by French Ledezma               AR (allergic rhinitis) ICD-10-CM: J30.9  ICD-9-CM: 477.9  3/29/2010        ADD (attention deficit disorder) ICD-10-CM: F98.8  ICD-9-CM: 314.00  3/29/2010        COPD (chronic obstructive pulmonary disease) ([facility] ICD-10-CM: J44.9  ICD-9-CM: 496  3/29/2010    Overview Signed 11/10/2014  2:05 PM by Roxane Lee MD     Pul Dr Merlene Ojeda             Thyroiditis, subacute ICD-10-CM: E06.1  ICD-9-CM: 245.1  3/29/2010        Hyperlipemia ICD-10-CM: E78.5  ICD-9-CM: 272.4  3/29/2010    Overview Signed 7/14/2010  1:05 PM by Roxane Lee MD     2005             DVT of Rt Lower Extremity ICD-10-CM: I82.409  ICD-9-CM: 453.40  3/29/2010    Overview Addendum 7/14/2010  1:04 PM by Roxane Lee MD     11/06             Elevated LFT's, Fatty Liver ICD-10-CM: R79.89  ICD-9-CM: 790.6  3/29/2010    Overview Addendum 7/14/2010  1:04 PM by Roxane Lee MD     [facility] 7/09 Hepatic Steatosis                       PAST SURGICAL HISTORY     Past Surgical History:   Procedure Laterality Date    ECHO STRESS  2005    Negative    HX BREAST BIOPSY      HX COLONOSCOPY  3/2013, Dr Janneth Zapata    benign cecal polyp, f/u 10 yrs    HX COLONOSCOPY  18's    Benign polypectomy    HX COLOSTOMY  04/08/2019    Exp Laparotomy, Sigmoidectomy & Colostomy for Perforated Sigmoid Diverticulum, Dr. Elsie Griggs  10/2006    for clipping of ruptured aneurysm; Dr Tanesha Malloy a shunt    HX CSF SHUNT  04/17/2019    Right Intrapleural Placement  Shunt (relocated due to peritoneal/abdominal surgery 4/8/19[facility], Dr. Augustin ANDERSON, Dr Janneth Zapata    \"ok\" per pt report    HX GI  07/19/2019    Laparoscopic closure of colostomy     HX HYSTERECTOMY  1991    Cervical Dysplasia (Dr Deepika Oleary)    HX OTHER SURGICAL  07/19/2019    lap sig.  [id number]. b. Carmody    HX OTHER SURGICAL  04/08/2019    Laparotomy with sigmoidectomy and end wakdobtti-ASK-Ly. B. Carmody    IR DRAIN CUTANEOUS/SUBCU ABSCESS  03/29/2019    Lap Drain Pelvic Abscess for Perforated Sigmoid Diverticulum, Dr. Kelsea Garrett  04/08/2019    Externalize  Shunt Right Abdomen for Exp Lap/Colostomy, [facility], Dr. Helms Case     Current Outpatient Medications   Medication Sig    amLODIPine (Norvasc) 10 mg tablet Take 10 mg by mouth daily.  losartan-hydroCHLOROthiazide (HYZAAR) 50-12.5 mg per tablet TAKE 1 TAB BY MOUTH DAILY. MUST HAVE APPOINTMENT FOR FURTHER REFILLS!  busPIRone (BUSPAR) 5 mg tablet Take 1 Tab by mouth two (2) times a day.  traZODone (DESYREL) 50 mg tablet TAKE 1 TABLET BY MOUTH EVERY DAY AT NIGHT    omeprazole (PRILOSEC) 20 mg capsule TAKE 1 CAPSULE BY MOUTH EVERY DAY    sertraline (ZOLOFT) 100 mg tablet TAKE 1.5 TABS BY MOUTH DAILY    dilTIAZem ER (CARDIZEM CD) 300 mg capsule TAKE 1 CAPSULE BY MOUTH EVERY DAY    ferrous sulfate 325 mg (65 mg iron) tablet Take 1 Tab by mouth two (2) times a day.  albuterol (VENTOLIN HFA) 90 mcg/actuation inhaler Take 2 Puffs by inhalation every six (6) hours as needed for Wheezing or Shortness of Breath.  MULTIVITAMINS (MULTIVITAMIN PO) Take 1 Tab by mouth daily. No current facility-administered medications for this visit. ALLERGIES     Allergies   Allergen Reactions    Adderall [Dextroamphetamine-Amphetamine] Unknown (comments)    Ativan [Lorazepam] Hives    Egg Yolk Other (comments)     indigestion    Pcn [Penicillins] Other (comments)     Unsure of reaction; pt reports unsure if have taken cephalosporin before.      Wellbutrin [Bupropion Hcl] Anxiety    Xanax [Alprazolam] Hives          SOCIAL HISTORY     Social History     Tobacco Use    Smoking status: Current Every Day Smoker     Packs/day: 0.50     Years: 30.00     Pack years: 15.00     Types: Cigarettes    Smokeless tobacco: Never Used   Substance Use Topics    Alcohol use: No     Alcohol/week: 0.0 standard drinks     Social History     Social History Narrative        Lives in 2 story home    Her daughter Renee Mccloud lives w/ her and has POA. Daughter prepares most meals but patient still cooks some as well    No asst devices for ambulation; no canes or walkers    No longer drives since the [facility] and stroke    Daughter manages her finances and medications    Daughter helps w/ cleaning, meals and total transportation     Does other ADL's, dressing, bathing, grooming all on her own; some help w/ meal preparation; pt can use microwave and the stove when daughters are home. Patient does some light house work/cleaning. No toileting problems; uses independently    Initial ACP discussion w/ pt and daughter . AMD form reviewed and copy provided. Discussed w/ pt and daughter again , they think patient has AMD and will check.      Diet:nothing special, appetite good, gaining wt over the years    Exercise: none, pretty sedentary    Caffeine: 2 ups of coffee qam, 1 cup qpm, no tea or sodas             Social History     Substance and Sexual Activity   Sexual Activity Never       IMMUNIZATIONS     Immunization History   Administered Date(s) Administered    Pneumococcal Conjugate (PCV-13) 2019    Pneumococcal Polysaccharide (PPSV-23) 07/10/2014, 2021         FAMILY HISTORY     Family History   Problem Relation Age of Onset    Anxiety Daughter     Attention Deficit Disorder Daughter     Drug Abuse Daughter     Anxiety Daughter     Thyroid Disease Daughter     Thyroid Disease Daughter         Hypothyroidism    Asthma Daughter     Cancer Father         kidney    Diabetes Father     Arthritis-osteo Father     Diabetes Mother     Hypertension Mother    Madisyn Qualia Mother    Ryanne Solsilvia Stroke Mother     Hypertension Sister     Depression Sister     Pneumonia Sister          age 72    Diabetes Sister     Thyroid Disease Sister     Thyroid Disease Sister     Heart Disease Sister     Diabetes Maternal Grandmother     No Known Problems Brother     Thyroid Disease Sister     Anxiety Sister     Depression Sister     Alcohol abuse Sister     Hypertension Sister     Hypertension Sister     Thyroid Disease Sister     Thyroid Disease Sister     Anesth Problems Neg Hx          VITALS     Visit Vitals  /70   Pulse 90   Temp 98.9 °F (37.2 °C)   Resp 18   Ht 5' 8\" (1.727 m)   Wt 214 lb 3.2 oz (97.2 kg)   SpO2 95%   BMI 32.57 kg/m²          PHYSICAL EXAMINATION   Physical Exam  Vitals signs reviewed. Constitutional:       Appearance: She is not ill-appearing or toxic-appearing. HENT:      Right Ear: Tympanic membrane and ear canal normal.      Left Ear: Tympanic membrane and ear canal normal.   Eyes:      Conjunctiva/sclera: Conjunctivae normal.   Neck:      Musculoskeletal: Neck supple. Cardiovascular:      Rate and Rhythm: Rhythm irregular. Heart sounds: No murmur. Comments: Rate 90's  Pulmonary:      Effort: Pulmonary effort is normal. No respiratory distress. Abdominal:      Palpations: Abdomen is soft. Tenderness: There is no abdominal tenderness. Musculoskeletal: Normal range of motion. Skin:     General: Skin is warm. Neurological:      General: No focal deficit present. Mental Status: She is alert and oriented to person, place, and time. Mental status is at baseline.    Psychiatric:         Mood and Affect: Mood normal.                LABORATORY DATA/ANCILLARY/IMAGING     Results for orders placed or performed during the hospital encounter of 07/19/19   CBC W/O DIFF   Result Value Ref Range    WBC 12.0 (H) 3.6 - 11.0 K/uL    RBC 4.47 3.80 - 5.20 M/uL    HGB 12.7 11.5 - 16.0 g/dL    HCT 39.3 35.0 - 47.0 %    MCV 87.9 80.0 - 99.0 FL    MCH 28.4 26.0 - 34.0 PG    MCHC 32.3 30.0 - 36.5 g/dL    RDW 14.1 11.5 - 14.5 %    PLATELET 924 975 - 338 K/uL    MPV 10.1 8.9 - 12.9 FL    NRBC 0.0 0  WBC    ABSOLUTE NRBC 0.00 0.00 - 7.28 K/uL   METABOLIC PANEL, BASIC   Result Value Ref Range    Sodium 139 136 - 145 mmol/L    Potassium 3.9 3.5 - 5.1 mmol/L    Chloride 106 97 - 108 mmol/L    CO2 25 21 - 32 mmol/L    Anion gap 8 5 - 15 mmol/L    Glucose 135 (H) 65 - 100 mg/dL    BUN 5 (L) 6 - 20 MG/DL    Creatinine 0.74 0.55 - 1.02 MG/DL    BUN/Creatinine ratio 7 (L) 12 - 20      GFR est AA >60 >60 ml/min/1.73m2    GFR est non-AA >60 >60 ml/min/1.73m2    Calcium 8.6 8.5 - 10.1 MG/DL   MAGNESIUM   Result Value Ref Range    Magnesium 2.5 (H) 1.6 - 2.4 mg/dL   PHOSPHORUS   Result Value Ref Range    Phosphorus 3.0 2.6 - 4.7 MG/DL       Lab Results   Component Value Date/Time    Cholesterol, total 157 10/05/2018 08:25 AM    HDL Cholesterol 40 10/05/2018 08:25 AM    LDL, calculated 89 10/05/2018 08:25 AM    Triglyceride 141 10/05/2018 08:25 AM    CHOL/HDL Ratio 2.7 10/05/2009 09:40 AM     Lab Results   Component Value Date/Time    TSH 1.160 10/05/2018 08:25 AM          ASSESSMENT & PLAN   Diagnoses and all orders for this visit:    1. Medicare annual wellness visit, subsequent, See separate note under this same encounter visit for Medicare Wellness note. 2. Encounter for screening mammogram for malignant neoplasm of breast  -     CARLO MAMMO BI SCREENING INCL CAD; Future    3. Encounter for immunization  -     PNEUMOCOCCAL POLYSACCHARIDE VACCINE, 23-VALENT, ADULT OR IMMUNOSUPPRESSED PT DOSE,  -     ADMIN PNEUMOCOCCAL VACCINE    4. Benign essential hypertension  Assessment & Plan:  Stable. Key CAD CHF Meds             amLODIPine (Norvasc) 10 mg tablet (Taking) Take 10 mg by mouth daily. losartan-hydroCHLOROthiazide (HYZAAR) 50-12.5 mg per tablet (Taking) TAKE 1 TAB BY MOUTH DAILY. MUST HAVE APPOINTMENT FOR FURTHER REFILLS!    dilTIAZem ER (CARDIZEM CD) 300 mg capsule (Taking) TAKE 1 CAPSULE BY MOUTH EVERY DAY            Orders:  -     CBC W/O DIFF; Future  -     METABOLIC PANEL, COMPREHENSIVE; Future  -     LIPID PANEL; Future  -     TSH 3RD GENERATION; Future    5.  Paroxysmal atrial fibrillation (HCC)  Assessment & Plan:  Stable, based on history, physical exam and review of pertinent labs, studies and medications; meds reconciled; continue current treatment plan., This condition is managed by Specialist. Cardiology Dr. Shira Alaniz CAD CHF Meds             amLODIPine (Norvasc) 10 mg tablet (Taking) Take 10 mg by mouth daily. losartan-hydroCHLOROthiazide (HYZAAR) 50-12.5 mg per tablet (Taking) TAKE 1 TAB BY MOUTH DAILY. MUST HAVE APPOINTMENT FOR FURTHER REFILLS!    dilTIAZem ER (CARDIZEM CD) 300 mg capsule (Taking) TAKE 1 CAPSULE BY MOUTH EVERY DAY              6. Hypercholesterolemia   Given ASCVD risk, would benefit from statin therapy. Await today's labs   -     METABOLIC PANEL, COMPREHENSIVE; Future  -     LIPID PANEL; Future  -     TSH 3RD GENERATION; Future    7. Prediabetes  -     METABOLIC PANEL, COMPREHENSIVE; Future  -     HEMOGLOBIN A1C WITH EAG; Future    8. Chronic obstructive pulmonary disease, unspecified [facility] type [facility]  Assessment & Plan:  Stable. Pulmonary Dr. Winthrop Cabot. Smoking cessation counseling. 9. Chronic depression  Assessment & Plan:  Controlled, stable on current regimen of Zoloft daily      10. Generalized anxiety disorder with panic attacks  Assessment & Plan:  Controlled, stable on current regimen of Zoloft daily      Patient accompanied by her daughter Jeaneth Gonzalez today, long past due routine follow up hypertension, hypercholesterolemia, anxiety, depression, and medication check. Also overdue labs. Not fasting today but difficult for daughter to get her back in. Therefore check labs today  Cardiovascular risk and specific lipid/LDL/BP goals reviewed  Given ASCVD risk, would benefit from statin therapy. Await today's labs to decide regimen  Reviewed diet, nutrition, exercise, weight management, BMI/goals. Age/risk based screening recommendations, health maintenance & prevention counseling. Cancer screening USPTFS guidelines reviewed w/ pt today.    Discussed benefits/positive/negative outcomes of screening based on age/risk stratification. Informed consent for/against screening based on pt's personal hx/risk factors. Updated in history above and health maintenance. Reviewed medications and side effects in detail  Further follow up & other recommendations pending review of labs.

## 2021-02-22 NOTE — ASSESSMENT & PLAN NOTE
Stable, based on history, physical exam and review of pertinent labs, studies and medications; meds reconciled; continue current treatment plan., This condition is managed by Specialist. Cardiology Dr. Jean Brown CAD CHF Meds             amLODIPine (Norvasc) 10 mg tablet (Taking) Take 10 mg by mouth daily. losartan-hydroCHLOROthiazide (HYZAAR) 50-12.5 mg per tablet (Taking) TAKE 1 TAB BY MOUTH DAILY.  MUST HAVE APPOINTMENT FOR FURTHER REFILLS!    dilTIAZem ER (CARDIZEM CD) 300 mg capsule (Taking) TAKE 1 CAPSULE BY MOUTH EVERY DAY

## 2021-02-22 NOTE — ASSESSMENT & PLAN NOTE
Stable.  Key CAD CHF Meds             amLODIPine (Norvasc) 10 mg tablet (Taking) Take 10 mg by mouth daily.    losartan-hydroCHLOROthiazide (HYZAAR) 50-12.5 mg per tablet (Taking) TAKE 1 TAB BY MOUTH DAILY. MUST HAVE APPOINTMENT FOR FURTHER REFILLS!    dilTIAZem ER (CARDIZEM CD) 300 mg capsule (Taking) TAKE 1 CAPSULE BY MOUTH EVERY DAY

## 2021-02-22 NOTE — PROGRESS NOTES
This is the Subsequent Medicare Annual Wellness Exam, performed 12 months or more after the Initial AWV or the last Subsequent AWV    I have reviewed the patient's medical history in detail and updated the computerized patient record. Depression Risk Factor Screening:     3 most recent PHQ Screens 2/22/2021   PHQ Not Done Active Diagnosis of Depression or Bipolar Disorder   Little interest or pleasure in doing things -   Feeling down, depressed, irritable, or hopeless -   Total Score PHQ 2 -       Alcohol Risk Screen    Do you average more than 1 drink per night or more than 7 drinks a week:  No    On any one occasion in the past three months have you have had more than 3 drinks containing alcohol:  No        Functional Ability and Level of Safety:    Hearing: Hearing is good. Activities of Daily Living: The home contains: no safety equipment. Patient needs help with:  transportation, shopping, managing medications and managing money     ADL Assessment 2/22/2021   Feeding yourself No Help Needed   Getting from bed to chair No Help Needed   Getting dressed No Help Needed   Bathing or showering No Help Needed   Walk across the room (includes cane/walker) No Help Needed   Using the telphone No Help Needed   Taking your medications Help Needed   Preparing meals No Help Needed   Managing money (expenses/bills) No Help Needed   Moderately strenuous housework (laundry) No Help Needed   Shopping for personal items (toiletries/medicines) Help Needed   Shopping for groceries No Help Needed   Driving Help Needed   Climbing a flight of stairs No Help Needed   Getting to places beyond walking distances No Help Needed        Ambulation: with no difficulty     Fall Risk:  Fall Risk Assessment, last 12 mths 2/22/2021   Able to walk? Yes   Fall in past 12 months? 0   Do you feel unsteady? 0   Are you worried about falling 0   Number of falls in past 12 months -   Fall with injury?  -      Abuse Screen:  Patient is not abused       Cognitive Screening    Has your family/caregiver stated any concerns about your memory: no         Assessment/Plan   Education and counseling provided:  Are appropriate based on today's review and evaluation  Screening Mammography    Diagnoses and all orders for this visit:    1. Medicare annual wellness visit, subsequent    2. Encounter for screening mammogram for malignant neoplasm of breast  -     CARLO MAMMO BI SCREENING INCL CAD; Future    3.  Encounter for immunization  -     PNEUMOCOCCAL POLYSACCHARIDE VACCINE, 23-VALENT, ADULT OR IMMUNOSUPPRESSED PT DOSE,  -     ADMIN PNEUMOCOCCAL VACCINE        Health Maintenance Due     Health Maintenance Due   Topic Date Due    COVID-19 Vaccine (1 of 2) 10/24/1968    Shingrix Vaccine Age 50> (1 of 2) 10/24/2002    GLAUCOMA SCREENING Q2Y  10/24/2017    Bone Densitometry (Dexa) Screening  10/24/2017    Breast Cancer Screen Mammogram  02/21/2019    A1C test (Diabetic or Prediabetic)  10/05/2019    Pneumococcal 65+ years (2 of 2 - PPSV23) 05/02/2020       Patient Care Team   Patient Care Team:  Diana Casey MD as PCP - Padmaja Gallo MD as PCP - [facility] Empaneled Provider  Jaquelin Cutler MD (Pulmonary Disease)  Sonia Paula MD (Cardiology)  Mary Kay Ng MD as Physician (General Surgery)    History     Patient Active Problem List   Diagnosis Code    Cough variant asthma J45.991    Tobacco Abuse F17.210    AR (allergic rhinitis) J30.9    ADD (attention deficit disorder) F98.8    COPD (chronic obstructive pulmonary disease) ([facility] J44.9    Thyroiditis, subacute E06.1    Hyperlipemia E78.5    DVT of Rt Lower Extremity I82.409    Elevated LFT's, Fatty Liver R79.89    H/O Subarachnoid hemorrhage due to ruptured aneurysm I60.8    Essential tremor G25.0    GERD (gastroesophageal reflux disease) K21.9    Bilateral hip pain M25.551, M25.552    Vitamin D deficiency E55.9    Impaired fasting glucose/Prediabetes R73.01    Benign essential hypertension I10    ACP (advance care planning) Z71.89    Perforated diverticulum of large intestine K57.20    Acute hypoxemic respiratory failure (HCC) J96.01    Pneumonia due to infectious organism J18.9    Perforation and abscess of large intestine concurrent with and due to diverticulitis K57.20    Atrial fibrillation, transient (HCC) I48.91    Chronic fatigue R53.82    Chronic depression F32.9    Generalized anxiety disorder with panic attacks F41.1, F41.0     Past Medical History:   Diagnosis Date    ACP (advance care planning) 2/21/2017    Initial ACP discussion w/ pt and daughter 2-2017. AMD form reviewed and copy provided. NN/facilitator to discuss with patient     Aneurysm ([address] 2006    [facility]    Asthma     hx of    Atrial fibrillation, transient ([address] 5/2/2019    Post Op Acute A.  Fib after  Shunt replacement, 4-17-19, [facility], Cardio, Dr. Freeman Frazier (no cardioversion required, medication mgt only)    Benign essential hypertension 6/24/2016    Bilateral hip pain 6/3/2014    xrays 2012, negative    Cancer ([address] 1990ish    [facility]    Chronic depression 7/12/2019    COPD (chronic obstructive pulmonary disease) ([address] 3/29/2010    Diverticulitis     DVT of Rt Lower Extremity 3/29/2010    Elevated LFT's, Fatty Liver 3/29/2010    Essential tremor 7/14/2010    Generalized anxiety disorder with panic attacks 7/12/2019    GERD (gastroesophageal reflux disease) 9/2/2011    H/O Subarachnoid hemorrhage due to ruptured aneurysm 7/14/2010    Hyperlipemia 3/29/2010    Impaired fasting glucose 7/13/2014 6/2014    Perforation and abscess of large intestine concurrent with and due to diverticulitis 5/2/2019    Lap Drain 3-29-19, Exp Laparotomy/Colostomy 4-8-19, Dr. Justen Chapa, subacute 3/29/2010    Tobacco Abuse 3/29/2010    Vitamin D deficiency 6/3/2014    2012      Past Surgical History:   Procedure Laterality Date    ECHO STRESS  2005    Negative    HX BREAST BIOPSY  HX COLONOSCOPY  3/2013, Dr Calvin Camacho    benign cecal polyp, f/u 10 yrs    HX COLONOSCOPY  18's    Benign polypectomy    HX COLOSTOMY  04/08/2019    Exp Laparotomy, Sigmoidectomy & Colostomy for Perforated Sigmoid Diverticulum, Dr. Noe Rick  10/2006    for clipping of ruptured aneurysm; Dr Marciano Grant a shunt    HX CSF SHUNT  04/17/2019    Right Intrapleural Placement  Shunt (relocated due to peritoneal/abdominal surgery 4/8/19), [facility], Dr. Murray Stage  EGD, Dr Calvin Camacho    \"ok\" per pt report    HX GI  07/19/2019    Laparoscopic closure of colostomy     HX HYSTERECTOMY  1991    Cervical Dysplasia (Dr Aye Hart)    HX OTHER SURGICAL  07/19/2019    lap sig. [id number]. b. Carmody    HX OTHER SURGICAL  04/08/2019    Laparotomy with sigmoidectomy and end [id number]. B. Carmody    IR DRAIN CUTANEOUS/SUBCU ABSCESS  03/29/2019    Lap Drain Pelvic Abscess for Perforated Sigmoid Diverticulum, Dr. Quynh Hurst  04/08/2019    Externalize  Shunt Right Abdomen for Exp Lap/Colostomy, [facility], Dr. Marisa Haro     Current Outpatient Medications   Medication Sig Dispense Refill    amLODIPine (Norvasc) 10 mg tablet Take 10 mg by mouth daily.  losartan-hydroCHLOROthiazide (HYZAAR) 50-12.5 mg per tablet TAKE 1 TAB BY MOUTH DAILY. MUST HAVE APPOINTMENT FOR FURTHER REFILLS! 14 Tab 0    busPIRone (BUSPAR) 5 mg tablet Take 1 Tab by mouth two (2) times a day. 60 Tab 0    traZODone (DESYREL) 50 mg tablet TAKE 1 TABLET BY MOUTH EVERY DAY AT NIGHT 30 Tab 0    omeprazole (PRILOSEC) 20 mg capsule TAKE 1 CAPSULE BY MOUTH EVERY DAY 30 Cap 0    sertraline (ZOLOFT) 100 mg tablet TAKE 1.5 TABS BY MOUTH DAILY 45 Tab 0    dilTIAZem ER (CARDIZEM CD) 300 mg capsule TAKE 1 CAPSULE BY MOUTH EVERY DAY 90 Cap 3    ferrous sulfate 325 mg (65 mg iron) tablet Take 1 Tab by mouth two (2) times a day.       albuterol (VENTOLIN HFA) 90 mcg/actuation inhaler Take 2 Puffs by inhalation every six (6) hours as needed for Wheezing or Shortness of Breath.  MULTIVITAMINS (MULTIVITAMIN PO) Take 1 Tab by mouth daily. Allergies   Allergen Reactions    Adderall [Dextroamphetamine-Amphetamine] Unknown (comments)    Ativan [Lorazepam] Hives    Egg Yolk Other (comments)     indigestion    Pcn [Penicillins] Other (comments)     Unsure of reaction; pt reports unsure if have taken cephalosporin before.      Wellbutrin [Bupropion Hcl] Anxiety    Xanax [Alprazolam] Hives       Family History   Problem Relation Age of Onset    Anxiety Daughter     Attention Deficit Disorder Daughter     Drug Abuse Daughter     Anxiety Daughter     Thyroid Disease Daughter     Thyroid Disease Daughter         Hypothyroidism    Asthma Daughter     Cancer Father         kidney    Diabetes Father    Maxx Barros Father     Diabetes Mother     Hypertension Mother    Maxx Barros Mother    [facility] Stroke Mother     Hypertension Sister     Depression Sister     Pneumonia Sister          age 72    Diabetes Sister     Thyroid Disease Sister     Thyroid Disease Sister     Heart Disease Sister     Diabetes Maternal Grandmother     No Known Problems Brother     Thyroid Disease Sister     Anxiety Sister     Depression Sister     Alcohol abuse Sister     Hypertension Sister     Hypertension Sister     Thyroid Disease Sister     Thyroid Disease Sister     Anesth Problems Neg Hx      Social History     Tobacco Use    Smoking status: Current Every Day Smoker     Packs/day: 0.50     Years: 30.00     Pack years: 15.00     Types: Cigarettes    Smokeless tobacco: Never Used   Substance Use Topics    Alcohol use: No     Alcohol/week: 0.0 standard drinks

## 2021-02-22 NOTE — PATIENT INSTRUCTIONS
Medicare Wellness Visit, Female The best way to live healthy is to have a lifestyle where you eat a well-balanced diet, exercise regularly, limit alcohol use, and quit all forms of tobacco/nicotine, if applicable. Regular preventive services are another way to keep healthy. Preventive services (vaccines, screening tests, monitoring & exams) can help personalize your care plan, which helps you manage your own care. Screening tests can find health problems at the earliest stages, when they are easiest to treat. [facility] follows the current, evidence-based guidelines published by the [facility] (RUSTSTF) when recommending preventive services for our patients. Because we follow these guidelines, sometimes recommendations change over time as research supports it. (For example, mammograms used to be recommended annually. Even though Medicare will still pay for an annual mammogram, the newer guidelines recommend a mammogram every two years for women of average risk). Of course, you and your doctor may decide to screen more often for some diseases, based on your risk and your co-morbidities (chronic disease you are already diagnosed with). Preventive services for you include: - Medicare offers their members a free annual wellness visit, which is time for you and your primary care provider to discuss and plan for your preventive service needs. Take advantage of this benefit every year! 
-All adults over the age of 72 should receive the recommended pneumonia vaccines. Current USPSTF guidelines recommend a series of two vaccines for the best pneumonia protection.  
-All adults should have a flu vaccine yearly and a tetanus vaccine every 10 years.  
-All adults age 48 and older should receive the shingles vaccines (series of two vaccines). -All adults age 38-68 who are overweight should have a diabetes screening test once every three years. -All adults born between [birth year] and 1963 should be screened once for Hepatitis C. 
-Other screening tests and preventive services for persons with diabetes include: an eye exam to screen for diabetic retinopathy, a kidney function test, a foot exam, and stricter control over your cholesterol.  
-Cardiovascular screening for adults with routine risk involves an electrocardiogram (ECG) at intervals determined by your doctor.  
-Colorectal cancer screenings should be done for adults age 54-65 with no increased risk factors for colorectal cancer. There are a number of acceptable methods of screening for this type of cancer. Each test has its own benefits and drawbacks. Discuss with your doctor what is most appropriate for you during your annual wellness visit. The different tests include: colonoscopy (considered the best screening method), a fecal occult blood test, a fecal DNA test, and sigmoidoscopy. 
 
-A bone mass density test is recommended when a woman turns 65 to screen for osteoporosis. This test is only recommended one time, as a screening. Some providers will use this same test as a disease monitoring tool if you already have osteoporosis. -Breast cancer screenings are recommended every other year for women of normal risk, age 54-69. 
-Cervical cancer screenings for women over age 72 are only recommended with certain risk factors. Here is a list of your current Health Maintenance items (your personalized list of preventive services) with a due date: 
Health Maintenance Due Topic Date Due  
 COVID-19 Vaccine (1 of 2) 10/24/1968  Shingles Vaccine (1 of 2) 10/24/2002  Glaucoma Screening   10/24/2017  Bone Mineral Density   10/24/2017  Mammogram  02/21/2019  Hemoglobin A1C    10/05/2019  Pneumococcal Vaccine (2 of 2 - PPSV23) 05/02/2020

## 2021-02-23 LAB
ALBUMIN SERPL-MCNC: 4.1 G/DL (ref 3.5–5)
ALBUMIN/GLOB SERPL: 1.1 {RATIO} (ref 1.1–2.2)
ALP SERPL-CCNC: 72 U/L (ref 45–117)
ALT SERPL-CCNC: 48 U/L (ref 12–78)
ANION GAP SERPL CALC-SCNC: 5 MMOL/L (ref 5–15)
AST SERPL-CCNC: 22 U/L (ref 15–37)
BILIRUB SERPL-MCNC: 0.4 MG/DL (ref 0.2–1)
BUN SERPL-MCNC: 10 MG/DL (ref 6–20)
BUN/CREAT SERPL: 11 (ref 12–20)
CALCIUM SERPL-MCNC: 9.2 MG/DL (ref 8.5–10.1)
CHLORIDE SERPL-SCNC: 100 MMOL/L (ref 97–108)
CHOLEST SERPL-MCNC: 187 MG/DL
CO2 SERPL-SCNC: 30 MMOL/L (ref 21–32)
CREAT SERPL-MCNC: 0.95 MG/DL (ref 0.55–1.02)
ERYTHROCYTE [DISTWIDTH] IN BLOOD BY AUTOMATED COUNT: 12.3 % (ref 11.5–14.5)
EST. AVERAGE GLUCOSE BLD GHB EST-MCNC: 128 MG/DL
GLOBULIN SER CALC-MCNC: 3.7 G/DL (ref 2–4)
GLUCOSE SERPL-MCNC: 93 MG/DL (ref 65–100)
HBA1C MFR BLD: 6.1 % (ref 4–5.6)
HCT VFR BLD AUTO: 41.4 % (ref 35–47)
HDLC SERPL-MCNC: 49 MG/DL
HDLC SERPL: 3.8 {RATIO} (ref 0–5)
HGB BLD-MCNC: 13.6 G/DL (ref 11.5–16)
LDLC SERPL CALC-MCNC: 114 MG/DL (ref 0–100)
LIPID PROFILE,FLP: ABNORMAL
MCH RBC QN AUTO: 31 PG (ref 26–34)
MCHC RBC AUTO-ENTMCNC: 32.9 G/DL (ref 30–36.5)
MCV RBC AUTO: 94.3 FL (ref 80–99)
NRBC # BLD: 0 K/UL (ref 0–0.01)
NRBC BLD-RTO: 0 PER 100 WBC
PLATELET # BLD AUTO: 261 K/UL (ref 150–400)
PMV BLD AUTO: 10.4 FL (ref 8.9–12.9)
POTASSIUM SERPL-SCNC: 4.2 MMOL/L (ref 3.5–5.1)
PROT SERPL-MCNC: 7.8 G/DL (ref 6.4–8.2)
RBC # BLD AUTO: 4.39 M/UL (ref 3.8–5.2)
SODIUM SERPL-SCNC: 135 MMOL/L (ref 136–145)
TRIGL SERPL-MCNC: 120 MG/DL (ref ?–150)
TSH SERPL DL<=0.05 MIU/L-ACNC: 1.11 UIU/ML (ref 0.36–3.74)
VLDLC SERPL CALC-MCNC: 24 MG/DL
WBC # BLD AUTO: 10.4 K/UL (ref 3.6–11)

## 2021-02-25 DIAGNOSIS — F41.1 GENERALIZED ANXIETY DISORDER WITH PANIC ATTACKS: ICD-10-CM

## 2021-02-25 DIAGNOSIS — K21.9 GASTROESOPHAGEAL REFLUX DISEASE: ICD-10-CM

## 2021-02-25 DIAGNOSIS — F41.0 GENERALIZED ANXIETY DISORDER WITH PANIC ATTACKS: ICD-10-CM

## 2021-02-25 NOTE — TELEPHONE ENCOUNTER
Last Visit: 2/22/21 MD Allie Blanton  Next Appointment: Not scheduled  Previous Refill Encounter(s):   buspar 9/3/20 60  Omeprazole 8/29/20 30    Requested Prescriptions     Pending Prescriptions Disp Refills    busPIRone (BUSPAR) 5 mg tablet 60 Tab 5     Sig: Take 1 Tab by mouth two (2) times a day.  omeprazole (PRILOSEC) 20 mg capsule 30 Cap 5     Sig: Take 1 Cap by mouth daily.

## 2021-02-26 RX ORDER — OMEPRAZOLE 20 MG/1
20 CAPSULE, DELAYED RELEASE ORAL DAILY
Qty: 90 CAP | Refills: 3 | Status: SHIPPED | OUTPATIENT
Start: 2021-02-26 | End: 2022-03-06

## 2021-02-26 RX ORDER — BUSPIRONE HYDROCHLORIDE 5 MG/1
5 TABLET ORAL 2 TIMES DAILY
Qty: 180 TAB | Refills: 3 | Status: SHIPPED | OUTPATIENT
Start: 2021-02-26 | End: 2022-04-18

## 2021-02-28 ENCOUNTER — TELEPHONE (OUTPATIENT)
Dept: FAMILY MEDICINE CLINIC | Age: 71
End: 2021-02-28

## 2021-02-28 DIAGNOSIS — E78.00 HYPERCHOLESTEROLEMIA: Primary | ICD-10-CM

## 2021-02-28 NOTE — TELEPHONE ENCOUNTER
Advise pt's daughter Luis Alberto Rodriguez that her mother's labs are overall good. But her cholesterol is outside of goal range for her. She used to be on Lipitor and somehow has been off it for a while after being past due. I would like to get her back on the Lipitor and do a fasting follow up check again in 3-6 months. Get that scheduled now please. If things are ok then, we can get her back on track for her once a year checkups next March. Route back to me when done so I can get rx sent in to her pharm.

## 2021-03-01 RX ORDER — ATORVASTATIN CALCIUM 20 MG/1
TABLET, FILM COATED ORAL
Qty: 90 TAB | Refills: 1 | Status: SHIPPED | OUTPATIENT
Start: 2021-03-01 | End: 2021-08-22

## 2021-03-01 NOTE — TELEPHONE ENCOUNTER
Spoke to patient's daughter Gerry Fountain, verified pt's . Advised daughter lab results and recommendations. Daughter agreed to medication sent to [address] [place] [address].  Follow up scheduled 21 @ Ondina Walter LPN

## 2021-05-04 DIAGNOSIS — I10 BENIGN ESSENTIAL HYPERTENSION: ICD-10-CM

## 2021-05-04 NOTE — TELEPHONE ENCOUNTER
Last Visit: 2/22/21 MD Matias  Next Appointment: 6/1/21 MD DIALLO  Previous Refill Encounter(s): 1/5/21 14    Requested Prescriptions     Pending Prescriptions Disp Refills    losartan-hydroCHLOROthiazide (HYZAAR) 50-12.5 mg per tablet 90 Tab 1     Sig: Take 1 Tab by mouth daily.

## 2021-05-05 RX ORDER — LOSARTAN POTASSIUM AND HYDROCHLOROTHIAZIDE 12.5; 5 MG/1; MG/1
1 TABLET ORAL DAILY
Qty: 90 TAB | Refills: 1 | Status: SHIPPED | OUTPATIENT
Start: 2021-05-05 | End: 2021-08-22

## 2021-08-04 RX ORDER — DILTIAZEM HYDROCHLORIDE 300 MG/1
CAPSULE, COATED, EXTENDED RELEASE ORAL
Qty: 90 CAPSULE | Refills: 3 | OUTPATIENT
Start: 2021-08-04

## 2021-08-21 DIAGNOSIS — I10 BENIGN ESSENTIAL HYPERTENSION: ICD-10-CM

## 2021-08-21 DIAGNOSIS — E78.00 HYPERCHOLESTEROLEMIA: ICD-10-CM

## 2021-08-22 RX ORDER — ATORVASTATIN CALCIUM 20 MG/1
TABLET, FILM COATED ORAL
Qty: 90 TABLET | Refills: 0 | Status: SHIPPED | OUTPATIENT
Start: 2021-08-22 | End: 2022-02-06

## 2021-08-22 RX ORDER — LOSARTAN POTASSIUM AND HYDROCHLOROTHIAZIDE 12.5; 5 MG/1; MG/1
TABLET ORAL
Qty: 90 TABLET | Refills: 0 | Status: SHIPPED | OUTPATIENT
Start: 2021-08-22 | End: 2022-02-06

## 2021-08-25 RX ORDER — DILTIAZEM HYDROCHLORIDE 300 MG/1
CAPSULE, COATED, EXTENDED RELEASE ORAL
Qty: 90 CAPSULE | Refills: 3 | OUTPATIENT
Start: 2021-08-25

## 2021-09-21 ENCOUNTER — OFFICE VISIT (OUTPATIENT)
Dept: FAMILY MEDICINE CLINIC | Age: 71
End: 2021-09-21
Payer: MEDICARE

## 2021-09-21 VITALS
BODY MASS INDEX: 31.64 KG/M2 | HEIGHT: 68 IN | HEART RATE: 95 BPM | SYSTOLIC BLOOD PRESSURE: 154 MMHG | RESPIRATION RATE: 16 BRPM | TEMPERATURE: 97.9 F | WEIGHT: 208.8 LBS | DIASTOLIC BLOOD PRESSURE: 84 MMHG | OXYGEN SATURATION: 97 %

## 2021-09-21 DIAGNOSIS — F32.A CHRONIC DEPRESSION: ICD-10-CM

## 2021-09-21 DIAGNOSIS — F41.1 GENERALIZED ANXIETY DISORDER WITH PANIC ATTACKS: ICD-10-CM

## 2021-09-21 DIAGNOSIS — R73.03 PREDIABETES: ICD-10-CM

## 2021-09-21 DIAGNOSIS — I48.0 PAROXYSMAL ATRIAL FIBRILLATION (HCC): ICD-10-CM

## 2021-09-21 DIAGNOSIS — F41.0 GENERALIZED ANXIETY DISORDER WITH PANIC ATTACKS: ICD-10-CM

## 2021-09-21 DIAGNOSIS — E78.00 HYPERCHOLESTEROLEMIA: Primary | ICD-10-CM

## 2021-09-21 DIAGNOSIS — Z72.0 TOBACCO ABUSE: ICD-10-CM

## 2021-09-21 DIAGNOSIS — K76.0 FATTY LIVER: ICD-10-CM

## 2021-09-21 DIAGNOSIS — I10 BENIGN ESSENTIAL HYPERTENSION: ICD-10-CM

## 2021-09-21 LAB
ALT SERPL-CCNC: 41 U/L (ref 12–78)
AST SERPL-CCNC: 22 U/L (ref 15–37)
CHOLEST SERPL-MCNC: 119 MG/DL
EST. AVERAGE GLUCOSE BLD GHB EST-MCNC: 128 MG/DL
HBA1C MFR BLD: 6.1 % (ref 4–5.6)
HDLC SERPL-MCNC: 48 MG/DL
HDLC SERPL: 2.5 {RATIO} (ref 0–5)
LDLC SERPL CALC-MCNC: 41.4 MG/DL (ref 0–100)
TRIGL SERPL-MCNC: 148 MG/DL (ref ?–150)
VLDLC SERPL CALC-MCNC: 29.6 MG/DL

## 2021-09-21 PROCEDURE — G8400 PT W/DXA NO RESULTS DOC: HCPCS | Performed by: FAMILY MEDICINE

## 2021-09-21 PROCEDURE — G8536 NO DOC ELDER MAL SCRN: HCPCS | Performed by: FAMILY MEDICINE

## 2021-09-21 PROCEDURE — G8427 DOCREV CUR MEDS BY ELIG CLIN: HCPCS | Performed by: FAMILY MEDICINE

## 2021-09-21 PROCEDURE — G9717 DOC PT DX DEP/BP F/U NT REQ: HCPCS | Performed by: FAMILY MEDICINE

## 2021-09-21 PROCEDURE — 1090F PRES/ABSN URINE INCON ASSESS: CPT | Performed by: FAMILY MEDICINE

## 2021-09-21 PROCEDURE — 3017F COLORECTAL CA SCREEN DOC REV: CPT | Performed by: FAMILY MEDICINE

## 2021-09-21 PROCEDURE — G9899 SCRN MAM PERF RSLTS DOC: HCPCS | Performed by: FAMILY MEDICINE

## 2021-09-21 PROCEDURE — G8417 CALC BMI ABV UP PARAM F/U: HCPCS | Performed by: FAMILY MEDICINE

## 2021-09-21 PROCEDURE — 99214 OFFICE O/P EST MOD 30 MIN: CPT | Performed by: FAMILY MEDICINE

## 2021-09-21 PROCEDURE — G8753 SYS BP > OR = 140: HCPCS | Performed by: FAMILY MEDICINE

## 2021-09-21 PROCEDURE — 1101F PT FALLS ASSESS-DOCD LE1/YR: CPT | Performed by: FAMILY MEDICINE

## 2021-09-21 PROCEDURE — G8754 DIAS BP LESS 90: HCPCS | Performed by: FAMILY MEDICINE

## 2021-09-21 RX ORDER — SERTRALINE HYDROCHLORIDE 100 MG/1
TABLET, FILM COATED ORAL
Qty: 135 TABLET | Refills: 3 | Status: SHIPPED | OUTPATIENT
Start: 2021-09-21

## 2021-09-21 NOTE — PROGRESS NOTES
Chief Complaint   Patient presents with    Cholesterol Problem     fasting    Hypertension    Blood sugar problem     1. \"Have you been to the ER, urgent care clinic since your last visit? Hospitalized since your last visit? \" No    2. \"Have you seen or consulted any other health care providers outside of the [address] [place] [address] since your last visit? \" No     3. For patients over 45: Has the patient had a colonoscopy? In system      If the patient is female:    4. For patients over 40: Has the patient had a mammogram? due    5. For patients over 21: Has the patient had a pap smear?  na

## 2021-09-21 NOTE — PROGRESS NOTES
Chief Complaint   Patient presents with    Cholesterol Problem     Fasting follow up    Hypertension    Pre-diabetes       HISTORY OF PRESENT ILLNESS   HPI  Fasting follow up Hypercholesterolemia, Hypertension, Prediabetes, and h/o PAF, Stroke, and Tobacco Abuse. Restarted Lipitor after last visit. Tolerating it and her other meds well w/o reaction or side effects. Generally feeling well, no complaints  Diet:nothing special, appetite good, daughter says she cut back on bringing so many sweets and junk in the house  Exercise: none, pretty sedentary  Caffeine: 3-4 cups of coffee qam, 1 cup qpm, 1 Diet Dr. Anna Avila a day, no tea   REVIEW OF SYMPTOMS   Review of Systems   Constitutional: Negative. Respiratory: Negative. Cardiovascular: Negative. Gastrointestinal: Negative. Musculoskeletal: Negative for myalgias. Neurological: Negative. Psychiatric/Behavioral: Negative.          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 ICD-10-CM: Z86.79  ICD-9-CM: V12.59  2/22/2021    Overview Signed 2/22/2021  5:53 PM by Megan Gleason MD     Ruptured aneurysm; 10/10/06 S/P Rt Frontal Craniotomy and clipping, Dr Terrance Casarez 10/27/06             Chronic depression ICD-10-CM: F32.9  ICD-9-CM: 464  7/12/2019        Generalized anxiety disorder with panic attacks ICD-10-CM: F41.1, F41.0  ICD-9-CM: 300.02, 300.01  7/12/2019        Chronic fatigue ICD-10-CM: R53.82  ICD-9-CM: 780.79  5/16/2019 Perforation and abscess of large intestine concurrent with and due to diverticulitis ICD-10-CM: K57.20  ICD-9-CM: 562.11, 569.5  5/2/2019    Overview Signed 5/2/2019 11:40 AM by Gera Irizarry MD     Lap Drain 3-29-19, Exp Laparotomy/Colostomy 4-8-19, Dr. Dani Squires             Paroxysmal atrial fibrillation [facility]) ICD-10-CM: I48.0  ICD-9-CM: 427.31  5/2/2019    Overview Signed 5/2/2019 11:53 AM by Gera Irizarry MD     Post Op Acute A. Fib after  Shunt replacement, 4-17-19, [facility], Dr. Heyward Crigler (no cardioversion required, medication mgt only)             Pneumonia due to infectious organism ICD-10-CM: J18.9  ICD-9-CM: 769  4/12/2019        Perforated diverticulum of large intestine ICD-10-CM: K57.20  ICD-9-CM: 562.10  3/26/2019        ACP (advance care planning) ICD-10-CM: Z71.89  ICD-9-CM: V65.49  2/21/2017    Overview Signed 2/21/2017 11:02 AM by Gera Irizarry MD     Initial ACP discussion w/ pt and daughter 2-2017. AMD form reviewed and copy provided.  NN/facilitator to discuss with patient               Benign essential hypertension ICD-10-CM: I10  ICD-9-CM: 401.1  6/24/2016        Impaired fasting glucose/Prediabetes ICD-10-CM: R73.01  ICD-9-CM: 790.21  7/13/2014    Overview Signed 7/13/2014 10:50 PM by Gera Irizarry MD     6/2014             Bilateral hip pain ICD-10-CM: M25.551, M25.552  ICD-9-CM: 719.45  6/3/2014    Overview Signed 6/3/2014  2:46 PM by Gera Irizarry MD     xrays 2012, negative             Vitamin D deficiency ICD-10-CM: E55.9  ICD-9-CM: 268.9  6/3/2014    Overview Signed 6/3/2014  2:48 PM by Gera Irizarry MD     2012             GERD (gastroesophageal reflux disease) ICD-10-CM: K21.9  ICD-9-CM: 530.81  9/2/2011        Essential tremor ICD-10-CM: G25.0  ICD-9-CM: 333.1  7/14/2010        Cough variant asthma ICD-10-CM: J45.991  ICD-9-CM: 493.82  3/29/2010        Tobacco Abuse ICD-10-CM: L27.270  ICD-9-CM: 305.1  3/29/2010    Overview Signed 3/29/2010  6:13 PM by Ghislaine Beckwith (allergic rhinitis) ICD-10-CM: J30.9  ICD-9-CM: 477.9  3/29/2010        ADD (attention deficit disorder) ICD-10-CM: F98.8  ICD-9-CM: 314.00  3/29/2010        COPD (chronic obstructive pulmonary disease) ([facility] ICD-10-CM: J44.9  ICD-9-CM: 496  3/29/2010    Overview Signed 11/10/2014  2:05 PM by Xi Ferro MD     Pulm Dr Cain Zacarias             Thyroiditis, subacute ICD-10-CM: E06.1  ICD-9-CM: 245.1  3/29/2010        Hyperlipemia ICD-10-CM: E78.5  ICD-9-CM: 272.4  3/29/2010    Overview Signed 7/14/2010  1:05 PM by Xi Ferro MD     2005             Elevated LFT's, Fatty Liver ICD-10-CM: R79.89  ICD-9-CM: 790.6  3/29/2010    Overview Addendum 7/14/2010  1:04 PM by Xi Ferro MD     Abd US 7/09 Hepatic Steatosis                       PAST SURGICAL HISTORY     Past Surgical History:   Procedure Laterality Date    ECHO STRESS  2005    Negative    HX BREAST BIOPSY      HX COLONOSCOPY  3/2013, Dr Cristino Scheuermann    benign cecal polyp, f/u 10 yrs    HX COLONOSCOPY  18's    Benign polypectomy    HX COLOSTOMY  04/08/2019    Exp Laparotomy, Sigmoidectomy & Colostomy for Perforated Sigmoid Diverticulum, Dr. Dom Park  10/2006    for clipping of ruptured aneurysm; Dr Keren Ware a shunt    HX CSF SHUNT  04/17/2019    Right Intrapleural Placement  Shunt (relocated due to peritoneal/abdominal surgery 4/8/19), [facility], Dr. Katerin Montez  EGD, Dr Cristino Scheuermann    \"ok\" per pt report    HX GI  07/19/2019    Laparoscopic closure of colostomy     HX HYSTERECTOMY  1991    Cervical Dysplasia (Dr Jenna Lyle)    HX OTHER SURGICAL  07/19/2019    lap sig.  [id number]. b. Carmody    HX OTHER SURGICAL  04/08/2019    Laparotomy with sigmoidectomy and end [id number]. B. Carmody    IR DRAIN CUTANEOUS/SUBCU ABSCESS  03/29/2019    Lap Drain Pelvic Abscess for Perforated Sigmoid Diverticulum, Dr. Arabella Jasmine  IR INSERT PERITONEAL VENOUS SHUNT  04/08/2019    Externalize  Shunt Right Abdomen for Exp Lap/Colostomy, [facility], Dr. Kanika Jimenez     Current Outpatient Medications   Medication Sig    losartan-hydroCHLOROthiazide (HYZAAR) 50-12.5 mg per tablet TAKE 1 TABLET BY MOUTH EVERY DAY    atorvastatin (LIPITOR) 20 mg tablet TAKE 1 TABLET BY MOUTH EVERY DAY    busPIRone (BUSPAR) 5 mg tablet Take 1 Tab by mouth two (2) times a day.  omeprazole (PRILOSEC) 20 mg capsule Take 1 Cap by mouth daily.  amLODIPine (Norvasc) 10 mg tablet Take 10 mg by mouth daily.  traZODone (DESYREL) 50 mg tablet TAKE 1 TABLET BY MOUTH EVERY DAY AT NIGHT    sertraline (ZOLOFT) 100 mg tablet TAKE 1.5 TABS BY MOUTH DAILY    dilTIAZem ER (CARDIZEM CD) 300 mg capsule TAKE 1 CAPSULE BY MOUTH EVERY DAY    ferrous sulfate 325 mg (65 mg iron) tablet Take 1 Tab by mouth two (2) times a day.  albuterol (VENTOLIN HFA) 90 mcg/actuation inhaler Take 2 Puffs by inhalation every six (6) hours as needed for Wheezing or Shortness of Breath.  MULTIVITAMINS (MULTIVITAMIN PO) Take 1 Tab by mouth daily. No current facility-administered medications for this visit. ALLERGIES     Allergies   Allergen Reactions    Adderall [Dextroamphetamine-Amphetamine] Unknown (comments)    Ativan [Lorazepam] Hives    Egg Yolk Other (comments)     indigestion    Pcn [Penicillins] Other (comments)     Unsure of reaction; pt reports unsure if have taken cephalosporin before.      Wellbutrin [Bupropion Hcl] Anxiety    Xanax [Alprazolam] Hives          SOCIAL HISTORY     Social History     Tobacco Use    Smoking status: Current Every Day Smoker     Packs/day: 0.50     Years: 30.00     Pack years: 15.00     Types: Cigarettes    Smokeless tobacco: Never Used   Substance Use Topics    Alcohol use: No     Alcohol/week: 0.0 standard drinks     Social History     Social History Narrative        Lives in 2 story home    Her daughter Marly Mena lives w/ her and has POA. Daughter prepares most meals but patient still cooks some as well    Uses a walking cane on outings    No longer drives since the [facility] and stroke    Daughter manages her finances and medications    Daughter helps w/ cleaning, meals and total transportation     Does other ADL's, dressing, bathing, grooming all on her own; some help w/ meal preparation; pt can use microwave and the stove when daughters are home. Patient does some light house work/cleaning. No toileting problems; uses independently    Initial ACP discussion w/ pt and daughter . AMD form reviewed and copy provided. Discussed w/ pt and daughter again , they think patient has AMD and will check.      Diet:nothing special, appetite good, daughter says she cut back on bringing so many sweets and junk in the house    Exercise: none, pretty sedentary    Caffeine: 3-4 cups of coffee qam, 1 cup qpm, 1 Diet Dr. Mona Rosario a day, no tea         Social History     Substance and Sexual Activity   Sexual Activity Not Currently    Partners: Male       IMMUNIZATIONS     Immunization History   Administered Date(s) Administered    COVID-19, PFIZER, MRNA, LNP-S, PF, 30MCG/0.3ML DOSE 2021, 2021    Pneumococcal Conjugate (PCV-13) 2019    Pneumococcal Polysaccharide (PPSV-23) 07/10/2014, 2021         FAMILY HISTORY     Family History   Problem Relation Age of Onset    Anxiety Daughter     Attention Deficit Disorder Daughter     Drug Abuse Daughter     Anxiety Daughter     Thyroid Disease Daughter     Thyroid Disease Daughter         Hypothyroidism    Asthma Daughter     Cancer Father         kidney    Diabetes Father     Arthritis-osteo Father     Diabetes Mother     Hypertension Mother     Arthritis-osteo Mother     Stroke Mother     Hypertension Sister     Depression Sister     Pneumonia Sister          age 72    Diabetes Sister     Thyroid Disease Sister     Thyroid Disease Sister     Heart Disease Sister     Diabetes Maternal Grandmother     No Known Problems Brother     Thyroid Disease Sister     Anxiety Sister     Depression Sister     Alcohol abuse Sister    Gennette [place] Hypertension Sister     Hypertension Sister     Thyroid Disease Sister     Thyroid Disease Sister     Anesth Problems Neg Hx          VITALS     Visit Vitals  BP (!) 154/84 (BP 1 Location: Left upper arm, BP Patient Position: Sitting, BP Cuff Size: Large adult)   Pulse 95   Temp 97.9 °F (36.6 °C) (Oral)   Resp 16   Ht 5' 8\" (1.727 m)   Wt 208 lb 12.8 oz (94.7 kg)   SpO2 97%   BMI 31.75 kg/m²          PHYSICAL EXAMINATION   Physical Exam  Vitals reviewed. Constitutional:       General: She is not in acute distress. Appearance: Normal appearance. Eyes:      General: No scleral icterus. Cardiovascular:      Rate and Rhythm: Normal rate and regular rhythm. Heart sounds: Normal heart sounds. Pulmonary:      Effort: Pulmonary effort is normal.      Breath sounds: Normal breath sounds. Abdominal:      Palpations: Abdomen is soft. Tenderness: There is no abdominal tenderness. Musculoskeletal:         General: No swelling. Right lower leg: No edema. Left lower leg: No edema. Neurological:      General: No focal deficit present. Mental Status: She is alert and oriented to person, place, and time. Mental status is at baseline.       Comments: Gait stable using walking cane   Psychiatric:         Mood and Affect: Mood and affect normal.         Speech: Speech normal.         Cognition and Memory: Cognition normal.                LABORATORY DATA/ANCILLARY/IMAGING     Results for orders placed or performed in visit on 02/22/21   HEMOGLOBIN A1C WITH EAG   Result Value Ref Range    Hemoglobin A1c 6.1 (H) 4.0 - 5.6 %    Est. average glucose 128 mg/dL   TSH 3RD GENERATION   Result Value Ref Range    TSH 1.11 0.36 - 3.74 uIU/mL   LIPID PANEL   Result Value Ref Range    LIPID PROFILE          Cholesterol, total 187 <200 MG/DL    Triglyceride 120 <150 MG/DL    HDL Cholesterol 49 MG/DL    LDL, calculated 114 (H) 0 - 100 MG/DL    VLDL, calculated 24 MG/DL    CHOL/HDL Ratio 3.8 0.0 - 5.0     METABOLIC PANEL, COMPREHENSIVE   Result Value Ref Range    Sodium 135 (L) 136 - 145 mmol/L    Potassium 4.2 3.5 - 5.1 mmol/L    Chloride 100 97 - 108 mmol/L    CO2 30 21 - 32 mmol/L    Anion gap 5 5 - 15 mmol/L    Glucose 93 65 - 100 mg/dL    BUN 10 6 - 20 MG/DL    Creatinine 0.95 0.55 - 1.02 MG/DL    BUN/Creatinine ratio 11 (L) 12 - 20      GFR est AA >60 >60 ml/min/1.73m2    GFR est non-AA 59 (L) >60 ml/min/1.73m2    Calcium 9.2 8.5 - 10.1 MG/DL    Bilirubin, total 0.4 0.2 - 1.0 MG/DL    ALT (SGPT) 48 12 - 78 U/L    AST (SGOT) 22 15 - 37 U/L    Alk. phosphatase 72 45 - 117 U/L    Protein, total 7.8 6.4 - 8.2 g/dL    Albumin 4.1 3.5 - 5.0 g/dL    Globulin 3.7 2.0 - 4.0 g/dL    A-G Ratio 1.1 1.1 - 2.2     CBC W/O DIFF   Result Value Ref Range    WBC 10.4 3.6 - 11.0 K/uL    RBC 4.39 3.80 - 5.20 M/uL    HGB 13.6 11.5 - 16.0 g/dL    HCT 41.4 35.0 - 47.0 %    MCV 94.3 80.0 - 99.0 FL    MCH 31.0 26.0 - 34.0 PG    MCHC 32.9 30.0 - 36.5 g/dL    RDW 12.3 11.5 - 14.5 %    PLATELET 491 350 - 960 K/uL    MPV 10.4 8.9 - 12.9 FL    NRBC 0.0 0  WBC    ABSOLUTE NRBC 0.00 0.00 - 0.01 K/uL             ASSESSMENT & PLAN   Diagnoses and all orders for this visit:    1. Hypercholesterolemia, restarted statin therapy 6 months ago  -     LIPID PANEL; Future    2. Benign essential hypertension, not at goal, missed meds    3. Prediabetes  -     HEMOGLOBIN A1C WITH EAG; Future    4. Fatty liver  -     ALT; Future  -     AST; Future    5. Paroxysmal atrial fibrillation (HCC), Stable, NSR, rate controlled on Diltiazem    6.  Tobacco abuse, smoking cessation counseling, risks/negative health implications, and benefits of quitting discussed as well as resources/support    Fasting labs checked today  Cardiovascular risk and specific lipid/LDL/BP/A1c goals reviewed  Reviewed medications and side effects  Reviewed diet, nutrition, exercise, weight management, BMI/goals. Age/risk based screening recommendations, health maintenance & prevention counseling. Cancer screening USPTFS guidelines reviewed w/ pt today. Discussed benefits/positive/negative outcomes of screening based on age/risk stratification. Informed consent for/against screening based on pt's personal hx/risk factors. Updated in history above and health maintenance. Further follow up & other recommendations pending review of labs.  If all remains good and stable, follow up in 6 months ~ 2-2022 for next fasting follow up and AWV

## 2021-09-24 ENCOUNTER — OFFICE VISIT (OUTPATIENT)
Dept: CARDIOLOGY CLINIC | Age: 71
End: 2021-09-24
Payer: MEDICARE

## 2021-09-24 VITALS
OXYGEN SATURATION: 95 % | DIASTOLIC BLOOD PRESSURE: 98 MMHG | WEIGHT: 210 LBS | BODY MASS INDEX: 31.83 KG/M2 | HEIGHT: 68 IN | RESPIRATION RATE: 16 BRPM | SYSTOLIC BLOOD PRESSURE: 160 MMHG | HEART RATE: 74 BPM

## 2021-09-24 DIAGNOSIS — I48.0 PAROXYSMAL A-FIB (HCC): ICD-10-CM

## 2021-09-24 DIAGNOSIS — J44.9 CHRONIC OBSTRUCTIVE PULMONARY DISEASE, UNSPECIFIED COPD TYPE (HCC): ICD-10-CM

## 2021-09-24 DIAGNOSIS — R40.0 DAYTIME SOMNOLENCE: ICD-10-CM

## 2021-09-24 DIAGNOSIS — I10 BENIGN ESSENTIAL HYPERTENSION: ICD-10-CM

## 2021-09-24 DIAGNOSIS — R53.83 OTHER FATIGUE: Primary | ICD-10-CM

## 2021-09-24 PROCEDURE — G9717 DOC PT DX DEP/BP F/U NT REQ: HCPCS | Performed by: INTERNAL MEDICINE

## 2021-09-24 PROCEDURE — 1090F PRES/ABSN URINE INCON ASSESS: CPT | Performed by: INTERNAL MEDICINE

## 2021-09-24 PROCEDURE — G8417 CALC BMI ABV UP PARAM F/U: HCPCS | Performed by: INTERNAL MEDICINE

## 2021-09-24 PROCEDURE — 1101F PT FALLS ASSESS-DOCD LE1/YR: CPT | Performed by: INTERNAL MEDICINE

## 2021-09-24 PROCEDURE — G8753 SYS BP > OR = 140: HCPCS | Performed by: INTERNAL MEDICINE

## 2021-09-24 PROCEDURE — G8755 DIAS BP > OR = 90: HCPCS | Performed by: INTERNAL MEDICINE

## 2021-09-24 PROCEDURE — 93000 ELECTROCARDIOGRAM COMPLETE: CPT | Performed by: INTERNAL MEDICINE

## 2021-09-24 PROCEDURE — G8400 PT W/DXA NO RESULTS DOC: HCPCS | Performed by: INTERNAL MEDICINE

## 2021-09-24 PROCEDURE — 3017F COLORECTAL CA SCREEN DOC REV: CPT | Performed by: INTERNAL MEDICINE

## 2021-09-24 PROCEDURE — G8536 NO DOC ELDER MAL SCRN: HCPCS | Performed by: INTERNAL MEDICINE

## 2021-09-24 PROCEDURE — G9899 SCRN MAM PERF RSLTS DOC: HCPCS | Performed by: INTERNAL MEDICINE

## 2021-09-24 PROCEDURE — 99214 OFFICE O/P EST MOD 30 MIN: CPT | Performed by: INTERNAL MEDICINE

## 2021-09-24 PROCEDURE — G8427 DOCREV CUR MEDS BY ELIG CLIN: HCPCS | Performed by: INTERNAL MEDICINE

## 2021-09-24 RX ORDER — DILTIAZEM HYDROCHLORIDE 300 MG/1
300 CAPSULE, COATED, EXTENDED RELEASE ORAL DAILY
Qty: 90 CAPSULE | Refills: 3 | Status: SHIPPED | OUTPATIENT
Start: 2021-09-24

## 2021-09-24 NOTE — PROGRESS NOTES
[place]: [place] [place]  [phone]    History of Present Illness: Ms. Russell Ferrer is a 77 yo F with h/o hospitalization for perforated diverticulum, history of multiple surgeries including sigmoidectomy, colostomy and  shunt, during her hospitalization developed atrial fibrillation with RVR and was started on Diltiazem and Metoprolol and converted to sinus rhythm. Echo 04/02/2019 that demonstrated preserved LV function. Since her last visit from a cardiac standpoint she has been okay with no significant palpitations. Her breathing has been okay. No exertional chest pain. However, she has had more daytime somnolence, fatigue. She has not been sleeping as well and now wakes up at night. She has been told that she does snore per her daughter. She is compensated on exam with clear lungs and no lower extremity edema. Her EKG was normal sinus rhythm, nonspecific ST-T wave abnormality. Assessment and Plan:   1. Paroxysmal atrial fibrillation. Stable and compensated and no changes made. She does have a significant CHADS VASC score, but history of retinal hemorrhage and at this time the risks outweigh the benefits of oral anticoagulation and will continue to hold off. If in the future she has issues with stroke or mini-stroke, this would change her risk profile and would need to consider anticoagulation. Will have her follow back in one year. 2. Fatigue, daytime somnolence. Will obtain a sleep study and if abnormal, sleep consult. 3. Essential hypertension. Blood pressure is controlled. 4. Perforated diverticulum, status post surgery in 2019.   5. Status post colostomy takedown. 6. COPD. 7.  shunt.         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 She also has no past medical history of Abuse, Anemia NEC, Calculus of kidney, Congestive heart failure, unspecified, Contact dermatitis and other eczema, due to unspecified cause, Headache(784.0), Psychotic disorder ([facility] or Trauma. All other systems negative except as above. PE  Vitals:    09/24/21 0958   BP: (!) 178/98   Pulse: 74   Resp: 16   SpO2: 95%   Weight: 210 lb (95.3 kg)   Height: 5' 8\" (1.727 m)    Body mass index is 31.93 kg/m².    General appearance - alert, well appearing, and in no distress  Mental status - affect appropriate to mood  Eyes - sclera anicteric, moist mucous membranes  Neck - supple, no JVD  Chest - clear to auscultation, no wheezes, rales or rhonchi  Heart - normal rate, regular rhythm, normal S1, S2, I/VI systolic murmur LUSB  Abdomen - soft, nontender, nondistended, no masses or organomegaly  Extremities - peripheral pulses normal, no pedal edema    Recent Labs:  Lab Results   Component Value Date/Time    Cholesterol, total 119 09/21/2021 08:44 AM    HDL Cholesterol 48 09/21/2021 08:44 AM    LDL, calculated 41.4 09/21/2021 08:44 AM    Triglyceride 148 09/21/2021 08:44 AM    CHOL/HDL Ratio 2.5 09/21/2021 08:44 AM     Lab Results   Component Value Date/Time    Creatinine 0.95 02/22/2021 04:38 PM     Lab Results   Component Value Date/Time    BUN 10 02/22/2021 04:38 PM Lab Results   Component Value Date/Time    Potassium 4.2 02/22/2021 04:38 PM     Lab Results   Component Value Date/Time    Hemoglobin A1c 6.1 (H) 09/21/2021 08:44 AM     Lab Results   Component Value Date/Time    Hemoglobin (POC) 9.5 (L) 04/08/2019 04:39 PM    HGB 13.6 02/22/2021 04:38 PM     Lab Results   Component Value Date/Time    PLATELET 253 19/65/8784 04:38 PM       Reviewed:  Past Medical History:   Diagnosis Date    ACP (advance care planning) 2/21/2017    Initial ACP discussion w/ pt and daughter 2-2017. AMD form reviewed and copy provided. 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  Fib after  Shunt replacement, 4-17-19, [facility], Dr. Odin Larsen (no cardioversion required, medication mgt only)    Perforation and abscess of large intestine concurrent with and due to diverticulitis 5/2/2019    Lap Drain 3-29-19, Exp Laparotomy/Colostomy 4-8-19, Dr. Caryn Simon, subacute 3/29/2010    Tobacco Abuse 3/29/2010    Vitamin D deficiency 6/3/2014    2012     Social History     Tobacco Use   Smoking Status Current Every Day Smoker    Packs/day: 0.50    Years: 30.00    Pack years: 15.00    Types: Cigarettes   Smokeless Tobacco Never Used     Social History     Substance and Sexual Activity   Alcohol Use No    Alcohol/week: 0.0 standard drinks     Allergies   Allergen Reactions    Adderall [Dextroamphetamine-Amphetamine] Unknown (comments)    Ativan [Lorazepam] Hives    Egg Yolk Other (comments)     indigestion    Pcn [Penicillins] Other (comments)     Unsure of reaction; pt reports unsure if have taken cephalosporin before.  Wellbutrin [Bupropion Hcl] Anxiety    Xanax [Alprazolam] Hives       Current Outpatient Medications   Medication Sig    sertraline (ZOLOFT) 100 mg tablet TAKE 1.5 TABS BY MOUTH DAILY    losartan-hydroCHLOROthiazide (HYZAAR) 50-12.5 mg per tablet TAKE 1 TABLET BY MOUTH EVERY DAY    atorvastatin (LIPITOR) 20 mg tablet TAKE 1 TABLET BY MOUTH EVERY DAY    busPIRone (BUSPAR) 5 mg tablet Take 1 Tab by mouth two (2) times a day.  omeprazole (PRILOSEC) 20 mg capsule Take 1 Cap by mouth daily.  amLODIPine (Norvasc) 10 mg tablet Take 10 mg by mouth daily.  traZODone (DESYREL) 50 mg tablet TAKE 1 TABLET BY MOUTH EVERY DAY AT NIGHT    dilTIAZem ER (CARDIZEM CD) 300 mg capsule TAKE 1 CAPSULE BY MOUTH EVERY DAY    ferrous sulfate 325 mg (65 mg iron) tablet Take 1 Tab by mouth two (2) times a day.  albuterol (VENTOLIN HFA) 90 mcg/actuation inhaler Take 2 Puffs by inhalation every six (6) hours as needed for Wheezing or Shortness of Breath.  MULTIVITAMINS (MULTIVITAMIN PO) Take 1 Tab by mouth daily. No current facility-administered medications for this visit.        Melissa Thompson MD  [facility] [place]  [address] [facility] [address]  [address]

## 2021-09-25 DIAGNOSIS — F41.1 GENERALIZED ANXIETY DISORDER WITH PANIC ATTACKS: ICD-10-CM

## 2021-09-25 DIAGNOSIS — F41.0 GENERALIZED ANXIETY DISORDER WITH PANIC ATTACKS: ICD-10-CM

## 2021-09-27 RX ORDER — BUSPIRONE HYDROCHLORIDE 5 MG/1
TABLET ORAL
Qty: 180 TABLET | Refills: 3 | OUTPATIENT
Start: 2021-09-27

## 2021-09-27 NOTE — TELEPHONE ENCOUNTER
Spoke to pharmacist and she said that they have the refills and not sure why the computer sent the request.  Let her know that we would refuse this since she confirms they have all the refills.

## 2022-02-06 DIAGNOSIS — I10 BENIGN ESSENTIAL HYPERTENSION: ICD-10-CM

## 2022-02-06 DIAGNOSIS — F32.A CHRONIC DEPRESSION: ICD-10-CM

## 2022-02-06 DIAGNOSIS — F51.05 INSOMNIA SECONDARY TO DEPRESSION WITH ANXIETY: ICD-10-CM

## 2022-02-06 DIAGNOSIS — E78.00 HYPERCHOLESTEROLEMIA: ICD-10-CM

## 2022-02-06 DIAGNOSIS — F41.8 INSOMNIA SECONDARY TO DEPRESSION WITH ANXIETY: ICD-10-CM

## 2022-02-06 RX ORDER — LOSARTAN POTASSIUM AND HYDROCHLOROTHIAZIDE 12.5; 5 MG/1; MG/1
TABLET ORAL
Qty: 90 TABLET | Refills: 0 | Status: SHIPPED | OUTPATIENT
Start: 2022-02-06 | End: 2022-05-19 | Stop reason: DRUGHIGH

## 2022-02-06 RX ORDER — TRAZODONE HYDROCHLORIDE 50 MG/1
50 TABLET ORAL
Qty: 90 TABLET | Refills: 0 | Status: SHIPPED | OUTPATIENT
Start: 2022-02-06 | End: 2022-08-24 | Stop reason: SDUPTHER

## 2022-02-06 RX ORDER — ATORVASTATIN CALCIUM 20 MG/1
TABLET, FILM COATED ORAL
Qty: 90 TABLET | Refills: 0 | Status: SHIPPED | OUTPATIENT
Start: 2022-02-06 | End: 2022-05-24

## 2022-03-18 PROBLEM — Z87.09 HISTORY OF ACUTE RESPIRATORY FAILURE: Status: ACTIVE | Noted: 2021-02-22

## 2022-03-18 PROBLEM — Z71.89 ACP (ADVANCE CARE PLANNING): Status: ACTIVE | Noted: 2017-02-21

## 2022-03-19 PROBLEM — K57.20 PERFORATION AND ABSCESS OF LARGE INTESTINE CONCURRENT WITH AND DUE TO DIVERTICULITIS: Status: ACTIVE | Noted: 2019-05-02

## 2022-03-19 PROBLEM — F41.0 GENERALIZED ANXIETY DISORDER WITH PANIC ATTACKS: Status: ACTIVE | Noted: 2019-07-12

## 2022-03-19 PROBLEM — I48.0 PAROXYSMAL ATRIAL FIBRILLATION (HCC): Status: ACTIVE | Noted: 2019-05-02

## 2022-03-19 PROBLEM — R53.82 CHRONIC FATIGUE: Status: ACTIVE | Noted: 2019-05-16

## 2022-03-19 PROBLEM — Z86.79: Status: ACTIVE | Noted: 2021-02-22

## 2022-03-19 PROBLEM — F41.1 GENERALIZED ANXIETY DISORDER WITH PANIC ATTACKS: Status: ACTIVE | Noted: 2019-07-12

## 2022-03-19 PROBLEM — K57.20 PERFORATED DIVERTICULUM OF LARGE INTESTINE: Status: ACTIVE | Noted: 2019-03-26

## 2022-03-20 PROBLEM — Z86.718 HISTORY OF DVT OF LOWER EXTREMITY: Status: ACTIVE | Noted: 2021-02-22

## 2022-03-20 PROBLEM — F32.A CHRONIC DEPRESSION: Status: ACTIVE | Noted: 2019-07-12

## 2022-03-20 PROBLEM — J18.9 PNEUMONIA DUE TO INFECTIOUS ORGANISM: Status: ACTIVE | Noted: 2019-04-12

## 2022-04-18 DIAGNOSIS — F41.0 GENERALIZED ANXIETY DISORDER WITH PANIC ATTACKS: ICD-10-CM

## 2022-04-18 DIAGNOSIS — F41.1 GENERALIZED ANXIETY DISORDER WITH PANIC ATTACKS: ICD-10-CM

## 2022-04-18 DIAGNOSIS — K21.9 GASTROESOPHAGEAL REFLUX DISEASE: ICD-10-CM

## 2022-04-18 RX ORDER — OMEPRAZOLE 20 MG/1
CAPSULE, DELAYED RELEASE ORAL
Qty: 90 CAPSULE | Refills: 0 | Status: SHIPPED | OUTPATIENT
Start: 2022-04-18

## 2022-04-18 RX ORDER — BUSPIRONE HYDROCHLORIDE 5 MG/1
TABLET ORAL
Qty: 180 TABLET | Refills: 0 | Status: SHIPPED | OUTPATIENT
Start: 2022-04-18

## 2022-05-19 ENCOUNTER — OFFICE VISIT (OUTPATIENT)
Dept: FAMILY MEDICINE CLINIC | Age: 72
End: 2022-05-19
Payer: MEDICARE

## 2022-05-19 VITALS
HEIGHT: 68 IN | WEIGHT: 206.4 LBS | RESPIRATION RATE: 16 BRPM | TEMPERATURE: 98.3 F | OXYGEN SATURATION: 96 % | SYSTOLIC BLOOD PRESSURE: 152 MMHG | BODY MASS INDEX: 31.28 KG/M2 | HEART RATE: 77 BPM | DIASTOLIC BLOOD PRESSURE: 74 MMHG

## 2022-05-19 DIAGNOSIS — R73.03 PREDIABETES: ICD-10-CM

## 2022-05-19 DIAGNOSIS — Z12.31 ENCOUNTER FOR SCREENING MAMMOGRAM FOR MALIGNANT NEOPLASM OF BREAST: ICD-10-CM

## 2022-05-19 DIAGNOSIS — Z00.00 MEDICARE ANNUAL WELLNESS VISIT, SUBSEQUENT: Primary | ICD-10-CM

## 2022-05-19 DIAGNOSIS — E78.00 HYPERCHOLESTEROLEMIA: ICD-10-CM

## 2022-05-19 DIAGNOSIS — K76.0 FATTY LIVER: ICD-10-CM

## 2022-05-19 DIAGNOSIS — I10 BENIGN ESSENTIAL HYPERTENSION: ICD-10-CM

## 2022-05-19 PROCEDURE — 99214 OFFICE O/P EST MOD 30 MIN: CPT | Performed by: FAMILY MEDICINE

## 2022-05-19 PROCEDURE — 1101F PT FALLS ASSESS-DOCD LE1/YR: CPT | Performed by: FAMILY MEDICINE

## 2022-05-19 PROCEDURE — G8536 NO DOC ELDER MAL SCRN: HCPCS | Performed by: FAMILY MEDICINE

## 2022-05-19 PROCEDURE — G8754 DIAS BP LESS 90: HCPCS | Performed by: FAMILY MEDICINE

## 2022-05-19 PROCEDURE — G8400 PT W/DXA NO RESULTS DOC: HCPCS | Performed by: FAMILY MEDICINE

## 2022-05-19 PROCEDURE — G8417 CALC BMI ABV UP PARAM F/U: HCPCS | Performed by: FAMILY MEDICINE

## 2022-05-19 PROCEDURE — G9899 SCRN MAM PERF RSLTS DOC: HCPCS | Performed by: FAMILY MEDICINE

## 2022-05-19 PROCEDURE — G8753 SYS BP > OR = 140: HCPCS | Performed by: FAMILY MEDICINE

## 2022-05-19 PROCEDURE — G9717 DOC PT DX DEP/BP F/U NT REQ: HCPCS | Performed by: FAMILY MEDICINE

## 2022-05-19 PROCEDURE — G8427 DOCREV CUR MEDS BY ELIG CLIN: HCPCS | Performed by: FAMILY MEDICINE

## 2022-05-19 PROCEDURE — 1090F PRES/ABSN URINE INCON ASSESS: CPT | Performed by: FAMILY MEDICINE

## 2022-05-19 PROCEDURE — 3017F COLORECTAL CA SCREEN DOC REV: CPT | Performed by: FAMILY MEDICINE

## 2022-05-19 PROCEDURE — G0439 PPPS, SUBSEQ VISIT: HCPCS | Performed by: FAMILY MEDICINE

## 2022-05-19 RX ORDER — LOSARTAN POTASSIUM AND HYDROCHLOROTHIAZIDE 25; 100 MG/1; MG/1
1 TABLET ORAL DAILY
Qty: 90 TABLET | Refills: 0 | Status: SHIPPED | OUTPATIENT
Start: 2022-05-19

## 2022-05-19 NOTE — PROGRESS NOTES
This is the Subsequent Medicare Annual Wellness Exam, performed 12 months or more after the Initial AWV or the last Subsequent AWV    I have reviewed the patient's medical history in detail and updated the computerized patient record. Assessment/Plan   Education and counseling provided:  Are appropriate based on today's review and evaluation    1. Medicare annual wellness visit, subsequent  2. Encounter for screening mammogram for malignant neoplasm of breast  -     CARLO MAMMO BI SCREENING INCL CAD; Future       Depression Risk Factor Screening     3 most recent PHQ Screens 5/19/2022   PHQ Not Done -   Little interest or pleasure in doing things Not at all   Feeling down, depressed, irritable, or hopeless Not at all   Total Score PHQ 2 0       Alcohol & Drug Abuse Risk Screen    Do you average more than 1 drink per night or more than 7 drinks a week:  No    On any one occasion in the past three months have you have had more than 3 drinks containing alcohol:  No          Functional Ability and Level of Safety    Hearing: Hearing is good. Activities of Daily Living:  ADL Assessment 5/19/2022   Feeding yourself No Help Needed   Getting from bed to chair No Help Needed   Getting dressed No Help Needed   Bathing or showering No Help Needed   Walk across the room (includes cane/walker) No Help Needed   Using the telphone No Help Needed   Taking your medications No Help Needed   Preparing meals No Help Needed   Managing money (expenses/bills) No Help Needed   Moderately strenuous housework (laundry) No Help Needed   Shopping for personal items (toiletries/medicines) No Help Needed   Shopping for groceries No Help Needed   Driving Help Needed   Climbing a flight of stairs No Help Needed   Getting to places beyond walking distances No Help Needed           Ambulation: with difficulty, uses a cane and walker     Fall Risk:  Fall Risk Assessment, last 12 mths 5/19/2022   Able to walk? Yes   Fall in past 12 months? 0   Do you feel unsteady? 1   Are you worried about falling 0   Is the gait abnormal? 0   Number of falls in past 12 months -   Fall with injury? -      Abuse Screen:  Patient is not abused       Cognitive Screening    Has your family/caregiver stated any concerns about your memory: no         Health Maintenance Due     Health Maintenance Due   Topic Date Due    Bone Densitometry (Dexa) Screening  Never done    Breast Cancer Screen Mammogram  02/21/2019       Patient Care Team   Patient Care Team:  Karmen Bray MD as PCP - Kanika Skinner MD as PCP - [facility]  Skip Juan MD (Pulmonary Disease)  Yue Sarkar MD (Cardiovascular Disease Physician)  Jessica Manuel MD as Physician (General Surgery)    History     Patient Active Problem List   Diagnosis Code    Cough variant asthma J45.991    Tobacco Abuse F17.210    AR (allergic rhinitis) J30.9    ADD (attention deficit disorder) F98.8    COPD (chronic obstructive pulmonary disease) ([facility] [address] J44.9    Thyroiditis, subacute E06.1    Hyperlipemia E78.5    Elevated LFT's, Fatty Liver R79.89    Essential tremor G25.0    GERD (gastroesophageal reflux disease) K21.9    Bilateral hip pain M25.551, M25.552    Vitamin D deficiency E55.9    Impaired fasting glucose/Prediabetes R73.01    Benign essential hypertension I10    ACP (advance care planning) Z71.89    Perforated diverticulum of large intestine K57.20    Pneumonia due to infectious organism J18.9    Perforation and abscess of large intestine concurrent with and due to diverticulitis K57.20    Paroxysmal atrial fibrillation (HCC) I48.0    Chronic fatigue R53.82    Chronic depression F32. 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 AMD form reviewed and copy provided. NN/facilitator to discuss with patient     Asthma     hx of    Benign essential hypertension 6/24/2016    Bilateral hip pain 6/3/2014    xrays 2012, negative    Chronic depression 7/12/2019    COPD (chronic obstructive pulmonary disease) ([facility] [address] 3/29/2010    Diverticulitis     Elevated LFT's, Fatty Liver 3/29/2010    Essential tremor 7/14/2010    Generalized anxiety disorder with panic attacks 7/12/2019    GERD (gastroesophageal reflux disease) 9/2/2011    History of DVT of lower extremity 2/22/2021    RLE      History of spontaneous subarachnoid intracranial hemorrhage due to cerebral AVM 2/22/2021    Ruptured aneurysm; 10/10/06 S/P Rt Frontal Craniotomy and clipping, Dr Dasha Lee 10/27/06    Hyperlipemia 3/29/2010    Impaired fasting glucose 7/13/2014 6/2014    Paroxysmal atrial fibrillation ([facility] [address] 5/2/2019    Post Op Acute A.  Fib after  Shunt replacement, 4-17-19, [facility], Dr. Jerdo Beth (no cardioversion required, medication mgt only)    Perforation and abscess of large intestine concurrent with and due to diverticulitis 5/2/2019    Lap Drain 3-29-19, Exp Laparotomy/Colostomy 4-8-19, Dr. Radha Snyder Thyroiditis, subacute 3/29/2010    Tobacco Abuse 3/29/2010    Vitamin D deficiency 6/3/2014    2012      Past Surgical History:   Procedure Laterality Date    ECHO STRESS  2005    Negative    HX BREAST BIOPSY      HX COLONOSCOPY  3/2013, Dr Katya Correa    benign cecal polyp, f/u 10 yrs    HX COLONOSCOPY  18's    Benign polypectomy    HX COLOSTOMY  04/08/2019    Exp Laparotomy, Sigmoidectomy & Colostomy for Perforated Sigmoid Diverticulum, Dr. Jerry Gage  10/2006    for clipping of ruptured aneurysm; Dr Ramsey Jc a shunt    HX CSF SHUNT  04/17/2019    Right Intrapleural Placement  Shunt (relocated due to peritoneal/abdominal surgery 4/8/19), [facility], Dr. Morales Husbands  EGD, Dr Marifer Berman    \"ok\" per pt report    HX GI  07/19/2019    Laparoscopic closure of colostomy     HX HYSTERECTOMY  1991    Cervical Dysplasia (Dr Ileana Gee)    HX OTHER SURGICAL  07/19/2019    lap sig. [id number]. b. Carmody    HX OTHER SURGICAL  04/08/2019    Laparotomy with sigmoidectomy and end [id number]. B. Carmody    IR DRAIN CUTANEOUS/SUBCU ABSCESS  03/29/2019    Lap Drain Pelvic Abscess for Perforated Sigmoid Diverticulum, Dr. Audrye Mendoza  04/08/2019    Externalize  Shunt Right Abdomen for Exp Lap/Colostomy, [facility], Dr. Joana Fernandes     Current Outpatient Medications   Medication Sig Dispense Refill    busPIRone (BUSPAR) 5 mg tablet TAKE 1 TABLET BY MOUTH TWICE A  Tablet 0    omeprazole (PRILOSEC) 20 mg capsule TAKE 1 CAPSULE BY MOUTH EVERY DAY 90 Capsule 0    atorvastatin (LIPITOR) 20 mg tablet TAKE 1 TABLET BY MOUTH EVERY DAY 90 Tablet 0    losartan-hydroCHLOROthiazide (HYZAAR) 50-12.5 mg per tablet TAKE 1 TABLET BY MOUTH EVERY DAY 90 Tablet 0    traZODone (DESYREL) 50 mg tablet Take 1 Tablet by mouth nightly. for sleep 90 Tablet 0    dilTIAZem ER (CARDIZEM CD) 300 mg capsule Take 1 Capsule by mouth daily. 90 Capsule 3    sertraline (ZOLOFT) 100 mg tablet TAKE 1.5 TABS BY MOUTH DAILY 135 Tablet 3    amLODIPine (Norvasc) 10 mg tablet Take 10 mg by mouth daily.  ferrous sulfate 325 mg (65 mg iron) tablet Take 1 Tab by mouth two (2) times a day.  albuterol (VENTOLIN HFA) 90 mcg/actuation inhaler Take 2 Puffs by inhalation every six (6) hours as needed for Wheezing or Shortness of Breath.  MULTIVITAMINS (MULTIVITAMIN PO) Take 1 Tab by mouth daily.        Allergies   Allergen Reactions    Adderall [Dextroamphetamine-Amphetamine] Unknown (comments)    Ativan [Lorazepam] Hives    Egg Yolk Other (comments)     indigestion    Pcn [Penicillins] Other (comments)     Unsure of reaction; pt reports unsure if have taken cephalosporin before.      Wellbutrin [Bupropion Hcl] Anxiety    Xanax [Alprazolam] Hives       Family History   Problem Relation Age of Onset    Anxiety Daughter     Attention Deficit Disorder Daughter     Drug Abuse Daughter     Anxiety Daughter     Thyroid Disease Daughter     Thyroid Disease Daughter         Hypothyroidism    Asthma Daughter     Cancer Father         kidney    Diabetes Father     OSTEOARTHRITIS Father     Diabetes Mother     Hypertension Mother    Brandie Benavides Mother     Stroke Mother     Hypertension Sister     Depression Sister     Pneumonia Sister          age 72    Diabetes Sister     Thyroid Disease Sister     Thyroid Disease Sister     Heart Disease Sister     Diabetes Maternal Grandmother     No Known Problems Brother     Thyroid Disease Sister     Anxiety Sister     Depression Sister     Alcohol abuse Sister     Hypertension Sister     Hypertension Sister     Thyroid Disease Sister     Thyroid Disease Sister     Anesth Problems Neg Hx      Social History     Tobacco Use    Smoking status: Current Every Day Smoker     Packs/day: 0.50     Years: 30.00     Pack years: 15.00     Types: Cigarettes    Smokeless tobacco: Never Used   Substance Use Topics    Alcohol use: No     Alcohol/week: 0.0 standard drinks         Dona Newton MD

## 2022-05-19 NOTE — PATIENT INSTRUCTIONS

## 2022-05-19 NOTE — PROGRESS NOTES
Chief Complaint   Patient presents with    Annual Wellness Visit    Cholesterol Problem     Not fasting, had coffee with cream    Hypertension    Pre-diabetes       HISTORY OF PRESENT ILLNESS   HPI  Follow up hypercholesterolemia, hypertension, prediabetes, labs and medication check. Had coffee w/ cream this AM just prior to visit but otherwise npo. Diet:nothing special, appetite good, daughter says she cut back on bringing so many sweets and junk in the house  Exercise: none, pretty sedentary but daughters try to encourage her to get up and get moving more around the house  Caffeine: 3-4 cups of coffee qam, 1 cup qpm, 1 Diet Dr. Scott Running a day, no tea  They have not been monitoring her home BP's the past several months  Has been high last few checks here as well as at cardiologist's office back in 9-2021. REVIEW OF SYMPTOMS   Review of Systems   Constitutional: Negative. Eyes: Negative. Respiratory: Negative. Cardiovascular: Negative. Gastrointestinal: Negative. Genitourinary: Negative. Musculoskeletal: Negative for myalgias. Neurological: Negative. Endo/Heme/Allergies: Negative. Psychiatric/Behavioral: Negative.          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 A  ICD-9-CM: 388  7/12/2019        Generalized anxiety disorder with panic attacks ICD-10-CM: F41.1, F41.0  ICD-9-CM: 300.02, 300.01  7/12/2019        Chronic fatigue ICD-10-CM: R53.82  ICD-9-CM: 780.79  5/16/2019        Perforation and abscess of large intestine concurrent with and due to diverticulitis ICD-10-CM: K57.20  ICD-9-CM: 562.11, 569.5  5/2/2019    Overview Signed 5/2/2019 11:40 AM by Piedad Jensen MD     Lap Drain 3-29-19, Exp Laparotomy/Colostomy 4-8-19, Dr. Herminia Underwood             Paroxysmal atrial fibrillation [facility]) ICD-10-CM: I48.0  ICD-9-CM: 427.31  5/2/2019    Overview Signed 5/2/2019 11:53 AM by Piedad Jensen MD     Post Op Acute A. Fib after  Shunt replacement, 4-17-19, [facility], Dr. Alise Padron (no cardioversion required, medication mgt only)             Pneumonia due to infectious organism ICD-10-CM: J18.9  ICD-9-CM: 446  4/12/2019        Perforated diverticulum of large intestine ICD-10-CM: K57.20  ICD-9-CM: 562.10  3/26/2019        ACP (advance care planning) ICD-10-CM: Z71.89  ICD-9-CM: V65.49  2/21/2017    Overview Signed 2/21/2017 11:02 AM by Piedad Jensen MD     Initial ACP discussion w/ pt and daughter 2-2017. AMD form reviewed and copy provided.  NN/facilitator to discuss with patient               Benign essential hypertension ICD-10-CM: I10  ICD-9-CM: 401.1  6/24/2016        Impaired fasting glucose/Prediabetes ICD-10-CM: R73.01  ICD-9-CM: 790.21  7/13/2014    Overview Signed 7/13/2014 10:50 PM by Piedad Jensen MD     6/2014             Bilateral hip pain ICD-10-CM: M25.551, M25.552  ICD-9-CM: 719.45  6/3/2014    Overview Signed 6/3/2014  2:46 PM by Piedad Jensen MD     xrays 2012, negative             Vitamin D deficiency ICD-10-CM: E55.9  ICD-9-CM: 268.9  6/3/2014    Overview Signed 6/3/2014  2:48 PM by Piedad Jensen MD     2012             GERD (gastroesophageal reflux disease) ICD-10-CM: K21.9  ICD-9-CM: 530.81  9/2/2011        Essential tremor ICD-10-CM: G25.0  ICD-9-CM: 333.1  7/14/2010        Cough variant asthma ICD-10-CM: J45.991  ICD-9-CM: 493.82  3/29/2010        Tobacco Abuse ICD-10-CM: F17.210  ICD-9-CM: 305.1  3/29/2010    Overview Signed 3/29/2010  6:13 PM by Braeden Britton               AR (allergic rhinitis) ICD-10-CM: J30.9  ICD-9-CM: 477.9  3/29/2010        ADD (attention deficit disorder) ICD-10-CM: F98.8  ICD-9-CM: 314.00  3/29/2010        COP[facility] obstructive pulmonary disease) [facility] ICD-10-CM: J44.9  ICD-9-CM: 496  3/29/2010    Overview Signed 11/10/2014  2:05 PM by Cheko Jasso MD     [address] Ramiro Ceron             Thyroiditis, subacute ICD-10-CM: E06.1  ICD-9-CM: 245.1  3/29/2010        Hyperlipemia ICD-10-CM: E78.5  ICD-9-CM: 272.4  3/29/2010    Overview Signed 7/14/2010  1:05 PM by Cheko Jasso MD     2005             Elevated LFT's, Fatty Liver ICD-10-CM: R79.89  ICD-9-CM: 790.6  3/29/2010    Overview Addendum 7/14/2010  1:04 PM by Cheko Jasso MD     [facility] 7/09 Hepatic Steatosis                       PAST SURGICAL HISTORY     Past Surgical History:   Procedure Laterality Date    ECHO STRESS  2005    Negative    HX BREAST BIOPSY      HX COLONOSCOPY  3/2013, Dr Grace Iglesias    benign cecal polyp, f/u 10 yrs    HX COLONOSCOPY  18's    Benign polypectomy    HX COLOSTOMY  04/08/2019    Exp Laparotomy, Sigmoidectomy & Colostomy for Perforated Sigmoid Diverticulum, Dr. Mikaela Jimenez  10/2006    for clipping of ruptured aneurysm; Dr Escobar Gramajo a shunt    HX CSF SHUNT  04/17/2019    Right Intrapleural Placement  Shunt (relocated due to peritoneal/abdominal surgery 4/8/19), [facility], Dr. Taty ANDERSON, Dr Grace Iglesias    \"ok\" per pt report    HX GI  07/19/2019    Laparoscopic closure of colostomy     HX HYSTERECTOMY  1991    Cervical Dysplasia (Dr Lona Crane)    [facility] 07/19/2019    lap sig. [id number]. b. Carmody    HX OTHER SURGICAL  04/08/2019    Laparotomy with sigmoidectomy and end [id number]. B. Carmody    IR DRAIN CUTANEOUS/SUBCU ABSCESS  03/29/2019    Lap Drain Pelvic Abscess for Perforated Sigmoid Diverticulum, Dr. Rigo Galaviz  04/08/2019    Externalize  Shunt Right Abdomen for Exp Lap/Colostomy, [facility], Dr. Aliyah Westfall     Current Outpatient Medications   Medication Sig    busPIRone (BUSPAR) 5 mg tablet TAKE 1 TABLET BY MOUTH TWICE A DAY    omeprazole (PRILOSEC) 20 mg capsule TAKE 1 CAPSULE BY MOUTH EVERY DAY    atorvastatin (LIPITOR) 20 mg tablet TAKE 1 TABLET BY MOUTH EVERY DAY    losartan-hydroCHLOROthiazide (HYZAAR) 50-12.5 mg per tablet TAKE 1 TABLET BY MOUTH EVERY DAY    traZODone (DESYREL) 50 mg tablet Take 1 Tablet by mouth nightly. for sleep    dilTIAZem ER (CARDIZEM CD) 300 mg capsule Take 1 Capsule by mouth daily.  sertraline (ZOLOFT) 100 mg tablet TAKE 1.5 TABS BY MOUTH DAILY    amLODIPine (Norvasc) 10 mg tablet Take 10 mg by mouth daily.  ferrous sulfate 325 mg (65 mg iron) tablet Take 1 Tab by mouth two (2) times a day.  albuterol (VENTOLIN HFA) 90 mcg/actuation inhaler Take 2 Puffs by inhalation every six (6) hours as needed for Wheezing or Shortness of Breath.  MULTIVITAMINS (MULTIVITAMIN PO) Take 1 Tab by mouth daily. No current facility-administered medications for this visit. ALLERGIES     Allergies   Allergen Reactions    Adderall [Dextroamphetamine-Amphetamine] Unknown (comments)    Ativan [Lorazepam] Hives    Egg Yolk Other (comments)     indigestion    Pcn [Penicillins] Other (comments)     Unsure of reaction; pt reports unsure if have taken cephalosporin before.      Wellbutrin [Bupropion Hcl] Anxiety    Xanax [Alprazolam] Hives          SOCIAL HISTORY     Social History     Tobacco Use    Smoking status: Current Every Day Smoker     Packs/day: 0.50     Years: 30.00     Pack years: 15.00     Types: Cigarettes    Smokeless tobacco: Never Used   Substance Use Topics    Alcohol use: No     Alcohol/week: 0.0 standard drinks     Social History     Social History Narrative        Lives in 2 story home    Her daughter Pato Kiran lives w/ her and has POA. Daughter prepares most meals but patient still cooks some as well    Uses a walking cane or rolling walker on outings    No longer drives since the [facility] and stroke    Daughter manages her finances and medications    Daughter helps w/ cleaning, meals and total transportation     Does other ADL's, dressing, bathing, grooming all on her own; some help w/ meal preparation; pt can use microwave and the stove when daughters are home. Patient does some light house work/cleaning. No toileting problems; uses independently    Initial ACP discussion w/ pt and daughter 2-2017. AMD form reviewed and copy provided. Discussed w/ pt and daughter again, they think patient has AMD and will check.          Diet:nothing special, appetite good, daughter says she cut back on bringing so many sweets and junk in the house    Exercise: none, pretty sedentary    Caffeine: 3-4 cups of coffee qam, 1 cup qpm, 1 Diet Dr. Mag Wu a day, no tea         Social History     Substance and Sexual Activity   Sexual Activity Not Currently    Partners: Male       IMMUNIZATIONS     Immunization History   Administered Date(s) Administered    COVID-19, Pfizer Purple top, DILUTE for use, 12+ yrs, 30mcg/0.3mL dose 03/11/2021, 04/01/2021, 11/05/2021    Pneumococcal Conjugate (PCV-13) 05/02/2019    Pneumococcal Polysaccharide (PPSV-23) 07/10/2014, 02/22/2021         FAMILY HISTORY     Family History   Problem Relation Age of Onset    Anxiety Daughter     Attention Deficit Disorder Daughter     Drug Abuse Daughter     Anxiety Daughter     Thyroid Disease Daughter     Thyroid Disease Daughter         Hypothyroidism    Asthma Daughter    [employer] Cancer Father         kidney    Diabetes Father     OSTEOARTHRITIS Father     Diabetes Mother     Hypertension Mother    Leda Ruiz Mother    [facility] Stroke Mother     Hypertension Sister     Depression Sister     Pneumonia Sister          age 72    Diabetes Sister     Thyroid Disease Sister     Thyroid Disease Sister     Heart Disease Sister     Diabetes Maternal Grandmother     No Known Problems Brother     Thyroid Disease Sister     Anxiety Sister     Depression Sister     Alcohol abuse Sister    [facility] Hypertension Sister     Hypertension Sister     Thyroid Disease Sister     Thyroid Disease Sister     Anesth Problems Neg Hx          VITALS     Visit Vitals  BP (!) 152/74 (BP 1 Location: Left upper arm, BP Patient Position: Sitting, BP Cuff Size: Large adult); 168/84 Left arm; 166/90 Right arm   Pulse 77   Temp 98.3 °F (36.8 °C) (Oral)   Resp 16   Ht 5' 8\" (1.727 m)   Wt 206 lb 6.4 oz (93.6 kg)   SpO2 96%   BMI 31.38 kg/m²          PHYSICAL EXAMINATION   Physical Exam  Vitals reviewed. Constitutional:       General: She is not in acute distress. HENT:      Right Ear: Tympanic membrane normal.      Left Ear: Tympanic membrane normal.   Cardiovascular:      Rate and Rhythm: Normal rate and regular rhythm. Pulmonary:      Effort: Pulmonary effort is normal.      Breath sounds: Normal breath sounds. Abdominal:      Palpations: Abdomen is soft. Tenderness: There is no abdominal tenderness. Musculoskeletal:         General: No swelling or tenderness. Right lower leg: No edema. Left lower leg: No edema. Skin:     General: Skin is warm and dry. Neurological:      General: No focal deficit present. Mental Status: She is alert and oriented to person, place, and time. Mental status is at baseline.       Comments: Gait stable using rolling walker   Psychiatric:         Mood and Affect: Mood and affect normal.         Cognition and Memory: Cognition and memory normal. LABORATORY DATA/ANCILLARY/IMAGING     Results for orders placed or performed in visit on 09/21/21   HEMOGLOBIN A1C WITH EAG   Result Value Ref Range    Hemoglobin A1c 6.1 (H) 4.0 - 5.6 %    Est. average glucose 128 mg/dL   AST   Result Value Ref Range    AST (SGOT) 22 15 - 37 U/L   ALT   Result Value Ref Range    ALT (SGPT) 41 12 - 78 U/L   LIPID PANEL   Result Value Ref Range    Cholesterol, total 119 <200 MG/DL    Triglyceride 148 <150 MG/DL    HDL Cholesterol 48 MG/DL    LDL, calculated 41.4 0 - 100 MG/DL    VLDL, calculated 29.6 MG/DL    CHOL/HDL Ratio 2.5 0.0 - 5.0               ASSESSMENT & PLAN   Diagnoses and all orders for this visit:    1. Medicare annual wellness visit, subsequent  See separate note under this same encounter visit for Medicare Wellness note. 2. Encounter for screening mammogram for malignant neoplasm of breast  -     CARLO MAMMO BI SCREENING INCL CAD; Future    3. Benign essential hypertension, not controlled  -     METABOLIC PANEL, COMPREHENSIVE; Future  -     LIPID PANEL; Future  -     TSH 3RD GENERATION; Future  -     losartan-hydroCHLOROthiazide (HYZAAR) 100-25 mg per tablet; Take 1 Tablet by mouth daily. 4. Hypercholesterolemia  -     METABOLIC PANEL, COMPREHENSIVE; Future  -     LIPID PANEL; Future  -     TSH 3RD GENERATION; Future    5. Prediabetes  -     HEMOGLOBIN A1C WITH EAG; Future    6. Fatty liver  -     CBC W/O DIFF; Future  -     METABOLIC PANEL, COMPREHENSIVE; Future    Labs checked today not fasting  Cardiovascular risk and specific lipid/LDL/A1c/BP goals reviewed  Reviewed diet, nutrition, exercise, weight management, BMI/goals. Age/risk based screening recommendations, health maintenance & prevention counseling. Cancer screening USPTFS guidelines reviewed w/ pt today. Discussed benefits/positive/negative outcomes of screening based on age/risk stratification. Informed consent for/against screening based on pt's personal hx/risk factors. Updated in history above and health maintenance. Reviewed medications and side effects   Increase Hyzaar from 50-12.5 mg to 100-25 mg once daily  Monitor interim home BP's  Further follow up & other recommendations pending review of labs as well.   Otherwise will plan on follow up BP check, medication evaluation and repeat BMP in ~ 6 weeks, sooner prn

## 2022-05-19 NOTE — PROGRESS NOTES
Chief Complaint   Patient presents with    Annual Wellness Visit    Cholesterol Problem     had coffee with cream    Hypertension    Blood sugar problem     1. \"Have you been to the ER, urgent care clinic since your last visit? Hospitalized since your last visit? \" No    2. \"Have you seen or consulted any other health care providers outside of the [address] [place] [address] since your last visit? \" Yes Where: sleep study [facility] month     3. For patients aged 39-70: Has the patient had a colonoscopy / FIT/ Cologuard? 3/2013   benign cecal polyp, f/u 10 yrs      If the patient is female:    4. For patients aged 41-77: Has the patient had a mammogram within the past 2 years? 2014 in system      5. For patients aged 21-65: Has the patient had a pap smear?  NA

## 2022-05-21 LAB
ALBUMIN SERPL-MCNC: 4.7 G/DL (ref 3.8–4.8)
ALBUMIN/GLOB SERPL: 1.5 {RATIO} (ref 1.2–2.2)
ALP SERPL-CCNC: 74 IU/L (ref 44–121)
ALT SERPL-CCNC: 60 IU/L (ref 0–32)
AST SERPL-CCNC: 42 IU/L (ref 0–40)
BILIRUB SERPL-MCNC: 0.3 MG/DL (ref 0–1.2)
BUN SERPL-MCNC: 15 MG/DL (ref 8–27)
BUN/CREAT SERPL: 16 (ref 12–28)
CALCIUM SERPL-MCNC: 9.8 MG/DL (ref 8.7–10.3)
CHLORIDE SERPL-SCNC: 101 MMOL/L (ref 96–106)
CHOLEST SERPL-MCNC: 220 MG/DL (ref 100–199)
CO2 SERPL-SCNC: 22 MMOL/L (ref 20–29)
CREAT SERPL-MCNC: 0.94 MG/DL (ref 0.57–1)
EGFR: 66 ML/MIN/1.73
ERYTHROCYTE [DISTWIDTH] IN BLOOD BY AUTOMATED COUNT: 12.1 % (ref 11.7–15.4)
EST. AVERAGE GLUCOSE BLD GHB EST-MCNC: 146 MG/DL
GLOBULIN SER CALC-MCNC: 3.1 G/DL (ref 1.5–4.5)
GLUCOSE SERPL-MCNC: 99 MG/DL (ref 65–99)
HBA1C MFR BLD: 6.7 % (ref 4.8–5.6)
HCT VFR BLD AUTO: 46.5 % (ref 34–46.6)
HDLC SERPL-MCNC: 42 MG/DL
HGB BLD-MCNC: 15.1 G/DL (ref 11.1–15.9)
IMP & REVIEW OF LAB RESULTS: NORMAL
LDLC SERPL CALC-MCNC: 124 MG/DL (ref 0–99)
MCH RBC QN AUTO: 31.6 PG (ref 26.6–33)
MCHC RBC AUTO-ENTMCNC: 32.5 G/DL (ref 31.5–35.7)
MCV RBC AUTO: 97 FL (ref 79–97)
PLATELET # BLD AUTO: 275 X10E3/UL (ref 150–450)
POTASSIUM SERPL-SCNC: 4.1 MMOL/L (ref 3.5–5.2)
PROT SERPL-MCNC: 7.8 G/DL (ref 6–8.5)
RBC # BLD AUTO: 4.78 X10E6/UL (ref 3.77–5.28)
SODIUM SERPL-SCNC: 142 MMOL/L (ref 134–144)
TRIGL SERPL-MCNC: 307 MG/DL (ref 0–149)
TSH SERPL DL<=0.005 MIU/L-ACNC: 2.44 UIU/ML (ref 0.45–4.5)
VLDLC SERPL CALC-MCNC: 54 MG/DL (ref 5–40)
WBC # BLD AUTO: 11.2 X10E3/UL (ref 3.4–10.8)

## 2022-05-22 ENCOUNTER — TELEPHONE (OUTPATIENT)
Dept: FAMILY MEDICINE CLINIC | Age: 72
End: 2022-05-22

## 2022-05-22 DIAGNOSIS — R79.89 ELEVATED LFTS: ICD-10-CM

## 2022-05-22 DIAGNOSIS — D72.829 LEUKOCYTOSIS, UNSPECIFIED TYPE: ICD-10-CM

## 2022-05-22 DIAGNOSIS — E78.2 MIXED HYPERLIPIDEMIA: Primary | ICD-10-CM

## 2022-05-22 DIAGNOSIS — R73.03 PREDIABETES: ICD-10-CM

## 2022-05-22 NOTE — TELEPHONE ENCOUNTER
Advise patient's daughter Kiki Clark re: the following lab results:    -WBC mildly elevated (possibly c/w infection or other, will monitor). -Liver enzymes elevated. Could be viral, medications, alcohol, fatty liver or other. -Avoid Nsaid's and alcohol. Increase water. Will reassess, see below.  -HgbA1c has moved into diabetes range. Avoid sweets/sugars, reserve for special occasions only. Strict low carb diet. Lose weight.   -Cholesterol numbers have also gone up.   -Change Lipitor to Crestor 10 mg.   -Fasting follow up office visit and labs in 3 months. Please get booked now.    After done w/ the above route back so I can send in Crestor

## 2022-05-24 RX ORDER — ROSUVASTATIN CALCIUM 10 MG/1
10 TABLET, COATED ORAL
Qty: 90 TABLET | Refills: 0 | Status: SHIPPED | OUTPATIENT
Start: 2022-05-24

## 2022-05-24 NOTE — TELEPHONE ENCOUNTER
Informed dtr Marino Garcia of results. She states that pt doesn't drink any alcohol. She is agreeable with plan. I confirmed pharmacy. Scheduled pt for a fasting f/u appt for 8/24 @ 8:15.

## 2022-06-02 ENCOUNTER — HOSPITAL ENCOUNTER (OUTPATIENT)
Dept: MAMMOGRAPHY | Age: 72
Discharge: HOME | End: 2022-06-02
Attending: FAMILY MEDICINE
Payer: MEDICARE

## 2022-06-02 DIAGNOSIS — Z12.31 ENCOUNTER FOR SCREENING MAMMOGRAM FOR MALIGNANT NEOPLASM OF BREAST: ICD-10-CM

## 2022-06-02 PROCEDURE — 77067 SCR MAMMO BI INCL CAD: CPT

## 2022-08-24 ENCOUNTER — OFFICE VISIT (OUTPATIENT)
Dept: FAMILY MEDICINE CLINIC | Age: 72
End: 2022-08-24
Payer: MEDICARE

## 2022-08-24 VITALS
OXYGEN SATURATION: 96 % | BODY MASS INDEX: 29.67 KG/M2 | RESPIRATION RATE: 16 BRPM | HEIGHT: 68 IN | DIASTOLIC BLOOD PRESSURE: 60 MMHG | WEIGHT: 195.8 LBS | HEART RATE: 78 BPM | SYSTOLIC BLOOD PRESSURE: 138 MMHG | TEMPERATURE: 98.2 F

## 2022-08-24 DIAGNOSIS — I10 BENIGN ESSENTIAL HYPERTENSION: ICD-10-CM

## 2022-08-24 DIAGNOSIS — K76.0 FATTY LIVER: ICD-10-CM

## 2022-08-24 DIAGNOSIS — F41.0 GENERALIZED ANXIETY DISORDER WITH PANIC ATTACKS: ICD-10-CM

## 2022-08-24 DIAGNOSIS — F51.05 INSOMNIA SECONDARY TO DEPRESSION WITH ANXIETY: ICD-10-CM

## 2022-08-24 DIAGNOSIS — F17.210 CIGARETTE SMOKER: ICD-10-CM

## 2022-08-24 DIAGNOSIS — F41.1 GENERALIZED ANXIETY DISORDER WITH PANIC ATTACKS: ICD-10-CM

## 2022-08-24 DIAGNOSIS — F32.A CHRONIC DEPRESSION: ICD-10-CM

## 2022-08-24 DIAGNOSIS — E78.2 MIXED HYPERLIPIDEMIA: Primary | ICD-10-CM

## 2022-08-24 DIAGNOSIS — J44.9 COPD, SEVERE (HCC): ICD-10-CM

## 2022-08-24 DIAGNOSIS — D72.829 LEUKOCYTOSIS, UNSPECIFIED TYPE: ICD-10-CM

## 2022-08-24 DIAGNOSIS — I48.0 PAROXYSMAL ATRIAL FIBRILLATION (HCC): ICD-10-CM

## 2022-08-24 DIAGNOSIS — F41.8 INSOMNIA SECONDARY TO DEPRESSION WITH ANXIETY: ICD-10-CM

## 2022-08-24 DIAGNOSIS — Z78.9 EXCESSIVE CAFFEINE INTAKE: ICD-10-CM

## 2022-08-24 DIAGNOSIS — R73.03 PREDIABETES: ICD-10-CM

## 2022-08-24 PROCEDURE — 1090F PRES/ABSN URINE INCON ASSESS: CPT | Performed by: FAMILY MEDICINE

## 2022-08-24 PROCEDURE — G9717 DOC PT DX DEP/BP F/U NT REQ: HCPCS | Performed by: FAMILY MEDICINE

## 2022-08-24 PROCEDURE — 3017F COLORECTAL CA SCREEN DOC REV: CPT | Performed by: FAMILY MEDICINE

## 2022-08-24 PROCEDURE — G8417 CALC BMI ABV UP PARAM F/U: HCPCS | Performed by: FAMILY MEDICINE

## 2022-08-24 PROCEDURE — G8427 DOCREV CUR MEDS BY ELIG CLIN: HCPCS | Performed by: FAMILY MEDICINE

## 2022-08-24 PROCEDURE — G8400 PT W/DXA NO RESULTS DOC: HCPCS | Performed by: FAMILY MEDICINE

## 2022-08-24 PROCEDURE — 99214 OFFICE O/P EST MOD 30 MIN: CPT | Performed by: FAMILY MEDICINE

## 2022-08-24 PROCEDURE — G8754 DIAS BP LESS 90: HCPCS | Performed by: FAMILY MEDICINE

## 2022-08-24 PROCEDURE — 1101F PT FALLS ASSESS-DOCD LE1/YR: CPT | Performed by: FAMILY MEDICINE

## 2022-08-24 PROCEDURE — G9899 SCRN MAM PERF RSLTS DOC: HCPCS | Performed by: FAMILY MEDICINE

## 2022-08-24 PROCEDURE — G8536 NO DOC ELDER MAL SCRN: HCPCS | Performed by: FAMILY MEDICINE

## 2022-08-24 PROCEDURE — 1123F ACP DISCUSS/DSCN MKR DOCD: CPT | Performed by: FAMILY MEDICINE

## 2022-08-24 PROCEDURE — G8752 SYS BP LESS 140: HCPCS | Performed by: FAMILY MEDICINE

## 2022-08-24 RX ORDER — BUDESONIDE 0.5 MG/2ML
500 INHALANT ORAL 2 TIMES DAILY
COMMUNITY

## 2022-08-24 RX ORDER — TRAZODONE HYDROCHLORIDE 50 MG/1
50 TABLET ORAL
Qty: 90 TABLET | Refills: 2 | Status: SHIPPED | OUTPATIENT
Start: 2022-08-24

## 2022-08-24 RX ORDER — LEVALBUTEROL INHALATION SOLUTION 0.63 MG/3ML
0.63 SOLUTION RESPIRATORY (INHALATION)
COMMUNITY

## 2022-08-24 NOTE — PROGRESS NOTES
Chief Complaint   Patient presents with    Cholesterol Problem     fasting    Hypertension    Blood sugar problem     1. \"Have you been to the ER, urgent care clinic since your last visit? Hospitalized since your last visit? \" No    2. \"Have you seen or consulted any other health care providers outside of the [address] [place] [address] since your last visit? \" No     3. For patients aged 39-70: Has the patient had a colonoscopy / FIT/ Cologuard? Yes - no Care Gap present      If the patient is female:    4. For patients aged 41-77: Has the patient had a mammogram within the past 2 years? Yes - no Care Gap present      5. For patients aged 21-65: Has the patient had a pap smear?  NA - based on age or sex

## 2022-08-24 NOTE — PROGRESS NOTES
Chief Complaint   Patient presents with    Cholesterol Problem     Fasting follow up    Hypertension    Pre-diabetes    Medication Evaluation       HISTORY OF PRESENT ILLNESS   Fasting follow up Hyperlipidemia, Hypertension, Prediabetes. Accompanied by daughter Mehran Mcmahon today. Complying routinely w/ related medications and tolerating w/o reaction or side effects. No home BP checks recently. Diet:nothing special, appetite good, daughter says she cut back on bringing so many sweets and junk in the house; patient is working on reducing her sugars/creams and portions; weight gradually coming down from the 210's down to 195 lbs now  Exercise: none, pretty sedentary  Caffeine: 2 cups of coffee qam, 2 cups coffee in the afternoon and 1 cup every evening; 1 Diet Dr. Perez Dears a day, no tea  She states she has to have her coffee and she wont give it up  [facility] Had a few visits there earlier this year and daughter states she had a sleep study done in 4-2022. They have not followed up since then as directed but will work to get that set up. Patient states she does have baseline CAMARA which has not changed from her baseline but daughter states she doesn't comply w/ her breathing treatments. Patient states she doesn't use the HFA inhaler bc it makes her feel shaky and racy. But she states she does try to at least use her Nebulizer machine bid. She is not sure which medication she is prescribed by Pulmonary. When I reviewed their office notes it appears she is prescribed Xopenex Nebs and Pulmicort Nebs which I updated in her chart today. There note indicates patient's symptoms are controlled when using them. She does still smoke 1/2-1ppd. REVIEW OF SYMPTOMS   Review of Systems   Constitutional: Negative. Eyes: Negative. Respiratory:  Negative for cough and sputum production. Cardiovascular: Negative. Gastrointestinal: Negative. Genitourinary: Negative.     Musculoskeletal:  Negative for myalgias. Neurological: Negative. Endo/Heme/Allergies: Negative. Psychiatric/Behavioral:  Negative for depression. The patient has insomnia (has not been taking Trazodone for a long time, it was helping when she took it, not sure if updated rx at home). The patient is not nervous/anxious. 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 A  ICD-9-CM: 311  7/12/2019        Generalized anxiety disorder with panic attacks ICD-10-CM: F41.1, F41.0  ICD-9-CM: 300.02, 300.01  7/12/2019        Perforation and abscess of large intestine concurrent with and due to diverticulitis ICD-10-CM: K57.20  ICD-9-CM: 562.11, 569.5  5/2/2019    Overview Signed 5/2/2019 11:40 AM by Chemo Blake MD     Lap Drain 3-29-19, Exp Laparotomy/Colostomy 4-8-19, Dr. Jesse Méndez             Paroxysmal atrial fibrillation [facility]) ICD-10-CM: I48.0  ICD-9-CM: 427.31  5/2/2019    Overview Signed 5/2/2019 11:53 AM by Chemo Blake MD     Post Op Acute A.  Fib after  Shunt replacement, 4-17-19, Dr. Randy Alberto (no cardioversion required, medication mgt only)             Pneumonia due to infectious organism ICD-10-CM: J18.9  ICD-9-CM: 486  4/12/2019        Perforated diverticulum of large intestine ICD-10-CM: K57.20  ICD-9-CM: 562.10  3/26/2019        ACP (advance care planning) ICD-10-CM: Z71.89  ICD-9-CM: V65.49  2/21/2017    Overview Signed 2/21/2017 11:02 AM by Bi Edouard MD     Initial ACP discussion w/ pt and daughter 2-2017. AMD form reviewed and copy provided.  NN/facilitator to discuss with patient               Benign essential hypertension ICD-10-CM: I10  ICD-9-CM: 401.1  6/24/2016        Impaired fasting glucose/Prediabetes ICD-10-CM: R73.01  ICD-9-CM: 790.21  7/13/2014    Overview Signed 7/13/2014 10:50 PM by Bi Edouard MD     6/2014             Bilateral hip pain ICD-10-CM: M25.551, M25.552  ICD-9-CM: 719.45  6/3/2014    Overview Signed 6/3/2014  2:46 PM by Bi Edouard MD     xrays 2012, negative             Vitamin D deficiency ICD-10-CM: E55.9  ICD-9-CM: 268.9  6/3/2014    Overview Signed 6/3/2014  2:48 PM by Bi Edouard MD     2012             GERD (gastroesophageal reflux disease) ICD-10-CM: K21.9  ICD-9-CM: 530.81  9/2/2011        Essential tremor ICD-10-CM: G25.0  ICD-9-CM: 333.1  7/14/2010        Tobacco Abuse ICD-10-CM: X56.656  ICD-9-CM: 305.1  3/29/2010    Overview Addendum 8/24/2022  3:51 PM by Bi Edouard MD                    AR (allergic rhinitis) ICD-10-CM: J30.9  ICD-9-CM: 477.9  3/29/2010        ADD (attention deficit disorder) ICD-10-CM: F98.8  ICD-9-CM: 314.00  3/29/2010        COPD, severe ([address] ICD-10-CM: J44.9  ICD-9-CM: 122  3/29/2010    Overview Addendum 8/24/2022  3:49 PM by Bi Edouard MD     [facility]: Dr Tato Booker, Dr. Inna Wu             Thyroiditis, subacute ICD-10-CM: E06.1  ICD-9-CM: 245.1  3/29/2010        Hyperlipemia ICD-10-CM: E78.5  ICD-9-CM: 272.4  3/29/2010    Overview Signed 7/14/2010  1:05 PM by Bi Edouard MD     2005             Elevated LFT's, Fatty Liver ICD-10-CM: R79.89  ICD-9-CM: 790.6  3/29/2010    Overview Addendum 7/14/2010  1:04 PM by Vincent Giang MD     Abd US 7/09 Hepatic Steatosis                    PAST SURGICAL HISTORY     Past Surgical History:   Procedure Laterality Date    ECHO STRESS  2005    Negative    HX COLONOSCOPY  3/2013, Dr Elvira Segundo    benign cecal polyp, f/u 10 yrs    HX COLONOSCOPY  18's    Benign polypectomy    HX COLOSTOMY  04/08/2019    Exp Laparotomy, Sigmoidectomy & Colostomy for Perforated Sigmoid Diverticulum, Dr. Tere Frias  10/2006    for clipping of ruptured aneurysm; Dr Henna Moses a shunt    HX CSF SHUNT  04/17/2019    Right Intrapleural Placement  Shunt (relocated due to peritoneal/abdominal surgery 4/8/19), [facility], Dr. Stacie Sim     HX [address]      Side not know    HX ENDOSCOPY  EGD, Dr Elvira Segundo    \"ok\" per pt report    HX GI  07/19/2019    Laparoscopic closure of colostomy     HX HYSTERECTOMY  1991    Cervical Dysplasia (Dr Karissa Walker)    HX OTHER SURGICAL  07/19/2019    lap sig. [id number]. b. Carmody    HX OTHER SURGICAL  04/08/2019    Laparotomy with sigmoidectomy and end [id number]. B. Carmody    IR DRAIN CUTANEOUS/SUBCU ABSCESS  03/29/2019    Lap Drain Pelvic Abscess for Perforated Sigmoid Diverticulum, Dr. Gonzalez Asp    IR [address]  04/08/2019    Externalize  Shunt Right Abdomen for Exp Lap/Colostomy, [facility], Dr. Hollis Martinez     Current Outpatient Medications   Medication Sig    levalbuterol (XOPENEX) 0.63 mg/3 mL nebu 0.63 mg by Nebulization route every six (6) hours as needed. Per [facility]    budesonide (Pulmicort) 0.5 mg/2 mL nbsp 500 mcg by Nebulization route two (2) times a day. Per [facility]    traZODone (DESYREL) 50 mg tablet Take 1 Tablet by mouth nightly. for sleep    rosuvastatin (CRESTOR) 10 mg tablet Take 1 Tablet by mouth nightly. losartan-hydroCHLOROthiazide (HYZAAR) 100-25 mg per tablet Take 1 Tablet by mouth daily.     busPIRone (BUSPAR) 5 mg tablet TAKE 1 TABLET BY MOUTH TWICE A DAY    omeprazole (PRILOSEC) 20 mg capsule TAKE 1 CAPSULE BY MOUTH EVERY DAY    dilTIAZem ER (CARDIZEM CD) 300 mg capsule Take 1 Capsule by mouth daily. sertraline (ZOLOFT) 100 mg tablet TAKE 1.5 TABS BY MOUTH DAILY    amLODIPine (NORVASC) 10 mg tablet Take 10 mg by mouth daily. ferrous sulfate 325 mg (65 mg iron) tablet Take 1 Tab by mouth two (2) times a day. MULTIVITAMINS (MULTIVITAMIN PO) Take 1 Tab by mouth daily. albuterol (PROVENTIL HFA, VENTOLIN HFA, PROAIR HFA) 90 mcg/actuation inhaler Take 2 Puffs by inhalation every six (6) hours as needed for Wheezing or Shortness of Breath. (Patient not taking: Reported on 8/24/2022)     No current facility-administered medications for this visit. ALLERGIES     Allergies   Allergen Reactions    Adderall [Dextroamphetamine-Amphetamine] Unknown (comments)    Ativan [Lorazepam] Hives    Egg Yolk Other (comments)     indigestion    Pcn [Penicillins] Other (comments)     Unsure of reaction; pt reports unsure if have taken cephalosporin before. Wellbutrin [Bupropion Hcl] Anxiety    Xanax [Alprazolam] Hives          SOCIAL HISTORY     Social History     Tobacco Use    Smoking status: Every Day     Packs/day: 0.50     Years: 30.00     Pack years: 15.00     Types: Cigarettes    Smokeless tobacco: Never   Substance Use Topics    Alcohol use: No     Alcohol/week: 0.0 standard drinks     Social History     Social History Narrative    , 3 daughters (Babak Child, Jack Clarence) all local    Lives in [address] [place] home    Her daughter Jhoan Jiménez lives w/ her and has POA.      Daughter prepares most meals but patient still cooks some as well    Uses a walking cane or rolling walker on outings    No longer drives since the [facility] and stroke    Daughter manages her finances and medications    Daughter helps w/ cleaning, meals and total transportation     Does other ADL's, dressing, bathing, grooming all on her own; some help w/ meal preparation; pt can use microwave and the stove when daughters are home. Patient does some light house work/cleaning. No toileting problems; uses independently    Initial ACP discussion w/ pt and daughter . AMD form reviewed and copy provided. Discussed w/ pt and daughter again, they think patient has AMD and will check.          Diet:nothing special, appetite good, daughter says she cut back on bringing so many sweets and junk in the house; patient is working on reducing her sugars/creams and portions; weight gradually coming down from the 210's down to 195 lbs now    Exercise: none, pretty sedentary    Caffeine: 2 cups of coffee qam, 2 cups coffee in the afternoon and 1 cup every evening; 1 Diet Dr. Umair Pascual a day, no tea         Social History     Substance and Sexual Activity   Sexual Activity Not Currently    Partners: Male       IMMUNIZATIONS     Immunization History   Administered Date(s) Administered    COVID-19, PFIZER PURPLE top, DILUTE for use, (age 15 y+), IM, 30mcg/0.3mL 2021, 2021, 2021    Pneumococcal Conjugate (PCV-13) 2019    Pneumococcal Polysaccharide (PPSV-23) 07/10/2014, 2021         FAMILY HISTORY     Family History   Problem Relation Age of Onset    Anxiety Daughter     Attention Deficit Disorder Daughter     Drug Abuse Daughter     Anxiety Daughter     Thyroid Disease Daughter     Thyroid Disease Daughter         Hypothyroidism    Asthma Daughter     Cancer Father         kidney    Diabetes Father     OSTEOARTHRITIS Father     Diabetes Mother     Hypertension Mother     OSTEOARTHRITIS Mother     Stroke Mother     Hypertension Sister     Depression Sister     Pneumonia Sister          age 72    Diabetes Sister     Thyroid Disease Sister     Thyroid Disease Sister     Heart Disease Sister     Diabetes Maternal Grandmother     No Known Problems Brother     Thyroid Disease Sister     Anxiety Sister     Depression Sister     Alcohol abuse Sister     Hypertension Sister     Hypertension Sister Thyroid Disease Sister     Thyroid Disease Sister     Anesth Problems Neg Hx          VITALS   Visit Vitals  /60 repeated by MD at end of exam (BP 1 Location: Right upper arm, BP Patient Position: Sitting, BP Cuff Size: Large adult); Beginning of exam 1st nurse BP check 172/78 left arm, repeated 154/72 left arm   Pulse 78   Temp 98.2 °F (36.8 °C) (Oral)   Resp 16   Ht 5' 8\" (1.727 m)   Wt 195 lb 12.8 oz (88.8 kg)   SpO2 96%   BMI 29.77 kg/m²          PHYSICAL EXAMINATION   Physical Exam  Vitals reviewed. Constitutional:       General: She is not in acute distress. Cardiovascular:      Rate and Rhythm: Normal rate and regular rhythm. Pulmonary:      Effort: Pulmonary effort is normal. No respiratory distress. Abdominal:      Palpations: Abdomen is soft. Tenderness: There is no abdominal tenderness. Musculoskeletal:         General: No swelling. Skin:     General: Skin is warm and dry. Neurological:      General: No focal deficit present. Mental Status: She is alert and oriented to person, place, and time. Mental status is at baseline.       Comments: Gait steady using walking cane    Psychiatric:         Mood and Affect: Mood normal.              LABORATORY DATA/ANCILLARY/IMAGING     Results for orders placed or performed in visit on 05/19/22   CBC W/O DIFF   Result Value Ref Range    WBC 11.2 (H) 3.4 - 10.8 x10E3/uL    RBC 4.78 3.77 - 5.28 x10E6/uL    HGB 15.1 11.1 - 15.9 g/dL    HCT 46.5 34.0 - 46.6 %    MCV 97 79 - 97 fL    MCH 31.6 26.6 - 33.0 pg    MCHC 32.5 31.5 - 35.7 g/dL    RDW 12.1 11.7 - 15.4 %    PLATELET 593 042 - 042 V01G8/OD   METABOLIC PANEL, COMPREHENSIVE   Result Value Ref Range    Glucose 99 65 - 99 mg/dL    BUN 15 8 - 27 mg/dL    Creatinine 0.94 0.57 - 1.00 mg/dL    eGFR 66 >59 mL/min/1.73    BUN/Creatinine ratio 16 12 - 28    Sodium 142 134 - 144 mmol/L    Potassium 4.1 3.5 - 5.2 mmol/L    Chloride 101 96 - 106 mmol/L    CO2 22 20 - 29 mmol/L    Calcium 9.8 8.7 - 10.3 mg/dL    Protein, total 7.8 6.0 - 8.5 g/dL    Albumin 4.7 3.8 - 4.8 g/dL    GLOBULIN, TOTAL 3.1 1.5 - 4.5 g/dL    A-G Ratio 1.5 1.2 - 2.2    Bilirubin, total 0.3 0.0 - 1.2 mg/dL    Alk. phosphatase 74 44 - 121 IU/L    AST (SGOT) 42 (H) 0 - 40 IU/L    ALT (SGPT) 60 (H) 0 - 32 IU/L   LIPID PANEL   Result Value Ref Range    Cholesterol, total 220 (H) 100 - 199 mg/dL    Triglyceride 307 (H) 0 - 149 mg/dL    HDL Cholesterol 42 >39 mg/dL    VLDL, calculated 54 (H) 5 - 40 mg/dL    LDL, calculated 124 (H) 0 - 99 mg/dL   TSH 3RD GENERATION   Result Value Ref Range    TSH 2.440 0.450 - 4.500 uIU/mL   HEMOGLOBIN A1C WITH EAG   Result Value Ref Range    Hemoglobin A1c 6.7 (H) 4.8 - 5.6 %    Estimated average glucose 146 mg/dL   CVD REPORT   Result Value Ref Range    INTERPRETATION Note              ASSESSMENT & PLAN   Diagnoses and all orders for this visit:    1. Mixed hyperlipidemia  LDL not at goal  Continue Crestor 10 mg daily  She is working more on dietary modifications at well  Lipid panel checked today  Further recommendations pending today's fasting labs    2. Prediabetes  Worsening HgbA1c last check  Rechecked w/ today's labs  Discussed sugar free, low carb diet and weight management  Further recommendations after review of today's labs    3. Benign essential hypertension  Improving w/ increased dose of Hyzaar last visit   Continue current regimen of Hyzaar 100-25 mg, Diltiazem  mg, Norvasc 10 mg daily  Work on caffeine reduction but she is resistant to that idea    4. Paroxysmal atrial fibrillation (HCC)  Assessment & Plan:  H/o Post Op Acute A. Fib after  Shunt replacement, 4-17-19, Ivon Gunter, Dr. Yin Speak (no cardioversion required, medication mgt only). Monitored by specialist. Stable. No acute findings meriting change in the plan. Last appt 9-2021. Next upcoming follow up appt w/ cardiology Dr. Yin Speak  9/26/22      5.  Fatty liver  Hepatic Function and CBC checked w/ today's labs  Keep working on weight reduction    6. Tobacco Abuse  Smoking cessation counseling    7. COPD, severe [facility])  Assessment & Plan:  [facility]: Dr Amador Bermudez, Dr. Isabelle Frazier; No acute findings meriting change in the plan. Last visit this spring. Sleep study performed 4-2022. Scheduling follow up at this time. Work on smoking cessation. Be more compliant w/ usage of nebulizer treatments as prescribed. 8. Chronic depression  Stable on current regimen of Zoloft 150 mg    9. Generalized anxiety disorder with panic attacks  Stable on current regimen of Zoloft 150 mg    10. Insomnia secondary to depression with anxiety  She has been off Trazodone for months and is also resistant to the idea of cutting back her caffeine consumption  -     traZODone (DESYREL) 50 mg tablet; Take 1 Tablet by mouth nightly. for sleep    11. Excessive caffeine intake  As above. Discussed negative health implications including uncontrolled BP, worsening of essential tremor, worsening sleep hygiene. Discussed weaning. Pt declines    12. Leukocytosis, unspecified type  CBC with Diff checked w/ today's labs    Fasting labs checked today as ordered  Cardiovascular risk and specific lipid/LDL/BP/Hgba1c goals assessed  Reviewed medications and side effects  Reviewed diet, nutrition, exercise, weight management, BMI/goals. Age/risk based screening recommendations, health maintenance & prevention counseling. Cancer screening USPTFS guidelines reviewed w/ pt today. Discussed benefits/positive/negative outcomes of screening based on age/risk stratification. Informed consent for/against screening based on pt's personal hx/risk factors. Updated in history above and health maintenance. Further follow up & other recommendations pending review of labs.  If all remains good and stable, follow up in 5/2023 for next fasting follow up and Medicare AWV

## 2022-08-24 NOTE — ASSESSMENT & PLAN NOTE
Followed by [facility]: Dr Helena Rod, Dr. Jazyz Ba; No acute findings meriting change in the plan. Last visit this spring. Sleep study performed 4-2022. Scheduling follow up at this time. Work on smoking cessation. Be more compliant w/ usage of nebulizer treatments as prescribed.

## 2022-08-24 NOTE — ASSESSMENT & PLAN NOTE
H/o Post Op Acute A. Fib after  Shunt replacement, 4-17-19, Allyson Gramajo, Dr. Shyanne Stanley (no cardioversion required, medication mgt only). Monitored by specialist. Stable. No acute findings meriting change in the plan. Last appt 9-2021.  Next upcoming follow up appt w/ cardiology Dr. Shyanne Stanley  9/26/22

## 2022-08-25 LAB
ALBUMIN SERPL-MCNC: 4.6 G/DL (ref 3.8–4.8)
ALBUMIN/GLOB SERPL: 1.6 {RATIO} (ref 1.2–2.2)
ALP SERPL-CCNC: 66 IU/L (ref 44–121)
ALT SERPL-CCNC: 53 IU/L (ref 0–32)
AST SERPL-CCNC: 40 IU/L (ref 0–40)
BASOPHILS # BLD AUTO: 0 X10E3/UL (ref 0–0.2)
BASOPHILS NFR BLD AUTO: 0 %
BILIRUB SERPL-MCNC: 0.3 MG/DL (ref 0–1.2)
BUN SERPL-MCNC: 15 MG/DL (ref 8–27)
BUN/CREAT SERPL: 18 (ref 12–28)
CALCIUM SERPL-MCNC: 9.2 MG/DL (ref 8.7–10.3)
CHLORIDE SERPL-SCNC: 103 MMOL/L (ref 96–106)
CHOLEST SERPL-MCNC: 144 MG/DL (ref 100–199)
CO2 SERPL-SCNC: 24 MMOL/L (ref 20–29)
CREAT SERPL-MCNC: 0.82 MG/DL (ref 0.57–1)
EGFR: 77 ML/MIN/1.73
EOSINOPHIL # BLD AUTO: 0.3 X10E3/UL (ref 0–0.4)
EOSINOPHIL NFR BLD AUTO: 3 %
ERYTHROCYTE [DISTWIDTH] IN BLOOD BY AUTOMATED COUNT: 11.9 % (ref 11.7–15.4)
EST. AVERAGE GLUCOSE BLD GHB EST-MCNC: 140 MG/DL
GLOBULIN SER CALC-MCNC: 2.8 G/DL (ref 1.5–4.5)
GLUCOSE SERPL-MCNC: 119 MG/DL (ref 65–99)
HBA1C MFR BLD: 6.5 % (ref 4.8–5.6)
HCT VFR BLD AUTO: 45.9 % (ref 34–46.6)
HDLC SERPL-MCNC: 48 MG/DL
HGB BLD-MCNC: 15.3 G/DL (ref 11.1–15.9)
IMM GRANULOCYTES # BLD AUTO: 0 X10E3/UL (ref 0–0.1)
IMM GRANULOCYTES NFR BLD AUTO: 0 %
IMP & REVIEW OF LAB RESULTS: NORMAL
LDLC SERPL CALC-MCNC: 76 MG/DL (ref 0–99)
LYMPHOCYTES # BLD AUTO: 3.4 X10E3/UL (ref 0.7–3.1)
LYMPHOCYTES NFR BLD AUTO: 34 %
MCH RBC QN AUTO: 30.9 PG (ref 26.6–33)
MCHC RBC AUTO-ENTMCNC: 33.3 G/DL (ref 31.5–35.7)
MCV RBC AUTO: 93 FL (ref 79–97)
MONOCYTES # BLD AUTO: 0.7 X10E3/UL (ref 0.1–0.9)
MONOCYTES NFR BLD AUTO: 7 %
NEUTROPHILS # BLD AUTO: 5.5 X10E3/UL (ref 1.4–7)
NEUTROPHILS NFR BLD AUTO: 56 %
PLATELET # BLD AUTO: 252 X10E3/UL (ref 150–450)
POTASSIUM SERPL-SCNC: 4 MMOL/L (ref 3.5–5.2)
PROT SERPL-MCNC: 7.4 G/DL (ref 6–8.5)
RBC # BLD AUTO: 4.95 X10E6/UL (ref 3.77–5.28)
SODIUM SERPL-SCNC: 144 MMOL/L (ref 134–144)
TRIGL SERPL-MCNC: 111 MG/DL (ref 0–149)
VLDLC SERPL CALC-MCNC: 20 MG/DL (ref 5–40)
WBC # BLD AUTO: 10 X10E3/UL (ref 3.4–10.8)

## 2022-11-11 DIAGNOSIS — I10 BENIGN ESSENTIAL HYPERTENSION: ICD-10-CM

## 2022-11-11 DIAGNOSIS — E78.2 MIXED HYPERLIPIDEMIA: ICD-10-CM

## 2022-11-12 RX ORDER — LOSARTAN POTASSIUM AND HYDROCHLOROTHIAZIDE 25; 100 MG/1; MG/1
TABLET ORAL
Qty: 90 TABLET | Refills: 1 | Status: SHIPPED | OUTPATIENT
Start: 2022-11-12

## 2022-11-12 RX ORDER — ROSUVASTATIN CALCIUM 10 MG/1
10 TABLET, COATED ORAL
Qty: 90 TABLET | Refills: 1 | Status: SHIPPED | OUTPATIENT
Start: 2022-11-12

## 2022-11-23 DIAGNOSIS — F41.1 GENERALIZED ANXIETY DISORDER WITH PANIC ATTACKS: ICD-10-CM

## 2022-11-23 DIAGNOSIS — F41.0 GENERALIZED ANXIETY DISORDER WITH PANIC ATTACKS: ICD-10-CM

## 2022-11-23 DIAGNOSIS — F32.A CHRONIC DEPRESSION: ICD-10-CM

## 2022-11-23 RX ORDER — SERTRALINE HYDROCHLORIDE 100 MG/1
TABLET, FILM COATED ORAL
Qty: 135 TABLET | Refills: 3 | Status: SHIPPED | OUTPATIENT
Start: 2022-11-23

## 2022-11-23 NOTE — TELEPHONE ENCOUNTER
Patient mychart request for refill. Thanks, Chiquita Aaron    Last Visit: 8/24/22 MD DIALLO  Next Appointment: None  Previous Refill Encounter(s): 9/21/21 135 + 3    Requested Prescriptions     Pending Prescriptions Disp Refills    sertraline (ZOLOFT) 100 mg tablet 135 Tablet 3     Sig: TAKE 1.5 TABS BY MOUTH DAILY     For Pharmacy Admin Tracking Only    CPA in place:   Recommendation Provided To:    Intervention Detail: New Rx: 1, reason: Patient Preference  Gap Closed?:   Intervention Accepted By:   Time Spent (min): 5

## 2022-11-25 DIAGNOSIS — I48.0 PAROXYSMAL A-FIB (HCC): ICD-10-CM

## 2022-11-25 DIAGNOSIS — I10 BENIGN ESSENTIAL HYPERTENSION: Primary | ICD-10-CM

## 2022-11-30 RX ORDER — DILTIAZEM HYDROCHLORIDE 300 MG/1
CAPSULE, COATED, EXTENDED RELEASE ORAL
Qty: 90 CAPSULE | Refills: 3 | Status: SHIPPED | OUTPATIENT
Start: 2022-11-30

## 2023-02-27 DIAGNOSIS — K21.9 GASTROESOPHAGEAL REFLUX DISEASE: ICD-10-CM

## 2023-02-27 DIAGNOSIS — F41.1 GENERALIZED ANXIETY DISORDER WITH PANIC ATTACKS: ICD-10-CM

## 2023-02-27 DIAGNOSIS — I10 BENIGN ESSENTIAL HYPERTENSION: ICD-10-CM

## 2023-02-27 DIAGNOSIS — F41.0 GENERALIZED ANXIETY DISORDER WITH PANIC ATTACKS: ICD-10-CM

## 2023-02-27 DIAGNOSIS — E78.2 MIXED HYPERLIPIDEMIA: ICD-10-CM

## 2023-02-27 RX ORDER — OMEPRAZOLE 20 MG/1
CAPSULE, DELAYED RELEASE ORAL
Qty: 90 CAPSULE | Refills: 0 | Status: SHIPPED | OUTPATIENT
Start: 2023-02-27

## 2023-02-27 RX ORDER — ROSUVASTATIN CALCIUM 10 MG/1
10 TABLET, COATED ORAL
Qty: 90 TABLET | Refills: 1 | OUTPATIENT
Start: 2023-02-27

## 2023-02-27 RX ORDER — LOSARTAN POTASSIUM AND HYDROCHLOROTHIAZIDE 25; 100 MG/1; MG/1
TABLET ORAL
Qty: 90 TABLET | Refills: 1 | OUTPATIENT
Start: 2023-02-27

## 2023-02-27 RX ORDER — BUSPIRONE HYDROCHLORIDE 5 MG/1
TABLET ORAL
Qty: 180 TABLET | Refills: 0 | Status: SHIPPED | OUTPATIENT
Start: 2023-02-27

## 2023-05-31 DIAGNOSIS — K21.9 GASTRO-ESOPHAGEAL REFLUX DISEASE WITHOUT ESOPHAGITIS: ICD-10-CM

## 2023-06-01 RX ORDER — OMEPRAZOLE 20 MG/1
CAPSULE, DELAYED RELEASE ORAL
Qty: 90 CAPSULE | Refills: 1 | Status: SHIPPED | OUTPATIENT
Start: 2023-06-01

## 2023-06-09 DIAGNOSIS — I10 BENIGN ESSENTIAL HYPERTENSION: ICD-10-CM

## 2023-06-09 DIAGNOSIS — E78.2 MIXED HYPERLIPIDEMIA: Primary | ICD-10-CM

## 2023-06-10 RX ORDER — ROSUVASTATIN CALCIUM 10 MG/1
TABLET, COATED ORAL
Qty: 90 TABLET | Refills: 0 | Status: SHIPPED | OUTPATIENT
Start: 2023-06-10

## 2023-06-10 RX ORDER — LOSARTAN POTASSIUM AND HYDROCHLOROTHIAZIDE 25; 100 MG/1; MG/1
TABLET ORAL
Qty: 90 TABLET | Refills: 0 | Status: SHIPPED | OUTPATIENT
Start: 2023-06-10

## 2023-08-03 DIAGNOSIS — F41.1 GENERALIZED ANXIETY DISORDER: ICD-10-CM

## 2023-08-03 RX ORDER — BUSPIRONE HYDROCHLORIDE 5 MG/1
TABLET ORAL
Qty: 180 TABLET | Refills: 0 | Status: SHIPPED | OUTPATIENT
Start: 2023-08-03

## 2023-08-31 DIAGNOSIS — E78.2 MIXED HYPERLIPIDEMIA: ICD-10-CM

## 2023-08-31 DIAGNOSIS — I10 BENIGN ESSENTIAL HYPERTENSION: ICD-10-CM

## 2023-08-31 RX ORDER — ROSUVASTATIN CALCIUM 10 MG/1
TABLET, COATED ORAL
Qty: 90 TABLET | Refills: 0 | OUTPATIENT
Start: 2023-08-31

## 2023-08-31 RX ORDER — LOSARTAN POTASSIUM AND HYDROCHLOROTHIAZIDE 25; 100 MG/1; MG/1
TABLET ORAL
Qty: 90 TABLET | Refills: 0 | OUTPATIENT
Start: 2023-08-31

## 2023-09-16 DIAGNOSIS — I10 BENIGN ESSENTIAL HYPERTENSION: ICD-10-CM

## 2023-09-16 DIAGNOSIS — E78.2 MIXED HYPERLIPIDEMIA: ICD-10-CM

## 2023-09-17 RX ORDER — LOSARTAN POTASSIUM AND HYDROCHLOROTHIAZIDE 25; 100 MG/1; MG/1
TABLET ORAL
Qty: 30 TABLET | Refills: 0 | Status: SHIPPED | OUTPATIENT
Start: 2023-09-17 | End: 2023-10-24

## 2023-09-17 RX ORDER — ROSUVASTATIN CALCIUM 10 MG/1
10 TABLET, COATED ORAL
Qty: 30 TABLET | Refills: 0 | Status: SHIPPED | OUTPATIENT
Start: 2023-09-17 | End: 2023-10-12

## 2023-09-18 NOTE — TELEPHONE ENCOUNTER
Sent in 30 day supply. Last OV and labs 8-2022. Patient cancelled 8-29-23 visit and nothing rescheduled. Please advise and get booked asap.

## 2023-10-07 NOTE — TELEPHONE ENCOUNTER
I have booked her for 10/30. Her dtr brings her. I booked as an AWV. She won't be fasting. I did ask her to do the questionnaire prior to appt.

## 2023-10-12 DIAGNOSIS — E78.2 MIXED HYPERLIPIDEMIA: ICD-10-CM

## 2023-10-12 RX ORDER — ROSUVASTATIN CALCIUM 10 MG/1
10 TABLET, COATED ORAL
Qty: 30 TABLET | Refills: 0 | Status: SHIPPED | OUTPATIENT
Start: 2023-10-12

## 2023-10-23 DIAGNOSIS — I10 BENIGN ESSENTIAL HYPERTENSION: ICD-10-CM

## 2023-10-24 RX ORDER — LOSARTAN POTASSIUM AND HYDROCHLOROTHIAZIDE 25; 100 MG/1; MG/1
TABLET ORAL
Qty: 30 TABLET | Refills: 0 | Status: SHIPPED | OUTPATIENT
Start: 2023-10-24

## 2023-10-25 ENCOUNTER — TELEPHONE (OUTPATIENT)
Age: 73
End: 2023-10-25

## 2023-10-25 NOTE — TELEPHONE ENCOUNTER
Spoke with Blake Grant at [facility] was calling to follow up with pt says they have been unsuccessful with getting in contact with pt, and if pt is seen with PCP to make sure pt is aware of the importance of her chronic condition and are continuing to take the losartan and rosuvastatin.  Best call back number [phone] reference#:[id number]

## 2023-10-30 ENCOUNTER — OFFICE VISIT (OUTPATIENT)
Age: 73
End: 2023-10-30
Payer: MEDICARE

## 2023-10-30 VITALS
HEART RATE: 77 BPM | HEIGHT: 65 IN | SYSTOLIC BLOOD PRESSURE: 144 MMHG | OXYGEN SATURATION: 96 % | WEIGHT: 201.4 LBS | RESPIRATION RATE: 16 BRPM | BODY MASS INDEX: 33.55 KG/M2 | TEMPERATURE: 98 F | DIASTOLIC BLOOD PRESSURE: 72 MMHG

## 2023-10-30 DIAGNOSIS — F32.A CHRONIC DEPRESSION: ICD-10-CM

## 2023-10-30 DIAGNOSIS — Z00.00 MEDICARE ANNUAL WELLNESS VISIT, SUBSEQUENT: Primary | ICD-10-CM

## 2023-10-30 DIAGNOSIS — I10 BENIGN ESSENTIAL HYPERTENSION: ICD-10-CM

## 2023-10-30 DIAGNOSIS — E78.2 MIXED HYPERLIPIDEMIA: ICD-10-CM

## 2023-10-30 DIAGNOSIS — Z86.79 HISTORY OF SPONTANEOUS SUBARACHNOID INTRACRANIAL HEMORRHAGE DUE TO CEREBRAL AVM: ICD-10-CM

## 2023-10-30 DIAGNOSIS — Z72.0 TOBACCO ABUSE: ICD-10-CM

## 2023-10-30 DIAGNOSIS — F41.0 GENERALIZED ANXIETY DISORDER WITH PANIC ATTACKS: ICD-10-CM

## 2023-10-30 DIAGNOSIS — F41.1 GENERALIZED ANXIETY DISORDER WITH PANIC ATTACKS: ICD-10-CM

## 2023-10-30 DIAGNOSIS — I48.0 PAROXYSMAL ATRIAL FIBRILLATION (HCC): ICD-10-CM

## 2023-10-30 DIAGNOSIS — J44.9 COPD, SEVERE (HCC): ICD-10-CM

## 2023-10-30 DIAGNOSIS — R73.03 PREDIABETES: ICD-10-CM

## 2023-10-30 PROBLEM — G47.33 OSA (OBSTRUCTIVE SLEEP APNEA): Status: ACTIVE | Noted: 2023-10-30

## 2023-10-30 PROCEDURE — G0439 PPPS, SUBSEQ VISIT: HCPCS | Performed by: FAMILY MEDICINE

## 2023-10-30 PROCEDURE — 99214 OFFICE O/P EST MOD 30 MIN: CPT | Performed by: FAMILY MEDICINE

## 2023-10-30 PROCEDURE — 1123F ACP DISCUSS/DSCN MKR DOCD: CPT | Performed by: FAMILY MEDICINE

## 2023-10-30 PROCEDURE — 3077F SYST BP >= 140 MM HG: CPT | Performed by: FAMILY MEDICINE

## 2023-10-30 PROCEDURE — 3078F DIAST BP <80 MM HG: CPT | Performed by: FAMILY MEDICINE

## 2023-10-30 RX ORDER — FORMOTEROL FUMARATE 20 UG/2ML
2 SOLUTION RESPIRATORY (INHALATION) 2 TIMES DAILY
COMMUNITY
Start: 2023-10-09

## 2023-10-30 SDOH — ECONOMIC STABILITY: FOOD INSECURITY: WITHIN THE PAST 12 MONTHS, YOU WORRIED THAT YOUR FOOD WOULD RUN OUT BEFORE YOU GOT MONEY TO BUY MORE.: NEVER TRUE

## 2023-10-30 SDOH — ECONOMIC STABILITY: HOUSING INSECURITY
IN THE LAST 12 MONTHS, WAS THERE A TIME WHEN YOU DID NOT HAVE A STEADY PLACE TO SLEEP OR SLEPT IN A SHELTER (INCLUDING NOW)?: NO

## 2023-10-30 SDOH — ECONOMIC STABILITY: FOOD INSECURITY: WITHIN THE PAST 12 MONTHS, THE FOOD YOU BOUGHT JUST DIDN'T LAST AND YOU DIDN'T HAVE MONEY TO GET MORE.: NEVER TRUE

## 2023-10-30 SDOH — ECONOMIC STABILITY: INCOME INSECURITY: HOW HARD IS IT FOR YOU TO PAY FOR THE VERY BASICS LIKE FOOD, HOUSING, MEDICAL CARE, AND HEATING?: NOT HARD AT ALL

## 2023-10-30 ASSESSMENT — PATIENT HEALTH QUESTIONNAIRE - PHQ9
7. TROUBLE CONCENTRATING ON THINGS, SUCH AS READING THE NEWSPAPER OR WATCHING TELEVISION: 3
SUM OF ALL RESPONSES TO PHQ QUESTIONS 1-9: 6
8. MOVING OR SPEAKING SO SLOWLY THAT OTHER PEOPLE COULD HAVE NOTICED. OR THE OPPOSITE, BEING SO FIGETY OR RESTLESS THAT YOU HAVE BEEN MOVING AROUND A LOT MORE THAN USUAL: 0
5. POOR APPETITE OR OVEREATING: 0
SUM OF ALL RESPONSES TO PHQ QUESTIONS 1-9: 6
3. TROUBLE FALLING OR STAYING ASLEEP: 0
SUM OF ALL RESPONSES TO PHQ QUESTIONS 1-9: 6
SUM OF ALL RESPONSES TO PHQ QUESTIONS 1-9: 6
9. THOUGHTS THAT YOU WOULD BE BETTER OFF DEAD, OR OF HURTING YOURSELF: 0
6. FEELING BAD ABOUT YOURSELF - OR THAT YOU ARE A FAILURE OR HAVE LET YOURSELF OR YOUR FAMILY DOWN: 0
2. FEELING DOWN, DEPRESSED OR HOPELESS: 3
10. IF YOU CHECKED OFF ANY PROBLEMS, HOW DIFFICULT HAVE THESE PROBLEMS MADE IT FOR YOU TO DO YOUR WORK, TAKE CARE OF THINGS AT HOME, OR GET ALONG WITH OTHER PEOPLE: 1

## 2023-10-30 ASSESSMENT — LIFESTYLE VARIABLES
HOW OFTEN DO YOU HAVE A DRINK CONTAINING ALCOHOL: NEVER
HOW MANY STANDARD DRINKS CONTAINING ALCOHOL DO YOU HAVE ON A TYPICAL DAY: PATIENT DOES NOT DRINK

## 2023-10-30 ASSESSMENT — COLUMBIA-SUICIDE SEVERITY RATING SCALE - C-SSRS
7. DID THIS OCCUR IN THE LAST THREE MONTHS: NO
3. HAVE YOU BEEN THINKING ABOUT HOW YOU MIGHT KILL YOURSELF?: NO
4. HAVE YOU HAD THESE THOUGHTS AND HAD SOME INTENTION OF ACTING ON THEM?: NO
5. HAVE YOU STARTED TO WORK OUT OR WORKED OUT THE DETAILS OF HOW TO KILL YOURSELF? DO YOU INTEND TO CARRY OUT THIS PLAN?: NO

## 2023-10-30 NOTE — ASSESSMENT & PLAN NOTE
Followed by [facility]: Dr Kevin Ruiz, Dr. Teto Lennon ~ q3-6 months. Last visit 2 weeks ago. No changes made at that time. No acute findings meriting change in the plan.

## 2023-10-30 NOTE — ASSESSMENT & PLAN NOTE
H/O Post Op Acute A. Fib after  Shunt replacement, 4-17-19, Karla Gaines, Dr. Angélica Morales (no cardioversion required, medication mgt only) but oral anticoagulation not advised due to hemorrhagic history and risks deemed to outweigh benefit. Stable. Rhythm normal. Rate controlled on CCB alone. No BBlocker due to severe pulmonary disease.  Rhythm normal.

## 2023-10-30 NOTE — PROGRESS NOTES
Chief Complaint   Patient presents with    Medicare AWV    Cholesterol Problem     fasting    Hypertension    Blood Sugar Problem     Pre DM     1. \"Have you been to the ER, urgent care clinic since your last visit? Hospitalized since your last visit? \" No    2. \"Have you seen or consulted any other health care providers outside of the [address] [place] [address] since your last visit? \" Tara Michele    3. For patients aged 43-73: Has the patient had a colonoscopy / FIT/ Cologuard? Yes - no Care Gap present 3/2013 benign cecal polyp, f/u 10 yrs      If the patient is female:    4. For patients aged 43-66: Has the patient had a mammogram within the past 2 years? Yes - no Care Gap present 6/2022      5. For patients aged 21-65: Has the patient had a pap smear?  NA - based on age or sex
meriting change in the plan. Maintained on nebulizer regimen updated in today's hx  Generalized anxiety disorder with panic attacks  Controlled, stable on regimen of Zoloft and Buspar  Chronic depression  Controlled, stable on regimen of Zoloft and Trazodone    Labs checked today as ordered above, fasting except coffee w/ cream a few hours prior  Reviewed diet, nutrition, exercise, weight management/goals, risk reduction, cardiovascular goals for age and associated health risks, medications, effects, risks, benefits, potential interactions and possible side effects. Age/risk based screening recommendations, health maintenance & prevention counseling. Cancer screening USPTFS guidelines reviewed w/ pt today. Discussed benefits/positive/negative outcomes of screening based on age/risk stratification. Informed consent for/against screening based on pt's personal hx/risk factors. Updated in history above and health maintenance. Further follow up & other recommendations pending review of labs. Subjective   Patient accompanied by daughter and Eliseo Casey for follow up chronic conditions detailed above, labs, and medication check. Daughter manages her medications w/ good compliance. No medication reactions or side effects. , 3 daughters ([place], [place]) all local    Lives in [place] home    Her daughter Aneta Lowery lives w/ her and has POA. One other daughter and one of her young grand daughters lives w/ them presently as well   Daughter prepares most meals but patient still cooks some as well    Uses a walking cane around the home and a rolling walker on outings    No longer drives since the [facility] and stroke    Daughter manages her finances and medications    Daughter helps w/ cleaning, meals and total transportation     Does other ADL's, dressing, bathing, grooming all on her own; some help w/ meal preparation; pt can use microwave and the stove when daughters are home.    Patient does some

## 2023-10-31 LAB
ALBUMIN SERPL-MCNC: 4.1 G/DL (ref 3.5–5)
ALBUMIN/GLOB SERPL: 1.1 (ref 1.1–2.2)
ALP SERPL-CCNC: 64 U/L (ref 45–117)
ALT SERPL-CCNC: 89 U/L (ref 12–78)
ANION GAP SERPL CALC-SCNC: 6 MMOL/L (ref 5–15)
AST SERPL-CCNC: 56 U/L (ref 15–37)
BASOPHILS # BLD: 0 K/UL (ref 0–0.1)
BASOPHILS NFR BLD: 0 % (ref 0–1)
BILIRUB SERPL-MCNC: 0.4 MG/DL (ref 0.2–1)
BUN SERPL-MCNC: 12 MG/DL (ref 6–20)
BUN/CREAT SERPL: 15 (ref 12–20)
CALCIUM SERPL-MCNC: 9 MG/DL (ref 8.5–10.1)
CHLORIDE SERPL-SCNC: 102 MMOL/L (ref 97–108)
CHOLEST SERPL-MCNC: 122 MG/DL
CO2 SERPL-SCNC: 30 MMOL/L (ref 21–32)
CREAT SERPL-MCNC: 0.82 MG/DL (ref 0.55–1.02)
DIFFERENTIAL METHOD BLD: ABNORMAL
EOSINOPHIL # BLD: 0.4 K/UL (ref 0–0.4)
EOSINOPHIL NFR BLD: 3 % (ref 0–7)
ERYTHROCYTE [DISTWIDTH] IN BLOOD BY AUTOMATED COUNT: 12.3 % (ref 11.5–14.5)
EST. AVERAGE GLUCOSE BLD GHB EST-MCNC: 143 MG/DL
GLOBULIN SER CALC-MCNC: 3.7 G/DL (ref 2–4)
GLUCOSE SERPL-MCNC: 117 MG/DL (ref 65–100)
HBA1C MFR BLD: 6.6 % (ref 4–5.6)
HCT VFR BLD AUTO: 45.2 % (ref 35–47)
HDLC SERPL-MCNC: 53 MG/DL
HDLC SERPL: 2.3 (ref 0–5)
HGB BLD-MCNC: 14.8 G/DL (ref 11.5–16)
IMM GRANULOCYTES # BLD AUTO: 0 K/UL (ref 0–0.04)
IMM GRANULOCYTES NFR BLD AUTO: 0 % (ref 0–0.5)
LDLC SERPL CALC-MCNC: 40.6 MG/DL (ref 0–100)
LYMPHOCYTES # BLD: 4.2 K/UL (ref 0.8–3.5)
LYMPHOCYTES NFR BLD: 34 % (ref 12–49)
MCH RBC QN AUTO: 30.6 PG (ref 26–34)
MCHC RBC AUTO-ENTMCNC: 32.7 G/DL (ref 30–36.5)
MCV RBC AUTO: 93.6 FL (ref 80–99)
MONOCYTES # BLD: 0.8 K/UL (ref 0–1)
MONOCYTES NFR BLD: 6 % (ref 5–13)
NEUTS SEG # BLD: 7.1 K/UL (ref 1.8–8)
NEUTS SEG NFR BLD: 57 % (ref 32–75)
NRBC # BLD: 0 K/UL (ref 0–0.01)
NRBC BLD-RTO: 0 PER 100 WBC
PLATELET # BLD AUTO: 247 K/UL (ref 150–400)
PMV BLD AUTO: 11.3 FL (ref 8.9–12.9)
POTASSIUM SERPL-SCNC: 3.8 MMOL/L (ref 3.5–5.1)
PROT SERPL-MCNC: 7.8 G/DL (ref 6.4–8.2)
RBC # BLD AUTO: 4.83 M/UL (ref 3.8–5.2)
SODIUM SERPL-SCNC: 138 MMOL/L (ref 136–145)
TRIGL SERPL-MCNC: 142 MG/DL
TSH SERPL DL<=0.05 MIU/L-ACNC: 2.18 UIU/ML (ref 0.36–3.74)
VLDLC SERPL CALC-MCNC: 28.4 MG/DL
WBC # BLD AUTO: 12.5 K/UL (ref 3.6–11)

## 2023-11-04 ENCOUNTER — TELEPHONE (OUTPATIENT)
Age: 73
End: 2023-11-04

## 2023-11-04 DIAGNOSIS — D72.820 LYMPHOCYTOSIS: ICD-10-CM

## 2023-11-04 DIAGNOSIS — R74.8 ELEVATED LIVER ENZYMES: Primary | ICD-10-CM

## 2023-11-04 PROBLEM — E11.9 DIABETES MELLITUS TYPE 2, NONINSULIN DEPENDENT (HCC): Status: ACTIVE | Noted: 2023-11-04

## 2023-11-04 NOTE — TELEPHONE ENCOUNTER
Please call and review the following results and recommendations w/ patient's daughter and primary caregiver Dave Taveras:    Howard Dimmer, kidney and thyroid function all ok. A1c remains in mild diabetes range. Needs to avoid sweets/sugars and reserve only for special occasions. Limit carbs. Work on exercise and weight reduction. Cholesterol numbers remain excellent and at goal since being on Crestor. Liver enzymes elevated and have gone up also highest readings. She has history of fatty liver diagnosed years ago but current readings are higher. I do want to do some additional labs. I want her to avoid taking any Nsaids, Tylenol or alcohol (denies usage). I have pended those lab orders for her to get done in the next 1-2 weeks NON-fasting. WBC elevated again. This has been the case on and off over the years. It goes up then comes back down on repeat testing. Last check 8-2022 normal. This time elevated again, it is still mild but the highest reading she has had. I would therefore like to just refer her for consultation and further evaluation w/ one of our Hematologists. Please give Kanika lenz [facility] for scheduling. They will be able to access her records. I put referral in system. Follow up OV w/ me 6 months but we will be back in touch in the interim as additional labs come back.

## 2023-11-07 ENCOUNTER — HOSPITAL ENCOUNTER (EMERGENCY)
Facility: HOSPITAL | Age: 73
Discharge: HOME | End: 2023-11-07
Attending: EMERGENCY MEDICINE
Payer: MEDICARE

## 2023-11-07 VITALS
RESPIRATION RATE: 20 BRPM | OXYGEN SATURATION: 95 % | DIASTOLIC BLOOD PRESSURE: 66 MMHG | HEART RATE: 72 BPM | TEMPERATURE: 98.3 F | SYSTOLIC BLOOD PRESSURE: 156 MMHG | WEIGHT: 200 LBS | BODY MASS INDEX: 30.31 KG/M2 | HEIGHT: 68 IN

## 2023-11-07 DIAGNOSIS — L03.90 CELLULITIS, UNSPECIFIED CELLULITIS SITE: ICD-10-CM

## 2023-11-07 DIAGNOSIS — T14.8XXA ABRASION: Primary | ICD-10-CM

## 2023-11-07 PROCEDURE — 90471 IMMUNIZATION ADMIN: CPT | Performed by: EMERGENCY MEDICINE

## 2023-11-07 PROCEDURE — 90714 TD VACC NO PRESV 7 YRS+ IM: CPT | Performed by: EMERGENCY MEDICINE

## 2023-11-07 PROCEDURE — 99284 EMERGENCY DEPT VISIT MOD MDM: CPT

## 2023-11-07 PROCEDURE — 6370000000 HC RX 637 (ALT 250 FOR IP): Performed by: EMERGENCY MEDICINE

## 2023-11-07 PROCEDURE — 6360000002 HC RX W HCPCS: Performed by: EMERGENCY MEDICINE

## 2023-11-07 RX ORDER — TETANUS AND DIPHTHERIA TOXOIDS ADSORBED 2; 2 [LF]/.5ML; [LF]/.5ML
0.5 INJECTION INTRAMUSCULAR ONCE
Status: COMPLETED | OUTPATIENT
Start: 2023-11-07 | End: 2023-11-07

## 2023-11-07 RX ORDER — DOXYCYCLINE HYCLATE 100 MG
100 TABLET ORAL
Status: COMPLETED | OUTPATIENT
Start: 2023-11-07 | End: 2023-11-07

## 2023-11-07 RX ORDER — DOXYCYCLINE HYCLATE 100 MG
100 TABLET ORAL
Status: DISCONTINUED | OUTPATIENT
Start: 2023-11-07 | End: 2023-11-07 | Stop reason: HOSPADM

## 2023-11-07 RX ORDER — DOXYCYCLINE HYCLATE 100 MG
100 TABLET ORAL 2 TIMES DAILY
Qty: 14 TABLET | Refills: 0 | Status: SHIPPED | OUTPATIENT
Start: 2023-11-07 | End: 2023-11-14

## 2023-11-07 RX ADMIN — DOXYCYCLINE HYCLATE 100 MG: 100 TABLET, COATED ORAL at 21:30

## 2023-11-07 RX ADMIN — TETANUS AND DIPHTHERIA TOXOIDS ADSORBED 0.5 ML: 2; 2 INJECTION INTRAMUSCULAR at 21:28

## 2023-11-07 ASSESSMENT — PAIN SCALES - GENERAL: PAINLEVEL_OUTOF10: 7

## 2023-11-07 NOTE — TELEPHONE ENCOUNTER
LVM for Jose Martinez  for return call. Went over results with Jose Martinez. I also sent the \"highlights\" thru mychart since Jose Martinez was driving. She did want me to make sure that Dr Evan Aquino was aware that pt went to the ER on 11/7.

## 2023-11-08 NOTE — ED NOTES
Pain assessment on discharge was   Condition Stable  Patient discharged to home  Patient education was completed  Education taught to patient and family member   Teaching method used was handout and verbal  Understanding of teaching was good  Patient was discharged ambulatory to wheelchair  Discharged with family  Valuables were given to patient/family remained in possession of belongings during stay.        Marsha Martinez RN  11/07/23 [id number]

## 2023-11-08 NOTE — ED TRIAGE NOTES
Patient arrives via wheelchair with complaint of wound to left lower leg after scraping it on a sis house. Unknown last Tetanus.

## 2023-11-08 NOTE — ED PROVIDER NOTES
atrial fibrillation ([address]) 5/2/2019    Post Op Acute A. Fib after  Shunt replacement, 4-17-19, [facility], Dr. Fletcher Saleh (no cardioversion required, medication mgt only)    Perforation and abscess of large intestine concurrent with and due to diverticulitis 5/2/2019    Lap Drain 3-29-19, Exp Laparotomy/Colostomy 4-8-19, Dr. Ellen Locke    Thyroiditis, subacute 3/29/2010    Vitamin D deficiency 6/3/2014    2012         SURGICAL HISTORY       Past Surgical History:   Procedure Laterality Date    COLONOSCOPY  3/2013, Dr Anderson Sanchez    benign cecal polyp, f/u 10 yrs    COLONOSCOPY  18's    Benign polypectomy    COLOSTOMY  04/08/2019    Exp Laparotomy, Sigmoidectomy & Colostomy for Perforated Sigmoid Diverticulum, Dr. Gabi Dawson  10/2006    for clipping of ruptured aneurysm; Dr Juan Altman a shunt    CSF SHUNT  04/17/2019    Right Intrapleural Placement  Shunt (relocated due to peritoneal/abdominal surgery 4/8/19), [facility], Dr. Skaggs Sheets not know    ECHO STRESS  2005    Negative    GI  07/19/2019    Laparoscopic closure of colostomy     HYSTERECTOMY (CERVIX STATUS UNKNOWN)  1991    Cervical Dysplasia (Dr Mark Anderson)    IR [facility]  03/29/2019    Lap Drain Pelvic Abscess for Perforated Sigmoid Diverticulum, Dr. Leon [place] CATH  04/08/2019    Externalize  Shunt Right Abdomen for Exp Lap/Colostomy, [facility], Dr. Nancy Banks  04/08/2019    Laparotomy with sigmoidectomy and end [id number]. B. Carmody    OTHER SURGICAL HISTORY  07/19/2019    lap sig.  [id number]. b. Carmody    UPPER GASTROINTESTINAL ENDOSCOPY  EGD, Dr Anderson Sanchez    \"ok\" per pt report         CURRENT MEDICATIONS       Previous Medications    AMLODIPINE (NORVASC) 10 MG TABLET    Take by mouth daily    BUDESONIDE (PULMICORT) 0.5 MG/2ML NEBULIZER SUSPENSION    Inhale into the lungs 2 times daily Per Pulm Dr. Jaquelin Michele    BUSPIRONE (BUSPAR) 5 MG TABLET    TAKE 1 doxy mono, Disp-14 tablet, R-0Normal               (Please note that portions of this note were completed with a voice recognition program.  Efforts were made to edit the dictations but occasionally words are mis-transcribed.)    Kia Ho DO (electronically signed)  Emergency Attending Physician / Physician Assistant / Nurse Practitioner             Krysta Jennings DO  11/20/23 [id number]

## 2023-11-10 DIAGNOSIS — I10 BENIGN ESSENTIAL HYPERTENSION: ICD-10-CM

## 2023-11-10 RX ORDER — LOSARTAN POTASSIUM AND HYDROCHLOROTHIAZIDE 25; 100 MG/1; MG/1
1 TABLET ORAL DAILY
Qty: 90 TABLET | Refills: 0 | Status: SHIPPED | OUTPATIENT
Start: 2023-11-10

## 2023-11-10 NOTE — TELEPHONE ENCOUNTER
[facility] is requesting a 90 day supply prescription for the Hyzaar 100-25 mg. Please review and prescribe if appropriate. Thank you. Last appointment: 10/30/2023 MD Alireza Baeza   Next appointment: Ene Kim to follow up in 6 months (04/2024) - Nothing scheduled.    Previous refill encounter(s):   10/24/2023 Hyzaar #30     For Pharmacy Admin Tracking Only    Program: Medication Refill  Intervention Detail: New Rx: 1, reason: Patient Preference  Time Spent (min): 5      Requested Prescriptions     Pending Prescriptions Disp Refills    losartan-hydroCHLOROthiazide (HYZAAR) 100-25 MG per tablet 90 tablet 0     Sig: Take 1 tablet by mouth daily

## 2023-11-11 NOTE — TELEPHONE ENCOUNTER
I got a refill request for the Hyzaar 100-25 mg which I submitted. However upon looking over her meds and refill hx in chart, the following BP meds are listed in her last office note that we reviewed w/ her:    -Hyzaar (Losartan--25 mg)  -Diltiazem (Tiazac) 300 mg (from cardiology Dr. Lu Velázquez)  -Norvasc 10 mg    We reviewed these w/ daughter Héctor Farrell at last appointment. I see where I have been filling the Hyzaar and Dr. Lu Velázquez doing the Diltiazem. BUT I dont see that either of us have done the Norvasc. Is daughter SURE she is giving that medication to patient? I am asking bc all of her recent BP's have been on high end lately and need clarification on these meds. Please have daughter check pill bottles and clarify.

## 2023-11-17 NOTE — TELEPHONE ENCOUNTER
I spoke to Yusra Kindra and she said that she is sure that pt is taking the norvasc. She isn't home but she will double check when she gets home. She will send a [employer] message ltting us know.

## 2023-11-19 ENCOUNTER — PATIENT MESSAGE (OUTPATIENT)
Age: 73
End: 2023-11-19

## 2023-11-22 DIAGNOSIS — F51.05 INSOMNIA DUE TO OTHER MENTAL DISORDER (CODE): ICD-10-CM

## 2023-11-22 DIAGNOSIS — F41.1 GENERALIZED ANXIETY DISORDER: ICD-10-CM

## 2023-11-22 RX ORDER — TRAZODONE HYDROCHLORIDE 50 MG/1
50 TABLET ORAL NIGHTLY
Qty: 90 TABLET | Refills: 1 | Status: SHIPPED | OUTPATIENT
Start: 2023-11-22

## 2023-11-22 RX ORDER — BUSPIRONE HYDROCHLORIDE 5 MG/1
TABLET ORAL
Qty: 180 TABLET | Refills: 1 | Status: SHIPPED | OUTPATIENT
Start: 2023-11-22

## 2023-11-22 RX ORDER — AMLODIPINE BESYLATE 10 MG/1
10 TABLET ORAL DAILY
Qty: 90 TABLET | Refills: 0 | Status: SHIPPED | OUTPATIENT
Start: 2023-11-22

## 2023-11-22 NOTE — TELEPHONE ENCOUNTER
PCP: Cori Pedro MD    Last appt: 10/30/2023     No future appointments.     Requested Prescriptions     Pending Prescriptions Disp Refills    busPIRone (BUSPAR) 5 MG tablet [Pharmacy Med Name: BUSPIRONE HCL 5 MG TABLET] 180 tablet 0     Sig: TAKE 1 TABLET BY MOUTH TWICE A DAY       Prior labs and Blood pressures:  BP Readings from Last 3 Encounters:   11/07/23 (!) 156/66   10/30/23 (!) 144/72   08/24/22 138/60     Lab Results   Component Value Date/Time     10/30/2023 12:57 PM    K 3.8 10/30/2023 12:57 PM     10/30/2023 12:57 PM    CO2 30 10/30/2023 12:57 PM    BUN 12 10/30/2023 12:57 PM    GFRAA >60 02/22/2021 04:38 PM     No results found for: \"HBA1C\", \"PWP7GQDO\"  Lab Results   Component Value Date/Time    CHOL 122 10/30/2023 12:57 PM    HDL 53 10/30/2023 12:57 PM    VLDL 20 08/24/2022 12:00 AM     No results found for: \"VITD3\", \"VD3RIA\"    Lab Results   Component Value Date/Time    TSH 2.440 05/19/2022 12:00 AM

## 2023-12-07 ENCOUNTER — TELEPHONE (OUTPATIENT)
Age: 73
End: 2023-12-07

## 2023-12-07 NOTE — TELEPHONE ENCOUNTER
Called patient left vm advising of temp NP appt. Left day and time. Left c/b # if that doesn't work.

## 2023-12-31 DIAGNOSIS — K21.9 GASTRO-ESOPHAGEAL REFLUX DISEASE WITHOUT ESOPHAGITIS: ICD-10-CM

## 2024-01-01 DIAGNOSIS — F32.A DEPRESSION, UNSPECIFIED: ICD-10-CM

## 2024-01-01 DIAGNOSIS — F41.1 GENERALIZED ANXIETY DISORDER: ICD-10-CM

## 2024-01-01 RX ORDER — OMEPRAZOLE 20 MG/1
CAPSULE, DELAYED RELEASE ORAL
Qty: 90 CAPSULE | Refills: 1 | Status: SHIPPED | OUTPATIENT
Start: 2024-01-01

## 2024-01-01 RX ORDER — SERTRALINE HYDROCHLORIDE 100 MG/1
150 TABLET, FILM COATED ORAL DAILY
Qty: 135 TABLET | Refills: 2 | Status: SHIPPED | OUTPATIENT
Start: 2024-01-01

## 2024-01-08 ENCOUNTER — TELEPHONE (OUTPATIENT)
Age: 74
End: 2024-01-08

## 2024-01-08 RX ORDER — DILTIAZEM HYDROCHLORIDE 300 MG/1
CAPSULE, COATED, EXTENDED RELEASE ORAL DAILY
Qty: 90 CAPSULE | Refills: 3 | OUTPATIENT
Start: 2024-01-08

## 2024-01-08 NOTE — TELEPHONE ENCOUNTER
Per VO of MD    LOV: 9/24/2021     Future Appointments   Date Time Provider Department Center   1/29/2024  8:15 AM Tresa Waggoner, DO [facility]       Requested Prescriptions     Refused Prescriptions Disp Refills    dilTIAZem (CARDIZEM CD) 300 MG extended release capsule [Pharmacy Med Name: DILTIAZEM 24H ER(CD) 300 MG CP] 90 capsule 3     Sig: TAKE 1 CAPSULE BY MOUTH EVERY DAY     Refused By: JULIANNE VALENCIA     Reason for Refusal: Patient needs an appointment

## 2024-01-08 NOTE — TELEPHONE ENCOUNTER
Left pt. A voicemail to get 01/15/24 appt. Rescheduled; due to Dr. Waggoner not being in office that day.

## 2024-01-21 DIAGNOSIS — I10 BENIGN ESSENTIAL HYPERTENSION: ICD-10-CM

## 2024-01-21 RX ORDER — AMLODIPINE BESYLATE 10 MG/1
10 TABLET ORAL DAILY
Qty: 90 TABLET | Refills: 0 | OUTPATIENT
Start: 2024-01-21

## 2024-01-21 RX ORDER — LOSARTAN POTASSIUM AND HYDROCHLOROTHIAZIDE 25; 100 MG/1; MG/1
1 TABLET ORAL DAILY
Qty: 90 TABLET | Refills: 0 | OUTPATIENT
Start: 2024-01-21

## 2024-01-22 NOTE — TELEPHONE ENCOUNTER
[facility] please let her know that  refused her Norvasc as well as her Losartan-HCTZ.Pt need's to make an In Office Appt.

## 2024-01-27 NOTE — PROGRESS NOTES
[facility]  [address], [address] [place] [id number]  W: [phone]  F: [phone]    Reason for Evaluation   Arlene Campbell is a 73 y.o. female who is seen as a new patient for evaluation of lymphocytosis.    Treatment History:   None from us    History of Present Illness:     Seen today in office as a new patient for lymphocytosis.   Multiple medical problems.   Wbc 12k but has normal wbc also.   Hgb platelets normal.   Elevated lymphs at 4.2.   Here with daughter today.   Feels fine today.   No fevers/ chills/ chest pain/ SOB/ nausea/ vomiting/diarrhea/ pain        Past Medical History:   Diagnosis Date    ACP (advance care planning) 2/21/2017    Initial ACP discussion w/ pt and daughter 2-2017. AMD form reviewed and copy provided. NN/facilitator to discuss with patient     Asthma     hx of    Benign essential hypertension 6/24/2016    Benign essential tremor 7/14/2010    Bilateral hip pain 6/3/2014    xrays 2012, negative    Chronic depression 7/12/2019    Cigarette smoker 3/29/2010    COPD, severe (HCC) 3/29/2010    [facility]: Dr Ray, Dr. 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 Fib after  Shunt replacement, 4-17-19, [facility],

## 2024-01-29 ENCOUNTER — OFFICE VISIT (OUTPATIENT)
Age: 74
End: 2024-01-29
Payer: MEDICARE

## 2024-01-29 VITALS
HEART RATE: 90 BPM | OXYGEN SATURATION: 94 % | WEIGHT: 199 LBS | DIASTOLIC BLOOD PRESSURE: 81 MMHG | BODY MASS INDEX: 30.26 KG/M2 | RESPIRATION RATE: 18 BRPM | TEMPERATURE: 98 F | SYSTOLIC BLOOD PRESSURE: 167 MMHG

## 2024-01-29 DIAGNOSIS — D72.820 LYMPHOCYTOSIS: Primary | ICD-10-CM

## 2024-01-29 PROCEDURE — 1123F ACP DISCUSS/DSCN MKR DOCD: CPT | Performed by: INTERNAL MEDICINE

## 2024-01-29 PROCEDURE — 99204 OFFICE O/P NEW MOD 45 MIN: CPT | Performed by: INTERNAL MEDICINE

## 2024-01-29 PROCEDURE — 3079F DIAST BP 80-89 MM HG: CPT | Performed by: INTERNAL MEDICINE

## 2024-01-29 PROCEDURE — 3077F SYST BP >= 140 MM HG: CPT | Performed by: INTERNAL MEDICINE

## 2024-01-29 NOTE — PROGRESS NOTES
Arlene Campbell is a 73 y.o. female    Chief Complaint   Patient presents with    New Patient      lymphocytosis     1. Have you been to the ER, urgent care clinic since your last visit?  Hospitalized since your last visit?No    2. Have you seen or consulted any other health care providers outside of the [facility] since your last visit?  Include any pap smears or colon screening. No

## 2024-02-02 DIAGNOSIS — D72.820 LYMPHOCYTOSIS: ICD-10-CM

## 2024-02-02 DIAGNOSIS — R74.8 ELEVATED LIVER ENZYMES: ICD-10-CM

## 2024-02-02 LAB
BASOPHILS # BLD: 0 K/UL (ref 0–0.1)
BASOPHILS NFR BLD: 0 % (ref 0–1)
DIFFERENTIAL METHOD BLD: NORMAL
EOSINOPHIL # BLD: 0.3 K/UL (ref 0–0.4)
EOSINOPHIL NFR BLD: 3 % (ref 0–7)
ERYTHROCYTE [DISTWIDTH] IN BLOOD BY AUTOMATED COUNT: 12.3 % (ref 11.5–14.5)
HBV SURFACE AB SER QL: NONREACTIVE
HBV SURFACE AB SER-ACNC: <3.1 MIU/ML
HBV SURFACE AG SER QL: 0.29 INDEX
HBV SURFACE AG SER QL: NEGATIVE
HCT VFR BLD AUTO: 42.5 % (ref 35–47)
HCV AB SER IA-ACNC: 0.12 INDEX
HCV AB SERPL QL IA: NONREACTIVE
HGB BLD-MCNC: 14.3 G/DL (ref 11.5–16)
IMM GRANULOCYTES # BLD AUTO: 0 K/UL (ref 0–0.04)
IMM GRANULOCYTES NFR BLD AUTO: 0 % (ref 0–0.5)
LYMPHOCYTES # BLD: 3.2 K/UL (ref 0.8–3.5)
LYMPHOCYTES NFR BLD: 33 % (ref 12–49)
MCH RBC QN AUTO: 31 PG (ref 26–34)
MCHC RBC AUTO-ENTMCNC: 33.6 G/DL (ref 30–36.5)
MCV RBC AUTO: 92 FL (ref 80–99)
MONOCYTES # BLD: 0.6 K/UL (ref 0–1)
MONOCYTES NFR BLD: 6 % (ref 5–13)
NEUTS SEG # BLD: 5.6 K/UL (ref 1.8–8)
NEUTS SEG NFR BLD: 58 % (ref 32–75)
NRBC # BLD: 0 K/UL (ref 0–0.01)
NRBC BLD-RTO: 0 PER 100 WBC
PLATELET # BLD AUTO: 253 K/UL (ref 150–400)
PMV BLD AUTO: 10.7 FL (ref 8.9–12.9)
RBC # BLD AUTO: 4.62 M/UL (ref 3.8–5.2)
WBC # BLD AUTO: 9.7 K/UL (ref 3.6–11)

## 2024-02-03 LAB
ALBUMIN SERPL-MCNC: 3.9 G/DL (ref 3.5–5)
ALBUMIN/GLOB SERPL: 1.1 (ref 1.1–2.2)
ALP SERPL-CCNC: 65 U/L (ref 45–117)
ALT SERPL-CCNC: 121 U/L (ref 12–78)
AST SERPL-CCNC: 82 U/L (ref 15–37)
BILIRUB DIRECT SERPL-MCNC: 0.1 MG/DL (ref 0–0.2)
BILIRUB SERPL-MCNC: 0.5 MG/DL (ref 0.2–1)
GLOBULIN SER CALC-MCNC: 3.7 G/DL (ref 2–4)
IRON SERPL-MCNC: 66 UG/DL (ref 35–150)
PERIPHERAL SMEAR, MD REVIEW: NORMAL
PROT SERPL-MCNC: 7.6 G/DL (ref 6.4–8.2)

## 2024-02-04 ENCOUNTER — TELEPHONE (OUTPATIENT)
Age: 74
End: 2024-02-04

## 2024-02-04 DIAGNOSIS — R74.8 ELEVATED LIVER ENZYMES: Primary | ICD-10-CM

## 2024-02-04 LAB
A1AT SERPL-MCNC: 157 MG/DL (ref 101–187)
CERULOPLASMIN SERPL-MCNC: 26.1 MG/DL (ref 19–39)

## 2024-02-04 NOTE — TELEPHONE ENCOUNTER
Advise pt's daughter Bella that patient's follow up liver enzymes are still elevated and have gotten a lot worse. Currently highest readings she has had so far. I want to get her set up for an ABD US and refer her to Dr. Curran. They can go ahead and get that appointment on the books now in advance, there is a long wait time. Follow strict low carb diet. Lose weight. No alcohol. Route back to me after talk to Bella so I can place US order.

## 2024-02-05 LAB — ANA SER QL: NEGATIVE

## 2024-02-06 NOTE — TELEPHONE ENCOUNTER
Informed dtr Bella of results.  I gave her the phone # for Dr Mohr.  She will call to get that scheduled.  I also gave her the # for [facility] for the U/S if she doesn't hear from them in a couple of days.  Bella said that pt doesn't drink ETOH so that isn't a problem.

## 2024-02-07 LAB — FLOW CYTOMETRY: NORMAL

## 2024-02-09 NOTE — PATIENT INSTRUCTIONS
Chronic Obstructive Pulmonary Disease (COPD): Care Instructions Your Care Instructions Chronic obstructive pulmonary disease (COPD) is a general term for a group of lung diseases, including emphysema and chronic bronchitis. People with COPD have decreased airflow in and out of the lungs, which makes it hard to breathe. The airways also can get clogged with thick mucus. Cigarette smoking is a major cause of COPD. Although there is no cure for COPD, you can slow its progress. Following your treatment plan and taking care of yourself can help you feel better and live longer. Follow-up care is a key part of your treatment and safety. Be sure to make and go to all appointments, and call your doctor if you are having problems. It's also a good idea to know your test results and keep a list of the medicines you take. How can you care for yourself at home? 
 Staying healthy 
  · Do not smoke. This is the most important step you can take to prevent more damage to your lungs. If you need help quitting, talk to your doctor about stop-smoking programs and medicines. These can increase your chances of quitting for good.  
  · Avoid colds and flu. Get a pneumococcal vaccine shot. If you have had one before, ask your doctor whether you need a second dose. Get the flu vaccine every fall. If you must be around people with colds or the flu, wash your hands often.  
  · Avoid secondhand smoke, air pollution, and high altitudes. Also avoid cold, dry air and hot, humid air. Stay at home with your windows closed when air pollution is bad.  
 Medicines and oxygen therapy 
  · Take your medicines exactly as prescribed. Call your doctor if you think you are having a problem with your medicine.  
  · You may be taking medicines such as: 
? Bronchodilators. These help open your airways and make breathing easier.  Bronchodilators are either short-acting (work for 6 to 9 hours) or long-acting (work for 24 hours). You inhale most bronchodilators, so they start to act quickly. Always carry your quick-relief inhaler with you in case you need it while you are away from home. ? Corticosteroids (prednisone, budesonide). These reduce airway inflammation. They come in pill or inhaled form. You must take these medicines every day for them to work well.  
  · A spacer may help you get more inhaled medicine to your lungs. Ask your doctor or pharmacist if a spacer is right for you. If it is, ask how to use it properly.  
  · Do not take any vitamins, over-the-counter medicine, or herbal products without talking to your doctor first.  
  · If your doctor prescribed antibiotics, take them as directed. Do not stop taking them just because you feel better. You need to take the full course of antibiotics.  
  · Oxygen therapy boosts the amount of oxygen in your blood and helps you breathe easier. Use the flow rate your doctor has recommended, and do not change it without talking to your doctor first.  
Activity 
  · Get regular exercise. Walking is an easy way to get exercise. Start out slowly, and walk a little more each day.  
  · Pay attention to your breathing. You are exercising too hard if you cannot talk while you are exercising.  
  · Take short rest breaks when doing household chores and other activities.  
  · Learn breathing methodssuch as breathing through pursed lipsto help you become less short of breath.  
  · If your doctor has not set you up with a pulmonary rehabilitation program, talk to him or her about whether rehab is right for you. Rehab includes exercise programs, education about your disease and how to manage it, help with diet and other changes, and emotional support. Diet 
  · Eat regular, healthy meals. Use bronchodilators about 1 hour before you eat to make it easier to eat.  Eat several small meals instead of three large ones. Drink beverages at the end of the meal. Avoid foods that are hard to chew.  
  · Eat foods that contain protein so that you do not lose muscle mass.  
  · Talk with your doctor if you gain too much weight or if you lose weight without trying.  
 Mental health 
  · Talk to your family, friends, or a therapist about your feelings. It is normal to feel frightened, angry, hopeless, helpless, and even guilty. Talking openly about bad feelings can help you cope. If these feelings last, talk to your doctor. When should you call for help? Call 911 anytime you think you may need emergency care. For example, call if: 
  · You have severe trouble breathing.  
 Call your doctor now or seek immediate medical care if: 
  · You have new or worse trouble breathing.  
  · You cough up blood.  
  · You have a fever.  
 Watch closely for changes in your health, and be sure to contact your doctor if: 
  · You cough more deeply or more often, especially if you notice more mucus or a change in the color of your mucus.  
  · You have new or worse swelling in your legs or belly.  
  · You are not getting better as expected. Where can you learn more? Go to http://jama-obinna.info/. Alicia Duran in the search box to learn more about \"Chronic Obstructive Pulmonary Disease (COPD): Care Instructions. \" Current as of: September 5, 2018 Content Version: 12.1 © 7813-6162 [employer]. Care instructions adapted under license by [employer] (which disclaims liability or warranty for this information). If you have questions about a medical condition or this instruction, always ask your healthcare professional. [address] any warranty or liability for your use of this information. no

## 2024-02-14 ENCOUNTER — HOSPITAL ENCOUNTER (OUTPATIENT)
Facility: HOSPITAL | Age: 74
Discharge: HOME | End: 2024-02-17
Attending: FAMILY MEDICINE
Payer: MEDICARE

## 2024-02-14 DIAGNOSIS — R74.8 ELEVATED LIVER ENZYMES: ICD-10-CM

## 2024-02-14 PROCEDURE — 76700 US EXAM ABDOM COMPLETE: CPT

## 2024-02-16 DIAGNOSIS — I10 BENIGN ESSENTIAL HYPERTENSION: ICD-10-CM

## 2024-02-16 RX ORDER — DILTIAZEM HYDROCHLORIDE 300 MG/1
CAPSULE, COATED, EXTENDED RELEASE ORAL DAILY
Qty: 90 CAPSULE | Refills: 3 | OUTPATIENT
Start: 2024-02-16

## 2024-02-16 NOTE — TELEPHONE ENCOUNTER
Requested Prescriptions     Refused Prescriptions Disp Refills    dilTIAZem (CARDIZEM CD) 300 MG extended release capsule [Pharmacy Med Name: DILTIAZEM 24H ER(CD) 300 MG CP] 90 capsule 3     Sig: TAKE 1 CAPSULE BY MOUTH EVERY DAY     Refused By: HENRIQUE SANCHEZ     Reason for Refusal: Patient needs an appointment     VO per MD    Future Appointments   Date Time Provider Department Center   7/5/2024  8:00 AM Ting Acevedo, GUILLERMO - NP [facility]

## 2024-02-17 ENCOUNTER — TELEPHONE (OUTPATIENT)
Age: 74
End: 2024-02-17

## 2024-02-17 RX ORDER — AMLODIPINE BESYLATE 10 MG/1
10 TABLET ORAL DAILY
Qty: 90 TABLET | Refills: 1 | Status: SHIPPED | OUTPATIENT
Start: 2024-02-17

## 2024-02-17 RX ORDER — LOSARTAN POTASSIUM AND HYDROCHLOROTHIAZIDE 25; 100 MG/1; MG/1
1 TABLET ORAL DAILY
Qty: 90 TABLET | Refills: 1 | Status: SHIPPED | OUTPATIENT
Start: 2024-02-17

## 2024-02-17 NOTE — TELEPHONE ENCOUNTER
Advise daughter Bella that patient's abdominal ultrasound shows a slightly enlarged fatty liver and a small amount of sludge in the gallbladder but no gallstones. These results will be accessible to the Hepatologist for her upcoming appt w/ them in a few months as well. Patient needs to be on a low fat, strict low carb diet and work on weight reduction. Schedule Medicare AWV and fasting labs w/ me for around October, 40 minute appt.

## 2024-02-19 NOTE — TELEPHONE ENCOUNTER
Informed dtr Bella of pt's results.    Scheduled pt for 10/7 @ 9:40 for fasting AWV.  Asked dtr to have pt complete the AWV questionnaire prior to appt.

## 2024-02-20 ENCOUNTER — TELEPHONE (OUTPATIENT)
Age: 74
End: 2024-02-20

## 2024-02-20 RX ORDER — DILTIAZEM HYDROCHLORIDE 300 MG/1
300 CAPSULE, EXTENDED RELEASE ORAL DAILY
Qty: 30 CAPSULE | Refills: 0 | Status: SHIPPED | OUTPATIENT
Start: 2024-02-20 | End: 2024-02-23 | Stop reason: SDUPTHER

## 2024-02-20 NOTE — TELEPHONE ENCOUNTER
Requested Prescriptions     Signed Prescriptions Disp Refills    dilTIAZem (TIAZAC) 300 MG extended release capsule 30 capsule 0     Sig: Take 1 capsule by mouth daily     Authorizing Provider: CED FERNANDEZ     Ordering User: HENRIQUE SANCHEZ MD    Future Appointments   Date Time Provider Department Center   2/23/2024  1:40 PM Ced Fernandez MD [facility]   7/5/2024  8:00 AM Ting Acevedo APRN - NP [facility]   10/7/2024  9:40 AM Brandy Acevedo MD [facility]

## 2024-02-20 NOTE — TELEPHONE ENCOUNTER
Pt's daughter is calling requesting a refill for medication Diltiazem 300 mg. Pt has been without it for about 2 weeks. Pt is scheduled to see Dr. Awan on Friday, 02/23/2024. Refill needs to be sent to [facility].     [facility] ph # [phone]    Bella Campbell (pt's daughter) ph # [phone]

## 2024-02-23 ENCOUNTER — OFFICE VISIT (OUTPATIENT)
Age: 74
End: 2024-02-23

## 2024-02-23 VITALS
WEIGHT: 204 LBS | HEIGHT: 68 IN | HEART RATE: 84 BPM | SYSTOLIC BLOOD PRESSURE: 152 MMHG | DIASTOLIC BLOOD PRESSURE: 76 MMHG | BODY MASS INDEX: 30.92 KG/M2 | OXYGEN SATURATION: 93 %

## 2024-02-23 DIAGNOSIS — J44.9 CHRONIC OBSTRUCTIVE PULMONARY DISEASE, UNSPECIFIED COPD TYPE (HCC): ICD-10-CM

## 2024-02-23 DIAGNOSIS — I10 ESSENTIAL (PRIMARY) HYPERTENSION: ICD-10-CM

## 2024-02-23 DIAGNOSIS — I48.0 PAROXYSMAL ATRIAL FIBRILLATION (HCC): Primary | ICD-10-CM

## 2024-02-23 RX ORDER — DILTIAZEM HYDROCHLORIDE 300 MG/1
300 CAPSULE, EXTENDED RELEASE ORAL DAILY
Qty: 90 CAPSULE | Refills: 3 | Status: SHIPPED | OUTPATIENT
Start: 2024-02-23

## 2024-02-23 ASSESSMENT — PATIENT HEALTH QUESTIONNAIRE - PHQ9
SUM OF ALL RESPONSES TO PHQ QUESTIONS 1-9: 0
SUM OF ALL RESPONSES TO PHQ9 QUESTIONS 1 & 2: 0
SUM OF ALL RESPONSES TO PHQ QUESTIONS 1-9: 0
1. LITTLE INTEREST OR PLEASURE IN DOING THINGS: 0
2. FEELING DOWN, DEPRESSED OR HOPELESS: 0

## 2024-02-23 NOTE — PROGRESS NOTES
w/ pt and daughter 2-2017. AMD form reviewed and copy provided. NN/facilitator to discuss with patient     Asthma     hx of    Benign essential hypertension 6/24/2016    Benign essential tremor 7/14/2010    Bilateral hip pain 6/3/2014    xrays 2012, negative    Chronic depression 7/12/2019    Cigarette smoker 3/29/2010    COPD, severe (HCC) 3/29/2010    [facility]: Dr Ray, Dr. 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 Fib after  Shunt replacement, 4-17-19, [facility], Cardio, Dr. Awan (no cardioversion required, medication mgt only)    Perforation and abscess of large intestine concurrent with and due to diverticulitis 5/2/2019    Lap Drain 3-29-19, Exp Laparotomy/Colostomy 4-8-19, Dr. Manzo    Thyroiditis, subacute 3/29/2010    Vitamin D deficiency 6/3/2014    2012     Social History     Tobacco Use   Smoking Status Every Day    Current packs/day: 0.50    Types: Cigarettes   Smokeless Tobacco Never     Social History     Substance and Sexual Activity   Alcohol Use No    Alcohol/week: 0.0 standard drinks of alcohol     Allergies   Allergen Reactions    Alprazolam Hives    Egg Yolk Other (See Comments)     indigestion    Lorazepam Hives    Penicillins Other (See Comments)     Unsure of

## 2024-03-14 ENCOUNTER — TELEPHONE (OUTPATIENT)
Age: 74
End: 2024-03-14

## 2024-03-14 DIAGNOSIS — E78.2 MIXED HYPERLIPIDEMIA: ICD-10-CM

## 2024-03-14 RX ORDER — ROSUVASTATIN CALCIUM 10 MG/1
10 TABLET, COATED ORAL DAILY
Qty: 90 TABLET | Refills: 1 | Status: SHIPPED | OUTPATIENT
Start: 2024-03-14

## 2024-03-14 NOTE — TELEPHONE ENCOUNTER
LOV 10/30/23 for AWV, due f/u 6 months.  Pt is scheduled for 10/7/24 for AWV.  Last rx'd on 10/12/23 for # 30 only.  Looks like that was to cover until appt on 10/30.

## 2024-03-15 NOTE — TELEPHONE ENCOUNTER
Oh my goodness. If Bella can confirm that pt has been noncompliant w/ medications then lets just get her back on the cholesterol medication and just keep fasting follow up w/ me for  and will do labs then. I had referred her to liver specialist and that appt is coming up so they will be doing liver yeison and I will have access to those. I sent 90 day supply in.

## 2024-03-19 NOTE — TELEPHONE ENCOUNTER
Spoke to Bella and she said that the pt has been taking the crestor everyday, b/c she has been fixing her pill box.  She said that she just picked up another refill.     Not sure where the discrepancy was.

## 2024-05-20 DIAGNOSIS — F51.05 INSOMNIA DUE TO OTHER MENTAL DISORDER (CODE): ICD-10-CM

## 2024-05-20 DIAGNOSIS — F41.1 GENERALIZED ANXIETY DISORDER: ICD-10-CM

## 2024-05-20 RX ORDER — BUSPIRONE HYDROCHLORIDE 5 MG/1
TABLET ORAL
Qty: 180 TABLET | Refills: 1 | Status: SHIPPED | OUTPATIENT
Start: 2024-05-20

## 2024-05-20 RX ORDER — TRAZODONE HYDROCHLORIDE 50 MG/1
50 TABLET ORAL NIGHTLY
Qty: 90 TABLET | Refills: 1 | Status: SHIPPED | OUTPATIENT
Start: 2024-05-20

## 2024-05-27 DIAGNOSIS — I10 BENIGN ESSENTIAL HYPERTENSION: ICD-10-CM

## 2024-05-27 DIAGNOSIS — K21.9 GASTRO-ESOPHAGEAL REFLUX DISEASE WITHOUT ESOPHAGITIS: ICD-10-CM

## 2024-05-27 RX ORDER — OMEPRAZOLE 20 MG/1
CAPSULE, DELAYED RELEASE ORAL
Qty: 90 CAPSULE | Refills: 1 | Status: SHIPPED | OUTPATIENT
Start: 2024-05-27

## 2024-05-27 RX ORDER — AMLODIPINE BESYLATE 10 MG/1
10 TABLET ORAL DAILY
Qty: 90 TABLET | Refills: 1 | OUTPATIENT
Start: 2024-05-27

## 2024-05-27 RX ORDER — LOSARTAN POTASSIUM AND HYDROCHLOROTHIAZIDE 25; 100 MG/1; MG/1
1 TABLET ORAL DAILY
Qty: 90 TABLET | Refills: 1 | OUTPATIENT
Start: 2024-05-27

## 2024-05-28 RX ORDER — DILTIAZEM HYDROCHLORIDE 300 MG/1
300 CAPSULE, COATED, EXTENDED RELEASE ORAL DAILY
Qty: 90 CAPSULE | Refills: 3 | Status: SHIPPED | OUTPATIENT
Start: 2024-05-28

## 2024-05-28 NOTE — TELEPHONE ENCOUNTER
Requested Prescriptions     Signed Prescriptions Disp Refills    dilTIAZem (CARDIZEM CD) 300 MG extended release capsule 90 capsule 3     Sig: Take 1 capsule by mouth daily Please request refills from your pharmacy. It's been filled for a year. After the year is complete have your primary care doctor fill future refills.     Authorizing Provider: CED FERNANDEZ     Ordering User: HENRIQUE SANCHEZ MD    Future Appointments   Date Time Provider Department Center   7/5/2024  8:00 AM Ting Acevedo APRN - NP [facility]   10/7/2024  9:40 AM Brandy Acevedo MD [facility]

## 2024-08-30 DIAGNOSIS — F41.1 GENERALIZED ANXIETY DISORDER: ICD-10-CM

## 2024-08-30 DIAGNOSIS — F32.A DEPRESSION, UNSPECIFIED: ICD-10-CM

## 2024-08-31 RX ORDER — SERTRALINE HYDROCHLORIDE 100 MG/1
TABLET, FILM COATED ORAL
Qty: 135 TABLET | Refills: 0 | Status: SHIPPED | OUTPATIENT
Start: 2024-08-31

## 2024-10-06 DIAGNOSIS — I10 BENIGN ESSENTIAL HYPERTENSION: ICD-10-CM

## 2024-10-06 DIAGNOSIS — E78.2 MIXED HYPERLIPIDEMIA: ICD-10-CM

## 2024-10-07 ENCOUNTER — OFFICE VISIT (OUTPATIENT)
Age: 74
End: 2024-10-07
Payer: MEDICARE

## 2024-10-07 VITALS
WEIGHT: 201 LBS | OXYGEN SATURATION: 96 % | HEIGHT: 70 IN | TEMPERATURE: 98.6 F | DIASTOLIC BLOOD PRESSURE: 78 MMHG | BODY MASS INDEX: 28.77 KG/M2 | RESPIRATION RATE: 16 BRPM | HEART RATE: 80 BPM | SYSTOLIC BLOOD PRESSURE: 162 MMHG

## 2024-10-07 DIAGNOSIS — E11.9 DIABETES MELLITUS TYPE 2, NONINSULIN DEPENDENT (HCC): ICD-10-CM

## 2024-10-07 DIAGNOSIS — E78.2 MIXED HYPERLIPIDEMIA: ICD-10-CM

## 2024-10-07 DIAGNOSIS — J44.9 COPD, SEVERE (HCC): ICD-10-CM

## 2024-10-07 DIAGNOSIS — R74.8 ELEVATED LIVER ENZYMES: ICD-10-CM

## 2024-10-07 DIAGNOSIS — Z00.00 MEDICARE ANNUAL WELLNESS VISIT, SUBSEQUENT: Primary | ICD-10-CM

## 2024-10-07 DIAGNOSIS — I10 BENIGN ESSENTIAL HYPERTENSION: ICD-10-CM

## 2024-10-07 DIAGNOSIS — Z86.79 HISTORY OF SPONTANEOUS SUBARACHNOID INTRACRANIAL HEMORRHAGE DUE TO CEREBRAL AVM: ICD-10-CM

## 2024-10-07 DIAGNOSIS — Z72.0 TOBACCO ABUSE: ICD-10-CM

## 2024-10-07 DIAGNOSIS — Z12.12 SCREENING FOR COLORECTAL CANCER: ICD-10-CM

## 2024-10-07 DIAGNOSIS — F41.9 ANXIETY AND DEPRESSION: ICD-10-CM

## 2024-10-07 DIAGNOSIS — F32.A ANXIETY AND DEPRESSION: ICD-10-CM

## 2024-10-07 DIAGNOSIS — Z12.11 SCREENING FOR COLORECTAL CANCER: ICD-10-CM

## 2024-10-07 DIAGNOSIS — D72.820 LYMPHOCYTOSIS: ICD-10-CM

## 2024-10-07 DIAGNOSIS — Z12.31 ENCOUNTER FOR SCREENING MAMMOGRAM FOR BREAST CANCER: ICD-10-CM

## 2024-10-07 DIAGNOSIS — I48.0 PAROXYSMAL ATRIAL FIBRILLATION (HCC): ICD-10-CM

## 2024-10-07 LAB
ALBUMIN SERPL-MCNC: 4.2 G/DL (ref 3.5–5)
ALBUMIN/GLOB SERPL: 1.2 (ref 1.1–2.2)
ALP SERPL-CCNC: 61 U/L (ref 45–117)
ALT SERPL-CCNC: 84 U/L (ref 12–78)
ANION GAP SERPL CALC-SCNC: 6 MMOL/L (ref 2–12)
AST SERPL-CCNC: 64 U/L (ref 15–37)
BASOPHILS # BLD: 0.1 K/UL (ref 0–0.1)
BASOPHILS NFR BLD: 1 % (ref 0–1)
BILIRUB SERPL-MCNC: 0.5 MG/DL (ref 0.2–1)
BUN SERPL-MCNC: 9 MG/DL (ref 6–20)
BUN/CREAT SERPL: 11 (ref 12–20)
CALCIUM SERPL-MCNC: 9.5 MG/DL (ref 8.5–10.1)
CHLORIDE SERPL-SCNC: 101 MMOL/L (ref 97–108)
CHOLEST SERPL-MCNC: 128 MG/DL
CO2 SERPL-SCNC: 29 MMOL/L (ref 21–32)
CREAT SERPL-MCNC: 0.83 MG/DL (ref 0.55–1.02)
DIFFERENTIAL METHOD BLD: ABNORMAL
EOSINOPHIL # BLD: 0.3 K/UL (ref 0–0.4)
EOSINOPHIL NFR BLD: 3 % (ref 0–7)
ERYTHROCYTE [DISTWIDTH] IN BLOOD BY AUTOMATED COUNT: 12.3 % (ref 11.5–14.5)
EST. AVERAGE GLUCOSE BLD GHB EST-MCNC: 169 MG/DL
GLOBULIN SER CALC-MCNC: 3.6 G/DL (ref 2–4)
GLUCOSE SERPL-MCNC: 156 MG/DL (ref 65–100)
HBA1C MFR BLD: 7.5 % (ref 4–5.6)
HCT VFR BLD AUTO: 43.2 % (ref 35–47)
HDLC SERPL-MCNC: 54 MG/DL
HDLC SERPL: 2.4 (ref 0–5)
HGB BLD-MCNC: 14.3 G/DL (ref 11.5–16)
IMM GRANULOCYTES # BLD AUTO: 0 K/UL (ref 0–0.04)
IMM GRANULOCYTES NFR BLD AUTO: 0 % (ref 0–0.5)
LDLC SERPL CALC-MCNC: 46.2 MG/DL (ref 0–100)
LYMPHOCYTES # BLD: 3.9 K/UL (ref 0.8–3.5)
LYMPHOCYTES NFR BLD: 39 % (ref 12–49)
MCH RBC QN AUTO: 30.6 PG (ref 26–34)
MCHC RBC AUTO-ENTMCNC: 33.1 G/DL (ref 30–36.5)
MCV RBC AUTO: 92.3 FL (ref 80–99)
MONOCYTES # BLD: 0.6 K/UL (ref 0–1)
MONOCYTES NFR BLD: 6 % (ref 5–13)
NEUTS SEG # BLD: 5.2 K/UL (ref 1.8–8)
NEUTS SEG NFR BLD: 51 % (ref 32–75)
NRBC # BLD: 0 K/UL (ref 0–0.01)
NRBC BLD-RTO: 0 PER 100 WBC
PLATELET # BLD AUTO: 264 K/UL (ref 150–400)
PMV BLD AUTO: 10.5 FL (ref 8.9–12.9)
POTASSIUM SERPL-SCNC: 3.7 MMOL/L (ref 3.5–5.1)
PROT SERPL-MCNC: 7.8 G/DL (ref 6.4–8.2)
RBC # BLD AUTO: 4.68 M/UL (ref 3.8–5.2)
SODIUM SERPL-SCNC: 136 MMOL/L (ref 136–145)
TRIGL SERPL-MCNC: 139 MG/DL
TSH SERPL DL<=0.05 MIU/L-ACNC: 2.53 UIU/ML (ref 0.36–3.74)
VLDLC SERPL CALC-MCNC: 27.8 MG/DL
WBC # BLD AUTO: 10.1 K/UL (ref 3.6–11)

## 2024-10-07 PROCEDURE — G0439 PPPS, SUBSEQ VISIT: HCPCS | Performed by: FAMILY MEDICINE

## 2024-10-07 PROCEDURE — 99214 OFFICE O/P EST MOD 30 MIN: CPT | Performed by: FAMILY MEDICINE

## 2024-10-07 PROCEDURE — 3046F HEMOGLOBIN A1C LEVEL >9.0%: CPT | Performed by: FAMILY MEDICINE

## 2024-10-07 PROCEDURE — G8417 CALC BMI ABV UP PARAM F/U: HCPCS | Performed by: FAMILY MEDICINE

## 2024-10-07 PROCEDURE — 3078F DIAST BP <80 MM HG: CPT | Performed by: FAMILY MEDICINE

## 2024-10-07 PROCEDURE — 3017F COLORECTAL CA SCREEN DOC REV: CPT | Performed by: FAMILY MEDICINE

## 2024-10-07 PROCEDURE — 1090F PRES/ABSN URINE INCON ASSESS: CPT | Performed by: FAMILY MEDICINE

## 2024-10-07 PROCEDURE — G8400 PT W/DXA NO RESULTS DOC: HCPCS | Performed by: FAMILY MEDICINE

## 2024-10-07 PROCEDURE — 2022F DILAT RTA XM EVC RTNOPTHY: CPT | Performed by: FAMILY MEDICINE

## 2024-10-07 PROCEDURE — 1123F ACP DISCUSS/DSCN MKR DOCD: CPT | Performed by: FAMILY MEDICINE

## 2024-10-07 PROCEDURE — 3023F SPIROM DOC REV: CPT | Performed by: FAMILY MEDICINE

## 2024-10-07 PROCEDURE — 3077F SYST BP >= 140 MM HG: CPT | Performed by: FAMILY MEDICINE

## 2024-10-07 PROCEDURE — 4004F PT TOBACCO SCREEN RCVD TLK: CPT | Performed by: FAMILY MEDICINE

## 2024-10-07 PROCEDURE — G8427 DOCREV CUR MEDS BY ELIG CLIN: HCPCS | Performed by: FAMILY MEDICINE

## 2024-10-07 PROCEDURE — G8484 FLU IMMUNIZE NO ADMIN: HCPCS | Performed by: FAMILY MEDICINE

## 2024-10-07 RX ORDER — DILTIAZEM HYDROCHLORIDE 360 MG/1
360 CAPSULE, EXTENDED RELEASE ORAL DAILY
Qty: 90 CAPSULE | Refills: 0 | Status: SHIPPED | OUTPATIENT
Start: 2024-10-07

## 2024-10-07 ASSESSMENT — ENCOUNTER SYMPTOMS
NAUSEA: 0
DIARRHEA: 0
CONSTIPATION: 0
VOMITING: 0
GASTROINTESTINAL NEGATIVE: 1
ABDOMINAL PAIN: 0
BLOOD IN STOOL: 0

## 2024-10-07 ASSESSMENT — PATIENT HEALTH QUESTIONNAIRE - PHQ9
3. TROUBLE FALLING OR STAYING ASLEEP: NOT AT ALL
5. POOR APPETITE OR OVEREATING: NOT AT ALL
9. THOUGHTS THAT YOU WOULD BE BETTER OFF DEAD, OR OF HURTING YOURSELF: NOT AT ALL
SUM OF ALL RESPONSES TO PHQ9 QUESTIONS 1 & 2: 0
6. FEELING BAD ABOUT YOURSELF - OR THAT YOU ARE A FAILURE OR HAVE LET YOURSELF OR YOUR FAMILY DOWN: NOT AT ALL
SUM OF ALL RESPONSES TO PHQ QUESTIONS 1-9: 0
8. MOVING OR SPEAKING SO SLOWLY THAT OTHER PEOPLE COULD HAVE NOTICED. OR THE OPPOSITE, BEING SO FIGETY OR RESTLESS THAT YOU HAVE BEEN MOVING AROUND A LOT MORE THAN USUAL: NOT AT ALL
7. TROUBLE CONCENTRATING ON THINGS, SUCH AS READING THE NEWSPAPER OR WATCHING TELEVISION: NOT AT ALL
10. IF YOU CHECKED OFF ANY PROBLEMS, HOW DIFFICULT HAVE THESE PROBLEMS MADE IT FOR YOU TO DO YOUR WORK, TAKE CARE OF THINGS AT HOME, OR GET ALONG WITH OTHER PEOPLE: NOT DIFFICULT AT ALL
SUM OF ALL RESPONSES TO PHQ QUESTIONS 1-9: 0
4. FEELING TIRED OR HAVING LITTLE ENERGY: NOT AT ALL
2. FEELING DOWN, DEPRESSED OR HOPELESS: NOT AT ALL
1. LITTLE INTEREST OR PLEASURE IN DOING THINGS: NOT AT ALL

## 2024-10-07 ASSESSMENT — LIFESTYLE VARIABLES
HOW MANY STANDARD DRINKS CONTAINING ALCOHOL DO YOU HAVE ON A TYPICAL DAY: PATIENT DOES NOT DRINK
HOW OFTEN DO YOU HAVE A DRINK CONTAINING ALCOHOL: NEVER

## 2024-10-07 NOTE — TELEPHONE ENCOUNTER
Last appointment: 10/7/24 (today), MD BRADFORD, labs today  Next appointment: 11/18/24 MD BRADFORD  Previous refill encounter(s):   Losartan/hctz 2/17/24 90 + 1  Amlodipine 2/17/24 90 + 1  Rosuvastatin 3/14/24 90 + 1    Requested Prescriptions     Pending Prescriptions Disp Refills    losartan-hydroCHLOROthiazide (HYZAAR) 100-25 MG per tablet [Pharmacy Med Name: LOSARTAN-HCTZ 100-25 MG TAB] 90 tablet 1     Sig: Take 1 tablet by mouth daily    amLODIPine (NORVASC) 10 MG tablet [Pharmacy Med Name: AMLODIPINE BESYLATE 10 MG TAB] 90 tablet 1     Sig: Take 1 tablet by mouth daily    rosuvastatin (CRESTOR) 10 MG tablet [Pharmacy Med Name: ROSUVASTATIN CALCIUM 10 MG TAB] 90 tablet 1     Sig: Take 1 tablet by mouth daily     For Pharmacy Admin Tracking Only    Program: Medication Refill  CPA in place:    Recommendation Provided To:   Intervention Detail: New Rx: 3, reason: Patient Preference  Intervention Accepted By:   Gap Closed?:    Time Spent (min): 5

## 2024-10-07 NOTE — PROGRESS NOTES
Chief Complaint   Patient presents with    Medicare AWV         Cholesterol Problem     Not fasting    Diabetes    Hypertension    Depression     1. \"Have you been to the ER, urgent care clinic since your last visit?  Hospitalized since your last visit?\" No    2. \"Have you seen or consulted any other health care providers outside of the [facility] since your last visit?\" Oncologist Dr Waggoner, cardio Dr Awan doesn't need to see him any longer that PCP could manage med    3. For patients aged 45-75: Has the patient had a colonoscopy / FIT/ Cologuard? Yes - Care Gap present. Most recent result on file  2013 benign cecal polyp, f/u 10 yrs       If the patient is female:    4. For patients aged 40-74: Has the patient had a mammogram within the past 2 years? Last on file 6/2022      5. For patients aged 21-65: Has the patient had a pap smear? Not needed      
Medicare Annual Wellness Visit    Arlene Campbell is here for Medicare AWV (), Cholesterol Problem (Not fasting), Diabetes, Hypertension, and Depression    Refer to separate note in this encounter for follow up of patient's chronic conditions, disease management, problems addressed today, and/or other health concerns as noted.     Assessment & Plan   Medicare annual wellness visit, subsequent  Encounter for screening mammogram for breast cancer  -     ATUL TOD[facility] DIGITAL SCREEN BILATERAL; Future  Screening for colorectal cancer  -     [facility] - Saman Harper MD, [facility] ([facility] [address])    Recommendations for Preventive Services Due: see orders and patient instructions/AVS.  Recommended screening schedule for the next 5-10 years is provided to the patient in written form: see Patient Instructions/AVS.            Patient's complete Health Risk Assessment and screening values have been reviewed and are found in Flowsheets. The following problems were reviewed today and where indicated follow up appointments were made and/or referrals ordered.    Positive Risk Factor Screenings with Interventions:    Fall Risk:  Do you feel unsteady or are you worried about falling? : (!) yes  2 or more falls in past year?: no  Fall with injury in past year?: no     Interventions:    Reviewed medications, home hazards, visual acuity, and co-morbidities that can increase risk for falls  See AVS for additional education material    Cognitive:   Clock Drawing Test (CDT): (!) Abnormal  Words recalled: 0 Words Recalled  Total Score: (!) 0  Total Score Interpretation: Abnormal Mini-Cog  Interventions:  See AVS for additional education material            Inactivity:  On average, how many days per week do you engage in moderate to strenuous exercise (like a brisk walk)?: 0 days (!) Abnormal  On average, how many minutes do you engage in exercise at this level?: 0 min  Interventions:  See AVS for additional education material      
Edilson; Workup 2/2024, 5/2024 benign; Follow up prn only      Diabetes mellitus type 2, noninsulin dependent (HCC) 11/04/2023     Overview Note:     8-2022; Foot exams: Annually at [facility]      SHARAD (obstructive sleep apnea) 10/30/2023     Overview Note:     [facility] Study 2022/2023; start CPAP trial       History of acute respiratory failure 02/22/2021     Overview Note:     Post op ~ 2019        History of spontaneous subarachnoid intracranial hemorrhage due to cerebral AVM 02/22/2021     Overview Note:     S/p Ruptured aneurysm 10/10/06 S/P Rt Frontal Craniotomy and clipping, Dr. Lassiter 10/27/06        History of DVT of lower extremity 02/22/2021     Overview Note:     RLE          Generalized anxiety disorder with panic attacks 07/12/2019    Chronic depression 07/12/2019    Paroxysmal atrial fibrillation (HCC) 05/02/2019     Overview Note:     H/O Post Op Acute A. Fib after  Shunt replacement, 4-17-19, [facility], Cardio, Dr. Awan (no cardioversion required, medication mgt only) but oral anticoagulation not advised due to hemorrhagic history and risks deemed to outweigh benefit. Stable.  Rhythm normal. Rate controlled on CCB alone. No BBlocker due to severe pulmonary disease. Rhythm normal.         Perforation and abscess of large intestine concurrent with and due to diverticulitis 05/02/2019     Overview Note:     Lap Drain 3-29-19, Exp Laparotomy/Colostomy 4-8-19, Dr. Manzo        Perforated diverticulum of large intestine 03/26/2019    ACP (advance care planning) 02/21/2017     Overview Note:     Initial ACP discussion w/ pt and daughter 2-2017. AMD form reviewed and   copy provided. NN/facilitator to discuss with patient        Benign essential hypertension 06/24/2016    Vitamin D deficiency 06/03/2014     Overview Note:     2012        Bilateral hip pain 06/03/2014     Overview Note:     xrays 2012, negative        GERD (gastroesophageal reflux disease) 09/02/2011    Benign

## 2024-10-08 RX ORDER — LOSARTAN POTASSIUM AND HYDROCHLOROTHIAZIDE 25; 100 MG/1; MG/1
1 TABLET ORAL DAILY
Qty: 90 TABLET | Refills: 0 | Status: SHIPPED | OUTPATIENT
Start: 2024-10-08

## 2024-10-08 RX ORDER — AMLODIPINE BESYLATE 10 MG/1
10 TABLET ORAL DAILY
Qty: 90 TABLET | Refills: 0 | Status: SHIPPED | OUTPATIENT
Start: 2024-10-08

## 2024-10-08 RX ORDER — ROSUVASTATIN CALCIUM 10 MG/1
10 TABLET, COATED ORAL DAILY
Qty: 90 TABLET | Refills: 1 | Status: SHIPPED | OUTPATIENT
Start: 2024-10-08

## 2024-11-13 NOTE — PROGRESS NOTES
[facility]     Freeman Mohr MD, FACP, MACG, FAASLD   MD Beulah Guido, AWA Priest, [facility]   Ting Acevedo, [facility]   Jody Gomezosta, [facility]-  Paulino Barron, [facility]-   Stephanie Aguilar, [facility]   Klarissa Granado, [facility]   [address], [address]   [place]  [address] [phone]   FAX: [phone]  [facility]   [address], [address]   [place]  [address] [phone]   FAX: [phone]         Patient Care Team:  Brandy Acevedo MD as PCP - General  Brandy Acevedo MD as PCP - Empaneled Provider  Logan Manzo MD as Physician    The clinicians listed above have asked me to see Arlene Campbell in consultation regarding elevated liver enzymes and its management.      All medical records sent by the referring physicians were reviewed including available imaging studies and pathology.        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

## 2024-11-15 ENCOUNTER — OFFICE VISIT (OUTPATIENT)
Age: 74
End: 2024-11-15
Payer: MEDICARE

## 2024-11-15 VITALS
HEART RATE: 71 BPM | HEIGHT: 69 IN | TEMPERATURE: 97.6 F | DIASTOLIC BLOOD PRESSURE: 74 MMHG | WEIGHT: 204.2 LBS | OXYGEN SATURATION: 98 % | BODY MASS INDEX: 30.24 KG/M2 | SYSTOLIC BLOOD PRESSURE: 157 MMHG

## 2024-11-15 DIAGNOSIS — K76.0 HEPATIC STEATOSIS: Primary | ICD-10-CM

## 2024-11-15 DIAGNOSIS — I78.1 SPIDER ANGIOMA OF SKIN: ICD-10-CM

## 2024-11-15 PROCEDURE — 99203 OFFICE O/P NEW LOW 30 MIN: CPT | Performed by: STUDENT IN AN ORGANIZED HEALTH CARE EDUCATION/TRAINING PROGRAM

## 2024-11-15 ASSESSMENT — PATIENT HEALTH QUESTIONNAIRE - PHQ9
2. FEELING DOWN, DEPRESSED OR HOPELESS: NOT AT ALL
1. LITTLE INTEREST OR PLEASURE IN DOING THINGS: NOT AT ALL
5. POOR APPETITE OR OVEREATING: NOT AT ALL
6. FEELING BAD ABOUT YOURSELF - OR THAT YOU ARE A FAILURE OR HAVE LET YOURSELF OR YOUR FAMILY DOWN: NOT AT ALL
SUM OF ALL RESPONSES TO PHQ QUESTIONS 1-9: 0
SUM OF ALL RESPONSES TO PHQ QUESTIONS 1-9: 0
7. TROUBLE CONCENTRATING ON THINGS, SUCH AS READING THE NEWSPAPER OR WATCHING TELEVISION: NOT AT ALL
4. FEELING TIRED OR HAVING LITTLE ENERGY: NOT AT ALL
10. IF YOU CHECKED OFF ANY PROBLEMS, HOW DIFFICULT HAVE THESE PROBLEMS MADE IT FOR YOU TO DO YOUR WORK, TAKE CARE OF THINGS AT HOME, OR GET ALONG WITH OTHER PEOPLE: NOT DIFFICULT AT ALL
9. THOUGHTS THAT YOU WOULD BE BETTER OFF DEAD, OR OF HURTING YOURSELF: NOT AT ALL
3. TROUBLE FALLING OR STAYING ASLEEP: NOT AT ALL
DEPRESSION UNABLE TO ASSESS: FUNCTIONAL CAPACITY MOTIVATION LIMITS ACCURACY
SUM OF ALL RESPONSES TO PHQ QUESTIONS 1-9: 0
SUM OF ALL RESPONSES TO PHQ9 QUESTIONS 1 & 2: 0
8. MOVING OR SPEAKING SO SLOWLY THAT OTHER PEOPLE COULD HAVE NOTICED. OR THE OPPOSITE, BEING SO FIGETY OR RESTLESS THAT YOU HAVE BEEN MOVING AROUND A LOT MORE THAN USUAL: NOT AT ALL
SUM OF ALL RESPONSES TO PHQ QUESTIONS 1-9: 0

## 2024-11-15 ASSESSMENT — ANXIETY QUESTIONNAIRES
4. TROUBLE RELAXING: NOT AT ALL
1. FEELING NERVOUS, ANXIOUS, OR ON EDGE: NOT AT ALL
7. FEELING AFRAID AS IF SOMETHING AWFUL MIGHT HAPPEN: NOT AT ALL
3. WORRYING TOO MUCH ABOUT DIFFERENT THINGS: NOT AT ALL
6. BECOMING EASILY ANNOYED OR IRRITABLE: NOT AT ALL
GAD7 TOTAL SCORE: 0
5. BEING SO RESTLESS THAT IT IS HARD TO SIT STILL: NOT AT ALL
IF YOU CHECKED OFF ANY PROBLEMS ON THIS QUESTIONNAIRE, HOW DIFFICULT HAVE THESE PROBLEMS MADE IT FOR YOU TO DO YOUR WORK, TAKE CARE OF THINGS AT HOME, OR GET ALONG WITH OTHER PEOPLE: NOT DIFFICULT AT ALL
2. NOT BEING ABLE TO STOP OR CONTROL WORRYING: NOT AT ALL

## 2024-11-15 NOTE — PATIENT INSTRUCTIONS
Thank you for enrolling in [employer]. Please follow the instructions below to securely access your online medical record. [employer] allows you to send messages to your doctor, view your test results, renew your prescriptions, schedule appointments, and more.     How Do I Sign Up?  In your Internet browser, go to [email or web address]  Click on the Sign Up Now link in the Sign In box. You will see the New Member Sign Up page.  Enter your [employer] Access Code exactly as it appears below. You will not need to use this code after you’ve completed the sign-up process. If you do not sign up before the expiration date, you must request a new code.  [employer] Access Code: Activation code not generated  Current [employer] Status: Active    Enter your Social Security Number ([id number]) and Date of Birth (mm/dd/yyyy) as indicated and click Submit. You will be taken to the next sign-up page.  Create a [employer] ID. This will be your [employer] login ID and cannot be changed, so think of one that is secure and easy to remember.  Create a [employer] password. You can change your password at any time.  Enter your Password Reset Question and Answer. This can be used at a later time if you forget your password.   Enter your e-mail address. You will receive e-mail notification when new information is available in [employer].  Click Sign Up. You can now view your medical record.     Additional Information  If you have questions, please contact the physician practice where you receive care. Remember, [employer] is NOT to be used for urgent needs. For medical emergencies, dial 911.    For questions regarding your [employer] account call [phone]. If you have a clinical question, please call your doctor's office.

## 2024-11-15 NOTE — PROGRESS NOTES
Chief Complaint   Patient presents with    New Patient     Vitals:    11/15/24 1041   BP: (!) 157/74   Pulse: 71   Temp: 97.6 °F (36.4 °C)   SpO2: 98%     \"Have you been to the ER, urgent care clinic since your last visit?  Hospitalized since your last visit?\"    NO    “Have you seen or consulted any other health care providers outside our system since your last visit?”    NO    Have you had a mammogram?”   NO    Date of last Mammogram: 6/2/2022       “Have you had a colorectal cancer screening such as a colonoscopy/FIT/Cologuard?    NO    Date of last Colonoscopy: 3/19/2013  No cologuard on file  No FIT/FOBT on file   No flexible sigmoidoscopy on file     “Have you had a diabetic eye exam?”    NO     No diabetic eye exam on file

## 2024-11-17 SDOH — ECONOMIC STABILITY: INCOME INSECURITY: HOW HARD IS IT FOR YOU TO PAY FOR THE VERY BASICS LIKE FOOD, HOUSING, MEDICAL CARE, AND HEATING?: NOT VERY HARD

## 2024-11-17 SDOH — ECONOMIC STABILITY: FOOD INSECURITY: WITHIN THE PAST 12 MONTHS, THE FOOD YOU BOUGHT JUST DIDN'T LAST AND YOU DIDN'T HAVE MONEY TO GET MORE.: NEVER TRUE

## 2024-11-17 SDOH — ECONOMIC STABILITY: FOOD INSECURITY: WITHIN THE PAST 12 MONTHS, YOU WORRIED THAT YOUR FOOD WOULD RUN OUT BEFORE YOU GOT MONEY TO BUY MORE.: SOMETIMES TRUE

## 2024-11-17 SDOH — ECONOMIC STABILITY: TRANSPORTATION INSECURITY
IN THE PAST 12 MONTHS, HAS LACK OF TRANSPORTATION KEPT YOU FROM MEETINGS, WORK, OR FROM GETTING THINGS NEEDED FOR DAILY LIVING?: NO

## 2024-11-18 ENCOUNTER — OFFICE VISIT (OUTPATIENT)
Age: 74
End: 2024-11-18
Payer: MEDICARE

## 2024-11-18 VITALS
HEIGHT: 69 IN | BODY MASS INDEX: 29.8 KG/M2 | RESPIRATION RATE: 16 BRPM | WEIGHT: 201.2 LBS | SYSTOLIC BLOOD PRESSURE: 136 MMHG | DIASTOLIC BLOOD PRESSURE: 64 MMHG | OXYGEN SATURATION: 96 % | TEMPERATURE: 98.3 F | HEART RATE: 72 BPM

## 2024-11-18 DIAGNOSIS — I10 BENIGN ESSENTIAL HYPERTENSION: Primary | ICD-10-CM

## 2024-11-18 DIAGNOSIS — I48.0 PAROXYSMAL ATRIAL FIBRILLATION (HCC): ICD-10-CM

## 2024-11-18 PROCEDURE — G8400 PT W/DXA NO RESULTS DOC: HCPCS | Performed by: FAMILY MEDICINE

## 2024-11-18 PROCEDURE — G8427 DOCREV CUR MEDS BY ELIG CLIN: HCPCS | Performed by: FAMILY MEDICINE

## 2024-11-18 PROCEDURE — G8417 CALC BMI ABV UP PARAM F/U: HCPCS | Performed by: FAMILY MEDICINE

## 2024-11-18 PROCEDURE — 3078F DIAST BP <80 MM HG: CPT | Performed by: FAMILY MEDICINE

## 2024-11-18 PROCEDURE — 4004F PT TOBACCO SCREEN RCVD TLK: CPT | Performed by: FAMILY MEDICINE

## 2024-11-18 PROCEDURE — 1123F ACP DISCUSS/DSCN MKR DOCD: CPT | Performed by: FAMILY MEDICINE

## 2024-11-18 PROCEDURE — 99213 OFFICE O/P EST LOW 20 MIN: CPT | Performed by: FAMILY MEDICINE

## 2024-11-18 PROCEDURE — 1159F MED LIST DOCD IN RCRD: CPT | Performed by: FAMILY MEDICINE

## 2024-11-18 PROCEDURE — 3017F COLORECTAL CA SCREEN DOC REV: CPT | Performed by: FAMILY MEDICINE

## 2024-11-18 PROCEDURE — 1160F RVW MEDS BY RX/DR IN RCRD: CPT | Performed by: FAMILY MEDICINE

## 2024-11-18 PROCEDURE — G8484 FLU IMMUNIZE NO ADMIN: HCPCS | Performed by: FAMILY MEDICINE

## 2024-11-18 PROCEDURE — 3075F SYST BP GE 130 - 139MM HG: CPT | Performed by: FAMILY MEDICINE

## 2024-11-18 PROCEDURE — 1126F AMNT PAIN NOTED NONE PRSNT: CPT | Performed by: FAMILY MEDICINE

## 2024-11-18 PROCEDURE — 1090F PRES/ABSN URINE INCON ASSESS: CPT | Performed by: FAMILY MEDICINE

## 2024-11-18 ASSESSMENT — ENCOUNTER SYMPTOMS
GASTROINTESTINAL NEGATIVE: 1
RESPIRATORY NEGATIVE: 1

## 2024-11-18 NOTE — PROGRESS NOTES
Chief Complaint   Patient presents with    Hypertension    Blood Pressure Check     1. \"Have you been to the ER, urgent care clinic since your last visit?  Hospitalized since your last visit?\" No    2. \"Have you seen or consulted any other health care providers outside of the [facility] since your last visit?\" Dr Mohr    3. For patients aged 45-75: Has the patient had a colonoscopy / FIT/ Cologuard? Yes - Care Gap present. Most recent result on file 2013      If the patient is female:    4. For patients aged 40-74: Has the patient had a mammogram within the past 2 years? Yes - Care Gap present. Most recent result on file 6/2022      5. For patients aged 21-65: Has the patient had a pap smear? NA - based on age or sex      
Overview Note:     [facility]: Dr Ray, Dr. Baumann               PAST SURGICAL HISTORY     Past Surgical History:   Procedure Laterality Date    COLONOSCOPY  3/2013    Benign cecal polyp, f/u 10 yrs, Dr. Harper    COLONOSCOPY  1980's    Benign polypectomy    COLOSTOMY  04/08/2019    Exp Laparotomy, Sigmoidectomy & Colostomy for Perforated Sigmoid Diverticulum, Dr. Manzo    CRANIOTOMY  10/2006    for clipping of ruptured aneurysm; Dr Lassiter/has a shunt    CSF SHUNT  04/17/2019    Right Intrapleural Placement  Shunt (relocated due to peritoneal/abdominal surgery 4/8/19), [facility], Dr. Rohan Montes     CYST INCISION AND DRAINAGE      Side not know    ECHO STRESS  2005    Negative    GI  07/19/2019    Laparoscopic closure of colostomy     HYSTERECTOMY (CERVIX STATUS UNKNOWN)  1991    Cervical Dysplasia (Dr Stephen Mckeon)    IR DRAIN SKIN ABSCESS  03/29/2019    Lap Drain Pelvic Abscess for Perforated Sigmoid Diverticulum, Dr. Manzo    IR INSERT PERM PERITONEAL CATH  04/08/2019    Externalize  Shunt Right Abdomen for Exp Lap/Colostomy, [facility], Dr. Rohan Montes    OTHER SURGICAL HISTORY  04/08/2019    Laparotomy with sigmoidectomy and end [id number]. B. Carmody    OTHER SURGICAL HISTORY  07/19/2019    lap sig. [id number]. b. Carmody    UPPER GASTROINTESTINAL ENDOSCOPY  EGD, Dr Harper    \"ok\" per pt report         MEDICATIONS     Current Outpatient Medications   Medication Sig    losartan-hydroCHLOROthiazide (HYZAAR) 100-25 MG per tablet Take 1 tablet by mouth daily    amLODIPine (NORVASC) 10 MG tablet Take 1 tablet by mouth daily    rosuvastatin (CRESTOR) 10 MG tablet Take 1 tablet by mouth daily    BiPAP Machine MISC nightly    OXYGEN by BiPAP route nightly    dilTIAZem (CARDIZEM CD) 360 MG extended release capsule Take 1 capsule by mouth daily    sertraline (ZOLOFT) 100 MG tablet TAKE 1 AND 1/2 TABLETS DAILY BY MOUTH    omeprazole (PRILOSEC) 20 MG delayed release capsule TAKE 1 CAPSULE BY

## 2024-11-23 DIAGNOSIS — F41.1 GENERALIZED ANXIETY DISORDER: ICD-10-CM

## 2024-11-23 DIAGNOSIS — F51.05 INSOMNIA DUE TO OTHER MENTAL DISORDER (CODE): ICD-10-CM

## 2024-11-24 RX ORDER — BUSPIRONE HYDROCHLORIDE 5 MG/1
TABLET ORAL
Qty: 180 TABLET | Refills: 1 | Status: SHIPPED | OUTPATIENT
Start: 2024-11-24

## 2024-11-24 RX ORDER — TRAZODONE HYDROCHLORIDE 50 MG/1
50 TABLET, FILM COATED ORAL NIGHTLY
Qty: 90 TABLET | Refills: 1 | Status: SHIPPED | OUTPATIENT
Start: 2024-11-24

## 2025-01-09 DIAGNOSIS — I48.0 PAROXYSMAL ATRIAL FIBRILLATION (HCC): ICD-10-CM

## 2025-01-09 DIAGNOSIS — I10 BENIGN ESSENTIAL HYPERTENSION: ICD-10-CM

## 2025-01-10 NOTE — TELEPHONE ENCOUNTER
PCP: Brandy Acevedo MD    Last appt: 11/18/2024     Future Appointments   Date Time Provider Department Center   4/15/2025  9:00 AM Brandy Acevedo MD [facility]   5/16/2025  1:00 PM Brittany Baca MD [facility]       Requested Prescriptions     Pending Prescriptions Disp Refills    dilTIAZem (CARDIZEM CD) 360 MG extended release capsule [Pharmacy Med Name: DILTIAZEM 24H ER(CD) 360 MG CP] 90 capsule 0     Sig: TAKE 1 CAPSULE BY MOUTH EVERY DAY    losartan-hydroCHLOROthiazide (HYZAAR) 100-25 MG per tablet [Pharmacy Med Name: LOSARTAN-HCTZ 100-25 MG TAB] 90 tablet 0     Sig: TAKE 1 TABLET BY MOUTH EVERY DAY    amLODIPine (NORVASC) 10 MG tablet [Pharmacy Med Name: AMLODIPINE BESYLATE 10 MG TAB] 90 tablet 0     Sig: TAKE 1 TABLET BY MOUTH EVERY DAY       Prior labs and Blood pressures:  BP Readings from Last 3 Encounters:   11/18/24 136/64   11/15/24 (!) 157/74   10/07/24 (!) 162/78     Lab Results   Component Value Date/Time     10/07/2024 10:44 AM    K 3.7 10/07/2024 10:44 AM     10/07/2024 10:44 AM    CO2 29 10/07/2024 10:44 AM    BUN 9 10/07/2024 10:44 AM    GFRAA >60 02/22/2021 04:38 PM     No results found for: \"HBA1C\", \"HMI1DGCJ\"  Lab Results   Component Value Date/Time    CHOL 128 10/07/2024 10:44 AM    HDL 54 10/07/2024 10:44 AM    LDL 46.2 10/07/2024 10:44 AM    LDL 40.6 10/30/2023 12:57 PM    VLDL 27.8 10/07/2024 10:44 AM    VLDL 20 08/24/2022 12:00 AM     No results found for: \"VITD3\"    Lab Results   Component Value Date/Time    TSH 2.53 10/07/2024 10:44 AM

## 2025-01-11 RX ORDER — LOSARTAN POTASSIUM AND HYDROCHLOROTHIAZIDE 25; 100 MG/1; MG/1
1 TABLET ORAL DAILY
Qty: 90 TABLET | Refills: 1 | Status: SHIPPED | OUTPATIENT
Start: 2025-01-11

## 2025-01-11 RX ORDER — DILTIAZEM HYDROCHLORIDE 360 MG/1
CAPSULE, EXTENDED RELEASE ORAL DAILY
Qty: 90 CAPSULE | Refills: 1 | Status: SHIPPED | OUTPATIENT
Start: 2025-01-11

## 2025-01-11 RX ORDER — AMLODIPINE BESYLATE 10 MG/1
10 TABLET ORAL DAILY
Qty: 90 TABLET | Refills: 1 | Status: SHIPPED | OUTPATIENT
Start: 2025-01-11

## 2025-01-28 DIAGNOSIS — F32.A DEPRESSION, UNSPECIFIED: ICD-10-CM

## 2025-01-28 DIAGNOSIS — F41.1 GENERALIZED ANXIETY DISORDER: ICD-10-CM

## 2025-01-28 DIAGNOSIS — K21.9 GASTRO-ESOPHAGEAL REFLUX DISEASE WITHOUT ESOPHAGITIS: ICD-10-CM

## 2025-01-29 NOTE — TELEPHONE ENCOUNTER
Called, left vm for pt to return call to office.  Message left for patient to call due to her concern if she has enough medication until her OV.

## 2025-01-30 RX ORDER — SERTRALINE HYDROCHLORIDE 100 MG/1
150 TABLET, FILM COATED ORAL DAILY
Qty: 135 TABLET | Refills: 1 | Status: SHIPPED | OUTPATIENT
Start: 2025-01-30

## 2025-01-30 NOTE — TELEPHONE ENCOUNTER
Last appointment: 11/18/24 MD BRADFORD  Next appointment: 4/15/25 MD BRADFORD  Previous refill encounter(s):   Omeprazole 5/27/24 90 + 1  Sertraline 8/31/24 135    Requested Prescriptions     Pending Prescriptions Disp Refills    omeprazole (PRILOSEC) 20 MG delayed release capsule [Pharmacy Med Name: OMEPRAZOLE DR 20 MG CAPSULE] 90 capsule 0     Sig: Take 1 capsule by mouth daily    sertraline (ZOLOFT) 100 MG tablet [Pharmacy Med Name: SERTRALINE  MG TABLET] 135 tablet 0     Sig: Take 1.5 tablets by mouth daily     For Pharmacy Admin Tracking Only    Program: Medication Refill  CPA in place:    Recommendation Provided To:   Intervention Detail: New Rx: 2, reason: Patient Preference  Intervention Accepted By:   Gap Closed?:    Time Spent (min): 5

## 2025-03-20 RX ORDER — DILTIAZEM HYDROCHLORIDE 300 MG/1
CAPSULE, EXTENDED RELEASE ORAL DAILY
Qty: 30 CAPSULE | OUTPATIENT
Start: 2025-03-20

## 2025-03-20 NOTE — TELEPHONE ENCOUNTER
Requested Prescriptions     Refused Prescriptions Disp Refills    TIADYLT  MG extended release capsule [Pharmacy Med Name: TIADYLT  MG CAPSULE] 30 capsule      Sig: TAKE 1 CAPSULE BY MOUTH EVERY DAY     Refused By: HENRIQUE SANCHEZ     Reason for Refusal: Other     VO per MD    Future Appointments   Date Time Provider Department Center   4/15/2025  9:00 AM [facility]   5/16/2025  1:00 PM Brittany Baca MD [facility]

## 2025-04-15 ENCOUNTER — OFFICE VISIT (OUTPATIENT)
Age: 75
End: 2025-04-15
Payer: MEDICARE

## 2025-04-15 VITALS
WEIGHT: 192.74 LBS | BODY MASS INDEX: 28.55 KG/M2 | DIASTOLIC BLOOD PRESSURE: 72 MMHG | OXYGEN SATURATION: 96 % | HEART RATE: 79 BPM | RESPIRATION RATE: 16 BRPM | HEIGHT: 69 IN | SYSTOLIC BLOOD PRESSURE: 146 MMHG | TEMPERATURE: 98.1 F

## 2025-04-15 DIAGNOSIS — Z86.79 HISTORY OF SPONTANEOUS SUBARACHNOID INTRACRANIAL HEMORRHAGE DUE TO CEREBRAL AVM: ICD-10-CM

## 2025-04-15 DIAGNOSIS — E78.2 MIXED HYPERLIPIDEMIA: ICD-10-CM

## 2025-04-15 DIAGNOSIS — K75.81 METABOLIC DYSFUNCTION-ASSOCIATED STEATOHEPATITIS (MASH): ICD-10-CM

## 2025-04-15 DIAGNOSIS — E11.9 DIABETES MELLITUS TYPE 2, NONINSULIN DEPENDENT (HCC): Primary | ICD-10-CM

## 2025-04-15 DIAGNOSIS — I48.0 PAROXYSMAL ATRIAL FIBRILLATION (HCC): ICD-10-CM

## 2025-04-15 DIAGNOSIS — I10 BENIGN ESSENTIAL HYPERTENSION: ICD-10-CM

## 2025-04-15 DIAGNOSIS — J44.9 COPD, SEVERE (HCC): ICD-10-CM

## 2025-04-15 LAB — HBA1C MFR BLD: 7 %

## 2025-04-15 PROCEDURE — 2022F DILAT RTA XM EVC RTNOPTHY: CPT | Performed by: FAMILY MEDICINE

## 2025-04-15 PROCEDURE — 3051F HG A1C>EQUAL 7.0%<8.0%: CPT | Performed by: FAMILY MEDICINE

## 2025-04-15 PROCEDURE — 83036 HEMOGLOBIN GLYCOSYLATED A1C: CPT | Performed by: FAMILY MEDICINE

## 2025-04-15 PROCEDURE — 3078F DIAST BP <80 MM HG: CPT | Performed by: FAMILY MEDICINE

## 2025-04-15 PROCEDURE — 1159F MED LIST DOCD IN RCRD: CPT | Performed by: FAMILY MEDICINE

## 2025-04-15 PROCEDURE — G8417 CALC BMI ABV UP PARAM F/U: HCPCS | Performed by: FAMILY MEDICINE

## 2025-04-15 PROCEDURE — 3077F SYST BP >= 140 MM HG: CPT | Performed by: FAMILY MEDICINE

## 2025-04-15 PROCEDURE — 3017F COLORECTAL CA SCREEN DOC REV: CPT | Performed by: FAMILY MEDICINE

## 2025-04-15 PROCEDURE — 1160F RVW MEDS BY RX/DR IN RCRD: CPT | Performed by: FAMILY MEDICINE

## 2025-04-15 PROCEDURE — 1126F AMNT PAIN NOTED NONE PRSNT: CPT | Performed by: FAMILY MEDICINE

## 2025-04-15 PROCEDURE — 1123F ACP DISCUSS/DSCN MKR DOCD: CPT | Performed by: FAMILY MEDICINE

## 2025-04-15 PROCEDURE — G8400 PT W/DXA NO RESULTS DOC: HCPCS | Performed by: FAMILY MEDICINE

## 2025-04-15 PROCEDURE — G2211 COMPLEX E/M VISIT ADD ON: HCPCS | Performed by: FAMILY MEDICINE

## 2025-04-15 PROCEDURE — 3023F SPIROM DOC REV: CPT | Performed by: FAMILY MEDICINE

## 2025-04-15 PROCEDURE — 99213 OFFICE O/P EST LOW 20 MIN: CPT | Performed by: FAMILY MEDICINE

## 2025-04-15 PROCEDURE — 3046F HEMOGLOBIN A1C LEVEL >9.0%: CPT | Performed by: FAMILY MEDICINE

## 2025-04-15 PROCEDURE — 1090F PRES/ABSN URINE INCON ASSESS: CPT | Performed by: FAMILY MEDICINE

## 2025-04-15 PROCEDURE — 4004F PT TOBACCO SCREEN RCVD TLK: CPT | Performed by: FAMILY MEDICINE

## 2025-04-15 PROCEDURE — PBSHW AMB POC HEMOGLOBIN A1C: Performed by: FAMILY MEDICINE

## 2025-04-15 PROCEDURE — G8427 DOCREV CUR MEDS BY ELIG CLIN: HCPCS | Performed by: FAMILY MEDICINE

## 2025-04-15 RX ORDER — MULTIVITAMIN WITH IRON
1 TABLET ORAL DAILY
COMMUNITY

## 2025-04-15 SDOH — ECONOMIC STABILITY: FOOD INSECURITY: WITHIN THE PAST 12 MONTHS, YOU WORRIED THAT YOUR FOOD WOULD RUN OUT BEFORE YOU GOT MONEY TO BUY MORE.: NEVER TRUE

## 2025-04-15 SDOH — ECONOMIC STABILITY: FOOD INSECURITY: WITHIN THE PAST 12 MONTHS, THE FOOD YOU BOUGHT JUST DIDN'T LAST AND YOU DIDN'T HAVE MONEY TO GET MORE.: NEVER TRUE

## 2025-04-15 SDOH — ECONOMIC STABILITY: INCOME INSECURITY: IN THE LAST 12 MONTHS, WAS THERE A TIME WHEN YOU WERE NOT ABLE TO PAY THE MORTGAGE OR RENT ON TIME?: NO

## 2025-04-15 SDOH — ECONOMIC STABILITY: TRANSPORTATION INSECURITY
IN THE PAST 12 MONTHS, HAS THE LACK OF TRANSPORTATION KEPT YOU FROM MEDICAL APPOINTMENTS OR FROM GETTING MEDICATIONS?: NO

## 2025-04-15 ASSESSMENT — PATIENT HEALTH QUESTIONNAIRE - PHQ9
SUM OF ALL RESPONSES TO PHQ QUESTIONS 1-9: 0
5. POOR APPETITE OR OVEREATING: NOT AT ALL
SUM OF ALL RESPONSES TO PHQ QUESTIONS 1-9: 0
SUM OF ALL RESPONSES TO PHQ QUESTIONS 1-9: 0
6. FEELING BAD ABOUT YOURSELF - OR THAT YOU ARE A FAILURE OR HAVE LET YOURSELF OR YOUR FAMILY DOWN: NOT AT ALL
1. LITTLE INTEREST OR PLEASURE IN DOING THINGS: NOT AT ALL
8. MOVING OR SPEAKING SO SLOWLY THAT OTHER PEOPLE COULD HAVE NOTICED. OR THE OPPOSITE, BEING SO FIGETY OR RESTLESS THAT YOU HAVE BEEN MOVING AROUND A LOT MORE THAN USUAL: NOT AT ALL
3. TROUBLE FALLING OR STAYING ASLEEP: NOT AT ALL
7. TROUBLE CONCENTRATING ON THINGS, SUCH AS READING THE NEWSPAPER OR WATCHING TELEVISION: NOT AT ALL
9. THOUGHTS THAT YOU WOULD BE BETTER OFF DEAD, OR OF HURTING YOURSELF: NOT AT ALL
2. FEELING DOWN, DEPRESSED OR HOPELESS: NOT AT ALL
4. FEELING TIRED OR HAVING LITTLE ENERGY: NOT AT ALL
10. IF YOU CHECKED OFF ANY PROBLEMS, HOW DIFFICULT HAVE THESE PROBLEMS MADE IT FOR YOU TO DO YOUR WORK, TAKE CARE OF THINGS AT HOME, OR GET ALONG WITH OTHER PEOPLE: NOT DIFFICULT AT ALL
SUM OF ALL RESPONSES TO PHQ QUESTIONS 1-9: 0

## 2025-04-15 ASSESSMENT — ENCOUNTER SYMPTOMS
GASTROINTESTINAL NEGATIVE: 1
RESPIRATORY NEGATIVE: 1

## 2025-04-15 NOTE — PROGRESS NOTES
Chief Complaint   Patient presents with    Diabetes     6 Month Follow Up     1. \"Have you been to the ER, urgent care clinic since your last visit?  Hospitalized since your last visit?\" No    2. \"Have you seen or consulted any other health care providers outside of the [facility] since your last visit?\" No     3. For patients aged 45-75: Has the patient had a colonoscopy / FIT/ Cologuard? Yes - no Care Gap present 2013 Benign cecal polyp, f/u 10 yrs, Dr. Harper       If the patient is female:    4. For patients aged 40-74: Has the patient had a mammogram within the past 2 years? 10/2024      5. For patients aged 21-65: Has the patient had a pap smear? NA - based on age or sex      
the home and a rolling walker on outings      No longer drives since the SAH and stroke      Daughter manages her finances and medications      Daughter helps w/ cleaning, meals and total transportation       Does other ADL's, dressing, bathing, grooming all on her own; some help w/ meal preparation; pt can use microwave and the stove when daughters are home.     Patient does some light house work/cleaning.       No toileting problems; uses independently     Social History     Substance and Sexual Activity   Sexual Activity Not Currently       IMMUNIZATIONS     Immunization History   Administered Date(s) Administered    COVID-19, PFIZER PURPLE top, DILUTE for use, (age 12 y+), 30mcg/0.3mL 2021, 2021, 2021    Pneumococcal, PCV-13, PREVNAR 13, (age 6w+), IM, 0.5mL 2019    Pneumococcal, PPSV23, PNEUMOVAX 23, (age 2y+), SC/IM, 0.5mL 07/10/2014, 2021    TD 2LF, TDVAX, (age 7y+), IM, 0.5mL 2023         FAMILY HISTORY     Family History   Problem Relation Age of Onset    Anxiety Disorder Daughter     Thyroid Disease Sister     Thyroid Disease Sister     Hypertension Sister     Hypertension Sister     Anxiety Disorder Daughter     Attention Deficit Disorder Daughter     Drug Abuse Daughter     Anesth Problems Neg Hx     Thyroid Disease Daughter     Thyroid Disease Daughter         Hypothyroidism    Asthma Daughter     Cancer Father         kidney    Diabetes Father     Osteoarthritis Father     Diabetes Mother     Hypertension Mother     Osteoarthritis Mother     Stroke Mother     Hypertension Sister     Depression Sister     Pneumonia Sister          age 65    Diabetes Sister     Thyroid Disease Sister     Thyroid Disease Sister     Heart Disease Sister     Diabetes Maternal Grandmother     No Known Problems Brother     Thyroid Disease Sister     Anxiety Disorder Sister     Depression Sister     Alcohol Abuse Sister          VITALS   BP (!) 146/72 (BP Site: Left Upper Arm, Patient

## 2025-05-01 NOTE — PROGRESS NOTES
Progress Note Patient: Priti Orta MRN: [id number]  SSN: [id number] YOB: 1950  Age: 79 y.o. Sex: female Admit Date: 3/26/2019 
 
8 Days Post-Op Procedure:  Procedure(s): LAPAROTOMY EXPLORATORY/ SIGMOIDECTOMY/ COLOSTOMY 
externalize Shunt Ventricular-Peritoneal 
 
Subjective:  
 
Patient still requiring intermittent BIPAP; tolerating GI Lite diet. Objective:  
 
Visit Vitals /74 Pulse 86 Temp 98.7 °F (37.1 °C) Resp 20 Ht 5' 8\" (1.727 m) Wt 181 lb 7 oz (82.3 kg) SpO2 99% BMI 27.59 kg/m² Temp (24hrs), Av.8 °F (37.1 °C), Min:98.6 °F (37 °C), Max:99.1 °F (37.3 °C) Physical Exam: ABDOMEN: Obese, non-distended; midline wound without signs of infection; serous drain fluid; productive colostomy; appropriate incisional pain with palpation. Data Review: VS, I/O's, Labs Lab Review:  
Recent Results (from the past 12 hour(s)) GLUCOSE, POC Collection Time: 04/15/19 10:53 PM  
Result Value Ref Range Glucose (POC) 208 (H) 65 - 100 mg/dL Performed by Rachelle Gautam   
CBC WITH AUTOMATED DIFF Collection Time: 19  3:35 AM  
Result Value Ref Range WBC 14.0 (H) 3.6 - 11.0 K/uL  
 RBC 2.51 (L) 3.80 - 5.20 M/uL HGB 7.7 (L) 11.5 - 16.0 g/dL HCT 24.5 (L) 35.0 - 47.0 % MCV 97.6 80.0 - 99.0 FL  
 MCH 30.7 26.0 - 34.0 PG  
 MCHC 31.4 30.0 - 36.5 g/dL  
 RDW 14.3 11.5 - 14.5 % PLATELET 119 922 - 111 K/uL MPV 9.6 8.9 - 12.9 FL  
 NRBC 0.0 0  WBC ABSOLUTE NRBC 0.00 0.00 - 0.01 K/uL NEUTROPHILS 72 32 - 75 % LYMPHOCYTES 15 12 - 49 % MONOCYTES 8 5 - 13 % EOSINOPHILS 4 0 - 7 % BASOPHILS 0 0 - 1 % IMMATURE GRANULOCYTES 1 (H) 0.0 - 0.5 % ABS. NEUTROPHILS 10.0 (H) 1.8 - 8.0 K/UL  
 ABS. LYMPHOCYTES 2.0 0.8 - 3.5 K/UL  
 ABS. MONOCYTES 1.2 (H) 0.0 - 1.0 K/UL  
 ABS. EOSINOPHILS 0.6 (H) 0.0 - 0.4 K/UL  
 ABS. BASOPHILS 0.0 0.0 - 0.1 K/UL  
 ABS. IMM. GRANS. 0.2 (H) 0.00 - 0.04 K/UL  
 DF AUTOMATED METABOLIC PANEL, BASIC Collection Time: 19  3:35 AM  
Result Value Ref Range Sodium 132 (L) 136 - 145 mmol/L Potassium 3.7 3.5 - 5.1 mmol/L Chloride 95 (L) 97 - 108 mmol/L  
 CO2 32 21 - 32 mmol/L Anion gap 5 5 - 15 mmol/L Glucose 190 (H) 65 - 100 mg/dL BUN 11 6 - 20 MG/DL Creatinine 0.43 (L) 0.55 - 1.02 MG/DL  
 BUN/Creatinine ratio 26 (H) 12 - 20 GFR est AA >60 >60 ml/min/1.73m2 GFR est non-AA >60 >60 ml/min/1.73m2 Calcium 8.3 (L) 8.5 - 10.1 MG/DL  
GLUCOSE, POC Collection Time: 19  7:12 AM  
Result Value Ref Range Glucose (POC) 238 (H) 65 - 100 mg/dL Performed by Pepper Gomez Assessment:  
 
Hospital Problems  Date Reviewed: 2019 Codes Class Noted POA Acute hypoxemic respiratory failure [facility] ICD-10-CM: J96.01 
ICD-9-CM: 518.81  2019 No  
   
 Pneumonia due to infectious organism ICD-10-CM: J18.9 ICD-9-CM: 136.9, 484.8  2019 No  
   
 Perforated diverticulum of large intestine ICD-10-CM: K57.20 ICD-9-CM: 562.10  3/26/2019 Yes Slight increase in WBC count but afebrile, tolerating diet. Plan/Recommendations/Medical Decision Makin. GI Lite diet. 2. Will not renew TPN; add supplements. 3. PT/OT. 4. BIPAP as needed. 5. Remove drain. 6. Tentatively planning resiting of  shunt tomorrow. no vascular compromise show

## 2025-05-08 ENCOUNTER — HOSPITAL ENCOUNTER (OUTPATIENT)
Facility: HOSPITAL | Age: 75
Discharge: HOME | End: 2025-05-11
Attending: STUDENT IN AN ORGANIZED HEALTH CARE EDUCATION/TRAINING PROGRAM
Payer: MEDICARE

## 2025-05-08 DIAGNOSIS — I78.1 SPIDER ANGIOMA OF SKIN: ICD-10-CM

## 2025-05-08 DIAGNOSIS — K76.0 HEPATIC STEATOSIS: ICD-10-CM

## 2025-05-08 PROCEDURE — 76700 US EXAM ABDOM COMPLETE: CPT

## 2025-05-09 LAB
ALBUMIN SERPL-MCNC: 4.6 G/DL (ref 3.8–4.8)
ALP SERPL-CCNC: 67 IU/L (ref 44–121)
ALT SERPL-CCNC: 35 IU/L (ref 0–32)
AST SERPL-CCNC: 43 IU/L (ref 0–40)
BASOPHILS # BLD AUTO: 0.1 X10E3/UL (ref 0–0.2)
BASOPHILS NFR BLD AUTO: 0 %
BILIRUB DIRECT SERPL-MCNC: 0.16 MG/DL (ref 0–0.4)
BILIRUB SERPL-MCNC: 0.3 MG/DL (ref 0–1.2)
EOSINOPHIL # BLD AUTO: 0.3 X10E3/UL (ref 0–0.4)
EOSINOPHIL NFR BLD AUTO: 3 %
ERYTHROCYTE [DISTWIDTH] IN BLOOD BY AUTOMATED COUNT: 12.8 % (ref 11.7–15.4)
FERRITIN SERPL-MCNC: 142 NG/ML (ref 15–150)
HCT VFR BLD AUTO: 45.5 % (ref 34–46.6)
HGB BLD-MCNC: 15.1 G/DL (ref 11.1–15.9)
IMM GRANULOCYTES # BLD AUTO: 0 X10E3/UL (ref 0–0.1)
IMM GRANULOCYTES NFR BLD AUTO: 0 %
INR PPP: 1 (ref 0.9–1.2)
IRON SATN MFR SERPL: 25 % (ref 15–55)
IRON SERPL-MCNC: 86 UG/DL (ref 27–139)
LYMPHOCYTES # BLD AUTO: 2.9 X10E3/UL (ref 0.7–3.1)
LYMPHOCYTES NFR BLD AUTO: 24 %
MCH RBC QN AUTO: 30.3 PG (ref 26.6–33)
MCHC RBC AUTO-ENTMCNC: 33.2 G/DL (ref 31.5–35.7)
MCV RBC AUTO: 91 FL (ref 79–97)
MONOCYTES # BLD AUTO: 0.7 X10E3/UL (ref 0.1–0.9)
MONOCYTES NFR BLD AUTO: 6 %
NEUTROPHILS # BLD AUTO: 8.3 X10E3/UL (ref 1.4–7)
NEUTROPHILS NFR BLD AUTO: 67 %
PLATELET # BLD AUTO: 295 X10E3/UL (ref 150–450)
PROT SERPL-MCNC: 7.5 G/DL (ref 6–8.5)
PROTHROMBIN TIME: 11.1 SEC (ref 9.1–12)
RBC # BLD AUTO: 4.99 X10E6/UL (ref 3.77–5.28)
TIBC SERPL-MCNC: 340 UG/DL (ref 250–450)
UIBC SERPL-MCNC: 254 UG/DL (ref 118–369)
WBC # BLD AUTO: 12.3 X10E3/UL (ref 3.4–10.8)

## 2025-05-10 LAB
A2 MACROGLOB SERPL-MCNC: 351 MG/DL (ref 110–276)
ALT SERPL W P-5'-P-CCNC: 42 IU/L (ref 0–40)
APO A-I SERPL-MCNC: 156 MG/DL (ref 116–209)
AST SERPL W P-5'-P-CCNC: 29 IU/L (ref 0–40)
BILIRUB SERPL-MCNC: 0.3 MG/DL (ref 0–1.2)
CHOLEST SERPL-MCNC: 127 MG/DL (ref 100–199)
FIBROSIS SCORING:: ABNORMAL
FIBROSIS STAGE SERPL QL: ABNORMAL
GGT SERPL-CCNC: 43 IU/L (ref 0–60)
GLUCOSE SERPL-MCNC: 166 MG/DL (ref 70–99)
HAPTOGLOB SERPL-MCNC: 203 MG/DL (ref 42–346)
LABORATORY COMMENT REPORT: ABNORMAL
LIVER FIBR SCORE SERPL CALC.FIBROSURE: 0.39 (ref 0–0.21)
LIVER STEATOSIS GRADE SERPL QL: ABNORMAL
LIVER STEATOSIS SCORE SERPL: 0.7 (ref 0–0.4)
NASH GRADE SERPL QL: ABNORMAL
NASH INTERPRETATION SERPL-IMP: ABNORMAL
NASH SCORE SERPL: 0.73 (ref 0–0.25)
NASH SCORING: ABNORMAL
STEATOSIS SCORING: ABNORMAL
TEST PERFORMANCE INFO SPEC: ABNORMAL
TEST PERFORMANCE INFO SPEC: ABNORMAL
TRIGL SERPL-MCNC: 112 MG/DL (ref 0–149)

## 2025-05-14 LAB
A1AT PHENOTYP SERPL IFE: NORMAL
A1AT SERPL-MCNC: 169 MG/DL (ref 101–187)

## 2025-05-15 ENCOUNTER — RESULTS FOLLOW-UP (OUTPATIENT)
Age: 75
End: 2025-05-15

## 2025-05-16 ENCOUNTER — OFFICE VISIT (OUTPATIENT)
Age: 75
End: 2025-05-16
Payer: MEDICARE

## 2025-05-16 VITALS
TEMPERATURE: 98 F | DIASTOLIC BLOOD PRESSURE: 75 MMHG | SYSTOLIC BLOOD PRESSURE: 123 MMHG | WEIGHT: 196.4 LBS | HEIGHT: 69 IN | BODY MASS INDEX: 29.09 KG/M2 | OXYGEN SATURATION: 98 % | HEART RATE: 69 BPM

## 2025-05-16 DIAGNOSIS — K76.0 HEPATIC STEATOSIS: Primary | ICD-10-CM

## 2025-05-16 PROCEDURE — 91200 LIVER ELASTOGRAPHY: CPT | Performed by: STUDENT IN AN ORGANIZED HEALTH CARE EDUCATION/TRAINING PROGRAM

## 2025-05-16 PROCEDURE — 99214 OFFICE O/P EST MOD 30 MIN: CPT | Performed by: STUDENT IN AN ORGANIZED HEALTH CARE EDUCATION/TRAINING PROGRAM

## 2025-05-16 ASSESSMENT — PATIENT HEALTH QUESTIONNAIRE - PHQ9
6. FEELING BAD ABOUT YOURSELF - OR THAT YOU ARE A FAILURE OR HAVE LET YOURSELF OR YOUR FAMILY DOWN: NOT AT ALL
SUM OF ALL RESPONSES TO PHQ QUESTIONS 1-9: 0
8. MOVING OR SPEAKING SO SLOWLY THAT OTHER PEOPLE COULD HAVE NOTICED. OR THE OPPOSITE, BEING SO FIGETY OR RESTLESS THAT YOU HAVE BEEN MOVING AROUND A LOT MORE THAN USUAL: NOT AT ALL
DEPRESSION UNABLE TO ASSESS: FUNCTIONAL CAPACITY MOTIVATION LIMITS ACCURACY
4. FEELING TIRED OR HAVING LITTLE ENERGY: NOT AT ALL
1. LITTLE INTEREST OR PLEASURE IN DOING THINGS: NOT AT ALL
SUM OF ALL RESPONSES TO PHQ QUESTIONS 1-9: 0
5. POOR APPETITE OR OVEREATING: NOT AT ALL
9. THOUGHTS THAT YOU WOULD BE BETTER OFF DEAD, OR OF HURTING YOURSELF: NOT AT ALL
3. TROUBLE FALLING OR STAYING ASLEEP: NOT AT ALL
10. IF YOU CHECKED OFF ANY PROBLEMS, HOW DIFFICULT HAVE THESE PROBLEMS MADE IT FOR YOU TO DO YOUR WORK, TAKE CARE OF THINGS AT HOME, OR GET ALONG WITH OTHER PEOPLE: NOT DIFFICULT AT ALL
2. FEELING DOWN, DEPRESSED OR HOPELESS: NOT AT ALL
SUM OF ALL RESPONSES TO PHQ QUESTIONS 1-9: 0
7. TROUBLE CONCENTRATING ON THINGS, SUCH AS READING THE NEWSPAPER OR WATCHING TELEVISION: NOT AT ALL
SUM OF ALL RESPONSES TO PHQ QUESTIONS 1-9: 0

## 2025-05-16 NOTE — PROGRESS NOTES
[facility]     Freeman Mohr MD, FACP, MACG, FAASLD   MD Beulah Guido, AWA Priest, [facility]   Ting Acevedo, [facility]   Jody Gomezosta, [facility]-  Paulino Barron, [facility]-   Stephanie Aguilar, [facility]   Klarissa Granado, [facility]   [address], [address]   [place]  [address] [phone]   FAX: [phone]  [facility]   [address], [address]   [place]  [address] [phone]   FAX: [phone]         Patient Care Team:  Brandy Acevedo MD as PCP - General  Brandy Acevedo MD as PCP - Empaneled Provider  Logan Manzo MD as Physician    The clinicians listed above have asked me to see Arlene Campbell in consultation regarding elevated liver enzymes and its management.      All medical records sent by the referring physicians were reviewed including available imaging studies and pathology.        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

## 2025-05-16 NOTE — PROGRESS NOTES
Chief Complaint   Patient presents with    Follow-up     Vitals:    05/16/25 1307   BP: 123/75   Pulse: 69   Temp: 98 °F (36.7 °C)   SpO2: 98%     Have you been to the ER, urgent care clinic since your last visit?  Hospitalized since your last visit?   NO    Have you seen or consulted any other health care providers outside our system since your last visit?   NO    Have you had a mammogram?”   NO    Date of last Mammogram: 6/2/2022       “Have you had a colorectal cancer screening such as a colonoscopy/FIT/Cologuard?    NO    Date of last Colonoscopy: 3/19/2013  No cologuard on file  No FIT/FOBT on file   No flexible sigmoidoscopy on file     “Have you had a diabetic eye exam?”    NO     No diabetic eye exam on file

## 2025-05-17 DIAGNOSIS — K21.9 GASTRO-ESOPHAGEAL REFLUX DISEASE WITHOUT ESOPHAGITIS: ICD-10-CM

## 2025-05-17 DIAGNOSIS — F41.1 GENERALIZED ANXIETY DISORDER: ICD-10-CM

## 2025-05-17 DIAGNOSIS — F32.A DEPRESSION, UNSPECIFIED: ICD-10-CM

## 2025-05-19 RX ORDER — SERTRALINE HYDROCHLORIDE 100 MG/1
TABLET, FILM COATED ORAL
Qty: 135 TABLET | Refills: 1 | OUTPATIENT
Start: 2025-05-19

## 2025-05-19 RX ORDER — OMEPRAZOLE 20 MG/1
CAPSULE, DELAYED RELEASE ORAL DAILY
Qty: 90 CAPSULE | Refills: 1 | OUTPATIENT
Start: 2025-05-19

## 2025-05-23 NOTE — PROGRESS NOTES
Hospital Discharge Follow-Up      Date/Time:  2019 9:06 AM    Patient was admitted to [facility] on  and discharged on  for laparoscopic closure of colostomy. The physician discharge summary was not available at the time of outreach. Patient was contacted within 1 business days of discharge. Top Challenges reviewed with the provider   Perforated diverticulum 3/26/19. Surgery -lap closure of colostomy/rigid protoscopy. Method of communication with provider :chart routing,face to face    Inpatient RRAT score: 25  Was this a readmission? no   Patient stated reason for the readmission: na    Nurse Navigator (NN) contacted the patient by telephone to perform post hospital discharge assessment. Verified name and  with patient as identifiers. Provided introduction to self, and explanation of the Nurse Navigator role. Patient reports she does have pain this am at the incision site. Rates it at about a #6. Getting ready to take Oxycodone. Last dose was last night before bed. Reminded of cautions with opioids. Reviewed discharge instructions and red flags with patient who verbalized understanding. Patient given an opportunity to ask questions and does not have any further questions or concerns at this time. The patient agrees to contact the PCP office for questions related to their healthcare. NN provided contact information for future reference. Disease Specific:   N/A    Summary of patient's top problems: 1. History of perforated sigmoid diverticulitis in 2019, ultimately managed with open Sachin's procedure in 2019. She gradually recovered and was discharged to home. She has continued to improve with repletion of protein stores and cessation of smoking. LAPAROSCOPIC COLOSTOMY CLOSURE, RIGID PROCTOSCOPY (E R A S)- per . Discharged to home on  with [address] [place] [address].       Home Health orders at discharge: SN/PT/OT  [address]: No CSA   No urine drug screen  Pended CSA  PCP to decine drug screen vs. Drug confrmation    04/23/2025 04/23/2025 1 Vyvanse 40 Mg Capsule 30.00 30 Di Beg [id number] Llo (7560) 0/0  [place]icaid [place]   03/20/2025 12/12/2024 1 Lisdexamfetamine 40 Mg Capsule 30.00 30 Di Beg [id number] Llo (7560) 0/0  [place]   02/18/2025 02/18/2025 2 Oxycodone Hcl (Ir) 5 Mg Tablet 6.00 2 Resendiz Ezequiel [id number] Shoaib ([id number]) 0/0 22.50 MME [place]   02/14/2025 12/12/2024 1 Vyvanse 40 Mg Capsule 30.00 30 Di Beg [id number] Llo (7560) 0/0  [place]   01/10/2025 10/28/2024 1 Lisdexamfetamine 30 Mg Capsule 30.00 30 Di Beg [id number] Llo (7560) 0/0  [place]      Tai Mat  Date of initial visit:     Durable Medical Equipment ordered/company: none  Durable Medical Equipment received: na    Barriers to care? None noted. Patient lives with daughter, caretaker. Advance Care Planning:   Does patient have an Advance Directive:  not on file     Medication(s):   New Medications at Discharge: Oxycodone IR 5mg q 4 hours prn pain  Changed Medications at Discharge: none  Discontinued Medications at Discharge: Flagyl and Neomycin    Medication reconciliation was performed with family, who verbalizes understanding of administration of home medications. There were no barriers to obtaining medications identified at this time. Referral to Pharm D needed: no     Current Outpatient Medications   Medication Sig    oxyCODONE IR (ROXICODONE) 5 mg immediate release tablet Take 1 Tab by mouth every four (4) hours as needed for Pain for up to 5 days. Max Daily Amount: 30 mg.    sertraline (ZOLOFT) 100 mg tablet TAKE 1.5 TABS BY MOUTH DAILY    omeprazole (PRILOSEC) 20 mg capsule TAKE 1 CAPSULE BY MOUTH EVERY DAY    AMLODIPINE BESYLATE, BULK, Take 10 mg by mouth daily.  losartan-hydroCHLOROthiazide (HYZAAR) 50-12.5 mg per tablet TAKE 1 TABLET BY MOUTH EVERY DAY    ferrous sulfate 325 mg (65 mg iron) tablet Take 1 Tab by mouth two (2) times a day.  dilTIAZem CD (CARDIZEM CD) 300 mg ER capsule Take 1 Cap by mouth daily.  busPIRone (BUSPAR) 5 mg tablet Take 1 Tab by mouth two (2) times a day. Indications: Repeated Episodes of Anxiety    sertraline (ZOLOFT) 100 mg tablet TAKE 1.5 TABS BY MOUTH DAILY    omeprazole (PRILOSEC) 20 mg capsule TAKE 1 CAPSULE BY MOUTH EVERY DAY    traZODone (DESYREL) 50 mg tablet TAKE 1 TABLET BY MOUTH EVERYDAY AT BEDTIME    albuterol (VENTOLIN HFA) 90 mcg/actuation inhaler Take 2 Puffs by inhalation every six (6) hours as needed for Wheezing or Shortness of Breath.  MULTIVITAMINS (MULTIVITAMIN PO) Take  by mouth daily.      No current facility-administered medications for this visit. There are no discontinued medications. [facility] follow up appointment(s):   Future Appointments   Date Time Provider Mona Adamson   7/30/2019 10:20 AM Juliet Matias MD [facility] SCHED   8/6/2019 10:00 AM Munira Rosenthal MD [facility] SCHED   11/13/2019 11:00 AM Gemma El  [address]      Non-[facility] follow up appointment(s):   [facility]:  information provided as a resource       Goals        Patient Stated     education on management after bowel surgery (pt-stated)      7/23/19 [facility] 7/19-7/22 lap closure of colostomy  · Reminded to get rest  · Walk in house to get blood flowing and prevent pneumonia and constipation  · No lifting  · Shower only for first 2 weeks after 48 hours after having surgey  · Eat healthy diet  · Yogurt will put good bacteria in colon and help prevent diarrhea  · Plenty of fluids-water and caffeine free best  · Take pain meds as directed-caution with the Oxycodone(0pioid)-as pain lessens, take fewer and farther between  · Reviewed red flags: fever, pus from incision,red streaks from incision, increased pain/warmth/redness. · F/u with  8/6 10am.  · MELBA with pcp 7/30 10:20am.  · Call NN if any questions/concerns. mbt         Other     Prevent complications post hospitalization. 7/23/19 [facility] 7/19-7/22 Lap closure of colostomy/rigid protoscopy  · Reviewed discharge instructions with patient and daughter, Joshua Donald (caretaker)  · Reviewed medications- education using teach back method on new medication,opioid. · Cautions on use of opioid discussed  · Reviewed red flags: fever, signs of infection at incision site, nausea,vomiting,diarrhea, sob,confusion,chest pain. · MELBA with pcp 7/30 at 10:20am-trying to schedule sooner appt with pcp. · F/u with surgeon, - 8/6 at 10am.  · Given info on [facility] as resource. · Given NN contact info if any questions/concerns.   · NN to call back in 5-7 days for update. mbt

## 2025-08-16 DIAGNOSIS — I10 BENIGN ESSENTIAL HYPERTENSION: ICD-10-CM

## 2025-08-16 DIAGNOSIS — E78.2 MIXED HYPERLIPIDEMIA: ICD-10-CM

## 2025-08-18 RX ORDER — LOSARTAN POTASSIUM AND HYDROCHLOROTHIAZIDE 25; 100 MG/1; MG/1
1 TABLET ORAL DAILY
Qty: 90 TABLET | Refills: 0 | Status: SHIPPED | OUTPATIENT
Start: 2025-08-18

## 2025-08-18 RX ORDER — AMLODIPINE BESYLATE 10 MG/1
10 TABLET ORAL DAILY
Qty: 90 TABLET | Refills: 0 | Status: SHIPPED | OUTPATIENT
Start: 2025-08-18

## 2025-08-18 RX ORDER — ROSUVASTATIN CALCIUM 10 MG/1
10 TABLET, COATED ORAL DAILY
Qty: 90 TABLET | Refills: 0 | Status: SHIPPED | OUTPATIENT
Start: 2025-08-18

## 2025-08-24 DIAGNOSIS — F41.1 GENERALIZED ANXIETY DISORDER: ICD-10-CM

## 2025-08-24 DIAGNOSIS — F32.A DEPRESSION, UNSPECIFIED: ICD-10-CM

## 2025-08-24 DIAGNOSIS — K21.9 GASTRO-ESOPHAGEAL REFLUX DISEASE WITHOUT ESOPHAGITIS: ICD-10-CM

## 2025-08-26 RX ORDER — SERTRALINE HYDROCHLORIDE 100 MG/1
150 TABLET, FILM COATED ORAL DAILY
Qty: 135 TABLET | Refills: 1 | Status: SHIPPED | OUTPATIENT
Start: 2025-08-26

## 2025-08-26 RX ORDER — OMEPRAZOLE 20 MG/1
20 CAPSULE, DELAYED RELEASE ORAL DAILY
Qty: 90 CAPSULE | Refills: 1 | Status: SHIPPED | OUTPATIENT
Start: 2025-08-26

## (undated) DEVICE — STAPLER INT 12MM 60MM CART SHT NEW KNF BLDE W/ EVERY FIRING

## (undated) DEVICE — 3M™ TEGADERM™ TRANSPARENT FILM DRESSING FRAME STYLE, 1626W, 4 IN X 4-3/4 IN (10 CM X 12 CM), 50/CT 4CT/CASE: Brand: 3M™ TEGADERM™

## (undated) DEVICE — ARTICULATING RELOAD WITH TRI-STAPLE TECHNOLOGY: Brand: ENDO GIA

## (undated) DEVICE — RELOAD STPL 45MM THCK TISS GRN W/ GRIPPING SURF TECHNOLOGY

## (undated) DEVICE — Device

## (undated) DEVICE — WRAP SURG W1.31XL1.34M CARD FOR PT 165-172CM THERMOWRP

## (undated) DEVICE — SOLUTION IV 1000ML 0.9% SOD CHL

## (undated) DEVICE — BLADE ASSEMB CLP HAIR COARSE --

## (undated) DEVICE — SUTURE PDS II SZ 1 L27IN ABSRB VLT CT-1 L36MM 1/2 CIR Z341H

## (undated) DEVICE — SPONGE: SPECIALTY PEANUT XR 100/CS: Brand: MEDICAL ACTION INDUSTRIES

## (undated) DEVICE — INTENDED FOR TISSUE SEPARATION, AND OTHER PROCEDURES THAT REQUIRE A SHARP SURGICAL BLADE TO PUNCTURE OR CUT.: Brand: BARD-PARKER ® CARBON RIB-BACK BLADES

## (undated) DEVICE — SUTURE PROL SZ 2-0 L36IN NONABSORBABLE BLU SH L26MM 1/2 CIR 8523H

## (undated) DEVICE — ROCKER SWITCH PENCIL BLADE ELECTRODE, HOLSTER: Brand: EDGE

## (undated) DEVICE — CATH URETH INTMIT ROB 16FR FUN -- CONVERT TO ITEM 179520

## (undated) DEVICE — STERILE-Z MAYO STAND COVERS CLEAR POLYETHYLENE STERILE UNIVERSAL FIT 20 PER CASE: Brand: STERILE-Z

## (undated) DEVICE — DRAPE FLD WRM W44XL66IN C6L FOR INTRATEMP SYS THERMABASIN

## (undated) DEVICE — SUT ETHLN 2-0 18IN FS BLK --

## (undated) DEVICE — BLUNT TIP TROCAR: Brand: AUTO SUTURE

## (undated) DEVICE — POWER SHELL: Brand: SIGNIA

## (undated) DEVICE — NEEDLE HYPO 22GA L1.5IN BLK S STL HUB POLYPR SHLD REG BVL

## (undated) DEVICE — DEVON™ KNEE AND BODY STRAP 60" X 3" (1.5 M X 7.6 CM): Brand: DEVON

## (undated) DEVICE — BLADELESS OPTICAL TROCAR WITH FIXATION CANNULA: Brand: VERSAONE

## (undated) DEVICE — PASSER 48407 CATHETER 38CM DISP.: Brand: CATHTER PASSER, DISPOSABLE

## (undated) DEVICE — STAPLER INT L L28MM DIA5MM GI BLU TI BARIATRIC CIR CUT 2

## (undated) DEVICE — SUTURE PERMA-HAND 1 L60IN NONABSORBABLE BLK SUTUPAK PRECUT SA7H

## (undated) DEVICE — INFECTION CONTROL KIT SYS

## (undated) DEVICE — SUTURE PDS II SZ 1 L96IN ABSRB VLT TP-1 L65MM 1/2 CIR Z880G

## (undated) DEVICE — UNIVERSAL FIXATION CANNULA: Brand: VERSAONE

## (undated) DEVICE — STERILE POLYISOPRENE POWDER-FREE SURGICAL GLOVES: Brand: PROTEXIS

## (undated) DEVICE — TUBING INSUFLTN 10FT LUER -- CONVERT TO ITEM 368568

## (undated) DEVICE — BIPOLAR IRRIGATOR INTEGRATED TUBING AND BIPOLAR CORD SET, DISPOSABLE

## (undated) DEVICE — GAUZE SPONGES,12 PLY: Brand: CURITY

## (undated) DEVICE — DRAPE,ROBOTICS,STERILE: Brand: MEDLINE

## (undated) DEVICE — SUTURE PERMAHAND SZ 3-0 L18IN NONABSORBABLE BLK L26MM SH C013D

## (undated) DEVICE — DRAPE,EENT,SPLIT,STERILE: Brand: MEDLINE

## (undated) DEVICE — SUTURE VCRL SZ 3-0 L27IN ABSRB VLT L26MM SH 1/2 CIR J316H

## (undated) DEVICE — TRAY PREP DRY W/ PREM GLV 2 APPL 6 SPNG 2 UNDPD 1 OVERWRAP

## (undated) DEVICE — FILTER SMK EVAC FLO CLR MEGADYNE

## (undated) DEVICE — 1200 GUARD II KIT W/5MM TUBE W/O VAC TUBE: Brand: GUARDIAN

## (undated) DEVICE — NEEDLE HYPO 25GA L1.5IN BVL ORIENTED ECLIPSE

## (undated) DEVICE — SPONGE LAP 18X18IN STRL -- 5/PK

## (undated) DEVICE — MARYLAND JAW LAPAROSCOPIC SEALER/DIVIDER COATED: Brand: LIGASURE

## (undated) DEVICE — SUTURE VCRL SZ 2-0 L27IN ABSRB VLT L26MM UR-6 5/8 CIR J602H

## (undated) DEVICE — STERILE POLYISOPRENE POWDER-FREE SURGICAL GLOVES WITH EMOLLIENT COATING: Brand: PROTEXIS

## (undated) DEVICE — SUTURE MCRYL SZ 4-0 L27IN ABSRB UD L19MM PS-2 1/2 CIR PRIM Y426H

## (undated) DEVICE — WOUND RETRACTOR AND PROTECTOR: Brand: ALEXIS WOUND PROTECTOR-RETRACTOR

## (undated) DEVICE — COLON CLOSING PACK: Brand: MEDLINE INDUSTRIES, INC.

## (undated) DEVICE — SUTURE PDS II SZ 1 L36IN ABSRB VLT L48MM CTX 1/2 CIR Z371T

## (undated) DEVICE — DRAIN CHN 19FR L0.25MM DIA6.3MM SIL RND HUBLESS FULL FLUT

## (undated) DEVICE — 3M™ IOBAN™ 2 ANTIMICROBIAL INCISE DRAPE 6648EZ: Brand: IOBAN™ 2

## (undated) DEVICE — PREP SKN PREVAIL 40ML APPL --

## (undated) DEVICE — SOLUTION IRRIG 1000ML H2O STRL BLT

## (undated) DEVICE — TRAY CATH 16F DRN BG LTX -- CONVERT TO ITEM 363158

## (undated) DEVICE — GOWN,SLEEVE,STERILE,W/CSR WRAP,1/P: Brand: MEDLINE

## (undated) DEVICE — PACK,EENT,TURBAN DRAPE,PK II: Brand: MEDLINE

## (undated) DEVICE — (D)PREP SKN CHLRAPRP APPL 26ML -- CONVERT TO ITEM 371833

## (undated) DEVICE — TROCAR SITE CLOSURE DEVICE: Brand: ENDO CLOSE

## (undated) DEVICE — COLOSTOMY/ILEOSTOMY KIT,FLEXWEAR: Brand: NEW IMAGE

## (undated) DEVICE — SKIN TEMPERATURE SENSOR: Brand: DEROYAL

## (undated) DEVICE — SEALER TISS L20CM DIA13MM ADV BPLR L CRV JAW OPN APPRCH

## (undated) DEVICE — 2, DISPOSABLE SUCTION/IRRIGATOR WITHOUT DISPOSABLE TIP: Brand: STRYKEFLOW

## (undated) DEVICE — STRAP,POSITIONING,KNEE/BODY,FOAM,4X60": Brand: MEDLINE

## (undated) DEVICE — AGENT HEMSTAT 3GM PURIFIED PLNT STARCH PWD ABSRB ARISTA AH

## (undated) DEVICE — GOWN,SIRUS,FABRNF,XL,20/CS: Brand: MEDLINE

## (undated) DEVICE — REM POLYHESIVE ADULT PATIENT RETURN ELECTRODE: Brand: VALLEYLAB

## (undated) DEVICE — SURGICAL PROCEDURE KIT GEN LAPAROSCOPY LF

## (undated) DEVICE — 3000CC GUARDIAN II: Brand: GUARDIAN

## (undated) DEVICE — BLADE ELECTRODE: Brand: EDGE

## (undated) DEVICE — STAPLER WITH DST SERIES TECHNOLOGY: Brand: GIA

## (undated) DEVICE — GARMENT,MEDLINE,DVT,INT,CALF,MED, GEN2: Brand: MEDLINE

## (undated) DEVICE — SURGICAL PROCEDURE PACK BASIN MAJ SET CUST NO CAUT

## (undated) DEVICE — KENDALL SCD EXPRESS SLEEVES, KNEE LENGTH, MEDIUM: Brand: KENDALL SCD

## (undated) DEVICE — 40580 - THE PINK PAD - ADVANCED TRENDELENBURG POSITIONING KIT: Brand: 40580 - THE PINK PAD - ADVANCED TRENDELENBURG POSITIONING KIT

## (undated) DEVICE — MEDI-VAC NON-CONDUCTIVE SUCTION TUBING: Brand: CARDINAL HEALTH

## (undated) DEVICE — 1016 S-DRAPE IRRIG POUCH 10/BOX: Brand: STERI-DRAPE™

## (undated) DEVICE — PACK,BASIC,SIRUS,V: Brand: MEDLINE

## (undated) DEVICE — VISUALIZATION SYSTEM: Brand: CLEARIFY

## (undated) DEVICE — BLADELESS OPTICAL TROCAR WITH FIXATION CANNULA: Brand: VERSAPORT

## (undated) DEVICE — DBD-PACK,LAPAROTOMY,2 REINFORCED GOWNS: Brand: MEDLINE

## (undated) DEVICE — SHEARS ENDOSCP L36CM DIA5MM ULTRASONIC CRV TIP HARM

## (undated) DEVICE — APPLICATOR BNDG 1MM ADH PREMIERPRO EXOFIN

## (undated) DEVICE — TOWEL SURG W17XL27IN STD BLU COT NONFENESTRATED PREWASHED

## (undated) DEVICE — HANDLE LT SNAP ON ULT DURABLE LENS FOR TRUMPF ALC DISPOSABLE

## (undated) DEVICE — CLICKLINE SCISSORS INSERT: Brand: CLICKLINE

## (undated) DEVICE — 5.0MM CANNULATED DRILL BIT/QC 200MM

## (undated) DEVICE — INTENDED USED TO PROTECT, TAG AND HELP LOCATED SUTURES DURING SURGERY: Brand: STERION®SUTURE AID BOOTIES

## (undated) DEVICE — SUT SLK 2-0SH 30IN BLK --

## (undated) DEVICE — SLIM BODY SKIN STAPLER: Brand: APPOSE ULC

## (undated) DEVICE — HANDPIECE LAP L23MM DIA5MM VES SEAL CUT CRV JAW PSTL GRP

## (undated) DEVICE — Z DISCONTINUED USE 2744636  DRESSING AQUACEL 14 IN ALG W3.5XL14IN POLYUR FLM CVR W/ HYDRCOLL

## (undated) DEVICE — DRAPE,REIN 53X77,STERILE: Brand: MEDLINE

## (undated) DEVICE — ADPT IV LR STUB 160/190ML 18G --

## (undated) DEVICE — POOLE SUCTION INSTRUMENT WITH REMOVABLE SHEATH: Brand: POOLE

## (undated) DEVICE — SOLUTION IV 250ML 0.9% SOD CHL CLR INJ FLX BG CONT PRT CLSR